# Patient Record
Sex: FEMALE | Race: WHITE | NOT HISPANIC OR LATINO | Employment: FULL TIME | URBAN - METROPOLITAN AREA
[De-identification: names, ages, dates, MRNs, and addresses within clinical notes are randomized per-mention and may not be internally consistent; named-entity substitution may affect disease eponyms.]

---

## 2018-12-08 ENCOUNTER — APPOINTMENT (OUTPATIENT)
Dept: CT IMAGING | Facility: HOSPITAL | Age: 43
End: 2018-12-08

## 2018-12-08 ENCOUNTER — HOSPITAL ENCOUNTER (EMERGENCY)
Facility: HOSPITAL | Age: 43
Discharge: HOME | End: 2018-12-08
Attending: EMERGENCY MEDICINE | Admitting: EMERGENCY MEDICINE

## 2018-12-08 ENCOUNTER — APPOINTMENT (OUTPATIENT)
Dept: GENERAL RADIOLOGY | Facility: HOSPITAL | Age: 43
End: 2018-12-08

## 2018-12-08 VITALS
SYSTOLIC BLOOD PRESSURE: 180 MMHG | WEIGHT: 168 LBS | HEIGHT: 66 IN | DIASTOLIC BLOOD PRESSURE: 125 MMHG | OXYGEN SATURATION: 98 % | RESPIRATION RATE: 16 BRPM | BODY MASS INDEX: 27 KG/M2 | TEMPERATURE: 99.5 F | HEART RATE: 120 BPM

## 2018-12-08 DIAGNOSIS — R10.9 LEFT FLANK PAIN: ICD-10-CM

## 2018-12-08 DIAGNOSIS — F19.10 POLYSUBSTANCE ABUSE (HCC): Primary | ICD-10-CM

## 2018-12-08 DIAGNOSIS — L98.9 SKIN LESIONS, GENERALIZED: ICD-10-CM

## 2018-12-08 LAB
ALBUMIN SERPL-MCNC: 3.5 G/DL (ref 3.5–5.2)
ALBUMIN/GLOB SERPL: 0.7 G/DL
ALP SERPL-CCNC: 82 U/L (ref 39–117)
ALT SERPL W P-5'-P-CCNC: 68 U/L (ref 1–33)
AMPHET+METHAMPHET UR QL: POSITIVE
ANION GAP SERPL CALCULATED.3IONS-SCNC: 13.7 MMOL/L
AST SERPL-CCNC: 77 U/L (ref 1–32)
B-HCG UR QL: NEGATIVE
BACTERIA UR QL AUTO: ABNORMAL /HPF
BARBITURATES UR QL SCN: NEGATIVE
BASOPHILS # BLD AUTO: 0.01 10*3/MM3 (ref 0–0.2)
BASOPHILS NFR BLD AUTO: 0.1 % (ref 0–1.5)
BENZODIAZ UR QL SCN: NEGATIVE
BILIRUB SERPL-MCNC: 0.9 MG/DL (ref 0.1–1.2)
BILIRUB UR QL STRIP: NEGATIVE
BUN BLD-MCNC: 9 MG/DL (ref 6–20)
BUN/CREAT SERPL: 13.2 (ref 7–25)
CALCIUM SPEC-SCNC: 8.8 MG/DL (ref 8.6–10.5)
CANNABINOIDS SERPL QL: POSITIVE
CHLORIDE SERPL-SCNC: 92 MMOL/L (ref 98–107)
CK SERPL-CCNC: 84 U/L (ref 20–180)
CLARITY UR: ABNORMAL
CO2 SERPL-SCNC: 25.3 MMOL/L (ref 22–29)
COARSE GRAN CASTS URNS QL MICRO: ABNORMAL /LPF
COCAINE UR QL: NEGATIVE
COLOR UR: ABNORMAL
CREAT BLD-MCNC: 0.68 MG/DL (ref 0.57–1)
D-LACTATE SERPL-SCNC: 1.5 MMOL/L (ref 0.5–2)
DEPRECATED RDW RBC AUTO: 52.2 FL (ref 37–54)
EOSINOPHIL # BLD AUTO: 0.01 10*3/MM3 (ref 0–0.7)
EOSINOPHIL NFR BLD AUTO: 0.1 % (ref 0.3–6.2)
ERYTHROCYTE [DISTWIDTH] IN BLOOD BY AUTOMATED COUNT: 16 % (ref 11.7–13)
FINE GRAN CASTS URNS QL MICRO: ABNORMAL /LPF
GFR SERPL CREATININE-BSD FRML MDRD: 95 ML/MIN/1.73
GLOBULIN UR ELPH-MCNC: 4.7 GM/DL
GLUCOSE BLD-MCNC: 118 MG/DL (ref 65–99)
GLUCOSE UR STRIP-MCNC: NEGATIVE MG/DL
HCT VFR BLD AUTO: 33.1 % (ref 35.6–45.5)
HGB BLD-MCNC: 10.4 G/DL (ref 11.9–15.5)
HGB UR QL STRIP.AUTO: NEGATIVE
HYALINE CASTS UR QL AUTO: ABNORMAL /LPF
IMM GRANULOCYTES # BLD: 0.03 10*3/MM3 (ref 0–0.03)
IMM GRANULOCYTES NFR BLD: 0.3 % (ref 0–0.5)
KETONES UR QL STRIP: ABNORMAL
LEUKOCYTE ESTERASE UR QL STRIP.AUTO: ABNORMAL
LIPASE SERPL-CCNC: 8 U/L (ref 13–60)
LYMPHOCYTES # BLD AUTO: 1.13 10*3/MM3 (ref 0.9–4.8)
LYMPHOCYTES NFR BLD AUTO: 9.7 % (ref 19.6–45.3)
MCH RBC QN AUTO: 28.1 PG (ref 26.9–32)
MCHC RBC AUTO-ENTMCNC: 31.4 G/DL (ref 32.4–36.3)
MCV RBC AUTO: 89.5 FL (ref 80.5–98.2)
METHADONE UR QL SCN: NEGATIVE
MONOCYTES # BLD AUTO: 1.06 10*3/MM3 (ref 0.2–1.2)
MONOCYTES NFR BLD AUTO: 9.1 % (ref 5–12)
MUCOUS THREADS URNS QL MICRO: ABNORMAL /HPF
NEUTROPHILS # BLD AUTO: 9.35 10*3/MM3 (ref 1.9–8.1)
NEUTROPHILS NFR BLD AUTO: 80.7 % (ref 42.7–76)
NITRITE UR QL STRIP: NEGATIVE
OPIATES UR QL: POSITIVE
OXYCODONE UR QL SCN: NEGATIVE
PH UR STRIP.AUTO: 5.5 [PH] (ref 5–8)
PLATELET # BLD AUTO: 197 10*3/MM3 (ref 140–500)
PMV BLD AUTO: 11.2 FL (ref 6–12)
POTASSIUM BLD-SCNC: 3.6 MMOL/L (ref 3.5–5.2)
PROCALCITONIN SERPL-MCNC: 0.83 NG/ML (ref 0.1–0.25)
PROT SERPL-MCNC: 8.2 G/DL (ref 6–8.5)
PROT UR QL STRIP: ABNORMAL
RBC # BLD AUTO: 3.7 10*6/MM3 (ref 3.9–5.2)
RBC # UR: ABNORMAL /HPF
REF LAB TEST METHOD: ABNORMAL
SODIUM BLD-SCNC: 131 MMOL/L (ref 136–145)
SP GR UR STRIP: 1.03 (ref 1–1.03)
SQUAMOUS #/AREA URNS HPF: ABNORMAL /HPF
UROBILINOGEN UR QL STRIP: ABNORMAL
WBC NRBC COR # BLD: 11.59 10*3/MM3 (ref 4.5–10.7)
WBC UR QL AUTO: ABNORMAL /HPF

## 2018-12-08 PROCEDURE — 80053 COMPREHEN METABOLIC PANEL: CPT | Performed by: PHYSICIAN ASSISTANT

## 2018-12-08 PROCEDURE — 87150 DNA/RNA AMPLIFIED PROBE: CPT | Performed by: PHYSICIAN ASSISTANT

## 2018-12-08 PROCEDURE — 81001 URINALYSIS AUTO W/SCOPE: CPT | Performed by: PHYSICIAN ASSISTANT

## 2018-12-08 PROCEDURE — 25010000002 KETOROLAC TROMETHAMINE PER 15 MG: Performed by: PHYSICIAN ASSISTANT

## 2018-12-08 PROCEDURE — 80307 DRUG TEST PRSMV CHEM ANLYZR: CPT | Performed by: PHYSICIAN ASSISTANT

## 2018-12-08 PROCEDURE — 71260 CT THORAX DX C+: CPT

## 2018-12-08 PROCEDURE — 87186 SC STD MICRODIL/AGAR DIL: CPT | Performed by: PHYSICIAN ASSISTANT

## 2018-12-08 PROCEDURE — 96375 TX/PRO/DX INJ NEW DRUG ADDON: CPT

## 2018-12-08 PROCEDURE — 96361 HYDRATE IV INFUSION ADD-ON: CPT

## 2018-12-08 PROCEDURE — 25010000002 ONDANSETRON PER 1 MG: Performed by: PHYSICIAN ASSISTANT

## 2018-12-08 PROCEDURE — 87040 BLOOD CULTURE FOR BACTERIA: CPT | Performed by: PHYSICIAN ASSISTANT

## 2018-12-08 PROCEDURE — 81025 URINE PREGNANCY TEST: CPT | Performed by: PHYSICIAN ASSISTANT

## 2018-12-08 PROCEDURE — 71045 X-RAY EXAM CHEST 1 VIEW: CPT

## 2018-12-08 PROCEDURE — 82550 ASSAY OF CK (CPK): CPT | Performed by: EMERGENCY MEDICINE

## 2018-12-08 PROCEDURE — 25010000002 IOPAMIDOL 61 % SOLUTION: Performed by: EMERGENCY MEDICINE

## 2018-12-08 PROCEDURE — 83605 ASSAY OF LACTIC ACID: CPT | Performed by: PHYSICIAN ASSISTANT

## 2018-12-08 PROCEDURE — 84145 PROCALCITONIN (PCT): CPT | Performed by: PHYSICIAN ASSISTANT

## 2018-12-08 PROCEDURE — 99284 EMERGENCY DEPT VISIT MOD MDM: CPT

## 2018-12-08 PROCEDURE — 87147 CULTURE TYPE IMMUNOLOGIC: CPT | Performed by: PHYSICIAN ASSISTANT

## 2018-12-08 PROCEDURE — 85025 COMPLETE CBC W/AUTO DIFF WBC: CPT | Performed by: PHYSICIAN ASSISTANT

## 2018-12-08 PROCEDURE — 83690 ASSAY OF LIPASE: CPT | Performed by: PHYSICIAN ASSISTANT

## 2018-12-08 PROCEDURE — 74177 CT ABD & PELVIS W/CONTRAST: CPT

## 2018-12-08 PROCEDURE — 96374 THER/PROPH/DIAG INJ IV PUSH: CPT

## 2018-12-08 PROCEDURE — 36415 COLL VENOUS BLD VENIPUNCTURE: CPT

## 2018-12-08 RX ORDER — ONDANSETRON 2 MG/ML
4 INJECTION INTRAMUSCULAR; INTRAVENOUS ONCE
Status: COMPLETED | OUTPATIENT
Start: 2018-12-08 | End: 2018-12-08

## 2018-12-08 RX ORDER — DOXYCYCLINE 100 MG/1
100 CAPSULE ORAL 2 TIMES DAILY
Qty: 20 CAPSULE | Refills: 0 | Status: SHIPPED | OUTPATIENT
Start: 2018-12-08 | End: 2018-12-21 | Stop reason: HOSPADM

## 2018-12-08 RX ORDER — KETOROLAC TROMETHAMINE 15 MG/ML
15 INJECTION, SOLUTION INTRAMUSCULAR; INTRAVENOUS ONCE
Status: COMPLETED | OUTPATIENT
Start: 2018-12-08 | End: 2018-12-08

## 2018-12-08 RX ORDER — SODIUM CHLORIDE 0.9 % (FLUSH) 0.9 %
10 SYRINGE (ML) INJECTION AS NEEDED
Status: DISCONTINUED | OUTPATIENT
Start: 2018-12-08 | End: 2018-12-08 | Stop reason: HOSPADM

## 2018-12-08 RX ORDER — NAPROXEN SODIUM 220 MG
220 TABLET ORAL 2 TIMES DAILY PRN
COMMUNITY
End: 2018-12-08

## 2018-12-08 RX ORDER — IBUPROFEN 600 MG/1
600 TABLET ORAL 3 TIMES DAILY
Qty: 24 TABLET | Refills: 0 | Status: SHIPPED | OUTPATIENT
Start: 2018-12-08 | End: 2018-12-21 | Stop reason: HOSPADM

## 2018-12-08 RX ORDER — METRONIDAZOLE 500 MG/1
2000 TABLET ORAL ONCE
Status: COMPLETED | OUTPATIENT
Start: 2018-12-08 | End: 2018-12-08

## 2018-12-08 RX ADMIN — IOPAMIDOL 85 ML: 612 INJECTION, SOLUTION INTRAVENOUS at 03:57

## 2018-12-08 RX ADMIN — SODIUM CHLORIDE 1000 ML: 9 INJECTION, SOLUTION INTRAVENOUS at 04:51

## 2018-12-08 RX ADMIN — ONDANSETRON 4 MG: 2 INJECTION INTRAMUSCULAR; INTRAVENOUS at 02:30

## 2018-12-08 RX ADMIN — SODIUM CHLORIDE, POTASSIUM CHLORIDE, SODIUM LACTATE AND CALCIUM CHLORIDE 1000 ML: 600; 310; 30; 20 INJECTION, SOLUTION INTRAVENOUS at 02:30

## 2018-12-08 RX ADMIN — METRONIDAZOLE 2000 MG: 500 TABLET, FILM COATED ORAL at 05:36

## 2018-12-08 RX ADMIN — KETOROLAC TROMETHAMINE 15 MG: 15 INJECTION, SOLUTION INTRAMUSCULAR; INTRAVENOUS at 05:13

## 2018-12-08 NOTE — DISCHARGE INSTRUCTIONS
Home, rest, home medicine as prescribed, keep well hydrated, follow up with PCP for recheck. Return to care with further concerns.

## 2018-12-08 NOTE — ED PROVIDER NOTES
Pt presents to the ED c/o left sided abd pain and flank pain that began several days ago. She also complains of fever, nausea, and vomiting for the same time period. On exam, mild left sided abd tenderness without rebound or guarding  Multiple excoriated areas to the trunk and extremities. Plan for labs and CT abd/pelvis.    Attestation:  The JACKY and I have discussed this patient's history, physical exam, and treatment plan.  I have reviewed the documentation and personally had a face to face interaction with the patient. I affirm the documentation and agree with the treatment and plan.  The attached note describes my personal findings.      Documentation assistance provided by arsh Saunders for Dr Barriga Information recorded by the arsh was done at my direction and has been verified and validated by me.     Tanna Saunders  12/08/18 0317       Aldo Barriga MD  12/08/18 0543

## 2018-12-08 NOTE — ED NOTES
Lesions are noted on bilaterally on forearms, feet, lower legs, abdomen. Lesions range in size but appear to be one cm in length, red appearance with dark discoloration around areas. Pt reports them to burn and itch.      Naina Gonsalez RN  12/08/18 0205

## 2018-12-08 NOTE — ED PROVIDER NOTES
" EMERGENCY DEPARTMENT ENCOUNTER    CHIEF COMPLAINT  Chief Complaint: Back Pain  History given by: Patient  History limited by:   Room Number: 26/26  PMD: Provider, No Known      HPI:  Pt is a 43 y.o. female who presents complaining of lower back pain that began several weeks ago. Since that time, pt reports that the pain has began to radiate to the LLQ and L hip. Pt was seen at [facility] and started on an abx (she does not know what for). Pt also reports some nausea and vomiting that began 4 day ago. Pt has hx of IVDA. She reports developing some skin lesions over the past several months. Pt has hx of nephrolithiasis.    Duration: several weeks  Onset: gradual  Timing: constant  Location: lower back  Radiation: LLQ, L hip  Quality: \"pain\"  Intensity/Severity: moderate  Progression: worsening  Associated Symptoms: nausea, vomiting, fever, wounds  Aggravating Factors: none  Alleviating Factors: none  Previous Episodes: hx of kidney stones. Pain is different  Treatment before arrival: seen at [facility] and started on abx.     PAST MEDICAL HISTORY  Active Ambulatory Problems     Diagnosis Date Noted   • No Active Ambulatory Problems     Resolved Ambulatory Problems     Diagnosis Date Noted   • No Resolved Ambulatory Problems     Past Medical History:   Diagnosis Date   • Kidney stone    • Lupus (systemic lupus erythematosus) (CMS/HCC)    • Mitral valve anterior leaflet prolapse    • Seizures (CMS/HCC)        PAST SURGICAL HISTORY  Past Surgical History:   Procedure Laterality Date   • CHOLECYSTECTOMY     • LIVER BIOPSY     • LYMPH NODE BIOPSY         FAMILY HISTORY  History reviewed. No pertinent family history.    SOCIAL HISTORY  Social History     Socioeconomic History   • Marital status: Single     Spouse name: Not on file   • Number of children: Not on file   • Years of education: Not on file   • Highest education level: Not on file   Social Needs   • Financial resource strain: Not on file   • Food insecurity - " worry: Not on file   • Food insecurity - inability: Not on file   • Transportation needs - medical: Not on file   • Transportation needs - non-medical: Not on file   Occupational History   • Not on file   Tobacco Use   • Smoking status: Current Every Day Smoker     Packs/day: 1.00     Types: Cigarettes   Substance and Sexual Activity   • Alcohol use: No     Frequency: Never   • Drug use: Yes     Types: IV   • Sexual activity: Defer   Other Topics Concern   • Not on file   Social History Narrative   • Not on file       ALLERGIES  Sulfa antibiotics    REVIEW OF SYSTEMS  Review of Systems   Constitutional: Positive for fever.   HENT: Negative for sore throat.    Eyes: Negative.    Respiratory: Negative for cough and shortness of breath.    Cardiovascular: Negative for chest pain.   Gastrointestinal: Positive for nausea and vomiting. Negative for abdominal pain and diarrhea.   Genitourinary: Negative for dysuria.   Musculoskeletal: Positive for back pain. Negative for neck pain.   Skin: Positive for wound. Negative for rash.   Allergic/Immunologic: Negative.    Neurological: Negative for weakness, numbness and headaches.   Hematological: Negative.    Psychiatric/Behavioral: Negative.    All other systems reviewed and are negative.      PHYSICAL EXAM  ED Triage Vitals [12/08/18 0100]   Temp Heart Rate Resp BP SpO2   99.9 °F (37.7 °C) (!) 124 18 -- 98 %      Temp src Heart Rate Source Patient Position BP Location FiO2 (%)   -- -- -- -- --       Physical Exam   Constitutional: She is oriented to person, place, and time. No distress.   HENT:   Head: Normocephalic and atraumatic.   Mouth/Throat: Mucous membranes are dry.   Eyes: EOM are normal. Pupils are equal, round, and reactive to light.   Neck: Normal range of motion. Neck supple.   Cardiovascular: Regular rhythm and normal heart sounds. Tachycardia present.   Pulmonary/Chest: Effort normal and breath sounds normal. No respiratory distress.   Abdominal: Soft. There is  no tenderness. There is no rebound and no guarding.   L flank tenderness   Musculoskeletal: Normal range of motion. She exhibits no edema.   Neurological: She is alert and oriented to person, place, and time. She has normal sensation and normal strength.   Skin: Skin is warm and dry. Lesion (multiple to face, BUE,and BLE. Consistant with picking.) noted.   Evidence of IV drug use.    Psychiatric: Mood and affect normal.   Nursing note and vitals reviewed.      LAB RESULTS  Lab Results (last 24 hours)     Procedure Component Value Units Date/Time    CBC & Differential [[id number]] Collected:  12/08/18 0208    Specimen:  Blood Updated:  12/08/18 0223    Narrative:       The following orders were created for panel order CBC & Differential.  Procedure                               Abnormality         Status                     ---------                               -----------         ------                     CBC Auto Differential[[id number]]        Abnormal            Final result                 Please view results for these tests on the individual orders.    Comprehensive Metabolic Panel [[id number]]  (Abnormal) Collected:  12/08/18 0208    Specimen:  Blood Updated:  12/08/18 0252     Glucose 118 mg/dL      BUN 9 mg/dL      Creatinine 0.68 mg/dL      Sodium 131 mmol/L      Potassium 3.6 mmol/L      Chloride 92 mmol/L      CO2 25.3 mmol/L      Calcium 8.8 mg/dL      Total Protein 8.2 g/dL      Albumin 3.50 g/dL      ALT (SGPT) 68 U/L      AST (SGOT) 77 U/L      Alkaline Phosphatase 82 U/L      Total Bilirubin 0.9 mg/dL      eGFR Non African Amer 95 mL/min/1.73      Globulin 4.7 gm/dL      A/G Ratio 0.7 g/dL      BUN/Creatinine Ratio 13.2     Anion Gap 13.7 mmol/L     Lipase [[id number]]  (Abnormal) Collected:  12/08/18 0208    Specimen:  Blood Updated:  12/08/18 0252     Lipase 8 U/L     Blood Culture - Blood, Arm, Left [[id number]] Collected:  12/08/18 0208    Specimen:  Blood from Arm, Left Updated:  12/08/18 0215     "Lactic Acid, Plasma [[id number]]  (Normal) Collected:  12/08/18 0208    Specimen:  Blood Updated:  12/08/18 0236     Lactate 1.5 mmol/L     Procalcitonin [[id number]]  (Abnormal) Collected:  12/08/18 0208    Specimen:  Blood Updated:  12/08/18 0301     Procalcitonin 0.83 ng/mL     Narrative:       As a Marker for Sepsis (Non-Neonates):   1. <0.5 ng/mL represents a low risk of severe sepsis and/or septic shock.  1. >2 ng/mL represents a high risk of severe sepsis and/or septic shock.    As a Marker for Lower Respiratory Tract Infections that require antibiotic therapy:  PCT on Admission     Antibiotic Therapy             6-12 Hrs later  > 0.5                Strongly Recommended            >0.25 - <0.5         Recommended  0.1 - 0.25           Discouraged                   Remeasure/reassess PCT  <0.1                 Strongly Discouraged          Remeasure/reassess PCT      As 28 day mortality risk marker: \"Change in Procalcitonin Result\" (> 80 % or <=80 %) if Day 0 (or Day 1) and Day 4 values are available. Refer to http://www.[employer]-pct-calculator.com/   Change in PCT <=80 %   A decrease of PCT levels below or equal to 80 % defines a positive change in PCT test result representing a higher risk for 28-day all-cause mortality of patients diagnosed with severe sepsis or septic shock.  Change in PCT > 80 %   A decrease of PCT levels of more than 80 % defines a negative change in PCT result representing a lower risk for 28-day all-cause mortality of patients diagnosed with severe sepsis or septic shock.                CBC Auto Differential [[id number]]  (Abnormal) Collected:  12/08/18 0208    Specimen:  Blood Updated:  12/08/18 0223     WBC 11.59 10*3/mm3      RBC 3.70 10*6/mm3      Hemoglobin 10.4 g/dL      Hematocrit 33.1 %      MCV 89.5 fL      MCH 28.1 pg      MCHC 31.4 g/dL      RDW 16.0 %      RDW-SD 52.2 fl      MPV 11.2 fL      Platelets 197 10*3/mm3      Neutrophil % 80.7 %      Lymphocyte % 9.7 %      Monocyte % " 9.1 %      Eosinophil % 0.1 %      Basophil % 0.1 %      Immature Grans % 0.3 %      Neutrophils, Absolute 9.35 10*3/mm3      Lymphocytes, Absolute 1.13 10*3/mm3      Monocytes, Absolute 1.06 10*3/mm3      Eosinophils, Absolute 0.01 10*3/mm3      Basophils, Absolute 0.01 10*3/mm3      Immature Grans, Absolute 0.03 10*3/mm3     CK [[id number]]  (Normal) Collected:  12/08/18 0208    Specimen:  Blood Updated:  12/08/18 0252     Creatine Kinase 84 U/L     Pregnancy, Urine - Urine, Clean Catch [[id number]]  (Normal) Collected:  12/08/18 0229    Specimen:  Urine, Clean Catch Updated:  12/08/18 0256     HCG, Urine QL Negative    Urinalysis With Microscopic If Indicated (No Culture) - Urine, Clean Catch [[id number]]  (Abnormal) Collected:  12/08/18 0229    Specimen:  Urine, Clean Catch Updated:  12/08/18 0330     Color, UA Dark Yellow     Appearance, UA Cloudy     pH, UA 5.5     Specific Gravity, UA 1.027     Glucose, UA Negative     Ketones, UA Trace     Bilirubin, UA Negative     Blood, UA Negative     Protein,  mg/dL (2+)     Leuk Esterase, UA Small (1+)     Nitrite, UA Negative     Urobilinogen, UA 2.0 E.U./dL    Urine Drug Screen - Urine, Clean Catch [[id number]]  (Abnormal) Collected:  12/08/18 0229    Specimen:  Urine, Clean Catch Updated:  12/08/18 0327     Amphet/Methamphet, Screen Positive     Barbiturates Screen, Urine Negative     Benzodiazepine Screen, Urine Negative     Cocaine Screen, Urine Negative     Opiate Screen Positive     THC, Screen, Urine Positive     Methadone Screen, Urine Negative     Oxycodone Screen, Urine Negative    Narrative:       Negative Thresholds For Drugs Screened:     Amphetamines               500 ng/ml   Barbiturates               200 ng/ml   Benzodiazepines            100 ng/ml   Cocaine                    300 ng/ml   Methadone                  300 ng/ml   Opiates                    300 ng/ml   Oxycodone                  100 ng/ml   THC                        50 ng/ml    The  Normal Value for all drugs tested is negative. This report includes final unconfirmed screening results to be used for medical treatment purposes only. Unconfirmed results must not be used for non-medical purposes such as employment or legal testing. Clinical consideration should be applied to any drug of abuse test, particulary when unconfirmed results are used.    Urinalysis, Microscopic Only - Urine, Clean Catch [[id number]]  (Abnormal) Collected:  12/08/18 0229    Specimen:  Urine, Clean Catch Updated:  12/08/18 0330     RBC, UA 0-2 /HPF      WBC, UA 3-5 /HPF      Bacteria, UA Trace /HPF      Squamous Epithelial Cells, UA 3-6 /HPF      Hyaline Casts, UA 13-20 /LPF      Coarse Granular Casts, UA 0-2 /LPF      Fine Granular Casts, UA 3-6 /LPF      Mucus, UA Small/1+ /HPF      Methodology Manual Light Microscopy    Blood Culture - Blood, Arm, Left [[id number]] Collected:  12/08/18 0243    Specimen:  Blood from Arm, Left Updated:  12/08/18 0247          I ordered the above labs and reviewed the results    RADIOLOGY  CT Chest With Contrast   Final Result      CT Abdomen Pelvis With Contrast   Final Result   IMPRESSION CHEST CT:    1. Unremarkable CT thorax     IMPRESSION ABDOMEN & PELVIS CT:    1. Homogeneous splenic enlargement.  2. Tiny right renal lesion, likely cyst      XR Chest 1 View   Final Result       No active disease by portable imaging.       This report was finalized on 12/8/2018 2:14 AM by Luis Pelaez M.D.               I ordered the above noted radiological studies. Interpreted by radiologist. Reviewed by me in PACS.       PROCEDURES  Procedures      PROGRESS AND CONSULTS     1:45 AM  Ordered blood work, CXR, CT abd/pel.  3:00 AM  Ordered CTA chest.  3:16 AM  Reviewed pt's history and workup with Dr. Barriga.  After a bedside evaluation; Dr Barriga agrees with the plan of care  5:02 AM  Rechecked with pt. Informed pt of her elevated procalcitonin, but otherwise unremarkable labs and imaging. Informed  of plan to discharge with abx. Pt understands and agrees with plan. All concerns addressed.       MEDICAL DECISION MAKING  Results were reviewed/discussed with the patient and they were also made aware of online access. Pt also made aware that some labs, such as cultures, will not be resulted during ER visit and follow up with PMD is necessary.     MDM  Number of Diagnoses or Management Options  Polysubstance abuse (CMS/HCC):      Amount and/or Complexity of Data Reviewed  Clinical lab tests: reviewed and ordered (Procalcitonin 0.83)  Tests in the radiology section of CPT®: reviewed and ordered (unremarkable)           DIAGNOSIS  Final diagnoses:   Polysubstance abuse (CMS/HCC)       DISPOSITION  DISCHARGE    Patient discharged in stable condition.    Reviewed implications of results, diagnosis, meds, responsibility to follow up, warning signs and symptoms of possible worsening, potential complications and reasons to return to ER.    Patient/Family voiced understanding of above instructions.    Discussed plan for discharge, as there is no emergent indication for admission. Patient referred to primary care provider for BP management due to today's BP. Pt/family is agreeable and understands need for follow up and repeat testing.  Pt is aware that discharge does not mean that nothing is wrong but it indicates no emergency is present that requires admission and they must continue care with follow-up as given below or physician of their choice.     FOLLOW-UP  No follow-up provider specified.       Medication List      No changes were made to your prescriptions during this visit.           Latest Documented Vital Signs:  As of 5:04 AM  BP- (!) 162/113 HR- (!) 122 Temp- 99.9 °F (37.7 °C) O2 sat- 98%    --  Documentation assistance provided by arsh Luu for Dago Valverde PA-C.  Information recorded by the arsh was done at my direction and has been verified and validated by me.       Yovany Luu  12/08/18  0504       Dago Valverde, PA  12/08/18 0546

## 2018-12-09 ENCOUNTER — APPOINTMENT (OUTPATIENT)
Dept: GENERAL RADIOLOGY | Facility: HOSPITAL | Age: 43
End: 2018-12-09

## 2018-12-09 ENCOUNTER — ANESTHESIA EVENT (OUTPATIENT)
Dept: PERIOP | Facility: HOSPITAL | Age: 43
End: 2018-12-09

## 2018-12-09 ENCOUNTER — APPOINTMENT (OUTPATIENT)
Dept: MRI IMAGING | Facility: HOSPITAL | Age: 43
End: 2018-12-09

## 2018-12-09 ENCOUNTER — ANESTHESIA (OUTPATIENT)
Dept: PERIOP | Facility: HOSPITAL | Age: 43
End: 2018-12-09

## 2018-12-09 ENCOUNTER — HOSPITAL ENCOUNTER (INPATIENT)
Facility: HOSPITAL | Age: 43
LOS: 12 days | Discharge: PSYCHIATRIC OR SUBSTANCE USE FACILITY | End: 2018-12-21
Attending: EMERGENCY MEDICINE | Admitting: INTERNAL MEDICINE

## 2018-12-09 DIAGNOSIS — B95.62 BACTEREMIA DUE TO METHICILLIN RESISTANT STAPHYLOCOCCUS AUREUS: ICD-10-CM

## 2018-12-09 DIAGNOSIS — R53.1 GENERALIZED WEAKNESS: ICD-10-CM

## 2018-12-09 DIAGNOSIS — G06.1 SPINAL EPIDURAL ABSCESS: Primary | ICD-10-CM

## 2018-12-09 DIAGNOSIS — M60.08 ABSCESS OF PARASPINAL MUSCLES: ICD-10-CM

## 2018-12-09 DIAGNOSIS — R78.81 BACTEREMIA DUE TO METHICILLIN RESISTANT STAPHYLOCOCCUS AUREUS: ICD-10-CM

## 2018-12-09 LAB
ALBUMIN SERPL-MCNC: 3.1 G/DL (ref 3.5–5.2)
ALBUMIN/GLOB SERPL: 0.7 G/DL
ALP SERPL-CCNC: 82 U/L (ref 39–117)
ALT SERPL W P-5'-P-CCNC: 52 U/L (ref 1–33)
ANION GAP SERPL CALCULATED.3IONS-SCNC: 11.7 MMOL/L
AST SERPL-CCNC: 59 U/L (ref 1–32)
BACTERIA BLD CULT: ABNORMAL
BASOPHILS # BLD AUTO: 0.01 10*3/MM3 (ref 0–0.2)
BASOPHILS NFR BLD AUTO: 0.1 % (ref 0–1.5)
BILIRUB SERPL-MCNC: 0.8 MG/DL (ref 0.1–1.2)
BUN BLD-MCNC: 8 MG/DL (ref 6–20)
BUN/CREAT SERPL: 11.9 (ref 7–25)
CALCIUM SPEC-SCNC: 9 MG/DL (ref 8.6–10.5)
CHLORIDE SERPL-SCNC: 94 MMOL/L (ref 98–107)
CO2 SERPL-SCNC: 29.3 MMOL/L (ref 22–29)
CREAT BLD-MCNC: 0.67 MG/DL (ref 0.57–1)
CRP SERPL-MCNC: 18.57 MG/DL (ref 0–0.5)
D-LACTATE SERPL-SCNC: 2.9 MMOL/L (ref 0.5–2)
DEPRECATED RDW RBC AUTO: 53.9 FL (ref 37–54)
EOSINOPHIL # BLD AUTO: 0.07 10*3/MM3 (ref 0–0.7)
EOSINOPHIL NFR BLD AUTO: 0.6 % (ref 0.3–6.2)
ERYTHROCYTE [DISTWIDTH] IN BLOOD BY AUTOMATED COUNT: 16.5 % (ref 11.7–13)
ERYTHROCYTE [SEDIMENTATION RATE] IN BLOOD: 66 MM/HR (ref 0–20)
GFR SERPL CREATININE-BSD FRML MDRD: 97 ML/MIN/1.73
GLOBULIN UR ELPH-MCNC: 4.7 GM/DL
GLUCOSE BLD-MCNC: 98 MG/DL (ref 65–99)
HCT VFR BLD AUTO: 34.5 % (ref 35.6–45.5)
HGB BLD-MCNC: 10.7 G/DL (ref 11.9–15.5)
HOLD SPECIMEN: NORMAL
IMM GRANULOCYTES # BLD: 0.06 10*3/MM3 (ref 0–0.03)
IMM GRANULOCYTES NFR BLD: 0.5 % (ref 0–0.5)
LYMPHOCYTES # BLD AUTO: 0.87 10*3/MM3 (ref 0.9–4.8)
LYMPHOCYTES NFR BLD AUTO: 7.7 % (ref 19.6–45.3)
MCH RBC QN AUTO: 27.7 PG (ref 26.9–32)
MCHC RBC AUTO-ENTMCNC: 31 G/DL (ref 32.4–36.3)
MCV RBC AUTO: 89.4 FL (ref 80.5–98.2)
MONOCYTES # BLD AUTO: 1.02 10*3/MM3 (ref 0.2–1.2)
MONOCYTES NFR BLD AUTO: 9 % (ref 5–12)
NEUTROPHILS # BLD AUTO: 9.26 10*3/MM3 (ref 1.9–8.1)
NEUTROPHILS NFR BLD AUTO: 82.1 % (ref 42.7–76)
PLATELET # BLD AUTO: 194 10*3/MM3 (ref 140–500)
PMV BLD AUTO: 10.8 FL (ref 6–12)
POTASSIUM BLD-SCNC: 3.2 MMOL/L (ref 3.5–5.2)
PROT SERPL-MCNC: 7.8 G/DL (ref 6–8.5)
RBC # BLD AUTO: 3.86 10*6/MM3 (ref 3.9–5.2)
SODIUM BLD-SCNC: 135 MMOL/L (ref 136–145)
WBC NRBC COR # BLD: 11.29 10*3/MM3 (ref 4.5–10.7)
WHOLE BLOOD HOLD SPECIMEN: NORMAL
WHOLE BLOOD HOLD SPECIMEN: NORMAL

## 2018-12-09 PROCEDURE — 25010000002 FENTANYL CITRATE (PF) 100 MCG/2ML SOLUTION: Performed by: ANESTHESIOLOGY

## 2018-12-09 PROCEDURE — 85025 COMPLETE CBC W/AUTO DIFF WBC: CPT | Performed by: EMERGENCY MEDICINE

## 2018-12-09 PROCEDURE — 83605 ASSAY OF LACTIC ACID: CPT | Performed by: EMERGENCY MEDICINE

## 2018-12-09 PROCEDURE — A9577 INJ MULTIHANCE: HCPCS | Performed by: EMERGENCY MEDICINE

## 2018-12-09 PROCEDURE — 80053 COMPREHEN METABOLIC PANEL: CPT | Performed by: EMERGENCY MEDICINE

## 2018-12-09 PROCEDURE — 25010000002 CEFEPIME PER 500 MG: Performed by: NEUROLOGICAL SURGERY

## 2018-12-09 PROCEDURE — 87205 SMEAR GRAM STAIN: CPT | Performed by: NEUROLOGICAL SURGERY

## 2018-12-09 PROCEDURE — 87070 CULTURE OTHR SPECIMN AEROBIC: CPT | Performed by: NEUROLOGICAL SURGERY

## 2018-12-09 PROCEDURE — 25010000002 VANCOMYCIN 1 G RECONSTITUTED SOLUTION 1 EACH VIAL: Performed by: NEUROLOGICAL SURGERY

## 2018-12-09 PROCEDURE — 25010000002 ONDANSETRON PER 1 MG: Performed by: ANESTHESIOLOGY

## 2018-12-09 PROCEDURE — 72158 MRI LUMBAR SPINE W/O & W/DYE: CPT

## 2018-12-09 PROCEDURE — 87176 TISSUE HOMOGENIZATION CULTR: CPT | Performed by: NEUROLOGICAL SURGERY

## 2018-12-09 PROCEDURE — 87186 SC STD MICRODIL/AGAR DIL: CPT | Performed by: NEUROLOGICAL SURGERY

## 2018-12-09 PROCEDURE — 25010000002 HYDROMORPHONE PER 4 MG: Performed by: ANESTHESIOLOGY

## 2018-12-09 PROCEDURE — 76000 FLUOROSCOPY <1 HR PHYS/QHP: CPT

## 2018-12-09 PROCEDURE — 63267 EXCISE INTRSPINL LESION LMBR: CPT | Performed by: NEUROLOGICAL SURGERY

## 2018-12-09 PROCEDURE — 25010000002 VANCOMYCIN 10 G RECONSTITUTED SOLUTION: Performed by: EMERGENCY MEDICINE

## 2018-12-09 PROCEDURE — 25010000002 PROPOFOL 10 MG/ML EMULSION: Performed by: ANESTHESIOLOGY

## 2018-12-09 PROCEDURE — 87116 MYCOBACTERIA CULTURE: CPT | Performed by: NEUROLOGICAL SURGERY

## 2018-12-09 PROCEDURE — 25010000002 DEXAMETHASONE PER 1 MG: Performed by: ANESTHESIOLOGY

## 2018-12-09 PROCEDURE — 87147 CULTURE TYPE IMMUNOLOGIC: CPT | Performed by: NEUROLOGICAL SURGERY

## 2018-12-09 PROCEDURE — 85652 RBC SED RATE AUTOMATED: CPT | Performed by: EMERGENCY MEDICINE

## 2018-12-09 PROCEDURE — 0 GADOBENATE DIMEGLUMINE 529 MG/ML SOLUTION: Performed by: EMERGENCY MEDICINE

## 2018-12-09 PROCEDURE — 25010000002 KETOROLAC TROMETHAMINE PER 15 MG: Performed by: ANESTHESIOLOGY

## 2018-12-09 PROCEDURE — 87102 FUNGUS ISOLATION CULTURE: CPT | Performed by: NEUROLOGICAL SURGERY

## 2018-12-09 PROCEDURE — 25010000002 LORAZEPAM PER 2 MG: Performed by: EMERGENCY MEDICINE

## 2018-12-09 PROCEDURE — 01NB0ZZ RELEASE LUMBAR NERVE, OPEN APPROACH: ICD-10-PCS | Performed by: NEUROLOGICAL SURGERY

## 2018-12-09 PROCEDURE — 99254 IP/OBS CNSLTJ NEW/EST MOD 60: CPT | Performed by: NEUROLOGICAL SURGERY

## 2018-12-09 PROCEDURE — 69990 MICROSURGERY ADD-ON: CPT | Performed by: NEUROLOGICAL SURGERY

## 2018-12-09 PROCEDURE — 87075 CULTR BACTERIA EXCEPT BLOOD: CPT | Performed by: NEUROLOGICAL SURGERY

## 2018-12-09 PROCEDURE — 25010000002 FENTANYL CITRATE (PF) 100 MCG/2ML SOLUTION: Performed by: EMERGENCY MEDICINE

## 2018-12-09 PROCEDURE — 87206 SMEAR FLUORESCENT/ACID STAI: CPT | Performed by: NEUROLOGICAL SURGERY

## 2018-12-09 PROCEDURE — 86140 C-REACTIVE PROTEIN: CPT | Performed by: EMERGENCY MEDICINE

## 2018-12-09 PROCEDURE — 25010000002 MIDAZOLAM PER 1 MG: Performed by: ANESTHESIOLOGY

## 2018-12-09 PROCEDURE — 72020 X-RAY EXAM OF SPINE 1 VIEW: CPT

## 2018-12-09 PROCEDURE — 99285 EMERGENCY DEPT VISIT HI MDM: CPT

## 2018-12-09 DEVICE — WAX BONE HEMO NAT 2.5G: Type: IMPLANTABLE DEVICE | Site: SPINE LUMBAR | Status: FUNCTIONAL

## 2018-12-09 RX ORDER — SODIUM CHLORIDE AND POTASSIUM CHLORIDE 150; 900 MG/100ML; MG/100ML
60 INJECTION, SOLUTION INTRAVENOUS CONTINUOUS
Status: DISCONTINUED | OUTPATIENT
Start: 2018-12-09 | End: 2018-12-10

## 2018-12-09 RX ORDER — HYDROCODONE BITARTRATE AND ACETAMINOPHEN 7.5; 325 MG/1; MG/1
1 TABLET ORAL ONCE AS NEEDED
Status: DISCONTINUED | OUTPATIENT
Start: 2018-12-09 | End: 2018-12-10 | Stop reason: HOSPADM

## 2018-12-09 RX ORDER — FENTANYL CITRATE 50 UG/ML
50 INJECTION, SOLUTION INTRAMUSCULAR; INTRAVENOUS
Status: DISCONTINUED | OUTPATIENT
Start: 2018-12-09 | End: 2018-12-10 | Stop reason: HOSPADM

## 2018-12-09 RX ORDER — ONDANSETRON 2 MG/ML
4 INJECTION INTRAMUSCULAR; INTRAVENOUS EVERY 6 HOURS PRN
Status: DISCONTINUED | OUTPATIENT
Start: 2018-12-09 | End: 2018-12-21 | Stop reason: HOSPADM

## 2018-12-09 RX ORDER — PROMETHAZINE HYDROCHLORIDE 25 MG/1
25 SUPPOSITORY RECTAL ONCE AS NEEDED
Status: DISCONTINUED | OUTPATIENT
Start: 2018-12-09 | End: 2018-12-10 | Stop reason: HOSPADM

## 2018-12-09 RX ORDER — ONDANSETRON 4 MG/1
4 TABLET, FILM COATED ORAL EVERY 6 HOURS PRN
Status: DISCONTINUED | OUTPATIENT
Start: 2018-12-09 | End: 2018-12-21 | Stop reason: HOSPADM

## 2018-12-09 RX ORDER — ONDANSETRON 2 MG/ML
INJECTION INTRAMUSCULAR; INTRAVENOUS AS NEEDED
Status: DISCONTINUED | OUTPATIENT
Start: 2018-12-09 | End: 2018-12-09 | Stop reason: SURG

## 2018-12-09 RX ORDER — LORAZEPAM 2 MG/ML
1 INJECTION INTRAMUSCULAR ONCE
Status: COMPLETED | OUTPATIENT
Start: 2018-12-09 | End: 2018-12-09

## 2018-12-09 RX ORDER — MIDAZOLAM HYDROCHLORIDE 1 MG/ML
1 INJECTION INTRAMUSCULAR; INTRAVENOUS
Status: DISCONTINUED | OUTPATIENT
Start: 2018-12-09 | End: 2018-12-09 | Stop reason: HOSPADM

## 2018-12-09 RX ORDER — HYDROMORPHONE HYDROCHLORIDE 1 MG/ML
0.5 INJECTION, SOLUTION INTRAMUSCULAR; INTRAVENOUS; SUBCUTANEOUS
Status: DISCONTINUED | OUTPATIENT
Start: 2018-12-09 | End: 2018-12-10 | Stop reason: HOSPADM

## 2018-12-09 RX ORDER — MIDAZOLAM HYDROCHLORIDE 1 MG/ML
2 INJECTION INTRAMUSCULAR; INTRAVENOUS
Status: DISCONTINUED | OUTPATIENT
Start: 2018-12-09 | End: 2018-12-09 | Stop reason: HOSPADM

## 2018-12-09 RX ORDER — PROMETHAZINE HYDROCHLORIDE 25 MG/ML
12.5 INJECTION, SOLUTION INTRAMUSCULAR; INTRAVENOUS ONCE AS NEEDED
Status: DISCONTINUED | OUTPATIENT
Start: 2018-12-09 | End: 2018-12-10 | Stop reason: HOSPADM

## 2018-12-09 RX ORDER — DOCUSATE SODIUM 100 MG/1
100 CAPSULE, LIQUID FILLED ORAL 2 TIMES DAILY PRN
Status: DISCONTINUED | OUTPATIENT
Start: 2018-12-09 | End: 2018-12-14

## 2018-12-09 RX ORDER — OXYCODONE AND ACETAMINOPHEN 7.5; 325 MG/1; MG/1
1 TABLET ORAL ONCE AS NEEDED
Status: DISCONTINUED | OUTPATIENT
Start: 2018-12-09 | End: 2018-12-10 | Stop reason: HOSPADM

## 2018-12-09 RX ORDER — FENTANYL CITRATE 50 UG/ML
50 INJECTION, SOLUTION INTRAMUSCULAR; INTRAVENOUS ONCE
Status: COMPLETED | OUTPATIENT
Start: 2018-12-09 | End: 2018-12-09

## 2018-12-09 RX ORDER — NALOXONE HCL 0.4 MG/ML
0.2 VIAL (ML) INJECTION AS NEEDED
Status: DISCONTINUED | OUTPATIENT
Start: 2018-12-09 | End: 2018-12-10 | Stop reason: HOSPADM

## 2018-12-09 RX ORDER — HYDROMORPHONE HCL IN 0.9% NACL 50 MG/50ML
PATIENT CONTROLLED ANALGESIA SYRINGE INTRAVENOUS CONTINUOUS
Status: DISCONTINUED | OUTPATIENT
Start: 2018-12-09 | End: 2018-12-12

## 2018-12-09 RX ORDER — SODIUM CHLORIDE, SODIUM LACTATE, POTASSIUM CHLORIDE, CALCIUM CHLORIDE 600; 310; 30; 20 MG/100ML; MG/100ML; MG/100ML; MG/100ML
9 INJECTION, SOLUTION INTRAVENOUS CONTINUOUS
Status: DISCONTINUED | OUTPATIENT
Start: 2018-12-09 | End: 2018-12-10

## 2018-12-09 RX ORDER — DEXAMETHASONE SODIUM PHOSPHATE 10 MG/ML
INJECTION INTRAMUSCULAR; INTRAVENOUS AS NEEDED
Status: DISCONTINUED | OUTPATIENT
Start: 2018-12-09 | End: 2018-12-09 | Stop reason: SURG

## 2018-12-09 RX ORDER — OXYCODONE AND ACETAMINOPHEN 10; 325 MG/1; MG/1
1 TABLET ORAL EVERY 4 HOURS PRN
Status: DISCONTINUED | OUTPATIENT
Start: 2018-12-09 | End: 2018-12-18

## 2018-12-09 RX ORDER — KETOROLAC TROMETHAMINE 30 MG/ML
INJECTION, SOLUTION INTRAMUSCULAR; INTRAVENOUS AS NEEDED
Status: DISCONTINUED | OUTPATIENT
Start: 2018-12-09 | End: 2018-12-09 | Stop reason: SURG

## 2018-12-09 RX ORDER — PROPOFOL 10 MG/ML
VIAL (ML) INTRAVENOUS AS NEEDED
Status: DISCONTINUED | OUTPATIENT
Start: 2018-12-09 | End: 2018-12-09 | Stop reason: SURG

## 2018-12-09 RX ORDER — EPHEDRINE SULFATE 50 MG/ML
5 INJECTION, SOLUTION INTRAVENOUS ONCE AS NEEDED
Status: DISCONTINUED | OUTPATIENT
Start: 2018-12-09 | End: 2018-12-10 | Stop reason: HOSPADM

## 2018-12-09 RX ORDER — ONDANSETRON 2 MG/ML
4 INJECTION INTRAMUSCULAR; INTRAVENOUS ONCE AS NEEDED
Status: DISCONTINUED | OUTPATIENT
Start: 2018-12-09 | End: 2018-12-10 | Stop reason: HOSPADM

## 2018-12-09 RX ORDER — ROCURONIUM BROMIDE 10 MG/ML
INJECTION, SOLUTION INTRAVENOUS AS NEEDED
Status: DISCONTINUED | OUTPATIENT
Start: 2018-12-09 | End: 2018-12-09 | Stop reason: SURG

## 2018-12-09 RX ORDER — LIDOCAINE HYDROCHLORIDE 20 MG/ML
INJECTION, SOLUTION INFILTRATION; PERINEURAL AS NEEDED
Status: DISCONTINUED | OUTPATIENT
Start: 2018-12-09 | End: 2018-12-09 | Stop reason: SURG

## 2018-12-09 RX ORDER — ONDANSETRON 4 MG/1
4 TABLET, ORALLY DISINTEGRATING ORAL EVERY 6 HOURS PRN
Status: DISCONTINUED | OUTPATIENT
Start: 2018-12-09 | End: 2018-12-21 | Stop reason: HOSPADM

## 2018-12-09 RX ORDER — SODIUM CHLORIDE 0.9 % (FLUSH) 0.9 %
10 SYRINGE (ML) INJECTION AS NEEDED
Status: DISCONTINUED | OUTPATIENT
Start: 2018-12-09 | End: 2018-12-21 | Stop reason: HOSPADM

## 2018-12-09 RX ORDER — DIPHENHYDRAMINE HCL 25 MG
25 CAPSULE ORAL
Status: DISCONTINUED | OUTPATIENT
Start: 2018-12-09 | End: 2018-12-10 | Stop reason: HOSPADM

## 2018-12-09 RX ORDER — FENTANYL CITRATE 50 UG/ML
INJECTION, SOLUTION INTRAMUSCULAR; INTRAVENOUS
Status: COMPLETED
Start: 2018-12-09 | End: 2018-12-09

## 2018-12-09 RX ORDER — FENTANYL CITRATE 50 UG/ML
50 INJECTION, SOLUTION INTRAMUSCULAR; INTRAVENOUS
Status: DISCONTINUED | OUTPATIENT
Start: 2018-12-09 | End: 2018-12-09 | Stop reason: HOSPADM

## 2018-12-09 RX ORDER — NALOXONE HCL 0.4 MG/ML
0.1 VIAL (ML) INJECTION
Status: DISCONTINUED | OUTPATIENT
Start: 2018-12-09 | End: 2018-12-21 | Stop reason: HOSPADM

## 2018-12-09 RX ORDER — PROMETHAZINE HYDROCHLORIDE 25 MG/1
12.5 TABLET ORAL ONCE AS NEEDED
Status: DISCONTINUED | OUTPATIENT
Start: 2018-12-09 | End: 2018-12-10 | Stop reason: HOSPADM

## 2018-12-09 RX ORDER — FLUMAZENIL 0.1 MG/ML
0.2 INJECTION INTRAVENOUS AS NEEDED
Status: DISCONTINUED | OUTPATIENT
Start: 2018-12-09 | End: 2018-12-10 | Stop reason: HOSPADM

## 2018-12-09 RX ORDER — LIDOCAINE HYDROCHLORIDE 10 MG/ML
0.5 INJECTION, SOLUTION EPIDURAL; INFILTRATION; INTRACAUDAL; PERINEURAL ONCE AS NEEDED
Status: DISCONTINUED | OUTPATIENT
Start: 2018-12-09 | End: 2018-12-09 | Stop reason: HOSPADM

## 2018-12-09 RX ORDER — HYDROMORPHONE HCL 110MG/55ML
PATIENT CONTROLLED ANALGESIA SYRINGE INTRAVENOUS AS NEEDED
Status: DISCONTINUED | OUTPATIENT
Start: 2018-12-09 | End: 2018-12-09 | Stop reason: SURG

## 2018-12-09 RX ORDER — SODIUM CHLORIDE 9 MG/ML
INJECTION, SOLUTION INTRAVENOUS AS NEEDED
Status: DISCONTINUED | OUTPATIENT
Start: 2018-12-09 | End: 2018-12-09 | Stop reason: HOSPADM

## 2018-12-09 RX ORDER — PROMETHAZINE HYDROCHLORIDE 25 MG/1
25 TABLET ORAL ONCE AS NEEDED
Status: DISCONTINUED | OUTPATIENT
Start: 2018-12-09 | End: 2018-12-10 | Stop reason: HOSPADM

## 2018-12-09 RX ORDER — FAMOTIDINE 10 MG/ML
20 INJECTION, SOLUTION INTRAVENOUS ONCE
Status: COMPLETED | OUTPATIENT
Start: 2018-12-09 | End: 2018-12-09

## 2018-12-09 RX ORDER — FENTANYL CITRATE 50 UG/ML
INJECTION, SOLUTION INTRAMUSCULAR; INTRAVENOUS AS NEEDED
Status: DISCONTINUED | OUTPATIENT
Start: 2018-12-09 | End: 2018-12-09 | Stop reason: SURG

## 2018-12-09 RX ORDER — SODIUM CHLORIDE 0.9 % (FLUSH) 0.9 %
1-10 SYRINGE (ML) INJECTION AS NEEDED
Status: DISCONTINUED | OUTPATIENT
Start: 2018-12-09 | End: 2018-12-09 | Stop reason: HOSPADM

## 2018-12-09 RX ADMIN — HYDROMORPHONE HYDROCHLORIDE: 10 INJECTION, SOLUTION INTRAMUSCULAR; INTRAVENOUS; SUBCUTANEOUS at 23:33

## 2018-12-09 RX ADMIN — SODIUM CHLORIDE, POTASSIUM CHLORIDE, SODIUM LACTATE AND CALCIUM CHLORIDE: 600; 310; 30; 20 INJECTION, SOLUTION INTRAVENOUS at 20:27

## 2018-12-09 RX ADMIN — ONDANSETRON 4 MG: 2 INJECTION INTRAMUSCULAR; INTRAVENOUS at 20:27

## 2018-12-09 RX ADMIN — HYDROMORPHONE HYDROCHLORIDE 0.5 MG: 2 INJECTION INTRAMUSCULAR; INTRAVENOUS; SUBCUTANEOUS at 20:45

## 2018-12-09 RX ADMIN — MIDAZOLAM HYDROCHLORIDE 2 MG: 2 INJECTION, SOLUTION INTRAMUSCULAR; INTRAVENOUS at 19:50

## 2018-12-09 RX ADMIN — SODIUM CHLORIDE 1000 ML: 9 INJECTION, SOLUTION INTRAVENOUS at 15:18

## 2018-12-09 RX ADMIN — FENTANYL CITRATE 50 MCG: 50 INJECTION, SOLUTION INTRAMUSCULAR; INTRAVENOUS at 22:03

## 2018-12-09 RX ADMIN — VANCOMYCIN HYDROCHLORIDE 1500 MG: 10 INJECTION, POWDER, LYOPHILIZED, FOR SOLUTION INTRAVENOUS at 15:36

## 2018-12-09 RX ADMIN — FENTANYL CITRATE 50 MCG: 50 INJECTION, SOLUTION INTRAMUSCULAR; INTRAVENOUS at 18:37

## 2018-12-09 RX ADMIN — ROCURONIUM BROMIDE 50 MG: 10 INJECTION INTRAVENOUS at 20:27

## 2018-12-09 RX ADMIN — PROPOFOL 200 MG: 10 INJECTION, EMULSION INTRAVENOUS at 20:27

## 2018-12-09 RX ADMIN — FENTANYL CITRATE 50 MCG: 50 INJECTION, SOLUTION INTRAMUSCULAR; INTRAVENOUS at 22:09

## 2018-12-09 RX ADMIN — SUGAMMADEX 152 MG: 100 INJECTION, SOLUTION INTRAVENOUS at 21:55

## 2018-12-09 RX ADMIN — LORAZEPAM 1 MG: 2 INJECTION INTRAMUSCULAR; INTRAVENOUS at 15:33

## 2018-12-09 RX ADMIN — GADOBENATE DIMEGLUMINE 15 ML: 529 INJECTION, SOLUTION INTRAVENOUS at 16:59

## 2018-12-09 RX ADMIN — FAMOTIDINE 20 MG: 10 INJECTION, SOLUTION INTRAVENOUS at 19:43

## 2018-12-09 RX ADMIN — FENTANYL CITRATE 50 MCG: 50 INJECTION, SOLUTION INTRAMUSCULAR; INTRAVENOUS at 23:15

## 2018-12-09 RX ADMIN — FENTANYL CITRATE 50 MCG: 50 INJECTION, SOLUTION INTRAMUSCULAR; INTRAVENOUS at 21:58

## 2018-12-09 RX ADMIN — SODIUM CHLORIDE, POTASSIUM CHLORIDE, SODIUM LACTATE AND CALCIUM CHLORIDE 9 ML/HR: 600; 310; 30; 20 INJECTION, SOLUTION INTRAVENOUS at 19:40

## 2018-12-09 RX ADMIN — KETOROLAC TROMETHAMINE 30 MG: 30 INJECTION, SOLUTION INTRAMUSCULAR; INTRAVENOUS at 22:09

## 2018-12-09 RX ADMIN — FENTANYL CITRATE 50 MCG: 50 INJECTION, SOLUTION INTRAMUSCULAR; INTRAVENOUS at 21:48

## 2018-12-09 RX ADMIN — LIDOCAINE HYDROCHLORIDE 100 MG: 20 INJECTION, SOLUTION INFILTRATION; PERINEURAL at 20:27

## 2018-12-09 RX ADMIN — FENTANYL CITRATE 50 MCG: 50 INJECTION, SOLUTION INTRAMUSCULAR; INTRAVENOUS at 20:27

## 2018-12-09 RX ADMIN — CEFEPIME 2 G: 2 INJECTION, POWDER, FOR SOLUTION INTRAVENOUS at 20:17

## 2018-12-09 RX ADMIN — HYDROMORPHONE HYDROCHLORIDE 0.5 MG: 2 INJECTION INTRAMUSCULAR; INTRAVENOUS; SUBCUTANEOUS at 20:35

## 2018-12-09 RX ADMIN — DEXAMETHASONE SODIUM PHOSPHATE 8 MG: 10 INJECTION INTRAMUSCULAR; INTRAVENOUS at 20:27

## 2018-12-09 RX ADMIN — FENTANYL CITRATE 50 MCG: 50 INJECTION, SOLUTION INTRAMUSCULAR; INTRAVENOUS at 19:45

## 2018-12-09 NOTE — ED PROVIDER NOTES
EMERGENCY DEPARTMENT ENCOUNTER    CHIEF COMPLAINT  Chief Complaint: abnormal lab  History given by: patient  History limited by: nothing  Room Number: 21/21  PMD: Provider, No Known      HPI:  Pt is a 43 y.o. female who presents to the ED after she received a call from the ED stating that her blood cultures were positive and that she needs to return to the ED. Pt complains of lower back pain and lower abd pain. Pt also complains of BLE weakness, difficulty urinating (yesterday) and BLE numbness (yesterday).Pt states that she went to [facility] yesterday after leaving [facility] yesterday because of her continued severe back pain and received an Ativan injection. Pt states that she has a history of IVDU and that she last used yesterday.    Duration:  2 days  Onset: gradual  Timing: constant  Quality: positive blood cultures  Intensity/Severity: moderate  Progression: unchanged  Associated Symptoms: lower back pain, lower abd pain, BLE weakness, difficulty urinating and BLE numbness  Aggravating Factors: none  Alleviating Factors: none  Previous Episodes: none mentioned  Treatment before arrival: none    PAST MEDICAL HISTORY  Active Ambulatory Problems     Diagnosis Date Noted   • No Active Ambulatory Problems     Resolved Ambulatory Problems     Diagnosis Date Noted   • No Resolved Ambulatory Problems     Past Medical History:   Diagnosis Date   • Kidney stone    • Lupus (systemic lupus erythematosus) (CMS/HCC)    • Mitral valve anterior leaflet prolapse    • Seizures (CMS/HCC)        PAST SURGICAL HISTORY  Past Surgical History:   Procedure Laterality Date   • CHOLECYSTECTOMY     • LIVER BIOPSY     • LYMPH NODE BIOPSY         FAMILY HISTORY  History reviewed. No pertinent family history.    SOCIAL HISTORY  Social History     Socioeconomic History   • Marital status: Single     Spouse name: Not on file   • Number of children: Not on file   • Years of education: Not on file   • Highest education level: Not on file    Social Needs   • Financial resource strain: Not on file   • Food insecurity - worry: Not on file   • Food insecurity - inability: Not on file   • Transportation needs - medical: Not on file   • Transportation needs - non-medical: Not on file   Occupational History   • Not on file   Tobacco Use   • Smoking status: Current Every Day Smoker     Packs/day: 1.00     Types: Cigarettes   Substance and Sexual Activity   • Alcohol use: No     Frequency: Never   • Drug use: Yes     Types: IV   • Sexual activity: Defer   Other Topics Concern   • Not on file   Social History Narrative   • Not on file       ALLERGIES  Sulfa antibiotics    REVIEW OF SYSTEMS  Review of Systems   Constitutional: Negative for fever.   HENT: Negative for sore throat.    Eyes: Negative.    Respiratory: Negative for cough and shortness of breath.    Cardiovascular: Negative for chest pain.   Gastrointestinal: Positive for abdominal pain (lower). Negative for diarrhea and vomiting.   Genitourinary: Positive for difficulty urinating. Negative for dysuria.   Musculoskeletal: Positive for back pain (lower). Negative for neck pain.   Skin: Negative for rash.   Allergic/Immunologic: Negative.    Neurological: Positive for weakness (BLE) and numbness (BLE). Negative for headaches.   Hematological: Negative.    Psychiatric/Behavioral: Negative.    All other systems reviewed and are negative.      PHYSICAL EXAM  ED Triage Vitals   Temp Heart Rate Resp BP SpO2   12/09/18 1341 12/09/18 1341 12/09/18 1341 12/09/18 1438 12/09/18 1341   97.8 °F (36.6 °C) 105 15 133/90 100 %      Temp src Heart Rate Source Patient Position BP Location FiO2 (%)   12/09/18 1341 -- -- -- --   Tympanic           Physical Exam   Constitutional: She is oriented to person, place, and time. No distress.   Pt appears uncomfortable   HENT:   Head: Normocephalic and atraumatic.   Eyes: EOM are normal. Pupils are equal, round, and reactive to light.   Neck: Normal range of motion. Neck supple.    Cardiovascular: Normal rate, regular rhythm and normal heart sounds.   No murmur heard.  Pulses:       Dorsalis pedis pulses are 2+ on the right side, and 2+ on the left side.   Pulmonary/Chest: Effort normal and breath sounds normal. No respiratory distress.   Abdominal: Soft. There is no tenderness. There is no rebound and no guarding.   Musculoskeletal: Normal range of motion. She exhibits no edema.        Lumbar back: She exhibits tenderness (left paraspinous) and bony tenderness (mid to lower L-Spine).   Neurological: She is alert and oriented to person, place, and time.   Skin: Skin is warm and dry. No rash noted.   Scattered excoriations to all extremities   Psychiatric: Mood and affect normal.   Nursing note and vitals reviewed.      LAB RESULTS  Lab Results (last 24 hours)     Procedure Component Value Units Date/Time    CBC & Differential [[id number]] Collected:  12/09/18 1436    Specimen:  Blood Updated:  12/09/18 1523    Narrative:       The following orders were created for panel order CBC & Differential.  Procedure                               Abnormality         Status                     ---------                               -----------         ------                     CBC Auto Differential[[id number]]        Abnormal            Final result                 Please view results for these tests on the individual orders.    Comprehensive Metabolic Panel [[id number]]  (Abnormal) Collected:  12/09/18 1436    Specimen:  Blood Updated:  12/09/18 1520     Glucose 98 mg/dL      BUN 8 mg/dL      Creatinine 0.67 mg/dL      Sodium 135 mmol/L      Potassium 3.2 mmol/L      Chloride 94 mmol/L      CO2 29.3 mmol/L      Calcium 9.0 mg/dL      Total Protein 7.8 g/dL      Albumin 3.10 g/dL      ALT (SGPT) 52 U/L      AST (SGOT) 59 U/L      Alkaline Phosphatase 82 U/L      Total Bilirubin 0.8 mg/dL      eGFR Non African Amer 97 mL/min/1.73      Globulin 4.7 gm/dL      A/G Ratio 0.7 g/dL      BUN/Creatinine Ratio  11.9     Anion Gap 11.7 mmol/L     C-reactive Protein [[id number]]  (Abnormal) Collected:  12/09/18 1436    Specimen:  Blood Updated:  12/09/18 1520     C-Reactive Protein 18.57 mg/dL     Sedimentation Rate [[id number]]  (Abnormal) Collected:  12/09/18 1436    Specimen:  Blood Updated:  12/09/18 1544     Sed Rate 66 mm/hr     CBC Auto Differential [[id number]]  (Abnormal) Collected:  12/09/18 1436    Specimen:  Blood Updated:  12/09/18 1523     WBC 11.29 10*3/mm3      RBC 3.86 10*6/mm3      Hemoglobin 10.7 g/dL      Hematocrit 34.5 %      MCV 89.4 fL      MCH 27.7 pg      MCHC 31.0 g/dL      RDW 16.5 %      RDW-SD 53.9 fl      MPV 10.8 fL      Platelets 194 10*3/mm3      Neutrophil % 82.1 %      Lymphocyte % 7.7 %      Monocyte % 9.0 %      Eosinophil % 0.6 %      Basophil % 0.1 %      Immature Grans % 0.5 %      Neutrophils, Absolute 9.26 10*3/mm3      Lymphocytes, Absolute 0.87 10*3/mm3      Monocytes, Absolute 1.02 10*3/mm3      Eosinophils, Absolute 0.07 10*3/mm3      Basophils, Absolute 0.01 10*3/mm3      Immature Grans, Absolute 0.06 10*3/mm3     Lactic Acid, Plasma [[id number]]  (Abnormal) Collected:  12/09/18 1519    Specimen:  Blood Updated:  12/09/18 1603     Lactate 2.9 mmol/L     Lactic Acid, Reflex Timer (This will reflex a repeat order 3-3:15 hours after ordered.) [[id number]] Collected:  12/09/18 1519    Specimen:  Blood Updated:  12/09/18 1603          I ordered the above labs and reviewed the results    RADIOLOGY  MRI Lumbar Spine With & Without Contrast   Preliminary Result   1.  Multilocular peripherally enhancing fluid collection in the   posterior spinal canal extending from L3 to S1 with dimensions given   above. This is consistent with an epidural abscess.   2.  Left paraspinal musculature abscess is also seen with dimensions   given above.   3.  Lower thoracic and degenerative disc disease as described.   4.  Findings were discussed with Dr. Cárdenas.             I ordered the above noted  radiological studies. Interpreted by radiologist. Discussed with radiologist (Dr. Macias). Reviewed by me in PACS.       PROCEDURES  Critical Care  Performed by: Perry Cárdenas MD  Authorized by: Perry Cárdenas MD     Critical care provider statement:     Critical care time (minutes):  30    Critical care time was exclusive of:  Separately billable procedures and treating other patients    Critical care was necessary to treat or prevent imminent or life-threatening deterioration of the following conditions:  Sepsis and CNS failure or compromise    Critical care was time spent personally by me on the following activities:  Development of treatment plan with patient or surrogate, discussions with consultants, evaluation of patient's response to treatment, examination of patient, obtaining history from patient or surrogate, ordering and performing treatments and interventions, ordering and review of laboratory studies, ordering and review of radiographic studies, pulse oximetry, re-evaluation of patient's condition and review of old charts            PROGRESS AND CONSULTS     1501- Notified pt that she will need to be admitted for IV abx and additional testing. Discussed the plan to order lab work and imaging studies for further evaluation prior to admission. Pt understands and agrees with the plan, all questions answered.    1505- Ordered blood work, sed rate, CRP, lactic acid and MRI L-Spine for further evaluation. Ordered vancomycin for bacteremia, ativan for agitation and IVF for hydration.     1742- Placed call to neurosurgery for consult.    1800- Rechecked pt. Pt is sleeping comfortably but is easily aroused. Notified pt of her lab and imaging results, including the spinal epidural abscess seen on MRI L-Spin. Discussed the plan to call neurosurgery and to admit the pt for IV abx and further evaluation. Pt agrees with the plan and all questions were addressed.    1810- Discussed the pt's case with Dr. Whitley  (neurosurgery), who agrees to consult.    1813- Placed call to pulmonology for admission.    1815- Rechecked pt. Notified pt of my discussion with Dr. Whitley (neurosurgery) and that he is going to come evaluate the pt in the ED. Pt understands and agrees with the plan, all questions answered.    1826- Discussed the pt's case with Dr. Gregory (pulmonology), who agrees to admit the pt to the ICU.    1831- Per RN, the pt is requesting pain medication. Ordered fentanyl for pain.    1849- Dr. Whitley (neurosurgery) is in the ED for consult.    1856- Discussed the pt's case with Dr. Whitley (neurosurgery), who states that he is going to take the pt to the OR.    MEDICAL DECISION MAKING  Results were reviewed/discussed with the patient and they were also made aware of online access. Pt also made aware that some labs, such as cultures, will not be resulted during ER visit and follow up with PMD is necessary.     MDM  Number of Diagnoses or Management Options  Abscess of paraspinal muscles, lumbar:   Bacteremia due to methicillin resistant Staphylococcus aureus:   Spinal epidural abscess:      Amount and/or Complexity of Data Reviewed  Clinical lab tests: ordered and reviewed (CRP=18.57, Sed Rate=66)  Tests in the radiology section of CPT®: ordered and reviewed (MRI L-Spine shows a L3-S1 epidural abscess that measures 1.4cm in diameter and 8cm in length. There is an abscess in the left paraspinous musculature that measures 5x3cm.)  Discussion of test results with the performing providers: yes (Dr. Macias)  Decide to obtain previous medical records or to obtain history from someone other than the patient: yes  Review and summarize past medical records: yes (Pt was seen in the ED yesterday for generalized abd pain and polysubstance abuse. Pt was called to come back to the ED because both of her blood cultures were positive for Staph. aureus.)  Discuss the patient with other providers: yes (Dr. Whitley (neurosurgery), Dr. Gregory  (pulmonology))  Independent visualization of images, tracings, or specimens: yes    Critical Care  Total time providing critical care: 30-74 minutes         DIAGNOSIS  Final diagnoses:   Spinal epidural abscess   Bacteremia due to methicillin resistant Staphylococcus aureus   Abscess of paraspinal muscles, lumbar       DISPOSITION  ADMISSION    Discussed treatment plan and reason for admission with pt/family and admitting physician.  Pt/family voiced understanding of the plan for admission for further testing/treatment as needed.         Latest Documented Vital Signs:  As of 6:57 PM  BP- 152/98 HR- 85 Temp- 97.8 °F (36.6 °C) (Tympanic) O2 sat- 98%    --  Documentation assistance provided by arsh Calle for Dr. Cárdenas.  Information recorded by the scribe was done at my direction and has been verified and validated by me.     Anneliese Calle  12/09/18 9209       Perry Cárdenas MD  12/10/18 1956

## 2018-12-09 NOTE — CONSULTS
Patient name: Silvia Velazquez  Referring Provider: Perry Cárdenas M.D.  Reason for Consultation: Epidural abscess    Patient Care Team:  Provider, No Known as PCP - General    Chief complaint: Back pain    Subjective .     History of present illness:    Patient is a 43 y.o. right handed female who presents with complaints of severe low back pain, bowel/bladder dysfunction, fever, nausea, vomiting, flank pain, bilateral lower extremity weakness and abdominal pain.      Review of Systems  Review of Systems   Constitutional: Positive for appetite change, chills and fever. Negative for unexpected weight change.   HENT: Negative for congestion.    Respiratory: Negative for chest tightness.    Gastrointestinal: Positive for abdominal pain, nausea and vomiting.   Endocrine: Positive for cold intolerance.   Genitourinary: Positive for difficulty urinating and dysuria.   Musculoskeletal: Positive for back pain.   Skin: Positive for rash.   Allergic/Immunologic: Negative for immunocompromised state.   Neurological: Negative for light-headedness and numbness.   Hematological: Negative for adenopathy. Does not bruise/bleed easily.   Psychiatric/Behavioral: Positive for agitation.       History  PAST MEDICAL HISTORY  Past Medical History:   Diagnosis Date   • Kidney stone    • Lupus (systemic lupus erythematosus) (CMS/HCC)    • Mitral valve anterior leaflet prolapse    • Seizures (CMS/HCC)        PAST SURGICAL HISTORY  Past Surgical History:   Procedure Laterality Date   • CHOLECYSTECTOMY     • LIVER BIOPSY     • LYMPH NODE BIOPSY         FAMILY HISTORY  History reviewed. No pertinent family history.    SOCIAL HISTORY  Social History     Tobacco Use   • Smoking status: Current Every Day Smoker     Packs/day: 1.00     Types: Cigarettes   Substance Use Topics   • Alcohol use: No     Frequency: Never   • Drug use: Yes     Types: IV       unemployed      Allergies:  Sulfa antibiotics    MEDICATIONS:    (Not in a hospital  admission)    Objective     Results Review:  LABS:    Admission on 12/09/2018   Component Date Value Ref Range Status   • Extra Tube 12/09/2018 hold for add-on   Final    Auto resulted   • Extra Tube 12/09/2018 Hold for add-ons.   Final    Auto resulted.   • Extra Tube 12/09/2018 hold for add-on   Final    Auto resulted   • Extra Tube 12/09/2018 Hold for add-ons.   Final    Auto resulted.   • Glucose 12/09/2018 98  65 - 99 mg/dL Final   • BUN 12/09/2018 8  6 - 20 mg/dL Final   • Creatinine 12/09/2018 0.67  0.57 - 1.00 mg/dL Final   • Sodium 12/09/2018 135* 136 - 145 mmol/L Final   • Potassium 12/09/2018 3.2* 3.5 - 5.2 mmol/L Final   • Chloride 12/09/2018 94* 98 - 107 mmol/L Final   • CO2 12/09/2018 29.3* 22.0 - 29.0 mmol/L Final   • Calcium 12/09/2018 9.0  8.6 - 10.5 mg/dL Final   • Total Protein 12/09/2018 7.8  6.0 - 8.5 g/dL Final   • Albumin 12/09/2018 3.10* 3.50 - 5.20 g/dL Final   • ALT (SGPT) 12/09/2018 52* 1 - 33 U/L Final   • AST (SGOT) 12/09/2018 59* 1 - 32 U/L Final   • Alkaline Phosphatase 12/09/2018 82  39 - 117 U/L Final   • Total Bilirubin 12/09/2018 0.8  0.1 - 1.2 mg/dL Final   • eGFR Non African Amer 12/09/2018 97  >60 mL/min/1.73 Final   • Globulin 12/09/2018 4.7  gm/dL Final   • A/G Ratio 12/09/2018 0.7  g/dL Final   • BUN/Creatinine Ratio 12/09/2018 11.9  7.0 - 25.0 Final   • Anion Gap 12/09/2018 11.7  mmol/L Final   • C-Reactive Protein 12/09/2018 18.57* 0.00 - 0.50 mg/dL Final   • Sed Rate 12/09/2018 66* 0 - 20 mm/hr Final   • Lactate 12/09/2018 2.9* 0.5 - 2.0 mmol/L Final   • WBC 12/09/2018 11.29* 4.50 - 10.70 10*3/mm3 Final   • RBC 12/09/2018 3.86* 3.90 - 5.20 10*6/mm3 Final   • Hemoglobin 12/09/2018 10.7* 11.9 - 15.5 g/dL Final   • Hematocrit 12/09/2018 34.5* 35.6 - 45.5 % Final   • MCV 12/09/2018 89.4  80.5 - 98.2 fL Final   • MCH 12/09/2018 27.7  26.9 - 32.0 pg Final   • MCHC 12/09/2018 31.0* 32.4 - 36.3 g/dL Final   • RDW 12/09/2018 16.5* 11.7 - 13.0 % Final   • RDW-SD 12/09/2018 53.9   37.0 - 54.0 fl Final   • MPV 12/09/2018 10.8  6.0 - 12.0 fL Final   • Platelets 12/09/2018 194  140 - 500 10*3/mm3 Final   • Neutrophil % 12/09/2018 82.1* 42.7 - 76.0 % Final   • Lymphocyte % 12/09/2018 7.7* 19.6 - 45.3 % Final   • Monocyte % 12/09/2018 9.0  5.0 - 12.0 % Final   • Eosinophil % 12/09/2018 0.6  0.3 - 6.2 % Final   • Basophil % 12/09/2018 0.1  0.0 - 1.5 % Final   • Immature Grans % 12/09/2018 0.5  0.0 - 0.5 % Final   • Neutrophils, Absolute 12/09/2018 9.26* 1.90 - 8.10 10*3/mm3 Final   • Lymphocytes, Absolute 12/09/2018 0.87* 0.90 - 4.80 10*3/mm3 Final   • Monocytes, Absolute 12/09/2018 1.02  0.20 - 1.20 10*3/mm3 Final   • Eosinophils, Absolute 12/09/2018 0.07  0.00 - 0.70 10*3/mm3 Final   • Basophils, Absolute 12/09/2018 0.01  0.00 - 0.20 10*3/mm3 Final   • Immature Grans, Absolute 12/09/2018 0.06* 0.00 - 0.03 10*3/mm3 Final       DIAGNOSTICS:  Blood cultures are positive for MRSA.    I personally reviewed the MRI of the lumbar spine done today: This demonstrates an enormous epidural abscess with severe lumbar stenosis on the basis of the epidural abscess at L3 4 L4 5 and L5-S1.    Results Review:   I reviewed the patient's new clinical results.  I personally viewed and interpreted the patient's imaging    Vital Signs   Temp:  [97.8 °F (36.6 °C)] 97.8 °F (36.6 °C)  Heart Rate:  [] 101  Resp:  [15] 15  BP: (133-134)/(90-94) 134/94    Physical Exam:  Physical Exam   HENT:   Extremely poor dentition   Neurological: She has an abnormal Heel to Barnes Test, an abnormal Romberg Test and an abnormal Tandem Gait Test. She has a normal Finger-Nose-Finger Test.   Reflex Scores:       Tricep reflexes are 3+ on the right side and 3+ on the left side.       Bicep reflexes are 3+ on the right side and 3+ on the left side.       Brachioradialis reflexes are 3+ on the right side and 3+ on the left side.       Patellar reflexes are 0 on the right side and 0 on the left side.       Achilles reflexes are 0 on the  right side and 0 on the left side.  Skin:   Multiple excoriated regions on the arms and legs that are scabbed over.    They are also on the breast torso back.     Neurologic Exam     Motor Exam     Strength   Strength 5/5 except as noted.   Right iliopsoas: 4/5  Left iliopsoas: 4/5  Right quadriceps: 4/5  Left quadriceps: 4/5  Right hamstrin/5  Left hamstrin/5  Right glutei: 3/5  Left glutei: 3/5  Right anterior tibial: 4/5  Left anterior tibial: 4/5  Right posterior tibial: 4/5  Left posterior tibial: 4/5  Right peroneal: 3/5  Left peroneal: 3/5  Right gastroc: 3/5  Left gastroc: 3/5    Sensory Exam   Decreased sensation throughout both lower extremities to both light touch and pinprick subjectively.  Predental examination demonstrates decreased rectal tone and an absent bulbocavernosus reflex.  Decreased pinprick in the pudendal region sensed as dull and light touch essentially absent.     Gait, Coordination, and Reflexes     Gait  Gait: wide-based    Coordination   Romberg: positive  Finger to nose coordination: normal  Heel to shin coordination: abnormal  Tandem walking coordination: abnormal    Reflexes   Right brachioradialis: 3+  Left brachioradialis: 3+  Right biceps: 3+  Left biceps: 3+  Right triceps: 3+  Left triceps: 3+  Right patellar: 0  Left patellar: 0  Right achilles: 0  Left achilles: 0  Right Chin: absent  Left Chin: absent  Right ankle clonus: present  Left ankle clonus: presentUnable to ambulate more than 3 steps with complaints of excruciating pain in the legs and weakness in the legs.    Absent bulbocavernosus reflex.       Assessment/Plan       * No active hospital problems. *  Epidural abscess  IV drug abuse  Sepsis  Cauda Equina syndrome    PLAN: Considering the size of the epidural abscess in the patient's current complaints I recommended that she undergo a lumbar decompression with removal of the epidural abscess and decompression of the nerve roots as best possible.  I did  explain to the patient that the likelihood of return of her neurologic deficit such as urinary problems was low.    The risks, benefits, and alternatives of lumbar decompression  were explained in detail to the patient. The alternative is not to perform an operative procedure. The benefit should be, though is not guaranteed, primarily a potential reduction in the neurosensory and/or motor dysfunction; secondarily, a possible reduction in back pain. The risks include, but are not limited to, the possibility of death, infection, stroke, bleeding, blindness, paralysis, blindness, lack of improvement in the patient's pain syndrome, worsening of the pain syndrome, meningitis, osteomyelitis, recurrent disc herniation or stenosis, need for further operative intervention, recurrence of the pain syndrome, sexual dysfunction, incontinence, worsening of inability to urinate without catheterization, inability to have a normal bowel movement, epidural scarring resulting in chronic back pain, leakage of cerebral spinal fluid at the time of surgery or after recovery from surgery requiring further operative intervention,spinal instability. I also explained the realistic expectations as they pertain to the procedure. The patient voiced understanding of these risks, benefits, alternatives, and realistic expectations and requests that we proceed with the operative intervention.    I discussed the patients findings and my recommendations with patient, nursing staff and consulting provider    Tevin Whitley MD  12/09/18  6:16 PM

## 2018-12-10 LAB
ANION GAP SERPL CALCULATED.3IONS-SCNC: 8.9 MMOL/L
BACTERIA SPEC AEROBE CULT: ABNORMAL
BACTERIA SPEC AEROBE CULT: ABNORMAL
BUN BLD-MCNC: 10 MG/DL (ref 6–20)
BUN/CREAT SERPL: 20.4 (ref 7–25)
CALCIUM SPEC-SCNC: 8.3 MG/DL (ref 8.6–10.5)
CHLORIDE SERPL-SCNC: 100 MMOL/L (ref 98–107)
CO2 SERPL-SCNC: 26.1 MMOL/L (ref 22–29)
CREAT BLD-MCNC: 0.49 MG/DL (ref 0.57–1)
D-LACTATE SERPL-SCNC: 0.7 MMOL/L (ref 0.5–2)
DEPRECATED RDW RBC AUTO: 52.4 FL (ref 37–54)
ERYTHROCYTE [DISTWIDTH] IN BLOOD BY AUTOMATED COUNT: 16.3 % (ref 11.7–13)
GFR SERPL CREATININE-BSD FRML MDRD: 139 ML/MIN/1.73
GLUCOSE BLD-MCNC: 147 MG/DL (ref 65–99)
GLUCOSE BLDC GLUCOMTR-MCNC: 115 MG/DL (ref 70–130)
GRAM STN SPEC: ABNORMAL
HCT VFR BLD AUTO: 31.3 % (ref 35.6–45.5)
HGB BLD-MCNC: 9.9 G/DL (ref 11.9–15.5)
ISOLATED FROM: ABNORMAL
ISOLATED FROM: ABNORMAL
MAGNESIUM SERPL-MCNC: 1.9 MG/DL (ref 1.6–2.6)
MCH RBC QN AUTO: 27.6 PG (ref 26.9–32)
MCHC RBC AUTO-ENTMCNC: 31.6 G/DL (ref 32.4–36.3)
MCV RBC AUTO: 87.2 FL (ref 80.5–98.2)
PHOSPHATE SERPL-MCNC: 3.7 MG/DL (ref 2.5–4.5)
PLATELET # BLD AUTO: 205 10*3/MM3 (ref 140–500)
PMV BLD AUTO: 10.5 FL (ref 6–12)
POTASSIUM BLD-SCNC: 3.8 MMOL/L (ref 3.5–5.2)
RBC # BLD AUTO: 3.59 10*6/MM3 (ref 3.9–5.2)
SODIUM BLD-SCNC: 135 MMOL/L (ref 136–145)
WBC NRBC COR # BLD: 13.63 10*3/MM3 (ref 4.5–10.7)

## 2018-12-10 PROCEDURE — 25810000003 SODIUM CHLORIDE 0.9 % WITH KCL 20 MEQ 20-0.9 MEQ/L-% SOLUTION: Performed by: NEUROLOGICAL SURGERY

## 2018-12-10 PROCEDURE — 97110 THERAPEUTIC EXERCISES: CPT

## 2018-12-10 PROCEDURE — 82962 GLUCOSE BLOOD TEST: CPT

## 2018-12-10 PROCEDURE — 83605 ASSAY OF LACTIC ACID: CPT | Performed by: EMERGENCY MEDICINE

## 2018-12-10 PROCEDURE — 25010000002 FENTANYL CITRATE (PF) 100 MCG/2ML SOLUTION: Performed by: ANESTHESIOLOGY

## 2018-12-10 PROCEDURE — 97162 PT EVAL MOD COMPLEX 30 MIN: CPT

## 2018-12-10 PROCEDURE — 85027 COMPLETE CBC AUTOMATED: CPT | Performed by: INTERNAL MEDICINE

## 2018-12-10 PROCEDURE — 25010000002 VANCOMYCIN 10 G RECONSTITUTED SOLUTION: Performed by: NEUROLOGICAL SURGERY

## 2018-12-10 PROCEDURE — 36415 COLL VENOUS BLD VENIPUNCTURE: CPT | Performed by: EMERGENCY MEDICINE

## 2018-12-10 PROCEDURE — 99024 POSTOP FOLLOW-UP VISIT: CPT | Performed by: NEUROLOGICAL SURGERY

## 2018-12-10 PROCEDURE — 84100 ASSAY OF PHOSPHORUS: CPT | Performed by: INTERNAL MEDICINE

## 2018-12-10 PROCEDURE — 83735 ASSAY OF MAGNESIUM: CPT | Performed by: INTERNAL MEDICINE

## 2018-12-10 PROCEDURE — 94799 UNLISTED PULMONARY SVC/PX: CPT

## 2018-12-10 PROCEDURE — 80048 BASIC METABOLIC PNL TOTAL CA: CPT | Performed by: INTERNAL MEDICINE

## 2018-12-10 RX ADMIN — OXYCODONE HYDROCHLORIDE AND ACETAMINOPHEN 1 TABLET: 10; 325 TABLET ORAL at 14:57

## 2018-12-10 RX ADMIN — VANCOMYCIN HYDROCHLORIDE 1250 MG: 10 INJECTION, POWDER, LYOPHILIZED, FOR SOLUTION INTRAVENOUS at 15:34

## 2018-12-10 RX ADMIN — VANCOMYCIN HYDROCHLORIDE 1250 MG: 10 INJECTION, POWDER, LYOPHILIZED, FOR SOLUTION INTRAVENOUS at 04:10

## 2018-12-10 RX ADMIN — OXYCODONE HYDROCHLORIDE AND ACETAMINOPHEN 1 TABLET: 10; 325 TABLET ORAL at 10:44

## 2018-12-10 RX ADMIN — FENTANYL CITRATE 50 MCG: 50 INJECTION, SOLUTION INTRAMUSCULAR; INTRAVENOUS at 00:15

## 2018-12-10 RX ADMIN — OXYCODONE HYDROCHLORIDE AND ACETAMINOPHEN 1 TABLET: 10; 325 TABLET ORAL at 20:10

## 2018-12-10 RX ADMIN — OXYCODONE HYDROCHLORIDE AND ACETAMINOPHEN 1 TABLET: 10; 325 TABLET ORAL at 23:40

## 2018-12-10 RX ADMIN — POTASSIUM CHLORIDE AND SODIUM CHLORIDE 60 ML/HR: 900; 150 INJECTION, SOLUTION INTRAVENOUS at 02:22

## 2018-12-10 NOTE — H&P
Group: [facility]         CRITICAL CARE ADMISSION    Patient Identification:  Silvia Velazquez  43 y.o.  female  1975  [id number]              CC: Epidural abscess    History of Present Illness:  43-year-old  female with a history of IV drug abuse.  She presents with severe low back pain.  This has been present for couple of days.  Described as severe.  It is associated with bowel/bladder dysfunction, fever, nausea, vomiting and flank pain.  She has also developed weakness in her lower extremities as well as abdominal pain.  Symptoms seem to be slightly better now after surgery.  She just underwent epidural abscess drainage and lumbar decompression by Dr. Tevin Whitley.  Alleviated by IV narcotics.    I reviewed operative note dictated with Dr. Whitley.    Review of Systems   Constitutional: Positive for diaphoresis, fatigue and fever.   HENT: Negative for ear discharge and sore throat.    Eyes: Negative for pain and visual disturbance.   Respiratory: Negative for cough and shortness of breath.    Cardiovascular: Negative for chest pain and leg swelling.   Gastrointestinal: Positive for abdominal pain, nausea and vomiting. Negative for diarrhea.   Endocrine: Negative for cold intolerance and polyuria.   Genitourinary: Negative for dysuria and hematuria.   Musculoskeletal: Positive for arthralgias, back pain and myalgias. Negative for joint swelling.   Skin: Negative for rash and wound.   Neurological: Negative for speech difficulty and numbness.   Hematological: Negative for adenopathy. Does not bruise/bleed easily.   Psychiatric/Behavioral: Negative for agitation and confusion.       Past Medical History:  Past Medical History:   Diagnosis Date   • Hepatitis C    • Kidney stone    • Lupus (systemic lupus erythematosus) (CMS/HCC)    • Mitral valve anterior leaflet prolapse    • Seizures (CMS/HCC)        Past Surgical History:  Past Surgical History:   Procedure Laterality Date   •  "CHOLECYSTECTOMY     • LIVER BIOPSY     • LYMPH NODE BIOPSY          Home Meds:  Medications Prior to Admission   Medication Sig Dispense Refill Last Dose   • doxycycline (MONODOX) 100 MG capsule Take 1 capsule by mouth 2 (Two) Times a Day. 20 capsule 0    • ibuprofen (ADVIL,MOTRIN) 600 MG tablet Take 1 tablet by mouth 3 (Three) Times a Day. 24 tablet 0        Allergies:  Allergies   Allergen Reactions   • Sulfa Antibiotics Nausea And Vomiting and Swelling       Social History:   Social History     Socioeconomic History   • Marital status: Single     Spouse name: Not on file   • Number of children: Not on file   • Years of education: Not on file   • Highest education level: Not on file   Social Needs   • Financial resource strain: Not on file   • Food insecurity - worry: Not on file   • Food insecurity - inability: Not on file   • Transportation needs - medical: Not on file   • Transportation needs - non-medical: Not on file   Occupational History   • Not on file   Tobacco Use   • Smoking status: Current Every Day Smoker     Packs/day: 1.00     Years: 30.00     Pack years: 30.00     Types: Cigarettes   • Smokeless tobacco: Never Used   Substance and Sexual Activity   • Alcohol use: No     Frequency: Never   • Drug use: Yes     Types: IV, Heroin   • Sexual activity: Defer   Other Topics Concern   • Not on file   Social History Narrative   • Not on file       Family History:  History reviewed. No pertinent family history.    Physical Exam:  /89   Pulse 81   Temp 99 °F (37.2 °C) (Oral)   Resp 16   Ht 167.6 cm (66\")   Wt 76.2 kg (168 lb)   LMP  (LMP Unknown)   SpO2 100%   BMI 27.12 kg/m²  Body mass index is 27.12 kg/m². 100% 76.2 kg (168 lb)  Physical Exam   Constitutional:   She is quite groggy after anesthesia.  She is arousable and very tearful   HENT:   Head: Normocephalic.   Mouth/Throat: Oropharynx is clear and moist.   Eyes: Conjunctivae and EOM are normal. No scleral icterus.   Neck: No tracheal " deviation present. No thyromegaly present.   Cardiovascular: Normal rate, regular rhythm and normal heart sounds.   Pulmonary/Chest: No respiratory distress. She has no wheezes. She exhibits no tenderness.   Abdominal: She exhibits no distension and no mass. There is no tenderness.   Musculoskeletal: She exhibits no deformity.   Range of motion in lower extremities is extremely limited.  She just underwent lumbar surgery   Neurological: She is alert. No cranial nerve deficit.   Some decreased muscular tone in her lower extremities.  She does distinguish to light touch on both lower extremities.  Normal strength 5 over 5 in both upper extremities.   Skin:   She has multiple excoriations and maculopapular as well as pustular lesions and ulcerations throughout her entire body, covering her face, trunk, abdomen and extremities.   Psychiatric:   She is very tearful, very poor insight.       LABS:  Lab Results   Component Value Date    CALCIUM 9.0 12/09/2018     Results from last 7 days   Lab Units  12/09/18   1436  12/08/18   0208   SODIUM mmol/L  135*  131*   POTASSIUM mmol/L  3.2*  3.6   CHLORIDE mmol/L  94*  92*   CO2 mmol/L  29.3*  25.3   BUN mg/dL  8  9   CREATININE mg/dL  0.67  0.68   GLUCOSE mg/dL  98  118*   CALCIUM mg/dL  9.0  8.8   WBC 10*3/mm3  11.29*  11.59*   HEMOGLOBIN g/dL  10.7*  10.4*   PLATELETS 10*3/mm3  194  197   ALT (SGPT) U/L  52*  68*   AST (SGOT) U/L  59*  77*   PROCALCITONIN ng/mL   --   0.83*     Lab Results   Component Value Date    CKTOTAL 84 12/08/2018     Results from last 7 days   Lab Units  12/08/18   0208   CK TOTAL U/L  84     Results from last 7 days   Lab Units  12/08/18   0243  12/08/18   0208   BLOODCX   Staphylococcus aureus*  Staphylococcus aureus*   BCIDPCR   Staphylococcus aureus. mecA (methicillin resistance gene) detected. Identification by BCID PCR.*   --      Results from last 7 days   Lab Units  12/09/18   1519  12/08/18   0208   PROCALCITONIN ng/mL   --   0.83*   LACTATE  mmol/L  2.9*  1.5                 Results from last 7 days   Lab Units  12/08/18   0243  12/08/18   0208   BLOODCX   Staphylococcus aureus*  Staphylococcus aureus*   BCIDPCR   Staphylococcus aureus. mecA (methicillin resistance gene) detected. Identification by BCID PCR.*   --      No results found for: TSH  Estimated Creatinine Clearance: 115.3 mL/min (by C-G formula based on SCr of 0.67 mg/dL).  Results from last 7 days   Lab Units  12/08/18   0229   NITRITE UA   Negative   WBC UA /HPF  3-5*   BACTERIA UA /HPF  Trace*   SQUAM EPITHEL UA /HPF  3-6*        Imaging: I personally visualized the images of scans/x-rays performed within last 3 days.      Assessment:  Staphylococcus aureus bacteremia  Epidural abscess  Status post lumbar decompression surgery  Back pain  Urinary incontinence  Hyponatremia  Hypokalemia  IV drug abuse  Chronic multifactorial anemia  Elevated liver enzymes      Recommendations:  The patient will be admitted to the intensive care unit.  She just underwent lumbar decompression surgery for epidural abscess causing bowel and bladder incontinence.  She will receive intravenous vancomycin  Consult infectious diseases.  Monitor lower extremities for any neurological changes.  Treat pain with oral narcotics, avoid IV narcotics if possible.  Lovenox for DVT prophylaxis when okay with neurosurgery.  SCD compression devices to legs for now.    Total critical care time 35 minutes        Med Rao MD  12/9/2018  11:17 PM      Much of this encounter note is an electronic transcription/translation of spoken language to printed text using Dragon Software.

## 2018-12-10 NOTE — PROGRESS NOTES
"Pharmacy Dosing Service  Pharmacokinetics  Vancomycin Initial Evaluation    Assessment/Action/Plan:  Initiated Vancomycin 1500 mg IVPB once, followed by 1250 mg every 12 hours. Vancomycin trough ordered prior to 4th dose on 12/11/2018 before 4th dose. Pharmacy will monitor renal function and adjust dose accordingly.     Subjective:  Silvia Velazquez is a 43 y.o. female with a Vancomycin \"Pharmacy to Dose\" consult for the treatment of MRSA bacteremia / spinal epidural abscess .    Objective:  Ht: 167.6 cm (66\"); Wt: 76.2 kg (168 lb)  Estimated Creatinine Clearance: 115.3 mL/min (by C-G formula based on SCr of 0.67 mg/dL).   Lab Results   Component Value Date    CREATININE 0.67 12/09/2018    CREATININE 0.68 12/08/2018      Lab Results   Component Value Date    WBC 11.29 (H) 12/09/2018    WBC 11.59 (H) 12/08/2018      Baseline culture results:  Microbiology Results (last 10 days)       Procedure Component Value - Date/Time    Blood Culture - Blood, Arm, Left [[id number]]  (Abnormal) Collected:  12/08/18 0243    Lab Status:  Preliminary result Specimen:  Blood from Arm, Left Updated:  12/09/18 0824     Blood Culture Staphylococcus aureus     Comment:   Consider infectious disease consult to rule out distant focus of infection.        Isolated from Aerobic and Anaerobic Bottles     Gram Stain Anaerobic Bottle Gram positive cocci in clusters      Aerobic Bottle Gram positive cocci in clusters    Blood Culture ID, PCR - Blood, Arm, Left [[id number]]  (Abnormal) Collected:  12/08/18 0243    Lab Status:  Final result Specimen:  Blood from Arm, Left Updated:  12/09/18 1707     BCID, PCR Staphylococcus aureus. mecA (methicillin resistance gene) detected. Identification by BCID PCR.    Blood Culture - Blood, Arm, Left [[id number]]  (Abnormal) Collected:  12/08/18 0208    Lab Status:  Preliminary result Specimen:  Blood from Arm, Left Updated:  12/09/18 0823     Blood Culture Staphylococcus aureus     Comment:   Consider " infectious disease consult to rule out distant focus of infection.        Isolated from Aerobic and Anaerobic Bottles     Gram Stain Anaerobic Bottle Gram positive cocci in clusters      Aerobic Bottle Gram positive cocci in clusters            Ruy Cortez III, [facility]  12/09/18 11:22 PM

## 2018-12-10 NOTE — H&P
Patient name: Silvia Velazquez  Referring Provider: Perry Cárdenas MD  Reason for Consultation: Epidural abcess    Patient Care Team:  Provider, No Known as PCP - General    Chief complaint: Pain and leg pain and inability to urinate    Subjective .     History of present illness:    Patient is a 43 y.o. right handed female who presents with a varying history of low back pain and bilateral leg pain.  She is crying out in pain.  The history is obtained from the chart and Dr. Cárdenas and the patient.    She is a trouble urinating, she has numbness in her peridium, she is not lost stool.  She has trouble initiating stream of urine.    She had low back pain.  This is been present for a long time.  It is Much worse.  She's been seen in the emergency room recently.  She's been seen a couple of different emergency rooms.    Patient has a history IV drug abuse.    With ongoing back pain and fever chills etc. she underwent blood cultures yesterday in the emergency room here.  She was called back because the blood cultures were positive for MRSA.  With the complaints as described above she underwent an MRI of the lumbar spine to demonstrates an enhancing lesions in the paraspinous musculature in the epidural space severely compressing the thecal sac consistent with epidural abscess.    Review of Systems  Review of Systems   Constitutional: Positive for fever and unexpected weight change.   HENT: Negative for dental problem.    Eyes: Negative for visual disturbance.   Respiratory: Negative for chest tightness.    Cardiovascular: Negative for chest pain, palpitations and leg swelling.   Endocrine: Negative for polyuria.   Genitourinary: Positive for difficulty urinating.   Musculoskeletal: Negative for joint swelling.   Skin: Positive for rash and wound.   Neurological: Negative for dizziness and light-headedness.   Hematological: Bruises/bleeds easily.   Psychiatric/Behavioral: Negative for confusion, dysphoric mood and  hallucinations.       History  PAST MEDICAL HISTORY  Past Medical History:   Diagnosis Date   • Hepatitis C    • Kidney stone    • Lupus (systemic lupus erythematosus) (CMS/HCC)    • Mitral valve anterior leaflet prolapse    • Seizures (CMS/HCC)        PAST SURGICAL HISTORY  Past Surgical History:   Procedure Laterality Date   • CHOLECYSTECTOMY     • LIVER BIOPSY     • LYMPH NODE BIOPSY         FAMILY HISTORY  History reviewed. No pertinent family history.    SOCIAL HISTORY  Social History     Tobacco Use   • Smoking status: Current Every Day Smoker     Packs/day: 1.00     Years: 30.00     Pack years: 30.00     Types: Cigarettes   • Smokeless tobacco: Never Used   Substance Use Topics   • Alcohol use: No     Frequency: Never   • Drug use: Yes     Types: IV, Heroin     not   unemployed      Allergies:  Sulfa antibiotics    MEDICATIONS:  Medications Prior to Admission   Medication Sig Dispense Refill Last Dose   • doxycycline (MONODOX) 100 MG capsule Take 1 capsule by mouth 2 (Two) Times a Day. 20 capsule 0    • ibuprofen (ADVIL,MOTRIN) 600 MG tablet Take 1 tablet by mouth 3 (Three) Times a Day. 24 tablet 0        Objective     Results Review:  LABS:    Admission on 12/09/2018   Component Date Value Ref Range Status   • Extra Tube 12/09/2018 hold for add-on   Final    Auto resulted   • Extra Tube 12/09/2018 Hold for add-ons.   Final    Auto resulted.   • Extra Tube 12/09/2018 hold for add-on   Final    Auto resulted   • Extra Tube 12/09/2018 Hold for add-ons.   Final    Auto resulted.   • Glucose 12/09/2018 98  65 - 99 mg/dL Final   • BUN 12/09/2018 8  6 - 20 mg/dL Final   • Creatinine 12/09/2018 0.67  0.57 - 1.00 mg/dL Final   • Sodium 12/09/2018 135* 136 - 145 mmol/L Final   • Potassium 12/09/2018 3.2* 3.5 - 5.2 mmol/L Final   • Chloride 12/09/2018 94* 98 - 107 mmol/L Final   • CO2 12/09/2018 29.3* 22.0 - 29.0 mmol/L Final   • Calcium 12/09/2018 9.0  8.6 - 10.5 mg/dL Final   • Total Protein 12/09/2018 7.8   6.0 - 8.5 g/dL Final   • Albumin 12/09/2018 3.10* 3.50 - 5.20 g/dL Final   • ALT (SGPT) 12/09/2018 52* 1 - 33 U/L Final   • AST (SGOT) 12/09/2018 59* 1 - 32 U/L Final   • Alkaline Phosphatase 12/09/2018 82  39 - 117 U/L Final   • Total Bilirubin 12/09/2018 0.8  0.1 - 1.2 mg/dL Final   • eGFR Non African Amer 12/09/2018 97  >60 mL/min/1.73 Final   • Globulin 12/09/2018 4.7  gm/dL Final   • A/G Ratio 12/09/2018 0.7  g/dL Final   • BUN/Creatinine Ratio 12/09/2018 11.9  7.0 - 25.0 Final   • Anion Gap 12/09/2018 11.7  mmol/L Final   • C-Reactive Protein 12/09/2018 18.57* 0.00 - 0.50 mg/dL Final   • Sed Rate 12/09/2018 66* 0 - 20 mm/hr Final   • Lactate 12/09/2018 2.9* 0.5 - 2.0 mmol/L Final   • WBC 12/09/2018 11.29* 4.50 - 10.70 10*3/mm3 Final   • RBC 12/09/2018 3.86* 3.90 - 5.20 10*6/mm3 Final   • Hemoglobin 12/09/2018 10.7* 11.9 - 15.5 g/dL Final   • Hematocrit 12/09/2018 34.5* 35.6 - 45.5 % Final   • MCV 12/09/2018 89.4  80.5 - 98.2 fL Final   • MCH 12/09/2018 27.7  26.9 - 32.0 pg Final   • MCHC 12/09/2018 31.0* 32.4 - 36.3 g/dL Final   • RDW 12/09/2018 16.5* 11.7 - 13.0 % Final   • RDW-SD 12/09/2018 53.9  37.0 - 54.0 fl Final   • MPV 12/09/2018 10.8  6.0 - 12.0 fL Final   • Platelets 12/09/2018 194  140 - 500 10*3/mm3 Final   • Neutrophil % 12/09/2018 82.1* 42.7 - 76.0 % Final   • Lymphocyte % 12/09/2018 7.7* 19.6 - 45.3 % Final   • Monocyte % 12/09/2018 9.0  5.0 - 12.0 % Final   • Eosinophil % 12/09/2018 0.6  0.3 - 6.2 % Final   • Basophil % 12/09/2018 0.1  0.0 - 1.5 % Final   • Immature Grans % 12/09/2018 0.5  0.0 - 0.5 % Final   • Neutrophils, Absolute 12/09/2018 9.26* 1.90 - 8.10 10*3/mm3 Final   • Lymphocytes, Absolute 12/09/2018 0.87* 0.90 - 4.80 10*3/mm3 Final   • Monocytes, Absolute 12/09/2018 1.02  0.20 - 1.20 10*3/mm3 Final   • Eosinophils, Absolute 12/09/2018 0.07  0.00 - 0.70 10*3/mm3 Final   • Basophils, Absolute 12/09/2018 0.01  0.00 - 0.20 10*3/mm3 Final   • Immature Grans, Absolute 12/09/2018 0.06*  0.00 - 0.03 10*3/mm3 Final   • Extra Tube 12/09/2018 Hold for add-ons.   Final    Auto resulted.       DIAGNOSTICS:  I personally reviewed the MRI scan of lumbar spine this demonstrates an enormous epidural abscess from L3 to sacrum.  There is also paraspinous muscular abscess.    Results Review:   I reviewed the patient's new clinical results.  I personally viewed and interpreted the patient's imaging    Vital Signs   Temp:  [97.8 °F (36.6 °C)-98.6 °F (37 °C)] 98.6 °F (37 °C)  Heart Rate:  [] 93  Resp:  [15-18] 18  BP: (133-153)/(77-98) 137/91    Physical Exam:  Physical Exam   Constitutional: She is oriented to person, place, and time. She appears well-developed and well-nourished. She is cooperative.   HENT:   Head: Normocephalic and atraumatic.   Eyes: EOM are normal. Pupils are equal, round, and reactive to light. No scleral icterus.   Neck: Normal range of motion. Neck supple.   Cardiovascular: Normal rate, regular rhythm and intact distal pulses.   No murmur heard.  Pulmonary/Chest: Effort normal and breath sounds normal.   Abdominal: Soft. Bowel sounds are normal. There is no tenderness.   Genitourinary:   Genitourinary Comments: Absent bulbocavernosus reflex.  Decreased anal tone.  Decreased sensation to both light touch and pinprick in the pudendal area and perianal region.   Musculoskeletal: Normal range of motion.   Neurological: She is alert and oriented to person, place, and time. She displays no atrophy. No cranial nerve deficit or sensory deficit. She exhibits normal muscle tone. She has an abnormal Romberg Test. She has a normal Finger-Nose-Finger Test, a normal Heel to Barnes Test and a normal Tandem Gait Test. She displays a negative Romberg sign. Coordination and gait normal. GCS eye subscore is 4. GCS verbal subscore is 5. GCS motor subscore is 6.   Reflex Scores:       Tricep reflexes are 2+ on the right side and 2+ on the left side.       Bicep reflexes are 2+ on the right side and 2+ on  the left side.       Brachioradialis reflexes are 2+ on the right side and 2+ on the left side.       Patellar reflexes are 0 on the right side and 0 on the left side.       Achilles reflexes are 0 on the right side and 0 on the left side.  I got her up to walk and she was able to take a few steps but bent over severely complaining of pain in her knees buckled.   Skin: Skin is warm, dry and intact.   Scabs on the arms and torso and breasts and legs   Psychiatric: She has a normal mood and affect. Her speech is normal and behavior is normal. Judgment and thought content normal. Cognition and memory are normal.   Vitals reviewed.    Neurologic Exam     Mental Status   Oriented to person, place, and time.   Registration: recalls 3 of 3 objects. Recall at 5 minutes: recalls 3 of 3 objects. Follows 3 step commands.   Attention: normal. Concentration: normal.   Speech: speech is normal   Level of consciousness: alert  Knowledge: good.   Able to name object. Able to read. Able to repeat. Able to write. Normal comprehension.     Cranial Nerves     CN II   Visual fields full to confrontation.     CN III, IV, VI   Pupils are equal, round, and reactive to light.  Extraocular motions are normal.   Right pupil: Consensual response: intact.   Left pupil: Consensual response: intact.   CN III: no CN III palsy  CN VI: no CN VI palsy  Nystagmus: none   Diplopia: none  Ophthalmoparesis: none  Upgaze: normal  Downgaze: normal  Conjugate gaze: present  Vestibulo-ocular reflex: present    CN V   Facial sensation intact.     CN VII   Facial expression full, symmetric.     CN VIII   CN VIII normal.     CN IX, X   CN IX normal.     CN XI   CN XI normal.     CN XII   CN XII normal.     Motor Exam   Muscle bulk: normal  Overall muscle tone: normal  Right arm tone: normal  Left arm tone: normal  Right arm pronator drift: absent  Left arm pronator drift: absent  Right leg tone: normal  Left leg tone: normal    Strength   Strength 5/5 except as  noted.   Right neck flexion: 5/5  Left neck flexion: 5/5  Right neck extension: 5/5  Left neck extension: 5/5  Right deltoid: 5/5  Left deltoid: 5/5  Right biceps: 5/5  Left biceps: 5/5  Right triceps: 5/5  Left triceps: 5/5  Right wrist flexion: 5/5  Left wrist flexion: 5/5  Right wrist extension: 5/5  Left wrist extension: 5/5  Right interossei: 5/5  Left interossei: 5/5  Right abdominals: 5/5  Left abdominals: 5/5  Right iliopsoas: 3/5  Left iliopsoas: 3/5  Right quadriceps: 4/5  Left quadriceps: 4/5  Right hamstring: 3/5  Left hamstring: 3/5  Right glutei: 4/5  Left glutei: 4/5  Right anterior tibial: 4/5  Left anterior tibial: 4/5  Right posterior tibial: 4/5  Left posterior tibial: 4/5  Right peroneal: 4/5  Left peroneal: 4/5  Right gastroc: 4/5  Left gastroc: 4/5    Sensory Exam   Light touch normal.   Vibration normal.   Proprioception normal.   Pinprick normal.     Gait, Coordination, and Reflexes     Coordination   Romberg: positive  Finger to nose coordination: normal  Heel to shin coordination: normal  Tandem walking coordination: normal    Tremor   Resting tremor: absent  Intention tremor: absent  Action tremor: absent    Reflexes   Right brachioradialis: 2+  Left brachioradialis: 2+  Right biceps: 2+  Left biceps: 2+  Right triceps: 2+  Left triceps: 2+  Right patellar: 0  Left patellar: 0  Right achilles: 0  Left achilles: 0  Right : 2+  Left : 2+  Right plantar: normal  Left plantar: normal  Right Chin: absent  Left Chin: absent  Right ankle clonus: absent  Left ankle clonus: absent      Assessment/Plan       Spinal epidural abscess      PLAN: The risks, benefits, and alternatives of lumbar decompression  were explained in detail to the patient. The alternative is not to perform an operative procedure. The benefit should be, though is not guaranteed, primarily a potential reduction in the neurosensory and/or motor dysfunction; secondarily, a possible reduction in back pain. The risks  include, but are not limited to, the possibility of death, infection, stroke, bleeding, blindness, paralysis, blindness, lack of improvement in the patient's pain syndrome, worsening of the pain syndrome, meningitis, osteomyelitis, recurrent disc herniation or stenosis, need for further operative intervention, recurrence of the pain syndrome, sexual dysfunction, incontinence, worsening of inability to urinate without catheterization, inability to have a normal bowel movement, epidural scarring resulting in chronic back pain, leakage of cerebral spinal fluid at the time of surgery or after recovery from surgery requiring further operative intervention,spinal instability. I also explained the realistic expectations as they pertain to the procedure. The patient voiced understanding of these risks, benefits, alternatives, and realistic expectations and requests that we proceed with the operative intervention.    I explained to the patient that I'm hopeful that she'll regain some of her the use of her neurologic function however it is not clear that we'll be the case.  I explained that the alternative is nonoperative management that this was suboptimal considering the amount of compression of the neural elements.    I discussed the patients findings and my recommendations with patient and consulting provider    Tevin Whitley MD  12/09/18  8:41 PM

## 2018-12-10 NOTE — ANESTHESIA POSTPROCEDURE EVALUATION
Patient: Silvia Velazquez    Procedure Summary     Date:  12/09/18 Room / Location:  [facility] OR [address] [place] [address] MAIN OR    Anesthesia Start:  2022 Anesthesia Stop:  2243    Procedure:  Posterior lumbar three through sacrum decompression (Left Spine Lumbar) Diagnosis:       Spinal epidural abscess      (Spinal epidural abscess [G06.1])    Surgeon:  Tevin Whitley MD Provider:  Alvin Alvarez MD    Anesthesia Type:  general ASA Status:  3 - Emergent          Anesthesia Type: general  Last vitals  BP   137/91 (12/09/18 1909)   Temp   37 °C (98.6 °F) (12/09/18 1909)   Pulse   93 (12/09/18 1909)   Resp   18 (12/09/18 1909)     SpO2   100 % (12/09/18 1909)     Post Anesthesia Care and Evaluation    Patient location during evaluation: PACU  Anesthetic complications: No anesthetic complications

## 2018-12-10 NOTE — PLAN OF CARE
Problem: Patient Care Overview  Goal: Plan of Care Review  Outcome: Ongoing (interventions implemented as appropriate)   12/10/18 0637   Coping/Psychosocial   Plan of Care Reviewed With patient   Plan of Care Review   Progress improving   OTHER   Outcome Summary VSS. PCA in place. No neuro changes. Pt drowsy throughout night. Drinking fluids with no issues. Continue to monitor per orders.       Problem: Skin Injury Risk (Adult)  Goal: Identify Related Risk Factors and Signs and Symptoms  Outcome: Ongoing (interventions implemented as appropriate)    Goal: Skin Health and Integrity  Outcome: Ongoing (interventions implemented as appropriate)      Problem: Fall Risk (Adult)  Goal: Identify Related Risk Factors and Signs and Symptoms  Outcome: Ongoing (interventions implemented as appropriate)    Goal: Absence of Fall  Outcome: Ongoing (interventions implemented as appropriate)

## 2018-12-10 NOTE — BRIEF OP NOTE
L3 - S1 laminectomy and removal of epidural abcess   Progress Note    Silvia Caroline  12/9/2018    Pre-op Diagnosis:   Spinal epidural abscess [G06.1]       Post-Op Diagnosis Codes:     * Spinal epidural abscess [G06.1]    Procedure/CPT® Codes:      Procedure(s):  Posterior lumbar three through sacrum decompression    Surgeon(s):  Tevin Whitley MD    Anesthesia: General    Staff:   Circulator: Lizabeth Felder RN; Bárbara Huerta RN  Radiology Technologist: Rianna Phillip  Scrub Person: Aisha Ying  Assistant: Lisa Tovar CSA    Estimated Blood Loss: 200ml    Urine Voided: * No values recorded between 12/9/2018  8:21 PM and 12/9/2018  9:55 PM *    Specimens:                ID Type Source Tests Collected by Time   1 : muscle Wound Spine, Lumbar WOUND CULTURE Tevin Whitley MD 12/9/2018 2125   2 : epidural Wound Spine, Lumbar WOUND CULTURE Tevin Whitley MD 12/9/2018 2126   3 : epidual tissue Tissue Spine, Lumbar ANAEROBIC CULTURE, FUNGAL CULTURE, TISSUE / BONE CULTURE, AFB CULTURE Tevin Whitley MD 12/9/2018 2127         Drains:   Urethral Catheter Non-latex (Active)       Findings: Free pus and thecal sac compression    Complications: none      Tevin Whitley MD     Date: 12/9/2018  Time: 9:55 PM

## 2018-12-10 NOTE — CONSULTS
CONSULT NOTE    Infectious Diseases - Fabian Woodruff MD  [facility]       Patient Identification:  Name: Silvia Velazquez  Age: 43 y.o.  Sex: female  :  1975  MRN: [id number]             Date of Consultation:  12/10/2018        Primary Care Physician: Provider, No Known                               Requesting Physician: Dr. Daniele Rao  Reason for Consultation: MRSA bacteremia with epidural abscess and paraspinal abscess status post I&D in the context of IV drug use.    Impression: 43-year-old female with history of IV drug use for the last 7 months presents to our facility with progressive abdominal pain and back pain for a few weeks, in the setting of active sepsis injections on a daily basis and her preferred drug of choice is heroin and currently planning to quit substance abuse and joint methadone clinic after she recovers from this hospitalization, on 2018.  Blood cultures were done and she was given pain medications and  On 2018 blood culture did come back positive and she was called back to be admitted.  The belly progressive worsening back pain and evidence of urinary incontinence MRI of her back was done showed epidural abscess with spinal canal stenosis and better spinal musculature abscess.  Patient was emergently taken to the OR and laminectomy and debridement was performed.  Her intraoperative culture and blood cultures are now positive for MRSA.  Patient had a CT scan of the abdomen and pelvis performed which did not show any acute intra-abdominal pathology.  Patient has generalized skin excoriations and areas of phlebitis upper extremities.      This presentation is consistent with:  1-disseminated MRSA sepsis with bacteremia originating from the skin lesions - multiple superficial abscesses and cellulitis and phlebitis, and secondary infection due to self mutilating IV drug abuse activity with obvious seeding to  2-epidural abscess involving the thoracic and  lumbar area with paraspinal muscular abscess status post emergent decompression surgery and debridement  3-at risk of probable right-sided or left-sided endocarditis becomes a persistent bacteremia as well as seeding to the other joints in the body parts.  4-active IV drug abuse with initial interaction suggest that she may be reaching the point to make some changes in her life and try to get rid of her habit      Recommendations/Discussions: At this juncture I agree with the care Plan consisting of IV vancomycin.  Check 2-D echo with Doppler repeated blood culture until blood cultures are sterile hours.  She would need periodic reassessment and reevaluation to uncover unmask other areas of secondary seeding but may require concurrent surgical intervention besides antibiotic therapy.  Access evaluation is needed so that she can be helped up into the program to get her for IV drug activity habits.  Long-term IV antibiotic therapy for 6-8 weeks as needed and trying to achieve this type of care structure would be difficult given her propensity for self abuse and mutilation and IV drug activity.  Thank you Dr. Rao for letting me be the part of the patient care please see above impression and recommendation        History of Present Illness:   43-year-old female with history of IV drug use for the last 7 months presents to our facility with progressive abdominal pain and back pain for a few weeks, in the setting of active sepsis injections on a daily basis and her preferred drug of choice is heroin and currently planning to quit substance abuse and joint methadone clinic after she recovers from this hospitalization, on 12/8/2018.  Blood cultures were done and she was given pain medications and  On 12/9/2018 blood culture did come back positive and she was called back to be admitted.  The belly progressive worsening back pain and evidence of urinary incontinence MRI of her back was done showed epidural abscess with  spinal canal stenosis and better spinal musculature abscess.  Patient was emergently taken to the OR and laminectomy and debridement was performed.  Her intraoperative culture and blood cultures are now positive for MRSA.  Patient had a CT scan of the abdomen and pelvis performed which did not show any acute intra-abdominal pathology.  Patient has generalized skin excoriations and areas of phlebitis upper extremities.        Past Medical History:  Past Medical History:   Diagnosis Date   • Hepatitis C    • Kidney stone    • Lupus (systemic lupus erythematosus) (CMS/HCC)    • Mitral valve anterior leaflet prolapse    • Seizures (CMS/HCC)      Past Surgical History:  Past Surgical History:   Procedure Laterality Date   • CHOLECYSTECTOMY     • LIVER BIOPSY     • LYMPH NODE BIOPSY        Home Meds:  Medications Prior to Admission   Medication Sig Dispense Refill Last Dose   • doxycycline (MONODOX) 100 MG capsule Take 1 capsule by mouth 2 (Two) Times a Day. 20 capsule 0    • ibuprofen (ADVIL,MOTRIN) 600 MG tablet Take 1 tablet by mouth 3 (Three) Times a Day. 24 tablet 0      Current Meds:     Current Facility-Administered Medications:   •  docusate sodium (COLACE) capsule 100 mg, 100 mg, Oral, BID PRN, Tevin Whitley MD  •  HYDROmorphone (DILAUDID) PCA 50 mg/50 mL Syringe, , Intravenous, Continuous, Tevin Whitley MD  •  lactated ringers infusion, 9 mL/hr, Intravenous, Continuous, lAvin Alvarez MD, Last Rate: 9 mL/hr at 12/09/18 1940  •  naloxone (NARCAN) injection 0.1 mg, 0.1 mg, Intravenous, Q5 Min PRN, Tevin Whitley MD  •  niCARdipine (CARDENE) 25 mg/250 mL (0.1 mg/mL) NS infusion kit, 5-15 mg/hr, Intravenous, Titrated, Tevin Whitley MD  •  ondansetron (ZOFRAN) tablet 4 mg, 4 mg, Oral, Q6H PRN **OR** ondansetron ODT (ZOFRAN-ODT) disintegrating tablet 4 mg, 4 mg, Oral, Q6H PRN **OR** ondansetron (ZOFRAN) injection 4 mg, 4 mg, Intravenous, Q6H PRN, Tevin Whitley MD  •   oxyCODONE-acetaminophen (PERCOCET)  MG per tablet 1 tablet, 1 tablet, Oral, Q4H PRN, Tevin Whitley MD  •  Pharmacy to dose vancomycin, , Does not apply, Continuous PRN, Tevin Whitley MD  •  [COMPLETED] Insert peripheral IV, , , Once **AND** sodium chloride 0.9 % flush 10 mL, 10 mL, Intravenous, PRN, Perry Cárdenas MD  •  sodium chloride 0.9 % with KCl 20 mEq/L infusion, 60 mL/hr, Intravenous, Continuous, Tevin Whitley MD, Last Rate: 60 mL/hr at 12/10/18 0222, 60 mL/hr at 12/10/18 0222  •  vancomycin 1250 mg/250 mL 0.9% NS IVPB ([facility]), 1,250 mg, Intravenous, Q12H, Tevin Whitley MD, 1,250 mg at 12/10/18 0410  Allergies:  Allergies   Allergen Reactions   • Sulfa Antibiotics Nausea And Vomiting and Swelling     Patient states when she took Sulfa medication, her throat started to swell.     Social History:   Social History     Tobacco Use   • Smoking status: Current Every Day Smoker     Packs/day: 1.00     Years: 30.00     Pack years: 30.00     Types: Cigarettes   • Smokeless tobacco: Never Used   Substance Use Topics   • Alcohol use: No     Frequency: Never      Family History:  History reviewed. No pertinent family history.       Review of Systems  See history of present illness and past medical history.   Constitutional: Positive for diaphoresis, fatigue and fever.   HENT: Negative for ear discharge and sore throat.    Eyes: Negative for pain and visual disturbance.   Respiratory: Negative for cough and shortness of breath.    Cardiovascular: Negative for chest pain and leg swelling.   Gastrointestinal: Positive for abdominal pain, nausea and vomiting. Negative for diarrhea.   Endocrine: Negative for cold intolerance and polyuria.   Genitourinary: Negative for dysuria and hematuria.   Musculoskeletal: Positive for arthralgias, back pain and myalgias. Negative for joint swelling.   Skin: Negative for rash and wound.   Neurological: Negative for speech difficulty and numbness.   Hematological:  "Negative for adenopathy. Does not bruise/bleed easily.   Psychiatric/Behavioral: Negative for agitation and confusion.         Vitals:   /96   Pulse 73   Temp 98.1 °F (36.7 °C) (Oral)   Resp 16   Ht 167.6 cm (66\")   Wt 76.2 kg (168 lb)   LMP  (LMP Unknown)   SpO2 98%   BMI 27.12 kg/m²   I/O:     Intake/Output Summary (Last 24 hours) at 12/10/2018 0824  Last data filed at 12/10/2018 0552  Gross per 24 hour   Intake 198.7 ml   Output 730 ml   Net -531.3 ml     Exam:  General Appearance:    Alert, cooperative, no distress, appears stated age   Head:    Normocephalic, without obvious abnormality, atraumatic   Eyes:    PERRL, conjunctiva/corneas clear, EOM's intact, both eyes   Ears:    Normal external ear canals, both ears   Nose:   Nares normal, septum midline, mucosa normal, no drainage    or sinus tenderness   Throat:   Lips, tongue, gums normal; oral mucosa pink and moist   Neck:   Supple, symmetrical, trachea midline, no adenopathy;     thyroid:  no enlargement/tenderness/nodules; no carotid    bruit or JVD   Back:     Surgical site distress    Lungs:     Clear to auscultation bilaterally, respirations unlabored   Chest Wall:    No tenderness or deformity    Heart:    Regular rate and rhythm, S1 and S2 normal, no murmur, rub   or gallop   Abdomen:     Soft, mild generalized tenderness, bowel sounds active all four quadrants,     no masses, no hepatomegaly, no splenomegaly   Extremities:   Extremities normal, atraumatic, no cyanosis or edema   Pulses:   Pulses palpable in all extremities; symmetric all extremities   Skin:   Multiple areas of excoriations due to IV drug activity.  Phlebitis noted needle track marks noted areas of neuropathy dermatitis with secondary infection involving upper and lower extremities and trunk noted is a small area draining abscess involving the left breast with no surrounding cellulitis.     Neurologic:   Grossly intact she is able to use upper and lower extremities and " has good strength.       Data Review:    I reviewed the patient's new clinical results.  Results from last 7 days   Lab Units  12/09/18   1436  12/08/18   0208   WBC 10*3/mm3  11.29*  11.59*   HEMOGLOBIN g/dL  10.7*  10.4*   PLATELETS 10*3/mm3  194  197     Results from last 7 days   Lab Units  12/09/18   1436  12/08/18   0208   SODIUM mmol/L  135*  131*   POTASSIUM mmol/L  3.2*  3.6   CHLORIDE mmol/L  94*  92*   CO2 mmol/L  29.3*  25.3   BUN mg/dL  8  9   CREATININE mg/dL  0.67  0.68   CALCIUM mg/dL  9.0  8.8   GLUCOSE mg/dL  98  118*     Microbiology Results (last 10 days)     Procedure Component Value - Date/Time    Wound Culture - Wound, Spine, Lumbar [[id number]]  (Abnormal) Collected:  12/09/18 2126    Lab Status:  Preliminary result Specimen:  Wound from Spine, Lumbar Updated:  12/10/18 0816     Wound Culture Heavy growth (4+) Staphylococcus aureus, MRSA     Comment:   Methicillin resistant Staphylococcus aureus, Patient may be an isolation risk.        Gram Stain Moderate (3+) WBCs seen      Few (2+) Gram positive cocci    Narrative:       Refer to previous wound culture collected on  12/9/18 for [facility]    Wound Culture - Wound, Spine, Lumbar [[id number]]  (Abnormal) Collected:  12/09/18 2125    Lab Status:  Preliminary result Specimen:  Wound from Spine, Lumbar Updated:  12/10/18 0815     Wound Culture Moderate growth (3+) Staphylococcus aureus, MRSA     Comment:   Methicillin resistant Staphylococcus aureus, Patient may be an isolation risk.        Gram Stain Moderate (3+) WBCs seen      Few (2+) Gram positive cocci    Blood Culture - Blood, Arm, Left [[id number]]  (Abnormal) Collected:  12/08/18 0243    Lab Status:  Edited Result - FINAL Specimen:  Blood from Arm, Left Updated:  12/10/18 0707     Blood Culture Staphylococcus aureus, MRSA     Comment:   Consider infectious disease consult to rule out distant focus of infection.  Methicillin resistant Staphylococcus aureus, Patient may be an isolation risk.     "    Isolated from Aerobic and Anaerobic Bottles     Gram Stain Anaerobic Bottle Gram positive cocci in clusters      Aerobic Bottle Gram positive cocci in clusters    Narrative:       Refer to previous blood culture collected on 12/8/2018 0208 for ZAINAB's.    Blood Culture ID, PCR - Blood, Arm, Left [[id number]]  (Abnormal) Collected:  12/08/18 0243    Lab Status:  Final result Specimen:  Blood from Arm, Left Updated:  12/09/18 1707     BCID, PCR Staphylococcus aureus. mecA (methicillin resistance gene) detected. Identification by BCID PCR.    Blood Culture - Blood, Arm, Left [[id number]]  (Abnormal)  (Susceptibility) Collected:  12/08/18 0208    Lab Status:  Edited Result - FINAL Specimen:  Blood from Arm, Left Updated:  12/10/18 0706     Blood Culture Staphylococcus aureus, MRSA     Comment:   Consider infectious disease consult to rule out distant focus of infection.  D test is positive. Isolate exhibits \"inducible\" resistance to Clindamycin.    Methicillin resistant Staphylococcus aureus, Patient may be an isolation risk.  Corrected result. Previously Reported Organism Changed. Previous result was Staphylococcus aureus on 12/10/2018 at 0650 EST.        Isolated from Aerobic and Anaerobic Bottles     Gram Stain Anaerobic Bottle Gram positive cocci in clusters      Aerobic Bottle Gram positive cocci in clusters    Narrative:       Although Staphylococcus aureus is sensitive to Oxacillin, it possesses the mecA gene, therefore it is an MRSA and should be considered resistant to all beta-lactam antibiotics.    Susceptibility      Staphylococcus aureus, MRSA     ZAINAB     Clindamycin Resistant     Erythromycin Resistant     Oxacillin Susceptible     Penicillin G Resistant     Rifampin Susceptible     Tetracycline Susceptible     Trimethoprim + Sulfamethoxazole Susceptible     Vancomycin Susceptible                        Xr Chest 1 View    Result Date: 12/8/2018   No active disease by portable imaging.  This report was " finalized on 12/8/2018 2:14 AM by Luis Pelaez M.D.      Xr Spine Lumbar 1 View    Result Date: 12/9/2018  Intraoperative fluoroscopy as above.       Mri Lumbar Spine With & Without Contrast    Result Date: 12/10/2018  1.  Multilocular peripherally enhancing fluid collection in the posterior spinal canal extending from L3 to S1 with dimensions given above. This is consistent with an epidural abscess. Additional thin collection is seen posterior to the inferior aspect of L1 and the L2 vertebra in the anterior spinal canal which may represent additional abscess. 2.  Left paraspinal musculature abscess is also seen with dimensions given above. 3.  Lower thoracic and degenerative disc disease as described. 4.  Findings were discussed with Dr. Cárdenas.  This report was finalized on 12/10/2018 9:16 AM by Dr. Pravin Macias M.D.        Assessment:  Active Hospital Problems    Diagnosis Date Noted   • Spinal epidural abscess [G06.1] 12/09/2018      Resolved Hospital Problems   No resolved problems to display.         Plan:  See above  Fabian Key MD   12/10/2018  8:24 AM    Much of this encounter note is an electronic transcription/translation of spoken language to printed text. The electronic translation of spoken language may permit erroneous, or at times, nonsensical words or phrases to be inadvertently transcribed; Although I have reviewed the note for such errors, some may still exist

## 2018-12-10 NOTE — PLAN OF CARE
Problem: Patient Care Overview  Goal: Plan of Care Review  Outcome: Ongoing (interventions implemented as appropriate)   12/10/18 1636   Coping/Psychosocial   Plan of Care Reviewed With patient   OTHER   Outcome Summary Pt presents with impaired funtional mobility and gait secondary to generalized weakness, pain, impaired balance, and decreased activity tolerance s/p back surgery for epidural abscess. Pt may benefit from skilled PT to address strength, mobility, and gait.

## 2018-12-10 NOTE — PROGRESS NOTES
Yasmani Encarnacion MD                          [phone]      Patient ID:    Name:  Silvia Velazquez    MRN:  [id number]    1975   43 y.o.  female            Patient Care Team:  Provider, No Known as PCP - General    CC/ Reason for visit: Per Assessment mentioned below    Subjective: Pt seen and examined this AM. No acute overnight events noted. Doing better post-op. C/o some pain but has a PCA. No diarrhoea noted.      ROS: Denies any subjective fevers, chest pain, nausea/ vomiting    Objective     Vital Signs past 24hrs    BP range: BP: (118-158)/() 132/82  Pulse range: Heart Rate:  [] 86  Resp rate range: Resp:  [15-18] 16  Temp range: Temp (24hrs), Av.4 °F (36.9 °C), Min:97.8 °F (36.6 °C), Max:99 °F (37.2 °C)      Ventilator/Non-Invasive Ventilation Settings (From admission, onward)    None          Device (Oxygen Therapy): room air       76.2 kg (168 lb); Body mass index is 27.12 kg/m².      Intake/Output Summary (Last 24 hours) at 12/10/2018 09  Last data filed at 12/10/2018 0552  Gross per 24 hour   Intake 198.7 ml   Output 730 ml   Net -531.3 ml       Exam:    GEN:  No respiratory distress, appears stated age; Ill appearing, No accessory muscle use  EYES:   EOMI, anicteric sclera bilat  ENT:    External ears/nose normal, OP clear  NECK:  Supple, midline trachea, No cervical lymphadenopathy  LUNGS: Bilateral breath sounds heard, No wheezes/ crackles heard  CV:  Regular rate and rhythm, No murmur  ABD:  Nontender, nondistended, no palpable liver or masses  EXT:  Extremities warm and well perfused, no peripheral/sacral edema.    Scheduled meds:    vancomycin 1,250 mg Intravenous Q12H       IV meds:                        HYDROmorphone HCl-NaCl     lactated ringers 9 mL/hr Last Rate: 9 mL/hr (18)   niCARdipine 5-15 mg/hr    Pharmacy to dose vancomycin     sodium chloride 0.9 % with KCl 20 mEq 60 mL/hr Last Rate: 60 mL/hr (12/10/18 0222)        Data Review:      Results from last 7 days   Lab Units  12/10/18   0820  12/09/18   1436  12/08/18   0208   SODIUM mmol/L  135*  135*  131*   POTASSIUM mmol/L  3.8  3.2*  3.6   CHLORIDE mmol/L  100  94*  92*   CO2 mmol/L  26.1  29.3*  25.3   BUN mg/dL  10  8  9   CREATININE mg/dL  0.49*  0.67  0.68   CALCIUM mg/dL  8.3*  9.0  8.8   BILIRUBIN mg/dL   --   0.8  0.9   ALK PHOS U/L   --   82  82   ALT (SGPT) U/L   --   52*  68*   AST (SGOT) U/L   --   59*  77*   GLUCOSE mg/dL  147*  98  118*   WBC 10*3/mm3  13.63*  11.29*  11.59*   HEMOGLOBIN g/dL  9.9*  10.7*  10.4*   PLATELETS 10*3/mm3  205  194  197   PROCALCITONIN ng/mL   --    --   0.83*       Lab Results   Component Value Date    CALCIUM 8.3 (L) 12/10/2018    PHOS 3.7 12/10/2018       Results from last 7 days   Lab Units  12/09/18   2126  12/09/18   2125  12/08/18   0243  12/08/18   0208   BLOODCX    --    --   Staphylococcus aureus, MRSA*  Staphylococcus aureus, MRSA*   WOUNDCX   Heavy growth (4+) Staphylococcus aureus, MRSA*  Moderate growth (3+) Staphylococcus aureus, MRSA*   --    --    BCIDPCR    --    --   Staphylococcus aureus. mecA (methicillin resistance gene) detected. Identification by BCID PCR.*   --               Results Review:    I have reviewed the available laboratory results and reviewed the chest imaging from the last 3 days personally and summarized it below    Assessment      ASSESSMENT:  MRSA Bacteremia   MRSA epidural/ paraspinal abscess   Spinal cord compression s/p lumbar decompression 12/9   Heroin/ Meth IVDA   Urinary incontinence  ABLA on Anemia of chronic disease  Hyponatremia  Hypokalemia  Mild hepatitis    PLAN:  Patient doing well postoperatively.  She is on a PCA pump maxed at 0.7 mg/hr with no basal rate.  I think it is a better idea than giving intermittent pushes.  I explained to her categorically that the goal is to wean her down and not replace her heroin.   No signs for withdrawal.  Will dc padron catheter now and eval  for retention again   Abx per ID - On Vanc   Will check HIV/ Hep panel   OOB/PT as tolerated per HUNTER  Full Code   DVT ppx - Mech     I have discussed my findings and recommendations with patient, nursing staff and consulting provider.     Yasmani Encarnacion MD  12/10/2018

## 2018-12-10 NOTE — ANESTHESIA PROCEDURE NOTES
ANESTHESIA INTUBATION  Urgency: elective    Airway not difficult    General Information and Staff    Patient location during procedure: OR  Anesthesiologist: Alvin Alvarez MD    Indications and Patient Condition  Indications for airway management: airway protection    Preoxygenated: yes  Mask difficulty assessment: 1 - vent by mask    Final Airway Details  Final airway type: endotracheal airway      Successful airway: ETT  Cuffed: yes   Successful intubation technique: video laryngoscopy  Blade: Heriberto  Blade size: D  ETT size (mm): 7.5  Cormack-Lehane Classification: grade I - full view of glottis  Placement verified by: chest auscultation and capnometry   Measured from: lips  ETT to lips (cm): 22  Number of attempts at approach: 1

## 2018-12-10 NOTE — ANESTHESIA PREPROCEDURE EVALUATION
Anesthesia Evaluation     Patient summary reviewed and Nursing notes reviewed   no history of anesthetic complications:    NPO Liquid Status: Waived due to emergency           Airway   Mallampati: III  TM distance: >3 FB  Neck ROM: limited  Possible difficult intubation  Dental      Pulmonary - normal exam   (+) a smoker Current,   Cardiovascular - normal exam    (+) valvular problems/murmurs MVP,   (-) angina      Neuro/Psych    ROS Comment: Epidural abcess  GI/Hepatic/Renal/Endo    (+)   hepatitis C,     Musculoskeletal     Abdominal    Substance History   (+) drug use (heroin)     OB/GYN          Other                        Anesthesia Plan    ASA 3 - emergent     general     Anesthetic plan, all risks, benefits, and alternatives have been provided, discussed and informed consent has been obtained with: patient.

## 2018-12-10 NOTE — THERAPY EVALUATION
Acute Care - Physical Therapy Initial Evaluation  [facility]     Patient Name: Silvia Velazquez  : 1975  MRN: [id number]  Today's Date: 12/10/2018   Onset of Illness/Injury or Date of Surgery: 18            Admit Date: 2018    Visit Dx:     ICD-10-CM ICD-9-CM   1. Spinal epidural abscess G06.1 324.1   2. Bacteremia due to methicillin resistant Staphylococcus aureus R78.81 790.7     041.12   3. Abscess of paraspinal muscles, lumbar M62.89 728.89   4. Generalized weakness R53.1 780.79     Patient Active Problem List   Diagnosis   • Spinal epidural abscess     Past Medical History:   Diagnosis Date   • Hepatitis C    • Kidney stone    • Lupus (systemic lupus erythematosus) (CMS/HCC)    • Mitral valve anterior leaflet prolapse    • Seizures (CMS/HCC)      Past Surgical History:   Procedure Laterality Date   • CHOLECYSTECTOMY     • LIVER BIOPSY     • LYMPH NODE BIOPSY          PT ASSESSMENT (last 12 hours)      Physical Therapy Evaluation     Row Name 12/10/18 1455          PT Evaluation Time/Intention    Subjective Information  complains of;pain  -     Document Type  evaluation  -     Mode of Treatment  physical therapy  -     Patient Effort  good  -     Symptoms Noted During/After Treatment  increased pain  -     Row Name 12/10/18 1455          General Information    Onset of Illness/Injury or Date of Surgery  18  -     Patient Observations  alert;cooperative;agree to therapy  -     Patient/Family Observations  pt L sidelying in bed, no acute distress noted at rest, eyes closed but answers questions  -     Prior Level of Function  independent:;gait;transfer;bed mobility;ADL's  -     Equipment Currently Used at Home  none  -     Pertinent History of Current Functional Problem  pt admitted with back pain and is post-op epidural abscess drainage and lumbar decompression  -     Existing Precautions/Restrictions  fall  -     Barriers to Rehab  medically complex  -      Row Name 12/10/18 1455          Resource/Environmental Concerns    Current Living Arrangements  hotel/motel  -     Row Name 12/10/18 1455          Cognitive Assessment/Interventions    Additional Documentation  Cognitive Assessment/Intervention (Group)  -     Row Name 12/10/18 1455          Cognitive Assessment/Intervention- PT/OT    Orientation Status (Cognition)  oriented x 3  -CH     Follows Commands (Cognition)  WFL  -     Personal Safety Interventions  fall prevention program maintained;gait belt;nonskid shoes/slippers when out of bed  -     Row Name 12/10/18 145          Bed Mobility Assessment/Treatment    Bed Mobility Assessment/Treatment  supine-sit;sit-supine  -     Supine-Sit [place] (Bed Mobility)  verbal cues;nonverbal cues (demo/gesture);moderate assist (50% patient effort);2 person assist log rolling  -     Sit-Supine [place] (Bed Mobility)  verbal cues;nonverbal cues (demo/gesture);moderate assist (50% patient effort);2 person assist log rolling  -     Row Name 12/10/18 1455          Transfer Assessment/Treatment    Transfer Assessment/Treatment  sit-stand transfer;stand-sit transfer  -     Sit-Stand [place] (Transfers)  verbal cues;nonverbal cues (demo/gesture);moderate assist (50% patient effort);2 person assist  -     Stand-Sit [place] (Transfers)  verbal cues;nonverbal cues (demo/gesture);moderate assist (50% patient effort);2 person assist  -     Row Name 12/10/18 1455          Sit-Stand Transfer    Assistive Device (Sit-Stand Transfers)  -- A  -     Row Name 12/10/18 1455          Stand-Sit Transfer    Assistive Device (Stand-Sit Transfers)  -- [facility]  -     Row Name 12/10/18 1455          Gait/Stairs Assessment/Training    [place] Level (Gait)  verbal cues;nonverbal cues (demo/gesture);moderate assist (50% patient effort);2 person assist  -     Assistive Device (Gait)  -- [facility]  -     Distance in Feet (Gait)  3 side steps to [facility]      Deviations/Abnormal Patterns (Gait)  antalgic;yamilet decreased;gait speed decreased;stride length decreased  -     Bilateral Gait Deviations  forward flexed posture  -[facility] Name 12/10/18 1455          General ROM    GENERAL ROM COMMENTS  UE and LE ROM WFL for age, trunch ROM limited secondary to pain  -[facility] Name 12/10/18 1455          MMT (Manual Muscle Testing)    General MMT Comments  generalized weakness noted with functional mobility  -[facility] Name 12/10/18 1455          Motor Assessment/Intervention    Additional Documentation  Balance (Group)  -CH     Row Name 12/10/18 1455          Balance    Balance  static standing balance;dynamic standing balance  -CH     Row Name 12/10/18 1455          Static Standing Balance    Level of [place] (Supported Standing, Static Balance)  moderate assist, 50 to 74% patient effort  -[facility] Name 12/10/18 1455          Dynamic Standing Balance    Level of [place], Reaches Outside Midline (Standing, Dynamic Balance)  moderate assist, 50 to 74% patient effort  -CH     Row Name 12/10/18 1455          Pain Assessment    Additional Documentation  Pain Scale: Numbers Pre/Post-Treatment (Group)  -CH     Row Name 12/10/18 1455          Pain Scale: Numbers Pre/Post-Treatment    Pain Scale: Numbers, Pretreatment  8/10  -     Pain Location  back  -     Pain Intervention(s)  Repositioned  -[facility] Name             Wound 12/09/18 2154 Bilateral back incision    Wound - Properties Group Date first assessed: 12/09/18  -KK Time first assessed: 2154  -KK Side: Bilateral  -KK Location: back  -KK Type: incision  -KK    [facility] Name 12/10/18 1455          Plan of Care Review    Plan of Care Reviewed With  patient  -CH     Row Name 12/10/18 1455          Physical Therapy Clinical Impression    Patient/Family Goals Statement (PT Clinical Impression)  to return to [facility]  -     Criteria for Skilled Interventions Met (PT Clinical Impression)  treatment indicated  -      Impairments Found (describe specific impairments)  gait, locomotion, and balance;muscle performance  -     Rehab Potential (PT Clinical Summary)  good, to achieve stated therapy goals  -     Row Name 12/10/18 1457          Physical Therapy Goals    Bed Mobility Goal Selection (PT)  bed mobility, PT goal 1  -CH     Transfer Goal Selection (PT)  transfer, PT goal 1  -CH     Gait Training Goal Selection (PT)  gait training, PT goal 1  -     Row Name 12/10/18 1459          Bed Mobility Goal 1 (PT)    Activity/Assistive Device (Bed Mobility Goal 1, PT)  bed mobility activities, all  -CH     [place] Level/Cues Needed (Bed Mobility Goal 1, PT)  contact guard assist  -CH     Time Frame (Bed Mobility Goal 1, PT)  1 week  -     Row Name 12/10/18 1451          Transfer Goal 1 (PT)    Activity/Assistive Device (Transfer Goal 1, PT)  transfers, all;walker, rolling  -CH     [place] Level/Cues Needed (Transfer Goal 1, PT)  contact guard assist  -CH     Time Frame (Transfer Goal 1, PT)  1 week  -     Row Name 12/10/18 1456          Gait Training Goal 1 (PT)    Activity/Assistive Device (Gait Training Goal 1, PT)  gait (walking locomotion);walker, rolling  -CH     [place] Level (Gait Training Goal 1, PT)  contact guard assist  -CH     Distance (Gait Goal 1, PT)  100  -CH     Time Frame (Gait Training Goal 1, PT)  1 week  -     Row Name 12/10/18 4288          Positioning and Restraints    Pre-Treatment Position  in bed  -CH     Post Treatment Position  bed  -CH     In Bed  side lying left;call light within reach;encouraged to call for assist;with [facility]  -       User Key  (r) = Recorded By, (t) = Taken By, (c) = Cosigned By    Initials Name Provider Type     Camille Emanuel, PT Physical Therapist    Bárbara Chakraborty, RN Registered Nurse        Physical Therapy Education     Title: PT OT SLP Therapies (Done)     Topic: Physical Therapy (Done)     Point: Mobility training (Done)     Learning Progress  Summary           Patient Acceptance, E,TB,D, VU,NR by  at 12/10/2018  4:29 PM                   Point: Home exercise program (Done)     Learning Progress Summary           Patient Acceptance, E,TB,D, VU,NR by  at 12/10/2018  4:29 PM                   Point: Body mechanics (Done)     Learning Progress Summary           Patient Acceptance, E,TB,D, VU,NR by  at 12/10/2018  4:29 PM                   Point: Precautions (Done)     Learning Progress Summary           Patient Acceptance, E,TB,D, VU,NR by  at 12/10/2018  4:29 PM                               User Key     Initials Effective Dates Name Provider Type Discipline     04/03/18 -  Camille Emanuel, PT Physical Therapist PT              PT Recommendation and Plan  Anticipated Discharge Disposition (PT): skilled nursing facility  Planned Therapy Interventions (PT Eval): balance training, bed mobility training, gait training, home exercise program, patient/family education, transfer training  Therapy Frequency (PT Clinical Impression): daily  Outcome Summary/Treatment Plan (PT)  Anticipated Discharge Disposition (PT): skilled nursing facility  Plan of Care Reviewed With: patient  Outcome Summary: Pt presents with impaired funtional mobility and gait secondary to generalized weakness, pain, impaired balance, and decreased activity tolerance s/p back surgery for epidural abscess. Pt may benefit from skilled PT to address strength, mobility, and gait.  Outcome Measures     Row Name 12/10/18 1600             How much help from another person do you currently need...    Turning from your back to your side while in flat bed without using bedrails?  2  -CH      Moving from lying on back to sitting on the side of a flat bed without bedrails?  2  -CH      Moving to and from a bed to a chair (including a wheelchair)?  2  -CH      Standing up from a chair using your arms (e.g., wheelchair, bedside chair)?  2  -CH      Climbing 3-5 steps with a railing?  1  -CH       To walk in hospital room?  2  -CH      AM-PAC 6 Clicks Score  11  -         Functional Assessment    Outcome Measure Options  AM-PAC 6 Clicks Basic Mobility (PT)  -        User Key  (r) = Recorded By, (t) = Taken By, (c) = Cosigned By    Initials Name Provider Type     Camille Emanuel, PT Physical Therapist         Time Calculation:   PT Charges     Row Name 12/10/18 1524             Time Calculation    Start Time  1441  -      Stop Time  1455  -      Time Calculation (min)  14 min  -      PT Received On  12/10/18  -      PT - Next Appointment  12/11/18  -      PT Goal Re-Cert Due Date  12/17/18  -         Time Calculation- PT    Total Timed Code Minutes- PT  8 minute(s)  -        User Key  (r) = Recorded By, (t) = Taken By, (c) = Cosigned By    Initials Name Provider Type     Camille Emanuel, PT Physical Therapist        Therapy Suggested Charges     Code   Minutes Charges    None           Therapy Charges for Today     Code Description Service Date Service Provider Modifiers Qty    58203771804  PT EVAL MOD COMPLEXITY 2 12/10/2018 Camille Emanuel, PT GP 1    15023731888 HC PT THER PROC EA 15 MIN 12/10/2018 Camille Emanuel, PT GP 1    14748297043 HC PT THER SUPP EA 15 MIN 12/10/2018 Camille Emanuel, PT GP 1          PT G-Codes  Outcome Measure Options: AM-PAC 6 Clicks Basic Mobility (PT)  AM-PAC 6 Clicks Score: 11      Camille Emanuel, ALIYAH  12/10/2018

## 2018-12-11 LAB
ANION GAP SERPL CALCULATED.3IONS-SCNC: 9.9 MMOL/L
BACTERIA SPEC AEROBE CULT: ABNORMAL
BUN BLD-MCNC: 8 MG/DL (ref 6–20)
BUN/CREAT SERPL: 17.4 (ref 7–25)
CALCIUM SPEC-SCNC: 7.9 MG/DL (ref 8.6–10.5)
CHLORIDE SERPL-SCNC: 99 MMOL/L (ref 98–107)
CO2 SERPL-SCNC: 25.1 MMOL/L (ref 22–29)
CREAT BLD-MCNC: 0.46 MG/DL (ref 0.57–1)
DEPRECATED RDW RBC AUTO: 52.9 FL (ref 37–54)
ERYTHROCYTE [DISTWIDTH] IN BLOOD BY AUTOMATED COUNT: 16.3 % (ref 11.7–13)
GFR SERPL CREATININE-BSD FRML MDRD: 150 ML/MIN/1.73
GLUCOSE BLD-MCNC: 99 MG/DL (ref 65–99)
GRAM STN SPEC: ABNORMAL
HCT VFR BLD AUTO: 29.6 % (ref 35.6–45.5)
HGB BLD-MCNC: 9.3 G/DL (ref 11.9–15.5)
MAGNESIUM SERPL-MCNC: 1.5 MG/DL (ref 1.6–2.6)
MCH RBC QN AUTO: 27.6 PG (ref 26.9–32)
MCHC RBC AUTO-ENTMCNC: 31.4 G/DL (ref 32.4–36.3)
MCV RBC AUTO: 87.8 FL (ref 80.5–98.2)
PHOSPHATE SERPL-MCNC: 3 MG/DL (ref 2.5–4.5)
PLATELET # BLD AUTO: 243 10*3/MM3 (ref 140–500)
PMV BLD AUTO: 11.3 FL (ref 6–12)
POTASSIUM BLD-SCNC: 2.9 MMOL/L (ref 3.5–5.2)
RBC # BLD AUTO: 3.37 10*6/MM3 (ref 3.9–5.2)
SODIUM BLD-SCNC: 134 MMOL/L (ref 136–145)
VANCOMYCIN TROUGH SERPL-MCNC: 10.9 MCG/ML (ref 5–20)
WBC NRBC COR # BLD: 11.24 10*3/MM3 (ref 4.5–10.7)

## 2018-12-11 PROCEDURE — 80202 ASSAY OF VANCOMYCIN: CPT | Performed by: INTERNAL MEDICINE

## 2018-12-11 PROCEDURE — 25010000002 MAGNESIUM SULFATE 2 GM/50ML SOLUTION: Performed by: INTERNAL MEDICINE

## 2018-12-11 PROCEDURE — 97110 THERAPEUTIC EXERCISES: CPT

## 2018-12-11 PROCEDURE — 25010000002 VANCOMYCIN 10 G RECONSTITUTED SOLUTION: Performed by: INTERNAL MEDICINE

## 2018-12-11 PROCEDURE — 25010000002 VANCOMYCIN 10 G RECONSTITUTED SOLUTION: Performed by: NEUROLOGICAL SURGERY

## 2018-12-11 PROCEDURE — 90791 PSYCH DIAGNOSTIC EVALUATION: CPT | Performed by: SOCIAL WORKER

## 2018-12-11 PROCEDURE — 85027 COMPLETE CBC AUTOMATED: CPT | Performed by: INTERNAL MEDICINE

## 2018-12-11 PROCEDURE — 80048 BASIC METABOLIC PNL TOTAL CA: CPT | Performed by: INTERNAL MEDICINE

## 2018-12-11 PROCEDURE — 84100 ASSAY OF PHOSPHORUS: CPT | Performed by: INTERNAL MEDICINE

## 2018-12-11 PROCEDURE — 99024 POSTOP FOLLOW-UP VISIT: CPT | Performed by: NEUROLOGICAL SURGERY

## 2018-12-11 PROCEDURE — 83735 ASSAY OF MAGNESIUM: CPT | Performed by: INTERNAL MEDICINE

## 2018-12-11 PROCEDURE — 87040 BLOOD CULTURE FOR BACTERIA: CPT | Performed by: INTERNAL MEDICINE

## 2018-12-11 PROCEDURE — 93010 ELECTROCARDIOGRAM REPORT: CPT | Performed by: INTERNAL MEDICINE

## 2018-12-11 PROCEDURE — 93005 ELECTROCARDIOGRAM TRACING: CPT | Performed by: INTERNAL MEDICINE

## 2018-12-11 RX ORDER — MAGNESIUM SULFATE HEPTAHYDRATE 40 MG/ML
4 INJECTION, SOLUTION INTRAVENOUS AS NEEDED
Status: DISCONTINUED | OUTPATIENT
Start: 2018-12-11 | End: 2018-12-21 | Stop reason: HOSPADM

## 2018-12-11 RX ORDER — MAGNESIUM SULFATE HEPTAHYDRATE 40 MG/ML
2 INJECTION, SOLUTION INTRAVENOUS AS NEEDED
Status: DISCONTINUED | OUTPATIENT
Start: 2018-12-11 | End: 2018-12-21 | Stop reason: HOSPADM

## 2018-12-11 RX ORDER — METHOCARBAMOL 750 MG/1
750 TABLET, FILM COATED ORAL 4 TIMES DAILY
Status: DISCONTINUED | OUTPATIENT
Start: 2018-12-11 | End: 2018-12-12

## 2018-12-11 RX ORDER — POTASSIUM CHLORIDE 750 MG/1
40 CAPSULE, EXTENDED RELEASE ORAL DAILY
Status: DISCONTINUED | OUTPATIENT
Start: 2018-12-11 | End: 2018-12-21 | Stop reason: HOSPADM

## 2018-12-11 RX ORDER — OXYCODONE HYDROCHLORIDE 5 MG/1
10 TABLET ORAL EVERY 6 HOURS SCHEDULED
Status: DISCONTINUED | OUTPATIENT
Start: 2018-12-11 | End: 2018-12-17

## 2018-12-11 RX ORDER — POTASSIUM CHLORIDE 20MEQ/15ML
40 LIQUID (ML) ORAL DAILY
Status: DISCONTINUED | OUTPATIENT
Start: 2018-12-11 | End: 2018-12-11 | Stop reason: CLARIF

## 2018-12-11 RX ORDER — POTASSIUM CHLORIDE 750 MG/1
40 CAPSULE, EXTENDED RELEASE ORAL AS NEEDED
Status: DISCONTINUED | OUTPATIENT
Start: 2018-12-11 | End: 2018-12-21 | Stop reason: HOSPADM

## 2018-12-11 RX ORDER — POTASSIUM CHLORIDE 1.5 G/1.77G
40 POWDER, FOR SOLUTION ORAL AS NEEDED
Status: DISCONTINUED | OUTPATIENT
Start: 2018-12-11 | End: 2018-12-21 | Stop reason: HOSPADM

## 2018-12-11 RX ADMIN — VANCOMYCIN HYDROCHLORIDE 1250 MG: 10 INJECTION, POWDER, LYOPHILIZED, FOR SOLUTION INTRAVENOUS at 15:09

## 2018-12-11 RX ADMIN — OXYCODONE HYDROCHLORIDE AND ACETAMINOPHEN 1 TABLET: 10; 325 TABLET ORAL at 04:05

## 2018-12-11 RX ADMIN — METHOCARBAMOL 750 MG: 750 TABLET ORAL at 21:30

## 2018-12-11 RX ADMIN — VANCOMYCIN HYDROCHLORIDE 1500 MG: 10 INJECTION, POWDER, LYOPHILIZED, FOR SOLUTION INTRAVENOUS at 17:57

## 2018-12-11 RX ADMIN — MAGNESIUM SULFATE HEPTAHYDRATE 2 G: 40 INJECTION, SOLUTION INTRAVENOUS at 20:17

## 2018-12-11 RX ADMIN — OXYCODONE HYDROCHLORIDE 10 MG: 5 TABLET ORAL at 17:52

## 2018-12-11 RX ADMIN — DOCUSATE SODIUM 100 MG: 100 CAPSULE, LIQUID FILLED ORAL at 07:58

## 2018-12-11 RX ADMIN — VANCOMYCIN HYDROCHLORIDE 1250 MG: 10 INJECTION, POWDER, LYOPHILIZED, FOR SOLUTION INTRAVENOUS at 04:07

## 2018-12-11 RX ADMIN — OXYCODONE HYDROCHLORIDE 10 MG: 5 TABLET ORAL at 11:36

## 2018-12-11 RX ADMIN — OXYCODONE HYDROCHLORIDE AND ACETAMINOPHEN 1 TABLET: 10; 325 TABLET ORAL at 15:59

## 2018-12-11 RX ADMIN — METHOCARBAMOL 750 MG: 750 TABLET ORAL at 11:22

## 2018-12-11 RX ADMIN — OXYCODONE HYDROCHLORIDE AND ACETAMINOPHEN 1 TABLET: 10; 325 TABLET ORAL at 07:58

## 2018-12-11 RX ADMIN — METHOCARBAMOL 750 MG: 750 TABLET ORAL at 17:52

## 2018-12-11 RX ADMIN — Medication 400 MG: at 20:15

## 2018-12-11 RX ADMIN — POTASSIUM CHLORIDE 40 MEQ: 750 CAPSULE, EXTENDED RELEASE ORAL at 14:55

## 2018-12-11 RX ADMIN — POTASSIUM CHLORIDE 40 MEQ: 750 CAPSULE, EXTENDED RELEASE ORAL at 17:52

## 2018-12-11 RX ADMIN — OXYCODONE HYDROCHLORIDE 10 MG: 5 TABLET ORAL at 23:55

## 2018-12-11 RX ADMIN — POTASSIUM CHLORIDE 40 MEQ: 750 CAPSULE, EXTENDED RELEASE ORAL at 21:30

## 2018-12-11 RX ADMIN — OXYCODONE HYDROCHLORIDE AND ACETAMINOPHEN 1 TABLET: 10; 325 TABLET ORAL at 11:59

## 2018-12-11 RX ADMIN — OXYCODONE HYDROCHLORIDE AND ACETAMINOPHEN 1 TABLET: 10; 325 TABLET ORAL at 20:15

## 2018-12-11 NOTE — PROGRESS NOTES
"Pharmacokinetic Evaluation - Vancomycin    Silvia Velazquez is a 43 y.o. female on vancomycin pharmacy to dose.  MRN: [id number]  : 1975    Day of vancomycin therapy: Vanc day 3/14  Indication: MRSA of blood and spinal epidural abcess  Consulted by: Dr. Fabian Key  Goal trough: 15-20 mcg/ml  Current dose: Vanc 1250mg Q12H  Other antimicrobials: none    Blood pressure (!) 154/113, pulse 92, temperature 98 °F (36.7 °C), temperature source Oral, resp. rate 16, height 167.6 cm (66\"), weight 82.6 kg (182 lb 1.6 oz), SpO2 98 %.  Results from last 7 days   Lab Units  18   1011  12/10/18   0820  18   1436   CREATININE mg/dL  0.46*  0.49*  0.67     Estimated Creatinine Clearance: 174.3 mL/min (A) (by C-G formula based on SCr of 0.46 mg/dL (L)).  Results from last 7 days   Lab Units  18   1011  12/10/18   0820  18   1436   WBC 10*3/mm3  11.24*  13.63*  11.29*   HEMOGLOBIN g/dL  9.3*  9.9*  10.7*   HEMATOCRIT %  29.6*  31.3*  34.5*   PLATELETS 10*3/mm3  243  205  194       Procal: 0.83()  Other relevant labs/chart info:     Radiology:     Cultures: MRSA positive in blood and spinal fluid     Microbiology Results (last 10 days)     Procedure Component Value - Date/Time    Tissue / Bone Culture - Tissue, Spine, Lumbar [[id number]]  (Abnormal) Collected:  18    Lab Status:  Edited Result - FINAL Specimen:  Tissue from Spine, Lumbar Updated:  18     Tissue Culture Light growth (2+) Staphylococcus aureus, MRSA     Comment:   Methicillin resistant Staphylococcus aureus, Patient may be an isolation risk.        Gram Stain Moderate (3+) WBCs seen      Rare (1+) Gram positive cocci    Narrative:       Refer to previous wound culture collected on 2018 for MICS    AFB Culture - Tissue, Spine, Lumbar [[id number]] Collected:  18    Lab Status:  Preliminary result Specimen:  Tissue from Spine, Lumbar Updated:  12/10/18 1216     AFB Stain No acid fast bacilli " "seen on direct smear    Wound Culture - Wound, Spine, Lumbar [[id number]]  (Abnormal) Collected:  12/09/18 2126    Lab Status:  Final result Specimen:  Wound from Spine, Lumbar Updated:  12/11/18 0704     Wound Culture Heavy growth (4+) Staphylococcus aureus, MRSA     Comment:   Methicillin resistant Staphylococcus aureus, Patient may be an isolation risk.        Gram Stain Moderate (3+) WBCs seen      Few (2+) Gram positive cocci    Narrative:       Refer to previous wound culture collected on 12/9/2018 2125 for MICS    Wound Culture - Wound, Spine, Lumbar [[id number]]  (Abnormal)  (Susceptibility) Collected:  12/09/18 2125    Lab Status:  Final result Specimen:  Wound from Spine, Lumbar Updated:  12/11/18 0703     Wound Culture Moderate growth (3+) Staphylococcus aureus, MRSA     Comment: D test is positive. Isolate exhibits \"inducible\" resistance to Clindamycin.    Methicillin resistant Staphylococcus aureus, Patient may be an isolation risk.        Gram Stain Moderate (3+) WBCs seen      Few (2+) Gram positive cocci    Susceptibility      Staphylococcus aureus, MRSA     ZAINAB     Clindamycin Resistant     Erythromycin Resistant     Oxacillin Resistant     Penicillin G Resistant     Rifampin Susceptible     Tetracycline Susceptible     Trimethoprim + Sulfamethoxazole Susceptible     Vancomycin Susceptible                    Blood Culture - Blood, Arm, Left [[id number]]  (Abnormal) Collected:  12/08/18 0243    Lab Status:  Edited Result - FINAL Specimen:  Blood from Arm, Left Updated:  12/10/18 0707     Blood Culture Staphylococcus aureus, MRSA     Comment:   Consider infectious disease consult to rule out distant focus of infection.  Methicillin resistant Staphylococcus aureus, Patient may be an isolation risk.        Isolated from Aerobic and Anaerobic Bottles     Gram Stain Anaerobic Bottle Gram positive cocci in clusters      Aerobic Bottle Gram positive cocci in clusters    Narrative:       Refer to previous blood " "culture collected on 12/8/2018 0208 for ZAINAB's.    Blood Culture ID, PCR - Blood, Arm, Left [[id number]]  (Abnormal) Collected:  12/08/18 0243    Lab Status:  Final result Specimen:  Blood from Arm, Left Updated:  12/09/18 1707     BCID, PCR Staphylococcus aureus. mecA (methicillin resistance gene) detected. Identification by BCID PCR.    Blood Culture - Blood, Arm, Left [[id number]]  (Abnormal)  (Susceptibility) Collected:  12/08/18 0208    Lab Status:  Edited Result - FINAL Specimen:  Blood from Arm, Left Updated:  12/10/18 0706     Blood Culture Staphylococcus aureus, MRSA     Comment:   Consider infectious disease consult to rule out distant focus of infection.  D test is positive. Isolate exhibits \"inducible\" resistance to Clindamycin.    Methicillin resistant Staphylococcus aureus, Patient may be an isolation risk.  Corrected result. Previously Reported Organism Changed. Previous result was Staphylococcus aureus on 12/10/2018 at 0650 EST.        Isolated from Aerobic and Anaerobic Bottles     Gram Stain Anaerobic Bottle Gram positive cocci in clusters      Aerobic Bottle Gram positive cocci in clusters    Narrative:       Although Staphylococcus aureus is sensitive to Oxacillin, it possesses the mecA gene, therefore it is an MRSA and should be considered resistant to all beta-lactam antibiotics.    Susceptibility      Staphylococcus aureus, MRSA     ZAINAB     Clindamycin Resistant     Erythromycin Resistant     Oxacillin Susceptible     Penicillin G Resistant     Rifampin Susceptible     Tetracycline Susceptible     Trimethoprim + Sulfamethoxazole Susceptible     Vancomycin Susceptible                            Dosing hx (include troughs if drawn):  12/9 1536  12/10  1250 q12:  0410, 1534  12/11  1250 q12:  0407    Assessment:  Trough is to be drawn at 1530 today. Scr stable. Expecting level to be low and may need q8 dosing, will leave out with 2nd shift rph.   Renal function is stable. CrCl is " 174mL/min.    Plan:  1) Dose vancomycin 1250 mg every 8 hours.  2) Encourage adequate hydration if appropriate. Monitor for decreased UOP, rash or other signs of vancomycin intolerance. Will continue to monitor renal function.    Thanks for this consult, will follow until Leslie agudelo, Pharm D. Candidate

## 2018-12-11 NOTE — PROGRESS NOTES
Discharge Planning Assessment  [facility]     Patient Name: Silvia Velazquez  MRN: [id number]  Today's Date: 12/11/2018    Admit Date: 12/9/2018    Discharge Needs Assessment     Row Name 12/11/18 1600       Living Environment    Lives With  spouse    Name(s) of Who Lives With Patient  Aldo Nieves  [phone]    Current Living Arrangements  hotel/motel    Primary Care Provided by  self    Provides Primary Care For  no one    Family Caregiver if Needed  spouse    Family Caregiver Names  Aldo Nieves  [phone]    Quality of Family Relationships  supportive       Resource/Environmental Concerns    Resource/Environmental Concerns  financial       Transition Planning    Patient/Family Anticipated Services at Transition  ;mental health services;community agency       Discharge Needs Assessment    Concerns to be Addressed  discharge planning;compliance issue;financial/insurance;medication;substance/tobacco abuse/use;other (see comments) Where will she get her long term IV antibiotics.    Equipment Currently Used at Home  none    Outpatient/Agency/Support Group Needs  infusion therapy, outpatient;outpatient substance abuse treatment    Discharge Facility/Level of Care Needs  LTACH (long term acute care hospital)    Offered/Gave Vendor List  no    Current Discharge Risk  chronically ill;psychiatric illness;substance use/abuse        Discharge Plan     Row Name 12/11/18 6562       Plan    Plan  TBD - Needs long term IV antibiotics.     Patient/Family in Agreement with Plan  yes    Plan Comments  Met with patient at bedside.  Face sheet reviewed, updated marital status.  Prior to admission patient states that she has been living with her  Aldo Nieevs  [phone].  Patient states that she has been able to perform her own ADL's.  Patient states that she goes to [facility] for [facility] appointments and gets her medications.   Patient does not use any special adaptive equipment at home.   Per MD notes, patient will need long-term IV antibiotics.  Spoke with Anabel Henson at [facility], [facility] not able to accept her insurance.  Patient's peripheral access is very poor.  Will need to identify resources that may be able to provide long-term IV antibiotics.  Will continue to monitor for new or changing needs.        Destination      Service Provider Request Status Selected Services Address Phone Number Fax Number    [facility] Pending - No Request Sent N/A [address] [facility] [address] [phone]       Lucila Obrien RN 12/11/2018 1313    12/11/2018 Left message for Anabel Henson, patient agreeable to [facility].  Th                 Durable Medical Equipment      No service coordination in this encounter.      Dialysis/Infusion      No service coordination in this encounter.      Home Medical Care      No service coordination in this encounter.      Community Resources      No service coordination in this encounter.          Demographic Summary     Row Name 12/11/18 1605       General Information    Admission Type  inpatient    Arrived From  emergency department    Referral Source  admission list    Reason for Consult  discharge planning    Preferred Language  English     Used During This Interaction  no       Contact Information    Permission Granted to Share Info With  family/designee Aldo Nieves  [phone]        Functional Status     Row Name 12/11/18 1606       Functional Status    Usual Activity Tolerance  moderate    Current Activity Tolerance  fair       Functional Status, IADL    Medications  independent    Meal Preparation  independent       Mental Status    General Appearance WDL  WDL;appearance    General Appearance  unkempt       Mental Status Summary    Recent Changes in Mental Status/Cognitive Functioning  no changes       Employment/    Employment Status  unemployed        Psychosocial    No documentation.        Abuse/Neglect    No documentation.       Legal    No documentation.       Substance Abuse    No documentation.       Patient Forms    No documentation.           Lucila Obrien RN

## 2018-12-11 NOTE — DISCHARGE PLACEMENT REQUEST
"Silvia Miller (43 y.o. Female)     Date of Birth Social Security Number Address Home Phone MRN    1975  [address] [place] [address] [place] [facility] [phone] [id number]    Orthodoxy Marital Status          Holiness Single       Admission Date Admission Type Admitting Provider Attending Provider Department, Room/Bed    12/9/18 Emergency Gomez Quick MD Nidadavolu, Vinay Gopal, MD [facility] INTENSIVE CARE, I388/1    Discharge Date Discharge Disposition Discharge Destination                       Attending Provider:  Yasmani Encarnacion MD    Allergies:  Sulfa Antibiotics    Isolation:  Contact   Infection:  MRSA (12/09/18)   Code Status:  CPR    Ht:  167.6 cm (66\")   Wt:  82.6 kg (182 lb 1.6 oz)    Admission Cmt:  None   Principal Problem:  None                Active Insurance as of 12/9/2018     Primary Coverage     Payor Plan Insurance Group Employer/Plan Group    [facility]      Payor Plan Address Payor Plan Phone Number Payor Plan Fax Number Effective Dates    [address] [address]   9/1/2017 - None Entered    [place] [address]       Subscriber Name Subscriber Birth Date Member ID       SILVIA MILLER 1975 [id number]                 Emergency Contacts      (Rel.) Home Phone Work Phone Mobile Phone    NievesAldo thomas (Spouse) [phone] -- --            "

## 2018-12-11 NOTE — PROGRESS NOTES
"Yasmani Encarnacion MD                          [phone]      Patient ID:    Name:  Silvia Velazquez    MRN:  [id number]    1975   43 y.o.  female            Patient Care Team:  Provider, No Known as PCP - General    CC/ Reason for visit: Per Assessment mentioned below    Subjective: Pt seen and examined this AM. No acute overnight events noted. C/o \"severe\" pain but has a PCA and getting prn po meds. No diarrhoea noted.      ROS: Denies any subjective fevers, chest pain, nausea/ vomiting    Objective     Vital Signs past 24hrs    BP range: BP: (136-173)/() 154/113  Pulse range: Heart Rate:  [74-92] 92  Resp rate range:    Temp range: Temp (24hrs), Av °F (36.7 °C), Min:97.9 °F (36.6 °C), Max:98 °F (36.7 °C)      Ventilator/Non-Invasive Ventilation Settings (From admission, onward)    None          Device (Oxygen Therapy): room air       82.6 kg (182 lb 1.6 oz); Body mass index is 29.39 kg/m².      Intake/Output Summary (Last 24 hours) at 2018 1638  Last data filed at 2018 1500  Gross per 24 hour   Intake 2494.8 ml   Output 2150 ml   Net 344.8 ml       Exam:    GEN:  No respiratory distress, appears stated age; Ill appearing, No accessory muscle use  EYES:   EOMI, anicteric sclera bilat  ENT:    External ears/nose normal, OP clear  NECK:  Supple, midline trachea, No cervical lymphadenopathy  LUNGS: Bilateral breath sounds heard, No wheezes/ crackles heard  CV:  Regular rate and rhythm, No murmur  ABD:  Nontender, nondistended, no palpable liver or masses  EXT:  Extremities warm and well perfused, no peripheral/sacral edema.    Scheduled meds:      methocarbamol 750 mg Oral 4x Daily   oxyCODONE 10 mg Oral Q6H   vancomycin 1,500 mg Intravenous Once   Followed by      [START ON 2018] vancomycin 1,250 mg Intravenous Q8H       IV meds:                          HYDROmorphone HCl-NaCl    Pharmacy to dose vancomycin        Data Review:      Results from " last 7 days   Lab Units  12/11/18   1011  12/10/18   0820  12/09/18   1436  12/08/18   0208   SODIUM mmol/L  134*  135*  135*  131*   POTASSIUM mmol/L  2.9*  3.8  3.2*  3.6   CHLORIDE mmol/L  99  100  94*  92*   CO2 mmol/L  25.1  26.1  29.3*  25.3   BUN mg/dL  8  10  8  9   CREATININE mg/dL  0.46*  0.49*  0.67  0.68   CALCIUM mg/dL  7.9*  8.3*  9.0  8.8   BILIRUBIN mg/dL   --    --   0.8  0.9   ALK PHOS U/L   --    --   82  82   ALT (SGPT) U/L   --    --   52*  68*   AST (SGOT) U/L   --    --   59*  77*   GLUCOSE mg/dL  99  147*  98  118*   WBC 10*3/mm3  11.24*  13.63*  11.29*  11.59*   HEMOGLOBIN g/dL  9.3*  9.9*  10.7*  10.4*   PLATELETS 10*3/mm3  243  205  194  197   PROCALCITONIN ng/mL   --    --    --   0.83*       Lab Results   Component Value Date    CALCIUM 7.9 (L) 12/11/2018    PHOS 3.0 12/11/2018       Results from last 7 days   Lab Units  12/09/18   2126  12/09/18   2125  12/08/18   0243  12/08/18   0208   BLOODCX    --    --   Staphylococcus aureus, MRSA*  Staphylococcus aureus, MRSA*   WOUNDCX   Heavy growth (4+) Staphylococcus aureus, MRSA*  Moderate growth (3+) Staphylococcus aureus, MRSA*   --    --    BCIDPCR    --    --   Staphylococcus aureus. mecA (methicillin resistance gene) detected. Identification by BCID PCR.*   --               Results Review:    I have reviewed the available laboratory results and reviewed the chest imaging from the last 3 days personally and summarized it below    Assessment      ASSESSMENT:  MRSA Bacteremia   MRSA epidural/ paraspinal abscess   Spinal cord compression s/p lumbar decompression 12/9   Heroin/ Meth IVDA   Urinary incontinence  ABLA on Anemia of chronic disease  Hyponatremia/ Hypomagnesemia  Hypokalemia  Mild hepatitis    PLAN:  Patient doing well postoperatively.  Will add some basal dilaudid to PCA and use PO long acting narcs. C/w prn narcs.  I explained to her categorically that the goal is to wean her down and not replace her heroin.   No signs for  withdrawal.  Abx per ID - On Vanc   Aggressive electrolyte repletion  Will check HIV/ Hep panel   OOB/PT as tolerated per HUNTER  Full Code   DVT ppx - Mech     I have discussed my findings and recommendations with patient, nursing staff and consulting provider.     Yasmani Encarnacion MD  12/11/2018

## 2018-12-11 NOTE — PROGRESS NOTES
Adult Nutrition  Assessment/PES    Patient Name:  Silvia Velazquez  YOB: 1975  MRN: [id number]  Admit Date:  12/9/2018    Assessment Date:  12/11/2018    Comments:  Nutrition assessment complete due to MST score of 4. Po intake 25-50% of meals. Pain seems to be her main issue. Skin noted. Encouraged po intake. Will follow as needed.    Reason for Assessment     Row Name 12/11/18 0956          Reason for Assessment    Reason For Assessment  identified at risk by screening criteria     Diagnosis  neurologic conditions;infection/sepsis S/P Posterior lumbar three through sacrum decompression, abscess in spinal cord     Identified At Risk by Screening Criteria  MST SCORE 2+           Anthropometrics     Row Name 12/11/18 0956 12/11/18 0425       Anthropometrics    Weight  --  82.6 kg (182 lb 1.6 oz)       Admit Weight    Admit Weight  76.2 kg (168 lb)  --       Body Mass Index (BMI)    BMI Assessment  BMI 25-29.9: overweight  --        Labs/Tests/Procedures/Meds     Row Name 12/11/18 0956          Labs/Procedures/Meds    Lab Results Reviewed  reviewed, pertinent     Lab Results Comments  Na, K, crp, wbc, hgb        Diagnostic Tests/Procedures    Diagnostic Test/Procedure Reviewed  reviewed, pertinent     Diagnostic Test/Procedures Comments  mri        Medications    Pertinent Medications Reviewed  reviewed, pertinent     Pertinent Medications Comments  Abx, pain meds         Physical Findings     Row Name 12/11/18 0959          Physical Findings    Overall Physical Appearance  overweight     Tubes  -- drains     Skin  other (see comments) back incision; multiple lesions amd scabs to legs and chest           Nutrition Prescription Ordered     Row Name 12/11/18 1000          Nutrition Prescription PO    Current PO Diet  Regular         Evaluation of Received Nutrient/Fluid Intake     Row Name 12/11/18 1000          PO Evaluation    Number of Meals  2     % PO Intake  25-50%          Problem/Interventions:  Problem 1     Row Name 12/11/18 1001          Nutrition Diagnoses Problem 1    Problem 1  Inadequate Intake/Infusion     Inadequate Intake Type  Oral     Macronutrient  Kcal;Protein         Intervention Goal     Row Name 12/11/18 1001          Intervention Goal    General  Maintain nutrition;Meet nutritional needs for age/condition;Reduce/improve symptoms;Disease management/therapy     PO  Tolerate PO;PO intake (%)     PO Intake %  75 %     Weight  No significant weight loss         Nutrition Intervention     Row Name 12/11/18 1001          Nutrition Intervention    RD/Tech Action  Care plan reviewd;Follow Tx progress;Encourage intake;Interview for preference           Education/Evaluation     Row Name 12/11/18 1001          Education    Education  Will Instruct as appropriate        Monitor/Evaluation    Monitor  Per protocol;Skin status;I&O;PO intake;Pertinent labs;Weight;Symptoms           Electronically signed by:  Rebecca Ochoa RD  12/11/18 10:41 AM

## 2018-12-11 NOTE — THERAPY TREATMENT NOTE
Acute Care - Physical Therapy Treatment Note  [facility]     Patient Name: Silvia Velazquez  : 1975  MRN: [id number]  Today's Date: 2018  Onset of Illness/Injury or Date of Surgery: 18          Admit Date: 2018    Visit Dx:    ICD-10-CM ICD-9-CM   1. Spinal epidural abscess G06.1 324.1   2. Bacteremia due to methicillin resistant Staphylococcus aureus R78.81 790.7     041.12   3. Abscess of paraspinal muscles, lumbar M62.89 728.89   4. Generalized weakness R53.1 780.79     Patient Active Problem List   Diagnosis   • Spinal epidural abscess       Therapy Treatment    Rehabilitation Treatment Summary     Row Name 18 1558             Treatment Time/Intention    Discipline  physical therapist  -      Document Type  therapy note (daily note)  -      Subjective Information  complains of;pain  -      Mode of Treatment  physical therapy  -      Patient/Family Observations  pt sitting in chair, pt appears uncomfortable secondary to back pain, RN present  -      Patient Effort  good  -      Comment  RN states pt has been up in chair since 12:30  -      Existing Precautions/Restrictions  fall  -CH      Recorded by [] Camille Emanuel, PT 18 1602      Row Name 18 1558             Cognitive Assessment/Intervention    Additional Documentation  Cognitive Assessment/Intervention (Group)  -CH      Recorded by [] Camille Emanuel, PT 18 1602      Row Name 18 1558             Cognitive Assessment/Intervention- PT/OT    Orientation Status (Cognition)  oriented x 3  -CH      Follows Commands (Cognition)  WFL  -      Personal Safety Interventions  fall prevention program maintained;nonskid shoes/slippers when out of bed  -      Recorded by [] Camille Emanuel, PT 18 1602      Row Name 18 1558             Bed Mobility Assessment/Treatment    Supine-Sit [place] (Bed Mobility)  not tested sitting in chair  -      Sit-Supine  [place] (Bed Mobility)  verbal cues;nonverbal cues (demo/gesture);minimum assist (75% patient effort);2 person assist  -CH      Recorded by [CH] Camille Emanuel, PT 12/11/18 1602      Row Name 12/11/18 1558             Sit-Stand Transfer    Sit-Stand [place] (Transfers)  verbal cues;nonverbal cues (demo/gesture);minimum assist (75% patient effort);2 person assist  -CH      Assistive Device (Sit-Stand Transfers)  walker, front-wheeled  -CH      Recorded by [CH] Camille Emanuel, PT 12/11/18 1602      Row Name 12/11/18 1558             Stand-Sit Transfer    Stand-Sit [place] (Transfers)  verbal cues;nonverbal cues (demo/gesture);contact guard;2 person assist  -CH      Assistive Device (Stand-Sit Transfers)  walker, front-wheeled  -CH      Recorded by [CH] Camille Emanuel, PT 12/11/18 1602      Row Name 12/11/18 1558             Gait/Stairs Assessment/Training    [place]ak Level (Gait)  verbal cues;nonverbal cues (demo/gesture);minimum assist (75% patient effort);2 person assist RN pushed IV pole  -CH      Assistive Device (Gait)  walker, front-wheeled  -CH      Distance in Feet (Gait)  35  -CH      Deviations/Abnormal Patterns (Gait)  yamilet decreased;stride length decreased;antalgic  -CH2      Bilateral Gait Deviations  forward flexed posture  -CH2      Comment (Gait/Stairs)  pt with some knee buckling x1, Dodie to correct  -CH2      Recorded by [CH] Camille Emanuel, PT 12/11/18 1602  [CH2] Camille Emanuel, PT 12/11/18 1618      Row Name 12/11/18 1558             Positioning and Restraints    Pre-Treatment Position  in bed  -CH      Post Treatment Position  bed  -CH      In Bed  side lying left;call light within reach;encouraged to call for assist;with nsg  -CH2      Recorded by [CH] Camille Emanuel, PT 12/11/18 1618  [CH2] Camille Emanuel, PT 12/11/18 1622      Row Name 12/11/18 1558             Pain Assessment    Additional Documentation  Pain Scale: Word Pre/Post-Treatment  (Group)  -CH      Recorded by [] Camille Emanuel, PT 12/11/18 1622      Row Name 12/11/18 1558             Pain Scale: Numbers Pre/Post-Treatment    Pain Location  back  -CH      Pain Intervention(s)  Repositioned pt used PCA pump  -CH      Recorded by [] Camille Emanuel, PT 12/11/18 1622      Row Name 12/11/18 1558             Pain Scale: Word Pre/Post-Treatment    Pain: Word Scale, Pretreatment  6 - moderate-severe pain  -CH      Recorded by [] Camille Emanuel, PT 12/11/18 1622      Row Name                Wound 12/09/18 2154 Bilateral back incision    Wound - Properties Group Date first assessed: 12/09/18 [KK] Time first assessed: 2154 [KK] Side: Bilateral [KK] Location: back [KK] Type: incision [KK] Recorded by:  [KK] Bárbara Huerta RN 12/09/18 2154    Row Name 12/11/18 1558             Plan of Care Review    Plan of Care Reviewed With  patient  -CH      Recorded by [] Camille Emanuel, PT 12/11/18 1622      Row Name 12/11/18 1558             Outcome Summary/Treatment Plan (PT)    Anticipated Discharge Disposition (PT)  skilled nursing facility  -CH      Recorded by [] Camille Emanuel, PT 12/11/18 1622        User Key  (r) = Recorded By, (t) = Taken By, (c) = Cosigned By    Initials Name Effective Dates Discipline     Camille Emanuel, PT 04/03/18 -  PT    Bárbara Chakraborty RN 09/30/16 -  Nurse          Wound 12/09/18 2154 Bilateral back incision (Active)   Dressing Appearance open to air;no drainage 12/11/2018 12:15 PM   Closure Sutures;Approximated;Open to air 12/11/2018 12:15 PM   Base clean;pink 12/11/2018 12:15 PM   Drainage Amount none 12/11/2018 12:15 PM   Dressing Care, Wound open to air 12/10/2018 10:00 PM           Physical Therapy Education     Title: PT OT SLP Therapies (Done)     Topic: Physical Therapy (Done)     Point: Mobility training (Done)     Learning Progress Summary           Patient Acceptance, E,TB,D, VU,NR by  at 12/11/2018  4:22 PM    Acceptance, E,TB,D,  VU,NR by  at 12/10/2018  4:29 PM                   Point: Home exercise program (Done)     Learning Progress Summary           Patient Acceptance, E,TB,D, VU,NR by  at 12/10/2018  4:29 PM                   Point: Body mechanics (Done)     Learning Progress Summary           Patient Acceptance, E,TB,D, VU,NR by  at 12/11/2018  4:22 PM    Acceptance, E,TB,D, VU,NR by  at 12/10/2018  4:29 PM                   Point: Precautions (Done)     Learning Progress Summary           Patient Acceptance, E,TB,D, VU,NR by  at 12/11/2018  4:22 PM    Acceptance, E,TB,D, VU,NR by  at 12/10/2018  4:29 PM                               User Key     Initials Effective Dates Name Provider Type Discipline     04/03/18 -  Camille Emanuel, PT Physical Therapist PT                PT Recommendation and Plan  Anticipated Discharge Disposition (PT): skilled nursing facility  Planned Therapy Interventions (PT Eval): balance training, bed mobility training, gait training, home exercise program, patient/family education, transfer training  Therapy Frequency (PT Clinical Impression): daily  Outcome Summary/Treatment Plan (PT)  Anticipated Discharge Disposition (PT): skilled nursing facility  Plan of Care Reviewed With: patient  Outcome Summary: Pt demonstrates some increased activity tolerance and functional strength as she was able to increase her gait distance and required slightly less assistance with tranfers and gait. PT willl. continue to follow to address strength, mobility, and gait.  Outcome Measures     Row Name 12/11/18 1600 12/10/18 1600          How much help from another person do you currently need...    Turning from your back to your side while in flat bed without using bedrails?  3  -CH  2  -CH     Moving from lying on back to sitting on the side of a flat bed without bedrails?  3  -CH  2  -CH     Moving to and from a bed to a chair (including a wheelchair)?  3  -CH  2  -CH     Standing up from a chair using your  arms (e.g., wheelchair, bedside chair)?  3  -CH  2  -CH     Climbing 3-5 steps with a railing?  1  -  1  -     To walk in hospital room?  3  -  2  -CH     AM-PAC 6 Clicks Score  16  -  11  -CH        Functional Assessment    Outcome Measure Options  AM-PAC 6 Clicks Basic Mobility (PT)  -CH  AM-PAC 6 Clicks Basic Mobility (PT)  -       User Key  (r) = Recorded By, (t) = Taken By, (c) = Cosigned By    Initials Name Provider Type     Camille Emanuel, PT Physical Therapist         Time Calculation:   PT Charges     Row Name 12/11/18 1624             Time Calculation    Start Time  1540  -      Stop Time  1557  -      Time Calculation (min)  17 min  -      PT Received On  12/11/18  -      PT - Next Appointment  12/12/18  -         Time Calculation- PT    Total Timed Code Minutes- PT  17 minute(s)  -        User Key  (r) = Recorded By, (t) = Taken By, (c) = Cosigned By    Initials Name Provider Type     Camille Emanuel PT Physical Therapist        Therapy Suggested Charges     Code   Minutes Charges    None           Therapy Charges for Today     Code Description Service Date Service Provider Modifiers Qty    59370385520 HC PT EVAL MOD COMPLEXITY 2 12/10/2018 Camille Emanuel, PT GP 1    54957065829 HC PT THER PROC EA 15 MIN 12/10/2018 Camille Emanuel, PT GP 1    85698611170 HC PT THER SUPP EA 15 MIN 12/10/2018 Camille Emanuel, PT GP 1    17467699797 HC PT THER PROC EA 15 MIN 12/11/2018 Camille Emanuel, PT GP 1    85999919939 HC PT THER SUPP EA 15 MIN 12/11/2018 Camille Emanuel, PT GP 1          PT G-Codes  Outcome Measure Options: AM-PAC 6 Clicks Basic Mobility (PT)  AM-PAC 6 Clicks Score: 16    Camille Emanuel, ALIYAH  12/11/2018

## 2018-12-11 NOTE — NURSING NOTE
WOCN referral multiple wounds legs and chest. Unit RN applied antibiotic ointment, vaseline gauze and gauze roll.  Continue current treatment.

## 2018-12-11 NOTE — PLAN OF CARE
Problem: Patient Care Overview  Goal: Plan of Care Review  Outcome: Ongoing (interventions implemented as appropriate)   12/11/18 4378   Coping/Psychosocial   Plan of Care Reviewed With patient   OTHER   Outcome Summary Pt demonstrates some increased activity tolerance and functional strength as she was able to increase her gait distance and required slightly less assistance with tranfers and gait. PT willl. continue to follow to address strength, mobility, and gait.

## 2018-12-11 NOTE — CONSULTS
"44 y/o  cauc admitted due to positive blood cultures for MRSA, and a epidural abscess and spinal musculature abscess. Patient was seen in I388. She was awake. Patient requested that her sig other of 7 off and on years, Aldo Nieves remain in the room.  Patient states they had just gotten back together. She had been on the street for a while using drugs.  He and she have most recently been living in a hotel or \"the cheapest place they can find.\"  She admitted to using substances as far back as her teens. In her teens she used ETOH, weed and LSD.  She states that most recently she used IV heroin. Used methamphetamine orally and by snorting.  Patient declined to give a more detailed hx of her drug use and mental health treatment saying she prefers to talk to treatment providers that she chooses and she did not come to [facility] for REINA tx.  She states that she has been going to [facility] and they know all about her and that she wasn't to see their psychiatric team and REINA team.  She is denying any SI or HI. She is denying any w/drawal symptoms.  Her UDS was positive for Amphet/methamphetmine., opiates and THC.  She and her sig other Aldo deny any regular use of ETOH.     Patient reports that at age 14 she took and OD. She denies any wish to die or any recent suicidal thoughts.  She reports past hx of IP psych admission. She declined to provide detailed hx of her psych and REINA treatment.  She states that she utilizes [facility] for her outpt treatment and she prefers to only talk to them about her usage have them help her w/ providers.  She rated depression as a 0 and anxiety as a 8.  She does state that she wants to get on methadone.     Patient had 4 children. She had a child that was murdered earlier this year in [place]. She states she is  from her . She is in a 7 year off and on relationship w/ Aldo Nieves who was in the room during the evaluation.  She states she has an associates degree " "and a \"law degree.\"  She has gone to 12 step meetings.  She has been homeless but is currently staying w/ Aldo A. In a hotel.  She is currently unemployed.    Patient is alert and O x 4. She is sitting up in a chair. No SI or HI.  No a/v hallucinations. Speech was appropriate. She provided some hx but chose not to provide a detailed hx of her Substance Use.  She denied any craving or interest in having someone bring her any substances into the hospital.  [facility] will follow. Discussed case at length w/ RN.   "

## 2018-12-11 NOTE — PROGRESS NOTES
"Pharmacy to Dose Vancomycin    Patient: Silvia Velazquez (I388/1, [id number]  LOS: 2 days )  Relevant clinical data and objective history reviewed:  43 y.o. female 167.6 cm (66\") 82.6 kg (182 lb 1.6 oz)  Body mass index is 29.39 kg/m².  she has a past medical history of Hepatitis C, Kidney stone, Lupus (systemic lupus erythematosus) (CMS/HCC), Mitral valve anterior leaflet prolapse, and Seizures (CMS/HCC).    Day #3  Duration: 14 days  Consult for Dr Fabian Key MD  Indication: MRSA blood and spinal epidural abscess  Current dose: 1250 mg IV q12h    Cultures:   tissue lumbar spine x2  MRSA   AFB - lumbar spine  Negative   wound cx lumbar spine  MRSA   Blood x2 and BCID  MRSA    Other Abx: none    Vancomycin dosing history/levels:   1536 1500 mg IV x1  12/10 041 1534 vanco 1250 mg IV q12h   0407     1532 Trough = 10.9 mcg/mL (~11.5 hr level)    Results from last 7 days   Lab Units  18   1011  12/10/18   0820  18   1436   WBC 10*3/mm3  11.24*  13.63*  11.29*   HEMOGLOBIN g/dL  9.3*  9.9*  10.7*   HEMATOCRIT %  29.6*  31.3*  34.5*   PLATELETS 10*3/mm3  243  205  194     Lab Results   Lab Value Date/Time    LACTATE 0.7 12/10/2018 0009    LACTATE 2.9 (C) 2018 1519    LACTATE 1.5 2018 0208    PROCALCITO 0.83 (C) 2018 0208    CKTOTAL 84 2018 0208     Temp (24hrs), Av °F (36.7 °C), Min:97.9 °F (36.6 °C), Max:98 °F (36.7 °C)    Serum creatinine: 0.46 mg/dL (L) 18 1011  Estimated creatinine clearance: 174.3 mL/min (A)    Assessment/Plan:   - Cpk(est) = (dose/Vd) + Tr = 1250 mg / (0.7 L/kg x 82.6 kg) + 10.9 mcg/ml ;  Cpk(est) = 21.6 mcg/ml  - 10.9 mcg/ml = 21.6 mcg/ml x e^(ke x 9.5 hr) ; ke = -0.038 hr-1  - new dose = (total daily dose x target concentration) / (acquired concentration)  = (2500 mg/day x 17.5 mcg/ml) / (10.9 mcg/ml) = ~4000 mg/day    - Vancomycin 1500 mg IV x1 (estPk = 36.8 mcg/mL) then 1250 mg IV q8h.  - Trough prior to the 4th dose " (12/12 1700). Predicted 15-20.    - CBC/CMP in AM. HIV antibodies and Hepatis profile ordered as well    Ilya Ash PharmD  12/11/18 4:22 PM

## 2018-12-11 NOTE — PROGRESS NOTES
POD # 1    Improved strength in LE.    Exam: Sensation improved in pudendal area. Normal rectal tone.    Wound is clean and dry.    Drain output: 80 cc.

## 2018-12-11 NOTE — PLAN OF CARE
Problem: Patient Care Overview  Goal: Plan of Care Review  Outcome: Ongoing (interventions implemented as appropriate)   12/11/18 4116   Coping/Psychosocial   Plan of Care Reviewed With patient;significant other   Plan of Care Review   Progress improving   OTHER   Outcome Summary Vitals stable on RA. Elevated BP usually associated with pain and inability to keep arm still. Scheduled PO narcotics and muscle relaxers were added to regimen today, augmenting PRN percocet and Dilaudid PCA. Patient had improved pain control, althouigh still has periods of intense pain. Patient's mobility improced significantly, got up to bedside commode, sat in chair for 4 hours, and walked short distance with physical therapy. Significant other Aldo at bedside most of day. He expressed concern that her sister or friends may bring her drugs. He says he refuses to do that. He asked to be called if we see anyone with suspicious behavior come to visit her. [facility] is working on LTAC placement as means to provide long term antibiotic therapy in a safe monitored environment.  Access center interviewed patient; see their note.        Problem: Skin Injury Risk (Adult)  Goal: Identify Related Risk Factors and Signs and Symptoms  Outcome: Outcome(s) achieved Date Met: 12/11/18    Goal: Skin Health and Integrity  Outcome: Ongoing (interventions implemented as appropriate)      Problem: Fall Risk (Adult)  Goal: Identify Related Risk Factors and Signs and Symptoms  Outcome: Outcome(s) achieved Date Met: 12/11/18    Goal: Absence of Fall  Outcome: Ongoing (interventions implemented as appropriate)      Problem: Pain, Acute (Adult)  Goal: Identify Related Risk Factors and Signs and Symptoms  Outcome: Outcome(s) achieved Date Met: 12/11/18    Goal: Acceptable Pain Control/Comfort Level  Outcome: Ongoing (interventions implemented as appropriate)      Problem: Skin and Soft Tissue Infection (Adult)  Goal: Signs and Symptoms of Listed Potential Problems Will  be Absent, Minimized or Managed (Skin and Soft Tissue Infection)  Outcome: Ongoing (interventions implemented as appropriate)

## 2018-12-11 NOTE — NURSING NOTE
Spoke with Dr. Rao about patient's request for additional pain medication.  No new orders received.

## 2018-12-11 NOTE — PROGRESS NOTES
Postoperative day #2    Strength essentially normal.    Voiding spontaneously.    Wound is clean clear and dry.    140 cc out of the drains.    Okay to transfer to floor from neurosurgery standpoint.

## 2018-12-11 NOTE — PROGRESS NOTES
"  Infectious Diseases Progress Note    Fabian Key MD     [facility]  Los: 2 days  Patient Identification:  Name: Silvia Velazquez  Age: 43 y.o.  Sex: female  :  1975  MRN: [id number]         Primary Care Physician: Provider, No Known            Subjective: Complaining a lot of pain in her back and states that the pain medication that she's getting is not adequate.  Denies any fever and chills.  Does admit to have increasing strength in lower extremities.  Interval History: See consultation note    Objective:    Scheduled Meds:  methocarbamol 750 mg Oral 4x Daily   vancomycin 1,250 mg Intravenous Q12H     Continuous Infusions:  HYDROmorphone HCl-NaCl    Pharmacy to dose vancomycin        Vital signs in last 24 hours:  Temp:  [97.9 °F (36.6 °C)-98.5 °F (36.9 °C)] 97.9 °F (36.6 °C)  Heart Rate:  [74-89] 84  BP: (127-169)/() 169/114    Intake/Output:    Intake/Output Summary (Last 24 hours) at 2018 0920  Last data filed at 2018 0425  Gross per 24 hour   Intake 2954.8 ml   Output 1565 ml   Net 1389.8 ml       Exam:  BP (!) 169/114 Comment: pt angry, upset, in pain  Pulse 84   Temp 97.9 °F (36.6 °C) (Oral)   Resp 16   Ht 167.6 cm (66\")   Wt 82.6 kg (182 lb 1.6 oz)   LMP  (LMP Unknown)   SpO2 100%   BMI 29.39 kg/m²     General Appearance:    Alert, cooperative, no distress, AAOx3                          Head:    Normocephalic, without obvious abnormality, atraumatic                           Eyes:    PERRL, conjunctivae/corneas clear, EOM's intact, both eyes                         Throat:   Lips, tongue, gums normal; oral mucosa pink and moist                           Neck:   Supple, symmetrical, trachea midline, no JVD                         Lungs:    Clear to auscultation bilaterally, respirations unlabored                 Chest Wall:    No tenderness or deformity                          Heart:    Regular rate and rhythm, S1 and S2 normal, no murmur, no rub         "                                 or gallop                  Abdomen:     Soft, non-tender, bowel sounds active, no masses, no                                                        organomegaly                  Extremities:   Extremities normal, atraumatic, no cyanosis or edema                        Pulses:   Pulses palpable in all extremities                            Skin:   Skin is warm and dry,  no rashes or palpable lesions                  Neurologic:   CNII-XII intact, motor strength grossly intact, sensation grossly                                         intact to light touch, no focal deficits noted       Data Review:    I reviewed the patient's new clinical results.  Results from last 7 days   Lab Units  12/10/18   0820  12/09/18   1436  12/08/18   0208   WBC 10*3/mm3  13.63*  11.29*  11.59*   HEMOGLOBIN g/dL  9.9*  10.7*  10.4*   PLATELETS 10*3/mm3  205  194  197     Results from last 7 days   Lab Units  12/10/18   0820  12/09/18   1436  12/08/18   0208   SODIUM mmol/L  135*  135*  131*   POTASSIUM mmol/L  3.8  3.2*  3.6   CHLORIDE mmol/L  100  94*  92*   CO2 mmol/L  26.1  29.3*  25.3   BUN mg/dL  10  8  9   CREATININE mg/dL  0.49*  0.67  0.68   CALCIUM mg/dL  8.3*  9.0  8.8   GLUCOSE mg/dL  147*  98  118*     Microbiology Results (last 10 days)     Procedure Component Value - Date/Time    Tissue / Bone Culture - Tissue, Spine, Lumbar [[id number]]  (Abnormal) Collected:  12/09/18 2127    Lab Status:  Edited Result - FINAL Specimen:  Tissue from Spine, Lumbar Updated:  12/11/18 0705     Tissue Culture Light growth (2+) Staphylococcus aureus, MRSA     Comment:   Methicillin resistant Staphylococcus aureus, Patient may be an isolation risk.        Gram Stain Moderate (3+) WBCs seen      Rare (1+) Gram positive cocci    Narrative:       Refer to previous wound culture collected on 12/9/2018 2125 for MICS    AFB Culture - Tissue, Spine, Lumbar [[id number]] Collected:  12/09/18 2127    Lab Status:  Preliminary  "result Specimen:  Tissue from Spine, Lumbar Updated:  12/10/18 1216     AFB Stain No acid fast bacilli seen on direct smear    Wound Culture - Wound, Spine, Lumbar [[id number]]  (Abnormal) Collected:  12/09/18 2126    Lab Status:  Final result Specimen:  Wound from Spine, Lumbar Updated:  12/11/18 0704     Wound Culture Heavy growth (4+) Staphylococcus aureus, MRSA     Comment:   Methicillin resistant Staphylococcus aureus, Patient may be an isolation risk.        Gram Stain Moderate (3+) WBCs seen      Few (2+) Gram positive cocci    Narrative:       Refer to previous wound culture collected on 12/9/2018 2125 for MICS    Wound Culture - Wound, Spine, Lumbar [[id number]]  (Abnormal)  (Susceptibility) Collected:  12/09/18 2125    Lab Status:  Final result Specimen:  Wound from Spine, Lumbar Updated:  12/11/18 0703     Wound Culture Moderate growth (3+) Staphylococcus aureus, MRSA     Comment: D test is positive. Isolate exhibits \"inducible\" resistance to Clindamycin.    Methicillin resistant Staphylococcus aureus, Patient may be an isolation risk.        Gram Stain Moderate (3+) WBCs seen      Few (2+) Gram positive cocci    Susceptibility      Staphylococcus aureus, MRSA     ZAINAB     Clindamycin Resistant     Erythromycin Resistant     Oxacillin Resistant     Penicillin G Resistant     Rifampin Susceptible     Tetracycline Susceptible     Trimethoprim + Sulfamethoxazole Susceptible     Vancomycin Susceptible                    Blood Culture - Blood, Arm, Left [[id number]]  (Abnormal) Collected:  12/08/18 0243    Lab Status:  Edited Result - FINAL Specimen:  Blood from Arm, Left Updated:  12/10/18 0707     Blood Culture Staphylococcus aureus, MRSA     Comment:   Consider infectious disease consult to rule out distant focus of infection.  Methicillin resistant Staphylococcus aureus, Patient may be an isolation risk.        Isolated from Aerobic and Anaerobic Bottles     Gram Stain Anaerobic Bottle Gram positive cocci in " "clusters      Aerobic Bottle Gram positive cocci in clusters    Narrative:       Refer to previous blood culture collected on 12/8/2018 0208 for ZAINAB's.    Blood Culture ID, PCR - Blood, Arm, Left [[id number]]  (Abnormal) Collected:  12/08/18 0243    Lab Status:  Final result Specimen:  Blood from Arm, Left Updated:  12/09/18 1707     BCID, PCR Staphylococcus aureus. mecA (methicillin resistance gene) detected. Identification by BCID PCR.    Blood Culture - Blood, Arm, Left [[id number]]  (Abnormal)  (Susceptibility) Collected:  12/08/18 0208    Lab Status:  Edited Result - FINAL Specimen:  Blood from Arm, Left Updated:  12/10/18 0706     Blood Culture Staphylococcus aureus, MRSA     Comment:   Consider infectious disease consult to rule out distant focus of infection.  D test is positive. Isolate exhibits \"inducible\" resistance to Clindamycin.    Methicillin resistant Staphylococcus aureus, Patient may be an isolation risk.  Corrected result. Previously Reported Organism Changed. Previous result was Staphylococcus aureus on 12/10/2018 at 0650 EST.        Isolated from Aerobic and Anaerobic Bottles     Gram Stain Anaerobic Bottle Gram positive cocci in clusters      Aerobic Bottle Gram positive cocci in clusters    Narrative:       Although Staphylococcus aureus is sensitive to Oxacillin, it possesses the mecA gene, therefore it is an MRSA and should be considered resistant to all beta-lactam antibiotics.    Susceptibility      Staphylococcus aureus, MRSA     ZAINAB     Clindamycin Resistant     Erythromycin Resistant     Oxacillin Susceptible     Penicillin G Resistant     Rifampin Susceptible     Tetracycline Susceptible     Trimethoprim + Sulfamethoxazole Susceptible     Vancomycin Susceptible                            Assessment:    Spinal epidural abscess  MSSA sepsis with bacteremia - staph aureus has mecA gene  Subjective IV drug abuse with significant skin lesions in the needle tracks and associated " phlebitis    Recommendation:  Continue vancomycin adjusted to achieve a trough level of 20.Follow up on repeat blood cultures.  Would need 6 weeks of IV antibiotics in the last negative blood culture surgical intervention with her result latest.    Fabian Key MD  12/11/2018  9:20 AM    Much of this encounter note is an electronic transcription/translation of spoken language to printed text. The electronic translation of spoken language may permit erroneous, or at times, nonsensical words or phrases to be inadvertently transcribed; Although I have reviewed the note for such errors, some may still exist

## 2018-12-11 NOTE — PLAN OF CARE
Problem: Patient Care Overview  Goal: Plan of Care Review   12/11/18 0748   Coping/Psychosocial   Plan of Care Reviewed With patient;spouse   Plan of Care Review   Progress no change   OTHER   Outcome Summary Pt c/o pain throughout the night, able to urinate on her own with a purewick.  at bedside all night .No neuro changes overnight.       Problem: Skin Injury Risk (Adult)  Goal: Identify Related Risk Factors and Signs and Symptoms  Outcome: Ongoing (interventions implemented as appropriate)      Problem: Fall Risk (Adult)  Goal: Identify Related Risk Factors and Signs and Symptoms  Outcome: Ongoing (interventions implemented as appropriate)      Problem: Pain, Acute (Adult)  Goal: Identify Related Risk Factors and Signs and Symptoms  Outcome: Ongoing (interventions implemented as appropriate)    Goal: Acceptable Pain Control/Comfort Level  Outcome: Ongoing (interventions implemented as appropriate)      Problem: Skin and Soft Tissue Infection (Adult)  Goal: Signs and Symptoms of Listed Potential Problems Will be Absent, Minimized or Managed (Skin and Soft Tissue Infection)  Outcome: Ongoing (interventions implemented as appropriate)

## 2018-12-12 ENCOUNTER — APPOINTMENT (OUTPATIENT)
Dept: CARDIOLOGY | Facility: HOSPITAL | Age: 43
End: 2018-12-12
Attending: INTERNAL MEDICINE

## 2018-12-12 LAB
ALBUMIN SERPL-MCNC: 2.6 G/DL (ref 3.5–5.2)
ALBUMIN/GLOB SERPL: 0.5 G/DL
ALP SERPL-CCNC: 78 U/L (ref 39–117)
ALT SERPL W P-5'-P-CCNC: 29 U/L (ref 1–33)
ANION GAP SERPL CALCULATED.3IONS-SCNC: 11 MMOL/L
AST SERPL-CCNC: 31 U/L (ref 1–32)
BH CV ECHO MEAS - ACS: 2 CM
BH CV ECHO MEAS - AO MEAN PG (FULL): 1 MMHG
BH CV ECHO MEAS - AO MEAN PG: 5 MMHG
BH CV ECHO MEAS - AO V2 MAX: 155 CM/SEC
BH CV ECHO MEAS - AO V2 MEAN: 97.9 CM/SEC
BH CV ECHO MEAS - AO V2 VTI: 29.3 CM
BH CV ECHO MEAS - AVA(I,A): 2.6 CM^2
BH CV ECHO MEAS - AVA(I,D): 2.6 CM^2
BH CV ECHO MEAS - BSA(HAYCOCK): 2 M^2
BH CV ECHO MEAS - BSA: 1.9 M^2
BH CV ECHO MEAS - BZI_BMI: 29.4 KILOGRAMS/M^2
BH CV ECHO MEAS - BZI_METRIC_HEIGHT: 167.6 CM
BH CV ECHO MEAS - BZI_METRIC_WEIGHT: 82.6 KG
BH CV ECHO MEAS - EDV(CUBED): 50.7 ML
BH CV ECHO MEAS - EDV(MOD-SP2): 85 ML
BH CV ECHO MEAS - EDV(MOD-SP4): 95 ML
BH CV ECHO MEAS - EDV(TEICH): 58.1 ML
BH CV ECHO MEAS - EF(CUBED): 72.7 %
BH CV ECHO MEAS - EF(MOD-SP2): 58.8 %
BH CV ECHO MEAS - EF(MOD-SP4): 58.9 %
BH CV ECHO MEAS - EF(TEICH): 65.3 %
BH CV ECHO MEAS - ESV(CUBED): 13.8 ML
BH CV ECHO MEAS - ESV(MOD-SP2): 35 ML
BH CV ECHO MEAS - ESV(MOD-SP4): 39 ML
BH CV ECHO MEAS - ESV(TEICH): 20.2 ML
BH CV ECHO MEAS - FS: 35.1 %
BH CV ECHO MEAS - IVS/LVPW: 1.2
BH CV ECHO MEAS - IVSD: 1.1 CM
BH CV ECHO MEAS - LAT PEAK E' VEL: 9 CM/SEC
BH CV ECHO MEAS - LV DIASTOLIC VOL/BSA (35-75): 49.4 ML/M^2
BH CV ECHO MEAS - LV MASS(C)D: 112.5 GRAMS
BH CV ECHO MEAS - LV MASS(C)DI: 58.6 GRAMS/M^2
BH CV ECHO MEAS - LV MEAN PG: 4 MMHG
BH CV ECHO MEAS - LV SYSTOLIC VOL/BSA (12-30): 20.3 ML/M^2
BH CV ECHO MEAS - LV V1 MAX: 149 CM/SEC
BH CV ECHO MEAS - LV V1 MEAN: 96.1 CM/SEC
BH CV ECHO MEAS - LV V1 VTI: 29.7 CM
BH CV ECHO MEAS - LVIDD: 3.7 CM
BH CV ECHO MEAS - LVIDS: 2.4 CM
BH CV ECHO MEAS - LVLD AP2: 9.5 CM
BH CV ECHO MEAS - LVLD AP4: 9.5 CM
BH CV ECHO MEAS - LVLS AP2: 7.8 CM
BH CV ECHO MEAS - LVLS AP4: 8.2 CM
BH CV ECHO MEAS - LVOT AREA (M): 2.5 CM^2
BH CV ECHO MEAS - LVOT AREA: 2.5 CM^2
BH CV ECHO MEAS - LVOT DIAM: 1.8 CM
BH CV ECHO MEAS - LVPWD: 0.9 CM
BH CV ECHO MEAS - MED PEAK E' VEL: 7 CM/SEC
BH CV ECHO MEAS - MV A DUR: 0.09 SEC
BH CV ECHO MEAS - MV A MAX VEL: 132 CM/SEC
BH CV ECHO MEAS - MV DEC SLOPE: 355 CM/SEC^2
BH CV ECHO MEAS - MV DEC TIME: 0.22 SEC
BH CV ECHO MEAS - MV E MAX VEL: 113 CM/SEC
BH CV ECHO MEAS - MV E/A: 0.86
BH CV ECHO MEAS - MV MEAN PG: 3 MMHG
BH CV ECHO MEAS - MV P1/2T MAX VEL: 107 CM/SEC
BH CV ECHO MEAS - MV P1/2T: 88.3 MSEC
BH CV ECHO MEAS - MV V2 MEAN: 81.6 CM/SEC
BH CV ECHO MEAS - MV V2 VTI: 28.5 CM
BH CV ECHO MEAS - MVA P1/2T LCG: 2.1 CM^2
BH CV ECHO MEAS - MVA(P1/2T): 2.5 CM^2
BH CV ECHO MEAS - MVA(VTI): 2.7 CM^2
BH CV ECHO MEAS - PA ACC SLOPE: 0 CM/SEC^2
BH CV ECHO MEAS - PA ACC TIME: 0.09 SEC
BH CV ECHO MEAS - PA MAX PG (FULL): 4.2 MMHG
BH CV ECHO MEAS - PA MAX PG: 8 MMHG
BH CV ECHO MEAS - PA PR(ACCEL): 39.4 MMHG
BH CV ECHO MEAS - PA V2 MAX: 141 CM/SEC
BH CV ECHO MEAS - PULM A REVS DUR: 0.11 SEC
BH CV ECHO MEAS - PULM A REVS VEL: 31.6 CM/SEC
BH CV ECHO MEAS - PULM DIAS VEL: 55.3 CM/SEC
BH CV ECHO MEAS - PULM S/D: 1.5
BH CV ECHO MEAS - PULM SYS VEL: 83.9 CM/SEC
BH CV ECHO MEAS - PVA(V,A): 1.9 CM^2
BH CV ECHO MEAS - PVA(V,D): 1.9 CM^2
BH CV ECHO MEAS - QP/QS: 0.78
BH CV ECHO MEAS - RAP SYSTOLE: 8 MMHG
BH CV ECHO MEAS - RV MAX PG: 3.7 MMHG
BH CV ECHO MEAS - RV MEAN PG: 2 MMHG
BH CV ECHO MEAS - RV V1 MAX: 96.3 CM/SEC
BH CV ECHO MEAS - RV V1 MEAN: 64.1 CM/SEC
BH CV ECHO MEAS - RV V1 VTI: 20.7 CM
BH CV ECHO MEAS - RVOT AREA: 2.8 CM^2
BH CV ECHO MEAS - RVOT DIAM: 1.9 CM
BH CV ECHO MEAS - RVSP: 33 MMHG
BH CV ECHO MEAS - SI(CUBED): 19.2 ML/M^2
BH CV ECHO MEAS - SI(LVOT): 39.3 ML/M^2
BH CV ECHO MEAS - SI(MOD-SP2): 26 ML/M^2
BH CV ECHO MEAS - SI(MOD-SP4): 29.1 ML/M^2
BH CV ECHO MEAS - SI(TEICH): 19.8 ML/M^2
BH CV ECHO MEAS - SV(CUBED): 36.8 ML
BH CV ECHO MEAS - SV(LVOT): 75.6 ML
BH CV ECHO MEAS - SV(MOD-SP2): 50 ML
BH CV ECHO MEAS - SV(MOD-SP4): 56 ML
BH CV ECHO MEAS - SV(RVOT): 58.7 ML
BH CV ECHO MEAS - SV(TEICH): 38 ML
BH CV ECHO MEAS - TAPSE (>1.6): 2.6 CM2
BH CV ECHO MEAS - TR MAX VEL: 250 CM/SEC
BH CV ECHO MEASUREMENTS AVERAGE E/E' RATIO: 14.13
BH CV XLRA - RV BASE: 3.6 CM
BH CV XLRA - TDI S': 16 CM/SEC
BILIRUB SERPL-MCNC: 0.4 MG/DL (ref 0.1–1.2)
BUN BLD-MCNC: 3 MG/DL (ref 6–20)
BUN/CREAT SERPL: 6.4 (ref 7–25)
CALCIUM SPEC-SCNC: 8.6 MG/DL (ref 8.6–10.5)
CHLORIDE SERPL-SCNC: 98 MMOL/L (ref 98–107)
CO2 SERPL-SCNC: 26 MMOL/L (ref 22–29)
CREAT BLD-MCNC: 0.47 MG/DL (ref 0.57–1)
DEPRECATED RDW RBC AUTO: 50.3 FL (ref 37–54)
ERYTHROCYTE [DISTWIDTH] IN BLOOD BY AUTOMATED COUNT: 16.4 % (ref 11.7–13)
GFR SERPL CREATININE-BSD FRML MDRD: 146 ML/MIN/1.73
GLOBULIN UR ELPH-MCNC: 5 GM/DL
GLUCOSE BLD-MCNC: 105 MG/DL (ref 65–99)
HCT VFR BLD AUTO: 30.9 % (ref 35.6–45.5)
HGB BLD-MCNC: 10.2 G/DL (ref 11.9–15.5)
HIV1 P24 AG SER QL: NORMAL
HIV1+2 AB SER QL: NORMAL
LEFT ATRIUM VOLUME INDEX: 15 ML/M2
LV EF 2D ECHO EST: 59 %
MAGNESIUM SERPL-MCNC: 1.9 MG/DL (ref 1.6–2.6)
MAXIMAL PREDICTED HEART RATE: 179 BPM
MCH RBC QN AUTO: 27.6 PG (ref 26.9–32)
MCHC RBC AUTO-ENTMCNC: 33 G/DL (ref 32.4–36.3)
MCV RBC AUTO: 83.7 FL (ref 80.5–98.2)
PHOSPHATE SERPL-MCNC: 3.2 MG/DL (ref 2.5–4.5)
PLATELET # BLD AUTO: 253 10*3/MM3 (ref 140–500)
PMV BLD AUTO: 11.1 FL (ref 6–12)
POTASSIUM BLD-SCNC: 4.1 MMOL/L (ref 3.5–5.2)
PROT SERPL-MCNC: 7.6 G/DL (ref 6–8.5)
RBC # BLD AUTO: 3.69 10*6/MM3 (ref 3.9–5.2)
SODIUM BLD-SCNC: 135 MMOL/L (ref 136–145)
STRESS TARGET HR: 152 BPM
VANCOMYCIN TROUGH SERPL-MCNC: 14.6 MCG/ML (ref 5–20)
WBC NRBC COR # BLD: 10.25 10*3/MM3 (ref 4.5–10.7)

## 2018-12-12 PROCEDURE — 93306 TTE W/DOPPLER COMPLETE: CPT

## 2018-12-12 PROCEDURE — 99024 POSTOP FOLLOW-UP VISIT: CPT | Performed by: NURSE PRACTITIONER

## 2018-12-12 PROCEDURE — 86705 HEP B CORE ANTIBODY IGM: CPT | Performed by: INTERNAL MEDICINE

## 2018-12-12 PROCEDURE — 85027 COMPLETE CBC AUTOMATED: CPT | Performed by: INTERNAL MEDICINE

## 2018-12-12 PROCEDURE — 86709 HEPATITIS A IGM ANTIBODY: CPT | Performed by: INTERNAL MEDICINE

## 2018-12-12 PROCEDURE — 87899 AGENT NOS ASSAY W/OPTIC: CPT | Performed by: INTERNAL MEDICINE

## 2018-12-12 PROCEDURE — G0432 EIA HIV-1/HIV-2 SCREEN: HCPCS | Performed by: INTERNAL MEDICINE

## 2018-12-12 PROCEDURE — 25010000002 VANCOMYCIN 10 G RECONSTITUTED SOLUTION: Performed by: INTERNAL MEDICINE

## 2018-12-12 PROCEDURE — 25010000002 VANCOMYCIN 10 G RECONSTITUTED SOLUTION: Performed by: NEUROLOGICAL SURGERY

## 2018-12-12 PROCEDURE — 80053 COMPREHEN METABOLIC PANEL: CPT | Performed by: INTERNAL MEDICINE

## 2018-12-12 PROCEDURE — 80202 ASSAY OF VANCOMYCIN: CPT | Performed by: INTERNAL MEDICINE

## 2018-12-12 PROCEDURE — 25010000002 ONDANSETRON PER 1 MG: Performed by: NEUROLOGICAL SURGERY

## 2018-12-12 PROCEDURE — 25010000002 HYDROMORPHONE HCL-NACL 50-0.9 MG/50ML-% SOLUTION PREFILLED SYRINGE: Performed by: NURSE PRACTITIONER

## 2018-12-12 PROCEDURE — 93306 TTE W/DOPPLER COMPLETE: CPT | Performed by: INTERNAL MEDICINE

## 2018-12-12 PROCEDURE — 84100 ASSAY OF PHOSPHORUS: CPT | Performed by: INTERNAL MEDICINE

## 2018-12-12 PROCEDURE — 83735 ASSAY OF MAGNESIUM: CPT | Performed by: INTERNAL MEDICINE

## 2018-12-12 PROCEDURE — 87340 HEPATITIS B SURFACE AG IA: CPT | Performed by: INTERNAL MEDICINE

## 2018-12-12 RX ORDER — CLONIDINE HYDROCHLORIDE 0.1 MG/1
0.1 TABLET ORAL 4 TIMES DAILY PRN
Status: DISPENSED | OUTPATIENT
Start: 2018-12-12 | End: 2018-12-13

## 2018-12-12 RX ORDER — NICOTINE 21 MG/24HR
1 PATCH, TRANSDERMAL 24 HOURS TRANSDERMAL
Status: DISCONTINUED | OUTPATIENT
Start: 2018-12-12 | End: 2018-12-21 | Stop reason: HOSPADM

## 2018-12-12 RX ORDER — CLONIDINE HYDROCHLORIDE 0.1 MG/1
0.1 TABLET ORAL ONCE AS NEEDED
Status: ACTIVE | OUTPATIENT
Start: 2018-12-16 | End: 2018-12-17

## 2018-12-12 RX ORDER — HYDROMORPHONE HCL IN 0.9% NACL 50 MG/50ML
PATIENT CONTROLLED ANALGESIA SYRINGE INTRAVENOUS CONTINUOUS
Status: DISCONTINUED | OUTPATIENT
Start: 2018-12-12 | End: 2018-12-13

## 2018-12-12 RX ORDER — CLONIDINE HYDROCHLORIDE 0.1 MG/1
0.1 TABLET ORAL 3 TIMES DAILY PRN
Status: ACTIVE | OUTPATIENT
Start: 2018-12-14 | End: 2018-12-15

## 2018-12-12 RX ORDER — METHOCARBAMOL 750 MG/1
750 TABLET, FILM COATED ORAL EVERY 6 HOURS SCHEDULED
Status: DISCONTINUED | OUTPATIENT
Start: 2018-12-12 | End: 2018-12-21 | Stop reason: HOSPADM

## 2018-12-12 RX ORDER — CLONIDINE HYDROCHLORIDE 0.1 MG/1
0.1 TABLET ORAL 2 TIMES DAILY PRN
Status: ACTIVE | OUTPATIENT
Start: 2018-12-15 | End: 2018-12-16

## 2018-12-12 RX ORDER — CLONIDINE HYDROCHLORIDE 0.1 MG/1
0.1 TABLET ORAL 4 TIMES DAILY PRN
Status: ACTIVE | OUTPATIENT
Start: 2018-12-13 | End: 2018-12-14

## 2018-12-12 RX ORDER — HYDROMORPHONE HYDROCHLORIDE 1 MG/ML
0.5 INJECTION, SOLUTION INTRAMUSCULAR; INTRAVENOUS; SUBCUTANEOUS
Status: DISCONTINUED | OUTPATIENT
Start: 2018-12-12 | End: 2018-12-17

## 2018-12-12 RX ADMIN — CLONIDINE HYDROCHLORIDE 0.1 MG: 0.1 TABLET ORAL at 15:56

## 2018-12-12 RX ADMIN — OXYCODONE HYDROCHLORIDE AND ACETAMINOPHEN 1 TABLET: 10; 325 TABLET ORAL at 07:53

## 2018-12-12 RX ADMIN — Medication 400 MG: at 22:00

## 2018-12-12 RX ADMIN — OXYCODONE HYDROCHLORIDE 10 MG: 5 TABLET ORAL at 17:43

## 2018-12-12 RX ADMIN — METHOCARBAMOL 750 MG: 750 TABLET ORAL at 07:52

## 2018-12-12 RX ADMIN — OXYCODONE HYDROCHLORIDE AND ACETAMINOPHEN 1 TABLET: 10; 325 TABLET ORAL at 00:24

## 2018-12-12 RX ADMIN — VANCOMYCIN HYDROCHLORIDE 1250 MG: 10 INJECTION, POWDER, LYOPHILIZED, FOR SOLUTION INTRAVENOUS at 00:31

## 2018-12-12 RX ADMIN — POTASSIUM CHLORIDE 40 MEQ: 750 CAPSULE, EXTENDED RELEASE ORAL at 00:24

## 2018-12-12 RX ADMIN — HYDROMORPHONE HYDROCHLORIDE: 10 INJECTION, SOLUTION INTRAMUSCULAR; INTRAVENOUS; SUBCUTANEOUS at 21:55

## 2018-12-12 RX ADMIN — ONDANSETRON 4 MG: 2 INJECTION INTRAMUSCULAR; INTRAVENOUS at 03:12

## 2018-12-12 RX ADMIN — OXYCODONE HYDROCHLORIDE AND ACETAMINOPHEN 1 TABLET: 10; 325 TABLET ORAL at 15:56

## 2018-12-12 RX ADMIN — OXYCODONE HYDROCHLORIDE AND ACETAMINOPHEN 1 TABLET: 10; 325 TABLET ORAL at 04:17

## 2018-12-12 RX ADMIN — VANCOMYCIN HYDROCHLORIDE 1500 MG: 10 INJECTION, POWDER, LYOPHILIZED, FOR SOLUTION INTRAVENOUS at 18:58

## 2018-12-12 RX ADMIN — NICOTINE 1 PATCH: 21 PATCH, EXTENDED RELEASE TRANSDERMAL at 17:43

## 2018-12-12 RX ADMIN — POTASSIUM CHLORIDE 40 MEQ: 750 CAPSULE, EXTENDED RELEASE ORAL at 08:03

## 2018-12-12 RX ADMIN — OXYCODONE HYDROCHLORIDE 10 MG: 5 TABLET ORAL at 06:01

## 2018-12-12 RX ADMIN — METHOCARBAMOL TABLETS 750 MG: 750 TABLET, COATED ORAL at 17:43

## 2018-12-12 RX ADMIN — OXYCODONE HYDROCHLORIDE 10 MG: 5 TABLET ORAL at 11:12

## 2018-12-12 RX ADMIN — OXYCODONE HYDROCHLORIDE AND ACETAMINOPHEN 1 TABLET: 10; 325 TABLET ORAL at 12:16

## 2018-12-12 RX ADMIN — METHOCARBAMOL 750 MG: 750 TABLET ORAL at 11:12

## 2018-12-12 RX ADMIN — OXYCODONE HYDROCHLORIDE AND ACETAMINOPHEN 1 TABLET: 10; 325 TABLET ORAL at 20:23

## 2018-12-12 RX ADMIN — DOCUSATE SODIUM 100 MG: 100 CAPSULE, LIQUID FILLED ORAL at 16:22

## 2018-12-12 RX ADMIN — VANCOMYCIN HYDROCHLORIDE 1250 MG: 10 INJECTION, POWDER, LYOPHILIZED, FOR SOLUTION INTRAVENOUS at 10:17

## 2018-12-12 NOTE — PROGRESS NOTES
"Pharmacokinetic Evaluation - Vancomycin    Silvia Velazquez is a 43 y.o. female on vancomycin pharmacy to dose.  MRN: [id number]  : 1975    Day of vancomycin therapy: day   Indication: MRSA in spinal fluid/bone  Consulted by: Dr. Fabian Key MD  Goal trough: 15-20 mcg/ml  Current dose: 1250mg [id number],101 today  Other antimicrobials: none    Blood pressure (!) 166/109, pulse 86, temperature 98.2 °F (36.8 °C), temperature source Oral, resp. rate 16, height 167.6 cm (66\"), weight 82.6 kg (182 lb 1.6 oz), SpO2 91 %.  Results from last 7 days   Lab Units  18   0621  18   1011  12/10/18   0820   CREATININE mg/dL  0.47*  0.46*  0.49*     Estimated Creatinine Clearance: 170.6 mL/min (A) (by C-G formula based on SCr of 0.47 mg/dL (L)).  Results from last 7 days   Lab Units  18   0621  18   1011  12/10/18   0820   WBC 10*3/mm3  10.25  11.24*  13.63*   HEMOGLOBIN g/dL  10.2*  9.3*  9.9*   HEMATOCRIT %  30.9*  29.6*  31.3*   PLATELETS 10*3/mm3  253  243  205       Procal: n/a  Other relevant labs/chart info: lactate-0.7 (12/10)    Radiology: n/a  Cultures: MRSA positive in Spinal fluid and bone     Microbiology Results (last 10 days)     Procedure Component Value - Date/Time    Blood Culture - Blood, Hand, Right [[id number]] Collected:  18 1029    Lab Status:  Preliminary result Specimen:  Blood from Hand, Right Updated:  18 1046     Blood Culture No growth at 24 hours    Blood Culture - Blood, Arm, Left [[id number]] Collected:  18 1011    Lab Status:  Preliminary result Specimen:  Blood from Arm, Left Updated:  18 1046     Blood Culture No growth at 24 hours    Anaerobic Culture - Tissue, Spine, Lumbar [[id number]] Collected:  18    Lab Status:  Preliminary result Specimen:  Tissue from Spine, Lumbar Updated:  18 1025     Culture No anaerobes isolated at 3 days    Tissue / Bone Culture - Tissue, Spine, Lumbar [[id number]]  (Abnormal) Collected:  " "12/09/18 2127    Lab Status:  Edited Result - FINAL Specimen:  Tissue from Spine, Lumbar Updated:  12/11/18 0705     Tissue Culture Light growth (2+) Staphylococcus aureus, MRSA     Comment:   Methicillin resistant Staphylococcus aureus, Patient may be an isolation risk.        Gram Stain Moderate (3+) WBCs seen      Rare (1+) Gram positive cocci    Narrative:       Refer to previous wound culture collected on 12/9/2018 2125 for MICS    AFB Culture - Tissue, Spine, Lumbar [[id number]] Collected:  12/09/18 2127    Lab Status:  Preliminary result Specimen:  Tissue from Spine, Lumbar Updated:  12/10/18 1216     AFB Stain No acid fast bacilli seen on direct smear    Wound Culture - Wound, Spine, Lumbar [[id number]]  (Abnormal) Collected:  12/09/18 2126    Lab Status:  Final result Specimen:  Wound from Spine, Lumbar Updated:  12/11/18 0704     Wound Culture Heavy growth (4+) Staphylococcus aureus, MRSA     Comment:   Methicillin resistant Staphylococcus aureus, Patient may be an isolation risk.        Gram Stain Moderate (3+) WBCs seen      Few (2+) Gram positive cocci    Narrative:       Refer to previous wound culture collected on 12/9/2018 2125 for MICS    Wound Culture - Wound, Spine, Lumbar [[id number]]  (Abnormal)  (Susceptibility) Collected:  12/09/18 2125    Lab Status:  Final result Specimen:  Wound from Spine, Lumbar Updated:  12/11/18 0703     Wound Culture Moderate growth (3+) Staphylococcus aureus, MRSA     Comment: D test is positive. Isolate exhibits \"inducible\" resistance to Clindamycin.    Methicillin resistant Staphylococcus aureus, Patient may be an isolation risk.        Gram Stain Moderate (3+) WBCs seen      Few (2+) Gram positive cocci    Susceptibility      Staphylococcus aureus, MRSA     ZAINAB     Clindamycin Resistant     Erythromycin Resistant     Oxacillin Resistant     Penicillin G Resistant     Rifampin Susceptible     Tetracycline Susceptible     Trimethoprim + Sulfamethoxazole Susceptible     " "Vancomycin Susceptible                    Blood Culture - Blood, Arm, Left [[id number]]  (Abnormal) Collected:  12/08/18 0243    Lab Status:  Edited Result - FINAL Specimen:  Blood from Arm, Left Updated:  12/10/18 0707     Blood Culture Staphylococcus aureus, MRSA     Comment:   Consider infectious disease consult to rule out distant focus of infection.  Methicillin resistant Staphylococcus aureus, Patient may be an isolation risk.        Isolated from Aerobic and Anaerobic Bottles     Gram Stain Anaerobic Bottle Gram positive cocci in clusters      Aerobic Bottle Gram positive cocci in clusters    Narrative:       Refer to previous blood culture collected on 12/8/2018 0208 for ZAINAB's.    Blood Culture ID, PCR - Blood, Arm, Left [[id number]]  (Abnormal) Collected:  12/08/18 0243    Lab Status:  Final result Specimen:  Blood from Arm, Left Updated:  12/09/18 1707     BCID, PCR Staphylococcus aureus. mecA (methicillin resistance gene) detected. Identification by BCID PCR.    Blood Culture - Blood, Arm, Left [[id number]]  (Abnormal)  (Susceptibility) Collected:  12/08/18 0208    Lab Status:  Edited Result - FINAL Specimen:  Blood from Arm, Left Updated:  12/10/18 0706     Blood Culture Staphylococcus aureus, MRSA     Comment:   Consider infectious disease consult to rule out distant focus of infection.  D test is positive. Isolate exhibits \"inducible\" resistance to Clindamycin.    Methicillin resistant Staphylococcus aureus, Patient may be an isolation risk.  Corrected result. Previously Reported Organism Changed. Previous result was Staphylococcus aureus on 12/10/2018 at 0650 EST.        Isolated from Aerobic and Anaerobic Bottles     Gram Stain Anaerobic Bottle Gram positive cocci in clusters      Aerobic Bottle Gram positive cocci in clusters    Narrative:       Although Staphylococcus aureus is sensitive to Oxacillin, it possesses the mecA gene, therefore it is an MRSA and should be considered resistant to all " beta-lactam antibiotics.    Susceptibility      Staphylococcus aureus, MRSA     ZAINAB     Clindamycin Resistant     Erythromycin Resistant     Oxacillin Susceptible     Penicillin G Resistant     Rifampin Susceptible     Tetracycline Susceptible     Trimethoprim + Sulfamethoxazole Susceptible     Vancomycin Susceptible                            Dosing hx (include troughs if drawn):  Vanc 1500mg loading dose given 12/.  Vanc 1250 Q12H on (12/10)-0410, 1534 (12/11)-0407,1509 (rn started early, then stopped after 10 min to allow for trough, then finished infusion after level drawn)  Vanc trough 12/11 at 1530-10.9  Vanc 1500mg given 12/ (one dose for subtherapeutic vanc trough)--this was in addition to the 1250 mg given at 1509  Vanc 1250 Q8H-(12/12)-0031, 1017    Assessment:  Renal function is steady. CrCl-170ml/min  Last trough was 10.9; drawn 1530 at steady state.  One time dose of Vanc 1500mg was given for subtherapeutic levels  Dose was then adjusted to 1250mg Q8H instead of Q12H    Plan:  1) Give vancomycin 1250mg every 8 hours.  2) Next trough on 12/12 at 1700 after 3 doses of this regimen and 8 total doses.  3) Encourage adequate hydration if appropriate. Monitor for decreased UOP, rash or other signs of vancomycin intolerance.   4) CrCl/renal function stable, continue to monitor.  5) If trough from 12/12 comes back at 15-20, keep regimen as is. If not, reassess.     Thanks for this consult, will follow until Leslie agudelo, Pharm. D. Candidate

## 2018-12-12 NOTE — PROGRESS NOTES
"Reviewed chart and interviewed pt. Educated pt brain disease of REINA. Pt would like to have REINA treatment. Pt said she would not go to \"[facility]\"   "

## 2018-12-12 NOTE — PROGRESS NOTES
Continued Stay Note  [facility]     Patient Name: Silvia Velazquez  MRN: [id number]  Today's Date: 12/12/2018    Admit Date: 12/9/2018    Discharge Plan     Row Name 12/12/18 1616       Plan    Plan  TBD    Patient/Family in Agreement with Plan  yes    Plan Comments  HR CCP spoke again with pt at bedside.  She states that she will not be able to care for herself at home so therefore is open to referrals to other [facility] at this time.  Referral called to [facility] as well as [facility].  Await response.    Row Name 12/12/18 1531       Plan    Plan Comments  Received a call from Mara for [facility].  Her facilities have reviewed and decline.  HR CCP contacted Graciela with [facility] and they do not accept pt's insurance.    Row Name 12/12/18 1327       Plan    Plan  TBD    Patient/Family in Agreement with Plan  yes    Plan Comments  High [facility] CCP met with pt and, with her permission, her significant other Aldo Nieves at bedside.  Explained discharge planning role and explained that at this time, pt appears that she will need LTACH placement.  Gave pt options of LTACHs which will accept pt's insurance.  She refuses referrals due to their location out of the [facility].  She states that [place] or [place] are the only locations she will accept.  She would like to go to a SNF in either of those [facility] and gives permission for CCP to send referrals to all who may be able to accept her.  HR CCP placed a call to [facility]/[facility].  They reviewed and both decline.  Call then placed to Mara Cruz for the [facility] and she will review and let CCP know.        Discharge Codes    No documentation.             Yolanda Altman RN

## 2018-12-12 NOTE — CONSULTS
The patient states that the only narcotic that really helps is Percocet 10mg for about an hour. Perhaps she would do well with a higher dose Oxycontin and she states she also does well  With Methadone..

## 2018-12-12 NOTE — PLAN OF CARE
Problem: Skin Injury Risk (Adult)  Goal: Skin Health and Integrity  Outcome: Ongoing (interventions implemented as appropriate)      Problem: Fall Risk (Adult)  Goal: Absence of Fall  Outcome: Ongoing (interventions implemented as appropriate)      Problem: Pain, Acute (Adult)  Goal: Acceptable Pain Control/Comfort Level  Outcome: Ongoing (interventions implemented as appropriate)      Problem: Skin and Soft Tissue Infection (Adult)  Goal: Signs and Symptoms of Listed Potential Problems Will be Absent, Minimized or Managed (Skin and Soft Tissue Infection)  Outcome: Ongoing (interventions implemented as appropriate)

## 2018-12-12 NOTE — PROGRESS NOTES
Continued Stay Note  [facility]     Patient Name: Silvia Velazquez  MRN: [id number]  Today's Date: 12/12/2018    Admit Date: 12/9/2018    Discharge Plan     Row Name 12/12/18 1327       Plan    Plan  TBD    Patient/Family in Agreement with Plan  yes    Plan Comments  High Risk CCP met with pt and, with her permission, her significant other Aldo Nieves at bedside.  Explained discharge planning role and explained that at this time, pt appears that she will need LTACH placement.  Gave pt options of LTACHs which will accept pt's insurance.  She refuses referrals due to their location out of the city.  She states that [place] or [place] are the only locations she will accept.  She would like to go to a SNF in either of those [facility] and gives permission for CCP to send referrals to all who may be able to accept her.  HR CCP placed a call to [place]/[facility].  They reviewed and both decline.  Call then placed to Mara Cruz for the [facility] and she will review and let CCP know.        Discharge Codes    No documentation.             Yolanda Altman RN

## 2018-12-12 NOTE — PROGRESS NOTES
Continued Stay Note  [facility]     Patient Name: Silvia Velazquez  MRN: [id number]  Today's Date: 12/12/2018    Admit Date: 12/9/2018    Discharge Plan     Row Name 12/12/18 3025       Plan    Plan Comments  Received a call from Mara for [facility].  Her facilities have reviewed and decline.  HR CCP contacted Graciela with [facility] and they do not accept pt's insurance.    Row Name 12/12/18 1328       Plan    Plan  TBD    Patient/Family in Agreement with Plan  yes    Plan Comments  High Risk CCP met with pt and, with her permission, her significant other Aldo Nieves at bedside.  Explained discharge planning role and explained that at this time, pt appears that she will need LTACH placement.  Gave pt options of LTACHs which will accept pt's insurance.  She refuses referrals due to their location out of the [facility].  She states that [place] or [place] are the only locations she will accept.  She would like to go to a SNF in either of those [facility] and gives permission for CCP to send referrals to all who may be able to accept her.  HR CCP placed a call to [place]/[facility].  They reviewed and both decline.  Call then placed to Mara Cruz for the [facility] group and she will review and let CCP know.        Discharge Codes    No documentation.             Yolanda Altman RN

## 2018-12-12 NOTE — PROGRESS NOTES
Yasmani Encarnacion MD                          [phone]      Patient ID:    Name:  Silvia Velazquez    MRN:  [id number]    1975   43 y.o.  female            Patient Care Team:  Provider, No Known as PCP - General    CC/ Reason for visit: Per Assessment mentioned below    Subjective: Pt seen and examined this AM. No acute overnight events noted. C/o Rt butt pain. Pain not controlled per her. Echo done      ROS: Denies any subjective fevers, chest pain, nausea/ vomiting    Objective     Vital Signs past 24hrs    BP range: BP: (152-189)/() 189/110  Pulse range: Heart Rate:  [72-92] 89  Resp rate range:    Temp range: Temp (24hrs), Av °F (36.7 °C), Min:97.9 °F (36.6 °C), Max:98.2 °F (36.8 °C)      Ventilator/Non-Invasive Ventilation Settings (From admission, onward)    None          Device (Oxygen Therapy): room air       82.6 kg (182 lb); Body mass index is 29.38 kg/m².      Intake/Output Summary (Last 24 hours) at 2018 1709  Last data filed at 2018 1556  Gross per 24 hour   Intake 2513.5 ml   Output 6420 ml   Net -3906.5 ml       Exam:    GEN:  No respiratory distress, appears stated age; Ill appearing,  No accessory muscle use  EYES:   EOMI, anicteric sclera bilat  ENT:    External ears/nose normal, OP clear  NECK:  Supple, midline trachea, No cervical lymphadenopathy  LUNGS: Bilateral breath sounds heard, No wheezes/ crackles heard  CV:  Regular rate and rhythm, No murmur  ABD:  Nontender, nondistended, no palpable liver or masses  EXT:  Extremities warm and well perfused, no peripheral/sacral edema. Rt ischial tuberosity pain     Scheduled meds:      magnesium oxide 400 mg Oral BID   methocarbamol 750 mg Oral Q6H   nicotine 1 patch Transdermal Q24H   oxyCODONE 10 mg Oral Q6H   potassium chloride 40 mEq Oral Daily   vancomycin 1,250 mg Intravenous Q8H       IV meds:                          HYDROmorphone HCl-NaCl    Pharmacy to dose vancomycin         Data Review:      Results from last 7 days   Lab Units  12/12/18   0621  12/11/18   1011  12/10/18   0820  12/09/18   1436  12/08/18   0208   SODIUM mmol/L  135*  134*  135*  135*  131*   POTASSIUM mmol/L  4.1  2.9*  3.8  3.2*  3.6   CHLORIDE mmol/L  98  99  100  94*  92*   CO2 mmol/L  26.0  25.1  26.1  29.3*  25.3   BUN mg/dL  3*  8  10  8  9   CREATININE mg/dL  0.47*  0.46*  0.49*  0.67  0.68   CALCIUM mg/dL  8.6  7.9*  8.3*  9.0  8.8   BILIRUBIN mg/dL  0.4   --    --   0.8  0.9   ALK PHOS U/L  78   --    --   82  82   ALT (SGPT) U/L  29   --    --   52*  68*   AST (SGOT) U/L  31   --    --   59*  77*   GLUCOSE mg/dL  105*  99  147*  98  118*   WBC 10*3/mm3  10.25  11.24*  13.63*  11.29*  11.59*   HEMOGLOBIN g/dL  10.2*  9.3*  9.9*  10.7*  10.4*   PLATELETS 10*3/mm3  253  243  205  194  197   PROCALCITONIN ng/mL   --    --    --    --   0.83*       Lab Results   Component Value Date    CALCIUM 8.6 12/12/2018    PHOS 3.2 12/12/2018       Results from last 7 days   Lab Units  12/11/18   1029  12/11/18   1011  12/09/18   2126  12/09/18   2125  12/08/18   0243  12/08/18   0208   BLOODCX   No growth at 24 hours  No growth at 24 hours   --    --   Staphylococcus aureus, MRSA*  Staphylococcus aureus, MRSA*   WOUNDCX    --    --   Heavy growth (4+) Staphylococcus aureus, MRSA*  Moderate growth (3+) Staphylococcus aureus, MRSA*   --    --    BCIDPCR    --    --    --    --   Staphylococcus aureus. mecA (methicillin resistance gene) detected. Identification by BCID PCR.*   --               Results Review:    I have reviewed the available laboratory results and reviewed the chest imaging from the last 3 days personally and summarized it below    Assessment      ASSESSMENT:  MRSA Bacteremia   MRSA epidural/ paraspinal abscess   Spinal cord compression s/p lumbar decompression 12/9   Heroin/ Meth IVDA   Urinary incontinence  ABLA on Anemia of chronic disease  Hyponatremia/ Hypomagnesemia  Hypokalemia  Mild  hepatitis  Grade 2 DD - no veg on ECHO     PLAN:  Now c/o Rt ischial tuberosity pain.   Pain management consulted now. Will follow their recs  No signs for narc withdrawal.  Abx per ID - On Vanc. Rpt blood cx neg.    Aggressive electrolyte repletion  Neg HIV panel   OOB/PT as tolerated per HUNTER  Full Code   DVT ppx - Mech     Tx to floor    I have discussed my findings and recommendations with patient, nursing staff and consulting provider.     Yasmani Encarnacion MD  12/12/2018

## 2018-12-12 NOTE — PROGRESS NOTES
"[facility] PROGRESS NOTE      CC:Follow-up epidural abscess/MRSA sepsis status post lumbar decompression for abscessI    Subjective     Interval History: Since last encounter, patient has not had new complaints. Still with c/o severe pain. \"why does it hurt in my tailbone when I drink something?\" refused PT. Anesthesia consult regarding pain management: \"The patient states that the only narcotic that really helps is Percocet 10mg for about an hour. Perhaps she would do well with a higher dose Oxycontin and she states she also does well  With Methadone.\"    ROS:  Constitutional: No fever, chills  GI: No nausea, vomiting  MS: back pain  Neuro: No numbness, tingling  : no difficulty voiding, no incontinence    Objective     Vital signs in last 24 hours:  Temp:  [97.9 °F (36.6 °C)-98.2 °F (36.8 °C)] 98.2 °F (36.8 °C)  Heart Rate:  [72-92] 89  BP: (152-189)/() 189/110    Intake/Output this shift:  I/O this shift:  In: 480 [P.O.:480]  Out: 2200 [Urine:2200]  LACHELLE drain- left 15 cc, right 110 cc/24 hours; 45 cc from right drain since 0500    LABS:  Results from last 7 days   Lab Units  12/12/18   0621  12/11/18   1011  12/10/18   0820   WBC 10*3/mm3  10.25  11.24*  13.63*   HEMOGLOBIN g/dL  10.2*  9.3*  9.9*   HEMATOCRIT %  30.9*  29.6*  31.3*   PLATELETS 10*3/mm3  253  243  205     Results from last 7 days   Lab Units  12/12/18   0621  12/11/18   1011  12/10/18   0820  12/09/18   1436  12/08/18   0208   SODIUM mmol/L  135*  134*  135*  135*  131*   POTASSIUM mmol/L  4.1  2.9*  3.8  3.2*  3.6   CHLORIDE mmol/L  98  99  100  94*  92*   CO2 mmol/L  26.0  25.1  26.1  29.3*  25.3   BUN mg/dL  3*  8  10  8  9   CREATININE mg/dL  0.47*  0.46*  0.49*  0.67  0.68   CALCIUM mg/dL  8.6  7.9*  8.3*  9.0  8.8   BILIRUBIN mg/dL  0.4   --    --   0.8  0.9   ALK PHOS U/L  78   --    --   82  82   ALT (SGPT) U/L  29   --    --   52*  68*   AST (SGOT) U/L  31   --    --   59*  77*   GLUCOSE mg/dL  105*  99  147* "  98  118*       Blood cultures 12/8/18 and intraoperative lumbar wound culture 12/9/2018, intraoperative tissue/bone culture dated 12/9/2018 all reveal MRSA.      Blood culture 12/11/18 ×2  NGTD    IMAGING STUDIES:  No new imaging    I personally viewed and interpreted the patient's chart.    Meds reviewed/changed: Yes    Current Facility-Administered Medications:   •  CloNIDine (CATAPRES) tablet 0.1 mg, 0.1 mg, Oral, 4x Daily PRN, 0.1 mg at 12/12/18 1556 **FOLLOWED BY** [START ON 12/13/2018] CloNIDine (CATAPRES) tablet 0.1 mg, 0.1 mg, Oral, 4x Daily PRN **FOLLOWED BY** [START ON 12/14/2018] CloNIDine (CATAPRES) tablet 0.1 mg, 0.1 mg, Oral, TID PRN **FOLLOWED BY** [START ON 12/15/2018] CloNIDine (CATAPRES) tablet 0.1 mg, 0.1 mg, Oral, BID PRN **FOLLOWED BY** [START ON 12/16/2018] CloNIDine (CATAPRES) tablet 0.1 mg, 0.1 mg, Oral, Once PRN, Yasmani Encarnacion MD  •  docusate sodium (COLACE) capsule 100 mg, 100 mg, Oral, BID PRN, Tevin Whitley MD, 100 mg at 12/11/18 0758  •  HYDROmorphone (DILAUDID) PCA 50 mg/50 mL Syringe, , Intravenous, Continuous, Tevin Whitley MD  •  magnesium oxide (MAG-OX) tablet 400 mg, 400 mg, Oral, BID, Yasmani Encarnacion MD, 400 mg at 12/11/18 2015  •  Magnesium Sulfate 2 gram Bolus, followed by 8 gram infusion (total Mg dose 10 grams)- Mg less than or equal to 1mg/dL, 2 g, Intravenous, PRN **OR** Magnesium Sulfate 2 gram / 50mL Infusion (GIVE X 3 BAGS TO EQUAL 6GM TOTAL DOSE) - Mg 1.1 - 1.5 mg/dl, 2 g, Intravenous, PRN, Last Rate: 25 mL/hr at 12/11/18 2017, 2 g at 12/11/18 2017 **OR** Magnesium Sulfate 4 gram infusion- Mg 1.6-1.9 mg/dL, 4 g, Intravenous, PRN, Yasmani Encarnacion MD  •  methocarbamol (ROBAXIN) tablet 750 mg, 750 mg, Oral, Q6H, Yasmani Encarnacion MD  •  naloxone (NARCAN) injection 0.1 mg, 0.1 mg, Intravenous, Q5 Min PRN, Tevin Whitley MD  •  ondansetron (ZOFRAN) tablet 4 mg, 4 mg, Oral, Q6H PRN **OR** ondansetron ODT (ZOFRAN-ODT)  disintegrating tablet 4 mg, 4 mg, Oral, Q6H PRN **OR** ondansetron (ZOFRAN) injection 4 mg, 4 mg, Intravenous, Q6H PRN, Tevin Whitley MD, 4 mg at 12/12/18 0312  •  oxyCODONE (ROXICODONE) immediate release tablet 10 mg, 10 mg, Oral, Q6H, Yasmani Encarnacion MD, 10 mg at 12/12/18 1112  •  oxyCODONE-acetaminophen (PERCOCET)  MG per tablet 1 tablet, 1 tablet, Oral, Q4H PRN, Tevin Whitley MD, 1 tablet at 12/12/18 1556  •  Pharmacy to dose vancomycin, , Does not apply, Continuous PRN, Tevin Whitley MD  •  potassium chloride (KLOR-CON) packet 40 mEq, 40 mEq, Oral, PRN, Yasmani Encarnacion MD  •  potassium chloride (MICRO-K) CR capsule 40 mEq, 40 mEq, Oral, PRN, Yasmani Encarnacion MD, 40 mEq at 12/12/18 0024  •  potassium chloride (MICRO-K) CR capsule 40 mEq, 40 mEq, Oral, Daily, Yasmani Encarnacion MD, 40 mEq at 12/12/18 0803  •  [COMPLETED] Insert peripheral IV, , , Once **AND** sodium chloride 0.9 % flush 10 mL, 10 mL, Intravenous, PRN, Perry Cárdenas MD  •  [COMPLETED] vancomycin 1500 mg/500 mL 0.9% NS IVPB ([facility]), 1,500 mg, Intravenous, Once, 1,500 mg at 12/11/18 1757 **FOLLOWED BY** vancomycin 1250 mg/250 mL 0.9% NS IVPB ([facility]), 1,250 mg, Intravenous, Q8H, Fabian Key MD, 1,250 mg at 12/12/18 1017      Physical Exam:    General:   Awake, alert, oriented x3. Speech clear with no aphasia  Back:    LACHELLE x2 with serosanginous drainage; minimal left; Incision midline lumbar well approximated with no R/E/D; back pain with SLR     Motor: 5/5 jessee LE  Sensation: Normal to light touch and pain jessee LE  Extremities:   Wearing SCD; diffuse scattered open lesions/abrasions; distal pulses 2+      Assessment/Plan     ASSESSMENT:      Spinal epidural abscess      PLAN: Neuro intact; wound looks fine; JPs decreasing- will DC left LACHELLE today;  ID notes indicate plan is for continuation of IV and a biotics for 6 weeks from last negative blood culture or surgery CCP working on placements, but so far  multiple fills facilities have declined.  Encouraged activity, compliance with therapies.  Told her that we need to start weaning off her pain medications.  We will decrease PCA timing.  DC at 6 AM tomorrow. Will see if Dr. Gu will evaluate.      I discussed the patients findings and my recommendations with patient and nursing staff       LOS: 3 days       SYLWIA Johnson  12/12/2018  4:02 PM

## 2018-12-12 NOTE — PLAN OF CARE
Problem: Patient Care Overview  Goal: Plan of Care Review   12/12/18 1833   Coping/Psychosocial   Plan of Care Reviewed With patient   Plan of Care Review   Progress improving   OTHER   Outcome Summary Pt stable neurologically; no deficits. C/o pain frequently; scheduled and PRN medications given frequently; frequent use of PCA noted. Clonidine protocol added for possible withdrawal symptoms and elevated blood pressure. Awaiting transfer to floor.        Problem: Skin Injury Risk (Adult)  Goal: Skin Health and Integrity   12/12/18 1833   Skin Injury Risk (Adult)   Skin Health and Integrity making progress toward outcome       Problem: Fall Risk (Adult)  Goal: Absence of Fall   12/12/18 1833   Fall Risk (Adult)   Absence of Fall making progress toward outcome       Problem: Pain, Acute (Adult)  Goal: Acceptable Pain Control/Comfort Level   12/12/18 1833   Pain, Acute (Adult)   Acceptable Pain Control/Comfort Level making progress toward outcome       Problem: Skin and Soft Tissue Infection (Adult)  Goal: Signs and Symptoms of Listed Potential Problems Will be Absent, Minimized or Managed (Skin and Soft Tissue Infection)   12/12/18 1833   Goal/Outcome Evaluation   Problems Assessed (Skin and Soft Tissue Infection) all   Problems Present (Skin/Soft Tissue Inf) none

## 2018-12-12 NOTE — DISCHARGE PLACEMENT REQUEST
"Silvia Miller (43 y.o. Female)     Date of Birth Social Security Number Address Home Phone MRN    1975  [address] [place] [address] [place] [facility] [phone] [id number]    Protestant Marital Status          Episcopalian Single       Admission Date Admission Type Admitting Provider Attending Provider Department, Room/Bed    12/9/18 Emergency Gomez Quick MD Nidadavolu, Vinay Gopal, MD [facility] INTENSIVE CARE, I388/1    Discharge Date Discharge Disposition Discharge Destination                       Attending Provider:  Yasmani Encarnacion MD    Allergies:  Sulfa Antibiotics    Isolation:  Contact   Infection:  MRSA (12/09/18)   Code Status:  CPR    Ht:  167.6 cm (66\")   Wt:  82.6 kg (182 lb)    Admission Cmt:  None   Principal Problem:  None                Active Insurance as of 12/9/2018     Primary Coverage     Payor Plan Insurance Group Employer/Plan Group    [facility] [employer] [facility]      Payor Plan Address Payor Plan Phone Number Payor Plan Fax Number Effective Dates    [address] [address]   9/1/2017 - None Entered    [place] [address]       Subscriber Name Subscriber Birth Date Member ID       SILVIA MILLER 1975 [id number]                 Emergency Contacts      (Rel.) Home Phone Work Phone Mobile Phone    Aldo Nieves (Significant Other) [phone] -- --              "

## 2018-12-12 NOTE — PROGRESS NOTES
"  Infectious Diseases Progress Note    Fabian Key MD     [facility]  Los: 3 days  Patient Identification:  Name: Silvia Velazquez  Age: 43 y.o.  Sex: female  :  1975  MRN: [id number]         Primary Care Physician: Provider, No Known            Subjective: Having episodic spasm in the back and right gluteal and thigh area in between she thinks that she is improving.  Interval History: See consultation note    Objective:    Scheduled Meds:    magnesium oxide 400 mg Oral BID   methocarbamol 750 mg Oral 4x Daily   oxyCODONE 10 mg Oral Q6H   potassium chloride 40 mEq Oral Daily   vancomycin 1,250 mg Intravenous Q8H     Continuous Infusions:    HYDROmorphone HCl-NaCl    Pharmacy to dose vancomycin        Vital signs in last 24 hours:  Temp:  [97.9 °F (36.6 °C)-98.2 °F (36.8 °C)] 98.2 °F (36.8 °C)  Heart Rate:  [72-92] 72  BP: (152-186)/() 170/102    Intake/Output:    Intake/Output Summary (Last 24 hours) at 2018 1406  Last data filed at 2018 1005  Gross per 24 hour   Intake 2513.5 ml   Output 4475 ml   Net -1961.5 ml       Exam:  BP (!) 170/102   Pulse 72   Temp 98.2 °F (36.8 °C) (Oral)   Resp 16   Ht 167.6 cm (66\")   Wt 82.6 kg (182 lb)   LMP  (LMP Unknown)   SpO2 96%   BMI 29.38 kg/m²     General Appearance:    Alert, cooperative, no distress, AAOx3                          Head:    Normocephalic, without obvious abnormality, atraumatic                           Eyes:    PERRL, conjunctivae/corneas clear, EOM's intact, both eyes                         Throat:   Lips, tongue, gums normal; oral mucosa pink and moist                           Neck:   Supple, symmetrical, trachea midline, no JVD                         Lungs:    Clear to auscultation bilaterally, respirations unlabored                 Chest Wall:    No tenderness or deformity                          Heart:    Regular rate and rhythm, S1 and S2 normal, no murmur, no rub                                    "      or gallop                  Abdomen:     Soft, non-tender, bowel sounds active, no masses, no                                                        organomegaly                  Extremities:   Extremities normal, atraumatic, no cyanosis or edema                        Pulses:   Pulses palpable in all extremities                            Skin:   Multiple skin lesions noted          Data Review:    I reviewed the patient's new clinical results.  Results from last 7 days   Lab Units  12/12/18   0621  12/11/18   1011  12/10/18   0820  12/09/18   1436  12/08/18   0208   WBC 10*3/mm3  10.25  11.24*  13.63*  11.29*  11.59*   HEMOGLOBIN g/dL  10.2*  9.3*  9.9*  10.7*  10.4*   PLATELETS 10*3/mm3  253  243  205  194  197     Results from last 7 days   Lab Units  12/12/18   0621  12/11/18   1011  12/10/18   0820  12/09/18   1436  12/08/18   0208   SODIUM mmol/L  135*  134*  135*  135*  131*   POTASSIUM mmol/L  4.1  2.9*  3.8  3.2*  3.6   CHLORIDE mmol/L  98  99  100  94*  92*   CO2 mmol/L  26.0  25.1  26.1  29.3*  25.3   BUN mg/dL  3*  8  10  8  9   CREATININE mg/dL  0.47*  0.46*  0.49*  0.67  0.68   CALCIUM mg/dL  8.6  7.9*  8.3*  9.0  8.8   GLUCOSE mg/dL  105*  99  147*  98  118*     Microbiology Results (last 10 days)     Procedure Component Value - Date/Time    Blood Culture - Blood, Hand, Right [[id number]] Collected:  12/11/18 1029    Lab Status:  Preliminary result Specimen:  Blood from Hand, Right Updated:  12/12/18 1046     Blood Culture No growth at 24 hours    Blood Culture - Blood, Arm, Left [[id number]] Collected:  12/11/18 1011    Lab Status:  Preliminary result Specimen:  Blood from Arm, Left Updated:  12/12/18 1046     Blood Culture No growth at 24 hours    Anaerobic Culture - Tissue, Spine, Lumbar [[id number]] Collected:  12/09/18 2127    Lab Status:  Preliminary result Specimen:  Tissue from Spine, Lumbar Updated:  12/12/18 1025     Culture No anaerobes isolated at 3 days    Tissue / Bone Culture -  "Tissue, Spine, Lumbar [[id number]]  (Abnormal) Collected:  12/09/18 2127    Lab Status:  Edited Result - FINAL Specimen:  Tissue from Spine, Lumbar Updated:  12/11/18 0705     Tissue Culture Light growth (2+) Staphylococcus aureus, MRSA     Comment:   Methicillin resistant Staphylococcus aureus, Patient may be an isolation risk.        Gram Stain Moderate (3+) WBCs seen      Rare (1+) Gram positive cocci    Narrative:       Refer to previous wound culture collected on 12/9/2018 2125 for MICS    AFB Culture - Tissue, Spine, Lumbar [[id number]] Collected:  12/09/18 2127    Lab Status:  Preliminary result Specimen:  Tissue from Spine, Lumbar Updated:  12/10/18 1216     AFB Stain No acid fast bacilli seen on direct smear    Wound Culture - Wound, Spine, Lumbar [[id number]]  (Abnormal) Collected:  12/09/18 2126    Lab Status:  Final result Specimen:  Wound from Spine, Lumbar Updated:  12/11/18 0704     Wound Culture Heavy growth (4+) Staphylococcus aureus, MRSA     Comment:   Methicillin resistant Staphylococcus aureus, Patient may be an isolation risk.        Gram Stain Moderate (3+) WBCs seen      Few (2+) Gram positive cocci    Narrative:       Refer to previous wound culture collected on 12/9/2018 2125 for MICS    Wound Culture - Wound, Spine, Lumbar [[id number]]  (Abnormal)  (Susceptibility) Collected:  12/09/18 2125    Lab Status:  Final result Specimen:  Wound from Spine, Lumbar Updated:  12/11/18 0703     Wound Culture Moderate growth (3+) Staphylococcus aureus, MRSA     Comment: D test is positive. Isolate exhibits \"inducible\" resistance to Clindamycin.    Methicillin resistant Staphylococcus aureus, Patient may be an isolation risk.        Gram Stain Moderate (3+) WBCs seen      Few (2+) Gram positive cocci    Susceptibility      Staphylococcus aureus, MRSA     ZAINAB     Clindamycin Resistant     Erythromycin Resistant     Oxacillin Resistant     Penicillin G Resistant     Rifampin Susceptible     Tetracycline " "Susceptible     Trimethoprim + Sulfamethoxazole Susceptible     Vancomycin Susceptible                    Blood Culture - Blood, Arm, Left [[id number]]  (Abnormal) Collected:  12/08/18 0243    Lab Status:  Edited Result - FINAL Specimen:  Blood from Arm, Left Updated:  12/10/18 0707     Blood Culture Staphylococcus aureus, MRSA     Comment:   Consider infectious disease consult to rule out distant focus of infection.  Methicillin resistant Staphylococcus aureus, Patient may be an isolation risk.        Isolated from Aerobic and Anaerobic Bottles     Gram Stain Anaerobic Bottle Gram positive cocci in clusters      Aerobic Bottle Gram positive cocci in clusters    Narrative:       Refer to previous blood culture collected on 12/8/2018 0208 for ZAINAB's.    Blood Culture ID, PCR - Blood, Arm, Left [[id number]]  (Abnormal) Collected:  12/08/18 0243    Lab Status:  Final result Specimen:  Blood from Arm, Left Updated:  12/09/18 1707     BCID, PCR Staphylococcus aureus. mecA (methicillin resistance gene) detected. Identification by BCID PCR.    Blood Culture - Blood, Arm, Left [[id number]]  (Abnormal)  (Susceptibility) Collected:  12/08/18 0208    Lab Status:  Edited Result - FINAL Specimen:  Blood from Arm, Left Updated:  12/10/18 0706     Blood Culture Staphylococcus aureus, MRSA     Comment:   Consider infectious disease consult to rule out distant focus of infection.  D test is positive. Isolate exhibits \"inducible\" resistance to Clindamycin.    Methicillin resistant Staphylococcus aureus, Patient may be an isolation risk.  Corrected result. Previously Reported Organism Changed. Previous result was Staphylococcus aureus on 12/10/2018 at 0650 EST.        Isolated from Aerobic and Anaerobic Bottles     Gram Stain Anaerobic Bottle Gram positive cocci in clusters      Aerobic Bottle Gram positive cocci in clusters    Narrative:       Although Staphylococcus aureus is sensitive to Oxacillin, it possesses the mecA gene, therefore " it is an MRSA and should be considered resistant to all beta-lactam antibiotics.    Susceptibility      Staphylococcus aureus, MRSA     ZAINAB     Clindamycin Resistant     Erythromycin Resistant     Oxacillin Susceptible     Penicillin G Resistant     Rifampin Susceptible     Tetracycline Susceptible     Trimethoprim + Sulfamethoxazole Susceptible     Vancomycin Susceptible                            Assessment:    Spinal epidural abscess  MSSA sepsis with bacteremia - staph aureus has mecA gene  Subjective IV drug abuse with significant skin lesions in the needle tracks and associated phlebitis    Recommendation:  Continue vancomycin adjusted to achieve a trough level of 20.  Follow up on repeat blood cultures.  Would need 6 weeks of IV antibiotics in the last negative blood culture or surgical intervention which ever is the latest.    Fabian Key MD  12/12/2018  2:06 PM    Much of this encounter note is an electronic transcription/translation of spoken language to printed text. The electronic translation of spoken language may permit erroneous, or at times, nonsensical words or phrases to be inadvertently transcribed; Although I have reviewed the note for such errors, some may still exist

## 2018-12-12 NOTE — PROGRESS NOTES
"Pharmacokinetic Consult - Vancomycin Dosing (Follow-up Note)  Silvia Velazquez is a 43 y.o. female on vancomycin pharmacy to dose.  MRN: [id number]  : 1975     Day of vancomycin therapy: day   Indication: MRSA in spinal fluid/bone  Consulted by: Dr. Fabian Key MD  Goal trough: 15-20 mcg/mL -> targeting 20 mcg/mL per Dr. Key  Current dose: 1250mg [id number] today  Other antimicrobials: none    Relevant clinical data and objective history reviewed:  167.6 cm (66\")  82.6 kg (182 lb)  Body mass index is 29.38 kg/m².     She has a past medical history of Hepatitis C, Kidney stone, Lupus (systemic lupus erythematosus) (CMS/HCC), Mitral valve anterior leaflet prolapse, and Seizures (CMS/HCC).    Allergies as of 2018 - Reviewed 2018   Allergen Reaction Noted   • Sulfa antibiotics Nausea And Vomiting and Swelling 2018     Vital Signs (last 24 hours)        0700  -   0659  0700  -   1807   Most Recent    Temp (°F) 97.9 -  98.2       98.2 (36.8)    Heart Rate 73 -  92    72 -  89     89    /95 -  (!) 186/113    (!) 158/113 -  (!) 189/110     (!) 189/110  Comment: clonidine requested    SpO2 (%) 94 -  100    91 -  99     95        Estimated Creatinine Clearance: 170.6 mL/min (A) (by C-G formula based on SCr of 0.47 mg/dL (L)).  Results from last 7 days   Lab Units  18   0621  18   1011  12/10/18   0820   CREATININE mg/dL  0.47*  0.46*  0.49*     Results from last 7 days   Lab Units  18   0621  18   1011  12/10/18   0820   WBC 10*3/mm3  10.25  11.24*  13.63*     Procal: n/a  Other relevant labs/chart info: lactate-0.7 (12/10)     Cultures:   BC 2 of 2 MRSA (Vancomycin ZAINAB = 1)   MRSA positive in Spinal fluid and bone (Vancomycin ZAINAB < 0.5)   BC prelim NG24h    Dosing hx (include troughs if drawn):  Vanc 1500mg loading dose given -.  Vanc 1250 Q12H on (12/10)-410, 1534 ()-0407,1509 (rn started early, then stopped " "after 10 min to allow for trough, then finished infusion after level drawn)  Vanc trough 12/11 at 1530-10.9  Vanc 1500mg given 12/ (one dose for subtherapeutic vanc trough)--this was in addition to the 1250 mg given at 1509  Vanc 1250 Q8H-(12/12)-0031, 1017  12/12 1707 Vancomycin trough level 14.6 mcg/mL (~7hr level)     Assessment:  Renal function is steady.   Last trough was 10.9; drawn 1530 at steady state.  One time dose of Vanc 1500mg was previously given for subtherapeutic levels and dose adjusted from 1250mg IV q12h to 1250 q8h     Lab Results   Component Value Date    VANCOTROUGH 14.60 12/12/2018     Assessment/Plan  Dr. Key plans for \"6 weeks of IV antibiotics in the last negative blood culture or surgical intervention which ever is the latest.\"    1) Current Vancomycin trough level drawn ~30min prior to target and before 4th dose of q8h regimen = 14.6 mcg/mL.  Anticipate Vancomycin trough will continue to increase/accumulate given total daily dose of 45mg/kg/day and age of 43.    -To help increase trough level closer to 20 mcg/mL more quickly, will give a \"mini-load\" of Vancomycin 1500mg IV x1 dose followed 8hrs later by 1250mg IV q8h.  Will defer follow-up troughs to RPh in AM  2) Daily CMP on order  3) Encourage hydration as allowed by MD to help prevent toxic accumulation; monitor for s/sxn of toxicity including increase in SCr and decrease in UOP.    Pharmacy will continue to follow daily while on vancomycin and adjust as needed.     Thanks, Jose Hicks, PharmD, BCPS    "

## 2018-12-13 LAB
ALBUMIN SERPL-MCNC: 2.5 G/DL (ref 3.5–5.2)
ALBUMIN/GLOB SERPL: 0.5 G/DL
ALP SERPL-CCNC: 86 U/L (ref 39–117)
ALT SERPL W P-5'-P-CCNC: 24 U/L (ref 1–33)
ANION GAP SERPL CALCULATED.3IONS-SCNC: 11.1 MMOL/L
AST SERPL-CCNC: 26 U/L (ref 1–32)
BILIRUB SERPL-MCNC: 0.4 MG/DL (ref 0.1–1.2)
BILIRUB UR QL STRIP: NEGATIVE
BUN BLD-MCNC: 5 MG/DL (ref 6–20)
BUN/CREAT SERPL: 10.4 (ref 7–25)
CALCIUM SPEC-SCNC: 8.6 MG/DL (ref 8.6–10.5)
CHLORIDE SERPL-SCNC: 96 MMOL/L (ref 98–107)
CLARITY UR: CLEAR
CO2 SERPL-SCNC: 25.9 MMOL/L (ref 22–29)
COLOR UR: YELLOW
CREAT BLD-MCNC: 0.48 MG/DL (ref 0.57–1)
DEPRECATED RDW RBC AUTO: 50.8 FL (ref 37–54)
ERYTHROCYTE [DISTWIDTH] IN BLOOD BY AUTOMATED COUNT: 16.4 % (ref 11.7–13)
GFR SERPL CREATININE-BSD FRML MDRD: 143 ML/MIN/1.73
GLOBULIN UR ELPH-MCNC: 5.1 GM/DL
GLUCOSE BLD-MCNC: 128 MG/DL (ref 65–99)
GLUCOSE UR STRIP-MCNC: NEGATIVE MG/DL
HAV IGM SERPL QL IA: POSITIVE
HBV CORE IGM SERPL QL IA: NEGATIVE
HBV SURFACE AG SERPL QL IA: NEGATIVE
HCT VFR BLD AUTO: 31.4 % (ref 35.6–45.5)
HGB BLD-MCNC: 10.3 G/DL (ref 11.9–15.5)
HGB UR QL STRIP.AUTO: NEGATIVE
KETONES UR QL STRIP: NEGATIVE
LEUKOCYTE ESTERASE UR QL STRIP.AUTO: NEGATIVE
MAGNESIUM SERPL-MCNC: 1.7 MG/DL (ref 1.6–2.6)
MCH RBC QN AUTO: 27.8 PG (ref 26.9–32)
MCHC RBC AUTO-ENTMCNC: 32.8 G/DL (ref 32.4–36.3)
MCV RBC AUTO: 84.6 FL (ref 80.5–98.2)
NITRITE UR QL STRIP: NEGATIVE
PH UR STRIP.AUTO: 8.5 [PH] (ref 5–8)
PHOSPHATE SERPL-MCNC: 3.6 MG/DL (ref 2.5–4.5)
PLATELET # BLD AUTO: 285 10*3/MM3 (ref 140–500)
PMV BLD AUTO: 11 FL (ref 6–12)
POTASSIUM BLD-SCNC: 4 MMOL/L (ref 3.5–5.2)
PROT SERPL-MCNC: 7.6 G/DL (ref 6–8.5)
PROT UR QL STRIP: NEGATIVE
RBC # BLD AUTO: 3.71 10*6/MM3 (ref 3.9–5.2)
SODIUM BLD-SCNC: 133 MMOL/L (ref 136–145)
SP GR UR STRIP: 1.01 (ref 1–1.03)
UROBILINOGEN UR QL STRIP: ABNORMAL
WBC NRBC COR # BLD: 12.64 10*3/MM3 (ref 4.5–10.7)

## 2018-12-13 PROCEDURE — 25010000002 VANCOMYCIN 10 G RECONSTITUTED SOLUTION: Performed by: NEUROLOGICAL SURGERY

## 2018-12-13 PROCEDURE — 84100 ASSAY OF PHOSPHORUS: CPT | Performed by: INTERNAL MEDICINE

## 2018-12-13 PROCEDURE — 80053 COMPREHEN METABOLIC PANEL: CPT | Performed by: INTERNAL MEDICINE

## 2018-12-13 PROCEDURE — 99024 POSTOP FOLLOW-UP VISIT: CPT | Performed by: NURSE PRACTITIONER

## 2018-12-13 PROCEDURE — 85027 COMPLETE CBC AUTOMATED: CPT | Performed by: INTERNAL MEDICINE

## 2018-12-13 PROCEDURE — 25010000002 HYDROMORPHONE PER 4 MG: Performed by: NURSE PRACTITIONER

## 2018-12-13 PROCEDURE — 83735 ASSAY OF MAGNESIUM: CPT | Performed by: INTERNAL MEDICINE

## 2018-12-13 PROCEDURE — 81003 URINALYSIS AUTO W/O SCOPE: CPT | Performed by: NURSE PRACTITIONER

## 2018-12-13 PROCEDURE — 25010000002 HYDRALAZINE PER 20 MG: Performed by: INTERNAL MEDICINE

## 2018-12-13 PROCEDURE — 25010000002 ONDANSETRON PER 1 MG: Performed by: NEUROLOGICAL SURGERY

## 2018-12-13 RX ORDER — AMLODIPINE BESYLATE 10 MG/1
10 TABLET ORAL
Status: DISCONTINUED | OUTPATIENT
Start: 2018-12-13 | End: 2018-12-21 | Stop reason: HOSPADM

## 2018-12-13 RX ORDER — HYDRALAZINE HYDROCHLORIDE 20 MG/ML
20 INJECTION INTRAMUSCULAR; INTRAVENOUS EVERY 4 HOURS PRN
Status: DISCONTINUED | OUTPATIENT
Start: 2018-12-13 | End: 2018-12-21 | Stop reason: HOSPADM

## 2018-12-13 RX ORDER — METHADONE HYDROCHLORIDE 5 MG/1
5 TABLET ORAL EVERY 8 HOURS SCHEDULED
Status: DISCONTINUED | OUTPATIENT
Start: 2018-12-13 | End: 2018-12-14

## 2018-12-13 RX ADMIN — METHOCARBAMOL TABLETS 750 MG: 750 TABLET, COATED ORAL at 06:57

## 2018-12-13 RX ADMIN — OXYCODONE HYDROCHLORIDE AND ACETAMINOPHEN 1 TABLET: 10; 325 TABLET ORAL at 01:12

## 2018-12-13 RX ADMIN — VANCOMYCIN HYDROCHLORIDE 1250 MG: 10 INJECTION, POWDER, LYOPHILIZED, FOR SOLUTION INTRAVENOUS at 01:13

## 2018-12-13 RX ADMIN — ONDANSETRON 4 MG: 4 TABLET, FILM COATED ORAL at 08:10

## 2018-12-13 RX ADMIN — NICOTINE 1 PATCH: 21 PATCH, EXTENDED RELEASE TRANSDERMAL at 08:03

## 2018-12-13 RX ADMIN — CLONIDINE HYDROCHLORIDE 0.1 MG: 0.1 TABLET ORAL at 00:27

## 2018-12-13 RX ADMIN — OXYCODONE HYDROCHLORIDE 10 MG: 5 TABLET ORAL at 06:57

## 2018-12-13 RX ADMIN — HYDROMORPHONE HYDROCHLORIDE 0.5 MG: 1 INJECTION, SOLUTION INTRAMUSCULAR; INTRAVENOUS; SUBCUTANEOUS at 03:45

## 2018-12-13 RX ADMIN — DOCUSATE SODIUM 100 MG: 100 CAPSULE, LIQUID FILLED ORAL at 12:01

## 2018-12-13 RX ADMIN — ONDANSETRON 4 MG: 4 TABLET, FILM COATED ORAL at 17:04

## 2018-12-13 RX ADMIN — METHOCARBAMOL TABLETS 750 MG: 750 TABLET, COATED ORAL at 12:01

## 2018-12-13 RX ADMIN — OXYCODONE HYDROCHLORIDE 10 MG: 5 TABLET ORAL at 12:01

## 2018-12-13 RX ADMIN — OXYCODONE HYDROCHLORIDE AND ACETAMINOPHEN 1 TABLET: 10; 325 TABLET ORAL at 13:22

## 2018-12-13 RX ADMIN — METHADONE HYDROCHLORIDE 5 MG: 5 TABLET ORAL at 14:58

## 2018-12-13 RX ADMIN — HYDROMORPHONE HYDROCHLORIDE 0.5 MG: 1 INJECTION, SOLUTION INTRAMUSCULAR; INTRAVENOUS; SUBCUTANEOUS at 13:56

## 2018-12-13 RX ADMIN — Medication 400 MG: at 20:00

## 2018-12-13 RX ADMIN — ONDANSETRON 4 MG: 2 INJECTION INTRAMUSCULAR; INTRAVENOUS at 03:45

## 2018-12-13 RX ADMIN — OXYCODONE HYDROCHLORIDE AND ACETAMINOPHEN 1 TABLET: 10; 325 TABLET ORAL at 17:01

## 2018-12-13 RX ADMIN — OXYCODONE HYDROCHLORIDE AND ACETAMINOPHEN 1 TABLET: 10; 325 TABLET ORAL at 09:24

## 2018-12-13 RX ADMIN — METHADONE HYDROCHLORIDE 5 MG: 5 TABLET ORAL at 22:03

## 2018-12-13 RX ADMIN — AMLODIPINE BESYLATE 10 MG: 10 TABLET ORAL at 15:34

## 2018-12-13 RX ADMIN — HYDROMORPHONE HYDROCHLORIDE 0.5 MG: 1 INJECTION, SOLUTION INTRAMUSCULAR; INTRAVENOUS; SUBCUTANEOUS at 17:01

## 2018-12-13 RX ADMIN — HYDROMORPHONE HYDROCHLORIDE 0.5 MG: 1 INJECTION, SOLUTION INTRAMUSCULAR; INTRAVENOUS; SUBCUTANEOUS at 20:01

## 2018-12-13 RX ADMIN — VANCOMYCIN HYDROCHLORIDE 1250 MG: 10 INJECTION, POWDER, LYOPHILIZED, FOR SOLUTION INTRAVENOUS at 11:18

## 2018-12-13 RX ADMIN — HYDROMORPHONE HYDROCHLORIDE 0.5 MG: 1 INJECTION, SOLUTION INTRAMUSCULAR; INTRAVENOUS; SUBCUTANEOUS at 23:10

## 2018-12-13 RX ADMIN — OXYCODONE HYDROCHLORIDE 10 MG: 5 TABLET ORAL at 18:00

## 2018-12-13 RX ADMIN — HYDROMORPHONE HYDROCHLORIDE 0.5 MG: 1 INJECTION, SOLUTION INTRAMUSCULAR; INTRAVENOUS; SUBCUTANEOUS at 11:18

## 2018-12-13 RX ADMIN — POTASSIUM CHLORIDE 40 MEQ: 750 CAPSULE, EXTENDED RELEASE ORAL at 08:03

## 2018-12-13 RX ADMIN — OXYCODONE HYDROCHLORIDE AND ACETAMINOPHEN 1 TABLET: 10; 325 TABLET ORAL at 05:19

## 2018-12-13 RX ADMIN — METHOCARBAMOL TABLETS 750 MG: 750 TABLET, COATED ORAL at 00:08

## 2018-12-13 RX ADMIN — CLONIDINE HYDROCHLORIDE 0.1 MG: 0.1 TABLET ORAL at 08:03

## 2018-12-13 RX ADMIN — OXYCODONE HYDROCHLORIDE AND ACETAMINOPHEN 1 TABLET: 10; 325 TABLET ORAL at 21:21

## 2018-12-13 RX ADMIN — HYDROMORPHONE HYDROCHLORIDE 0.5 MG: 1 INJECTION, SOLUTION INTRAMUSCULAR; INTRAVENOUS; SUBCUTANEOUS at 08:03

## 2018-12-13 RX ADMIN — VANCOMYCIN HYDROCHLORIDE 1250 MG: 10 INJECTION, POWDER, LYOPHILIZED, FOR SOLUTION INTRAVENOUS at 18:00

## 2018-12-13 RX ADMIN — Medication 400 MG: at 08:03

## 2018-12-13 RX ADMIN — OXYCODONE HYDROCHLORIDE 10 MG: 5 TABLET ORAL at 00:08

## 2018-12-13 RX ADMIN — METHOCARBAMOL TABLETS 750 MG: 750 TABLET, COATED ORAL at 18:00

## 2018-12-13 RX ADMIN — HYDRALAZINE HYDROCHLORIDE 20 MG: 20 INJECTION INTRAMUSCULAR; INTRAVENOUS at 11:18

## 2018-12-13 NOTE — PROGRESS NOTES
[facility] INPATIENT PROGRESS NOTE         [address]    2018      PATIENT IDENTIFICATION:  Name: Silvia Velazquez ADMIT: 2018   : 1975  PCP: Provider, No Known    MRN: [id number] LOS: 4 days   AGE/SEX: 43 y.o. female  ROOM: [facility]                     LOS 4    Reason for visit: Follow-up sepsis with MRSA bacteremia from epidural/spinal abscess      SUBJECTIVE:    Complains of severe pain despite PCA.  Pain management helping with management of pain medications.  Pain is described as severe.    Objective   OBJECTIVE:    Vital Sign Min/Max for last 24 hours  Temp  Min: 97.4 °F (36.3 °C)  Max: 98.5 °F (36.9 °C)   BP  Min: 152/94  Max: 180/106   Pulse  Min: 66  Max: 93   Resp  Min: 16  Max: 18   SpO2  Min: 95 %  Max: 98 %   No Data Recorded   No Data Recorded                         Body mass index is 29.38 kg/m².    Intake/Output Summary (Last 24 hours) at 2018 1410  Last data filed at 2018 0900  Gross per 24 hour   Intake 1348.4 ml   Output 4335 ml   Net -2986.6 ml         Exam:     GEN:               No respiratory distress, appears stated age; Ill appearing,  No accessory muscle use  EYES:              EOMI, anicteric sclera bilat  ENT:                External ears/nose normal, OP clear  NECK:             Supple, midline trachea, No cervical lymphadenopathy  LUNGS:           Bilateral breath sounds heard, No wheezes/ crackles heard  CV:                  Regular rate and rhythm, No murmur  ABD:                Nontender, nondistended, no palpable liver or masses  EXT:                Extremities warm and well perfused, no peripheral/sacral edema. Rt ischial tuberosity pain           Scheduled meds:    amLODIPine 10 mg Oral Q24H   magnesium oxide 400 mg Oral BID   methocarbamol 750 mg Oral Q6H   nicotine 1 patch Transdermal Q24H   oxyCODONE 10 mg Oral Q6H   potassium chloride 40 mEq Oral Daily   vancomycin 1,250 mg Intravenous Q8H     IV meds:                         Pharmacy to dose vancomycin      Data Review:  Results from last 7 days   Lab Units  12/13/18   0357  12/12/18   0621  12/11/18   1011  12/10/18   0820  12/09/18   1436   SODIUM mmol/L  133*  135*  134*  135*  135*   POTASSIUM mmol/L  4.0  4.1  2.9*  3.8  3.2*   CHLORIDE mmol/L  96*  98  99  100  94*   CO2 mmol/L  25.9  26.0  25.1  26.1  29.3*   BUN mg/dL  5*  3*  8  10  8   CREATININE mg/dL  0.48*  0.47*  0.46*  0.49*  0.67   GLUCOSE mg/dL  128*  105*  99  147*  98   CALCIUM mg/dL  8.6  8.6  7.9*  8.3*  9.0         Estimated Creatinine Clearance: 167 mL/min (A) (by C-G formula based on SCr of 0.48 mg/dL (L)).  Results from last 7 days   Lab Units  12/13/18   0357  12/12/18   0621  12/11/18   1011  12/10/18   0820  12/09/18   1436   WBC 10*3/mm3  12.64*  10.25  11.24*  13.63*  11.29*   HEMOGLOBIN g/dL  10.3*  10.2*  9.3*  9.9*  10.7*   PLATELETS 10*3/mm3  285  253  243  205  194         Results from last 7 days   Lab Units  12/13/18   0357  12/12/18   0621  12/09/18   1436  12/08/18   0208   ALT (SGPT) U/L  24  29  52*  68*   AST (SGOT) U/L  26  31  59*  77*         Results from last 7 days   Lab Units  12/10/18   0009  12/09/18   1519  12/08/18   0208   PROCALCITONIN ng/mL   --    --   0.83*   LACTATE mmol/L  0.7  2.9*  1.5         Microbiology reviewed: Positive MRSA and blood and wound cultures.  Repeat blood cultures no growth to date from 12/11/18      2-D echo reviewed from 12/12/18: EF 59%.  Grade 2 diastolic dysfunction.    )Assessment   ASSESSMENT:      Active Hospital Problems    Diagnosis Date Noted   • Spinal epidural abscess [G06.1] 12/09/2018      Resolved Hospital Problems   No resolved problems to display.     MRSA Bacteremia with sepsis  MRSA epidural/ paraspinal abscess   Spinal cord compression s/p lumbar decompression 12/9   Heroin/ Meth IVDA   Urinary incontinence  ABLA on Anemia of chronic disease  Hyponatremia/ Hypomagnesemia  Hypokalemia  Mild hepatitis  Grade 2 DD - no veg on ECHO              PLAN:    Pain management consultation for recommendations and management of severe pain.  Current pain management refuses to see her and recommended consultng [facility] spine.  We'll place consultation for their group and add methadone in the interim.  Antibiotics per infectious disease recommendations.  Electrolyte repletion area  Increase activity encourage working with physical therapy.  Mechanical DVT prophylaxis.    Aldo Lindsey MD  Pulmonary and Critical Care Medicine  [facility], [facility]  12/13/2018    2:10 PM

## 2018-12-13 NOTE — NURSING NOTE
Call received from [facility]. Per office, their physicians do not accept inpatient consults.

## 2018-12-13 NOTE — PROGRESS NOTES
Telephone call from Yolanda marcus RN from [facility]. Consulted concerning d/c plan for pt. Yolanda said that the pt note said she was interested in [facility] treatment. Yolanda inquired if any [facility] residential treatment Center could take pt who will require 6 weeks of IV antibotics. We split up REINA resource list to call on behalf of pt. Called local REINA treatment Centers who accept pts insurance and needed medical care. None would accept pt in RE[facility] treatment. Spoke to [facility] treatment  who said that they used to do IV antibiotics for pts but is on hold. She called nurse manager and nurse manager said that they could not provide that care.   TC to Yolanda who said she could not fund a REINA residential placement and she was going to try to get pt in [facility].     Spoke to pts RN [facility] who stated that 10 minutes ago she gave pt small does of Methadone .5 mg and pt became angry that it was not enough. Gali states that pain management would not take pt.   Attempted to speak to pt. I could not wake her up despite several attempts.   Consulted with Jose Carlos Bean in [facility] who had no knowledge of any [facility] resources for treatment who would accept pt on IV antibiotics.

## 2018-12-13 NOTE — PROGRESS NOTES
"[facility] PROGRESS NOTE      CC:Follow-up epidural abscess/MRSA sepsis status post lumbar decompression for abscessI    Subjective     Interval History: Since last encounter, patient has had new complaints. Complains of low pelvic pain, worse with voiding attempt and sensation of needing to push to void. Still with c/o severe pain. Dr. Mitchell declined consult for pain management. States she is up moving around \" a lot\"- to bathroom, walking; nursing staff states that she refuses to get up and using Pure Wick for voids    ROS:  Constitutional: No fever, chills  MS: back pain  Neuro: No numbness, tingling  : pelvic pain and diff voiding    Objective     Vital signs in last 24 hours:  Temp:  [97.5 °F (36.4 °C)-98.5 °F (36.9 °C)] 98.5 °F (36.9 °C)  Heart Rate:  [66-89] 66  Resp:  [16-18] 16  BP: (155-189)/() 180/106    Intake/Output this shift:  I/O this shift:  In: -   Out: 840 [Urine:800; Drains:40]  LACHELLE drain right 40 cc/ 16 hours    LABS:  Results from last 7 days   Lab Units  12/13/18   0357  12/12/18   0621  12/11/18   1011   WBC 10*3/mm3  12.64*  10.25  11.24*   HEMOGLOBIN g/dL  10.3*  10.2*  9.3*   HEMATOCRIT %  31.4*  30.9*  29.6*   PLATELETS 10*3/mm3  285  253  243     Results from last 7 days   Lab Units  12/13/18   0357  12/12/18   0621  12/11/18   1011   12/09/18   1436   SODIUM mmol/L  133*  135*  134*   < >  135*   POTASSIUM mmol/L  4.0  4.1  2.9*   < >  3.2*   CHLORIDE mmol/L  96*  98  99   < >  94*   CO2 mmol/L  25.9  26.0  25.1   < >  29.3*   BUN mg/dL  5*  3*  8   < >  8   CREATININE mg/dL  0.48*  0.47*  0.46*   < >  0.67   CALCIUM mg/dL  8.6  8.6  7.9*   < >  9.0   BILIRUBIN mg/dL  0.4  0.4   --    --   0.8   ALK PHOS U/L  86  78   --    --   82   ALT (SGPT) U/L  24  29   --    --   52*   AST (SGOT) U/L  26  31   --    --   59*   GLUCOSE mg/dL  128*  105*  99   < >  98    < > = values in this interval not displayed.       Blood cultures 12/8/18 and intraoperative lumbar " wound culture 2018, intraoperative tissue/bone culture dated 2018 all reveal MRSA.      Blood culture 18 ×2  NGTD    IMAGING STUDIES:  No new imaging    I personally viewed and interpreted the patient's chart.    Meds reviewed/changed: Yes    Current Facility-Administered Medications:   •  amLODIPine (NORVASC) tablet 10 mg, 10 mg, Oral, Q24H, Aldo Lindsey MD  •  [] CloNIDine (CATAPRES) tablet 0.1 mg, 0.1 mg, Oral, 4x Daily PRN, 0.1 mg at 18 1556 **FOLLOWED BY** CloNIDine (CATAPRES) tablet 0.1 mg, 0.1 mg, Oral, 4x Daily PRN, 0.1 mg at 18 0803 **FOLLOWED BY** [START ON 2018] CloNIDine (CATAPRES) tablet 0.1 mg, 0.1 mg, Oral, TID PRN **FOLLOWED BY** [START ON 12/15/2018] CloNIDine (CATAPRES) tablet 0.1 mg, 0.1 mg, Oral, BID PRN **FOLLOWED BY** [START ON 2018] CloNIDine (CATAPRES) tablet 0.1 mg, 0.1 mg, Oral, Once PRN, Yasmani Encarnacion MD  •  docusate sodium (COLACE) capsule 100 mg, 100 mg, Oral, BID PRN, Tevin Whitley MD, 100 mg at 18 1622  •  hydrALAZINE (APRESOLINE) injection 20 mg, 20 mg, Intravenous, Q4H PRN, Aldo Lindsey MD  •  HYDROmorphone (DILAUDID) injection 0.5 mg, 0.5 mg, Intravenous, Q3H PRN, Caroline Randle, APRN, 0.5 mg at 18 0803  •  magnesium oxide (MAG-OX) tablet 400 mg, 400 mg, Oral, BID, Yasmani Encarnacion MD, 400 mg at 18 0803  •  Magnesium Sulfate 2 gram Bolus, followed by 8 gram infusion (total Mg dose 10 grams)- Mg less than or equal to 1mg/dL, 2 g, Intravenous, PRN **OR** Magnesium Sulfate 2 gram / 50mL Infusion (GIVE X 3 BAGS TO EQUAL 6GM TOTAL DOSE) - Mg 1.1 - 1.5 mg/dl, 2 g, Intravenous, PRN, Last Rate: 25 mL/hr at 18, 2 g at 18 **OR** Magnesium Sulfate 4 gram infusion- Mg 1.6-1.9 mg/dL, 4 g, Intravenous, PRN, Yasmani Encarnacion MD  •  methocarbamol (ROBAXIN) tablet 750 mg, 750 mg, Oral, Q6H, Yasmani Encarnacion MD, 750 mg at 18 0657  •  naloxone (NARCAN)  injection 0.1 mg, 0.1 mg, Intravenous, Q5 Min PRN, Tevin Whitley MD  •  nicotine (NICODERM CQ) 21 MG/24HR patch 1 patch, 1 patch, Transdermal, Q24H, Yasmani Encarnacion MD, 1 patch at 12/13/18 0803  •  ondansetron (ZOFRAN) tablet 4 mg, 4 mg, Oral, Q6H PRN, 4 mg at 12/13/18 0810 **OR** ondansetron ODT (ZOFRAN-ODT) disintegrating tablet 4 mg, 4 mg, Oral, Q6H PRN **OR** ondansetron (ZOFRAN) injection 4 mg, 4 mg, Intravenous, Q6H PRN, Tevin Whitley MD, 4 mg at 12/13/18 0345  •  oxyCODONE (ROXICODONE) immediate release tablet 10 mg, 10 mg, Oral, Q6H, Yasmani Encarnacion MD, 10 mg at 12/13/18 0657  •  oxyCODONE-acetaminophen (PERCOCET)  MG per tablet 1 tablet, 1 tablet, Oral, Q4H PRN, Tevin Whitley MD, 1 tablet at 12/13/18 0924  •  Pharmacy to dose vancomycin, , Does not apply, Continuous PRN, Tevin Whitley MD  •  potassium chloride (KLOR-CON) packet 40 mEq, 40 mEq, Oral, PRN, Yasmani Encarnacion MD  •  potassium chloride (MICRO-K) CR capsule 40 mEq, 40 mEq, Oral, PRN, Yasmani Encarnacion MD, 40 mEq at 12/12/18 0024  •  potassium chloride (MICRO-K) CR capsule 40 mEq, 40 mEq, Oral, Daily, Yasmani Encarnacion MD, 40 mEq at 12/13/18 0803  •  [COMPLETED] Insert peripheral IV, , , Once **AND** sodium chloride 0.9 % flush 10 mL, 10 mL, Intravenous, PRN, Perry Cárdenas MD  •  vancomycin 1250 mg/250 mL 0.9% NS IVPB ([facility]), 1,250 mg, Intravenous, Q8H, Tevin Whitley MD, 1,250 mg at 12/13/18 0113      Physical Exam:    General:   Awake, alert, oriented x3. Speech clear with no aphasia; facial grimacing     even with lying still but rolled in bed with minimal help  Back:    LACHELLE x 1 with serosanginous drainage;Incision midline lumbar      well approximated with no R/E/D; NO back pain with distracted SLR today;     diffuse tenderness   Motor:   5/5 jessee LE  Sensation: Normal to light touch and pain jessee LE  Extremities:   Wearing SCD; diffuse scattered open lesions/abrasions; distal  pulses 2+      Assessment/Plan     ASSESSMENT:      Spinal epidural abscess      PLAN: Neuro intact; wound looks fine; will DC right LACHELLE today; DC PCA; discussed importance of activity and participation with therapy. Will check U/A and PVR due to complaints of voiding issue.  ID notes indicate plan is for continuation of IV and antibiotics for 6 weeks from last negative blood culture or surgery CCP working on placements, but so far multiple fills facilities have declined. BP issues- nursing has discussed with primary service. [facility] following.    I discussed the patients findings and my recommendations with patient and nursing staff       LOS: 4 days       Caroline Randle, APRN  12/13/2018  10:26 AM

## 2018-12-13 NOTE — PROGRESS NOTES
Continued Stay Note  [facility]     Patient Name: Silvia Velazquez  MRN: [id number]  Today's Date: 12/13/2018    Admit Date: 12/9/2018    Discharge Plan     Row Name 12/13/18 1134       Plan    Plan  TBD    Patient/Family in Agreement with Plan  yes    Plan Comments  HR CCP spoke with pt at bedside.  She is requesting an inpatient [facility]INA treatment program.  Discussed with Carmella Clark with [facility] and received information about programs which may be able to help pt.  All locations called and no inpatient treatment program will accept pt while she still requires IV antibiotics.    Row Name 12/13/18 1056       Plan    Plan  TBD    Plan Comments  HR CCP placed a call to  Devon MCCOLLUM, Flor Yancey, and left another VM.  Await call back.  Placed a call to Lashawn Gomez for [facility] and left VM.  Call received from Abi for [facility].  Efaxed her the additional information requested, [phone].  Await response.        Discharge Codes    No documentation.             Yolanda Altman RN

## 2018-12-13 NOTE — NURSING NOTE
Call returned by Dr. Lindsey in regards to pt pain. Call placed to Dr. Mitchell per Dr. Lindsey request for assistance with pain control. Per Dr. Mitchell he will not be seeing the patient due to her drug abuse history, recommended contacting another pain management clinic, [facility]. Call placed to Dr. Lindsey to notify.

## 2018-12-13 NOTE — DISCHARGE PLACEMENT REQUEST
"Silvia Miller (43 y.o. Female)     Date of Birth Social Security Number Address Home Phone MRN    1975  [address] [place] [address] [place] [facility] [phone] [id number]    Rastafarian Marital Status          Buddhism Single       Admission Date Admission Type Admitting Provider Attending Provider Department, Room/Bed    12/9/18 Emergency Gomez Quick MD Nidadavolu, Vinay Gopal, MD [address], [address]1    Discharge Date Discharge Disposition Discharge Destination                       Attending Provider:  Yasmani Encarnacion MD    Allergies:  Sulfa Antibiotics    Isolation:  Contact   Infection:  MRSA (12/09/18)   Code Status:  CPR    Ht:  167.6 cm (66\")   Wt:  82.6 kg (182 lb)    Admission Cmt:  None   Principal Problem:  None                Active Insurance as of 12/9/2018     Primary Coverage     Payor Plan Insurance Group Employer/Plan Group    [facility] [employer] [place] [facility] [facility]      Payor Plan Address Payor Plan Phone Number Payor Plan Fax Number Effective Dates    [address] [address]   9/1/2017 - None Entered    [place] [address]       Subscriber Name Subscriber Birth Date Member ID       SILVIA MILLER 1975 [id number]                 Emergency Contacts      (Rel.) Home Phone Work Phone Mobile Phone    Aldo Nieves (Significant Other) [phone] -- --            Hospital Medications (active)       Dose Frequency Start End    amLODIPine (NORVASC) tablet 10 mg 10 mg Every 24 Hours Scheduled 12/13/2018     Sig - Route: Take 1 tablet by mouth Daily. - Oral    CloNIDine (CATAPRES) tablet 0.1 mg 0.1 mg 4 Times Daily PRN 12/12/2018 12/13/2018    Sig - Route: Take 1 tablet by mouth 4 (Four) Times a Day As Needed for Other (See Administration Instructions). - Oral    Linked Group 1:  \"Followed by\" Linked Group Details        CloNIDine (CATAPRES) tablet 0.1 mg 0.1 mg 4 Times Daily PRN 12/13/2018 12/14/2018    Sig - Route: Take 1 tablet by mouth " "4 (Four) Times a Day As Needed for Other (See Administration Instructions). - Oral    Linked Group 1:  \"Followed by\" Linked Group Details        CloNIDine (CATAPRES) tablet 0.1 mg 0.1 mg 3 Times Daily PRN 12/14/2018 12/15/2018    Sig - Route: Take 1 tablet by mouth 3 (Three) Times a Day As Needed for Other (See Administration Instructions). - Oral    Linked Group 1:  \"Followed by\" Linked Group Details        CloNIDine (CATAPRES) tablet 0.1 mg 0.1 mg 2 Times Daily PRN 12/15/2018 12/16/2018    Sig - Route: Take 1 tablet by mouth 2 (Two) Times a Day As Needed for Other (See Administration Instructions). - Oral    Linked Group 1:  \"Followed by\" Linked Group Details        CloNIDine (CATAPRES) tablet 0.1 mg 0.1 mg Once As Needed 12/16/2018 12/17/2018    Sig - Route: Take 1 tablet by mouth 1 (One) Time As Needed for Other (See Administration Instructions). - Oral    Linked Group 1:  \"Followed by\" Linked Group Details        docusate sodium (COLACE) capsule 100 mg 100 mg 2 Times Daily PRN 12/9/2018     Sig - Route: Take 1 capsule by mouth 2 (Two) Times a Day As Needed for Constipation. - Oral    hydrALAZINE (APRESOLINE) injection 20 mg 20 mg Every 4 Hours PRN 12/13/2018     Sig - Route: Infuse 1 mL into a venous catheter Every 4 (Four) Hours As Needed for High Blood Pressure. - Intravenous    HYDROmorphone (DILAUDID) injection 0.5 mg 0.5 mg Every 3 Hours PRN 12/12/2018 12/17/2018    Sig - Route: Infuse 0.5 mL into a venous catheter Every 3 (Three) Hours As Needed for Severe Pain  (breakthrough pain). - Intravenous    magnesium oxide (MAG-OX) tablet 400 mg 400 mg 2 Times Daily 12/11/2018     Sig - Route: Take 1 tablet by mouth 2 (Two) Times a Day. - Oral    Magnesium Sulfate 2 gram / 50mL Infusion (GIVE X 3 BAGS TO EQUAL 6GM TOTAL DOSE) - Mg 1.1 - 1.5 mg/dl 2 g As Needed 12/11/2018     Sig - Route: Infuse 50 mL into a venous catheter As Needed (See Administration Instructions). - Intravenous    Linked Group 2:  \"Or\" Linked " "Group Details        Magnesium Sulfate 2 gram Bolus, followed by 8 gram infusion (total Mg dose 10 grams)- Mg less than or equal to 1mg/dL 2 g As Needed 12/11/2018     Sig - Route: Infuse 50 mL into a venous catheter As Needed (See Administration Instructions). - Intravenous    Linked Group 2:  \"Or\" Linked Group Details        Magnesium Sulfate 4 gram infusion- Mg 1.6-1.9 mg/dL 4 g As Needed 12/11/2018     Sig - Route: Infuse 100 mL into a venous catheter As Needed (See Administration Instructions). - Intravenous    Linked Group 2:  \"Or\" Linked Group Details        methocarbamol (ROBAXIN) tablet 750 mg 750 mg Every 6 Hours Scheduled 12/12/2018     Sig - Route: Take 1 tablet by mouth Every 6 (Six) Hours. - Oral    naloxone (NARCAN) injection 0.1 mg 0.1 mg Every 5 Minutes PRN 12/9/2018     Sig - Route: Infuse 0.25 mL into a venous catheter Every 5 (Five) Minutes As Needed for Opioid Reversal or Respiratory Depression (see administration instructions). - Intravenous    nicotine (NICODERM CQ) 21 MG/24HR patch 1 patch 1 patch Every 24 Hours Scheduled 12/12/2018     Sig - Route: Place 1 patch on the skin as directed by provider Daily. - Transdermal    ondansetron (ZOFRAN) injection 4 mg 4 mg Every 6 Hours PRN 12/9/2018     Sig - Route: Infuse 2 mL into a venous catheter Every 6 (Six) Hours As Needed for Nausea or Vomiting. - Intravenous    Linked Group 3:  \"Or\" Linked Group Details        ondansetron (ZOFRAN) tablet 4 mg 4 mg Every 6 Hours PRN 12/9/2018     Sig - Route: Take 1 tablet by mouth Every 6 (Six) Hours As Needed for Nausea or Vomiting. - Oral    Linked Group 3:  \"Or\" Linked Group Details        ondansetron ODT (ZOFRAN-ODT) disintegrating tablet 4 mg 4 mg Every 6 Hours PRN 12/9/2018     Sig - Route: Take 1 tablet by mouth Every 6 (Six) Hours As Needed for Nausea or Vomiting. - Oral    Linked Group 3:  \"Or\" Linked Group Details        oxyCODONE (ROXICODONE) immediate release tablet 10 mg 10 mg Every 6 Hours " "Scheduled 12/11/2018 12/21/2018    Sig - Route: Take 2 tablets by mouth Every 6 (Six) Hours. - Oral    oxyCODONE-acetaminophen (PERCOCET)  MG per tablet 1 tablet 1 tablet Every 4 Hours PRN 12/9/2018 12/19/2018    Sig - Route: Take 1 tablet by mouth Every 4 (Four) Hours As Needed for Severe Pain . - Oral    Pharmacy to dose vancomycin  Continuous PRN 12/9/2018 12/23/2018    Sig - Route: Continuous As Needed for Consult. - Does not apply    potassium chloride (KLOR-CON) packet 40 mEq 40 mEq As Needed 12/11/2018     Sig - Route: Take 40 mEq by mouth As Needed (potassium replacement, see admin instructions). - Oral    potassium chloride (MICRO-K) CR capsule 40 mEq 40 mEq As Needed 12/11/2018     Sig - Route: Take 4 capsules by mouth As Needed (potassium replacement.  see admin instructions). - Oral    potassium chloride (MICRO-K) CR capsule 40 mEq 40 mEq Daily 12/11/2018     Sig - Route: Take 4 capsules by mouth Daily. - Oral    sodium chloride 0.9 % flush 10 mL 10 mL As Needed 12/9/2018     Sig - Route: Infuse 10 mL into a venous catheter As Needed for Line Care. - Intravenous    Linked Group 4:  \"And\" Linked Group Details        vancomycin 1250 mg/250 mL 0.9% NS IVPB ([facility]) 1,250 mg Every 8 Hours 12/13/2018 12/24/2018    Sig - Route: Infuse 250 mL into a venous catheter Every 8 (Eight) Hours. - Intravenous    vancomycin 1500 mg/500 mL 0.9% NS IVPB ([facility]) 1,500 mg Once 12/12/2018 12/12/2018    Sig - Route: Infuse 500 mL into a venous catheter 1 (One) Time. - Intravenous    Linked Group 5:  \"Followed by\" Linked Group Details        HYDROmorphone (DILAUDID) PCA 50 mg/50 mL Syringe (Discontinued)  Continuous 12/9/2018 12/12/2018    Sig - Route: Infuse  into a venous catheter Continuous. - Intravenous    HYDROmorphone (DILAUDID) PCA 50 mg/50 mL Syringe (Discontinued)  Continuous 12/12/2018 12/13/2018    Sig - Route: Infuse  into a venous catheter Continuous. - Intravenous    methocarbamol (ROBAXIN) tablet 750 mg " "(Discontinued) 750 mg 4 Times Daily 12/11/2018 12/12/2018    Sig - Route: Take 1 tablet by mouth 4 (Four) Times a Day. - Oral    vancomycin 1250 mg/250 mL 0.9% NS IVPB ([facility]) (Discontinued) 1,250 mg Every 8 Hours 12/12/2018 12/12/2018    Sig - Route: Infuse 250 mL into a venous catheter Every 8 (Eight) Hours. - Intravenous    Linked Group 5:  \"Followed by\" Linked Group Details              "

## 2018-12-13 NOTE — PROGRESS NOTES
"Pharmacy to Dose Vancomycin    Patient: Silvia Velazquez (E464/1, [id number]  LOS: 4 days )  Relevant clinical data and objective history reviewed:  43 y.o. female 167.6 cm (66\") 82.6 kg (182 lb)  Body mass index is 29.38 kg/m².  she has a past medical history of Hepatitis C, Kidney stone, Lupus (systemic lupus erythematosus) (CMS/HCC), Mitral valve anterior leaflet prolapse, and Seizures (CMS/HCC).    Day #5  Duration: 14 days  Consult for Dr Fabian Key MD   Indication: MRSA in spinal fluid/bone  Current dose: 1250 mg IV q8h    Cultures:   BC 2 of 2 MRSA (Vancomycin ZAINAB = 1)   MRSA positive in Spinal fluid and bone (Vancomycin ZAINAB < 0.5)   BC prelim NG24h    Other Abx: none    Dosing hx (include troughs if drawn):  Vanc 1500mg loading dose given -153.  Vanc 1250 Q12H on (12/10)-0410, 1534 ()-0407,1509 (rn started early, then stopped after 10 min to allow for trough, then finished infusion after level drawn)  Vanc trough  at 1530-10.9  Vanc 1500mg given -1757 (one dose for subtherapeutic vanc trough)--this was in addition to the 1250 mg given at 1509  Vanc 1250 Q8H-()-0031, 1017   1707 Vancomycin trough level 14.6 mcg/mL (~7hr level)   1858 Vanc 1500 mg IV x1 followed by 1250 mg IV q8h    Results from last 7 days   Lab Units  18   0357  18   0621  18   1011   WBC 10*3/mm3  12.64*  10.25  11.24*   HEMOGLOBIN g/dL  10.3*  10.2*  9.3*   HEMATOCRIT %  31.4*  30.9*  29.6*   PLATELETS 10*3/mm3  285  253  243     Lab Results   Lab Value Date/Time    LACTATE 0.7 12/10/2018 0009    LACTATE 2.9 (C) 2018 1519    LACTATE 1.5 2018 0208    PROCALCITO 0.83 (C) 2018 020    CKTOTAL 84 2018 020     Temp (24hrs), Av.9 °F (36.6 °C), Min:97.5 °F (36.4 °C), Max:98.5 °F (36.9 °C)    Serum creatinine: 0.48 mg/dL (L) 18 0357  Estimated creatinine clearance: 167 mL/min (A)    Assessment/Plan:   - Continue Vancomycin 1250 mg IV q8h  - " Trough prior to the 6th dose (12/14 0830). Predicted 15-20.    - expect 6 wks IV abx  (1/20/2019), current stop date is 12/23    - CMP/CBC/Mg/Phos daily    Ilya Ash PharmD  12/13/18 8:34 AM

## 2018-12-13 NOTE — PLAN OF CARE
Problem: Patient Care Overview  Goal: Plan of Care Review  Outcome: Ongoing (interventions implemented as appropriate)   12/13/18 7064   Coping/Psychosocial   Plan of Care Reviewed With patient;significant other   Plan of Care Review   Progress no change   OTHER   Outcome Summary pt c/o constant pain throughout shift. Dilaudid PCA d/c'd. MDs aware of pain complaints. consulted pain managment but neither group will see the patient. Dr. Lindsey added methadone to regimen. pain plan on pt white board so pt can visualize times medications are due. pt c/o back and muscle tightness. HUNTER aware-encouraged activity. pt refused PT but did ambulate out in hallway with NA. still need post void-pt using purewick for urination. BP meds started and BP better. VS otherwise stable. continue to monitor       Problem: Skin Injury Risk (Adult)  Goal: Skin Health and Integrity  Outcome: Ongoing (interventions implemented as appropriate)      Problem: Fall Risk (Adult)  Goal: Absence of Fall  Outcome: Ongoing (interventions implemented as appropriate)      Problem: Pain, Acute (Adult)  Goal: Acceptable Pain Control/Comfort Level  Outcome: Ongoing (interventions implemented as appropriate)      Problem: Skin and Soft Tissue Infection (Adult)  Goal: Signs and Symptoms of Listed Potential Problems Will be Absent, Minimized or Managed (Skin and Soft Tissue Infection)  Outcome: Ongoing (interventions implemented as appropriate)

## 2018-12-13 NOTE — PROGRESS NOTES
Continued Stay Note  [facility]     Patient Name: Silvia Velazquez  MRN: [id number]  Today's Date: 12/13/2018    Admit Date: 12/9/2018    Discharge Plan     Row Name 12/13/18 1056       Plan    Plan  TBD    Plan Comments  HR CCP placed a call to  Devon MCCOLLUM, Flor Yancey, and left another VM.  Await call back.  Placed a call to Lashawn Gomez for [facility] and left VM.  Call received from Abi for [facility].  Efaxed her the additional information requested, [phone].  Await response.        Discharge Codes    No documentation.             Yolanda Altman RN

## 2018-12-13 NOTE — PROGRESS NOTES
"  Infectious Diseases Progress Note    Fabian Key MD     [facility]  [place]: 4 days  Patient Identification:  Name: Silvia Velazquez  Age: 43 y.o.  Sex: female  :  1975  MRN: [id number]         Primary Care Physician: Provider, No Known            Subjective: less spasms and pain in the back of right leg. Denies any fever and chills. Has improved strength in the leg  Interval History: See consultation note    Objective:    Scheduled Meds:    amLODIPine 10 mg Oral Q24H   magnesium oxide 400 mg Oral BID   methocarbamol 750 mg Oral Q6H   nicotine 1 patch Transdermal Q24H   oxyCODONE 10 mg Oral Q6H   potassium chloride 40 mEq Oral Daily   vancomycin 1,250 mg Intravenous Q8H     Continuous Infusions:    Pharmacy to dose vancomycin        Vital signs in last 24 hours:  Temp:  [97.5 °F (36.4 °C)-98.5 °F (36.9 °C)] 98.5 °F (36.9 °C)  Heart Rate:  [66-89] 66  Resp:  [16-18] 16  BP: (155-189)/() 180/106    Intake/Output:    Intake/Output Summary (Last 24 hours) at 2018 1136  Last data filed at 2018 0900  Gross per 24 hour   Intake 1348.4 ml   Output 4335 ml   Net -2986.6 ml       Exam:  BP (!) 180/106 (BP Location: Right arm, Patient Position: Lying)   Pulse 66   Temp 98.5 °F (36.9 °C) (Oral)   Resp 16   Ht 167.6 cm (66\")   Wt 82.6 kg (182 lb)   LMP  (LMP Unknown)   SpO2 95%   BMI 29.38 kg/m²     General Appearance:    Alert, cooperative, no distress, AAOx3                          Head:    Normocephalic, without obvious abnormality, atraumatic                           Eyes:    PERRL, conjunctivae/corneas clear, EOM's intact, both eyes                         Throat:   Lips, tongue, gums normal; oral mucosa pink and moist                           Neck:   Supple, symmetrical, trachea midline, no JVD                         Lungs:    Clear to auscultation bilaterally, respirations unlabored                 Chest Wall:    No tenderness or deformity                          " Heart:    Regular rate and rhythm, S1 and S2 normal, no murmur, no rub                                         or gallop                  Abdomen:     Soft, non-tender, bowel sounds active, no masses, no                                                        organomegaly                  Extremities:   Extremities normal, atraumatic, no cyanosis or edema                        Pulses:   Pulses palpable in all extremities                            Skin:   Multiple skin lesions noted          Data Review:    I reviewed the patient's new clinical results.  Results from last 7 days   Lab Units  12/13/18   0357  12/12/18   0621  12/11/18   1011  12/10/18   0820  12/09/18   1436  12/08/18   0208   WBC 10*3/mm3  12.64*  10.25  11.24*  13.63*  11.29*  11.59*   HEMOGLOBIN g/dL  10.3*  10.2*  9.3*  9.9*  10.7*  10.4*   PLATELETS 10*3/mm3  285  253  243  205  194  197     Results from last 7 days   Lab Units  12/13/18   0357  12/12/18   0621  12/11/18   1011  12/10/18   0820  12/09/18   1436  12/08/18   0208   SODIUM mmol/L  133*  135*  134*  135*  135*  131*   POTASSIUM mmol/L  4.0  4.1  2.9*  3.8  3.2*  3.6   CHLORIDE mmol/L  96*  98  99  100  94*  92*   CO2 mmol/L  25.9  26.0  25.1  26.1  29.3*  25.3   BUN mg/dL  5*  3*  8  10  8  9   CREATININE mg/dL  0.48*  0.47*  0.46*  0.49*  0.67  0.68   CALCIUM mg/dL  8.6  8.6  7.9*  8.3*  9.0  8.8   GLUCOSE mg/dL  128*  105*  99  147*  98  118*     Microbiology Results (last 10 days)     Procedure Component Value - Date/Time    Blood Culture - Blood, Hand, Right [[id number]] Collected:  12/11/18 1029    Lab Status:  Preliminary result Specimen:  Blood from Hand, Right Updated:  12/13/18 1046     Blood Culture No growth at 2 days    Blood Culture - Blood, Arm, Left [[id number]] Collected:  12/11/18 1011    Lab Status:  Preliminary result Specimen:  Blood from Arm, Left Updated:  12/13/18 1046     Blood Culture No growth at 2 days    Anaerobic Culture - Tissue, Spine, Lumbar [[id number]]  "Collected:  12/09/18 2127    Lab Status:  Preliminary result Specimen:  Tissue from Spine, Lumbar Updated:  12/12/18 1025     Culture No anaerobes isolated at 3 days    Tissue / Bone Culture - Tissue, Spine, Lumbar [[id number]]  (Abnormal) Collected:  12/09/18 2127    Lab Status:  Edited Result - FINAL Specimen:  Tissue from Spine, Lumbar Updated:  12/11/18 0705     Tissue Culture Light growth (2+) Staphylococcus aureus, MRSA     Comment:   Methicillin resistant Staphylococcus aureus, Patient may be an isolation risk.        Gram Stain Moderate (3+) WBCs seen      Rare (1+) Gram positive cocci    Narrative:       Refer to previous wound culture collected on 12/9/2018 2125 for MICS    AFB Culture - Tissue, Spine, Lumbar [[id number]] Collected:  12/09/18 2127    Lab Status:  Preliminary result Specimen:  Tissue from Spine, Lumbar Updated:  12/10/18 1216     AFB Stain No acid fast bacilli seen on direct smear    Wound Culture - Wound, Spine, Lumbar [[id number]]  (Abnormal) Collected:  12/09/18 2126    Lab Status:  Final result Specimen:  Wound from Spine, Lumbar Updated:  12/11/18 0704     Wound Culture Heavy growth (4+) Staphylococcus aureus, MRSA     Comment:   Methicillin resistant Staphylococcus aureus, Patient may be an isolation risk.        Gram Stain Moderate (3+) WBCs seen      Few (2+) Gram positive cocci    Narrative:       Refer to previous wound culture collected on 12/9/2018 2125 for MICS    Wound Culture - Wound, Spine, Lumbar [[id number]]  (Abnormal)  (Susceptibility) Collected:  12/09/18 2125    Lab Status:  Final result Specimen:  Wound from Spine, Lumbar Updated:  12/11/18 0703     Wound Culture Moderate growth (3+) Staphylococcus aureus, MRSA     Comment: D test is positive. Isolate exhibits \"inducible\" resistance to Clindamycin.    Methicillin resistant Staphylococcus aureus, Patient may be an isolation risk.        Gram Stain Moderate (3+) WBCs seen      Few (2+) Gram positive cocci    Susceptibility  " "    Staphylococcus aureus, MRSA     ZAINAB     Clindamycin Resistant     Erythromycin Resistant     Oxacillin Resistant     Penicillin G Resistant     Rifampin Susceptible     Tetracycline Susceptible     Trimethoprim + Sulfamethoxazole Susceptible     Vancomycin Susceptible                    Blood Culture - Blood, Arm, Left [[id number]]  (Abnormal) Collected:  12/08/18 0243    Lab Status:  Edited Result - FINAL Specimen:  Blood from Arm, Left Updated:  12/10/18 0707     Blood Culture Staphylococcus aureus, MRSA     Comment:   Consider infectious disease consult to rule out distant focus of infection.  Methicillin resistant Staphylococcus aureus, Patient may be an isolation risk.        Isolated from Aerobic and Anaerobic Bottles     Gram Stain Anaerobic Bottle Gram positive cocci in clusters      Aerobic Bottle Gram positive cocci in clusters    Narrative:       Refer to previous blood culture collected on 12/8/2018 0208 for ZAINAB's.    Blood Culture ID, PCR - Blood, Arm, Left [[id number]]  (Abnormal) Collected:  12/08/18 0243    Lab Status:  Final result Specimen:  Blood from Arm, Left Updated:  12/09/18 1707     BCID, PCR Staphylococcus aureus. mecA (methicillin resistance gene) detected. Identification by BCID PCR.    Blood Culture - Blood, Arm, Left [[id number]]  (Abnormal)  (Susceptibility) Collected:  12/08/18 0208    Lab Status:  Edited Result - FINAL Specimen:  Blood from Arm, Left Updated:  12/10/18 0706     Blood Culture Staphylococcus aureus, MRSA     Comment:   Consider infectious disease consult to rule out distant focus of infection.  D test is positive. Isolate exhibits \"inducible\" resistance to Clindamycin.    Methicillin resistant Staphylococcus aureus, Patient may be an isolation risk.  Corrected result. Previously Reported Organism Changed. Previous result was Staphylococcus aureus on 12/10/2018 at 0650 EST.        Isolated from Aerobic and Anaerobic Bottles     Gram Stain Anaerobic Bottle Gram positive " cocci in clusters      Aerobic Bottle Gram positive cocci in clusters    Narrative:       Although Staphylococcus aureus is sensitive to Oxacillin, it possesses the mecA gene, therefore it is an MRSA and should be considered resistant to all beta-lactam antibiotics.    Susceptibility      Staphylococcus aureus, MRSA     ZAINAB     Clindamycin Resistant     Erythromycin Resistant     Oxacillin Susceptible     Penicillin G Resistant     Rifampin Susceptible     Tetracycline Susceptible     Trimethoprim + Sulfamethoxazole Susceptible     Vancomycin Susceptible                            Assessment:    Spinal epidural abscess  MSSA sepsis with bacteremia - staph aureus has mecA gene  Subjective IV drug abuse with significant skin lesions in the needle tracks and associated phlebitis    Recommendation:  · Continue vancomycin adjusted to achieve a trough level of 20.  · Follow up on repeat blood cultures.    · Would need 6 weeks of IV antibiotics in the last negative blood culture or final surgical intervention - to be decided by neurosurgery service -  which ever is the latest.    Fabian Key MD  12/13/2018  11:36 AM    Much of this encounter note is an electronic transcription/translation of spoken language to printed text. The electronic translation of spoken language may permit erroneous, or at times, nonsensical words or phrases to be inadvertently transcribed; Although I have reviewed the note for such errors, some may still exist

## 2018-12-13 NOTE — PLAN OF CARE
Problem: Patient Care Overview  Goal: Plan of Care Review  Outcome: Ongoing (interventions implemented as appropriate)   12/13/18 0630   Coping/Psychosocial   Plan of Care Reviewed With patient   Plan of Care Review   Progress no change   OTHER   Outcome Summary Pt c/o pain all night long. No relief. Pain meds requested every hour. Dilaudid PCA still infusing. BP elevated, prn clonidine given. Will continue to monitor.

## 2018-12-14 LAB
ALBUMIN SERPL-MCNC: 2.7 G/DL (ref 3.5–5.2)
ALBUMIN/GLOB SERPL: 0.5 G/DL
ALP SERPL-CCNC: 88 U/L (ref 39–117)
ALT SERPL W P-5'-P-CCNC: 22 U/L (ref 1–33)
ANION GAP SERPL CALCULATED.3IONS-SCNC: 11.1 MMOL/L
AST SERPL-CCNC: 24 U/L (ref 1–32)
BACTERIA SPEC ANAEROBE CULT: NORMAL
BILIRUB SERPL-MCNC: 0.4 MG/DL (ref 0.1–1.2)
BUN BLD-MCNC: 6 MG/DL (ref 6–20)
BUN/CREAT SERPL: 10.9 (ref 7–25)
CALCIUM SPEC-SCNC: 8.8 MG/DL (ref 8.6–10.5)
CHLORIDE SERPL-SCNC: 95 MMOL/L (ref 98–107)
CO2 SERPL-SCNC: 26.9 MMOL/L (ref 22–29)
CREAT BLD-MCNC: 0.55 MG/DL (ref 0.57–1)
CRP SERPL-MCNC: 3.81 MG/DL (ref 0–0.5)
DEPRECATED RDW RBC AUTO: 52.9 FL (ref 37–54)
ERYTHROCYTE [DISTWIDTH] IN BLOOD BY AUTOMATED COUNT: 16.4 % (ref 11.7–13)
ERYTHROCYTE [SEDIMENTATION RATE] IN BLOOD: 72 MM/HR (ref 0–20)
GFR SERPL CREATININE-BSD FRML MDRD: 122 ML/MIN/1.73
GLOBULIN UR ELPH-MCNC: 5.5 GM/DL
GLUCOSE BLD-MCNC: 115 MG/DL (ref 65–99)
HCT VFR BLD AUTO: 33.1 % (ref 35.6–45.5)
HGB BLD-MCNC: 10.5 G/DL (ref 11.9–15.5)
MAGNESIUM SERPL-MCNC: 1.8 MG/DL (ref 1.6–2.6)
MCH RBC QN AUTO: 27.9 PG (ref 26.9–32)
MCHC RBC AUTO-ENTMCNC: 31.7 G/DL (ref 32.4–36.3)
MCV RBC AUTO: 88 FL (ref 80.5–98.2)
PHOSPHATE SERPL-MCNC: 4 MG/DL (ref 2.5–4.5)
PLATELET # BLD AUTO: 366 10*3/MM3 (ref 140–500)
PMV BLD AUTO: 10.5 FL (ref 6–12)
POTASSIUM BLD-SCNC: 3.9 MMOL/L (ref 3.5–5.2)
PROT SERPL-MCNC: 8.2 G/DL (ref 6–8.5)
RBC # BLD AUTO: 3.76 10*6/MM3 (ref 3.9–5.2)
SODIUM BLD-SCNC: 133 MMOL/L (ref 136–145)
VANCOMYCIN TROUGH SERPL-MCNC: 6.8 MCG/ML (ref 5–20)
WBC NRBC COR # BLD: 13.76 10*3/MM3 (ref 4.5–10.7)

## 2018-12-14 PROCEDURE — 25010000002 VANCOMYCIN 10 G RECONSTITUTED SOLUTION: Performed by: NEUROLOGICAL SURGERY

## 2018-12-14 PROCEDURE — 83735 ASSAY OF MAGNESIUM: CPT | Performed by: INTERNAL MEDICINE

## 2018-12-14 PROCEDURE — 85027 COMPLETE CBC AUTOMATED: CPT | Performed by: INTERNAL MEDICINE

## 2018-12-14 PROCEDURE — 85652 RBC SED RATE AUTOMATED: CPT | Performed by: NURSE PRACTITIONER

## 2018-12-14 PROCEDURE — 97110 THERAPEUTIC EXERCISES: CPT

## 2018-12-14 PROCEDURE — 84100 ASSAY OF PHOSPHORUS: CPT | Performed by: INTERNAL MEDICINE

## 2018-12-14 PROCEDURE — 80053 COMPREHEN METABOLIC PANEL: CPT | Performed by: INTERNAL MEDICINE

## 2018-12-14 PROCEDURE — 25010000002 VANCOMYCIN 10 G RECONSTITUTED SOLUTION: Performed by: INTERNAL MEDICINE

## 2018-12-14 PROCEDURE — 25010000002 HYDROMORPHONE PER 4 MG: Performed by: NURSE PRACTITIONER

## 2018-12-14 PROCEDURE — 86140 C-REACTIVE PROTEIN: CPT | Performed by: NURSE PRACTITIONER

## 2018-12-14 PROCEDURE — 80202 ASSAY OF VANCOMYCIN: CPT | Performed by: INTERNAL MEDICINE

## 2018-12-14 PROCEDURE — 99024 POSTOP FOLLOW-UP VISIT: CPT | Performed by: NURSE PRACTITIONER

## 2018-12-14 RX ORDER — METHADONE HYDROCHLORIDE 5 MG/1
25 TABLET ORAL ONCE
Status: COMPLETED | OUTPATIENT
Start: 2018-12-14 | End: 2018-12-14

## 2018-12-14 RX ORDER — SENNA AND DOCUSATE SODIUM 50; 8.6 MG/1; MG/1
2 TABLET, FILM COATED ORAL NIGHTLY
Status: DISCONTINUED | OUTPATIENT
Start: 2018-12-14 | End: 2018-12-21 | Stop reason: HOSPADM

## 2018-12-14 RX ORDER — METHADONE HYDROCHLORIDE 10 MG/1
30 TABLET ORAL EVERY 8 HOURS SCHEDULED
Status: DISCONTINUED | OUTPATIENT
Start: 2018-12-14 | End: 2018-12-21 | Stop reason: HOSPADM

## 2018-12-14 RX ORDER — DOCUSATE SODIUM 100 MG/1
100 CAPSULE, LIQUID FILLED ORAL DAILY
Status: DISCONTINUED | OUTPATIENT
Start: 2018-12-15 | End: 2018-12-21 | Stop reason: HOSPADM

## 2018-12-14 RX ADMIN — DOCUSATE SODIUM 100 MG: 100 CAPSULE, LIQUID FILLED ORAL at 01:33

## 2018-12-14 RX ADMIN — OXYCODONE HYDROCHLORIDE AND ACETAMINOPHEN 1 TABLET: 10; 325 TABLET ORAL at 01:34

## 2018-12-14 RX ADMIN — OXYCODONE HYDROCHLORIDE AND ACETAMINOPHEN 1 TABLET: 10; 325 TABLET ORAL at 11:25

## 2018-12-14 RX ADMIN — NICOTINE 1 PATCH: 21 PATCH, EXTENDED RELEASE TRANSDERMAL at 08:53

## 2018-12-14 RX ADMIN — OXYCODONE HYDROCHLORIDE AND ACETAMINOPHEN 1 TABLET: 10; 325 TABLET ORAL at 23:47

## 2018-12-14 RX ADMIN — METHADONE HYDROCHLORIDE 25 MG: 5 TABLET ORAL at 10:18

## 2018-12-14 RX ADMIN — OXYCODONE HYDROCHLORIDE 10 MG: 5 TABLET ORAL at 05:00

## 2018-12-14 RX ADMIN — OXYCODONE HYDROCHLORIDE 10 MG: 5 TABLET ORAL at 18:10

## 2018-12-14 RX ADMIN — AMLODIPINE BESYLATE 10 MG: 10 TABLET ORAL at 08:53

## 2018-12-14 RX ADMIN — HYDROMORPHONE HYDROCHLORIDE 0.5 MG: 1 INJECTION, SOLUTION INTRAMUSCULAR; INTRAVENOUS; SUBCUTANEOUS at 22:43

## 2018-12-14 RX ADMIN — SENNOSIDES AND DOCUSATE SODIUM 2 TABLET: 8.6; 5 TABLET ORAL at 21:08

## 2018-12-14 RX ADMIN — ONDANSETRON 4 MG: 4 TABLET, FILM COATED ORAL at 07:58

## 2018-12-14 RX ADMIN — OXYCODONE HYDROCHLORIDE AND ACETAMINOPHEN 1 TABLET: 10; 325 TABLET ORAL at 19:39

## 2018-12-14 RX ADMIN — HYDROMORPHONE HYDROCHLORIDE 0.5 MG: 1 INJECTION, SOLUTION INTRAMUSCULAR; INTRAVENOUS; SUBCUTANEOUS at 19:39

## 2018-12-14 RX ADMIN — METHADONE HYDROCHLORIDE 5 MG: 5 TABLET ORAL at 07:45

## 2018-12-14 RX ADMIN — OXYCODONE HYDROCHLORIDE AND ACETAMINOPHEN 1 TABLET: 10; 325 TABLET ORAL at 06:12

## 2018-12-14 RX ADMIN — ONDANSETRON 4 MG: 4 TABLET, FILM COATED ORAL at 13:33

## 2018-12-14 RX ADMIN — HYDROMORPHONE HYDROCHLORIDE 0.5 MG: 1 INJECTION, SOLUTION INTRAMUSCULAR; INTRAVENOUS; SUBCUTANEOUS at 16:28

## 2018-12-14 RX ADMIN — HYDROMORPHONE HYDROCHLORIDE 0.5 MG: 1 INJECTION, SOLUTION INTRAMUSCULAR; INTRAVENOUS; SUBCUTANEOUS at 13:27

## 2018-12-14 RX ADMIN — OXYCODONE HYDROCHLORIDE 10 MG: 5 TABLET ORAL at 00:03

## 2018-12-14 RX ADMIN — METHOCARBAMOL TABLETS 750 MG: 750 TABLET, COATED ORAL at 18:10

## 2018-12-14 RX ADMIN — Medication 400 MG: at 21:08

## 2018-12-14 RX ADMIN — POTASSIUM CHLORIDE 40 MEQ: 750 CAPSULE, EXTENDED RELEASE ORAL at 08:53

## 2018-12-14 RX ADMIN — HYDROMORPHONE HYDROCHLORIDE 0.5 MG: 1 INJECTION, SOLUTION INTRAMUSCULAR; INTRAVENOUS; SUBCUTANEOUS at 08:53

## 2018-12-14 RX ADMIN — METHADONE HYDROCHLORIDE 30 MG: 10 TABLET ORAL at 17:03

## 2018-12-14 RX ADMIN — VANCOMYCIN HYDROCHLORIDE 1500 MG: 10 INJECTION, POWDER, LYOPHILIZED, FOR SOLUTION INTRAVENOUS at 17:05

## 2018-12-14 RX ADMIN — OXYCODONE HYDROCHLORIDE 10 MG: 5 TABLET ORAL at 12:37

## 2018-12-14 RX ADMIN — Medication 400 MG: at 08:53

## 2018-12-14 RX ADMIN — VANCOMYCIN HYDROCHLORIDE 1250 MG: 10 INJECTION, POWDER, LYOPHILIZED, FOR SOLUTION INTRAVENOUS at 08:53

## 2018-12-14 RX ADMIN — METHOCARBAMOL TABLETS 750 MG: 750 TABLET, COATED ORAL at 00:03

## 2018-12-14 RX ADMIN — METHOCARBAMOL TABLETS 750 MG: 750 TABLET, COATED ORAL at 12:37

## 2018-12-14 RX ADMIN — OXYCODONE HYDROCHLORIDE AND ACETAMINOPHEN 1 TABLET: 10; 325 TABLET ORAL at 15:57

## 2018-12-14 RX ADMIN — METHOCARBAMOL TABLETS 750 MG: 750 TABLET, COATED ORAL at 05:00

## 2018-12-14 NOTE — PROGRESS NOTES
[facility] INPATIENT PROGRESS NOTE         [address]    2018      PATIENT IDENTIFICATION:  Name: Silvia Velazquez ADMIT: 2018   : 1975  PCP: Provider, No Known    MRN: [id number] LOS: 5 days   AGE/SEX: 43 y.o. female  ROOM: [facility]                     LOS 5    Reason for visit: Follow-up sepsis with MRSA bacteremia from epidural/spinal abscess      SUBJECTIVE:      Increase methadone to 30 mg. Discontinue monitored bed. Pain management refuses to see her. Still with moderate discomfort.    Objective   OBJECTIVE:    Vital Sign Min/Max for last 24 hours  Temp  Min: 97.4 °F (36.3 °C)  Max: 98.6 °F (37 °C)   BP  Min: 149/95  Max: 152/99   Pulse  Min: 76  Max: 98   Resp  Min: 16  Max: 20   SpO2  Min: 95 %  Max: 95 %   No Data Recorded   No Data Recorded                         Body mass index is 29.38 kg/m².    Intake/Output Summary (Last 24 hours) at 2018 1113  Last data filed at 2018 0614  Gross per 24 hour   Intake 630 ml   Output 2950 ml   Net -2320 ml         Exam:     GEN:               No respiratory distress, appears stated age; Ill appearing,  No accessory muscle use  EYES:              EOMI, anicteric sclera bilat  ENT:                External ears/nose normal, OP clear  NECK:             Supple, midline trachea, No cervical lymphadenopathy  LUNGS:           Bilateral breath sounds heard, No wheezes/ crackles heard  CV:                  Regular rate and rhythm, No murmur  ABD:                Nontender, nondistended, no palpable liver or masses  EXT:                Extremities warm and well perfused, no peripheral/sacral edema. Rt ischial tuberosity pain           Scheduled meds:      amLODIPine 10 mg Oral Q24H   [START ON 12/15/2018] docusate sodium 100 mg Oral Daily   magnesium oxide 400 mg Oral BID   methadone 30 mg Oral Q8H   methocarbamol 750 mg Oral Q6H   nicotine 1 patch Transdermal Q24H   oxyCODONE 10 mg Oral Q6H   potassium chloride 40 mEq Oral Daily    sennosides-docusate sodium 2 tablet Oral Nightly   vancomycin 1,500 mg Intravenous Q8H     IV meds:                          Pharmacy to dose vancomycin      Data Review:  Results from last 7 days   Lab Units  12/14/18   0820  12/13/18   0357  12/12/18   0621  12/11/18   1011  12/10/18   0820   SODIUM mmol/L  133*  133*  135*  134*  135*   POTASSIUM mmol/L  3.9  4.0  4.1  2.9*  3.8   CHLORIDE mmol/L  95*  96*  98  99  100   CO2 mmol/L  26.9  25.9  26.0  25.1  26.1   BUN mg/dL  6  5*  3*  8  10   CREATININE mg/dL  0.55*  0.48*  0.47*  0.46*  0.49*   GLUCOSE mg/dL  115*  128*  105*  99  147*   CALCIUM mg/dL  8.8  8.6  8.6  7.9*  8.3*         Estimated Creatinine Clearance: 145.8 mL/min (A) (by C-G formula based on SCr of 0.55 mg/dL (L)).  Results from last 7 days   Lab Units  12/14/18   0820  12/13/18   0357  12/12/18   0621  12/11/18   1011  12/10/18   0820   WBC 10*3/mm3  13.76*  12.64*  10.25  11.24*  13.63*   HEMOGLOBIN g/dL  10.5*  10.3*  10.2*  9.3*  9.9*   PLATELETS 10*3/mm3  366  285  253  243  205         Results from last 7 days   Lab Units  12/14/18   0820  12/13/18   0357  12/12/18   0621  12/09/18   1436  12/08/18   0208   ALT (SGPT) U/L  22  24  29  52*  68*   AST (SGOT) U/L  24  26  31  59*  77*         Results from last 7 days   Lab Units  12/10/18   0009  12/09/18   1519  12/08/18   0208   PROCALCITONIN ng/mL   --    --   0.83*   LACTATE mmol/L  0.7  2.9*  1.5         Microbiology reviewed: Positive MRSA and blood and wound cultures.  Repeat blood cultures no growth to date from 12/11/18      2-D echo reviewed from 12/12/18: EF 59%.  Grade 2 diastolic dysfunction.    )Assessment   ASSESSMENT:      Active Hospital Problems    Diagnosis Date Noted   • Spinal epidural abscess [G06.1] 12/09/2018      Resolved Hospital Problems   No resolved problems to display.     MRSA Bacteremia with sepsis  MRSA epidural/ paraspinal abscess   Spinal cord compression s/p lumbar decompression 12/9   Heroin/ Meth IVDA    Urinary incontinence  ABLA on Anemia of chronic disease  Hyponatremia/ Hypomagnesemia  Hypokalemia  Mild hepatitis  Grade 2 DD - no veg on ECHO             PLAN:    Pain management refuses to see her due to previous drug abuse.  Increase methadone for better pain control.  Antibiotics per infectious disease recommendations.  Electrolyte repletion as needed.  Increase activity encourage working with physical therapy.  Mechanical DVT prophylaxis.  Can transfer to [facility].    Aldo Lindsey MD  Pulmonary and Critical Care Medicine  [facility], [facility]  12/14/2018    11:13 AM

## 2018-12-14 NOTE — PROGRESS NOTES
"[facility] PROGRESS NOTE      CC: Follow-up epidural abscess/MRSA sepsis status post lumbar decompression for abscess POD 5    Subjective     Interval History: Since last encounter, patient has not had new complaints. Lost IV access overnight per nursing and patient went most of the night without Dilaudid.  Nursing feels that she is doing better and more relaxed with the methadone. BP better. She is more agreeable to getting out of bed.  She worked with physical therapy this morning and walked 90 feet.  PVR per nursing- 21 cc. States she is Hep A positive and attributes belly pain to this; she states \"back feels great, belly hurts\".     ROS:  Constitutional: No fever, chills  MS: back pain-improved  Neuro: No numbness, tingling  : no voiding issue    Objective     Vital signs in last 24 hours:  Temp:  [97.4 °F (36.3 °C)-98.6 °F (37 °C)] 97.4 °F (36.3 °C)  Heart Rate:  [76-98] 76  Resp:  [16-20] 18  BP: (149-152)/(94-99) 149/95    Intake/Output this shift:  No intake/output data recorded.      LABS:  Results from last 7 days   Lab Units  12/14/18   0820  12/13/18   0357  12/12/18   0621   WBC 10*3/mm3  13.76*  12.64*  10.25   HEMOGLOBIN g/dL  10.5*  10.3*  10.2*   HEMATOCRIT %  33.1*  31.4*  30.9*   PLATELETS 10*3/mm3  366  285  253     Results from last 7 days   Lab Units  12/14/18   0820  12/13/18   0357  12/12/18   0621   SODIUM mmol/L  133*  133*  135*   POTASSIUM mmol/L  3.9  4.0  4.1   CHLORIDE mmol/L  95*  96*  98   CO2 mmol/L  26.9  25.9  26.0   BUN mg/dL  6  5*  3*   CREATININE mg/dL  0.55*  0.48*  0.47*   CALCIUM mg/dL  8.8  8.6  8.6   BILIRUBIN mg/dL  0.4  0.4  0.4   ALK PHOS U/L  88  86  78   ALT (SGPT) U/L  22  24  29   AST (SGOT) U/L  24  26  31   GLUCOSE mg/dL  115*  128*  105*     12/9: ESR-66, CRP-18.57    Results from last 7 days   Lab Units  12/14/18   0820   CRP mg/dL  3.81*     Results from last 7 days   Lab Units  12/14/18   1025   SED RATE mm/hr  72*       Blood cultures " 18 and intraoperative lumbar wound culture 2018, intraoperative tissue/bone culture dated 2018 all reveal MRSA.      Blood culture 18 ×2  NGTD    IMAGING STUDIES:  No new imaging    I personally viewed and interpreted the patient's chart.    Meds reviewed/changed: Yes    Current Facility-Administered Medications:   •  amLODIPine (NORVASC) tablet 10 mg, 10 mg, Oral, Q24H, Aldo Lindsey MD, 10 mg at 18 0853  •  [] CloNIDine (CATAPRES) tablet 0.1 mg, 0.1 mg, Oral, 4x Daily PRN, 0.1 mg at 18 1556 **FOLLOWED BY** [] CloNIDine (CATAPRES) tablet 0.1 mg, 0.1 mg, Oral, 4x Daily PRN, 0.1 mg at 18 0803 **FOLLOWED BY** CloNIDine (CATAPRES) tablet 0.1 mg, 0.1 mg, Oral, TID PRN **FOLLOWED BY** [START ON 12/15/2018] CloNIDine (CATAPRES) tablet 0.1 mg, 0.1 mg, Oral, BID PRN **FOLLOWED BY** [START ON 2018] CloNIDine (CATAPRES) tablet 0.1 mg, 0.1 mg, Oral, Once PRN, Yasmani Encarnacion MD  •  docusate sodium (COLACE) capsule 100 mg, 100 mg, Oral, BID PRN, Tevin Whitley MD, 100 mg at 18 0133  •  hydrALAZINE (APRESOLINE) injection 20 mg, 20 mg, Intravenous, Q4H PRN, Aldo Lindsey MD, 20 mg at 18 1118  •  HYDROmorphone (DILAUDID) injection 0.5 mg, 0.5 mg, Intravenous, Q3H PRN, Caroline Randle, APRN, 0.5 mg at 18 0853  •  magnesium oxide (MAG-OX) tablet 400 mg, 400 mg, Oral, BID, Yasmani Encarnacion MD, 400 mg at 18 0853  •  Magnesium Sulfate 2 gram Bolus, followed by 8 gram infusion (total Mg dose 10 grams)- Mg less than or equal to 1mg/dL, 2 g, Intravenous, PRN **OR** Magnesium Sulfate 2 gram / 50mL Infusion (GIVE X 3 BAGS TO EQUAL 6GM TOTAL DOSE) - Mg 1.1 - 1.5 mg/dl, 2 g, Intravenous, PRN, Last Rate: 25 mL/hr at 18, 2 g at 18 **OR** Magnesium Sulfate 4 gram infusion- Mg 1.6-1.9 mg/dL, 4 g, Intravenous, PRN, Yasmani Encarnacion MD  •  methadone (DOLOPHINE) tablet 30 mg, 30 mg, Oral, Q8H, César  Aldo Laboy MD  •  methocarbamol (ROBAXIN) tablet 750 mg, 750 mg, Oral, Q6H, Yasmani Encarnacion MD, 750 mg at 12/14/18 0500  •  naloxone (NARCAN) injection 0.1 mg, 0.1 mg, Intravenous, Q5 Min PRN, Tevin Whitley MD  •  nicotine (NICODERM CQ) 21 MG/24HR patch 1 patch, 1 patch, Transdermal, Q24H, Yasmani Encarnacion MD, 1 patch at 12/14/18 0853  •  ondansetron (ZOFRAN) tablet 4 mg, 4 mg, Oral, Q6H PRN, 4 mg at 12/14/18 0758 **OR** ondansetron ODT (ZOFRAN-ODT) disintegrating tablet 4 mg, 4 mg, Oral, Q6H PRN **OR** ondansetron (ZOFRAN) injection 4 mg, 4 mg, Intravenous, Q6H PRN, Tevin Whitley MD, 4 mg at 12/13/18 0345  •  oxyCODONE (ROXICODONE) immediate release tablet 10 mg, 10 mg, Oral, Q6H, Yasmani Encarnacion MD, 10 mg at 12/14/18 0500  •  oxyCODONE-acetaminophen (PERCOCET)  MG per tablet 1 tablet, 1 tablet, Oral, Q4H PRN, Tevin Whitley MD, 1 tablet at 12/14/18 0612  •  Pharmacy to dose vancomycin, , Does not apply, Continuous PRN, Tevin Whitley MD  •  potassium chloride (KLOR-CON) packet 40 mEq, 40 mEq, Oral, PRN, Yasmani Encarnacion MD  •  potassium chloride (MICRO-K) CR capsule 40 mEq, 40 mEq, Oral, PRN, Yasmani Encarnacion MD, 40 mEq at 12/12/18 0024  •  potassium chloride (MICRO-K) CR capsule 40 mEq, 40 mEq, Oral, Daily, Yasmani Encarnacion MD, 40 mEq at 12/14/18 0853  •  [COMPLETED] Insert peripheral IV, , , Once **AND** sodium chloride 0.9 % flush 10 mL, 10 mL, Intravenous, PRN, Perry Cárdenas MD  •  vancomycin 1500 mg/500 mL 0.9% NS IVPB ([facility]), 1,500 mg, Intravenous, Q8H, Fabian Key MD      Physical Exam:    General:   Resting but awakens to voice. Much more animated. Smiling     and relaxed. Speech clear with no aphasia;  Back:    LACHELLE site x 2 with CDI dressings;Incision midline lumbar     well approximated with no R/E/D;    Motor:   5/5 jessee LE  Sensation: Normal to light touch and pain jessee LE  Extremities:    diffuse scattered open  lesions/abrasions; distal pulses 2+      Assessment/Plan     ASSESSMENT:      Spinal epidural abscess      PLAN: WBC up slightly but no fever or chills.  Nursing states she missed one dose of vancomycin due to IV access loss.  ESR slightly up, but CRP significantly better. She looks much better today.  She has more interactive and participatory with care.  Her post void residual was minimal.  She denies any difficulty voiding today.  Strength is intact.  Incision looks good.  She is awaiting placement, but this may take some time.  Please call over weekend for covering neurosurgeon to see her if there are any concerns.  We will plan to see her back on Monday.  Repeat ESR, CRP, CBC at that time.  No plans to reimage patient unless she develops fever or new neurologic issues.  No plans for further surgery at this time.  She discharges over the weekend, we will see her back with new MRI in 6 weeks, following cessation of IV antibiotics.  No lifting, pushing, pulling more than 10 pounds.  No repetitive bending or twisting.  Okay to shower.  No tub baths or swimming.    I discussed the patients findings and my recommendations with patient and nursing staff       LOS: 5 days       Caroline Randle, APRN  12/14/2018  9:57 AM

## 2018-12-14 NOTE — PROGRESS NOTES
Access did follow up with pt. Pt stated there was no news about her d/c at this time. Access will continue to follow.

## 2018-12-14 NOTE — PROGRESS NOTES
Continued Stay Note  [facility]     Patient Name: Silvia Velazquez  MRN: [id number]  Today's Date: 12/14/2018    Admit Date: 12/9/2018    Discharge Plan     Row Name 12/14/18 1200       Plan    Plan  TBD    Plan Comments  Call received from [facility] with [facility].  [facility] is evaluating pt's referral.  Their two [place] hospitals have declined.  She will call back today with a response.    Row Name 12/14/18 0139       Plan    Plan  TBD    Plan Comments  Call placed to [facility] with [facility].  Left  for call back to check on eval progress.        Discharge Codes    No documentation.             Yolanda Altman RN

## 2018-12-14 NOTE — DISCHARGE INSTRUCTIONS
No lifting, pushing, pulling more than 10 pounds.  No repetitive bending or twisting.  Okay to shower.  No tub baths or swimming.

## 2018-12-14 NOTE — PLAN OF CARE
Problem: Patient Care Overview  Goal: Plan of Care Review  Outcome: Ongoing (interventions implemented as appropriate)   12/14/18 1723   Coping/Psychosocial   Plan of Care Reviewed With patient   Plan of Care Review   Progress improving   OTHER   Outcome Summary pain much better controlled with the addition on methadone. pt ambulated with PT and is using BSC. c/o abd pain-laxative ordered at HS. pt utilizing stretched provided by PT. BP better controlled with addition of norvasc-no PRNs needed. transfer to . continue to monitor       Problem: Skin Injury Risk (Adult)  Goal: Skin Health and Integrity  Outcome: Ongoing (interventions implemented as appropriate)      Problem: Fall Risk (Adult)  Goal: Absence of Fall  Outcome: Ongoing (interventions implemented as appropriate)      Problem: Pain, Acute (Adult)  Goal: Acceptable Pain Control/Comfort Level  Outcome: Ongoing (interventions implemented as appropriate)      Problem: Skin and Soft Tissue Infection (Adult)  Goal: Signs and Symptoms of Listed Potential Problems Will be Absent, Minimized or Managed (Skin and Soft Tissue Infection)  Outcome: Ongoing (interventions implemented as appropriate)

## 2018-12-14 NOTE — PROGRESS NOTES
"Pharmacokinetic Evaluation - Vancomycin    Silvia Velazquez is a 43 y.o. female on vancomycin pharmacy to dose.  MRN: [id number]  : 1975    Day of vancomycin therapy: 6  Indication: MRSA in spinal fluid/bone  Consulted by: Dr. Key  Goal trough: 15-20 (closer to 20 mg/dL per Dr. Key)    Current dose: 1250 q8  Other antimicrobials: None    Blood pressure 149/95, pulse 76, temperature 97.4 °F (36.3 °C), temperature source Oral, resp. rate 18, height 167.6 cm (66\"), weight 82.6 kg (182 lb), SpO2 95 %.  Results from last 7 days   Lab Units  18   081821   CREATININE mg/dL  0.55*  0.48*  0.47*     Estimated Creatinine Clearance: 145.8 mL/min (A) (by C-G formula based on SCr of 0.55 mg/dL (L)).  Results from last 7 days   Lab Units  18   0820  18   0357  18   0621   WBC 10*3/mm3  13.76*  12.64*  10.25   HEMOGLOBIN g/dL  10.5*  10.3*  10.2*   HEMATOCRIT %  33.1*  31.4*  30.9*   PLATELETS 10*3/mm3  366  285  253     Other relevant labs/chart info: Pt with spinal epidural abscess with subjective IV drug abuse. Per Dr. Key, pt to receive 6 week of IV abx from the last negative blood cx or final surgical intervention.     Radiology:   MRI Lumbar spine : \" Multilocular peripherally enhancing fluid collection in the  posterior spinal canal extending from L3 to S1 with dimensions given above. This is consistent with an epidural abscess. Additional thin collection is seen posterior to the inferior aspect of L1 and the L2 vertebra in the anterior spinal canal which may represent additional Abscess. 2.  Left paraspinal musculature abscess is also seen with dimensions given above.\"    Cultures:    Blood: MRSA   wound: MRSA   Tissue: MRSA  : Blood NGTD    Dosing hx (include troughs if drawn):  Vanc 1500mg loading dose given -.  Vanc 1250 Q12H on (12/10)-410, 1534 ()-8507,1505 (rn started early, then stopped after 10 min to allow " for trough, then finished infusion after level drawn)  Vanc trough 12/11 at 1530-10.9  Vanc 1500mg given 12/ (one dose for subtherapeutic vanc trough)--this was in addition to the 1250 mg given at 1509  Vanc 1250 Q8H-(12/12)-0031, 1017  12/12 1707 Vancomycin trough level 14.6 mcg/mL (~7hr level)  12/12 1858 Vanc 1500 mg IV x1 followed by 1250 mg IV q8h  12/14: Vancomycin trough at 0820 = 6.8; pt missed dose at 0236 due to loss of IV access, so this is a level drawn 14 hrs after last dose    Assessment:  Patient with subtherapeutic level, athough not reflective of trough. Although patient likely to accumulate still as per note on 12/12, the fact that she fell so far within only hours of missed dose make me concerned that she will not adequately reach levels around 20 on current dose. Will increase to 1500 q8 and recheck level on Sunday 12/16.    Plan:  1) Increase vancomycin to 1500 mg every 8 hours.  2) Next trough on Sunday 12/16 at 0830 after 5 total doses.  3) Monitor for s/sxns of vancomycin toxicity including changes in UOP/SCr; encourage hydration as tolerated to decrease risk of toxic accumulation.    Salome Mora, Pharm.D., [facility]  Oncology Pharmacy Specialist  [phone]

## 2018-12-14 NOTE — PROGRESS NOTES
Continued Stay Note  [facility]     Patient Name: Silvia Velazquez  MRN: [id number]  Today's Date: 12/14/2018    Admit Date: 12/9/2018    Discharge Plan     Row Name 12/14/18 1639       Plan    Plan  TBD    Patient/Family in Agreement with Plan  yes    Plan Comments  Another call placed to [facility] in [place].  They have not yet completed the evaluation so cannot accept pt at this time.  HR CCP discussed pt's discharge needs with pt at bedside.  Pt states that she is not currently able to get to the restroom on her own.   Her boyfriend works 12 hours and will not be with her during the day.  PT notes reviewed and CCP will follow.  Pt is in agreement with [facility] for outpt antibiotics if that discharge plan becomes viable.         Discharge Codes    No documentation.             Yolanda Altman RN

## 2018-12-14 NOTE — THERAPY TREATMENT NOTE
Acute Care - Physical Therapy Treatment Note  [facility]     Patient Name: Silvia Velazquez  : 1975  MRN: [id number]  Today's Date: 2018  Onset of Illness/Injury or Date of Surgery: 18          Admit Date: 2018    Visit Dx:    ICD-10-CM ICD-9-CM   1. Spinal epidural abscess G06.1 324.1   2. Bacteremia due to methicillin resistant Staphylococcus aureus R78.81 790.7     041.12   3. Abscess of paraspinal muscles, lumbar M62.89 728.89   4. Generalized weakness R53.1 780.79     Patient Active Problem List   Diagnosis   • Spinal epidural abscess       Therapy Treatment    Rehabilitation Treatment Summary     Row Name 1844             Treatment Time/Intention    Discipline  physical therapist  -      Document Type  therapy note (daily note)  -      Subjective Information  complains of;pain  -      Mode of Treatment  individual therapy;physical therapy  -      Patient/Family Observations  pt supine in bed no acute distress  -      Therapy Frequency (PT Clinical Impression)  daily  -      Patient Effort  good  -      Existing Precautions/Restrictions  fall  -LH      Recorded by [] Daisy Fowler, PT 1847      Row Name 1844             Cognitive Assessment/Intervention- PT/OT    Orientation Status (Cognition)  oriented x 3  -      Follows Commands (Cognition)  WFL  -      Personal Safety Interventions  fall prevention program maintained;gait belt;nonskid shoes/slippers when out of bed;supervised activity  -      Recorded by [] Daisy Fowler, PT 1847      Row Name 1844             Bed Mobility Assessment/Treatment    Supine-Sit [place] (Bed Mobility)  supervision  -      Sit-Supine [place] (Bed Mobility)  supervision  -      Comment (Bed Mobility)  log rolling  -LH      Recorded by [] Daisy Fowler, PT 1847      Row Name 18             Transfer Assessment/Treatment    Transfer  Assessment/Treatment  toilet transfer  -LH      Recorded by [] Daisy Fowler, PT 12/14/18 0947      Row Name 12/14/18 0944             Sit-Stand Transfer    Sit-Stand [place] (Transfers)  contact guard;verbal cues  -      Assistive Device (Sit-Stand Transfers)  walker, front-wheeled  -LH      Recorded by [] Daisy Fowler, PT 12/14/18 0947      Row Name 12/14/18 0944             Stand-Sit Transfer    Stand-Sit [place] (Transfers)  contact guard;verbal cues  -      Assistive Device (Stand-Sit Transfers)  walker, front-wheeled  -LH      Recorded by [] Daisy Fowler, PT 12/14/18 0947      Row Name 12/14/18 0944             Toilet Transfer    Type (Toilet Transfer)  sit-stand;stand-sit  -      [place] Level (Toilet Transfer)  contact guard;verbal cues  -      Assistive Device (Toilet Transfer)  commode, bedside without drop arms  -LH      Recorded by [] Daisy Fowler, PT 12/14/18 0947      Row Name 12/14/18 0944             Gait/Stairs Assessment/Training    [place] Level (Gait)  contact guard  -      Assistive Device (Gait)  walker, front-wheeled  -LH      Distance in Feet (Gait)  90  -      Pattern (Gait)  step-through  -      Deviations/Abnormal Patterns (Gait)  yamilet decreased;antalgic;right sided deviations  -      Bilateral Gait Deviations  forward flexed posture  -LH      Recorded by [] Daisy Fowler, PT 12/14/18 0947      Row Name 12/14/18 0944             Positioning and Restraints    Pre-Treatment Position  in bed  -      Post Treatment Position  bed  -      In Bed  supine;notified nsg;call light within reach;encouraged to call for assist;exit alarm on;with family/caregiver  -LH      Recorded by [] Daisy Fowler, PT 12/14/18 0947      Row Name 12/14/18 0944             Pain Scale: Numbers Pre/Post-Treatment    Pain Scale: Numbers, Pretreatment  8/10  -LH      Pain Scale: Numbers, Post-Treatment  8/10  -LH      Pain Location - Side  Right  -LH      Pain  "Location - Orientation  lower  -LH      Pain Location  hip  -LH      Pre/Post Treatment Pain Comment  \"sciatic\" pain  -LH      Pain Intervention(s)  Repositioned educated on figure 4 piriformis stretching  -LH      Recorded by [] Daisy Fowler, PT 12/14/18 0947      Row Name                Wound 12/09/18 2154 Bilateral back incision    Wound - Properties Group Date first assessed: 12/09/18 [KK] Time first assessed: 2154 [KK] Side: Bilateral [KK] Location: back [KK] Type: incision [KK] Recorded by:  [KK] Bárbara Huerta RN 12/09/18 2154      User Key  (r) = Recorded By, (t) = Taken By, (c) = Cosigned By    Initials Name Effective Dates Discipline     Daisy Fowler, PT 04/03/18 -  PT    Bárbara Chakraborty RN 09/30/16 -  Nurse          Wound 12/09/18 2154 Bilateral back incision (Active)   Dressing Appearance open to air 12/14/2018 12:03 AM   Closure Liquid skin adhesive 12/14/2018 12:03 AM   Base clean;pink 12/14/2018 12:03 AM   Periwound intact;dry 12/14/2018 12:03 AM   Periwound Temperature warm 12/14/2018 12:03 AM   Periwound Skin Turgor soft 12/14/2018 12:03 AM   Drainage Amount none 12/14/2018 12:03 AM   Dressing Care, Wound open to air 12/14/2018 12:03 AM           Physical Therapy Education     Title: PT OT SLP Therapies (Done)     Topic: Physical Therapy (Done)     Point: Mobility training (Done)     Learning Progress Summary           Patient Acceptance, E, VU,NR by  at 12/14/2018  9:47 AM    Acceptance, E,TB,D, VU,NR by  at 12/11/2018  4:22 PM    Acceptance, E,TB,D, VU,NR by  at 12/10/2018  4:29 PM                   Point: Home exercise program (Done)     Learning Progress Summary           Patient Acceptance, E, VU,NR by  at 12/14/2018  9:47 AM    Acceptance, E,TB,D, VU,NR by  at 12/10/2018  4:29 PM                   Point: Body mechanics (Done)     Learning Progress Summary           Patient Acceptance, E, VU,NR by  at 12/14/2018  9:47 AM    Acceptance, E,DAVON COLON, EUGENIO,NR by  at 12/11/2018  " 4:22 PM    Acceptance, E,TB,D, VU,NR by  at 12/10/2018  4:29 PM                   Point: Precautions (Done)     Learning Progress Summary           Patient Acceptance, E, VU,NR by  at 12/14/2018  9:47 AM    Acceptance, E,TB,D, VU,NR by  at 12/11/2018  4:22 PM    Acceptance, E,TB,D, VU,NR by  at 12/10/2018  4:29 PM                               User Key     Initials Effective Dates Name Provider Type Discipline     04/03/18 -  Camille Emanuel, PT Physical Therapist PT     04/03/18 -  Daisy Fowler, PT Physical Therapist PT                PT Recommendation and Plan  Therapy Frequency (PT Clinical Impression): daily  Plan of Care Reviewed With: patient  Outcome Summary: pt agreeable PT this date with nsg encouragement, ambulated 90ft CGA rwx, antalgic gait. pt also assisted to BSC, urinated. pt educated on figure 4 stretching for R sciatic pain. will cont to progress as ritu. pt anxious at times.   Outcome Measures     Row Name 12/14/18 0900 12/11/18 1600          How much help from another person do you currently need...    Turning from your back to your side while in flat bed without using bedrails?  3  -  3  -CH     Moving from lying on back to sitting on the side of a flat bed without bedrails?  3  -  3  -CH     Moving to and from a bed to a chair (including a wheelchair)?  3  -  3  -CH     Standing up from a chair using your arms (e.g., wheelchair, bedside chair)?  3  -  3  -CH     Climbing 3-5 steps with a railing?  3  -  1  -CH     To walk in hospital room?  3  -  3  -CH     AM-PAC 6 Clicks Score  18  -  16  -CH        Functional Assessment    Outcome Measure Options  AM-PAC 6 Clicks Basic Mobility (PT)  -  AM-PAC 6 Clicks Basic Mobility (PT)  -       User Key  (r) = Recorded By, (t) = Taken By, (c) = Cosigned By    Initials Name Provider Type     Camille Emanuel, PT Physical Therapist     Daisy Fowler, PT Physical Therapist         Time Calculation:   PT Charges     Row  Name 12/14/18 0948             Time Calculation    Start Time  0900  -      Stop Time  0928  -      Time Calculation (min)  28 min  -      PT Received On  12/14/18  -      PT - Next Appointment  12/15/18  -        User Key  (r) = Recorded By, (t) = Taken By, (c) = Cosigned By    Initials Name Provider Type     Daisy Fowler, PT Physical Therapist        Therapy Suggested Charges     Code   Minutes Charges    None           Therapy Charges for Today     Code Description Service Date Service Provider Modifiers Qty    68724511315 HC PT THER SUPP EA 15 MIN 12/14/2018 Daisy Fowler, PT GP 1    74078425773 HC PT THER PROC EA 15 MIN 12/14/2018 Daisy Fowler, PT GP 2          PT G-Codes  Outcome Measure Options: AM-PAC 6 Clicks Basic Mobility (PT)  AM-PAC 6 Clicks Score: 18    Daisy Fowler PT  12/14/2018

## 2018-12-14 NOTE — PROGRESS NOTES
"  Infectious Diseases Progress Note    Fabian Key MD     [facility]  [place]: 5 days  Patient Identification:  Name: Silvia Velazquez  Age: 43 y.o.  Sex: female  :  1975  MRN: [id number]         Primary Care Physician: Provider, No Known            Subjective: Somewhat better denies any fever and chills.    Interval History: See consultation note    Objective:    Scheduled Meds:    amLODIPine 10 mg Oral Q24H   [START ON 12/15/2018] docusate sodium 100 mg Oral Daily   magnesium oxide 400 mg Oral BID   methadone 30 mg Oral Q8H   methocarbamol 750 mg Oral Q6H   nicotine 1 patch Transdermal Q24H   oxyCODONE 10 mg Oral Q6H   potassium chloride 40 mEq Oral Daily   sennosides-docusate sodium 2 tablet Oral Nightly   vancomycin 1,500 mg Intravenous Q8H     Continuous Infusions:    Pharmacy to dose vancomycin        Vital signs in last 24 hours:  Temp:  [97.4 °F (36.3 °C)-98.6 °F (37 °C)] 97.4 °F (36.3 °C)  Heart Rate:  [76-98] 76  Resp:  [16-20] 18  BP: (149-152)/(94-99) 149/95    Intake/Output:    Intake/Output Summary (Last 24 hours) at 2018 1040  Last data filed at 2018 0614  Gross per 24 hour   Intake 630 ml   Output 2950 ml   Net -2320 ml       Exam:  /95 (BP Location: Right arm, Patient Position: Lying)   Pulse 76   Temp 97.4 °F (36.3 °C) (Oral)   Resp 18   Ht 167.6 cm (66\")   Wt 82.6 kg (182 lb)   LMP  (LMP Unknown)   SpO2 95%   BMI 29.38 kg/m²     General Appearance:    Alert, cooperative, no distress, AAOx3                          Head:    Normocephalic, without obvious abnormality, atraumatic                           Eyes:    PERRL, conjunctivae/corneas clear, EOM's intact, both eyes                         Throat:   Lips, tongue, gums normal; oral mucosa pink and moist                           Neck:   Supple, symmetrical, trachea midline, no JVD                         Lungs:    Clear to auscultation bilaterally, respirations unlabored                 Chest Wall: "    No tenderness or deformity                          Heart:    Regular rate and rhythm, S1 and S2 normal, no murmur, no rub                                         or gallop                  Abdomen:     Soft, non-tender, bowel sounds active, no masses, no                                                        organomegaly                  Extremities:   Extremities normal, atraumatic, no cyanosis or edema                        Pulses:   Pulses palpable in all extremities                            Skin:   Multiple skin lesions noted          Data Review:    I reviewed the patient's new clinical results.  Results from last 7 days   Lab Units  12/14/18   0820  12/13/18   0357  12/12/18   0621  12/11/18   1011  12/10/18   0820  12/09/18   1436  12/08/18   0208   WBC 10*3/mm3  13.76*  12.64*  10.25  11.24*  13.63*  11.29*  11.59*   HEMOGLOBIN g/dL  10.5*  10.3*  10.2*  9.3*  9.9*  10.7*  10.4*   PLATELETS 10*3/mm3  366  285  253  243  205  194  197     Results from last 7 days   Lab Units  12/14/18   0820  12/13/18   0357  12/12/18   0621  12/11/18   1011  12/10/18   0820  12/09/18   1436  12/08/18   0208   SODIUM mmol/L  133*  133*  135*  134*  135*  135*  131*   POTASSIUM mmol/L  3.9  4.0  4.1  2.9*  3.8  3.2*  3.6   CHLORIDE mmol/L  95*  96*  98  99  100  94*  92*   CO2 mmol/L  26.9  25.9  26.0  25.1  26.1  29.3*  25.3   BUN mg/dL  6  5*  3*  8  10  8  9   CREATININE mg/dL  0.55*  0.48*  0.47*  0.46*  0.49*  0.67  0.68   CALCIUM mg/dL  8.8  8.6  8.6  7.9*  8.3*  9.0  8.8   GLUCOSE mg/dL  115*  128*  105*  99  147*  98  118*     Microbiology Results (last 10 days)     Procedure Component Value - Date/Time    Blood Culture - Blood, Hand, Right [[id number]] Collected:  12/11/18 1029    Lab Status:  Preliminary result Specimen:  Blood from Hand, Right Updated:  12/13/18 1046     Blood Culture No growth at 2 days    Blood Culture - Blood, Arm, Left [[id number]] Collected:  12/11/18 1011    Lab Status:  Preliminary  "result Specimen:  Blood from Arm, Left Updated:  12/13/18 1046     Blood Culture No growth at 2 days    Anaerobic Culture - Tissue, Spine, Lumbar [[id number]] Collected:  12/09/18 2127    Lab Status:  Preliminary result Specimen:  Tissue from Spine, Lumbar Updated:  12/12/18 1025     Culture No anaerobes isolated at 3 days    Tissue / Bone Culture - Tissue, Spine, Lumbar [[id number]]  (Abnormal) Collected:  12/09/18 2127    Lab Status:  Edited Result - FINAL Specimen:  Tissue from Spine, Lumbar Updated:  12/11/18 0705     Tissue Culture Light growth (2+) Staphylococcus aureus, MRSA     Comment:   Methicillin resistant Staphylococcus aureus, Patient may be an isolation risk.        Gram Stain Moderate (3+) WBCs seen      Rare (1+) Gram positive cocci    Narrative:       Refer to previous wound culture collected on 12/9/2018 2125 for MICS    AFB Culture - Tissue, Spine, Lumbar [[id number]] Collected:  12/09/18 2127    Lab Status:  Preliminary result Specimen:  Tissue from Spine, Lumbar Updated:  12/10/18 1216     AFB Stain No acid fast bacilli seen on direct smear    Wound Culture - Wound, Spine, Lumbar [[id number]]  (Abnormal) Collected:  12/09/18 2126    Lab Status:  Final result Specimen:  Wound from Spine, Lumbar Updated:  12/11/18 0704     Wound Culture Heavy growth (4+) Staphylococcus aureus, MRSA     Comment:   Methicillin resistant Staphylococcus aureus, Patient may be an isolation risk.        Gram Stain Moderate (3+) WBCs seen      Few (2+) Gram positive cocci    Narrative:       Refer to previous wound culture collected on 12/9/2018 2125 for MICS    Wound Culture - Wound, Spine, Lumbar [[id number]]  (Abnormal)  (Susceptibility) Collected:  12/09/18 2125    Lab Status:  Final result Specimen:  Wound from Spine, Lumbar Updated:  12/11/18 0703     Wound Culture Moderate growth (3+) Staphylococcus aureus, MRSA     Comment: D test is positive. Isolate exhibits \"inducible\" resistance to Clindamycin.    Methicillin " "resistant Staphylococcus aureus, Patient may be an isolation risk.        Gram Stain Moderate (3+) WBCs seen      Few (2+) Gram positive cocci    Susceptibility      Staphylococcus aureus, MRSA     ZAINAB     Clindamycin Resistant     Erythromycin Resistant     Oxacillin Resistant     Penicillin G Resistant     Rifampin Susceptible     Tetracycline Susceptible     Trimethoprim + Sulfamethoxazole Susceptible     Vancomycin Susceptible                    Blood Culture - Blood, Arm, Left [[id number]]  (Abnormal) Collected:  12/08/18 0243    Lab Status:  Edited Result - FINAL Specimen:  Blood from Arm, Left Updated:  12/10/18 0707     Blood Culture Staphylococcus aureus, MRSA     Comment:   Consider infectious disease consult to rule out distant focus of infection.  Methicillin resistant Staphylococcus aureus, Patient may be an isolation risk.        Isolated from Aerobic and Anaerobic Bottles     Gram Stain Anaerobic Bottle Gram positive cocci in clusters      Aerobic Bottle Gram positive cocci in clusters    Narrative:       Refer to previous blood culture collected on 12/8/2018 0208 for ZAINAB's.    Blood Culture ID, PCR - Blood, Arm, Left [[id number]]  (Abnormal) Collected:  12/08/18 0243    Lab Status:  Final result Specimen:  Blood from Arm, Left Updated:  12/09/18 1707     BCID, PCR Staphylococcus aureus. mecA (methicillin resistance gene) detected. Identification by BCID PCR.    Blood Culture - Blood, Arm, Left [[id number]]  (Abnormal)  (Susceptibility) Collected:  12/08/18 0208    Lab Status:  Edited Result - FINAL Specimen:  Blood from Arm, Left Updated:  12/10/18 0706     Blood Culture Staphylococcus aureus, MRSA     Comment:   Consider infectious disease consult to rule out distant focus of infection.  D test is positive. Isolate exhibits \"inducible\" resistance to Clindamycin.    Methicillin resistant Staphylococcus aureus, Patient may be an isolation risk.  Corrected result. Previously Reported Organism Changed. " Previous result was Staphylococcus aureus on 12/10/2018 at 0650 EST.        Isolated from Aerobic and Anaerobic Bottles     Gram Stain Anaerobic Bottle Gram positive cocci in clusters      Aerobic Bottle Gram positive cocci in clusters    Narrative:       Although Staphylococcus aureus is sensitive to Oxacillin, it possesses the mecA gene, therefore it is an MRSA and should be considered resistant to all beta-lactam antibiotics.    Susceptibility      Staphylococcus aureus, MRSA     ZAINAB     Clindamycin Resistant     Erythromycin Resistant     Oxacillin Susceptible     Penicillin G Resistant     Rifampin Susceptible     Tetracycline Susceptible     Trimethoprim + Sulfamethoxazole Susceptible     Vancomycin Susceptible                            Assessment:    Spinal epidural abscess  MSSA sepsis with bacteremia - staph aureus has mecA gene  Subjective IV drug abuse with significant skin lesions in the needle tracks and associated phlebitis    Recommendation:  · Continue vancomycin adjusted to achieve a trough level of 20.  · Follow up on repeat blood cultures.    · Would need 6 weeks of IV antibiotics in the last negative blood culture or final surgical intervention - to be decided by neurosurgery service -  which ever is the latest.    Fabian Key MD  12/14/2018  10:40 AM    Much of this encounter note is an electronic transcription/translation of spoken language to printed text. The electronic translation of spoken language may permit erroneous, or at times, nonsensical words or phrases to be inadvertently transcribed; Although I have reviewed the note for such errors, some may still exist

## 2018-12-14 NOTE — PROGRESS NOTES
Continued Stay Note  [facility]     Patient Name: Silvia Velazquez  MRN: [id number]  Today's Date: 12/14/2018    Admit Date: 12/9/2018    Discharge Plan     Row Name 12/14/18 1133       Plan    Plan  TBD    Plan Comments  Call placed to [facility] with [facility].  Left  for call back to check on eval progress.        Discharge Codes    No documentation.             Yolanda Altman RN

## 2018-12-14 NOTE — CONSULTS
Adult Nutrition  Assessment/PES    Patient Name:  Silvia Velazquez  YOB: 1975  MRN: [id number]  Admit Date:  12/9/2018    Assessment Date:  12/14/2018    Comments:  Nutrition assessment ordered per APRN.  Previous RD saw and assessed patient on Tuesday 12/11.  25% x 3 meals per chart PO data.    Patient reports fair-poor appetite, but says this is probably d/t new dx hep A causing patient to have severe abdominal pain constantly.    Discussed oral nutrition supplements.  Requests [employer] Instant Breakfast and Boost Plus on every tray.  Will add orders.    Will continue to follow.    Reason for Assessment     Row Name 12/14/18 1309          Reason for Assessment    Reason For Assessment  nurse/nurse practitioner consult per APRN consult - sepsis, low albumin         Nutrition/Diet History     Row Name 12/14/18 1310          Nutrition/Diet History    Typical Food/Fluid Intake  patient reports fair appetite, says not doing so well d/t abdominal pain constantly (says from hep A)     Supplemental Drinks/Foods/Additives  wants Boost Plus & CIB on every tray         Anthropometrics     Row Name 12/14/18 1310          Admit Weight    Admit Weight  -- 182# 12/12        Body Mass Index (BMI)    BMI Assessment  BMI 25-29.9: overweight         Labs/Tests/Procedures/Meds     Row Name 12/14/18 1311          Labs/Procedures/Meds    Lab Results Reviewed  reviewed, pertinent     Lab Results Comments  Gluc, Na+, Creat, Alb, WBC, Hgb, Hct        Diagnostic Tests/Procedures    Diagnostic Test/Procedure Reviewed  reviewed, pertinent        Medications    Pertinent Medications Reviewed  reviewed, pertinent     Pertinent Medications Comments  colace, MagOx, methadone, KCl, senokot, vanc         Physical Findings     Row Name 12/14/18 1312          Physical Findings    Overall Physical Appearance  overweight     Skin  other (see comments) back incision, B=18           Nutrition Prescription Ordered     Row Name 12/14/18  1312          Nutrition Prescription PO    Current PO Diet  Regular         Evaluation of Received Nutrient/Fluid Intake     Row Name 12/14/18 1312          PO Evaluation    Number of Meals  3     % PO Intake  25               Problem/Interventions:            Intervention Goal     Row Name 12/14/18 1313          Intervention Goal    General  Maintain nutrition;Reduce/improve symptoms;Disease management/therapy;Meet nutritional needs for age/condition     PO  Tolerate PO;Increase intake;PO intake (%)     PO Intake %  75 %     Weight  No significant weight loss         Nutrition Intervention     Row Name 12/14/18 1313          Nutrition Intervention    RD/Tech Action  Follow Tx progress;Care plan reviewd;Encourage intake;Recommend/ordered     Recommended/Ordered  Supplement         Nutrition Prescription     Row Name 12/14/18 1313          Nutrition Prescription PO    PO Prescription  Begin/change supplement     Supplement  Boost Plus;[place] Breakfast Essentials     Supplement Frequency  3 times a day     New PO Prescription Ordered?  Yes         Education/Evaluation     Row Name 12/14/18 1313          Education    Education  Will Instruct as appropriate        Monitor/Evaluation    Monitor  Per protocol;PO intake;Supplement intake;Pertinent labs;Weight;Skin status;Symptoms           Electronically signed by:  Bria Antunez RD  12/14/18 1:14 PM

## 2018-12-14 NOTE — PLAN OF CARE
Problem: Patient Care Overview  Goal: Plan of Care Review  Outcome: Ongoing (interventions implemented as appropriate)   12/14/18 0322   Coping/Psychosocial   Plan of Care Reviewed With patient   Plan of Care Review   Progress no change   OTHER   Outcome Summary Pt c/o constant hip, back and abd pain, PRN pain meds, scheduled pain meds given and non pharmacological pain measures provided. Pain remains uncontrolled. VSS. Colace given. Refused to walk in the donato.  IV abx given. Will cont to monitor.     Goal: Individualization and Mutuality  Outcome: Ongoing (interventions implemented as appropriate)    Goal: Discharge Needs Assessment  Outcome: Ongoing (interventions implemented as appropriate)    Goal: Interprofessional Rounds/Family Conf  Outcome: Ongoing (interventions implemented as appropriate)      Problem: Skin Injury Risk (Adult)  Goal: Identify Related Risk Factors and Signs and Symptoms  Outcome: Outcome(s) achieved Date Met: 12/14/18    Goal: Skin Health and Integrity  Outcome: Ongoing (interventions implemented as appropriate)      Problem: Fall Risk (Adult)  Goal: Identify Related Risk Factors and Signs and Symptoms  Outcome: Outcome(s) achieved Date Met: 12/14/18    Goal: Absence of Fall  Outcome: Ongoing (interventions implemented as appropriate)      Problem: Pain, Acute (Adult)  Goal: Identify Related Risk Factors and Signs and Symptoms  Outcome: Outcome(s) achieved Date Met: 12/14/18    Goal: Acceptable Pain Control/Comfort Level  Outcome: Ongoing (interventions implemented as appropriate)      Problem: Skin and Soft Tissue Infection (Adult)  Goal: Signs and Symptoms of Listed Potential Problems Will be Absent, Minimized or Managed (Skin and Soft Tissue Infection)  Outcome: Ongoing (interventions implemented as appropriate)

## 2018-12-15 LAB
ALBUMIN SERPL-MCNC: 2.7 G/DL (ref 3.5–5.2)
ALBUMIN/GLOB SERPL: 0.6 G/DL
ALP SERPL-CCNC: 86 U/L (ref 39–117)
ALT SERPL W P-5'-P-CCNC: 18 U/L (ref 1–33)
ANION GAP SERPL CALCULATED.3IONS-SCNC: 7.8 MMOL/L
AST SERPL-CCNC: 25 U/L (ref 1–32)
BILIRUB SERPL-MCNC: 0.4 MG/DL (ref 0.1–1.2)
BUN BLD-MCNC: 9 MG/DL (ref 6–20)
BUN/CREAT SERPL: 17 (ref 7–25)
CALCIUM SPEC-SCNC: 8.5 MG/DL (ref 8.6–10.5)
CHLORIDE SERPL-SCNC: 93 MMOL/L (ref 98–107)
CO2 SERPL-SCNC: 29.2 MMOL/L (ref 22–29)
CREAT BLD-MCNC: 0.53 MG/DL (ref 0.57–1)
DEPRECATED RDW RBC AUTO: 51.8 FL (ref 37–54)
ERYTHROCYTE [DISTWIDTH] IN BLOOD BY AUTOMATED COUNT: 16.6 % (ref 11.7–13)
GFR SERPL CREATININE-BSD FRML MDRD: 127 ML/MIN/1.73
GLOBULIN UR ELPH-MCNC: 4.8 GM/DL
GLUCOSE BLD-MCNC: 129 MG/DL (ref 65–99)
HCT VFR BLD AUTO: 29.9 % (ref 35.6–45.5)
HGB BLD-MCNC: 9.8 G/DL (ref 11.9–15.5)
MAGNESIUM SERPL-MCNC: 1.7 MG/DL (ref 1.6–2.6)
MCH RBC QN AUTO: 27.9 PG (ref 26.9–32)
MCHC RBC AUTO-ENTMCNC: 32.8 G/DL (ref 32.4–36.3)
MCV RBC AUTO: 85.2 FL (ref 80.5–98.2)
PHOSPHATE SERPL-MCNC: 3.3 MG/DL (ref 2.5–4.5)
PLATELET # BLD AUTO: 381 10*3/MM3 (ref 140–500)
PMV BLD AUTO: 10.8 FL (ref 6–12)
POTASSIUM BLD-SCNC: 3.9 MMOL/L (ref 3.5–5.2)
PROT SERPL-MCNC: 7.5 G/DL (ref 6–8.5)
RBC # BLD AUTO: 3.51 10*6/MM3 (ref 3.9–5.2)
SODIUM BLD-SCNC: 130 MMOL/L (ref 136–145)
WBC NRBC COR # BLD: 14.1 10*3/MM3 (ref 4.5–10.7)

## 2018-12-15 PROCEDURE — 25010000002 VANCOMYCIN 10 G RECONSTITUTED SOLUTION: Performed by: INTERNAL MEDICINE

## 2018-12-15 PROCEDURE — 84100 ASSAY OF PHOSPHORUS: CPT | Performed by: INTERNAL MEDICINE

## 2018-12-15 PROCEDURE — 25010000002 HYDROMORPHONE PER 4 MG: Performed by: NURSE PRACTITIONER

## 2018-12-15 PROCEDURE — 25010000002 ONDANSETRON PER 1 MG: Performed by: NEUROLOGICAL SURGERY

## 2018-12-15 PROCEDURE — 83735 ASSAY OF MAGNESIUM: CPT | Performed by: INTERNAL MEDICINE

## 2018-12-15 PROCEDURE — 85027 COMPLETE CBC AUTOMATED: CPT | Performed by: INTERNAL MEDICINE

## 2018-12-15 PROCEDURE — 97110 THERAPEUTIC EXERCISES: CPT

## 2018-12-15 PROCEDURE — 80053 COMPREHEN METABOLIC PANEL: CPT | Performed by: INTERNAL MEDICINE

## 2018-12-15 RX ORDER — FAMOTIDINE 20 MG/1
20 TABLET, FILM COATED ORAL 2 TIMES DAILY
Status: DISCONTINUED | OUTPATIENT
Start: 2018-12-15 | End: 2018-12-21 | Stop reason: HOSPADM

## 2018-12-15 RX ORDER — ALUMINA, MAGNESIA, AND SIMETHICONE 2400; 2400; 240 MG/30ML; MG/30ML; MG/30ML
30 SUSPENSION ORAL EVERY 4 HOURS PRN
Status: DISCONTINUED | OUTPATIENT
Start: 2018-12-15 | End: 2018-12-21 | Stop reason: HOSPADM

## 2018-12-15 RX ADMIN — VANCOMYCIN HYDROCHLORIDE 1500 MG: 10 INJECTION, POWDER, LYOPHILIZED, FOR SOLUTION INTRAVENOUS at 08:09

## 2018-12-15 RX ADMIN — NICOTINE 1 PATCH: 21 PATCH, EXTENDED RELEASE TRANSDERMAL at 08:09

## 2018-12-15 RX ADMIN — ONDANSETRON 4 MG: 2 INJECTION INTRAMUSCULAR; INTRAVENOUS at 11:07

## 2018-12-15 RX ADMIN — OXYCODONE HYDROCHLORIDE AND ACETAMINOPHEN 1 TABLET: 10; 325 TABLET ORAL at 09:06

## 2018-12-15 RX ADMIN — FAMOTIDINE 20 MG: 20 TABLET, FILM COATED ORAL at 08:08

## 2018-12-15 RX ADMIN — HYDROMORPHONE HYDROCHLORIDE 0.5 MG: 1 INJECTION, SOLUTION INTRAMUSCULAR; INTRAVENOUS; SUBCUTANEOUS at 22:23

## 2018-12-15 RX ADMIN — OXYCODONE HYDROCHLORIDE AND ACETAMINOPHEN 1 TABLET: 10; 325 TABLET ORAL at 20:05

## 2018-12-15 RX ADMIN — DOCUSATE SODIUM 100 MG: 100 CAPSULE, LIQUID FILLED ORAL at 08:08

## 2018-12-15 RX ADMIN — OXYCODONE HYDROCHLORIDE 10 MG: 5 TABLET ORAL at 18:10

## 2018-12-15 RX ADMIN — OXYCODONE HYDROCHLORIDE AND ACETAMINOPHEN 1 TABLET: 10; 325 TABLET ORAL at 04:21

## 2018-12-15 RX ADMIN — ALUMINUM HYDROXIDE, MAGNESIUM HYDROXIDE, AND DIMETHICONE 30 ML: 400; 400; 40 SUSPENSION ORAL at 04:21

## 2018-12-15 RX ADMIN — HYDROMORPHONE HYDROCHLORIDE 0.5 MG: 1 INJECTION, SOLUTION INTRAMUSCULAR; INTRAVENOUS; SUBCUTANEOUS at 07:29

## 2018-12-15 RX ADMIN — POTASSIUM CHLORIDE 40 MEQ: 750 CAPSULE, EXTENDED RELEASE ORAL at 08:09

## 2018-12-15 RX ADMIN — OXYCODONE HYDROCHLORIDE AND ACETAMINOPHEN 1 TABLET: 10; 325 TABLET ORAL at 15:43

## 2018-12-15 RX ADMIN — ALUMINUM HYDROXIDE, MAGNESIUM HYDROXIDE, AND DIMETHICONE 30 ML: 400; 400; 40 SUSPENSION ORAL at 18:10

## 2018-12-15 RX ADMIN — HYDROMORPHONE HYDROCHLORIDE 0.5 MG: 1 INJECTION, SOLUTION INTRAMUSCULAR; INTRAVENOUS; SUBCUTANEOUS at 10:43

## 2018-12-15 RX ADMIN — AMLODIPINE BESYLATE 10 MG: 10 TABLET ORAL at 08:09

## 2018-12-15 RX ADMIN — VANCOMYCIN HYDROCHLORIDE 1500 MG: 10 INJECTION, POWDER, LYOPHILIZED, FOR SOLUTION INTRAVENOUS at 18:16

## 2018-12-15 RX ADMIN — METHADONE HYDROCHLORIDE 30 MG: 10 TABLET ORAL at 18:10

## 2018-12-15 RX ADMIN — HYDROMORPHONE HYDROCHLORIDE 0.5 MG: 1 INJECTION, SOLUTION INTRAMUSCULAR; INTRAVENOUS; SUBCUTANEOUS at 01:40

## 2018-12-15 RX ADMIN — OXYCODONE HYDROCHLORIDE 10 MG: 5 TABLET ORAL at 06:45

## 2018-12-15 RX ADMIN — METHADONE HYDROCHLORIDE 30 MG: 10 TABLET ORAL at 01:06

## 2018-12-15 RX ADMIN — Medication 400 MG: at 21:07

## 2018-12-15 RX ADMIN — HYDROMORPHONE HYDROCHLORIDE 0.5 MG: 1 INJECTION, SOLUTION INTRAMUSCULAR; INTRAVENOUS; SUBCUTANEOUS at 15:43

## 2018-12-15 RX ADMIN — METHOCARBAMOL TABLETS 750 MG: 750 TABLET, COATED ORAL at 06:45

## 2018-12-15 RX ADMIN — Medication 400 MG: at 08:09

## 2018-12-15 RX ADMIN — HYDROMORPHONE HYDROCHLORIDE 0.5 MG: 1 INJECTION, SOLUTION INTRAMUSCULAR; INTRAVENOUS; SUBCUTANEOUS at 19:21

## 2018-12-15 RX ADMIN — METHOCARBAMOL TABLETS 750 MG: 750 TABLET, COATED ORAL at 01:00

## 2018-12-15 RX ADMIN — SENNOSIDES AND DOCUSATE SODIUM 2 TABLET: 8.6; 5 TABLET ORAL at 21:07

## 2018-12-15 RX ADMIN — METHOCARBAMOL TABLETS 750 MG: 750 TABLET, COATED ORAL at 18:10

## 2018-12-15 RX ADMIN — VANCOMYCIN HYDROCHLORIDE 1500 MG: 10 INJECTION, POWDER, LYOPHILIZED, FOR SOLUTION INTRAVENOUS at 01:06

## 2018-12-15 RX ADMIN — OXYCODONE HYDROCHLORIDE 10 MG: 5 TABLET ORAL at 13:59

## 2018-12-15 RX ADMIN — HYDROMORPHONE HYDROCHLORIDE 0.5 MG: 1 INJECTION, SOLUTION INTRAMUSCULAR; INTRAVENOUS; SUBCUTANEOUS at 04:21

## 2018-12-15 RX ADMIN — METHADONE HYDROCHLORIDE 30 MG: 10 TABLET ORAL at 10:43

## 2018-12-15 RX ADMIN — FAMOTIDINE 20 MG: 20 TABLET, FILM COATED ORAL at 21:07

## 2018-12-15 RX ADMIN — OXYCODONE HYDROCHLORIDE 10 MG: 5 TABLET ORAL at 01:05

## 2018-12-15 RX ADMIN — METHOCARBAMOL TABLETS 750 MG: 750 TABLET, COATED ORAL at 13:59

## 2018-12-15 NOTE — NURSING NOTE
"Access Center follow-up.  Upon entering room patient is awake, RIB, significant other sleeping on recliner.      Patient appears bright and hopeful. 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  She stated her last use was the day of admission and that this experience has opened her eyes to the importance of sobriety.  She reports a history of being sober for 18 months, but one of her sons was \"brutally murdered\" and this caused her to relapse.      She reports a sober support system in her family, children, and boyfriend.  She is interested in REINA treatment options.  She apparently will require long-term IV antibiotics after discharged.  Discussed with her that there are no inpatient REINA treatment options with IV antibiotics.  She is understanding of this and would like to receive outpatient REINA treatment options for when the time is appropriate. ]    She also expressed wanting information on passport insurance.  Informed her to check with [facility] and her RN regarding this information.  I informed RN of this request.      List of REINA treatment options given to patient.    Access will sign off, please call if any questions.      "

## 2018-12-15 NOTE — PLAN OF CARE
Problem: Patient Care Overview  Goal: Plan of Care Review  Outcome: Ongoing (interventions implemented as appropriate)   12/15/18 5536   Coping/Psychosocial   Plan of Care Reviewed With patient   Plan of Care Review   Progress improving   OTHER   Outcome Summary Pt increased with amb safety and activity tolerance but required Min A with legs to get back into bed

## 2018-12-15 NOTE — THERAPY TREATMENT NOTE
Acute Care - Physical Therapy Treatment Note  [facility]     Patient Name: Silvia Velazquez  : 1975  MRN: [id number]  Today's Date: 12/15/2018  Onset of Illness/Injury or Date of Surgery: 18          Admit Date: 2018    Visit Dx:    ICD-10-CM ICD-9-CM   1. Spinal epidural abscess G06.1 324.1   2. Bacteremia due to methicillin resistant Staphylococcus aureus R78.81 790.7     041.12   3. Abscess of paraspinal muscles, lumbar M62.89 728.89   4. Generalized weakness R53.1 780.79     Patient Active Problem List   Diagnosis   • Spinal epidural abscess       Therapy Treatment    Rehabilitation Treatment Summary     Row Name 12/15/18 1400             Treatment Time/Intention    Discipline  physical therapy assistant  -CW      Document Type  therapy note (daily note)  -CW      Subjective Information  complains of;weakness;fatigue;pain  -CW      Mode of Treatment  physical therapy  -CW      Therapy Frequency (PT Clinical Impression)  daily  -CW      Patient Effort  good  -CW      Existing Precautions/Restrictions  fall  -CW      Recorded by [CW] Brent Pratt P, PTA 12/15/18 1425      Row Name 12/15/18 1400             Vital Signs    O2 Delivery Pre Treatment  room air  -CW      Recorded by [CW] Brent Pratt P, PTA 12/15/18 1425      Row Name 12/15/18 1400             Cognitive Assessment/Intervention- PT/OT    Orientation Status (Cognition)  oriented x 3  -CW      Follows Commands (Cognition)  WFL  -CW      Personal Safety Interventions  fall prevention program maintained;gait belt;muscle strengthening facilitated;nonskid shoes/slippers when out of bed  -CW      Recorded by [CW] Brent Pratt P, PTA 12/15/18 1425      Row Name 12/15/18 1400             Bed Mobility Assessment/Treatment    Bed Mobility Assessment/Treatment  supine-sit;sit-supine  -CW      Supine-Sit [place] (Bed Mobility)  supervision  -CW      Sit-Supine [place] (Bed Mobility)  minimum assist (75% patient effort)   -CW      Recorded by [CW] Brent Pratt, PTA 12/15/18 1425      Row Name 12/15/18 1400             Transfer Assessment/Treatment    Transfer Assessment/Treatment  toilet transfer;sit-stand transfer;stand-sit transfer  -CW      Recorded by [CW] Brent Pratt, PTA 12/15/18 1425      Row Name 12/15/18 1400             Sit-Stand Transfer    Sit-Stand [place] (Transfers)  contact guard;verbal cues  -CW      Assistive Device (Sit-Stand Transfers)  walker, front-wheeled  -CW      Recorded by [CW] Brent Pratt, PTA 12/15/18 1425      Row Name 12/15/18 1400             Stand-Sit Transfer    Stand-Sit [place] (Transfers)  contact guard;verbal cues  -CW      Assistive Device (Stand-Sit Transfers)  walker, front-wheeled  -CW      Recorded by [CW] Brent Pratt, PTA 12/15/18 1425      Row Name 12/15/18 1400             Toilet Transfer    Type (Toilet Transfer)  sit-stand;stand-sit  -CW      [place] Level (Toilet Transfer)  contact guard;verbal cues  -CW      Assistive Device (Toilet Transfer)  commode, bedside without drop arms  -CW      Recorded by [CW] Brent Pratt, PTA 12/15/18 1425      Row Name 12/15/18 1400             Gait/Stairs Assessment/Training    [place] Level (Gait)  contact guard  -CW      Assistive Device (Gait)  walker, front-wheeled  -CW      Distance in Feet (Gait)  90  -CW      Pattern (Gait)  step-through  -CW      Deviations/Abnormal Patterns (Gait)  yamilet decreased;antalgic;right sided deviations  -CW      Bilateral Gait Deviations  forward flexed posture  -CW      Recorded by [CW] Brent Pratt, PTA 12/15/18 1425      Row Name 12/15/18 1400             Positioning and Restraints    Pre-Treatment Position  in bed  -CW      Post Treatment Position  bed  -CW      In Bed  notified nsg;supine;call light within reach;encouraged to call for assist  -CW      Recorded by [CW] Brent Pratt, PTA 12/15/18 1425      Row Name 12/15/18 1400              Pain Assessment    Additional Documentation  Pain Scale: Numbers Pre/Post-Treatment (Group)  -CW      Recorded by [CW] Brent Pratt, PTA 12/15/18 1425      Row Name 12/15/18 1400             Pain Scale: Numbers Pre/Post-Treatment    Pain Scale: Numbers, Pretreatment  8/10  -CW      Pain Scale: Numbers, Post-Treatment  8/10  -CW      Pain Location - Side  Right  -CW      Pain Location - Orientation  lower  -CW      Pain Location  hip  -CW      Pain Intervention(s)  Repositioned;Ambulation/increased activity  -CW      Recorded by [CW] Brent Pratt, PTA 12/15/18 1425      Row Name                Wound 12/09/18 2154 Bilateral back incision    Wound - Properties Group Date first assessed: 12/09/18 [KK] Time first assessed: 2154 [KK] Side: Bilateral [KK] Location: back [KK] Type: incision [KK] Recorded by:  [KK] Bárbara Huerta RN 12/09/18 2154    Row Name 12/15/18 1400             Outcome Summary/Treatment Plan (PT)    Anticipated Discharge Disposition (PT)  skilled nursing facility  -CW      Recorded by [CW] Brent Pratt, PTA 12/15/18 1425        User Key  (r) = Recorded By, (t) = Taken By, (c) = Cosigned By    Initials Name Effective Dates Discipline    KK Bárbara Huerta RN 09/30/16 -  Nurse    CW Brent Pratt, PTA 03/07/18 -  PT          Wound 12/09/18 2154 Bilateral back incision (Active)   Dressing Appearance other (see comments) 12/15/2018  8:00 AM   Closure Liquid skin adhesive 12/15/2018  8:00 AM   Base clean;pink 12/15/2018  8:00 AM   Drainage Amount none 12/15/2018  8:00 AM   Dressing Care, Wound other (see comments) 12/15/2018  8:00 AM           Physical Therapy Education     Title: PT OT SLP Therapies (Done)     Topic: Physical Therapy (Done)     Point: Mobility training (Done)     Learning Progress Summary           Patient Acceptance, E,TB, VU,DU by CW at 12/15/2018  2:25 PM    Acceptance, E, VU,NR by  at 12/14/2018  9:47 AM    Acceptance, E,TB,D, VU,NR by  at 12/11/2018   4:22 PM    Acceptance, E,TB,D, VU,NR by  at 12/10/2018  4:29 PM                   Point: Home exercise program (Done)     Learning Progress Summary           Patient Acceptance, E,TB, VU,DU by  at 12/15/2018  2:25 PM    Acceptance, E, VU,NR by  at 12/14/2018  9:47 AM    Acceptance, E,TB,D, VU,NR by  at 12/10/2018  4:29 PM                   Point: Body mechanics (Done)     Learning Progress Summary           Patient Acceptance, E,TB, VU,DU by  at 12/15/2018  2:25 PM    Acceptance, E, VU,NR by  at 12/14/2018  9:47 AM    Acceptance, E,TB,D, VU,NR by  at 12/11/2018  4:22 PM    Acceptance, E,TB,D, VU,NR by  at 12/10/2018  4:29 PM                   Point: Precautions (Done)     Learning Progress Summary           Patient Acceptance, E,TB, VU,DU by  at 12/15/2018  2:25 PM    Acceptance, E, VU,NR by  at 12/14/2018  9:47 AM    Acceptance, E,TB,D, VU,NR by  at 12/11/2018  4:22 PM    Acceptance, E,TB,D, VU,NR by  at 12/10/2018  4:29 PM                               User Key     Initials Effective Dates Name Provider Type Discipline     04/03/18 -  Camille Emanuel, PT Physical Therapist PT     04/03/18 -  Daisy Fowler, PT Physical Therapist PT     03/07/18 -  Brent Pratt, PTA Physical Therapy Assistant PT                PT Recommendation and Plan  Anticipated Discharge Disposition (PT): skilled nursing facility  Therapy Frequency (PT Clinical Impression): daily  Outcome Summary/Treatment Plan (PT)  Anticipated Discharge Disposition (PT): skilled nursing facility  Plan of Care Reviewed With: patient  Progress: improving  Outcome Summary: Pt increased with amb safety and activity otlerance but required Min A with legs to get back into bed  Outcome Measures     Row Name 12/15/18 1400 12/14/18 0900          How much help from another person do you currently need...    Turning from your back to your side while in flat bed without using bedrails?  3  -  3  -     Moving from lying on back  to sitting on the side of a flat bed without bedrails?  3  -CW  3  -LH     Moving to and from a bed to a chair (including a wheelchair)?  3  -CW  3  -LH     Standing up from a chair using your arms (e.g., wheelchair, bedside chair)?  3  -CW  3  -LH     Climbing 3-5 steps with a railing?  2  -CW  3  -LH     To walk in hospital room?  3  -CW  3  -LH     AM-PAC 6 Clicks Score  17  -CW  18  -LH        Functional Assessment    Outcome Measure Options  AM-PAC 6 Clicks Basic Mobility (PT)  -CW  AM-PAC 6 Clicks Basic Mobility (PT)  -LH       User Key  (r) = Recorded By, (t) = Taken By, (c) = Cosigned By    Initials Name Provider Type     Daisy Fowler, PT Physical Therapist    Brent Nicole PTA Physical Therapy Assistant         Time Calculation:   PT Charges     Row Name 12/15/18 1427             Time Calculation    Start Time  1410  -CW      Stop Time  1427  -CW      Time Calculation (min)  17 min  -CW      PT Received On  12/15/18  -CW      PT - Next Appointment  12/17/18  -CW        User Key  (r) = Recorded By, (t) = Taken By, (c) = Cosigned By    Initials Name Provider Type    Brent Nicole PTA Physical Therapy Assistant        Therapy Suggested Charges     Code   Minutes Charges    None           Therapy Charges for Today     Code Description Service Date Service Provider Modifiers Qty    40293461781 HC PT THER PROC EA 15 MIN 12/15/2018 Brent Pratt PTA GP 1    36585777260 HC PT THER SUPP EA 15 MIN 12/15/2018 Brent Pratt PTA GP 1          PT G-Codes  Outcome Measure Options: AM-PAC 6 Clicks Basic Mobility (PT)  AM-PAC 6 Clicks Score: 17    Brent Pratt PTA  12/15/2018

## 2018-12-15 NOTE — PLAN OF CARE
"Problem: Patient Care Overview  Goal: Plan of Care Review  Outcome: Ongoing (interventions implemented as appropriate)   12/15/18 1625   Coping/Psychosocial   Plan of Care Reviewed With patient   Plan of Care Review   Progress improving   OTHER   Outcome Summary Patient alert and verbal with needs. Frequently seeks out staff. Frequently requesting pain medications and requests her dilaudid be given in a port close to IV insertion site because she can \"feel it\" better. Patient was able to ambulate with PT down hallway with a walker. Patient requested a \"wick\". Upon further investigation she wanted a purewick cath. Explained there was no idication for one and we do not use them on patients who are cont and able to get up. Patient was angry but did get up to bedside commode with one assist. Awaiting rehab placement.        Problem: Skin Injury Risk (Adult)  Goal: Skin Health and Integrity  Outcome: Ongoing (interventions implemented as appropriate)   12/15/18 1625   Skin Injury Risk (Adult)   Skin Health and Integrity making progress toward outcome       Problem: Fall Risk (Adult)  Goal: Absence of Fall  Outcome: Ongoing (interventions implemented as appropriate)   12/15/18 1625   Fall Risk (Adult)   Absence of Fall making progress toward outcome       Problem: Pain, Acute (Adult)  Goal: Acceptable Pain Control/Comfort Level  Outcome: Ongoing (interventions implemented as appropriate)   12/15/18 1625   Pain, Acute (Adult)   Acceptable Pain Control/Comfort Level making progress toward outcome       Problem: Skin and Soft Tissue Infection (Adult)  Goal: Signs and Symptoms of Listed Potential Problems Will be Absent, Minimized or Managed (Skin and Soft Tissue Infection)  Outcome: Ongoing (interventions implemented as appropriate)   12/15/18 1625   Goal/Outcome Evaluation   Problems Assessed (Skin and Soft Tissue Infection) all   Problems Present (Skin/Soft Tissue Inf) pain         "

## 2018-12-16 LAB
ALBUMIN SERPL-MCNC: 2.5 G/DL (ref 3.5–5.2)
ALBUMIN/GLOB SERPL: 0.5 G/DL
ALP SERPL-CCNC: 84 U/L (ref 39–117)
ALT SERPL W P-5'-P-CCNC: 20 U/L (ref 1–33)
ANION GAP SERPL CALCULATED.3IONS-SCNC: 6.9 MMOL/L
AST SERPL-CCNC: 24 U/L (ref 1–32)
BACTERIA SPEC AEROBE CULT: NORMAL
BACTERIA SPEC AEROBE CULT: NORMAL
BILIRUB SERPL-MCNC: 0.4 MG/DL (ref 0.1–1.2)
BUN BLD-MCNC: 9 MG/DL (ref 6–20)
BUN/CREAT SERPL: 18.8 (ref 7–25)
CALCIUM SPEC-SCNC: 8 MG/DL (ref 8.6–10.5)
CHLORIDE SERPL-SCNC: 95 MMOL/L (ref 98–107)
CO2 SERPL-SCNC: 27.1 MMOL/L (ref 22–29)
CREAT BLD-MCNC: 0.48 MG/DL (ref 0.57–1)
DEPRECATED RDW RBC AUTO: 51.7 FL (ref 37–54)
ERYTHROCYTE [DISTWIDTH] IN BLOOD BY AUTOMATED COUNT: 16.6 % (ref 11.7–13)
GFR SERPL CREATININE-BSD FRML MDRD: 143 ML/MIN/1.73
GLOBULIN UR ELPH-MCNC: 4.9 GM/DL
GLUCOSE BLD-MCNC: 82 MG/DL (ref 65–99)
HCT VFR BLD AUTO: 29.4 % (ref 35.6–45.5)
HGB BLD-MCNC: 9.6 G/DL (ref 11.9–15.5)
MAGNESIUM SERPL-MCNC: 2 MG/DL (ref 1.6–2.6)
MCH RBC QN AUTO: 27.8 PG (ref 26.9–32)
MCHC RBC AUTO-ENTMCNC: 32.7 G/DL (ref 32.4–36.3)
MCV RBC AUTO: 85.2 FL (ref 80.5–98.2)
PHOSPHATE SERPL-MCNC: 3.4 MG/DL (ref 2.5–4.5)
PLATELET # BLD AUTO: 351 10*3/MM3 (ref 140–500)
PMV BLD AUTO: 10.5 FL (ref 6–12)
POTASSIUM BLD-SCNC: 4.1 MMOL/L (ref 3.5–5.2)
PROT SERPL-MCNC: 7.4 G/DL (ref 6–8.5)
RBC # BLD AUTO: 3.45 10*6/MM3 (ref 3.9–5.2)
SODIUM BLD-SCNC: 129 MMOL/L (ref 136–145)
VANCOMYCIN TROUGH SERPL-MCNC: 15.3 MCG/ML (ref 5–20)
WBC NRBC COR # BLD: 14.45 10*3/MM3 (ref 4.5–10.7)

## 2018-12-16 PROCEDURE — 25010000002 VANCOMYCIN 10 G RECONSTITUTED SOLUTION: Performed by: INTERNAL MEDICINE

## 2018-12-16 PROCEDURE — 25010000002 VANCOMYCIN: Performed by: INTERNAL MEDICINE

## 2018-12-16 PROCEDURE — 80053 COMPREHEN METABOLIC PANEL: CPT | Performed by: INTERNAL MEDICINE

## 2018-12-16 PROCEDURE — 25010000002 HYDROMORPHONE PER 4 MG: Performed by: NURSE PRACTITIONER

## 2018-12-16 PROCEDURE — 80202 ASSAY OF VANCOMYCIN: CPT | Performed by: INTERNAL MEDICINE

## 2018-12-16 PROCEDURE — 25010000002 ONDANSETRON PER 1 MG: Performed by: NEUROLOGICAL SURGERY

## 2018-12-16 PROCEDURE — 85027 COMPLETE CBC AUTOMATED: CPT | Performed by: INTERNAL MEDICINE

## 2018-12-16 PROCEDURE — 84100 ASSAY OF PHOSPHORUS: CPT | Performed by: INTERNAL MEDICINE

## 2018-12-16 PROCEDURE — 25010000002 VANCOMYCIN PER 500 MG: Performed by: INTERNAL MEDICINE

## 2018-12-16 PROCEDURE — 83735 ASSAY OF MAGNESIUM: CPT | Performed by: INTERNAL MEDICINE

## 2018-12-16 RX ORDER — VANCOMYCIN HYDROCHLORIDE 1 G/200ML
1000 INJECTION, SOLUTION INTRAVENOUS EVERY 6 HOURS
Status: DISCONTINUED | OUTPATIENT
Start: 2018-12-16 | End: 2018-12-19

## 2018-12-16 RX ADMIN — HYDROMORPHONE HYDROCHLORIDE 0.5 MG: 1 INJECTION, SOLUTION INTRAMUSCULAR; INTRAVENOUS; SUBCUTANEOUS at 01:54

## 2018-12-16 RX ADMIN — METHOCARBAMOL TABLETS 750 MG: 750 TABLET, COATED ORAL at 12:09

## 2018-12-16 RX ADMIN — HYDROMORPHONE HYDROCHLORIDE 0.5 MG: 1 INJECTION, SOLUTION INTRAMUSCULAR; INTRAVENOUS; SUBCUTANEOUS at 22:51

## 2018-12-16 RX ADMIN — HYDROMORPHONE HYDROCHLORIDE 0.5 MG: 1 INJECTION, SOLUTION INTRAMUSCULAR; INTRAVENOUS; SUBCUTANEOUS at 13:57

## 2018-12-16 RX ADMIN — Medication 400 MG: at 20:14

## 2018-12-16 RX ADMIN — METHADONE HYDROCHLORIDE 30 MG: 10 TABLET ORAL at 01:54

## 2018-12-16 RX ADMIN — OXYCODONE HYDROCHLORIDE AND ACETAMINOPHEN 1 TABLET: 10; 325 TABLET ORAL at 20:14

## 2018-12-16 RX ADMIN — SENNOSIDES AND DOCUSATE SODIUM 2 TABLET: 8.6; 5 TABLET ORAL at 20:14

## 2018-12-16 RX ADMIN — METHADONE HYDROCHLORIDE 30 MG: 10 TABLET ORAL at 17:34

## 2018-12-16 RX ADMIN — OXYCODONE HYDROCHLORIDE 10 MG: 5 TABLET ORAL at 00:12

## 2018-12-16 RX ADMIN — METHOCARBAMOL TABLETS 750 MG: 750 TABLET, COATED ORAL at 17:34

## 2018-12-16 RX ADMIN — HYDROMORPHONE HYDROCHLORIDE 0.5 MG: 1 INJECTION, SOLUTION INTRAMUSCULAR; INTRAVENOUS; SUBCUTANEOUS at 18:31

## 2018-12-16 RX ADMIN — Medication 400 MG: at 08:42

## 2018-12-16 RX ADMIN — OXYCODONE HYDROCHLORIDE 10 MG: 5 TABLET ORAL at 06:44

## 2018-12-16 RX ADMIN — DOCUSATE SODIUM 100 MG: 100 CAPSULE, LIQUID FILLED ORAL at 08:42

## 2018-12-16 RX ADMIN — OXYCODONE HYDROCHLORIDE AND ACETAMINOPHEN 1 TABLET: 10; 325 TABLET ORAL at 09:21

## 2018-12-16 RX ADMIN — OXYCODONE HYDROCHLORIDE 10 MG: 5 TABLET ORAL at 17:34

## 2018-12-16 RX ADMIN — VANCOMYCIN HYDROCHLORIDE 1500 MG: 10 INJECTION, POWDER, LYOPHILIZED, FOR SOLUTION INTRAVENOUS at 08:42

## 2018-12-16 RX ADMIN — ONDANSETRON 4 MG: 2 INJECTION INTRAMUSCULAR; INTRAVENOUS at 20:26

## 2018-12-16 RX ADMIN — OXYCODONE HYDROCHLORIDE AND ACETAMINOPHEN 1 TABLET: 10; 325 TABLET ORAL at 00:12

## 2018-12-16 RX ADMIN — OXYCODONE HYDROCHLORIDE AND ACETAMINOPHEN 1 TABLET: 10; 325 TABLET ORAL at 04:40

## 2018-12-16 RX ADMIN — VANCOMYCIN HYDROCHLORIDE 1000 MG: 1 INJECTION, SOLUTION INTRAVENOUS at 20:14

## 2018-12-16 RX ADMIN — VANCOMYCIN HYDROCHLORIDE 1500 MG: 10 INJECTION, POWDER, LYOPHILIZED, FOR SOLUTION INTRAVENOUS at 00:36

## 2018-12-16 RX ADMIN — HYDROMORPHONE HYDROCHLORIDE 0.5 MG: 1 INJECTION, SOLUTION INTRAMUSCULAR; INTRAVENOUS; SUBCUTANEOUS at 09:21

## 2018-12-16 RX ADMIN — NICOTINE 1 PATCH: 21 PATCH, EXTENDED RELEASE TRANSDERMAL at 08:57

## 2018-12-16 RX ADMIN — OXYCODONE HYDROCHLORIDE 10 MG: 5 TABLET ORAL at 12:09

## 2018-12-16 RX ADMIN — POTASSIUM CHLORIDE 40 MEQ: 750 CAPSULE, EXTENDED RELEASE ORAL at 08:43

## 2018-12-16 RX ADMIN — FAMOTIDINE 20 MG: 20 TABLET, FILM COATED ORAL at 20:15

## 2018-12-16 RX ADMIN — ONDANSETRON 4 MG: 2 INJECTION INTRAMUSCULAR; INTRAVENOUS at 08:43

## 2018-12-16 RX ADMIN — OXYCODONE HYDROCHLORIDE AND ACETAMINOPHEN 1 TABLET: 10; 325 TABLET ORAL at 14:19

## 2018-12-16 RX ADMIN — METHOCARBAMOL TABLETS 750 MG: 750 TABLET, COATED ORAL at 00:12

## 2018-12-16 RX ADMIN — AMLODIPINE BESYLATE 10 MG: 10 TABLET ORAL at 08:43

## 2018-12-16 RX ADMIN — HYDROMORPHONE HYDROCHLORIDE 0.5 MG: 1 INJECTION, SOLUTION INTRAMUSCULAR; INTRAVENOUS; SUBCUTANEOUS at 05:23

## 2018-12-16 RX ADMIN — METHADONE HYDROCHLORIDE 30 MG: 10 TABLET ORAL at 10:37

## 2018-12-16 RX ADMIN — VANCOMYCIN HYDROCHLORIDE 1000 MG: 1 INJECTION, SOLUTION INTRAVENOUS at 13:57

## 2018-12-16 RX ADMIN — FAMOTIDINE 20 MG: 20 TABLET, FILM COATED ORAL at 08:43

## 2018-12-16 RX ADMIN — METHOCARBAMOL TABLETS 750 MG: 750 TABLET, COATED ORAL at 06:44

## 2018-12-16 NOTE — PROGRESS NOTES
"Pharmacokinetic Evaluation: Vancomycin IV  Patient: Silvia Velazquez  Admission Date: 1209  MRN: [id number]  : 1975    Day of vancomycin therapy:   Consult for Dr. Key  Treating: Wound & Tissue cx (spine) MRSA+ (Spinal epidural abscess)  Goal trough: 20 mcg/mL    Current regimen: Vancomycin 1500 mg IV q8hrs    Relevant clinical data and objective history reviewed:  43 y.o. female 167.6 cm (66\") 82.6 kg (182 lb)  Body mass index is 29.38 kg/m².  Results from last 7 days   Lab Units  18   0437  12/15/18   0514  18   0820   CREATININE mg/dL  0.48*  0.53*  0.55*     Estimated Creatinine Clearance: 167 mL/min (A) (by C-G formula based on SCr of 0.48 mg/dL (L)).    Lab Results   Component Value Date    WBC 14.45 (H) 2018    WBC 14.10 (H) 12/15/2018    WBC 13.76 (H) 2018     Temp:  [98.4 °F (36.9 °C)-100.3 °F (37.9 °C)] 99 °F (37.2 °C)  Heart Rate:  [93-95] 93  Resp:  [16-20] 16  BP: (128-140)/(83-93) 128/93    Intake/Output Summary (Last 24 hours) at 2018 1212  Last data filed at 2018 0500  Gross per 24 hour   Intake 620 ml   Output --   Net 620 ml     Baseline cultures/labs/radiology:  Cultures:    Blood cx: MRSA+   Blood cx: NG x 5 days    Culture results from last 30 days   Lab  18   AFBSTAIN  No acid fast bacilli seen on direct smear   --    --    WOUNDCX   --   Heavy growth (4+) Staphylococcus aureus, MRSA*  Moderate growth (3+) Staphylococcus aureus, MRSA*   TISSUE CULTURE  Light growth (2+) Staphylococcus aureus, MRSA*   --    --    AFBCX  No AFB isolated at less than 1 week   --    --    ANACX  No anaerobes isolated at 5 days   --    --    FUNGUSCX  No fungus isolated at less than 1 week   --    --      Assessment/Plan:   PMH: Hepatitis C, Kidney stone, Lupus, Mitral valve anterior leaflet prolapse, and Seizures.     1. Vancomycin trough level this AM below target goal per Dr Key  2. Change Vancomycin to " 1000 mg IV q6hrs  3. Will follow with levels    Lab Results   Component Value Date    VANCO TROUGH 15.30 mcg/mL (prior 6th dose) 12/16/2018 @ 08:18 am     Vancomycin IV Dosing & Levels History:  Vanc 1500 mg loading dose given 12/.  Vanc 1250 mg Q12H on (12/10)-0410, 1534 (12/11)-0407,1509 (rn started early, then stopped after 10 min to allow for trough, then finished infusion after level drawn)  Vanc trough 12/11 at 1530-10.9  Vanc 1500 mg given 12/ (one dose for subtherapeutic vanc trough)--this was in addition to the 1250 mg given at 1509  Vanc 1250 mg Q8H-(12/12)-0031, 1017  12/12 1707 Vancomycin trough level 14.6 mcg/mL (~7hr level)  12/12 1858 Vanc 1500 mg IV x1 followed by 1250 mg IV q8h  12/14: Vancomycin trough at 0820 = 6.8; pt missed dose at 0236 due to loss of IV access, so this is a level drawn 14 hrs after last dose  12/14 Vanc 1500 mg q8hr  12/16 08:18 am Vanc trough= 15.30 mcg/mL    Thank you for your consult,    Laurel Sow [facility]

## 2018-12-16 NOTE — PROGRESS NOTES
"  Infectious Diseases Progress Note    Fabian Key MD     [facility]  Los: 6 days  Patient Identification:  Name: Silvia Velazquez  Age: 43 y.o.  Sex: female  :  1975  MRN: [id number]         Primary Care Physician: Provider, No Known            Subjective: Doing well and denies any specific complaints.  She does have significant discomfort in her hips and lower back after the period of inactivity but with movement and exercise she is able to carry out most of the task..    Interval History: See consultation note    Objective:    Scheduled Meds:    amLODIPine 10 mg Oral Q24H   docusate sodium 100 mg Oral Daily   famotidine 20 mg Oral BID   magnesium oxide 400 mg Oral BID   methadone 30 mg Oral Q8H   methocarbamol 750 mg Oral Q6H   nicotine 1 patch Transdermal Q24H   oxyCODONE 10 mg Oral Q6H   potassium chloride 40 mEq Oral Daily   sennosides-docusate sodium 2 tablet Oral Nightly   vancomycin 1,500 mg Intravenous Q8H     Continuous Infusions:    Pharmacy to dose vancomycin        Vital signs in last 24 hours:  Temp:  [97.7 °F (36.5 °C)-98.7 °F (37.1 °C)] 98.4 °F (36.9 °C)  Heart Rate:  [86-94] 93  Resp:  [17-20] 20  BP: (140-158)/() 140/88    Intake/Output:  No intake or output data in the 24 hours ending 12/15/18 1944    Exam:  /88 (BP Location: Right arm, Patient Position: Lying)   Pulse 93   Temp 98.4 °F (36.9 °C) (Oral)   Resp 20   Ht 167.6 cm (66\")   Wt 82.6 kg (182 lb)   LMP  (LMP Unknown)   SpO2 96%   BMI 29.38 kg/m²     General Appearance:    Alert, cooperative, no distress, AAOx3                          Head:    Normocephalic, without obvious abnormality, atraumatic                           Eyes:    PERRL, conjunctivae/corneas clear, EOM's intact, both eyes                         Throat:   Lips, tongue, gums normal; oral mucosa pink and moist                           Neck:   Supple, symmetrical, trachea midline, no JVD                         Lungs:    Clear " to auscultation bilaterally, respirations unlabored                 Chest Wall:    No tenderness or deformity                          Heart:    Regular rate and rhythm, S1 and S2 normal, no murmur, no rub                                         or gallop                  Abdomen:     Soft, non-tender, bowel sounds active, no masses, no                                                        organomegaly                  Extremities:   Extremities normal, atraumatic, no cyanosis or edema                        Pulses:   Pulses palpable in all extremities                            Skin:   Multiple skin lesions noted          Data Review:    I reviewed the patient's new clinical results.  Results from last 7 days   Lab Units  12/15/18   0514  12/14/18   0820  12/13/18   0357  12/12/18   0621  12/11/18   1011  12/10/18   0820  12/09/18   1436   WBC 10*3/mm3  14.10*  13.76*  12.64*  10.25  11.24*  13.63*  11.29*   HEMOGLOBIN g/dL  9.8*  10.5*  10.3*  10.2*  9.3*  9.9*  10.7*   PLATELETS 10*3/mm3  381  366  285  253  243  205  194     Results from last 7 days   Lab Units  12/15/18   0514  12/14/18   0820  12/13/18   0357  12/12/18   0621  12/11/18   1011  12/10/18   0820  12/09/18   1436   SODIUM mmol/L  130*  133*  133*  135*  134*  135*  135*   POTASSIUM mmol/L  3.9  3.9  4.0  4.1  2.9*  3.8  3.2*   CHLORIDE mmol/L  93*  95*  96*  98  99  100  94*   CO2 mmol/L  29.2*  26.9  25.9  26.0  25.1  26.1  29.3*   BUN mg/dL  9  6  5*  3*  8  10  8   CREATININE mg/dL  0.53*  0.55*  0.48*  0.47*  0.46*  0.49*  0.67   CALCIUM mg/dL  8.5*  8.8  8.6  8.6  7.9*  8.3*  9.0   GLUCOSE mg/dL  129*  115*  128*  105*  99  147*  98     Microbiology Results (last 10 days)     Procedure Component Value - Date/Time    Blood Culture - Blood, Hand, Right [[id number]] Collected:  12/11/18 1029    Lab Status:  Preliminary result Specimen:  Blood from Hand, Right Updated:  12/15/18 1046     Blood Culture No growth at 4 days    Blood Culture - Blood,  Arm, Left [[id number]] Collected:  12/11/18 1011    Lab Status:  Preliminary result Specimen:  Blood from Arm, Left Updated:  12/15/18 1046     Blood Culture No growth at 4 days    Anaerobic Culture - Tissue, Spine, Lumbar [[id number]] Collected:  12/09/18 2127    Lab Status:  Final result Specimen:  Tissue from Spine, Lumbar Updated:  12/14/18 1128     Culture No anaerobes isolated at 5 days    Fungus Culture - Tissue, Spine, Lumbar [[id number]] Collected:  12/09/18 2127    Lab Status:  Preliminary result Specimen:  Tissue from Spine, Lumbar Updated:  12/14/18 2146     Fungus Culture No fungus isolated at less than 1 week    Tissue / Bone Culture - Tissue, Spine, Lumbar [[id number]]  (Abnormal) Collected:  12/09/18 2127    Lab Status:  Edited Result - FINAL Specimen:  Tissue from Spine, Lumbar Updated:  12/11/18 0705     Tissue Culture Light growth (2+) Staphylococcus aureus, MRSA     Comment:   Methicillin resistant Staphylococcus aureus, Patient may be an isolation risk.        Gram Stain Moderate (3+) WBCs seen      Rare (1+) Gram positive cocci    Narrative:       Refer to previous wound culture collected on 12/9/2018 2125 for MICS    AFB Culture - Tissue, Spine, Lumbar [[id number]] Collected:  12/09/18 2127    Lab Status:  Preliminary result Specimen:  Tissue from Spine, Lumbar Updated:  12/14/18 2146     AFB Culture No AFB isolated at less than 1 week     AFB Stain No acid fast bacilli seen on direct smear    Wound Culture - Wound, Spine, Lumbar [[id number]]  (Abnormal) Collected:  12/09/18 2126    Lab Status:  Final result Specimen:  Wound from Spine, Lumbar Updated:  12/11/18 0704     Wound Culture Heavy growth (4+) Staphylococcus aureus, MRSA     Comment:   Methicillin resistant Staphylococcus aureus, Patient may be an isolation risk.        Gram Stain Moderate (3+) WBCs seen      Few (2+) Gram positive cocci    Narrative:       Refer to previous wound culture collected on 12/9/2018 2125 for MICS    Wound  "Culture - Wound, Spine, Lumbar [[id number]]  (Abnormal)  (Susceptibility) Collected:  12/09/18 2125    Lab Status:  Final result Specimen:  Wound from Spine, Lumbar Updated:  12/11/18 0703     Wound Culture Moderate growth (3+) Staphylococcus aureus, MRSA     Comment: D test is positive. Isolate exhibits \"inducible\" resistance to Clindamycin.    Methicillin resistant Staphylococcus aureus, Patient may be an isolation risk.        Gram Stain Moderate (3+) WBCs seen      Few (2+) Gram positive cocci    Susceptibility      Staphylococcus aureus, MRSA     ZAINAB     Clindamycin Resistant     Erythromycin Resistant     Oxacillin Resistant     Penicillin G Resistant     Rifampin Susceptible     Tetracycline Susceptible     Trimethoprim + Sulfamethoxazole Susceptible     Vancomycin Susceptible                    Blood Culture - Blood, Arm, Left [[id number]]  (Abnormal) Collected:  12/08/18 0243    Lab Status:  Edited Result - FINAL Specimen:  Blood from Arm, Left Updated:  12/10/18 0707     Blood Culture Staphylococcus aureus, MRSA     Comment:   Consider infectious disease consult to rule out distant focus of infection.  Methicillin resistant Staphylococcus aureus, Patient may be an isolation risk.        Isolated from Aerobic and Anaerobic Bottles     Gram Stain Anaerobic Bottle Gram positive cocci in clusters      Aerobic Bottle Gram positive cocci in clusters    Narrative:       Refer to previous blood culture collected on 12/8/2018 0208 for ZAINAB's.    Blood Culture ID, PCR - Blood, Arm, Left [[id number]]  (Abnormal) Collected:  12/08/18 0243    Lab Status:  Final result Specimen:  Blood from Arm, Left Updated:  12/09/18 1707     BCID, PCR Staphylococcus aureus. mecA (methicillin resistance gene) detected. Identification by BCID PCR.    Blood Culture - Blood, Arm, Left [[id number]]  (Abnormal)  (Susceptibility) Collected:  12/08/18 0208    Lab Status:  Edited Result - FINAL Specimen:  Blood from Arm, Left Updated:  12/10/18 " "0706     Blood Culture Staphylococcus aureus, MRSA     Comment:   Consider infectious disease consult to rule out distant focus of infection.  D test is positive. Isolate exhibits \"inducible\" resistance to Clindamycin.    Methicillin resistant Staphylococcus aureus, Patient may be an isolation risk.  Corrected result. Previously Reported Organism Changed. Previous result was Staphylococcus aureus on 12/10/2018 at 0650 EST.        Isolated from Aerobic and Anaerobic Bottles     Gram Stain Anaerobic Bottle Gram positive cocci in clusters      Aerobic Bottle Gram positive cocci in clusters    Narrative:       Although Staphylococcus aureus is sensitive to Oxacillin, it possesses the mecA gene, therefore it is an MRSA and should be considered resistant to all beta-lactam antibiotics.    Susceptibility      Staphylococcus aureus, MRSA     ZAINAB     Clindamycin Resistant     Erythromycin Resistant     Oxacillin Susceptible     Penicillin G Resistant     Rifampin Susceptible     Tetracycline Susceptible     Trimethoprim + Sulfamethoxazole Susceptible     Vancomycin Susceptible                            Assessment:    Spinal epidural abscess  MSSA sepsis with bacteremia - staph aureus has mecA gene  Subjective IV drug abuse with significant skin lesions in the needle tracks and associated phlebitis    Recommendation:  · Continue vancomycin adjusted to achieve a trough level of 20.  · Follow up on repeat blood cultures.    · Would need 6 weeks of IV antibiotics in the last negative blood culture or final surgical intervention - to be decided by neurosurgery service -  which ever is the latest.  · Long-term IV antibiotic administration  is  difficult unless she is accepted at LTAC type institution where her antibiotic administration    Fabian Key MD  12/15/2018  7:44 PM    Much of this encounter note is an electronic transcription/translation of spoken language to printed text. The electronic translation of spoken language " may permit erroneous, or at times, nonsensical words or phrases to be inadvertently transcribed; Although I have reviewed the note for such errors, some may still exist

## 2018-12-16 NOTE — PLAN OF CARE
Problem: Patient Care Overview  Goal: Plan of Care Review  Outcome: Ongoing (interventions implemented as appropriate)   12/16/18 0407   Coping/Psychosocial   Plan of Care Reviewed With patient   Plan of Care Review   Progress no change   OTHER   Outcome Summary no changes noted       Problem: Skin Injury Risk (Adult)  Goal: Skin Health and Integrity  Outcome: Ongoing (interventions implemented as appropriate)   12/16/18 0407   Skin Injury Risk (Adult)   Skin Health and Integrity making progress toward outcome       Problem: Fall Risk (Adult)  Goal: Absence of Fall  Outcome: Ongoing (interventions implemented as appropriate)   12/16/18 0407   Fall Risk (Adult)   Absence of Fall making progress toward outcome       Problem: Pain, Acute (Adult)  Goal: Acceptable Pain Control/Comfort Level  Outcome: Ongoing (interventions implemented as appropriate)   12/16/18 0407   Pain, Acute (Adult)   Acceptable Pain Control/Comfort Level making progress toward outcome       Problem: Skin and Soft Tissue Infection (Adult)  Goal: Signs and Symptoms of Listed Potential Problems Will be Absent, Minimized or Managed (Skin and Soft Tissue Infection)  Outcome: Ongoing (interventions implemented as appropriate)   12/16/18 0407   Goal/Outcome Evaluation   Problems Assessed (Skin and Soft Tissue Infection) all   Problems Present (Skin/Soft Tissue Inf) pain

## 2018-12-17 ENCOUNTER — TELEPHONE (OUTPATIENT)
Dept: NEUROSURGERY | Facility: CLINIC | Age: 43
End: 2018-12-17

## 2018-12-17 DIAGNOSIS — G06.1 ABSCESS IN EPIDURAL SPACE OF LUMBAR SPINE: Primary | ICD-10-CM

## 2018-12-17 DIAGNOSIS — Z98.890 POST-OPERATIVE STATE: ICD-10-CM

## 2018-12-17 LAB
ALBUMIN SERPL-MCNC: 2.6 G/DL (ref 3.5–5.2)
ALBUMIN/GLOB SERPL: 0.5 G/DL
ALP SERPL-CCNC: 96 U/L (ref 39–117)
ALT SERPL W P-5'-P-CCNC: 21 U/L (ref 1–33)
ANION GAP SERPL CALCULATED.3IONS-SCNC: 10.4 MMOL/L
AST SERPL-CCNC: 30 U/L (ref 1–32)
BASOPHILS # BLD AUTO: 0.02 10*3/MM3 (ref 0–0.2)
BASOPHILS NFR BLD AUTO: 0.1 % (ref 0–1.5)
BILIRUB SERPL-MCNC: 0.4 MG/DL (ref 0.1–1.2)
BUN BLD-MCNC: 12 MG/DL (ref 6–20)
BUN/CREAT SERPL: 20.7 (ref 7–25)
CALCIUM SPEC-SCNC: 8.4 MG/DL (ref 8.6–10.5)
CHLORIDE SERPL-SCNC: 89 MMOL/L (ref 98–107)
CO2 SERPL-SCNC: 28.6 MMOL/L (ref 22–29)
CREAT BLD-MCNC: 0.58 MG/DL (ref 0.57–1)
CRP SERPL-MCNC: 8.9 MG/DL (ref 0–0.5)
DEPRECATED RDW RBC AUTO: 51.8 FL (ref 37–54)
EOSINOPHIL # BLD AUTO: 0.17 10*3/MM3 (ref 0–0.7)
EOSINOPHIL NFR BLD AUTO: 1.2 % (ref 0.3–6.2)
ERYTHROCYTE [DISTWIDTH] IN BLOOD BY AUTOMATED COUNT: 16.5 % (ref 11.7–13)
ERYTHROCYTE [SEDIMENTATION RATE] IN BLOOD: 133 MM/HR (ref 0–20)
GFR SERPL CREATININE-BSD FRML MDRD: 115 ML/MIN/1.73
GLOBULIN UR ELPH-MCNC: 5.2 GM/DL
GLUCOSE BLD-MCNC: 145 MG/DL (ref 65–99)
HCT VFR BLD AUTO: 28.8 % (ref 35.6–45.5)
HGB BLD-MCNC: 9.2 G/DL (ref 11.9–15.5)
IMM GRANULOCYTES # BLD: 0.11 10*3/MM3 (ref 0–0.03)
IMM GRANULOCYTES NFR BLD: 0.8 % (ref 0–0.5)
LYMPHOCYTES # BLD AUTO: 1.74 10*3/MM3 (ref 0.9–4.8)
LYMPHOCYTES NFR BLD AUTO: 12.5 % (ref 19.6–45.3)
MAGNESIUM SERPL-MCNC: 1.9 MG/DL (ref 1.6–2.6)
MCH RBC QN AUTO: 27.4 PG (ref 26.9–32)
MCHC RBC AUTO-ENTMCNC: 31.9 G/DL (ref 32.4–36.3)
MCV RBC AUTO: 85.7 FL (ref 80.5–98.2)
MONOCYTES # BLD AUTO: 1.32 10*3/MM3 (ref 0.2–1.2)
MONOCYTES NFR BLD AUTO: 9.5 % (ref 5–12)
NEUTROPHILS # BLD AUTO: 10.71 10*3/MM3 (ref 1.9–8.1)
NEUTROPHILS NFR BLD AUTO: 76.7 % (ref 42.7–76)
NRBC BLD MANUAL-RTO: 0 /100 WBC (ref 0–0)
PHOSPHATE SERPL-MCNC: 3.7 MG/DL (ref 2.5–4.5)
PLATELET # BLD AUTO: 350 10*3/MM3 (ref 140–500)
PMV BLD AUTO: 10.6 FL (ref 6–12)
POTASSIUM BLD-SCNC: 4 MMOL/L (ref 3.5–5.2)
PROT SERPL-MCNC: 7.8 G/DL (ref 6–8.5)
RBC # BLD AUTO: 3.36 10*6/MM3 (ref 3.9–5.2)
SODIUM BLD-SCNC: 128 MMOL/L (ref 136–145)
WBC NRBC COR # BLD: 13.96 10*3/MM3 (ref 4.5–10.7)

## 2018-12-17 PROCEDURE — 97110 THERAPEUTIC EXERCISES: CPT

## 2018-12-17 PROCEDURE — 25010000002 VANCOMYCIN PER 500 MG: Performed by: INTERNAL MEDICINE

## 2018-12-17 PROCEDURE — 85652 RBC SED RATE AUTOMATED: CPT | Performed by: NURSE PRACTITIONER

## 2018-12-17 PROCEDURE — 83735 ASSAY OF MAGNESIUM: CPT | Performed by: INTERNAL MEDICINE

## 2018-12-17 PROCEDURE — 84100 ASSAY OF PHOSPHORUS: CPT | Performed by: INTERNAL MEDICINE

## 2018-12-17 PROCEDURE — 86140 C-REACTIVE PROTEIN: CPT | Performed by: NURSE PRACTITIONER

## 2018-12-17 PROCEDURE — 80053 COMPREHEN METABOLIC PANEL: CPT | Performed by: INTERNAL MEDICINE

## 2018-12-17 PROCEDURE — 85025 COMPLETE CBC W/AUTO DIFF WBC: CPT | Performed by: NURSE PRACTITIONER

## 2018-12-17 PROCEDURE — 63710000001 DIPHENHYDRAMINE PER 50 MG: Performed by: INTERNAL MEDICINE

## 2018-12-17 PROCEDURE — 25010000002 HYDROMORPHONE PER 4 MG: Performed by: NURSE PRACTITIONER

## 2018-12-17 PROCEDURE — 99024 POSTOP FOLLOW-UP VISIT: CPT | Performed by: NURSE PRACTITIONER

## 2018-12-17 RX ORDER — OXYCODONE HYDROCHLORIDE 5 MG/1
10 TABLET ORAL EVERY 8 HOURS
Status: DISCONTINUED | OUTPATIENT
Start: 2018-12-18 | End: 2018-12-18

## 2018-12-17 RX ORDER — DIPHENHYDRAMINE HCL 25 MG
25 CAPSULE ORAL EVERY 6 HOURS PRN
Status: DISCONTINUED | OUTPATIENT
Start: 2018-12-17 | End: 2018-12-21 | Stop reason: HOSPADM

## 2018-12-17 RX ORDER — POLYETHYLENE GLYCOL 3350 17 G/17G
17 POWDER, FOR SOLUTION ORAL DAILY
Status: DISCONTINUED | OUTPATIENT
Start: 2018-12-17 | End: 2018-12-21 | Stop reason: HOSPADM

## 2018-12-17 RX ORDER — BISACODYL 10 MG
10 SUPPOSITORY, RECTAL RECTAL DAILY PRN
Status: DISCONTINUED | OUTPATIENT
Start: 2018-12-17 | End: 2018-12-21 | Stop reason: HOSPADM

## 2018-12-17 RX ADMIN — AMLODIPINE BESYLATE 10 MG: 10 TABLET ORAL at 08:20

## 2018-12-17 RX ADMIN — FAMOTIDINE 20 MG: 20 TABLET, FILM COATED ORAL at 08:19

## 2018-12-17 RX ADMIN — NICOTINE 1 PATCH: 21 PATCH, EXTENDED RELEASE TRANSDERMAL at 08:20

## 2018-12-17 RX ADMIN — METHADONE HYDROCHLORIDE 30 MG: 10 TABLET ORAL at 18:11

## 2018-12-17 RX ADMIN — METHOCARBAMOL TABLETS 750 MG: 750 TABLET, COATED ORAL at 00:25

## 2018-12-17 RX ADMIN — METHOCARBAMOL TABLETS 750 MG: 750 TABLET, COATED ORAL at 06:06

## 2018-12-17 RX ADMIN — Medication 400 MG: at 08:20

## 2018-12-17 RX ADMIN — OXYCODONE HYDROCHLORIDE AND ACETAMINOPHEN 1 TABLET: 10; 325 TABLET ORAL at 01:23

## 2018-12-17 RX ADMIN — VANCOMYCIN HYDROCHLORIDE 1000 MG: 1 INJECTION, SOLUTION INTRAVENOUS at 14:17

## 2018-12-17 RX ADMIN — METHADONE HYDROCHLORIDE 30 MG: 10 TABLET ORAL at 10:40

## 2018-12-17 RX ADMIN — METHOCARBAMOL TABLETS 750 MG: 750 TABLET, COATED ORAL at 23:40

## 2018-12-17 RX ADMIN — DIPHENHYDRAMINE HYDROCHLORIDE 25 MG: 25 CAPSULE ORAL at 21:27

## 2018-12-17 RX ADMIN — HYDROMORPHONE HYDROCHLORIDE 0.5 MG: 1 INJECTION, SOLUTION INTRAMUSCULAR; INTRAVENOUS; SUBCUTANEOUS at 03:29

## 2018-12-17 RX ADMIN — OXYCODONE HYDROCHLORIDE 10 MG: 5 TABLET ORAL at 11:56

## 2018-12-17 RX ADMIN — METHOCARBAMOL TABLETS 750 MG: 750 TABLET, COATED ORAL at 18:11

## 2018-12-17 RX ADMIN — Medication 400 MG: at 21:23

## 2018-12-17 RX ADMIN — METHADONE HYDROCHLORIDE 30 MG: 10 TABLET ORAL at 02:12

## 2018-12-17 RX ADMIN — SENNOSIDES AND DOCUSATE SODIUM 2 TABLET: 8.6; 5 TABLET ORAL at 21:23

## 2018-12-17 RX ADMIN — FAMOTIDINE 20 MG: 20 TABLET, FILM COATED ORAL at 21:23

## 2018-12-17 RX ADMIN — POLYETHYLENE GLYCOL 3350 17 G: 17 POWDER, FOR SOLUTION ORAL at 21:23

## 2018-12-17 RX ADMIN — METHOCARBAMOL TABLETS 750 MG: 750 TABLET, COATED ORAL at 11:56

## 2018-12-17 RX ADMIN — ONDANSETRON 4 MG: 4 TABLET, ORALLY DISINTEGRATING ORAL at 12:04

## 2018-12-17 RX ADMIN — OXYCODONE HYDROCHLORIDE 10 MG: 5 TABLET ORAL at 18:11

## 2018-12-17 RX ADMIN — OXYCODONE HYDROCHLORIDE 10 MG: 5 TABLET ORAL at 06:06

## 2018-12-17 RX ADMIN — VANCOMYCIN HYDROCHLORIDE 1000 MG: 1 INJECTION, SOLUTION INTRAVENOUS at 21:22

## 2018-12-17 RX ADMIN — DOCUSATE SODIUM 100 MG: 100 CAPSULE, LIQUID FILLED ORAL at 08:20

## 2018-12-17 RX ADMIN — HYDROMORPHONE HYDROCHLORIDE 0.5 MG: 1 INJECTION, SOLUTION INTRAMUSCULAR; INTRAVENOUS; SUBCUTANEOUS at 09:44

## 2018-12-17 RX ADMIN — OXYCODONE HYDROCHLORIDE AND ACETAMINOPHEN 1 TABLET: 10; 325 TABLET ORAL at 13:28

## 2018-12-17 RX ADMIN — OXYCODONE HYDROCHLORIDE AND ACETAMINOPHEN 1 TABLET: 10; 325 TABLET ORAL at 08:20

## 2018-12-17 RX ADMIN — OXYCODONE HYDROCHLORIDE 10 MG: 5 TABLET ORAL at 00:25

## 2018-12-17 RX ADMIN — VANCOMYCIN HYDROCHLORIDE 1000 MG: 1 INJECTION, SOLUTION INTRAVENOUS at 02:12

## 2018-12-17 RX ADMIN — OXYCODONE HYDROCHLORIDE AND ACETAMINOPHEN 1 TABLET: 10; 325 TABLET ORAL at 17:38

## 2018-12-17 RX ADMIN — OXYCODONE HYDROCHLORIDE AND ACETAMINOPHEN 1 TABLET: 10; 325 TABLET ORAL at 21:23

## 2018-12-17 RX ADMIN — POTASSIUM CHLORIDE 40 MEQ: 750 CAPSULE, EXTENDED RELEASE ORAL at 08:19

## 2018-12-17 NOTE — PLAN OF CARE
Problem: Patient Care Overview  Goal: Plan of Care Review   12/16/18 1858   Coping/Psychosocial   Plan of Care Reviewed With patient   Plan of Care Review   Progress improving   OTHER   Outcome Summary VSS. Scheduled and PRN pain meds given. IVPB ABX. Pt has been aggitated at times but can be talked down with time and patience. Pt stated she had a son in college and a son that was murdered while young.  Ice pack for back. No SS of infection.  Gauze and tegaderm to site.         Problem: Skin Injury Risk (Adult)  Goal: Skin Health and Integrity   12/16/18 1858   Skin Injury Risk (Adult)   Skin Health and Integrity making progress toward outcome       Problem: Fall Risk (Adult)  Goal: Absence of Fall   12/16/18 1858   Fall Risk (Adult)   Absence of Fall making progress toward outcome       Problem: Pain, Acute (Adult)  Goal: Acceptable Pain Control/Comfort Level   12/16/18 1858   Pain, Acute (Adult)   Acceptable Pain Control/Comfort Level making progress toward outcome       Problem: Skin and Soft Tissue Infection (Adult)  Goal: Signs and Symptoms of Listed Potential Problems Will be Absent, Minimized or Managed (Skin and Soft Tissue Infection)   12/16/18 0407 12/16/18 1858   Goal/Outcome Evaluation   Problems Assessed (Skin and Soft Tissue Infection) all --    Problems Present (Skin/Soft Tissue Inf) --  pain

## 2018-12-17 NOTE — PROGRESS NOTES
"  Infectious Diseases Progress Note    Fabian Key MD     [facility]  Los: 7 days  Patient Identification:  Name: Silvia Velazquez  Age: 43 y.o.  Sex: female  :  1975  MRN: [id number]         Primary Care Physician: Provider, No Known            Subjective: nothing new, feels about the same.still gave questions about the \"hookworm infestation in the skin\"    Interval History: See consultation note    Objective:    Scheduled Meds:    amLODIPine 10 mg Oral Q24H   docusate sodium 100 mg Oral Daily   famotidine 20 mg Oral BID   magnesium oxide 400 mg Oral BID   methadone 30 mg Oral Q8H   methocarbamol 750 mg Oral Q6H   nicotine 1 patch Transdermal Q24H   oxyCODONE 10 mg Oral Q6H   potassium chloride 40 mEq Oral Daily   sennosides-docusate sodium 2 tablet Oral Nightly   vancomycin 1,000 mg Intravenous Q6H     Continuous Infusions:    Pharmacy to dose vancomycin        Vital signs in last 24 hours:  Temp:  [99 °F (37.2 °C)-99.6 °F (37.6 °C)] 99.6 °F (37.6 °C)  Heart Rate:  [] 91  Resp:  [16-18] 18  BP: (128-141)/(91-95) 128/94    Intake/Output:    Intake/Output Summary (Last 24 hours) at 20188  Last data filed at 2018 0500  Gross per 24 hour   Intake 620 ml   Output --   Net 620 ml       Exam:  /94 (BP Location: Left arm, Patient Position: Lying)   Pulse 91   Temp 99.6 °F (37.6 °C) (Oral)   Resp 18   Ht 167.6 cm (66\")   Wt 82.6 kg (182 lb)   LMP  (LMP Unknown)   SpO2 100%   BMI 29.38 kg/m²     General Appearance:    Alert, cooperative, no distress, AAOx3                          Head:    Normocephalic, without obvious abnormality, atraumatic                           Eyes:    PERRL, conjunctivae/corneas clear, EOM's intact, both eyes                         Throat:   Lips, tongue, gums normal; oral mucosa pink and moist                           Neck:   Supple, symmetrical, trachea midline, no JVD                         Lungs:    Clear to auscultation " bilaterally, respirations unlabored                 Chest Wall:    No tenderness or deformity                          Heart:    Regular rate and rhythm, S1 and S2 normal, no murmur, no rub                                         or gallop                  Abdomen:     Soft, non-tender, bowel sounds active, no masses, no                                                        organomegaly                  Extremities:   Extremities normal, atraumatic, no cyanosis or edema                        Pulses:   Pulses palpable in all extremities                            Skin:   Multiple skin lesions noted          Data Review:    I reviewed the patient's new clinical results.  Results from last 7 days   Lab Units  12/16/18   0437  12/15/18   0514  12/14/18   0820  12/13/18   0357  12/12/18   0621  12/11/18   1011  12/10/18   0820   WBC 10*3/mm3  14.45*  14.10*  13.76*  12.64*  10.25  11.24*  13.63*   HEMOGLOBIN g/dL  9.6*  9.8*  10.5*  10.3*  10.2*  9.3*  9.9*   PLATELETS 10*3/mm3  351  381  366  285  253  243  205     Results from last 7 days   Lab Units  12/16/18   0437  12/15/18   0514  12/14/18   0820  12/13/18   0357  12/12/18   0621  12/11/18   1011  12/10/18   0820   SODIUM mmol/L  129*  130*  133*  133*  135*  134*  135*   POTASSIUM mmol/L  4.1  3.9  3.9  4.0  4.1  2.9*  3.8   CHLORIDE mmol/L  95*  93*  95*  96*  98  99  100   CO2 mmol/L  27.1  29.2*  26.9  25.9  26.0  25.1  26.1   BUN mg/dL  9  9  6  5*  3*  8  10   CREATININE mg/dL  0.48*  0.53*  0.55*  0.48*  0.47*  0.46*  0.49*   CALCIUM mg/dL  8.0*  8.5*  8.8  8.6  8.6  7.9*  8.3*   GLUCOSE mg/dL  82  129*  115*  128*  105*  99  147*     Microbiology Results (last 10 days)     Procedure Component Value - Date/Time    Blood Culture - Blood, Hand, Right [[id number]] Collected:  12/11/18 1029    Lab Status:  Final result Specimen:  Blood from Hand, Right Updated:  12/16/18 1046     Blood Culture No growth at 5 days    Blood Culture - Blood, Arm, Left [[id number]]  Collected:  12/11/18 1011    Lab Status:  Final result Specimen:  Blood from Arm, Left Updated:  12/16/18 1046     Blood Culture No growth at 5 days    Anaerobic Culture - Tissue, Spine, Lumbar [[id number]] Collected:  12/09/18 2127    Lab Status:  Final result Specimen:  Tissue from Spine, Lumbar Updated:  12/14/18 1128     Culture No anaerobes isolated at 5 days    Fungus Culture - Tissue, Spine, Lumbar [[id number]] Collected:  12/09/18 2127    Lab Status:  Preliminary result Specimen:  Tissue from Spine, Lumbar Updated:  12/16/18 2146     Fungus Culture No fungus isolated at 1 week    Tissue / Bone Culture - Tissue, Spine, Lumbar [[id number]]  (Abnormal) Collected:  12/09/18 2127    Lab Status:  Edited Result - FINAL Specimen:  Tissue from Spine, Lumbar Updated:  12/11/18 0705     Tissue Culture Light growth (2+) Staphylococcus aureus, MRSA     Comment:   Methicillin resistant Staphylococcus aureus, Patient may be an isolation risk.        Gram Stain Moderate (3+) WBCs seen      Rare (1+) Gram positive cocci    Narrative:       Refer to previous wound culture collected on 12/9/2018 2125 for MICS    AFB Culture - Tissue, Spine, Lumbar [[id number]] Collected:  12/09/18 2127    Lab Status:  Preliminary result Specimen:  Tissue from Spine, Lumbar Updated:  12/16/18 2146     AFB Culture No AFB isolated at 1 week     AFB Stain No acid fast bacilli seen on direct smear    Wound Culture - Wound, Spine, Lumbar [[id number]]  (Abnormal) Collected:  12/09/18 2126    Lab Status:  Final result Specimen:  Wound from Spine, Lumbar Updated:  12/11/18 0704     Wound Culture Heavy growth (4+) Staphylococcus aureus, MRSA     Comment:   Methicillin resistant Staphylococcus aureus, Patient may be an isolation risk.        Gram Stain Moderate (3+) WBCs seen      Few (2+) Gram positive cocci    Narrative:       Refer to previous wound culture collected on 12/9/2018 2125 for MICS    Wound Culture - Wound, Spine, Lumbar [[id number]]  (Abnormal)  " (Susceptibility) Collected:  12/09/18 2125    Lab Status:  Final result Specimen:  Wound from Spine, Lumbar Updated:  12/11/18 0703     Wound Culture Moderate growth (3+) Staphylococcus aureus, MRSA     Comment: D test is positive. Isolate exhibits \"inducible\" resistance to Clindamycin.    Methicillin resistant Staphylococcus aureus, Patient may be an isolation risk.        Gram Stain Moderate (3+) WBCs seen      Few (2+) Gram positive cocci    Susceptibility      Staphylococcus aureus, MRSA     ZAINAB     Clindamycin Resistant     Erythromycin Resistant     Oxacillin Resistant     Penicillin G Resistant     Rifampin Susceptible     Tetracycline Susceptible     Trimethoprim + Sulfamethoxazole Susceptible     Vancomycin Susceptible                    Blood Culture - Blood, Arm, Left [[id number]]  (Abnormal) Collected:  12/08/18 0243    Lab Status:  Edited Result - FINAL Specimen:  Blood from Arm, Left Updated:  12/10/18 0707     Blood Culture Staphylococcus aureus, MRSA     Comment:   Consider infectious disease consult to rule out distant focus of infection.  Methicillin resistant Staphylococcus aureus, Patient may be an isolation risk.        Isolated from Aerobic and Anaerobic Bottles     Gram Stain Anaerobic Bottle Gram positive cocci in clusters      Aerobic Bottle Gram positive cocci in clusters    Narrative:       Refer to previous blood culture collected on 12/8/2018 0208 for ZAINAB's.    Blood Culture ID, PCR - Blood, Arm, Left [[id number]]  (Abnormal) Collected:  12/08/18 0243    Lab Status:  Final result Specimen:  Blood from Arm, Left Updated:  12/09/18 1707     BCID, PCR Staphylococcus aureus. mecA (methicillin resistance gene) detected. Identification by BCID PCR.    Blood Culture - Blood, Arm, Left [[id number]]  (Abnormal)  (Susceptibility) Collected:  12/08/18 0208    Lab Status:  Edited Result - FINAL Specimen:  Blood from Arm, Left Updated:  12/10/18 0706     Blood Culture Staphylococcus aureus, MRSA     " "Comment:   Consider infectious disease consult to rule out distant focus of infection.  D test is positive. Isolate exhibits \"inducible\" resistance to Clindamycin.    Methicillin resistant Staphylococcus aureus, Patient may be an isolation risk.  Corrected result. Previously Reported Organism Changed. Previous result was Staphylococcus aureus on 12/10/2018 at 0650 EST.        Isolated from Aerobic and Anaerobic Bottles     Gram Stain Anaerobic Bottle Gram positive cocci in clusters      Aerobic Bottle Gram positive cocci in clusters    Narrative:       Although Staphylococcus aureus is sensitive to Oxacillin, it possesses the mecA gene, therefore it is an MRSA and should be considered resistant to all beta-lactam antibiotics.    Susceptibility      Staphylococcus aureus, MRSA     ZAINAB     Clindamycin Resistant     Erythromycin Resistant     Oxacillin Susceptible     Penicillin G Resistant     Rifampin Susceptible     Tetracycline Susceptible     Trimethoprim + Sulfamethoxazole Susceptible     Vancomycin Susceptible                            Assessment:    Spinal epidural abscess  MSSA sepsis with bacteremia - staph aureus has mecA gene  Subjective IV drug abuse with significant skin lesions in the needle tracks and associated phlebitis    Recommendation:  · Continue vancomycin adjusted to achieve a trough level of 20.  · Follow up on repeat blood cultures.    · Would need 6 weeks of IV antibiotics in the last negative blood culture or final surgical intervention - to be decided by neurosurgery service -  which ever is the latest.  · Long-term IV antibiotic administration  is  difficult unless she is accepted at LTAC type institution where her antibiotic administration    Fabian Key MD  12/16/2018  11:28 PM    Much of this encounter note is an electronic transcription/translation of spoken language to printed text. The electronic translation of spoken language may permit erroneous, or at times, nonsensical words or " phrases to be inadvertently transcribed; Although I have reviewed the note for such errors, some may still exist

## 2018-12-17 NOTE — PROGRESS NOTES
"Continued Stay Note  [facility]     Patient Name: Silvia Velazquez  MRN: [id number]  Today's Date: 12/17/2018    Admit Date: 12/9/2018    Discharge Plan     Row Name 12/17/18 1054       Plan    Plan  Home with her boyfriend and daily appointments with [facility] for her IV Vancomycin    Patient/Family in Agreement with Plan  yes    Plan Comments  Notes reviewed.  Discussed with Dr. Salinas.  HR CCP spoke with pt and her boyfriend at bedside.  She states that she is doing better and getting to the [facility] \"alone until the pain got bad this morning\".  Pt is in agreement with plan for home with her boyfriend and return daily to [facility] for IV Abx.  Discussed with pt's RN.  Will need final antibiotic and lab work orders for [facility] planning.          Discharge Codes    No documentation.             Yolanda Altman RN    "

## 2018-12-17 NOTE — OP NOTE
LUMBAR LAMINECTOMY DISCECTOMY DECOMPRESSION POSTERIOR 1-2 LEVELS  Procedure Note    Silvia Velazquez  12/9/2018 - 12/10/2018  [id number]    SURGEON  Tevin Whitley MD    ASSISTANT:  Lisa Tovar CSA  INDICATION:  Epidural abscess with cauda equina syndrome    Pre-op Diagnosis:   Spinal epidural abscess [G06.1]    Post-Op Diagnosis Codes:     * Spinal epidural abscess [G06.1]    PROCEDURE PERFORMED:  Procedure(s):  Posterior lumbar three through sacrum decompression    Anesthesia: General    Staff:   Circulator: Lizabeth Felder RN; Bárbara Huerta RN  Radiology Technologist: Rianna Phillip  Scrub Person: Aisha Ying  Assistant: Lisa Tovar CSA    Estimated Blood Loss: 100ml    Specimens:   Order Name Source Comment Collection Info Order Time   WOUND CULTURE Spine, Lumbar  Collected By: Tevin Whitley MD 12/9/2018  9:25 PM   WOUND CULTURE Spine, Lumbar  Collected By: Tevin Whitley MD 12/9/2018  9:26 PM   ANAEROBIC CULTURE Spine, Lumbar  Collected By: Tevin Whitley MD 12/9/2018  9:28 PM   FUNGUS CULTURE Spine, Lumbar  Collected By: Tevin Whitley MD 12/9/2018  9:28 PM   TISSUE / BONE CULTURE Spine, Lumbar  Collected By: Tevin Whitley MD 12/9/2018  9:28 PM   AFB CULTURE Spine, Lumbar  Collected By: Tevin Whitley MD 12/9/2018  9:28 PM            Drains:   [REMOVED] Closed/Suction Drain 1 Left Back Bulb 15 Fr. (Removed)   Site Description Unable to view 12/12/2018  4:07 AM   Dressing Status Clean;Dry;Intact 12/12/2018  3:56 PM   Drainage Appearance Serosanguineous 12/12/2018  3:56 PM   Status To bulb suction 12/12/2018  3:56 PM   Output (mL) 5 mL 12/12/2018  3:56 PM       [REMOVED] Closed/Suction Drain 2 Right Back Bulb 15 Fr. (Removed)   Site Description Unable to view 12/13/2018  1:56 PM   Dressing Status Clean;Dry;Intact 12/13/2018  1:56 PM   Drainage Appearance Serosanguineous 12/13/2018  1:56 PM   Status To bulb suction 12/13/2018  1:56 PM   Number of Sutures  Removed 1 12/13/2018 12:00 PM   Output (mL) 40 mL 12/13/2018  9:00 AM       [REMOVED] Urethral Catheter Non-latex (Removed)   Daily Indications < 24 hr post op 12/10/2018 12:30 PM   Site Assessment Clean;Skin intact 12/10/2018 12:30 PM   Collection Container Standard drainage bag 12/10/2018 12:30 PM   Securement Method Securing device 12/10/2018 12:30 PM   Catheter care complete Yes 12/10/2018  2:30 PM   Output (mL) 325 mL 12/10/2018  3:34 PM       Findings: austin free flowing yellow pus in the epidural space with severe thecal sac compression    Complications: none    Details: The patient was brought to the operating room where general endotracheal anesthesia was induced.      Positioning: The patient was then turned prone onto the [place] table and care was taken to paddle susceptible areas.  The lumbar area was prepped and draped in the usual sterile manner.    Approach: Incision was made from the L2 spinous process to the S2 spinous processside and then the paraspinous musculature was then elevated from the spinous process and the lamina.  X-ray confirmation of the appropriate levels was then accomplished.  Using the high-speed drill I removed the laminal of L3, L4 and L5 and the superior lamina of S1.     Microscope: The operating microscope was then draped sterilely and brought into the field and the rest of the operation performed under operative magnification with microdissection technique and micro-illumination.    Decompression: I encountered austin pus. I widened the laminectomies. I cultured the pus. I removed the pus and adherent phlegman taking care not to violate the dura. I used copious Vancomycin irrigation.      Closure: Copious irrigation, hemostasis was obtained, the wound was then closed over two drains in appropriate skin reapproximated with Monocryl and Dermabond.  The patient was then awakened from general endotracheal anesthesia extubated and taken the recovery room in stable and good  condition.      Tevin Whitley MD     Date: 12/17/2018  Time: 9:18 AM

## 2018-12-17 NOTE — THERAPY TREATMENT NOTE
Acute Care - Physical Therapy Treatment Note  [facility]     Patient Name: Silvia Velazquez  : 1975  MRN: [id number]  Today's Date: 2018  Onset of Illness/Injury or Date of Surgery: 18          Admit Date: 2018    Visit Dx:    ICD-10-CM ICD-9-CM   1. Spinal epidural abscess G06.1 324.1   2. Bacteremia due to methicillin resistant Staphylococcus aureus R78.81 790.7     041.12   3. Abscess of paraspinal muscles, lumbar M62.89 728.89   4. Generalized weakness R53.1 780.79     Patient Active Problem List   Diagnosis   • Spinal epidural abscess       Therapy Treatment    Rehabilitation Treatment Summary     Row Name 18 1245             Treatment Time/Intention    Discipline  physical therapy assistant  -      Document Type  therapy note (daily note)  -      Subjective Information  complains of;weakness;fatigue;pain  -      Care Plan Review  patient/other agree to care plan  -      Comment  boyfriend in rm but sleeping  -      Existing Precautions/Restrictions  fall;spinal  -      Recorded by [JM] Lisa Acuña, PTA 18 1431      Row Name 18 1245             Bed Mobility Assessment/Treatment    Supine-Sit [place] (Bed Mobility)  minimum assist (75% patient effort);contact guard  -      Assistive Device (Bed Mobility)  bed rails;head of bed elevated attempted to educ on rail and HOB but too quick to sit  -      Comment (Bed Mobility)  reminded of log roll technique  -      Recorded by [JM] Lisa Acuña, PTA 18 1431      Row Name 18 1245             Sit-Stand Transfer    Sit-Stand [place] (Transfers)  contact guard;verbal cues cues for hand placement  -      Assistive Device (Sit-Stand Transfers)  walker, front-wheeled elevated bed-educ offered due to plans home  -      Recorded by [] Lisa Acuña, PTA 18 1431      Row Name 18 1245             Stand-Sit Transfer    Stand-Sit [place] (Transfers)   supervision;verbal cues  -JM      Assistive Device (Stand-Sit Transfers)  walker, front-wheeled  -JM      Recorded by [] Lisa Acuña, [facility] 12/17/18 1431      Row Name 12/17/18 1245             Toilet Transfer    [place] Level (Toilet Transfer)  contact guard;verbal cues  -JM      Assistive Device (Toilet Transfer)  commode, bedside without drop arms;grab bars/safety frame  -JM      Recorded by [] Lisa Acuña, [facility] 12/17/18 1431      Row Name 12/17/18 1245             Gait/Stairs Assessment/Training    [place] Level (Gait)  contact guard;supervision  -      Assistive Device (Gait)  walker, front-wheeled  -JM      Distance in Feet (Gait)  150  -JM      Deviations/Abnormal Patterns (Gait)  yamilet decreased;antalgic  -JM      Recorded by [] Lisa Acuña, [facility] 12/17/18 1431      Row Name 12/17/18 1245             Positioning and Restraints    Pre-Treatment Position  in bed  -JM      In Bed  sitting EOB;with nsg  -JM      Recorded by [] Lisa Acuña, NICOLE 12/17/18 1431      Row Name 12/17/18 1245             Pain Scale: Numbers Pre/Post-Treatment    Pain Scale: Numbers, Pretreatment  6/10  -JM      Pain Scale: Numbers, Post-Treatment  4/10  -JM      Pain Location  back  -JM      Pain Intervention(s)  Medication (See MAR);Repositioned;Ambulation/increased activity ice in room , after lunch eaten will place back on   -JM      Recorded by [] Lisa Acuña, NICOLE 12/17/18 1431      Row Name                Wound 12/09/18 2154 Bilateral back incision    Wound - Properties Group Date first assessed: 12/09/18 [KK] Time first assessed: 2154 [KK] Side: Bilateral [KK] Location: back [KK] Type: incision [KK] Recorded by:  [KK] Bárbara Huerta RN 12/09/18 2154      User Key  (r) = Recorded By, (t) = Taken By, (c) = Cosigned By    Initials Name Effective Dates Discipline    Lisa Chisholm, NICOLE 03/07/18 -  PT    KK Bárbara Huerta RN 09/30/16 -  Nurse          Wound 12/09/18 2154 Bilateral back  incision (Active)   Dressing Appearance open to air 12/17/2018  8:19 AM   Closure Liquid skin adhesive;Approximated 12/17/2018  8:19 AM   Base closed/resurfaced 12/17/2018  8:19 AM   Periwound intact;dry 12/17/2018  8:19 AM   Drainage Amount none 12/17/2018  8:19 AM           Physical Therapy Education     Title: PT OT SLP Therapies (Done)     Topic: Physical Therapy (Done)     Point: Mobility training (Done)     Learning Progress Summary           Patient Acceptance, E,TB,D, VU,NR by KENTON at 12/17/2018  2:32 PM    Comment:  cues for back safety and log rolling importance    Acceptance, E,TB, VU,DU by LEONARDO at 12/15/2018  2:25 PM    Acceptance, E, VU,NR by  at 12/14/2018  9:47 AM    Acceptance, E,TB,D, VU,NR by  at 12/11/2018  4:22 PM    Acceptance, E,TB,D, VU,NR by  at 12/10/2018  4:29 PM                   Point: Home exercise program (Done)     Learning Progress Summary           Patient Acceptance, E,TB,D, VU,NR by KENTON at 12/17/2018  2:32 PM    Comment:  cues for back safety and log rolling importance    Acceptance, E,TB, VU,DU by LEONARDO at 12/15/2018  2:25 PM    Acceptance, E, VU,NR by  at 12/14/2018  9:47 AM    Acceptance, E,TB,D, VU,NR by  at 12/10/2018  4:29 PM                   Point: Body mechanics (Done)     Learning Progress Summary           Patient Acceptance, E,TB,D, VU,NR by KENTON at 12/17/2018  2:32 PM    Comment:  cues for back safety and log rolling importance    Acceptance, E,TB, VU,DU by LEONARDO at 12/15/2018  2:25 PM    Acceptance, E, VU,NR by  at 12/14/2018  9:47 AM    Acceptance, E,TB,D, VU,NR by  at 12/11/2018  4:22 PM    Acceptance, E,TB,D, VU,NR by  at 12/10/2018  4:29 PM                   Point: Precautions (Done)     Learning Progress Summary           Patient Acceptance, E,TB,D, VU,NR by KENTON at 12/17/2018  2:32 PM    Comment:  cues for back safety and log rolling importance    Acceptance, E,TB, EUGENIO,DU by CW at 12/15/2018  2:25 PM    AcceptanceTHERESE VU, NR by  at 12/14/2018  9:47 AM     Acceptance, E,TB,D, VU,NR by  at 12/11/2018  4:22 PM    Acceptance, E,TB,D, VU,NR by  at 12/10/2018  4:29 PM                               User Key     Initials Effective Dates Name Provider Type [facility] 04/03/18 -  Camille Emanuel, PT Physical Therapist PT     04/03/18 -  Daisy Fowler, PT Physical Therapist PT     03/07/18 -  Lisa Acuña PTA Physical Therapy Assistant PT     03/07/18 -  Brent Pratt, NICOLE Physical Therapy Assistant PT                PT Recommendation and Plan     Plan of Care Reviewed With: patient  Progress: improving  Outcome Summary: incr amb dist , feels less pain after moving ; [facility] [place] [facility]  Outcome Measures     Row Name 12/17/18 1200 12/15/18 1400          How much help from another person do you currently need...    Turning from your back to your side while in flat bed without using bedrails?  3  -JM  3  -CW     Moving from lying on back to sitting on the side of a flat bed without bedrails?  3  -JM  3  -CW     Moving to and from a bed to a chair (including a wheelchair)?  3  -JM  3  -CW     Standing up from a chair using your arms (e.g., wheelchair, bedside chair)?  3  -  3  -CW     Climbing 3-5 steps with a railing?  2  -JM  2  -CW     To walk in hospital room?  3  -  3  -CW     AM-PAC 6 Clicks Score  17  -JM  17  -CW        Functional Assessment    Outcome Measure Options  --  AM-PAC 6 Clicks Basic Mobility (PT)  -CW       User Key  (r) = Recorded By, (t) = Taken By, (c) = Cosigned By    Initials Name Provider Type     Lisa Acuña PTA Physical Therapy Assistant     Brent Pratt, NICOLE Physical Therapy Assistant         Time Calculation:   PT Charges     Row Name 12/17/18 1219             Time Calculation    Start Time  1128  -      Stop Time  1153  -      Time Calculation (min)  25 min  -      PT Received On  12/17/18  -      PT - Next Appointment  12/18/18  -         Time Calculation- PT    Total  Timed Code Minutes- PT  25 minute(s)  -KENTON        User Key  (r) = Recorded By, (t) = Taken By, (c) = Cosigned By    Initials Name Provider Type    Lisa Chisholm PTA Physical Therapy Assistant        Therapy Suggested Charges     Code   Minutes Charges    None           Therapy Charges for Today     Code Description Service Date Service Provider Modifiers Qty    79418830066 HC PT THER PROC EA 15 MIN 12/17/2018 Lisa Acuña PTA GP 2          PT G-Codes  Outcome Measure Options: AM-PAC 6 Clicks Basic Mobility (PT)  AM-PAC 6 Clicks Score: 17    Lisa Acuña PTA  12/17/2018
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
## 2018-12-17 NOTE — PROGRESS NOTES
LOS: 8 days   Patient Care Team:  Provider, Luann Known as PCP - General    Subjective     Patient inadvertently the fell off computer list.  She reports she has been up walking pretty good today despite a good day her pain is less.  She is fixated on parasites in her skin she says she sure she has felt warm she says she seen in coming out and gave him to the nurses today the nurses here and says she absolutely did not.  The patient has some lesions on her scan interestingly they look like pick lesions and there are only regions that she can reach.  She was upset with the nurse when we discontinued IV narcotics.  I have spoken with the caseworkers there is the possibility that she can go to a treatment center in [facility] that could also administer her IV antibiotics otherwise she is can have to come in for a month daily and get a new IV because she can't be discharged to the community with her drug addiction with a IV in place.  She seems to understand that.  She seems to be willing to go to the [facility] treatment facility    Review of Systems:          Objective     Vital Signs  Vital Sign Min/Max for last 24 hours  Temp  Min: 98.6 °F (37 °C)  Max: 99.6 °F (37.6 °C)   BP  Min: 120/81  Max: 141/76   Pulse  Min: 82  Max: 99   Resp  Min: 17  Max: 18   SpO2  Min: 95 %  Max: 100 %   No Data Recorded   No Data Recorded        Ventilator/Non-Invasive Ventilation Settings (From admission, onward)    None                       Body mass index is 29.38 kg/m².  I/O last 3 completed shifts:  In: 620 [P.O.:120; IV Piggyback:500]  Out: -   No intake/output data recorded.        Physical Exam:  General Appearance: Well-developed white female she is resting in bed she doesn't appear in any acute distress  Eyes: Conjunctiva are clear and anicteric pupils are about 2-1/2 mm they are equal.  There is no conjunctival hemorrhages  ENT: Mucous membranes are moist no erythema or exudates  Neck: No lymphadenopathy or thyromegaly no  jugular venous distention trachea midline  Lungs: Clear nonlabored symmetric expansion no wheezes rales or rhonchi  Cardiac: Regular rate and rhythm there is a 2/6 systolic murmur heard best at the lower left sternal border  Abdomen: Soft nontender no palpable organomegaly or masses  : Not examined  Musculoskeletal: Grossly normal her lower spine incision appears to be healing well there is no erythema no drainage  Skin: He has a bunch of lesions on her legs and arms they look like pick lesions like she's been picking her skin I guess it could be prior injection sites.  They do not look like hookworm or other parasites to me.  Neuro: She is alert and oriented she is cooperative she is following commands moving extremities  Extremities/P Vascular: No clubbing no cyanosis no edema palpable radial dorsalis pedis pulses.  I don't see any splinter hemorrhages  MSE: She seems to be relatively pleasant right now       Labs:  Results from last 7 days   Lab Units  12/17/18   0403  12/16/18   0437  12/15/18   0514  12/14/18   0820  12/13/18   0357  12/12/18   0621  12/11/18   1011   GLUCOSE mg/dL  145*  82  129*  115*  128*  105*  99   SODIUM mmol/L  128*  129*  130*  133*  133*  135*  134*   POTASSIUM mmol/L  4.0  4.1  3.9  3.9  4.0  4.1  2.9*   MAGNESIUM mg/dL  1.9  2.0  1.7  1.8  1.7  1.9  1.5*   CO2 mmol/L  28.6  27.1  29.2*  26.9  25.9  26.0  25.1   CHLORIDE mmol/L  89*  95*  93*  95*  96*  98  99   ANION GAP mmol/L  10.4  6.9  7.8  11.1  11.1  11.0  9.9   CREATININE mg/dL  0.58  0.48*  0.53*  0.55*  0.48*  0.47*  0.46*   BUN mg/dL  12  9  9  6  5*  3*  8   BUN / CREAT RATIO   20.7  18.8  17.0  10.9  10.4  6.4*  17.4   CALCIUM mg/dL  8.4*  8.0*  8.5*  8.8  8.6  8.6  7.9*   EGFR IF NONAFRICN AM mL/min/1.73  115  143  127  122  143  146  150   ALK PHOS U/L  96  84  86  88  86  78   --    TOTAL PROTEIN g/dL  7.8  7.4  7.5  8.2  7.6  7.6   --    ALT (SGPT) U/L  21  20  18  22  24  29   --    AST (SGOT) U/L  30  24  25  24   26  31   --    BILIRUBIN mg/dL  0.4  0.4  0.4  0.4  0.4  0.4   --    ALBUMIN g/dL  2.60*  2.50*  2.70*  2.70*  2.50*  2.60*   --    GLOBULIN gm/dL  5.2  4.9  4.8  5.5  5.1  5.0   --      Estimated Creatinine Clearance: 138.2 mL/min (by C-G formula based on SCr of 0.58 mg/dL).      Results from last 7 days   Lab Units  12/17/18   0403  12/16/18   0437  12/15/18   0514  12/14/18   0820  12/13/18   0357  12/12/18   0621  12/11/18   1011   WBC 10*3/mm3  13.96*  14.45*  14.10*  13.76*  12.64*  10.25  11.24*   RBC 10*6/mm3  3.36*  3.45*  3.51*  3.76*  3.71*  3.69*  3.37*   HEMOGLOBIN g/dL  9.2*  9.6*  9.8*  10.5*  10.3*  10.2*  9.3*   HEMATOCRIT %  28.8*  29.4*  29.9*  33.1*  31.4*  30.9*  29.6*   MCV fL  85.7  85.2  85.2  88.0  84.6  83.7  87.8   MCH pg  27.4  27.8  27.9  27.9  27.8  27.6  27.6   MCHC g/dL  31.9*  32.7  32.8  31.7*  32.8  33.0  31.4*   RDW %  16.5*  16.6*  16.6*  16.4*  16.4*  16.4*  16.3*   RDW-SD fl  51.8  51.7  51.8  52.9  50.8  50.3  52.9   MPV fL  10.6  10.5  10.8  10.5  11.0  11.1  11.3   PLATELETS 10*3/mm3  350  351  381  366  285  253  243   NEUTROPHIL % %  76.7*   --    --    --    --    --    --    LYMPHOCYTE % %  12.5*   --    --    --    --    --    --    MONOCYTES % %  9.5   --    --    --    --    --    --    EOSINOPHIL % %  1.2   --    --    --    --    --    --    BASOPHIL % %  0.1   --    --    --    --    --    --    IMM GRAN % %  0.8*   --    --    --    --    --    --    NEUTROS ABS 10*3/mm3  10.71*   --    --    --    --    --    --    LYMPHS ABS 10*3/mm3  1.74   --    --    --    --    --    --    MONOS ABS 10*3/mm3  1.32*   --    --    --    --    --    --    EOS ABS 10*3/mm3  0.17   --    --    --    --    --    --    BASOS ABS 10*3/mm3  0.02   --    --    --    --    --    --    IMMATURE GRANS (ABS) 10*3/mm3  0.11*   --    --    --    --    --    --    NRBC /100 WBC  0.0   --    --    --    --    --    --                              Microbiology Results (last 10 days)      Procedure Component Value - Date/Time    Blood Culture - Blood, Hand, Right [[id number]] Collected:  12/11/18 1029    Lab Status:  Final result Specimen:  Blood from Hand, Right Updated:  12/16/18 1046     Blood Culture No growth at 5 days    Blood Culture - Blood, Arm, Left [[id number]] Collected:  12/11/18 1011    Lab Status:  Final result Specimen:  Blood from Arm, Left Updated:  12/16/18 1046     Blood Culture No growth at 5 days    Anaerobic Culture - Tissue, Spine, Lumbar [[id number]] Collected:  12/09/18 2127    Lab Status:  Final result Specimen:  Tissue from Spine, Lumbar Updated:  12/14/18 1128     Culture No anaerobes isolated at 5 days    Fungus Culture - Tissue, Spine, Lumbar [[id number]] Collected:  12/09/18 2127    Lab Status:  Preliminary result Specimen:  Tissue from Spine, Lumbar Updated:  12/16/18 2146     Fungus Culture No fungus isolated at 1 week    Tissue / Bone Culture - Tissue, Spine, Lumbar [[id number]]  (Abnormal) Collected:  12/09/18 2127    Lab Status:  Edited Result - FINAL Specimen:  Tissue from Spine, Lumbar Updated:  12/11/18 0705     Tissue Culture Light growth (2+) Staphylococcus aureus, MRSA     Comment:   Methicillin resistant Staphylococcus aureus, Patient may be an isolation risk.        Gram Stain Moderate (3+) WBCs seen      Rare (1+) Gram positive cocci    Narrative:       Refer to previous wound culture collected on 12/9/2018 2125 for MICS    AFB Culture - Tissue, Spine, Lumbar [[id number]] Collected:  12/09/18 2127    Lab Status:  Preliminary result Specimen:  Tissue from Spine, Lumbar Updated:  12/16/18 2146     AFB Culture No AFB isolated at 1 week     AFB Stain No acid fast bacilli seen on direct smear    Wound Culture - Wound, Spine, Lumbar [[id number]]  (Abnormal) Collected:  12/09/18 2126    Lab Status:  Final result Specimen:  Wound from Spine, Lumbar Updated:  12/11/18 0704     Wound Culture Heavy growth (4+) Staphylococcus aureus, MRSA     Comment:   Methicillin  "resistant Staphylococcus aureus, Patient may be an isolation risk.        Gram Stain Moderate (3+) WBCs seen      Few (2+) Gram positive cocci    Narrative:       Refer to previous wound culture collected on 12/9/2018 2125 for MICS    Wound Culture - Wound, Spine, Lumbar [[id number]]  (Abnormal)  (Susceptibility) Collected:  12/09/18 2125    Lab Status:  Final result Specimen:  Wound from Spine, Lumbar Updated:  12/11/18 0703     Wound Culture Moderate growth (3+) Staphylococcus aureus, MRSA     Comment: D test is positive. Isolate exhibits \"inducible\" resistance to Clindamycin.    Methicillin resistant Staphylococcus aureus, Patient may be an isolation risk.        Gram Stain Moderate (3+) WBCs seen      Few (2+) Gram positive cocci    Susceptibility      Staphylococcus aureus, MRSA     ZAINAB     Clindamycin Resistant     Erythromycin Resistant     Oxacillin Resistant     Penicillin G Resistant     Rifampin Susceptible     Tetracycline Susceptible     Trimethoprim + Sulfamethoxazole Susceptible     Vancomycin Susceptible                    Blood Culture - Blood, Arm, Left [[id number]]  (Abnormal) Collected:  12/08/18 0243    Lab Status:  Edited Result - FINAL Specimen:  Blood from Arm, Left Updated:  12/10/18 0707     Blood Culture Staphylococcus aureus, MRSA     Comment:   Consider infectious disease consult to rule out distant focus of infection.  Methicillin resistant Staphylococcus aureus, Patient may be an isolation risk.        Isolated from Aerobic and Anaerobic Bottles     Gram Stain Anaerobic Bottle Gram positive cocci in clusters      Aerobic Bottle Gram positive cocci in clusters    Narrative:       Refer to previous blood culture collected on 12/8/2018 0208 for ZAINAB's.    Blood Culture ID, PCR - Blood, Arm, Left [[id number]]  (Abnormal) Collected:  12/08/18 0243    Lab Status:  Final result Specimen:  Blood from Arm, Left Updated:  12/09/18 1707     BCID, PCR Staphylococcus aureus. mecA (methicillin " "resistance gene) detected. Identification by BCID PCR.    Blood Culture - Blood, Arm, Left [[id number]]  (Abnormal)  (Susceptibility) Collected:  12/08/18 0208    Lab Status:  Edited Result - FINAL Specimen:  Blood from Arm, Left Updated:  12/10/18 0706     Blood Culture Staphylococcus aureus, MRSA     Comment:   Consider infectious disease consult to rule out distant focus of infection.  D test is positive. Isolate exhibits \"inducible\" resistance to Clindamycin.    Methicillin resistant Staphylococcus aureus, Patient may be an isolation risk.  Corrected result. Previously Reported Organism Changed. Previous result was Staphylococcus aureus on 12/10/2018 at 0650 EST.        Isolated from Aerobic and Anaerobic Bottles     Gram Stain Anaerobic Bottle Gram positive cocci in clusters      Aerobic Bottle Gram positive cocci in clusters    Narrative:       Although Staphylococcus aureus is sensitive to Oxacillin, it possesses the mecA gene, therefore it is an MRSA and should be considered resistant to all beta-lactam antibiotics.    Susceptibility      Staphylococcus aureus, MRSA     ZAINAB     Clindamycin Resistant     Erythromycin Resistant     Oxacillin Susceptible     Penicillin G Resistant     Rifampin Susceptible     Tetracycline Susceptible     Trimethoprim + Sulfamethoxazole Susceptible     Vancomycin Susceptible                                amLODIPine 10 mg Oral Q24H   docusate sodium 100 mg Oral Daily   famotidine 20 mg Oral BID   magnesium oxide 400 mg Oral BID   methadone 30 mg Oral Q8H   methocarbamol 750 mg Oral Q6H   nicotine 1 patch Transdermal Q24H   oxyCODONE 10 mg Oral Q6H   potassium chloride 40 mEq Oral Daily   sennosides-docusate sodium 2 tablet Oral Nightly   vancomycin 1,000 mg Intravenous Q6H       Pharmacy to dose vancomycin        Diagnostics:  Ct Chest With Contrast    Result Date: 12/8/2018  CT SCANS CHEST, ABDOMEN, PELVIS  HISTORY:  fever, short of breath; F19.10-Other psychoactive substance " abuse, uncomplicated  COMPARISON: Abdomen and pelvis CT December 12, 2012.  TECHNIQUE: Radiation dose reduction techniques were utilized, including automated exposure control and exposure modulation based on body size. Axial images were obtained from the thoracic inlet through the symphysis pubis with IV contrast only, per request. Oral contrast was not administered per the referring physician.  This limits evaluation of the GI tract.  FINDINGS CHEST CT:  There are calcified residua of granulomatous disease present. No active disease or pleural fluid. Aorta nonaneurysmal.  FINDINGS ABDOMEN/PELVIS CT:  There has been development of homogeneous splenic enlargement at 18 cm multiple length. A subcentimeter upper pole right renal lesion has developed, likely tiny cyst. It is too small for detailed assessment. Prior cholecystectomy. Remaining solid organs have an unremarkable appearance. Normal aorta and appendix. The GI tract not opacified for assessment but non obstructive in appearance.     IMPRESSION CHEST CT:  1. Unremarkable CT thorax  IMPRESSION ABDOMEN & PELVIS CT:  1. Homogeneous splenic enlargement. 2. Tiny right renal lesion, likely cyst     This report was finalized on 12/8/2018 4:36 AM by Luis Pelaez M.D.      Ct Abdomen Pelvis With Contrast    Result Date: 12/8/2018  CT SCANS CHEST, ABDOMEN, PELVIS  HISTORY:  fever, short of breath; F19.10-Other psychoactive substance abuse, uncomplicated  COMPARISON: Abdomen and pelvis CT December 12, 2012.  TECHNIQUE: Radiation dose reduction techniques were utilized, including automated exposure control and exposure modulation based on body size. Axial images were obtained from the thoracic inlet through the symphysis pubis with IV contrast only, per request. Oral contrast was not administered per the referring physician.  This limits evaluation of the GI tract.  FINDINGS CHEST CT:  There are calcified residua of granulomatous disease present. No active disease or  pleural fluid. Aorta nonaneurysmal.  FINDINGS ABDOMEN/PELVIS CT:  There has been development of homogeneous splenic enlargement at 18 cm multiple length. A subcentimeter upper pole right renal lesion has developed, likely tiny cyst. It is too small for detailed assessment. Prior cholecystectomy. Remaining solid organs have an unremarkable appearance. Normal aorta and appendix. The GI tract not opacified for assessment but non obstructive in appearance.     IMPRESSION CHEST CT:  1. Unremarkable CT thorax  IMPRESSION ABDOMEN & PELVIS CT:  1. Homogeneous splenic enlargement. 2. Tiny right renal lesion, likely cyst     This report was finalized on 12/8/2018 4:36 AM by Luis Pelaez M.D.      Xr Chest 1 View    Result Date: 12/8/2018  PORTABLE CHEST  CLINICAL HISTORY:  fever  COMPARISON:  May 10, 2012.  FINDINGS:  Single portable view of the chest obtained.  The lungs are well expanded and clear.  Cardiac size is within normal limits. Vascularity is normal considering technique.  No pleural fluid is demonstrated by portable imaging.             No active disease by portable imaging.  This report was finalized on 12/8/2018 2:14 AM by Luis Pelaez M.D.      Fl C Arm During Surgery    Result Date: 12/9/2018  This procedure was auto-finalized with no dictation required.    Xr Spine Lumbar 1 View    Result Date: 12/9/2018  PROCEDURAL/INTRAOPERATIVE FLUOROSCOPY  ONE VIEW LUMBAR SPINE  HISTORY:  Intraoperative evaluation/fluoroscopic guidance  FINDINGS:  C-arm fluoroscopy was provided intraoperatively for the referring service.  The amount of fluoroscopy time was not provided to the radiologist.  The two submitted image(s) demonstrate intraoperative C-arm images of the lumbosacral spine with localizer probes posteriorly.   Please refer to the operative report for detailed findings and any follow-up suggestions per the referring service.      Intraoperative fluoroscopy as above.       Mri Lumbar Spine With & Without  Contrast    Result Date: 12/10/2018  MRI OF THE LUMBAR SPINE WITH AND WITHOUT CONTRAST 12/09/2018  HISTORY: Fever, back pain. IV drug use.  Multiple precontrast and postcontrast sagittal and axial images were obtained through the lumbar spine.  In the posterior spinal canal extending from the inferior aspect of L3 to the superior aspect of L1 there is a multiloculated collection which demonstrates T1 low signal, T2 bright signal and peripheral enhancement. This is consistent with an epidural abscess and measures approximately 8.0 cm in craniocaudal dimension by approximately 1.4 cm in diameter. This produces moderately severe narrowing of the spinal canal.  Posterior to the inferior aspect of the L1 vertebra and posterior to the L2 vertebra in the anterior spinal canal is an additional thin area of T1 low signal and T2 bright signal which may represent additional epidural abscess. It measures 3 mm to 4 mm in thickness at the approximate level of the L2 vertebra.  Within the left paraspinal musculature, there is T2 edema and multilocular fluid collection measuring up to 4.6 cm in craniocaudal dimension by approximately 2.7 cm in greatest oblique transverse dimension.  There is no evidence of discitis or osteomyelitis. There is a disc bulge at T11-T12 with minimal disc bulges at L4-5 and L5-S1. The lower cord and conus appear normal.  There is no evidence of osteomyelitis.      1.  Multilocular peripherally enhancing fluid collection in the posterior spinal canal extending from L3 to S1 with dimensions given above. This is consistent with an epidural abscess. Additional thin collection is seen posterior to the inferior aspect of L1 and the L2 vertebra in the anterior spinal canal which may represent additional abscess. 2.  Left paraspinal musculature abscess is also seen with dimensions given above. 3.  Lower thoracic and degenerative disc disease as described. 4.  Findings were discussed with Dr. Cárdenas.  This report  was finalized on 12/10/2018 9:16 AM by Dr. Pravin Macias M.D.      Results for orders placed during the hospital encounter of 12/09/18   Adult Transthoracic Echo Complete W/ Cont if Necessary Per Protocol    Narrative · Left ventricular systolic function is normal. Estimated EF = 59%. Normal   left ventricular cavity size and wall thickness noted. All left   ventricular wall segments contract normally. Left ventricular diastolic   dysfunction is noted (grade II w/high LAP) consistent with   pseudonormalization.  · Trace tricuspid valve regurgitation is present. Estimated right   ventricular systolic pressure from tricuspid regurgitation is normal (<35   mmHg).              Active Hospital Problems    Diagnosis Date Noted   • Spinal epidural abscess [G06.1] 12/09/2018      Resolved Hospital Problems   No resolved problems to display.         Assessment/Plan     1. MRSA bacteremic sepsis infectious diseases recommended 6 weeks of antibiotics from her last surgical intervention which is 12/9/18  2. MRSA epidural paraspinal abscess with cord compression  3. Status post lumbar decompression 12/9/18.  4. IV drug abuse heroin and methamphetamines patient is on a lot of narcotic pain medication somewhere the methadone dealing with her withdraw some of the pain in her back.  I have discontinued IV  narcotics.  Then discussed this with her and we are going slowly weaned back her other narcotics.  5. Acute blood loss anemia on anemia of chronic disease  6. Hyponatremia and hypomagnesemia and hypokalemia  7. Mild hepatitis probably secondary to sepsis  8. Grade 2 diastolic dysfunction    Plan for disposition: The best option for this patient would be if she can go to a treatment center that can help her with her drugs and still allow her to get her intravenous antibiotics.  There are not many options I'm just afraid she would not do well coming back and forth for 30 days to get IV antibiotics daily much less get adequate  treatment for her addiction    Aniceto Salinas MD  12/17/18  6:17 PM    Time:

## 2018-12-17 NOTE — PROGRESS NOTES
"Continued Stay Note  [facility]     Patient Name: Silvia Velazquez  MRN: [id number]  Today's Date: 12/17/2018    Admit Date: 12/9/2018    Discharge Plan     Row Name 12/17/18 1238       Plan    Plan  Home vs [facility] in [place]    Patient/Family in Agreement with Plan  yes    Plan Comments  HR CCP received a call back from Carol, nurse for [facility] in [place].  She states that they COULD accept pt once her pain is under better control, her pain meds are weaned down and a PICC is placed.  They WILL be able to give her IV Abx daily as ordered.   They are a residential program and have an MD and nursing on site.  They will not give pt Methadone.  They use Suboxone.  Carol will speak with their MD to see what pain meds will need to be weaned to in order to be accepted into their program.  She will have a PICC for her ABX and will be transported via [facility] to [facility] at discharge.  Spoke with pt at bedside.  She would like to speak with staff at [facility] prior to making a final decision about admitting into their program.  Notified Carol and she will give pt a call to discuss the program.  HR CCP will follow up.    Row Name 12/17/18 1050       Plan    Plan  Home with her boyfriend and daily appointments with [facility] for her IV Vancomycin    Patient/Family in Agreement with Plan  yes    Plan Comments  Notes reviewed.  Discussed with Dr. Salinas.  HR CCP spoke with pt and her boyfriend at bedside.  She states that she is doing better and getting to the BSC \"alone until the pain got bad this morning\".  Pt is in agreement with plan for home with her boyfriend and return daily to [facility] for IV Abx.  Discussed with pt's RN.  Will need final antibiotic and lab work orders for [facility] planning.          Discharge Codes    No documentation.             Yolanda Altman RN    "

## 2018-12-17 NOTE — PROGRESS NOTES
Continued Stay Note  [facility]     Patient Name: Silvia Velazquez  MRN: [id number]  Today's Date: 12/17/2018    Admit Date: 12/9/2018    Discharge Plan     Row Name 12/17/18 8420       Plan    Plan  [facility] treatment center in [place] pending eval    Patient/Family in Agreement with Plan  yes    Plan Comments  HR CCP spoke again with pt at bedside.  She has spoken with staff at [facility] and is in agreement with the plan to go there for residential [facility] treatment (with the ability to get IV Abx) at discharge.  She is requesting a rolling walker, BSC and shower chair at discharge.  She chooses [facility] to be her DME provider.  Left VM with Gosia for [facility].  HR CCP will contact [facility] for further evaluation.  Pt will need to be independent with ADLs and her pain meds will need to have been weaned down more prior to discharge.  She will also need PICC line placement.    Row Name 12/17/18 7047       Plan    Plan Comments  CCP spoke with RN/Aaliyah; per Aaliyah, patient made claims of human trafficking to her. CCP met with patient alone at bedside. Patient denied said claims of human trafficking. CCP updated RN and advised RN to make APS report regarding patient's claims. CCP will follow to see if APS case is accepted. Sandra Carr [facility]         Discharge Codes    No documentation.             Yolanda Altman RN

## 2018-12-17 NOTE — PROGRESS NOTES
Continued Stay Note  [facility]     Patient Name: Silvia Velazquez  MRN: [id number]  Today's Date: 12/17/2018    Admit Date: 12/9/2018    Discharge Plan     Row Name 12/17/18 1553       Plan    Plan Comments  CCP spoke with RN/Aaliyah; per Aaliyah, patient made claims of human trafficking to her. CCP met with patient alone at bedside. Patient denied said claims of human trafficking. CCP updated RN and advised RN to make APS report regarding patient's claims. CCP will follow to see if APS case is accepted. Sandra Carr [facility]         Discharge Codes    No documentation.             HANS Balbuena

## 2018-12-17 NOTE — TELEPHONE ENCOUNTER
----- Message from SYLWIA Brunson sent at 12/17/2018 10:17 AM EST -----  Please arrange outpatient follow-up with MRI lumbar spine (with and without contrast) status post surgical decompression for epidural abscess.  Okay to see NP.  Think

## 2018-12-17 NOTE — PLAN OF CARE
Problem: Patient Care Overview  Goal: Plan of Care Review  Outcome: Ongoing (interventions implemented as appropriate)   12/17/18 1431   Coping/Psychosocial   Plan of Care Reviewed With patient   Plan of Care Review   Progress improving   OTHER   Outcome Summary incr amb dist , feels less pain after moving ; plans home at [place] , wants rwx,bsc for home

## 2018-12-17 NOTE — PLAN OF CARE
Problem: Patient Care Overview  Goal: Plan of Care Review  Outcome: Ongoing (interventions implemented as appropriate)   12/17/18 0418   Coping/Psychosocial   Plan of Care Reviewed With patient   Plan of Care Review   Progress no change   OTHER   Outcome Summary Patient requesting pain meds every 30mins-1hr. Rates pain 9/10. Patient appears relaxed, eating throughout shift, and up to BSC with ease. VSS, no other changes. Significant other at bedside.

## 2018-12-17 NOTE — PROGRESS NOTES
"  [facility] PROGRESS NOTE    PATIENT IDENTIFICATION:   Name:  Silvia Velazquez      MRN:  [id number]     43 y.o.  female               CC:post op epidural abscess/ MRSA sepsis      Subjective     Interval History: has complaints of pain all over, concerned she \"parasites\" \"hookworms are in my legs\", requesting tx for this issue.    Objective     Vital signs in last 24 hours:  Temp:  [98.6 °F (37 °C)-99.6 °F (37.6 °C)] 98.6 °F (37 °C)  Heart Rate:  [] 99  Resp:  [18] 18  BP: (128-141)/(76-94) 141/76  ICP ranges-    Intake/Output last 3 shifts:  I/O last 3 completed shifts:  In: 620 [P.O.:120; IV Piggyback:500]  Out: -     Intake/Output this shift:  No intake/output data recorded.      Physical Exam:  General:   Awake, alert, and oriented x 3. NAD  Sitting on side of bed  Chronically ill-appearing  Midline lumbar incision site healing well, flat, normal surrounding skin  Moving all extremities well  Strength equal bilateral lower extremities  Gait is stable with assistance  Scratches and abrasions on all extremities          LABS:    Lab Results (last 24 hours)     Procedure Component Value Units Date/Time    Phosphorus [[id number]]  (Normal) Collected:  12/17/18 0403    Specimen:  Blood Updated:  12/17/18 0546     Phosphorus 3.7 mg/dL     Magnesium [[id number]]  (Normal) Collected:  12/17/18 0403    Specimen:  Blood Updated:  12/17/18 0546     Magnesium 1.9 mg/dL     Comprehensive Metabolic Panel [[id number]]  (Abnormal) Collected:  12/17/18 0403    Specimen:  Blood Updated:  12/17/18 0546     Glucose 145 mg/dL      BUN 12 mg/dL      Creatinine 0.58 mg/dL      Sodium 128 mmol/L      Potassium 4.0 mmol/L      Chloride 89 mmol/L      CO2 28.6 mmol/L      Calcium 8.4 mg/dL      Total Protein 7.8 g/dL      Albumin 2.60 g/dL      ALT (SGPT) 21 U/L      AST (SGOT) 30 U/L      Alkaline Phosphatase 96 U/L      Total Bilirubin 0.4 mg/dL      eGFR Non African Amer 115 mL/min/1.73      Globulin 5.2 " gm/dL      A/G Ratio 0.5 g/dL      BUN/Creatinine Ratio 20.7     Anion Gap 10.4 mmol/L     Sedimentation Rate [[id number]]  (Abnormal) Collected:  12/17/18 0403    Specimen:  Blood Updated:  12/17/18 0627     Sed Rate 133 mm/hr     C-reactive Protein [[id number]]  (Abnormal) Collected:  12/17/18 0403    Specimen:  Blood Updated:  12/17/18 0546     C-Reactive Protein 8.90 mg/dL     CBC & Differential [[id number]] Collected:  12/17/18 0403    Specimen:  Blood Updated:  12/17/18 0527    Narrative:       The following orders were created for panel order CBC & Differential.  Procedure                               Abnormality         Status                     ---------                               -----------         ------                     CBC Auto Differential[[id number]]        Abnormal            Final result                 Please view results for these tests on the individual orders.    CBC Auto Differential [[id number]]  (Abnormal) Collected:  12/17/18 0403    Specimen:  Blood Updated:  12/17/18 0527     WBC 13.96 10*3/mm3      RBC 3.36 10*6/mm3      Hemoglobin 9.2 g/dL      Hematocrit 28.8 %      MCV 85.7 fL      MCH 27.4 pg      MCHC 31.9 g/dL      RDW 16.5 %      RDW-SD 51.8 fl      MPV 10.6 fL      Platelets 350 10*3/mm3      Neutrophil % 76.7 %      Lymphocyte % 12.5 %      Monocyte % 9.5 %      Eosinophil % 1.2 %      Basophil % 0.1 %      Immature Grans % 0.8 %      Neutrophils, Absolute 10.71 10*3/mm3      Lymphocytes, Absolute 1.74 10*3/mm3      Monocytes, Absolute 1.32 10*3/mm3      Eosinophils, Absolute 0.17 10*3/mm3      Basophils, Absolute 0.02 10*3/mm3      Immature Grans, Absolute 0.11 10*3/mm3      nRBC 0.0 /100 WBC           IMAGING STUDIES:  No new imaging      Meds reviewed/changed: Yes    Current Facility-Administered Medications   Medication Dose Route Frequency Provider Last Rate Last Dose   • aluminum-magnesium hydroxide-simethicone (MAALOX MAX) 400-400-40 MG/5ML suspension 30 mL  30 mL Oral  Q4H PRN Mary Sanchez MD   30 mL at 12/15/18 1810   • amLODIPine (NORVASC) tablet 10 mg  10 mg Oral Q24H Aldo Lindsey MD   10 mg at 12/17/18 0820   • docusate sodium (COLACE) capsule 100 mg  100 mg Oral Daily Caroline Randle APRCM   100 mg at 12/17/18 0820   • famotidine (PEPCID) tablet 20 mg  20 mg Oral BID Mary Sanchez MD   20 mg at 12/17/18 0819   • hydrALAZINE (APRESOLINE) injection 20 mg  20 mg Intravenous Q4H PRN Aldo Lindsey MD   20 mg at 12/13/18 1118   • magnesium oxide (MAG-OX) tablet 400 mg  400 mg Oral BID Yasmani Encarnacion MD   400 mg at 12/17/18 0820   • Magnesium Sulfate 2 gram Bolus, followed by 8 gram infusion (total Mg dose 10 grams)- Mg less than or equal to 1mg/dL  2 g Intravenous PRN Yasmani Encarnacion MD        Or   • Magnesium Sulfate 2 gram / 50mL Infusion (GIVE X 3 BAGS TO EQUAL 6GM TOTAL DOSE) - Mg 1.1 - 1.5 mg/dl  2 g Intravenous PRN Yasmani Encarnacion MD 25 mL/hr at 12/11/18 2017 2 g at 12/11/18 2017    Or   • Magnesium Sulfate 4 gram infusion- Mg 1.6-1.9 mg/dL  4 g Intravenous PRN Yasmani Encarnacion MD       • methadone (DOLOPHINE) tablet 30 mg  30 mg Oral Q8H Aldo Lindsey MD   30 mg at 12/17/18 1040   • methocarbamol (ROBAXIN) tablet 750 mg  750 mg Oral Q6H Yasmani Encarnacion MD   750 mg at 12/17/18 1156   • naloxone (NARCAN) injection 0.1 mg  0.1 mg Intravenous Q5 Min PRN Tevin Whitley MD       • nicotine (NICODERM CQ) 21 MG/24HR patch 1 patch  1 patch Transdermal Q24H Yasmani Encarnacion MD   1 patch at 12/17/18 0820   • ondansetron (ZOFRAN) tablet 4 mg  4 mg Oral Q6H PRN Tevin Whitley MD   4 mg at 12/14/18 1333    Or   • ondansetron ODT (ZOFRAN-ODT) disintegrating tablet 4 mg  4 mg Oral Q6H PRN Tevin Whitley MD        Or   • ondansetron (ZOFRAN) injection 4 mg  4 mg Intravenous Q6H PRN Tevin Whitley MD   4 mg at 12/16/18 2026   • oxyCODONE (ROXICODONE) immediate release tablet 10 mg  10 mg Oral Q6H  "Yasmani Encarnacion MD   10 mg at 12/17/18 1156   • oxyCODONE-acetaminophen (PERCOCET)  MG per tablet 1 tablet  1 tablet Oral Q4H PRN Tevin Whitley MD   1 tablet at 12/17/18 0820   • Pharmacy to dose vancomycin   Does not apply Continuous PRN Fabian Key MD       • potassium chloride (KLOR-CON) packet 40 mEq  40 mEq Oral PRN Yasmani Encarnacion MD       • potassium chloride (MICRO-K) CR capsule 40 mEq  40 mEq Oral PRN Yasmani Encarnacion MD   40 mEq at 12/12/18 0024   • potassium chloride (MICRO-K) CR capsule 40 mEq  40 mEq Oral Daily Yasmani Encarnacion MD   40 mEq at 12/17/18 0819   • sennosides-docusate sodium (SENOKOT-S) 8.6-50 MG tablet 2 tablet  2 tablet Oral Nightly Caroline Randle, SYLWIA   2 tablet at 12/16/18 2014   • sodium chloride 0.9 % flush 10 mL  10 mL Intravenous PRN Perry Cárdenas MD       • vancomycin (VANCOCIN) in iso-osmotic dextrose IVPB 1 g (premix) 200 mL  1,000 mg Intravenous Q6H Fabian Key MD   Stopped at 12/17/18 0820       Assessment/Plan     ASSESSMENT:      Spinal epidural abscess      PLAN: White count slightly trended down to 13.96 today from 14 the past two days.  CRP is elevated at 8.9 and sed rate has increased to 133 from 72 on the 12/14. ID following, she remains on IV vanc.  Clinically she appears stable.  We will continue to monitor her labs.  As noted above, she is requesting treatment for \"parasites\".  The midline lumbar incision site is healing well.    I discussed the patients findings and my recommendations with patient, nursing staff and Dr Whitley       LOS: 8 days       Yanely Montilla, SYLWIA  12/17/2018  12:00 PM        "

## 2018-12-18 LAB
ALBUMIN SERPL-MCNC: 2.7 G/DL (ref 3.5–5.2)
ALBUMIN/GLOB SERPL: 0.5 G/DL
ALP SERPL-CCNC: 98 U/L (ref 39–117)
ALT SERPL W P-5'-P-CCNC: 33 U/L (ref 1–33)
ANION GAP SERPL CALCULATED.3IONS-SCNC: 10 MMOL/L
AST SERPL-CCNC: 46 U/L (ref 1–32)
BILIRUB SERPL-MCNC: 0.3 MG/DL (ref 0.1–1.2)
BUN BLD-MCNC: 13 MG/DL (ref 6–20)
BUN/CREAT SERPL: 26 (ref 7–25)
CALCIUM SPEC-SCNC: 8.8 MG/DL (ref 8.6–10.5)
CHLORIDE SERPL-SCNC: 92 MMOL/L (ref 98–107)
CO2 SERPL-SCNC: 31 MMOL/L (ref 22–29)
CREAT BLD-MCNC: 0.5 MG/DL (ref 0.57–1)
DEPRECATED RDW RBC AUTO: 52.3 FL (ref 37–54)
ERYTHROCYTE [DISTWIDTH] IN BLOOD BY AUTOMATED COUNT: 16.4 % (ref 11.7–13)
GFR SERPL CREATININE-BSD FRML MDRD: 136 ML/MIN/1.73
GLOBULIN UR ELPH-MCNC: 5.1 GM/DL
GLUCOSE BLD-MCNC: 98 MG/DL (ref 65–99)
HCT VFR BLD AUTO: 27.9 % (ref 35.6–45.5)
HGB BLD-MCNC: 8.8 G/DL (ref 11.9–15.5)
MAGNESIUM SERPL-MCNC: 2 MG/DL (ref 1.6–2.6)
MCH RBC QN AUTO: 27.2 PG (ref 26.9–32)
MCHC RBC AUTO-ENTMCNC: 31.5 G/DL (ref 32.4–36.3)
MCV RBC AUTO: 86.4 FL (ref 80.5–98.2)
PHOSPHATE SERPL-MCNC: 4.5 MG/DL (ref 2.5–4.5)
PLATELET # BLD AUTO: 347 10*3/MM3 (ref 140–500)
PMV BLD AUTO: 10.4 FL (ref 6–12)
POTASSIUM BLD-SCNC: 4.2 MMOL/L (ref 3.5–5.2)
PROT SERPL-MCNC: 7.8 G/DL (ref 6–8.5)
RBC # BLD AUTO: 3.23 10*6/MM3 (ref 3.9–5.2)
SODIUM BLD-SCNC: 133 MMOL/L (ref 136–145)
VANCOMYCIN TROUGH SERPL-MCNC: 11.6 MCG/ML (ref 5–20)
VANCOMYCIN TROUGH SERPL-MCNC: 22.7 MCG/ML (ref 5–20)
WBC NRBC COR # BLD: 10 10*3/MM3 (ref 4.5–10.7)

## 2018-12-18 PROCEDURE — 85027 COMPLETE CBC AUTOMATED: CPT | Performed by: INTERNAL MEDICINE

## 2018-12-18 PROCEDURE — 25010000002 VANCOMYCIN PER 500 MG: Performed by: INTERNAL MEDICINE

## 2018-12-18 PROCEDURE — 63710000001 DIPHENHYDRAMINE PER 50 MG: Performed by: INTERNAL MEDICINE

## 2018-12-18 PROCEDURE — 97110 THERAPEUTIC EXERCISES: CPT

## 2018-12-18 PROCEDURE — 84100 ASSAY OF PHOSPHORUS: CPT | Performed by: INTERNAL MEDICINE

## 2018-12-18 PROCEDURE — 83735 ASSAY OF MAGNESIUM: CPT | Performed by: INTERNAL MEDICINE

## 2018-12-18 PROCEDURE — 80053 COMPREHEN METABOLIC PANEL: CPT | Performed by: INTERNAL MEDICINE

## 2018-12-18 PROCEDURE — 80202 ASSAY OF VANCOMYCIN: CPT | Performed by: INTERNAL MEDICINE

## 2018-12-18 RX ORDER — OXYCODONE AND ACETAMINOPHEN 10; 325 MG/1; MG/1
1 TABLET ORAL EVERY 8 HOURS PRN
Status: DISCONTINUED | OUTPATIENT
Start: 2018-12-18 | End: 2018-12-19

## 2018-12-18 RX ADMIN — OXYCODONE HYDROCHLORIDE 10 MG: 5 TABLET ORAL at 10:45

## 2018-12-18 RX ADMIN — VANCOMYCIN HYDROCHLORIDE 1000 MG: 1 INJECTION, SOLUTION INTRAVENOUS at 15:08

## 2018-12-18 RX ADMIN — POTASSIUM CHLORIDE 40 MEQ: 750 CAPSULE, EXTENDED RELEASE ORAL at 09:30

## 2018-12-18 RX ADMIN — AMLODIPINE BESYLATE 10 MG: 10 TABLET ORAL at 09:30

## 2018-12-18 RX ADMIN — FAMOTIDINE 20 MG: 20 TABLET, FILM COATED ORAL at 09:30

## 2018-12-18 RX ADMIN — BISACODYL 10 MG: 10 SUPPOSITORY RECTAL at 04:35

## 2018-12-18 RX ADMIN — METHADONE HYDROCHLORIDE 30 MG: 10 TABLET ORAL at 02:10

## 2018-12-18 RX ADMIN — NICOTINE 1 PATCH: 21 PATCH, EXTENDED RELEASE TRANSDERMAL at 09:29

## 2018-12-18 RX ADMIN — Medication 400 MG: at 22:00

## 2018-12-18 RX ADMIN — SODIUM CHLORIDE, PRESERVATIVE FREE 10 ML: 5 INJECTION INTRAVENOUS at 09:29

## 2018-12-18 RX ADMIN — METHADONE HYDROCHLORIDE 30 MG: 10 TABLET ORAL at 18:45

## 2018-12-18 RX ADMIN — OXYCODONE HYDROCHLORIDE AND ACETAMINOPHEN 1 TABLET: 10; 325 TABLET ORAL at 01:12

## 2018-12-18 RX ADMIN — OXYCODONE HYDROCHLORIDE AND ACETAMINOPHEN 1 TABLET: 10; 325 TABLET ORAL at 05:33

## 2018-12-18 RX ADMIN — FAMOTIDINE 20 MG: 20 TABLET, FILM COATED ORAL at 22:00

## 2018-12-18 RX ADMIN — OXYCODONE HYDROCHLORIDE AND ACETAMINOPHEN 1 TABLET: 10; 325 TABLET ORAL at 09:30

## 2018-12-18 RX ADMIN — METHOCARBAMOL TABLETS 750 MG: 750 TABLET, COATED ORAL at 18:45

## 2018-12-18 RX ADMIN — VANCOMYCIN HYDROCHLORIDE 1000 MG: 1 INJECTION, SOLUTION INTRAVENOUS at 22:00

## 2018-12-18 RX ADMIN — OXYCODONE HYDROCHLORIDE AND ACETAMINOPHEN 1 TABLET: 10; 325 TABLET ORAL at 13:44

## 2018-12-18 RX ADMIN — DIPHENHYDRAMINE HYDROCHLORIDE 25 MG: 25 CAPSULE ORAL at 04:13

## 2018-12-18 RX ADMIN — METHADONE HYDROCHLORIDE 30 MG: 10 TABLET ORAL at 10:45

## 2018-12-18 RX ADMIN — OXYCODONE HYDROCHLORIDE AND ACETAMINOPHEN 1 TABLET: 10; 325 TABLET ORAL at 17:36

## 2018-12-18 RX ADMIN — SENNOSIDES AND DOCUSATE SODIUM 2 TABLET: 8.6; 5 TABLET ORAL at 22:00

## 2018-12-18 RX ADMIN — METHOCARBAMOL TABLETS 750 MG: 750 TABLET, COATED ORAL at 05:33

## 2018-12-18 RX ADMIN — Medication 400 MG: at 09:30

## 2018-12-18 RX ADMIN — OXYCODONE HYDROCHLORIDE 10 MG: 5 TABLET ORAL at 02:10

## 2018-12-18 RX ADMIN — POLYETHYLENE GLYCOL 3350 17 G: 17 POWDER, FOR SOLUTION ORAL at 09:29

## 2018-12-18 RX ADMIN — DOCUSATE SODIUM 100 MG: 100 CAPSULE, LIQUID FILLED ORAL at 09:30

## 2018-12-18 RX ADMIN — METHOCARBAMOL TABLETS 750 MG: 750 TABLET, COATED ORAL at 12:36

## 2018-12-18 NOTE — PROGRESS NOTES
Continued Stay Note  [facility]     Patient Name: Silvia Velazquez  MRN: [id number]  Today's Date: 12/18/2018    Admit Date: 12/9/2018    Discharge Plan     Row Name 12/18/18 1606       Plan    Plan Comments  Contact information for [facility]:  Intake, Trudi [phone];  Nursing, Carol [phone].    Row Name 12/18/18 1523       Plan    Plan  [facility] treatment [facility] in [place] pending final eval    Plan Comments  HR CCP discussed all with Dr. Salinas and with pt at bedside.  Plan is to continue weaning pt's narcotics as tolerated.  Tentative discharge date of 12/20.  HR CCP will f/u in am.      Row Name 12/18/18 9557       Plan    Plan  [facility] treatment [facility] in [place] pending final eval    Plan Comments  HR CCP spoke again with the nurse Carol at [facility].  She states that their physician is in agreement with their ability to wean pt from her Methadone slowly while she is there, if she is currently taking 30mg TID at discharge.  They would like her to be pain controlled at that time.  CCP spoke as well to Trudi with intake.  They would like notice the day before discharge.  Once notified, they will call pt to complete her intake psychosocial screening.  Once that is completed, they receommend placing her PICC line.          Discharge Codes    No documentation.             Yolanda Altman RN

## 2018-12-18 NOTE — THERAPY TREATMENT NOTE
Acute Care - Physical Therapy Treatment Note  [facility]     Patient Name: Silvia Velazquez  : 1975  MRN: [id number]  Today's Date: 2018  Onset of Illness/Injury or Date of Surgery: 18          Admit Date: 2018    Visit Dx:    ICD-10-CM ICD-9-CM   1. Spinal epidural abscess G06.1 324.1   2. Bacteremia due to methicillin resistant Staphylococcus aureus R78.81 790.7     041.12   3. Abscess of paraspinal muscles, lumbar M62.89 728.89   4. Generalized weakness R53.1 780.79     Patient Active Problem List   Diagnosis   • Spinal epidural abscess       Therapy Treatment    Rehabilitation Treatment Summary     Row Name 18 1100             Treatment Time/Intention    Discipline  physical therapy assistant  -      Document Type  therapy note (daily note)  -      Subjective Information  complains of;weakness;fatigue;pain  -      Care Plan Review  patient/other agree to care plan  -      Comment  impulsive, but slow paced movement  -      Existing Precautions/Restrictions  fall;spinal  -      Recorded by [JM] Lisa Acuña, PTA 18 1124      Row Name 18 1100             Bed Mobility Assessment/Treatment    Comment (Bed Mobility)  in chair  -      Recorded by [JM] Lisa Acuña, PTA 18 1124      Row Name 18 1100             Sit-Stand Transfer    Sit-Stand [place] (Transfers)  contact guard;verbal cues cues for hand placement  -      Assistive Device (Sit-Stand Transfers)  walker, front-wheeled elevated bed-educ offered due to plans home  -JM      Recorded by [JM] Lisa Acuña, PTA 18 1124      Row Name 18 1100             Stand-Sit Transfer    Stand-Sit [place] (Transfers)  supervision;verbal cues  -      Assistive Device (Stand-Sit Transfers)  walker, front-wheeled  -      Recorded by [JM] Lisa Acuña, PTA 18 1124      Row Name 18 1100             Toilet Transfer    [place] Level (Toilet Transfer)   contact guard;verbal cues  -      Assistive Device (Toilet Transfer)  commode, bedside without drop arms;grab bars/safety frame  -      Recorded by [] Lisa Acuña PTA 12/18/18 1124      Row Name 12/18/18 1100             Gait/Stairs Assessment/Training    [place] Level (Gait)  contact guard;supervision  -      Assistive Device (Gait)  walker, front-wheeled  -      Distance in Feet (Gait)  200  -JM      Deviations/Abnormal Patterns (Gait)  yamilet decreased;antalgic  -      Comment (Gait/Stairs)  2 standing rests  -      Recorded by [] Lisa Acuña PTA 12/18/18 1124      Row Name 12/18/18 1100             Positioning and Restraints    Pre-Treatment Position  sitting in chair/recliner  -      Post Treatment Position  chair  -JM      In Chair  reclined;call light within reach;encouraged to call for assist;exit alarm on;with family/caregiver;notified nsg  -      Recorded by [] Lisa Acuña PTA 12/18/18 1124      Row Name 12/18/18 1100             Pain Scale: Numbers Pre/Post-Treatment    Pain Scale: Numbers, Pretreatment  8/10  -      Pain Scale: Numbers, Post-Treatment  8/10  -JM      Pain Location  back  -      Pain Intervention(s)  Medication (See MAR);Repositioned;Ambulation/increased activity  -      Recorded by [] Lisa Acuña PTA 12/18/18 1124      Row Name                Wound 12/09/18 2154 Bilateral back incision    Wound - Properties Group Date first assessed: 12/09/18 [KK] Time first assessed: 2154 [KK] Side: Bilateral [KK] Location: back [KK] Type: incision [KK] Recorded by:  [KK] Bárbara Huerta RN 12/09/18 2154      User Key  (r) = Recorded By, (t) = Taken By, (c) = Cosigned By    Initials Name Effective Dates Discipline     Lisa Acuña PTA 03/07/18 -  PT    Bárbara Chakraborty RN 09/30/16 -  Nurse          Wound 12/09/18 2154 Bilateral back incision (Active)   Dressing Appearance open to air 12/17/2018  8:10 PM   Drainage Amount none  12/17/2018  8:10 PM           Physical Therapy Education     Title: PT OT SLP Therapies (Done)     Topic: Physical Therapy (Done)     Point: Mobility training (Done)     Learning Progress Summary           Patient Acceptance, E,TB,D, VU,NR by KENTON at 12/18/2018 11:27 AM    Comment:  educ on back safety    Acceptance, E,TB,D, VU,NR by KENTON at 12/17/2018  2:32 PM    Comment:  cues for back safety and log rolling importance    Acceptance, E,TB, VU,DU by LEONARDO at 12/15/2018  2:25 PM    Acceptance, E, VU,NR by  at 12/14/2018  9:47 AM    Acceptance, E,TB,D, VU,NR by  at 12/11/2018  4:22 PM    Acceptance, E,TB,D, VU,NR by  at 12/10/2018  4:29 PM                   Point: Home exercise program (Done)     Learning Progress Summary           Patient Acceptance, E,TB,D, VU,NR by KENTON at 12/18/2018 11:27 AM    Comment:  educ on back safety    Acceptance, E,TB,D, VU,NR by KENTON at 12/17/2018  2:32 PM    Comment:  cues for back safety and log rolling importance    Acceptance, E,TB, VU,DU by LEONARDO at 12/15/2018  2:25 PM    Acceptance, E, VU,NR by  at 12/14/2018  9:47 AM    Acceptance, E,TB,D, VU,NR by  at 12/10/2018  4:29 PM                   Point: Body mechanics (Done)     Learning Progress Summary           Patient Acceptance, E,TB,D, VU,NR by KENTON at 12/18/2018 11:27 AM    Comment:  educ on back safety    Acceptance, E,TB,D, VU,NR by KENTON at 12/17/2018  2:32 PM    Comment:  cues for back safety and log rolling importance    Acceptance, E,TB, VU,DU by LEONARDO at 12/15/2018  2:25 PM    Acceptance, E, VU,NR by  at 12/14/2018  9:47 AM    Acceptance, E,TB,D, VU,NR by  at 12/11/2018  4:22 PM    Acceptance, E,TB,D, VU,NR by  at 12/10/2018  4:29 PM                   Point: Precautions (Done)     Learning Progress Summary           Patient Acceptance, E,TB,D, VU,NR by KENTON at 12/18/2018 11:27 AM    Comment:  educ on back safety    Acceptance, E,TB,D, EUGENIO,NR by KENTON at 12/17/2018  2:32 PM    Comment:  cues for back safety and log rolling importance     Acceptance, E,TB, VU,DU by  at 12/15/2018  2:25 PM    Acceptance, E, VU,NR by  at 12/14/2018  9:47 AM    Acceptance, E,TB,D, VU,NR by  at 12/11/2018  4:22 PM    Acceptance, E,TB,D, VU,NR by  at 12/10/2018  4:29 PM                               User Key     Initials Effective Dates Name Provider Type Discipline     04/03/18 -  Camille Emanuel, PT Physical Therapist PT     04/03/18 -  Daisy Fowler, PT Physical Therapist PT     03/07/18 -  Lisa Acuña PTA Physical Therapy Assistant PT     03/07/18 -  Brent Pratt, NICOLE Physical Therapy Assistant PT                PT Recommendation and Plan     Plan of Care Reviewed With: patient  Progress: improving  Outcome Summary: moves at her pace but very agreeable to PT, very upset this am, but observed staff answering call light quickly; pt incr amb dist w/2 standing rests  Outcome Measures     Row Name 12/18/18 1100 12/17/18 1200 12/15/18 1400       How much help from another person do you currently need...    Turning from your back to your side while in flat bed without using bedrails?  3  -JM  3  -JM  3  -CW    Moving from lying on back to sitting on the side of a flat bed without bedrails?  3  -JM  3  -JM  3  -CW    Moving to and from a bed to a chair (including a wheelchair)?  3  -  3  -JM  3  -CW    Standing up from a chair using your arms (e.g., wheelchair, bedside chair)?  3  -JM  3  -JM  3  -CW    Climbing 3-5 steps with a railing?  2  -JM  2  -JM  2  -CW    To walk in hospital room?  3  -JM  3  -JM  3  -CW    AM-PAC 6 Clicks Score  17  -  17  -JM  17  -CW       Functional Assessment    Outcome Measure Options  --  --  AM-PAC 6 Clicks Basic Mobility (PT)  -CW      User Key  (r) = Recorded By, (t) = Taken By, (c) = Cosigned By    Initials Name Provider Type     Lisa Acuña, NICOLE Physical Therapy Assistant     Brent Pratt, NICOLE Physical Therapy Assistant         Time Calculation:   PT Charges     Row Name 12/18/18 1126              Time Calculation    Start Time  1045  -      Stop Time  1119  -KENTON      Time Calculation (min)  34 min  -KENTON      PT Received On  12/18/18  -KENTON      PT - Next Appointment  12/19/18  -KENTON         Time Calculation- PT    Total Timed Code Minutes- PT  30 minute(s)  -KENTON        User Key  (r) = Recorded By, (t) = Taken By, (c) = Cosigned By    Initials Name Provider Type    Lisa Chisholm PTA Physical Therapy Assistant        Therapy Suggested Charges     Code   Minutes Charges    None           Therapy Charges for Today     Code Description Service Date Service Provider Modifiers Qty    76204408273 HC PT THER PROC EA 15 MIN 12/17/2018 iLsa Acuña PTA GP 2    27365694586 HC PT THER PROC EA 15 MIN 12/18/2018 Lisa Acuña PTA GP 2          PT G-Codes  Outcome Measure Options: AM-PAC 6 Clicks Basic Mobility (PT)  AM-PAC 6 Clicks Score: 17    Lisa Acuña PTA  12/18/2018

## 2018-12-18 NOTE — PROGRESS NOTES
LOS: 9 days   Patient Care Team:  Provider, No Known as PCP - General    Subjective     Patient says she has been up walking today doing pretty well the nurse yesterday told me that she had worms coming out of her body and I asked her to save 1 she gave 3 things to the nurse there all clearly scabs that she has picked off.  We discussed weaning her narcotics further and she seemed amenable to that are not excited but she didn't object either    Review of Systems:          Objective     Vital Signs  Vital Sign Min/Max for last 24 hours  Temp  Min: 97.3 °F (36.3 °C)  Max: 98.6 °F (37 °C)   BP  Min: 112/68  Max: 130/95   Pulse  Min: 88  Max: 95   Resp  Min: 18  Max: 20   SpO2  Min: 95 %  Max: 100 %   No Data Recorded   No Data Recorded        Ventilator/Non-Invasive Ventilation Settings (From admission, onward)    None                       Body mass index is 29.38 kg/m².  I/O last 3 completed shifts:  In: 1370 [P.O.:1370]  Out: 1500 [Urine:1500]  No intake/output data recorded.        Physical Exam:  General Appearance: Well-developed white female she is resting in chair she doesn't appear in any acute distress  Eyes: Conjunctiva are clear and anicteric pupils are about 3 mm they are equal.    ENT: Mucous membranes are moist no erythema or exudates  Neck: No lymphadenopathy or thyromegaly no jugular venous distention trachea midline  Lungs: Clear nonlabored symmetric expansion no wheezes rales or rhonchi  Cardiac: Regular rate and rhythm I do not hear a murmur today  Abdomen: Soft nontender no palpable organomegaly or masses  : Not examined  Musculoskeletal: Grossly normal her lower spine incision appears to be healing well there is no erythema no drainage  Skin: SHe has a bunch of lesions on her legs and arms they look like pick lesions like she's been picking her skin I guess it could be prior injection sites.  They do not look like hookworm or other parasites to me.  Neuro: She is alert and oriented she  is cooperative she is following commands moving extremities  Extremities/P Vascular: No clubbing no cyanosis no edema palpable radial dorsalis pedis pulses.  I don't see any splinter hemorrhages  MSE: She seems to be relatively pleasant        Labs:  Results from last 7 days   Lab Units  12/18/18   0734  12/17/18   0403  12/16/18   0437  12/15/18   0514  12/14/18   0820  12/13/18   0357  12/12/18   0621   GLUCOSE mg/dL  98  145*  82  129*  115*  128*  105*   SODIUM mmol/L  133*  128*  129*  130*  133*  133*  135*   POTASSIUM mmol/L  4.2  4.0  4.1  3.9  3.9  4.0  4.1   MAGNESIUM mg/dL  2.0  1.9  2.0  1.7  1.8  1.7  1.9   CO2 mmol/L  31.0*  28.6  27.1  29.2*  26.9  25.9  26.0   CHLORIDE mmol/L  92*  89*  95*  93*  95*  96*  98   ANION GAP mmol/L  10.0  10.4  6.9  7.8  11.1  11.1  11.0   CREATININE mg/dL  0.50*  0.58  0.48*  0.53*  0.55*  0.48*  0.47*   BUN mg/dL  13  12  9  9  6  5*  3*   BUN / CREAT RATIO   26.0*  20.7  18.8  17.0  10.9  10.4  6.4*   CALCIUM mg/dL  8.8  8.4*  8.0*  8.5*  8.8  8.6  8.6   EGFR IF NONAFRICN AM mL/min/1.73  136  115  143  127  122  143  146   ALK PHOS U/L  98  96  84  86  88  86  78   TOTAL PROTEIN g/dL  7.8  7.8  7.4  7.5  8.2  7.6  7.6   ALT (SGPT) U/L  33  21  20  18  22  24  29   AST (SGOT) U/L  46*  30  24  25  24  26  31   BILIRUBIN mg/dL  0.3  0.4  0.4  0.4  0.4  0.4  0.4   ALBUMIN g/dL  2.70*  2.60*  2.50*  2.70*  2.70*  2.50*  2.60*   GLOBULIN gm/dL  5.1  5.2  4.9  4.8  5.5  5.1  5.0     Estimated Creatinine Clearance: 160.4 mL/min (A) (by C-G formula based on SCr of 0.5 mg/dL (L)).      Results from last 7 days   Lab Units  12/18/18   0735  12/17/18   0403  12/16/18   0437  12/15/18   0514  12/14/18   0820  12/13/18   0357  12/12/18   0621   WBC 10*3/mm3  10.00  13.96*  14.45*  14.10*  13.76*  12.64*  10.25   RBC 10*6/mm3  3.23*  3.36*  3.45*  3.51*  3.76*  3.71*  3.69*   HEMOGLOBIN g/dL  8.8*  9.2*  9.6*  9.8*  10.5*  10.3*  10.2*   HEMATOCRIT %  27.9*  28.8*  29.4*  29.9*   33.1*  31.4*  30.9*   MCV fL  86.4  85.7  85.2  85.2  88.0  84.6  83.7   MCH pg  27.2  27.4  27.8  27.9  27.9  27.8  27.6   MCHC g/dL  31.5*  31.9*  32.7  32.8  31.7*  32.8  33.0   RDW %  16.4*  16.5*  16.6*  16.6*  16.4*  16.4*  16.4*   RDW-SD fl  52.3  51.8  51.7  51.8  52.9  50.8  50.3   MPV fL  10.4  10.6  10.5  10.8  10.5  11.0  11.1   PLATELETS 10*3/mm3  347  350  351  381  366  285  253   NEUTROPHIL % %   --   76.7*   --    --    --    --    --    LYMPHOCYTE % %   --   12.5*   --    --    --    --    --    MONOCYTES % %   --   9.5   --    --    --    --    --    EOSINOPHIL % %   --   1.2   --    --    --    --    --    BASOPHIL % %   --   0.1   --    --    --    --    --    IMM GRAN % %   --   0.8*   --    --    --    --    --    NEUTROS ABS 10*3/mm3   --   10.71*   --    --    --    --    --    LYMPHS ABS 10*3/mm3   --   1.74   --    --    --    --    --    MONOS ABS 10*3/mm3   --   1.32*   --    --    --    --    --    EOS ABS 10*3/mm3   --   0.17   --    --    --    --    --    BASOS ABS 10*3/mm3   --   0.02   --    --    --    --    --    IMMATURE GRANS (ABS) 10*3/mm3   --   0.11*   --    --    --    --    --    NRBC /100 WBC   --   0.0   --    --    --    --    --                              Microbiology Results (last 10 days)     Procedure Component Value - Date/Time    Blood Culture - Blood, Hand, Right [[id number]] Collected:  12/11/18 1029    Lab Status:  Final result Specimen:  Blood from Hand, Right Updated:  12/16/18 1046     Blood Culture No growth at 5 days    Blood Culture - Blood, Arm, Left [[id number]] Collected:  12/11/18 1011    Lab Status:  Final result Specimen:  Blood from Arm, Left Updated:  12/16/18 1046     Blood Culture No growth at 5 days    Anaerobic Culture - Tissue, Spine, Lumbar [[id number]] Collected:  12/09/18 2127    Lab Status:  Final result Specimen:  Tissue from Spine, Lumbar Updated:  12/14/18 1128     Culture No anaerobes isolated at 5 days    Fungus Culture - Tissue,  "Spine, Lumbar [[id number]] Collected:  12/09/18 2127    Lab Status:  Preliminary result Specimen:  Tissue from Spine, Lumbar Updated:  12/16/18 2146     Fungus Culture No fungus isolated at 1 week    Tissue / Bone Culture - Tissue, Spine, Lumbar [[id number]]  (Abnormal) Collected:  12/09/18 2127    Lab Status:  Edited Result - FINAL Specimen:  Tissue from Spine, Lumbar Updated:  12/11/18 0705     Tissue Culture Light growth (2+) Staphylococcus aureus, MRSA     Comment:   Methicillin resistant Staphylococcus aureus, Patient may be an isolation risk.        Gram Stain Moderate (3+) WBCs seen      Rare (1+) Gram positive cocci    Narrative:       Refer to previous wound culture collected on 12/9/2018 2125 for [facility]    AFB Culture - Tissue, Spine, Lumbar [[id number]] Collected:  12/09/18 2127    Lab Status:  Preliminary result Specimen:  Tissue from Spine, Lumbar Updated:  12/16/18 2146     AFB Culture No AFB isolated at 1 week     AFB Stain No acid fast bacilli seen on direct smear    Wound Culture - Wound, Spine, Lumbar [[id number]]  (Abnormal) Collected:  12/09/18 2126    Lab Status:  Final result Specimen:  Wound from Spine, Lumbar Updated:  12/11/18 0704     Wound Culture Heavy growth (4+) Staphylococcus aureus, MRSA     Comment:   Methicillin resistant Staphylococcus aureus, Patient may be an isolation risk.        Gram Stain Moderate (3+) WBCs seen      Few (2+) Gram positive cocci    Narrative:       Refer to previous wound culture collected on 12/9/2018 2125 for [facility]    Wound Culture - Wound, Spine, Lumbar [[id number]]  (Abnormal)  (Susceptibility) Collected:  12/09/18 2125    Lab Status:  Final result Specimen:  Wound from Spine, Lumbar Updated:  12/11/18 0703     Wound Culture Moderate growth (3+) Staphylococcus aureus, MRSA     Comment: D test is positive. Isolate exhibits \"inducible\" resistance to Clindamycin.    Methicillin resistant Staphylococcus aureus, Patient may be an isolation risk.        Gram Stain " Moderate (3+) WBCs seen      Few (2+) Gram positive cocci    Susceptibility      Staphylococcus aureus, MRSA     ZAINAB     Clindamycin Resistant     Erythromycin Resistant     Oxacillin Resistant     Penicillin G Resistant     Rifampin Susceptible     Tetracycline Susceptible     Trimethoprim + Sulfamethoxazole Susceptible     Vancomycin Susceptible                                amLODIPine 10 mg Oral Q24H   docusate sodium 100 mg Oral Daily   famotidine 20 mg Oral BID   magnesium oxide 400 mg Oral BID   methadone 30 mg Oral Q8H   methocarbamol 750 mg Oral Q6H   nicotine 1 patch Transdermal Q24H   oxyCODONE 10 mg Oral Q8H   polyethylene glycol 17 g Oral Daily   potassium chloride 40 mEq Oral Daily   sennosides-docusate sodium 2 tablet Oral Nightly   vancomycin 1,000 mg Intravenous Q6H       Pharmacy to dose vancomycin        Diagnostics:  Ct Chest With Contrast    Result Date: 12/8/2018  CT SCANS CHEST, ABDOMEN, PELVIS  HISTORY:  fever, short of breath; F19.10-Other psychoactive substance abuse, uncomplicated  COMPARISON: Abdomen and pelvis CT December 12, 2012.  TECHNIQUE: Radiation dose reduction techniques were utilized, including automated exposure control and exposure modulation based on body size. Axial images were obtained from the thoracic inlet through the symphysis pubis with IV contrast only, per request. Oral contrast was not administered per the referring physician.  This limits evaluation of the GI tract.  FINDINGS CHEST CT:  There are calcified residua of granulomatous disease present. No active disease or pleural fluid. Aorta nonaneurysmal.  FINDINGS ABDOMEN/PELVIS CT:  There has been development of homogeneous splenic enlargement at 18 cm multiple length. A subcentimeter upper pole right renal lesion has developed, likely tiny cyst. It is too small for detailed assessment. Prior cholecystectomy. Remaining solid organs have an unremarkable appearance. Normal aorta and appendix. The GI tract not  opacified for assessment but non obstructive in appearance.     IMPRESSION CHEST CT:  1. Unremarkable CT thorax  IMPRESSION ABDOMEN & PELVIS CT:  1. Homogeneous splenic enlargement. 2. Tiny right renal lesion, likely cyst     This report was finalized on 12/8/2018 4:36 AM by Luis Pelaez M.D.      Ct Abdomen Pelvis With Contrast    Result Date: 12/8/2018  CT SCANS CHEST, ABDOMEN, PELVIS  HISTORY:  fever, short of breath; F19.10-Other psychoactive substance abuse, uncomplicated  COMPARISON: Abdomen and pelvis CT December 12, 2012.  TECHNIQUE: Radiation dose reduction techniques were utilized, including automated exposure control and exposure modulation based on body size. Axial images were obtained from the thoracic inlet through the symphysis pubis with IV contrast only, per request. Oral contrast was not administered per the referring physician.  This limits evaluation of the GI tract.  FINDINGS CHEST CT:  There are calcified residua of granulomatous disease present. No active disease or pleural fluid. Aorta nonaneurysmal.  FINDINGS ABDOMEN/PELVIS CT:  There has been development of homogeneous splenic enlargement at 18 cm multiple length. A subcentimeter upper pole right renal lesion has developed, likely tiny cyst. It is too small for detailed assessment. Prior cholecystectomy. Remaining solid organs have an unremarkable appearance. Normal aorta and appendix. The GI tract not opacified for assessment but non obstructive in appearance.     IMPRESSION CHEST CT:  1. Unremarkable CT thorax  IMPRESSION ABDOMEN & PELVIS CT:  1. Homogeneous splenic enlargement. 2. Tiny right renal lesion, likely cyst     This report was finalized on 12/8/2018 4:36 AM by Luis Pelaez M.D.      Xr Chest 1 View    Result Date: 12/8/2018  PORTABLE CHEST  CLINICAL HISTORY:  fever  COMPARISON:  May 10, 2012.  FINDINGS:  Single portable view of the chest obtained.  The lungs are well expanded and clear.  Cardiac size is within normal  limits. Vascularity is normal considering technique.  No pleural fluid is demonstrated by portable imaging.             No active disease by portable imaging.  This report was finalized on 12/8/2018 2:14 AM by Luis Pelaez M.D.      Fl C Arm During Surgery    Result Date: 12/9/2018  This procedure was auto-finalized with no dictation required.    Xr Spine Lumbar 1 View    Result Date: 12/9/2018  PROCEDURAL/INTRAOPERATIVE FLUOROSCOPY  ONE VIEW LUMBAR SPINE  HISTORY:  Intraoperative evaluation/fluoroscopic guidance  FINDINGS:  C-arm fluoroscopy was provided intraoperatively for the referring service.  The amount of fluoroscopy time was not provided to the radiologist.  The two submitted image(s) demonstrate intraoperative C-arm images of the lumbosacral spine with localizer probes posteriorly.   Please refer to the operative report for detailed findings and any follow-up suggestions per the referring service.      Intraoperative fluoroscopy as above.       Mri Lumbar Spine With & Without Contrast    Result Date: 12/10/2018  MRI OF THE LUMBAR SPINE WITH AND WITHOUT CONTRAST 12/09/2018  HISTORY: Fever, back pain. IV drug use.  Multiple precontrast and postcontrast sagittal and axial images were obtained through the lumbar spine.  In the posterior spinal canal extending from the inferior aspect of L3 to the superior aspect of L1 there is a multiloculated collection which demonstrates T1 low signal, T2 bright signal and peripheral enhancement. This is consistent with an epidural abscess and measures approximately 8.0 cm in craniocaudal dimension by approximately 1.4 cm in diameter. This produces moderately severe narrowing of the spinal canal.  Posterior to the inferior aspect of the L1 vertebra and posterior to the L2 vertebra in the anterior spinal canal is an additional thin area of T1 low signal and T2 bright signal which may represent additional epidural abscess. It measures 3 mm to 4 mm in thickness at the  approximate level of the L2 vertebra.  Within the left paraspinal musculature, there is T2 edema and multilocular fluid collection measuring up to 4.6 cm in craniocaudal dimension by approximately 2.7 cm in greatest oblique transverse dimension.  There is no evidence of discitis or osteomyelitis. There is a disc bulge at T11-T12 with minimal disc bulges at L4-5 and L5-S1. The lower cord and conus appear normal.  There is no evidence of osteomyelitis.      1.  Multilocular peripherally enhancing fluid collection in the posterior spinal canal extending from L3 to S1 with dimensions given above. This is consistent with an epidural abscess. Additional thin collection is seen posterior to the inferior aspect of L1 and the L2 vertebra in the anterior spinal canal which may represent additional abscess. 2.  Left paraspinal musculature abscess is also seen with dimensions given above. 3.  Lower thoracic and degenerative disc disease as described. 4.  Findings were discussed with Dr. Cárdenas.  This report was finalized on 12/10/2018 9:16 AM by Dr. Pravin Macias M.D.      Results for orders placed during the hospital encounter of 12/09/18   Adult Transthoracic Echo Complete W/ Cont if Necessary Per Protocol    Narrative · Left ventricular systolic function is normal. Estimated EF = 59%. Normal   left ventricular cavity size and wall thickness noted. All left   ventricular wall segments contract normally. Left ventricular diastolic   dysfunction is noted (grade II w/high LAP) consistent with   pseudonormalization.  · Trace tricuspid valve regurgitation is present. Estimated right   ventricular systolic pressure from tricuspid regurgitation is normal (<35   mmHg).              Active Hospital Problems    Diagnosis Date Noted   • Spinal epidural abscess [G06.1] 12/09/2018      Resolved Hospital Problems   No resolved problems to display.         Assessment/Plan     1. MRSA bacteremic sepsis infectious diseases recommended 6  weeks of antibiotics from her last surgical intervention which is 12/9/18  2. MRSA epidural paraspinal abscess with cord compression  3. Status post lumbar decompression 12/9/18.  4. IV drug abuse heroin and methamphetamines patient is on a lot of narcotic pain medication  the methadone dealing with her withdraw some of the pain in her back.  She hasn't done bad at all today with the reduction and narcotics yesterday we are going to reduce further today as I discussed with her and hopefully get her back just to the fairly high dose methadone that she is on next couple of days.  5. Acute blood loss anemia on anemia of chronic disease  6. Hyponatremia and hypomagnesemia and hypokalemia  7. Mild hepatitis probably secondary to sepsis  8. Grade 2 diastolic dysfunction    Plan for disposition: The best option for this patient would be if she can go to a treatment center that can help her with her drugs and still allow her to get her intravenous antibiotics.  There are not many options I'm just afraid she would not do well coming back and forth for 30 days to get IV antibiotics daily much less get adequate treatment for her addiction    Aniceto Salinas MD  12/18/18  5:26 PM    Time:

## 2018-12-18 NOTE — PROGRESS NOTES
"Pharmacokinetic Evaluation - Vancomycin    Silvia Velazquez is a 43 y.o. female on vancomycin pharmacy to dose.  MRN: [id number]  : 1975    Day of vancomycin therapy: 10/42  Indication: spinal epidural abscess  Consulted by: Dr. Key  Goal trough: 20 mcg/mL    Current dose: 1000 mg IV q6h    Blood pressure 112/68, pulse 88, temperature 98.6 °F (37 °C), temperature source Oral, resp. rate 20, height 167.6 cm (66\"), weight 82.6 kg (182 lb), SpO2 97 %.  Results from last 7 days   Lab Units  18   0734  18   0403  18   0437   CREATININE mg/dL  0.50*  0.58  0.48*     Estimated Creatinine Clearance: 160.4 mL/min (A) (by C-G formula based on SCr of 0.5 mg/dL (L)).  Results from last 7 days   Lab Units  18   0735  18   0403  18   0437   WBC 10*3/mm3  10.00  13.96*  14.45*   HEMOGLOBIN g/dL  8.8*  9.2*  9.6*   HEMATOCRIT %  27.9*  28.8*  29.4*   PLATELETS 10*3/mm3  347  350  351       Baseline cultures/labs/radiology:  Cultures:    Blood cx: MRSA+   Blood cx: NG x 5 days     Vancomycin IV Dosing & Levels History:  Vanc 1500 mg loading dose given -.  Vanc 1250 mg Q12H on (12/10)-0410, 1534 ()-0407,1509 (rn started early, then stopped after 10 min to allow for trough, then finished infusion after level drawn)  Vanc trough  at 1530-10.9  Vanc 1500 mg given -175 (one dose for subtherapeutic vanc trough)--this was in addition to the 1250 mg given at 1509  Vanc 1250 mg Q8H-()-0031, 1017   1707 Vancomycin trough level 14.6 mcg/mL (~7hr level)   1858 Vanc 1500 mg IV x1 followed by 1250 mg IV q8h  : Vancomycin trough at 0820 = 6.8; pt missed dose at 0236 due to loss of IV access, so this is a level drawn 14 hrs after last dose   Vanc 1500 mg q8hr   08:18 am Vanc trough= 15.30 mcg/mL   1357 vancomycin 1g IV q6h started                  0212                         0800 not given d/t leaking " vancomycin bag after being spiked               12/17 1417               12/17 2122 dose documented as leaking at 2252               12/17 2339               12/18 1046                            12/18 1326 trough at 22.7 mcg/mL (~ 2.5 hours after beginning last infusion)     Assessment:  Level is 22.7 mcg/mL; drawn roughly 2.5 hours after starting last administered dose. This level is unlikely to be a representation of steady state levels. There have been a few issues with administering regimen, and patient has missed a few doses in the last 24 hours.   Renal function remains stable.    Plan:  1) Continue vancomycin 1000 mg every 6 hours.  2) Next trough on 12/19 at 1330.  3) Pharmacy will continue to monitor daily and adjust regimen as needed.    Salome Rocha RPH

## 2018-12-18 NOTE — NURSING NOTE
"Flushed saline lock to left hand. Patient immediately screamed, \"You just pushed air into my vein then water!\" Patient instructed that saline lock was flushed with normal saline. Continued to cry inconsolably, until saline lock d/c. Pressure held at site for approximately 2 minutes due to the site continuing to bleed. Pressure dressing applied once bleeding had stopped.  "

## 2018-12-18 NOTE — PLAN OF CARE
Problem: Patient Care Overview  Goal: Plan of Care Review  Outcome: Ongoing (interventions implemented as appropriate)   12/18/18 0458   Coping/Psychosocial   Plan of Care Reviewed With patient   Plan of Care Review   Progress improving   OTHER   Outcome Summary meds per order, pain meds per request, cont to frequently request pain meds, skin remains with scabs, no breakdown noted, no falls, see vs and labs     Goal: Individualization and Mutuality  Outcome: Ongoing (interventions implemented as appropriate)    Goal: Discharge Needs Assessment  Outcome: Ongoing (interventions implemented as appropriate)    Goal: Interprofessional Rounds/Family Conf  Outcome: Ongoing (interventions implemented as appropriate)      Problem: Skin Injury Risk (Adult)  Goal: Skin Health and Integrity  Outcome: Ongoing (interventions implemented as appropriate)      Problem: Fall Risk (Adult)  Goal: Absence of Fall  Outcome: Ongoing (interventions implemented as appropriate)      Problem: Pain, Acute (Adult)  Goal: Acceptable Pain Control/Comfort Level  Outcome: Ongoing (interventions implemented as appropriate)      Problem: Skin and Soft Tissue Infection (Adult)  Goal: Signs and Symptoms of Listed Potential Problems Will be Absent, Minimized or Managed (Skin and Soft Tissue Infection)  Outcome: Ongoing (interventions implemented as appropriate)

## 2018-12-18 NOTE — TELEPHONE ENCOUNTER
appt angela/Yanely 1-23 at 1PM. MRI lumbar [facility] 1-17 arrive 9:15 AM. Informed Nim on 5Park. Letter/forms sent to patient.

## 2018-12-18 NOTE — PLAN OF CARE
Problem: Patient Care Overview  Goal: Plan of Care Review  Outcome: Ongoing (interventions implemented as appropriate)   12/17/18 1954   OTHER   Outcome Summary Consistent complaints of pain. Medicated with oral meds per orders. Up to chair, ambulatory in donato with PT, and took own shower today.        Problem: Skin Injury Risk (Adult)  Goal: Skin Health and Integrity  Outcome: Ongoing (interventions implemented as appropriate)      Problem: Fall Risk (Adult)  Goal: Absence of Fall  Outcome: Ongoing (interventions implemented as appropriate)      Problem: Pain, Acute (Adult)  Goal: Acceptable Pain Control/Comfort Level  Outcome: Ongoing (interventions implemented as appropriate)

## 2018-12-18 NOTE — NURSING NOTE
"Pt picking at scabs on legs. States \"I am removing parasites\" appears to be pieces of skin. Dr. Sanchez aware, see order for Benedryl  "

## 2018-12-18 NOTE — PROGRESS NOTES
Continued Stay Note  [facility]     Patient Name: Silvia Velazquez  MRN: [id number]  Today's Date: 12/18/2018    Admit Date: 12/9/2018    Discharge Plan     Row Name 12/18/18 1523       Plan    Plan  [facility] [place] in [place] pending final eval    Plan Comments  HR CCP discussed all with Dr. Salinas and with pt at bedside.  Plan is to continue weaning pt's narcotics as tolerated.  Tentative discharge date of 12/20.  HR CCP will f/u in am.      Row Name 12/18/18 1442       Plan    Plan  [facility] in [place] pending final eval    Plan Comments  HR CCP spoke again with the nurse Carol at [facility].  She states that their physician is in agreement with their ability to wean pt from her Methadone slowly while she is there, if she is currently taking 30mg TID at discharge.  They would like her to be pain controlled at that time.  CCP spoke as well to Trudi with intake.  They would like notice the day before discharge.  Once notified, they will call pt to complete her intake psychosocial screening.  Once that is completed, they receommend placing her PICC line.          Discharge Codes    No documentation.             Yolanda Altman RN

## 2018-12-18 NOTE — PLAN OF CARE
Problem: Patient Care Overview  Goal: Plan of Care Review  Outcome: Ongoing (interventions implemented as appropriate)   12/18/18 1124   Coping/Psychosocial   Plan of Care Reviewed With patient   Plan of Care Review   Progress improving   OTHER   Outcome Summary moves at her pace but very agreeable to PT, very upset this am, but observed staff answering call light quickly; pt incr amb dist w/2 standing rests

## 2018-12-18 NOTE — PROGRESS NOTES
Continued Stay Note  [facility]     Patient Name: Silvia Velazquez  MRN: [id number]  Today's Date: 12/18/2018    Admit Date: 12/9/2018    Discharge Plan     Row Name 12/18/18 1442       Plan    Plan  [facility] treatment center in [place] pending final eval    Plan Comments  HR CCP spoke again with the nurse Carol at [facility].  She states that their physician is in agreement with their ability to wean pt from her Methadone slowly while she is there, if she is currently taking 30mg TID at discharge.  They would like her to be pain controlled at that time.  CCP spoke as well to Trudi with intake.  They would like notice the day before discharge.  Once notified, they will call pt to complete her intake psychosocial screening.  Once that is completed, they receommend placing her PICC line.          Discharge Codes    No documentation.             Yolanda Altman RN

## 2018-12-19 LAB
ALBUMIN SERPL-MCNC: 2.9 G/DL (ref 3.5–5.2)
ALBUMIN/GLOB SERPL: 0.5 G/DL
ALP SERPL-CCNC: 108 U/L (ref 39–117)
ALT SERPL W P-5'-P-CCNC: 40 U/L (ref 1–33)
ANION GAP SERPL CALCULATED.3IONS-SCNC: 10 MMOL/L
AST SERPL-CCNC: 46 U/L (ref 1–32)
BILIRUB SERPL-MCNC: 0.3 MG/DL (ref 0.1–1.2)
BUN BLD-MCNC: 13 MG/DL (ref 6–20)
BUN/CREAT SERPL: 22.4 (ref 7–25)
CALCIUM SPEC-SCNC: 9.1 MG/DL (ref 8.6–10.5)
CHLORIDE SERPL-SCNC: 92 MMOL/L (ref 98–107)
CO2 SERPL-SCNC: 31 MMOL/L (ref 22–29)
CREAT BLD-MCNC: 0.58 MG/DL (ref 0.57–1)
DEPRECATED RDW RBC AUTO: 54.7 FL (ref 37–54)
ERYTHROCYTE [DISTWIDTH] IN BLOOD BY AUTOMATED COUNT: 16.3 % (ref 11.7–13)
GFR SERPL CREATININE-BSD FRML MDRD: 115 ML/MIN/1.73
GLOBULIN UR ELPH-MCNC: 5.5 GM/DL
GLUCOSE BLD-MCNC: 136 MG/DL (ref 65–99)
HCT VFR BLD AUTO: 31.3 % (ref 35.6–45.5)
HGB BLD-MCNC: 9.3 G/DL (ref 11.9–15.5)
MAGNESIUM SERPL-MCNC: 1.9 MG/DL (ref 1.6–2.6)
MCH RBC QN AUTO: 27 PG (ref 26.9–32)
MCHC RBC AUTO-ENTMCNC: 29.7 G/DL (ref 32.4–36.3)
MCV RBC AUTO: 90.7 FL (ref 80.5–98.2)
PHOSPHATE SERPL-MCNC: 4.8 MG/DL (ref 2.5–4.5)
PLATELET # BLD AUTO: 422 10*3/MM3 (ref 140–500)
PMV BLD AUTO: 10 FL (ref 6–12)
POTASSIUM BLD-SCNC: 4.2 MMOL/L (ref 3.5–5.2)
PROT SERPL-MCNC: 8.4 G/DL (ref 6–8.5)
RBC # BLD AUTO: 3.45 10*6/MM3 (ref 3.9–5.2)
SODIUM BLD-SCNC: 133 MMOL/L (ref 136–145)
VANCOMYCIN SERPL-MCNC: 17.6 MCG/ML (ref 5–40)
VANCOMYCIN TROUGH SERPL-MCNC: 28.3 MCG/ML (ref 5–20)
WBC NRBC COR # BLD: 8.33 10*3/MM3 (ref 4.5–10.7)

## 2018-12-19 PROCEDURE — 80202 ASSAY OF VANCOMYCIN: CPT | Performed by: INTERNAL MEDICINE

## 2018-12-19 PROCEDURE — 84100 ASSAY OF PHOSPHORUS: CPT | Performed by: INTERNAL MEDICINE

## 2018-12-19 PROCEDURE — 83735 ASSAY OF MAGNESIUM: CPT | Performed by: INTERNAL MEDICINE

## 2018-12-19 PROCEDURE — 85027 COMPLETE CBC AUTOMATED: CPT | Performed by: INTERNAL MEDICINE

## 2018-12-19 PROCEDURE — 63710000001 DIPHENHYDRAMINE PER 50 MG: Performed by: INTERNAL MEDICINE

## 2018-12-19 PROCEDURE — 97110 THERAPEUTIC EXERCISES: CPT

## 2018-12-19 PROCEDURE — 80053 COMPREHEN METABOLIC PANEL: CPT | Performed by: INTERNAL MEDICINE

## 2018-12-19 PROCEDURE — 25010000002 VANCOMYCIN PER 500 MG: Performed by: INTERNAL MEDICINE

## 2018-12-19 RX ORDER — OXYCODONE HYDROCHLORIDE AND ACETAMINOPHEN 5; 325 MG/1; MG/1
1 TABLET ORAL EVERY 8 HOURS PRN
Status: DISCONTINUED | OUTPATIENT
Start: 2018-12-19 | End: 2018-12-21 | Stop reason: HOSPADM

## 2018-12-19 RX ADMIN — METHADONE HYDROCHLORIDE 30 MG: 10 TABLET ORAL at 12:16

## 2018-12-19 RX ADMIN — OXYCODONE HYDROCHLORIDE AND ACETAMINOPHEN 1 TABLET: 10; 325 TABLET ORAL at 01:20

## 2018-12-19 RX ADMIN — METHOCARBAMOL TABLETS 750 MG: 750 TABLET, COATED ORAL at 22:20

## 2018-12-19 RX ADMIN — METHOCARBAMOL TABLETS 750 MG: 750 TABLET, COATED ORAL at 15:50

## 2018-12-19 RX ADMIN — POLYETHYLENE GLYCOL 3350 17 G: 17 POWDER, FOR SOLUTION ORAL at 09:23

## 2018-12-19 RX ADMIN — VANCOMYCIN HYDROCHLORIDE 1000 MG: 1 INJECTION, SOLUTION INTRAVENOUS at 05:00

## 2018-12-19 RX ADMIN — BISACODYL 10 MG: 10 SUPPOSITORY RECTAL at 22:20

## 2018-12-19 RX ADMIN — POTASSIUM CHLORIDE 40 MEQ: 750 CAPSULE, EXTENDED RELEASE ORAL at 09:18

## 2018-12-19 RX ADMIN — VANCOMYCIN HYDROCHLORIDE 1000 MG: 1 INJECTION, SOLUTION INTRAVENOUS at 12:15

## 2018-12-19 RX ADMIN — FAMOTIDINE 20 MG: 20 TABLET, FILM COATED ORAL at 22:20

## 2018-12-19 RX ADMIN — METHOCARBAMOL TABLETS 750 MG: 750 TABLET, COATED ORAL at 01:20

## 2018-12-19 RX ADMIN — SENNOSIDES AND DOCUSATE SODIUM 2 TABLET: 8.6; 5 TABLET ORAL at 22:20

## 2018-12-19 RX ADMIN — DOCUSATE SODIUM 100 MG: 100 CAPSULE, LIQUID FILLED ORAL at 09:19

## 2018-12-19 RX ADMIN — OXYCODONE HYDROCHLORIDE AND ACETAMINOPHEN 1 TABLET: 10; 325 TABLET ORAL at 09:19

## 2018-12-19 RX ADMIN — Medication 400 MG: at 22:20

## 2018-12-19 RX ADMIN — METHADONE HYDROCHLORIDE 30 MG: 10 TABLET ORAL at 18:21

## 2018-12-19 RX ADMIN — METHOCARBAMOL TABLETS 750 MG: 750 TABLET, COATED ORAL at 09:19

## 2018-12-19 RX ADMIN — DIPHENHYDRAMINE HYDROCHLORIDE 25 MG: 25 CAPSULE ORAL at 12:22

## 2018-12-19 RX ADMIN — AMLODIPINE BESYLATE 10 MG: 10 TABLET ORAL at 09:19

## 2018-12-19 RX ADMIN — Medication 400 MG: at 09:19

## 2018-12-19 RX ADMIN — FAMOTIDINE 20 MG: 20 TABLET, FILM COATED ORAL at 09:19

## 2018-12-19 RX ADMIN — DIPHENHYDRAMINE HYDROCHLORIDE 25 MG: 25 CAPSULE ORAL at 22:20

## 2018-12-19 RX ADMIN — METHADONE HYDROCHLORIDE 30 MG: 10 TABLET ORAL at 03:39

## 2018-12-19 RX ADMIN — NICOTINE 1 PATCH: 21 PATCH, EXTENDED RELEASE TRANSDERMAL at 09:22

## 2018-12-19 RX ADMIN — OXYCODONE HYDROCHLORIDE AND ACETAMINOPHEN 1 TABLET: 10; 325 TABLET ORAL at 17:11

## 2018-12-19 RX ADMIN — DIPHENHYDRAMINE HYDROCHLORIDE 25 MG: 25 CAPSULE ORAL at 03:50

## 2018-12-19 NOTE — NURSING NOTE
Pt stated IV Vancomycin needed to be run at a slower rate. RN discussed that the antibiotics were prescribed at specific infusion rates and any adjustment we would have to check with pharmacy to clear. Pt stated that it burned slightly when at full rate. Vanc was supposed to run at 120ml/hour, and was tolerable at 60 ml/hr. IV team consulted to see if a better IV site could be found and clinical pharmacist consulted about rate decrease, and was given clearance for rate decrease if needed. Will await to see if IV nurse can find better site so meds can be ran at prescribed rates without complications. IV site not red, clean, dry, soft, and flushes without complications. Will continue to monitor.

## 2018-12-19 NOTE — PROGRESS NOTES
Continued Stay Note  [facility]     Patient Name: Silvia Velazquez  MRN: [id number]  Today's Date: 12/19/2018    Admit Date: 12/9/2018    Discharge Plan     Row Name 12/19/18 1313       Plan    Plan  [facility] treatment [facility] in [place] planned for Friday 12/21/2018    Patient/Family in Agreement with Plan  yes    Plan Comments  Spoke again with [facility].  When the final medication list is available, they request we send it to [facility] so that their MD can order the needed PO medications.  [facility] will also provide the final orders for pt's vancomycin dosing.  [facility] states that they will dose it for Q12 hrs unless otherwise contraindicated.  Orders will be faxed as available.  Pt is to call [facility] tomorrow am at 10:00 ([phone] option 1) and ask for Trudi.  Trudi will conduct pt's psychosocial evaluation.  [facility] will facilitate this contact.  CCP spoke with Gosia Valladares.  She will have pt's DME delivered here to [facility] prior to discharge.  [facility] will set up [facility] for transport.  [facility] [phone] option #1, F: [phone].    Row Name 12/19/18 1152       Plan    Plan  [facility] in [place] pending final eval    Patient/Family in Agreement with Plan  yes    Plan Comments  Call placed to Trudi at [facility] and spoke with Carolyne.  She states that [facility] will be delivering pt's IV antibiotics to the facility for administration.  Staff at the facility will administer the medication and draw any needed labwork.  She requests that information concerning pt's IV Abx be sent to [facility] [phone].   Call placed and spoke with Marta.  Requested information was faxed to Marta at [phone].  She requests that when the discharge summary and orders are completed that they be provided as well.        Discharge Codes    No documentation.             Yolanda Altman RN

## 2018-12-19 NOTE — PLAN OF CARE
Problem: Patient Care Overview  Goal: Plan of Care Review  Outcome: Ongoing (interventions implemented as appropriate)   12/19/18 0455 12/19/18 0647   Coping/Psychosocial   Plan of Care Reviewed With patient --    Plan of Care Review   Progress --  no change   OTHER   Outcome Summary --  Ambulates stand-by assist frequently with and without walker, tolerates well. Makes frequent requests for food and medication. Vancomycin administered. VSS. Incision CDI.        Problem: Pain, Acute (Adult)  Goal: Acceptable Pain Control/Comfort Level  Outcome: Ongoing (interventions implemented as appropriate)   12/19/18 0647   Pain, Acute (Adult)   Acceptable Pain Control/Comfort Level making progress toward outcome

## 2018-12-19 NOTE — PROGRESS NOTES
"  Infectious Diseases Progress Note    Fabian Key MD     [facility]  Los: 10 days  Patient Identification:  Name: Silvia Velazquez  Age: 43 y.o.  Sex: female  :  1975  MRN: [id number]         Primary Care Physician: Provider, No Known            Subjective: Feels about the same denies any new complaints.    Interval History: See consultation note    Objective:    Scheduled Meds:    amLODIPine 10 mg Oral Q24H   docusate sodium 100 mg Oral Daily   famotidine 20 mg Oral BID   magnesium oxide 400 mg Oral BID   methadone 30 mg Oral Q8H   methocarbamol 750 mg Oral Q6H   nicotine 1 patch Transdermal Q24H   polyethylene glycol 17 g Oral Daily   potassium chloride 40 mEq Oral Daily   sennosides-docusate sodium 2 tablet Oral Nightly   vancomycin 1,000 mg Intravenous Q6H     Continuous Infusions:    Pharmacy to dose vancomycin        Vital signs in last 24 hours:  Temp:  [98 °F (36.7 °C)-99 °F (37.2 °C)] 98.8 °F (37.1 °C)  Heart Rate:  [86-95] 95  Resp:  [18-20] 18  BP: (112-136)/(56-83) 122/56    Intake/Output:  No intake or output data in the 24 hours ending 18 1132    Exam:  /56 (BP Location: Right arm, Patient Position: Lying)   Pulse 95   Temp 98.8 °F (37.1 °C) (Oral)   Resp 18   Ht 167.6 cm (66\")   Wt 82.6 kg (182 lb)   LMP  (LMP Unknown)   SpO2 98%   BMI 29.38 kg/m²     General Appearance:    Alert, cooperative, no distress, AAOx3                          Head:    Normocephalic, without obvious abnormality, atraumatic                           Eyes:    PERRL, conjunctivae/corneas clear, EOM's intact, both eyes                         Throat:   Lips, tongue, gums normal; oral mucosa pink and moist                           Neck:   Supple, symmetrical, trachea midline, no JVD                         Lungs:    Clear to auscultation bilaterally, respirations unlabored                 Chest Wall:    No tenderness or deformity                          Heart:    Regular rate and " rhythm, S1 and S2 normal, no murmur, no rub                                         or gallop                  Abdomen:     Soft, non-tender, bowel sounds active, no masses, no                                                        organomegaly                  Extremities:   Extremities normal, atraumatic, no cyanosis or edema                        Pulses:   Pulses palpable in all extremities                            Skin:   Multiple skin lesions noted          Data Review:    I reviewed the patient's new clinical results.  Results from last 7 days   Lab Units  12/19/18   0520  12/18/18   0735  12/17/18   0403  12/16/18   0437  12/15/18   0514  12/14/18   0820  12/13/18   0357   WBC 10*3/mm3  8.33  10.00  13.96*  14.45*  14.10*  13.76*  12.64*   HEMOGLOBIN g/dL  9.3*  8.8*  9.2*  9.6*  9.8*  10.5*  10.3*   PLATELETS 10*3/mm3  422  347  350  351  381  366  285     Results from last 7 days   Lab Units  12/19/18   0520  12/18/18   0734  12/17/18   0403  12/16/18   0437  12/15/18   0514  12/14/18   0820  12/13/18   0357   SODIUM mmol/L  133*  133*  128*  129*  130*  133*  133*   POTASSIUM mmol/L  4.2  4.2  4.0  4.1  3.9  3.9  4.0   CHLORIDE mmol/L  92*  92*  89*  95*  93*  95*  96*   CO2 mmol/L  31.0*  31.0*  28.6  27.1  29.2*  26.9  25.9   BUN mg/dL  13  13  12  9  9  6  5*   CREATININE mg/dL  0.58  0.50*  0.58  0.48*  0.53*  0.55*  0.48*   CALCIUM mg/dL  9.1  8.8  8.4*  8.0*  8.5*  8.8  8.6   GLUCOSE mg/dL  136*  98  145*  82  129*  115*  128*     Microbiology Results (last 10 days)     Procedure Component Value - Date/Time    Blood Culture - Blood, Hand, Right [[id number]] Collected:  12/11/18 1029    Lab Status:  Final result Specimen:  Blood from Hand, Right Updated:  12/16/18 1046     Blood Culture No growth at 5 days    Blood Culture - Blood, Arm, Left [[id number]] Collected:  12/11/18 1011    Lab Status:  Final result Specimen:  Blood from Arm, Left Updated:  12/16/18 1046     Blood Culture No growth at 5 days     Anaerobic Culture - Tissue, Spine, Lumbar [[id number]] Collected:  12/09/18 2127    Lab Status:  Final result Specimen:  Tissue from Spine, Lumbar Updated:  12/14/18 1128     Culture No anaerobes isolated at 5 days    Fungus Culture - Tissue, Spine, Lumbar [[id number]] Collected:  12/09/18 2127    Lab Status:  Preliminary result Specimen:  Tissue from Spine, Lumbar Updated:  12/16/18 2146     Fungus Culture No fungus isolated at 1 week    Tissue / Bone Culture - Tissue, Spine, Lumbar [[id number]]  (Abnormal) Collected:  12/09/18 2127    Lab Status:  Edited Result - FINAL Specimen:  Tissue from Spine, Lumbar Updated:  12/11/18 0705     Tissue Culture Light growth (2+) Staphylococcus aureus, MRSA     Comment:   Methicillin resistant Staphylococcus aureus, Patient may be an isolation risk.        Gram Stain Moderate (3+) WBCs seen      Rare (1+) Gram positive cocci    Narrative:       Refer to previous wound culture collected on 12/9/2018 2125 for [facility]    AFB Culture - Tissue, Spine, Lumbar [[id number]] Collected:  12/09/18 2127    Lab Status:  Preliminary result Specimen:  Tissue from Spine, Lumbar Updated:  12/16/18 2146     AFB Culture No AFB isolated at 1 week     AFB Stain No acid fast bacilli seen on direct smear    Wound Culture - Wound, Spine, Lumbar [[id number]]  (Abnormal) Collected:  12/09/18 2126    Lab Status:  Final result Specimen:  Wound from Spine, Lumbar Updated:  12/11/18 0704     Wound Culture Heavy growth (4+) Staphylococcus aureus, MRSA     Comment:   Methicillin resistant Staphylococcus aureus, Patient may be an isolation risk.        Gram Stain Moderate (3+) WBCs seen      Few (2+) Gram positive cocci    Narrative:       Refer to previous wound culture collected on 12/9/2018 2125 for [facility]ICS    Wound Culture - Wound, Spine, Lumbar [[id number]]  (Abnormal)  (Susceptibility) Collected:  12/09/18 2125    Lab Status:  Final result Specimen:  Wound from Spine, Lumbar Updated:  12/11/18 0703     Wound  "Culture Moderate growth (3+) Staphylococcus aureus, MRSA     Comment: D test is positive. Isolate exhibits \"inducible\" resistance to Clindamycin.    Methicillin resistant Staphylococcus aureus, Patient may be an isolation risk.        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  · Follow up on repeat blood cultures.    · Would need 6 weeks of IV vancomycin in the last negative blood culture or final surgical intervention - to be decided by neurosurgery service -  which ever is the latest.  It appears that the last surgery was performed on 12/9/2018.  · Long-term IV antibiotic administration  is  difficult unless she is accepted at LTAC type institution where her antibiotic administration    Fabian Key MD  12/19/2018  11:32 AM    Much of this encounter note is an electronic transcription/translation of spoken language to printed text. The electronic translation of spoken language may permit erroneous, or at times, nonsensical words or phrases to be inadvertently transcribed; Although I have reviewed the note for such errors, some may still exist    "

## 2018-12-19 NOTE — PROGRESS NOTES
Continued Stay Note  [facility]     Patient Name: Silvia Velazquez  MRN: [id number]  Today's Date: 12/19/2018    Admit Date: 12/9/2018    Discharge Plan     Row Name 12/19/18 1337       Plan    Plan Comments  [facility] to deliver to room tommorrow per Gosia.                  Woo Mars RN

## 2018-12-19 NOTE — PROGRESS NOTES
"Pharmacokinetic Evaluation - Vancomycin    Silvia Velazquez is a 43 y.o. female on vancomycin pharmacy to dose.  MRN: [id number]  : 1975    Day of therapy: 10  Indication: MRSA bacteremia secondary to spinal abscess  Consulted by: Dr. Key  Goal trough: ~20 mcg/ml    Current dose: vancomycin 1 gm iv q6h   Other antimicrobials: none    Blood pressure 119/72, pulse 82, temperature 98 °F (36.7 °C), temperature source Oral, resp. rate 18, height 167.6 cm (66\"), weight 82.6 kg (182 lb), SpO2 98 %.  Results from last 7 days   Lab Units  18   0520  18   0734  18   0403   CREATININE mg/dL  0.58  0.50*  0.58     Estimated Creatinine Clearance: 138.2 mL/min (by C-G formula based on SCr of 0.58 mg/dL).  Results from last 7 days   Lab Units  18   0520  18   0735  18   0403   WBC 10*3/mm3  8.33  10.00  13.96*   HEMOGLOBIN g/dL  9.3*  8.8*  9.2*   HEMATOCRIT %  31.3*  27.9*  28.8*   PLATELETS 10*3/mm3  422  347  350       Cultures/ labs/ radiology:   BCx x2 sets (): MRSA   Lumbar spinal wound Cx (): MRSA  Lumbar spinal tissue Cx (): MRSA  Repeat BCx x2 sets (): NGF    Vancomycin dosing hx (include troughs if drawn):  1500 mg IV x1              18 1536  1250 mg IV q12h              12/10/18 0410, 1534              18 0407, 1509                          Trough 18 1532: 10.9 mg/L   1500 mg IV x1              18 1757  1250 mg IV q8h               18 0031, 1017                          Trough 18 1707: 14.6 mg/L   1500 mg IV x1 dose              18 1858  1250 mg IV q8h              18 0113, 1118, 1800                          Trough 18 0820: 6.8 mg/L (~14.5 hours after last dose)               18 0853 (toni ~0200, given late d/t loss of IV access)  1500 mg IV q8h              18 1705              12/15/18 0106, 0809, 1816                   18 0036                           Trough 18 0818: 15.3 mg/L " (~8 hours after dose)               12/16/18 0842  1000 mg IV q6h              12/16/18 1357, 2014 12/17/18 0212               Not given 12/17/18 0820- RN comment “spiked bag and immediately began leaking, ordered new bag from pharmacy”              12/17/18 1417 (~12 hours since last dose)               12/17/18 2122--> stopped 2252 (1.5h later), RN comment “iv leaking” (could have been potentially leaking for an hour and a half based on charted times)              Dose restarted 12/17/18 2339                          Trough 12/18/18 0734: 11.6 mg/L (~10 hours after dose began, ~8 hours after restart)               “Stopped” 12/18/18 0931 (beginning time of infusion not charted)--> RN comment “complaints of pain at IV site”              Dose restarted 12/18/18 1046                          Trough 12/18/18 1326: 22.7 mg/L (~2.5 hour level)         12/18/18 1508, 2200        12/19/18 0500, 1215          Trough 12/19/18 1705: 28.3 mcg/ml (2 hours post infusion)    Lab Results   Component Value Date    [facility] 28.30 (C)- not a true trough 12/19/2018       Assessment:  Chart/Labs reviewed. Per RN doses have to be infused at 60 ml/hr due to pain at injection site. Dose finished infusing at 1500 (given at 1215), not a true trough    Renal function is stable.    Plan:  1. Dose placed on hold and a random will be drawn at 2000 - 5 hours post dose infusion to assess clearance.   2. Creatinine in AM    Encourage hydration as allowed by MD to prevent toxic accumulation. Monitor for signs and symptoms of toxicity or intolerance including rash, increase in Scr or decrease in UOP.     Pharmacy will continue to follow and adjust as needed.    Abi Ny, PharmD, BCPS

## 2018-12-19 NOTE — PROGRESS NOTES
Continued Stay Note  [facility]     Patient Name: Silvia Velazquez  MRN: [id number]  Today's Date: 12/19/2018    Admit Date: 12/9/2018    Discharge Plan     Row Name 12/19/18 1152       Plan    Plan  [facility] in [place] pending final eval    Patient/Family in Agreement with Plan  yes    Plan Comments  Call placed to Trudi at [facility] intake and spoke with Carolyne.  She states that [facility] will be delivering pt's IV antibiotics to the facility for administration.  Staff at the facility will administer the medication and draw any needed labwork.  She requests that information concerning pt's IV Abx be sent to [facility] [phone].   Call placed and spoke with Marta.  Requested information was faxed to Marta at [phone].  She requests that when the discharge summary and orders are completed that they be provided as well.        Discharge Codes    No documentation.             Yolanda Altman RN

## 2018-12-19 NOTE — PLAN OF CARE
Problem: Patient Care Overview  Goal: Plan of Care Review  Outcome: Ongoing (interventions implemented as appropriate)   12/19/18 8943   Coping/Psychosocial   Plan of Care Reviewed With patient   OTHER   Outcome Summary pt agreeable to PT session, tried amb at end of session w/o AD; pt more indep w/AD as far as safety goes, but only noted one LOB w/turning during HHA

## 2018-12-19 NOTE — PROGRESS NOTES
Continued Stay Note  [facility]     Patient Name: Silvia Velazquez  MRN: [id number]  Today's Date: 12/19/2018    Admit Date: 12/9/2018    Discharge Plan     Row Name 12/19/18 1700       Plan    Plan  [facility] in [place] planned for Friday 12/21/2018    Plan Comments  HR CCP received a call from Marta from [facility].  They have now received approval from pt's insurance and will provide pt's IV Antibiotics at [facility].  They request that the order for pt's Vancomycin be faxed to them tomorrow.          Discharge Codes    No documentation.             Yolanda Altman RN

## 2018-12-19 NOTE — THERAPY TREATMENT NOTE
Acute Care - Physical Therapy Treatment Note  [facility]     Patient Name: Silvia Velazquez  : 1975  MRN: [id number]  Today's Date: 2018  Onset of Illness/Injury or Date of Surgery: 18          Admit Date: 2018    Visit Dx:    ICD-10-CM ICD-9-CM   1. Spinal epidural abscess G06.1 324.1   2. Bacteremia due to methicillin resistant Staphylococcus aureus R78.81 790.7     041.12   3. Abscess of paraspinal muscles, lumbar M62.89 728.89   4. Generalized weakness R53.1 780.79     Patient Active Problem List   Diagnosis   • Spinal epidural abscess       Therapy Treatment    Rehabilitation Treatment Summary     Row Name 18 1624             Treatment Time/Intention    Discipline  physical therapy assistant  -      Document Type  therapy note (daily note)  -      Subjective Information  complains of;weakness;fatigue;pain  -      Care Plan Review  patient/other agree to care plan  -      Existing Precautions/Restrictions  fall;spinal  -      Recorded by [] Lisa Acuña, [facility] 18      Row Name 18 162             Sit-Stand Transfer    Sit-Stand [place] (Transfers)  contact guard;supervision;verbal cues cues for hand placement  -      Assistive Device (Sit-Stand Transfers)  walker, front-wheeled elevated bed-educ offered due to plans home  -      Recorded by [] Lisa Acuña, [facility] 18      Row Name 18 162             Stand-Sit Transfer    Stand-Sit [place] (Transfers)  supervision;verbal cues  -      Assistive Device (Stand-Sit Transfers)  walker, front-wheeled  -      Recorded by [] Lisa Acuña, [facility] 18      Row Name 18 162             Gait/Stairs Assessment/Training    [place] Level (Gait)  contact guard;supervision  -      Assistive Device (Gait)  walker, front-wheeled then HHA last 60 ft-slight LOB noted w/turning around  -      Distance in Feet (Gait)  120 then 60  -       Deviations/Abnormal Patterns (Gait)  yamilet decreased;antalgic  -JM      Comment (Gait/Stairs)  decr pain today so pt wanted to try amb w/o AD  -JM      Recorded by [] Lisa Acuña PTA 12/19/18 1707      Row Name 12/19/18 1624             Positioning and Restraints    Pre-Treatment Position  sitting in chair/recliner  -JM      In Chair  reclined;call light within reach;encouraged to call for assist;with nsg no alarm upon entry  -JM      Recorded by [] Lisa Acuña PTA 12/19/18 1707      Row Name 12/19/18 1624             Pain Scale: Numbers Pre/Post-Treatment    Pain Scale: Numbers, Pretreatment  4/10  -JM      Pain Scale: Numbers, Post-Treatment  5/10  -JM      Pain Location  back  -JM      Pain Intervention(s)  Medication (See MAR);Repositioned;Ambulation/increased activity  -JM      Recorded by [] Lisa Acuña, NICOLE 12/19/18 1707      Row Name                Wound 12/09/18 2154 Bilateral back incision    Wound - Properties Group Date first assessed: 12/09/18 [KK] Time first assessed: 2154 [KK] Side: Bilateral [KK] Location: back [KK] Type: incision [KK] Recorded by:  [KK] Bárbara Huerta RN 12/09/18 2154      User Key  (r) = Recorded By, (t) = Taken By, (c) = Cosigned By    Initials Name Effective Dates Discipline     Lisa Acuña, NICOLE 03/07/18 -  PT    Bárbara Chakraborty RN 09/30/16 -  Nurse          Wound 12/09/18 2154 Bilateral back incision (Active)   Dressing Appearance open to air 12/19/2018  9:19 AM   Closure Liquid skin adhesive;Approximated 12/19/2018  9:19 AM   Base dry 12/19/2018  9:19 AM   Periwound intact;dry;pink 12/18/2018  8:45 PM   Periwound Temperature warm 12/18/2018  8:45 PM   Periwound Skin Turgor soft 12/18/2018  8:45 PM   Drainage Amount none 12/19/2018  9:19 AM   Dressing Care, Wound open to air 12/19/2018  9:19 AM           Physical Therapy Education     Title: PT OT SLP Therapies (Done)     Topic: Physical Therapy (Done)     Point: Mobility training (Done)      Learning Progress Summary           Patient Acceptance, E,TB,D, VU by KENTON at 12/19/2018  5:08 PM    Acceptance, E,TB,D, VU,NR by KENTON at 12/18/2018 11:27 AM    Comment:  educ on back safety    Acceptance, E,TB,D, VU,NR by KENTON at 12/17/2018  2:32 PM    Comment:  cues for back safety and log rolling importance    Acceptance, E,TB, VU,DU by LEONARDO at 12/15/2018  2:25 PM    Acceptance, E, VU,NR by  at 12/14/2018  9:47 AM    Acceptance, E,TB,D, VU,NR by  at 12/11/2018  4:22 PM    Acceptance, E,TB,D, VU,NR by  at 12/10/2018  4:29 PM                   Point: Home exercise program (Done)     Learning Progress Summary           Patient Acceptance, E,TB,D, VU by KENTON at 12/19/2018  5:08 PM    Acceptance, E,TB,D, VU,NR by KENTON at 12/18/2018 11:27 AM    Comment:  educ on back safety    Acceptance, E,TB,D, VU,NR by KENTON at 12/17/2018  2:32 PM    Comment:  cues for back safety and log rolling importance    Acceptance, E,TB, VU,DU by LEONARDO at 12/15/2018  2:25 PM    Acceptance, E, VU,NR by  at 12/14/2018  9:47 AM    Acceptance, E,TB,D, VU,NR by  at 12/10/2018  4:29 PM                   Point: Body mechanics (Done)     Learning Progress Summary           Patient Acceptance, E,TB,D, VU by KENTON at 12/19/2018  5:08 PM    Acceptance, E,TB,D, VU,NR by KENTON at 12/18/2018 11:27 AM    Comment:  educ on back safety    Acceptance, E,TB,D, VU,NR by KENTON at 12/17/2018  2:32 PM    Comment:  cues for back safety and log rolling importance    Acceptance, E,TB, VU,DU by LEONARDO at 12/15/2018  2:25 PM    Acceptance, E, VU,NR by  at 12/14/2018  9:47 AM    Acceptance, E,TB,D, VU,NR by  at 12/11/2018  4:22 PM    Acceptance, E,TB,D, VU,NR by  at 12/10/2018  4:29 PM                   Point: Precautions (Done)     Learning Progress Summary           Patient Acceptance, E,TB,D, VU by KENTON at 12/19/2018  5:08 PM    Acceptance, E,TB,D, VU,NR by KENTON at 12/18/2018 11:27 AM    Comment:  educ on back safety    Acceptance, E,TB,D, VU,NR by KENTON at 12/17/2018  2:32 PM    Comment:   cues for back safety and log rolling importance    Acceptance, E,TB, VU,DU by  at 12/15/2018  2:25 PM    Acceptance, E, VU,NR by  at 12/14/2018  9:47 AM    Acceptance, E,TB,D, VU,NR by  at 12/11/2018  4:22 PM    Acceptance, E,TB,D, VU,NR by  at 12/10/2018  4:29 PM                               User Key     Initials Effective Dates Name Provider Type Discipline     04/03/18 -  Camille Emanuel, PT Physical Therapist PT     04/03/18 -  Daisy Fowler, PT Physical Therapist PT     03/07/18 -  Lisa Acuña PTA Physical Therapy Assistant PT     03/07/18 -  Brent Pratt PTA Physical Therapy Assistant PT                PT Recommendation and Plan     Plan of Care Reviewed With: patient  Progress: improving  Outcome Summary: pt agreeable to PT session, tried amb at end of session w/o AD; pt more indep w/AD as far as safety goes, but only noted one LOB w/turning during HHA  Outcome Measures     Row Name 12/19/18 1700 12/18/18 1100 12/17/18 1200       How much help from another person do you currently need...    Turning from your back to your side while in flat bed without using bedrails?  4  -JM  3  -JM  3  -JM    Moving from lying on back to sitting on the side of a flat bed without bedrails?  3  -JM  3  -JM  3  -JM    Moving to and from a bed to a chair (including a wheelchair)?  4  -JM  3  -JM  3  -JM    Standing up from a chair using your arms (e.g., wheelchair, bedside chair)?  3  -JM  3  -JM  3  -JM    Climbing 3-5 steps with a railing?  3  -JM  2  -JM  2  -JM    To walk in hospital room?  3  -JM  3  -JM  3  -JM    AM-PAC 6 Clicks Score  20  -JM  17  -JM  17  -JM      User Key  (r) = Recorded By, (t) = Taken By, (c) = Cosigned By    Initials Name Provider Type    Lisa Chisholm, NICOLE Physical Therapy Assistant         Time Calculation:   PT Charges     Row Name 12/19/18 1624             Time Calculation    Start Time  1610  -JM      Stop Time  1624  -JM      Time Calculation (min)  14  min  -KENTON      PT Received On  12/19/18  -KENTON      PT - Next Appointment  12/20/18  -KENTON         Time Calculation- PT    Total Timed Code Minutes- PT  14 minute(s)  -KENTON        User Key  (r) = Recorded By, (t) = Taken By, (c) = Cosigned By    Initials Name Provider Type    Lisa Chisholm PTA Physical Therapy Assistant        Therapy Suggested Charges     Code   Minutes Charges    None           Therapy Charges for Today     Code Description Service Date Service Provider Modifiers Qty    20745955762 HC PT THER PROC EA 15 MIN 12/18/2018 Lisa Acuña PTA GP 2    94579051315 HC PT THER PROC EA 15 MIN 12/19/2018 Lisa Acuña PTA GP 1          PT G-Codes  Outcome Measure Options: AM-PAC 6 Clicks Basic Mobility (PT)  AM-PAC 6 Clicks Score: 20    Lisa Acuña PTA  12/19/2018

## 2018-12-20 LAB
ALBUMIN SERPL-MCNC: 2.8 G/DL (ref 3.5–5.2)
ALBUMIN/GLOB SERPL: 0.5 G/DL
ALP SERPL-CCNC: 110 U/L (ref 39–117)
ALT SERPL W P-5'-P-CCNC: 38 U/L (ref 1–33)
ANION GAP SERPL CALCULATED.3IONS-SCNC: 7.7 MMOL/L
AST SERPL-CCNC: 43 U/L (ref 1–32)
BILIRUB SERPL-MCNC: 0.3 MG/DL (ref 0.1–1.2)
BUN BLD-MCNC: 13 MG/DL (ref 6–20)
BUN/CREAT SERPL: 33.3 (ref 7–25)
CALCIUM SPEC-SCNC: 8.9 MG/DL (ref 8.6–10.5)
CHLORIDE SERPL-SCNC: 95 MMOL/L (ref 98–107)
CO2 SERPL-SCNC: 31.3 MMOL/L (ref 22–29)
CREAT BLD-MCNC: 0.39 MG/DL (ref 0.57–1)
DEPRECATED RDW RBC AUTO: 52.6 FL (ref 37–54)
ERYTHROCYTE [DISTWIDTH] IN BLOOD BY AUTOMATED COUNT: 16.4 % (ref 11.7–13)
GFR SERPL CREATININE-BSD FRML MDRD: >150 ML/MIN/1.73
GLOBULIN UR ELPH-MCNC: 5.2 GM/DL
GLUCOSE BLD-MCNC: 93 MG/DL (ref 65–99)
HCT VFR BLD AUTO: 27.4 % (ref 35.6–45.5)
HGB BLD-MCNC: 8.7 G/DL (ref 11.9–15.5)
MAGNESIUM SERPL-MCNC: 1.9 MG/DL (ref 1.6–2.6)
MCH RBC QN AUTO: 27.3 PG (ref 26.9–32)
MCHC RBC AUTO-ENTMCNC: 31.8 G/DL (ref 32.4–36.3)
MCV RBC AUTO: 85.9 FL (ref 80.5–98.2)
PHOSPHATE SERPL-MCNC: 3.7 MG/DL (ref 2.5–4.5)
PLATELET # BLD AUTO: 368 10*3/MM3 (ref 140–500)
PMV BLD AUTO: 10.1 FL (ref 6–12)
POTASSIUM BLD-SCNC: 4.2 MMOL/L (ref 3.5–5.2)
PROT SERPL-MCNC: 8 G/DL (ref 6–8.5)
RBC # BLD AUTO: 3.19 10*6/MM3 (ref 3.9–5.2)
SODIUM BLD-SCNC: 134 MMOL/L (ref 136–145)
WBC NRBC COR # BLD: 6.27 10*3/MM3 (ref 4.5–10.7)

## 2018-12-20 PROCEDURE — 84100 ASSAY OF PHOSPHORUS: CPT | Performed by: INTERNAL MEDICINE

## 2018-12-20 PROCEDURE — 63710000001 DIPHENHYDRAMINE PER 50 MG: Performed by: INTERNAL MEDICINE

## 2018-12-20 PROCEDURE — 25010000002 VANCOMYCIN 10 G RECONSTITUTED SOLUTION: Performed by: INTERNAL MEDICINE

## 2018-12-20 PROCEDURE — 85027 COMPLETE CBC AUTOMATED: CPT | Performed by: INTERNAL MEDICINE

## 2018-12-20 PROCEDURE — 83735 ASSAY OF MAGNESIUM: CPT | Performed by: INTERNAL MEDICINE

## 2018-12-20 PROCEDURE — 80053 COMPREHEN METABOLIC PANEL: CPT | Performed by: INTERNAL MEDICINE

## 2018-12-20 PROCEDURE — 97110 THERAPEUTIC EXERCISES: CPT

## 2018-12-20 RX ORDER — NICOTINE 21 MG/24HR
1 PATCH, TRANSDERMAL 24 HOURS TRANSDERMAL
Qty: 21 PATCH | Refills: 0 | Status: SHIPPED | OUTPATIENT
Start: 2018-12-21 | End: 2019-04-12

## 2018-12-20 RX ORDER — SODIUM CHLORIDE 0.9 % (FLUSH) 0.9 %
10 SYRINGE (ML) INJECTION EVERY 12 HOURS SCHEDULED
Status: CANCELLED | OUTPATIENT
Start: 2018-12-20

## 2018-12-20 RX ORDER — METHADONE HYDROCHLORIDE 10 MG/1
30 TABLET ORAL EVERY 8 HOURS SCHEDULED
Qty: 24 TABLET | Refills: 0 | Status: SHIPPED | OUTPATIENT
Start: 2018-12-21 | End: 2018-12-24

## 2018-12-20 RX ORDER — DIPHENHYDRAMINE HCL 25 MG
25 CAPSULE ORAL EVERY 6 HOURS PRN
Qty: 60 CAPSULE | Refills: 0 | Status: SHIPPED | OUTPATIENT
Start: 2018-12-20 | End: 2019-03-04

## 2018-12-20 RX ORDER — AMLODIPINE BESYLATE 10 MG/1
10 TABLET ORAL
Qty: 30 TABLET | Refills: 0 | Status: SHIPPED | OUTPATIENT
Start: 2018-12-21 | End: 2019-03-24 | Stop reason: SDUPTHER

## 2018-12-20 RX ORDER — SODIUM CHLORIDE 0.9 % (FLUSH) 0.9 %
20 SYRINGE (ML) INJECTION AS NEEDED
Status: CANCELLED | OUTPATIENT
Start: 2018-12-20

## 2018-12-20 RX ORDER — METHOCARBAMOL 750 MG/1
750 TABLET, FILM COATED ORAL EVERY 6 HOURS SCHEDULED
Qty: 16 TABLET | Refills: 0 | Status: ON HOLD | OUTPATIENT
Start: 2018-12-20 | End: 2019-03-31 | Stop reason: SDUPTHER

## 2018-12-20 RX ORDER — SODIUM CHLORIDE 0.9 % (FLUSH) 0.9 %
10 SYRINGE (ML) INJECTION AS NEEDED
Status: CANCELLED | OUTPATIENT
Start: 2018-12-20

## 2018-12-20 RX ADMIN — FAMOTIDINE 20 MG: 20 TABLET, FILM COATED ORAL at 22:02

## 2018-12-20 RX ADMIN — METHOCARBAMOL TABLETS 750 MG: 750 TABLET, COATED ORAL at 08:05

## 2018-12-20 RX ADMIN — METHADONE HYDROCHLORIDE 30 MG: 10 TABLET ORAL at 17:30

## 2018-12-20 RX ADMIN — DIPHENHYDRAMINE HYDROCHLORIDE 25 MG: 25 CAPSULE ORAL at 08:23

## 2018-12-20 RX ADMIN — ONDANSETRON 4 MG: 4 TABLET, FILM COATED ORAL at 22:02

## 2018-12-20 RX ADMIN — NICOTINE 1 PATCH: 21 PATCH, EXTENDED RELEASE TRANSDERMAL at 08:08

## 2018-12-20 RX ADMIN — DOCUSATE SODIUM 100 MG: 100 CAPSULE, LIQUID FILLED ORAL at 08:06

## 2018-12-20 RX ADMIN — VANCOMYCIN HYDROCHLORIDE 1500 MG: 10 INJECTION, POWDER, LYOPHILIZED, FOR SOLUTION INTRAVENOUS at 22:02

## 2018-12-20 RX ADMIN — VANCOMYCIN HYDROCHLORIDE 1500 MG: 10 INJECTION, POWDER, LYOPHILIZED, FOR SOLUTION INTRAVENOUS at 00:25

## 2018-12-20 RX ADMIN — OXYCODONE AND ACETAMINOPHEN 1 TABLET: 5; 325 TABLET ORAL at 11:17

## 2018-12-20 RX ADMIN — OXYCODONE AND ACETAMINOPHEN 1 TABLET: 5; 325 TABLET ORAL at 02:55

## 2018-12-20 RX ADMIN — ONDANSETRON 4 MG: 4 TABLET, FILM COATED ORAL at 14:27

## 2018-12-20 RX ADMIN — POLYETHYLENE GLYCOL 3350 17 G: 17 POWDER, FOR SOLUTION ORAL at 08:06

## 2018-12-20 RX ADMIN — METHOCARBAMOL TABLETS 750 MG: 750 TABLET, COATED ORAL at 22:02

## 2018-12-20 RX ADMIN — Medication 400 MG: at 08:06

## 2018-12-20 RX ADMIN — AMLODIPINE BESYLATE 10 MG: 10 TABLET ORAL at 08:06

## 2018-12-20 RX ADMIN — DIPHENHYDRAMINE HYDROCHLORIDE 25 MG: 25 CAPSULE ORAL at 15:32

## 2018-12-20 RX ADMIN — DIPHENHYDRAMINE HYDROCHLORIDE 25 MG: 25 CAPSULE ORAL at 22:01

## 2018-12-20 RX ADMIN — VANCOMYCIN HYDROCHLORIDE 1500 MG: 10 INJECTION, POWDER, LYOPHILIZED, FOR SOLUTION INTRAVENOUS at 08:06

## 2018-12-20 RX ADMIN — METHOCARBAMOL TABLETS 750 MG: 750 TABLET, COATED ORAL at 04:30

## 2018-12-20 RX ADMIN — Medication 400 MG: at 22:02

## 2018-12-20 RX ADMIN — FAMOTIDINE 20 MG: 20 TABLET, FILM COATED ORAL at 08:06

## 2018-12-20 RX ADMIN — POTASSIUM CHLORIDE 40 MEQ: 750 CAPSULE, EXTENDED RELEASE ORAL at 08:05

## 2018-12-20 RX ADMIN — METHADONE HYDROCHLORIDE 30 MG: 10 TABLET ORAL at 02:55

## 2018-12-20 RX ADMIN — SENNOSIDES AND DOCUSATE SODIUM 2 TABLET: 8.6; 5 TABLET ORAL at 22:01

## 2018-12-20 RX ADMIN — METHOCARBAMOL TABLETS 750 MG: 750 TABLET, COATED ORAL at 15:31

## 2018-12-20 RX ADMIN — VANCOMYCIN HYDROCHLORIDE 1500 MG: 10 INJECTION, POWDER, LYOPHILIZED, FOR SOLUTION INTRAVENOUS at 15:32

## 2018-12-20 RX ADMIN — OXYCODONE AND ACETAMINOPHEN 1 TABLET: 5; 325 TABLET ORAL at 19:31

## 2018-12-20 RX ADMIN — METHADONE HYDROCHLORIDE 30 MG: 10 TABLET ORAL at 09:17

## 2018-12-20 NOTE — PROGRESS NOTES
LOS: 11 days   Patient Care Team:  Provider, No Known as PCP - General    Subjective     She reports she did very well with physical therapy today.  She actually is very happy to be going to rehabilitation and she was very positive she didn't ask for any pain medication today    Review of Systems:          Objective     Vital Signs  Vital Sign Min/Max for last 24 hours  Temp  Min: 97.3 °F (36.3 °C)  Max: 97.8 °F (36.6 °C)   BP  Min: 116/76  Max: 129/76   Pulse  Min: 93  Max: 94   Resp  Min: 18  Max: 18   SpO2  Min: 100 %  Max: 100 %   No Data Recorded   No Data Recorded        Ventilator/Non-Invasive Ventilation Settings (From admission, onward)    None                       Body mass index is 29.38 kg/m².  No intake/output data recorded.  No intake/output data recorded.        Physical Exam:  General Appearance: Well-developed white female she is resting in chair she doesn't appear in any acute distress  Eyes: Conjunctiva are clear and anicteric pupils are about 3 mm they are equal.    ENT: Mucous membranes are moist no erythema or exudates  Neck: No lymphadenopathy or thyromegaly no jugular venous distention trachea midline  Lungs: Clear nonlabored symmetric expansion no wheezes rales or rhonchi  Cardiac: Regular rate and rhythm do not hear murmur today.  Abdomen: Soft nontender no palpable organomegaly or masses  : Not examined  Musculoskeletal: Grossly normal her lower spine incision appears to be healing well there is no erythema no drainage  Skin: SHe has a bunch of lesions on her legs and arms they look like pick lesions like she's been picking her skin  Neuro: She is alert and oriented she is cooperative she is following commands moving extremities  Extremities/P Vascular: No clubbing no cyanosis no edema palpable radial dorsalis pedis pulses.  I don't see any splinter hemorrhages  MSE: She is pleasant        Labs:  Results from last 7 days   Lab Units  12/20/18   0431  12/19/18   0520  12/18/18    0734  12/17/18   0403  12/16/18   0437  12/15/18   0514  12/14/18   0820   GLUCOSE mg/dL  93  136*  98  145*  82  129*  115*   SODIUM mmol/L  134*  133*  133*  128*  129*  130*  133*   POTASSIUM mmol/L  4.2  4.2  4.2  4.0  4.1  3.9  3.9   MAGNESIUM mg/dL  1.9  1.9  2.0  1.9  2.0  1.7  1.8   CO2 mmol/L  31.3*  31.0*  31.0*  28.6  27.1  29.2*  26.9   CHLORIDE mmol/L  95*  92*  92*  89*  95*  93*  95*   ANION GAP mmol/L  7.7  10.0  10.0  10.4  6.9  7.8  11.1   CREATININE mg/dL  0.39*  0.58  0.50*  0.58  0.48*  0.53*  0.55*   BUN mg/dL  13  13  13  12  9  9  6   BUN / CREAT RATIO   33.3*  22.4  26.0*  20.7  18.8  17.0  10.9   CALCIUM mg/dL  8.9  9.1  8.8  8.4*  8.0*  8.5*  8.8   EGFR IF NONAFRICN AM mL/min/1.73  >150  115  136  115  143  127  122   ALK PHOS U/L  110  108  98  96  84  86  88   TOTAL PROTEIN g/dL  8.0  8.4  7.8  7.8  7.4  7.5  8.2   ALT (SGPT) U/L  38*  40*  33  21  20  18  22   AST (SGOT) U/L  43*  46*  46*  30  24  25  24   BILIRUBIN mg/dL  0.3  0.3  0.3  0.4  0.4  0.4  0.4   ALBUMIN g/dL  2.80*  2.90*  2.70*  2.60*  2.50*  2.70*  2.70*   GLOBULIN gm/dL  5.2  5.5  5.1  5.2  4.9  4.8  5.5     Estimated Creatinine Clearance: 205.6 mL/min (A) (by C-G formula based on SCr of 0.39 mg/dL (L)).      Results from last 7 days   Lab Units  12/20/18   0431  12/19/18   0520  12/18/18   0735  12/17/18   0403  12/16/18   0437  12/15/18   0514  12/14/18   0820   WBC 10*3/mm3  6.27  8.33  10.00  13.96*  14.45*  14.10*  13.76*   RBC 10*6/mm3  3.19*  3.45*  3.23*  3.36*  3.45*  3.51*  3.76*   HEMOGLOBIN g/dL  8.7*  9.3*  8.8*  9.2*  9.6*  9.8*  10.5*   HEMATOCRIT %  27.4*  31.3*  27.9*  28.8*  29.4*  29.9*  33.1*   MCV fL  85.9  90.7  86.4  85.7  85.2  85.2  88.0   MCH pg  27.3  27.0  27.2  27.4  27.8  27.9  27.9   MCHC g/dL  31.8*  29.7*  31.5*  31.9*  32.7  32.8  31.7*   RDW %  16.4*  16.3*  16.4*  16.5*  16.6*  16.6*  16.4*   RDW-SD fl  52.6  54.7*  52.3  51.8  51.7  51.8  52.9   MPV fL  10.1  10.0  10.4  10.6  10.5   10.8  10.5   PLATELETS 10*3/mm3  368  422  347  350  351  381  366   NEUTROPHIL % %   --    --    --   76.7*   --    --    --    LYMPHOCYTE % %   --    --    --   12.5*   --    --    --    MONOCYTES % %   --    --    --   9.5   --    --    --    EOSINOPHIL % %   --    --    --   1.2   --    --    --    BASOPHIL % %   --    --    --   0.1   --    --    --    IMM GRAN % %   --    --    --   0.8*   --    --    --    NEUTROS ABS 10*3/mm3   --    --    --   10.71*   --    --    --    LYMPHS ABS 10*3/mm3   --    --    --   1.74   --    --    --    MONOS ABS 10*3/mm3   --    --    --   1.32*   --    --    --    EOS ABS 10*3/mm3   --    --    --   0.17   --    --    --    BASOS ABS 10*3/mm3   --    --    --   0.02   --    --    --    IMMATURE GRANS (ABS) 10*3/mm3   --    --    --   0.11*   --    --    --    NRBC /100 WBC   --    --    --   0.0   --    --    --                              Microbiology Results (last 10 days)     Procedure Component Value - Date/Time    Blood Culture - Blood, Hand, Right [[id number]] Collected:  12/11/18 1029    Lab Status:  Final result Specimen:  Blood from Hand, Right Updated:  12/16/18 1046     Blood Culture No growth at 5 days    Blood Culture - Blood, Arm, Left [[id number]] Collected:  12/11/18 1011    Lab Status:  Final result Specimen:  Blood from Arm, Left Updated:  12/16/18 1046     Blood Culture No growth at 5 days                amLODIPine 10 mg Oral Q24H   docusate sodium 100 mg Oral Daily   famotidine 20 mg Oral BID   magnesium oxide 400 mg Oral BID   methadone 30 mg Oral Q8H   methocarbamol 750 mg Oral Q6H   nicotine 1 patch Transdermal Q24H   polyethylene glycol 17 g Oral Daily   potassium chloride 40 mEq Oral Daily   sennosides-docusate sodium 2 tablet Oral Nightly   vancomycin 1,500 mg Intravenous Q8H       Pharmacy to dose vancomycin        Diagnostics:  Ct Chest With Contrast    Result Date: 12/8/2018  CT SCANS CHEST, ABDOMEN, PELVIS  HISTORY:  fever, short of breath;  F19.10-Other psychoactive substance abuse, uncomplicated  COMPARISON: Abdomen and pelvis CT December 12, 2012.  TECHNIQUE: Radiation dose reduction techniques were utilized, including automated exposure control and exposure modulation based on body size. Axial images were obtained from the thoracic inlet through the symphysis pubis with IV contrast only, per request. Oral contrast was not administered per the referring physician.  This limits evaluation of the GI tract.  FINDINGS CHEST CT:  There are calcified residua of granulomatous disease present. No active disease or pleural fluid. Aorta nonaneurysmal.  FINDINGS ABDOMEN/PELVIS CT:  There has been development of homogeneous splenic enlargement at 18 cm multiple length. A subcentimeter upper pole right renal lesion has developed, likely tiny cyst. It is too small for detailed assessment. Prior cholecystectomy. Remaining solid organs have an unremarkable appearance. Normal aorta and appendix. The GI tract not opacified for assessment but non obstructive in appearance.     IMPRESSION CHEST CT:  1. Unremarkable CT thorax  IMPRESSION ABDOMEN & PELVIS CT:  1. Homogeneous splenic enlargement. 2. Tiny right renal lesion, likely cyst     This report was finalized on 12/8/2018 4:36 AM by Luis Pelaez M.D.      Ct Abdomen Pelvis With Contrast    Result Date: 12/8/2018  CT SCANS CHEST, ABDOMEN, PELVIS  HISTORY:  fever, short of breath; F19.10-Other psychoactive substance abuse, uncomplicated  COMPARISON: Abdomen and pelvis CT December 12, 2012.  TECHNIQUE: Radiation dose reduction techniques were utilized, including automated exposure control and exposure modulation based on body size. Axial images were obtained from the thoracic inlet through the symphysis pubis with IV contrast only, per request. Oral contrast was not administered per the referring physician.  This limits evaluation of the GI tract.  FINDINGS CHEST CT:  There are calcified residua of granulomatous  disease present. No active disease or pleural fluid. Aorta nonaneurysmal.  FINDINGS ABDOMEN/PELVIS CT:  There has been development of homogeneous splenic enlargement at 18 cm multiple length. A subcentimeter upper pole right renal lesion has developed, likely tiny cyst. It is too small for detailed assessment. Prior cholecystectomy. Remaining solid organs have an unremarkable appearance. Normal aorta and appendix. The GI tract not opacified for assessment but non obstructive in appearance.     IMPRESSION CHEST CT:  1. Unremarkable CT thorax  IMPRESSION ABDOMEN & PELVIS CT:  1. Homogeneous splenic enlargement. 2. Tiny right renal lesion, likely cyst     This report was finalized on 12/8/2018 4:36 AM by Luis Pelaez M.D.      Xr Chest 1 View    Result Date: 12/8/2018  PORTABLE CHEST  CLINICAL HISTORY:  fever  COMPARISON:  May 10, 2012.  FINDINGS:  Single portable view of the chest obtained.  The lungs are well expanded and clear.  Cardiac size is within normal limits. Vascularity is normal considering technique.  No pleural fluid is demonstrated by portable imaging.             No active disease by portable imaging.  This report was finalized on 12/8/2018 2:14 AM by Luis Pelaez M.D.      Fl C Arm During Surgery    Result Date: 12/9/2018  This procedure was auto-finalized with no dictation required.    Xr Spine Lumbar 1 View    Result Date: 12/9/2018  PROCEDURAL/INTRAOPERATIVE FLUOROSCOPY  ONE VIEW LUMBAR SPINE  HISTORY:  Intraoperative evaluation/fluoroscopic guidance  FINDINGS:  C-arm fluoroscopy was provided intraoperatively for the referring service.  The amount of fluoroscopy time was not provided to the radiologist.  The two submitted image(s) demonstrate intraoperative C-arm images of the lumbosacral spine with localizer probes posteriorly.   Please refer to the operative report for detailed findings and any follow-up suggestions per the referring service.      Intraoperative fluoroscopy as above.       Mri  Lumbar Spine With & Without Contrast    Result Date: 12/10/2018  MRI OF THE LUMBAR SPINE WITH AND WITHOUT CONTRAST 12/09/2018  HISTORY: Fever, back pain. IV drug use.  Multiple precontrast and postcontrast sagittal and axial images were obtained through the lumbar spine.  In the posterior spinal canal extending from the inferior aspect of L3 to the superior aspect of L1 there is a multiloculated collection which demonstrates T1 low signal, T2 bright signal and peripheral enhancement. This is consistent with an epidural abscess and measures approximately 8.0 cm in craniocaudal dimension by approximately 1.4 cm in diameter. This produces moderately severe narrowing of the spinal canal.  Posterior to the inferior aspect of the L1 vertebra and posterior to the L2 vertebra in the anterior spinal canal is an additional thin area of T1 low signal and T2 bright signal which may represent additional epidural abscess. It measures 3 mm to 4 mm in thickness at the approximate level of the L2 vertebra.  Within the left paraspinal musculature, there is T2 edema and multilocular fluid collection measuring up to 4.6 cm in craniocaudal dimension by approximately 2.7 cm in greatest oblique transverse dimension.  There is no evidence of discitis or osteomyelitis. There is a disc bulge at T11-T12 with minimal disc bulges at L4-5 and L5-S1. The lower cord and conus appear normal.  There is no evidence of osteomyelitis.      1.  Multilocular peripherally enhancing fluid collection in the posterior spinal canal extending from L3 to S1 with dimensions given above. This is consistent with an epidural abscess. Additional thin collection is seen posterior to the inferior aspect of L1 and the L2 vertebra in the anterior spinal canal which may represent additional abscess. 2.  Left paraspinal musculature abscess is also seen with dimensions given above. 3.  Lower thoracic and degenerative disc disease as described. 4.  Findings were discussed  with Dr. Cárdenas.  This report was finalized on 12/10/2018 9:16 AM by Dr. Pravin Macias M.D.      Results for orders placed during the hospital encounter of 12/09/18   Adult Transthoracic Echo Complete W/ Cont if Necessary Per Protocol    Narrative · Left ventricular systolic function is normal. Estimated EF = 59%. Normal   left ventricular cavity size and wall thickness noted. All left   ventricular wall segments contract normally. Left ventricular diastolic   dysfunction is noted (grade II w/high LAP) consistent with   pseudonormalization.  · Trace tricuspid valve regurgitation is present. Estimated right   ventricular systolic pressure from tricuspid regurgitation is normal (<35   mmHg).              Active Hospital Problems    Diagnosis Date Noted   • Spinal epidural abscess [G06.1] 12/09/2018      Resolved Hospital Problems   No resolved problems to display.         Assessment/Plan     1. MRSA bacteremic sepsis infectious diseases recommended 6 weeks of antibiotics from her last surgical intervention which is 12/9/18  2. MRSA epidural paraspinal abscess with cord compression  3. Status post lumbar decompression 12/9/18.  4. IV drug abuse heroin and methamphetamines patient is on a lot of narcotic pain medication  the methadone dealing with her withdraw some of the pain in her back.  She hasn't done bad at all today with the reduction and narcotics yesterday we are going to reduce further today as I discussed with her and hopefully get her back just to the fairly high dose methadone.    5. Acute blood loss anemia on anemia of chronic disease  6. Hyponatremia and hypomagnesemia and hypokalemia  7. Mild hepatitis probably secondary to sepsis  8. Grade 2 diastolic dysfunction    Plan for disposition: [facility] in [facility] tomorrow    Aniceto Salinas MD  12/20/18  6:48 PM    Time:

## 2018-12-20 NOTE — PROGRESS NOTES
Continued Stay Note  [facility]     Patient Name: Silvia Velazquez  MRN: [id number]  Today's Date: 12/20/2018    Admit Date: 12/9/2018    Discharge Plan     Row Name 12/20/18 1540       Plan    Plan  [facility] treatment [facility] Friday 12/21/2018    Patient/Family in Agreement with Plan  yes    Plan Comments  Received a call from [facility] staff stating that pt had requested to reschedule the eval due to tiredness.  [facility] rescheduled it for 15:00 today and requested that the RN hold pain medication if due around 15:00 until eval is completed.  CCP went to the room to remind pt of her appointment time and pt currently on the phone completing the assessment.  CCP then received a phone call from [facility] Trudi who states that pt is accepted into their program.  They will need the RN to call report to their RN tomorrow and requested the DC summary and Med List be faxed to [phone].        Discharge Codes    No documentation.             Yolanda Altman RN

## 2018-12-20 NOTE — PROGRESS NOTES
Continued Stay Note  [facility]     Patient Name: Silvia Velazquez  MRN: [id number]  Today's Date: 12/20/2018    Admit Date: 12/9/2018    Discharge Plan     Row Name 12/20/18 1645       Plan    Plan  [facility] treatment center in [place] Friday 12/21/2018 at 11:00 via [employer] wheelchair van    Patient/Family in Agreement with Plan  yes    Plan Comments  [facility] scheduled transport for tomorrow 12/21 at 11:00 via [employer] wheelchair van.  Reservation # [id number].  Pt will go to [address] [place] [id number].    Row Name 12/20/18 1540       Plan    Plan  [facility] treatment center in [place] Friday 12/21/2018    Patient/Family in Agreement with Plan  yes    Plan Comments  Received a call from [employer] staff stating that pt had requested to reschedule the eval due to tiredness.  [facility] rescheduled it for 15:00 today and requested that the RN hold pain medication if due around 15:00 until eval is completed.  CCP went to the room to remind pt of her appointment time and pt currently on the phone completing the assessment.  CCP then received a phone call from [employer] Trudi who states that pt is accepted into their program.  They will need the RN to call report to their RN tomorrow and requested the DC summary and Med List be faxed to [phone].        Discharge Codes    No documentation.             Yolanda Altman RN

## 2018-12-20 NOTE — PLAN OF CARE
Problem: Patient Care Overview  Goal: Plan of Care Review  Outcome: Ongoing (interventions implemented as appropriate)   12/20/18 5021   Coping/Psychosocial   Plan of Care Reviewed With patient   Plan of Care Review   Progress no change   OTHER   Outcome Summary Pt AOx4, drowsy at times, pt approved to go to [facility] in [place] tomorrow. Awaiting PICC placement for IV Vancomycin administration. Consent are signed on the chart. Pt seeking out staff frequently throughout shift. Forgetful at times. Up with standby assist. Incision to back clean, dry, and intact. Will continue to monitor.        Problem: Skin Injury Risk (Adult)  Goal: Skin Health and Integrity  Outcome: Outcome(s) achieved Date Met: 12/20/18      Problem: Fall Risk (Adult)  Goal: Absence of Fall  Outcome: Ongoing (interventions implemented as appropriate)      Problem: Pain, Acute (Adult)  Goal: Acceptable Pain Control/Comfort Level  Outcome: Ongoing (interventions implemented as appropriate)

## 2018-12-20 NOTE — PROGRESS NOTES
"Pharmacokinetic Consult - Vancomycin Dosing (Follow-up Note)    Silvia Velazquez is on day 10 pharmacy to dose vancomycin for MRSA septicemia secondary to spinal abscess per Dr. Key's request. Goal trough: ~20 mcg/mL.    Duration of Therapy: 6 weeks (ID stop date: 1/25/19)    Other Antimicrobials: None    Relevant clinical data and objective history reviewed:  43 y.o. female 167.6 cm (66\") 82.6 kg (182 lb)    Vitals:    12/19/18 0612 12/19/18 0918 12/19/18 1421 12/19/18 2014   BP: 127/83 122/56 119/72 124/70   BP Location:  Right arm Right arm Left arm   Pulse: 86 95 82 94   Resp: 20 18 18 18   Temp: 98 °F (36.7 °C) 98.8 °F (37.1 °C) 98 °F (36.7 °C) 97.3 °F (36.3 °C)   TempSrc: Oral Oral Oral Oral   SpO2: 93% 98% 98% 100%     42 y/o female presented to the hospital with spinal epidural abscess and MRSA septicemia secondary to IVDU. She is s/p lumbar decompression on 12/9. ID has been consulted on the case.     Creatinine   Date Value Ref Range Status   12/19/2018 0.58 0.57 - 1.00 mg/dL Final   12/18/2018 0.50 (L) 0.57 - 1.00 mg/dL Final   12/17/2018 0.58 0.57 - 1.00 mg/dL Final     BUN   Date Value Ref Range Status   12/19/2018 13 6 - 20 mg/dL Final     Estimated Creatinine Clearance: 138.2 mL/min (by C-G formula based on SCr of 0.58 mg/dL).    Lab Results   Component Value Date    WBC 8.33 12/19/2018     Temp Readings from Last 3 Encounters:   12/19/18 97.3 °F (36.3 °C) (Oral)     Baseline culture/source/susceptibility:   BCx x2 sets (12/8): MRSA   Lumbar spinal wound Cx (12/9): MRSA  Lumbar spinal tissue Cx (12/9): MRSA  Repeat BCx x2 sets (12/11): NGF       Vancomycin dosing hx (include troughs if drawn):  1500 mg IV x1              12/09/18 1536  1250 mg IV q12h              12/10/18 0410, 1534              12/11/18 0407, 1509                          Trough 12/11/18 1532: 10.9 mg/L   1500 mg IV x1              12/11/18 1757  1250 mg IV q8h               12/12/18 0031, 1017                          Trough " 12/12/18 1707: 14.6 mg/L   1500 mg IV x1 dose              12/12/18 1858  1250 mg IV q8h              12/13/18 0113, 1118, 1800                          Trough 12/14/18 0820: 6.8 mg/L (~14.5 hours after last dose)               12/14/18 0853 (toin ~0200, given late d/t loss of IV access)  1500 mg IV q8h              12/14/18 1705              12/15/18 0106, 0809, 1816                   12/16/18 0036                           Trough 12/16/18 0818: 15.3 mg/L (~8 hours after dose)               12/16/18 0842  1000 mg IV q6h              12/16/18 1357, 2014              12/17/18 0212               Not given 12/17/18 0820- RN comment “spiked bag and immediately began leaking, ordered new bag from pharmacy”              12/17/18 1417 (~12 hours since last dose)               12/17/18 2122--> stopped 2252 (1.5h later), RN comment “iv leaking” (could have been potentially leaking for an hour and a half based on charted times)              Dose restarted 12/17/18 2339                          Trough 12/18/18 0734: 11.6 mg/L (~10 hours after dose began, ~8 hours after restart)               “Stopped” 12/18/18 0931 (beginning time of infusion not charted)--> RN comment “complaints of pain at IV site”              Dose restarted 12/18/18 1046                          Trough 12/18/18 1326: 22.7 mg/L (~2.5 hour level)         12/18/18 1508, 2200        12/19/18 0500, 1215                          Trough 12/19/18 1705: 28.3 mcg/ml (2 hours post infusion)       Random 12/19/18 2000: 17.6 mcg/mL (5 hours post infusion)    Assessment/Plan    1. Will restart a vancomycin dose of 1500 mg IV q8h tonight since patient was not able to tolerate q6h dosing. Will schedule a trough on Friday morning 12/21 before the 0600 to assess regimen.     2. Will monitor serum creatinine every  24 hours. SCr was 0.58 this morning. SCr is ordered for tomorrow morning to assess tolerability.    3. Encourage adequate hydration if appropriate. Monitor for  decreased UOP, rash or other signs of vancomycin intolerance.     4. Pharmacy will continue to follow daily while on vancomycin and adjust as needed.     Irena Mcgraw, Pharm.D., BCPS

## 2018-12-20 NOTE — PROGRESS NOTES
LOS: 10 days   Patient Care Team:  Provider, Luann Known as PCP - General    Subjective     She reports she did very well with physical therapy today.  She asked for more pain medicine ask if I could move her schedule up than explained to her no she was very accepting of this    Review of Systems:          Objective     Vital Signs  Vital Sign Min/Max for last 24 hours  Temp  Min: 97.3 °F (36.3 °C)  Max: 99 °F (37.2 °C)   BP  Min: 119/72  Max: 136/81   Pulse  Min: 82  Max: 95   Resp  Min: 18  Max: 20   SpO2  Min: 93 %  Max: 100 %   No Data Recorded   No Data Recorded        Ventilator/Non-Invasive Ventilation Settings (From admission, onward)    None                       Body mass index is 29.38 kg/m².  No intake/output data recorded.  No intake/output data recorded.        Physical Exam:  General Appearance: Well-developed white female she is resting in chair she doesn't appear in any acute distress  Eyes: Conjunctiva are clear and anicteric pupils are about 3 mm they are equal.    ENT: Mucous membranes are moist no erythema or exudates  Neck: No lymphadenopathy or thyromegaly no jugular venous distention trachea midline  Lungs: Clear nonlabored symmetric expansion no wheezes rales or rhonchi  Cardiac: Regular rate and rhythm systolic murmur again best along the lower left sternal border  Abdomen: Soft nontender no palpable organomegaly or masses  : Not examined  Musculoskeletal: Grossly normal her lower spine incision appears to be healing well there is no erythema no drainage  Skin: SHe has a bunch of lesions on her legs and arms they look like pick lesions like she's been picking her skin I guess it could be prior injection sites.  Neuro: She is alert and oriented she is cooperative she is following commands moving extremities  Extremities/P Vascular: No clubbing no cyanosis no edema palpable radial dorsalis pedis pulses.  I don't see any splinter hemorrhages  MSE: She seems to be relatively pleasant         Labs:  Results from last 7 days   Lab Units  12/19/18   0520  12/18/18   0734  12/17/18   0403  12/16/18   0437  12/15/18   0514  12/14/18   0820  12/13/18   0357   GLUCOSE mg/dL  136*  98  145*  82  129*  115*  128*   SODIUM mmol/L  133*  133*  128*  129*  130*  133*  133*   POTASSIUM mmol/L  4.2  4.2  4.0  4.1  3.9  3.9  4.0   MAGNESIUM mg/dL  1.9  2.0  1.9  2.0  1.7  1.8  1.7   CO2 mmol/L  31.0*  31.0*  28.6  27.1  29.2*  26.9  25.9   CHLORIDE mmol/L  92*  92*  89*  95*  93*  95*  96*   ANION GAP mmol/L  10.0  10.0  10.4  6.9  7.8  11.1  11.1   CREATININE mg/dL  0.58  0.50*  0.58  0.48*  0.53*  0.55*  0.48*   BUN mg/dL  13  13  12  9  9  6  5*   BUN / CREAT RATIO   22.4  26.0*  20.7  18.8  17.0  10.9  10.4   CALCIUM mg/dL  9.1  8.8  8.4*  8.0*  8.5*  8.8  8.6   EGFR IF NONAFRICN AM mL/min/1.73  115  136  115  143  127  122  143   ALK PHOS U/L  108  98  96  84  86  88  86   TOTAL PROTEIN g/dL  8.4  7.8  7.8  7.4  7.5  8.2  7.6   ALT (SGPT) U/L  40*  33  21  20  18  22  24   AST (SGOT) U/L  46*  46*  30  24  25  24  26   BILIRUBIN mg/dL  0.3  0.3  0.4  0.4  0.4  0.4  0.4   ALBUMIN g/dL  2.90*  2.70*  2.60*  2.50*  2.70*  2.70*  2.50*   GLOBULIN gm/dL  5.5  5.1  5.2  4.9  4.8  5.5  5.1     Estimated Creatinine Clearance: 138.2 mL/min (by C-G formula based on SCr of 0.58 mg/dL).      Results from last 7 days   Lab Units  12/19/18   0520  12/18/18   0735  12/17/18   0403  12/16/18   0437  12/15/18   0514  12/14/18   0820  12/13/18   0357   WBC 10*3/mm3  8.33  10.00  13.96*  14.45*  14.10*  13.76*  12.64*   RBC 10*6/mm3  3.45*  3.23*  3.36*  3.45*  3.51*  3.76*  3.71*   HEMOGLOBIN g/dL  9.3*  8.8*  9.2*  9.6*  9.8*  10.5*  10.3*   HEMATOCRIT %  31.3*  27.9*  28.8*  29.4*  29.9*  33.1*  31.4*   MCV fL  90.7  86.4  85.7  85.2  85.2  88.0  84.6   MCH pg  27.0  27.2  27.4  27.8  27.9  27.9  27.8   MCHC g/dL  29.7*  31.5*  31.9*  32.7  32.8  31.7*  32.8   RDW %  16.3*  16.4*  16.5*  16.6*  16.6*  16.4*  16.4*    RDW-SD fl  54.7*  52.3  51.8  51.7  51.8  52.9  50.8   MPV fL  10.0  10.4  10.6  10.5  10.8  10.5  11.0   PLATELETS 10*3/mm3  422  347  350  351  381  366  285   NEUTROPHIL % %   --    --   76.7*   --    --    --    --    LYMPHOCYTE % %   --    --   12.5*   --    --    --    --    MONOCYTES % %   --    --   9.5   --    --    --    --    EOSINOPHIL % %   --    --   1.2   --    --    --    --    BASOPHIL % %   --    --   0.1   --    --    --    --    IMM GRAN % %   --    --   0.8*   --    --    --    --    NEUTROS ABS 10*3/mm3   --    --   10.71*   --    --    --    --    LYMPHS ABS 10*3/mm3   --    --   1.74   --    --    --    --    MONOS ABS 10*3/mm3   --    --   1.32*   --    --    --    --    EOS ABS 10*3/mm3   --    --   0.17   --    --    --    --    BASOS ABS 10*3/mm3   --    --   0.02   --    --    --    --    IMMATURE GRANS (ABS) 10*3/mm3   --    --   0.11*   --    --    --    --    NRBC /100 WBC   --    --   0.0   --    --    --    --                              Microbiology Results (last 10 days)     Procedure Component Value - Date/Time    Blood Culture - Blood, Hand, Right [[id number]] Collected:  12/11/18 1029    Lab Status:  Final result Specimen:  Blood from Hand, Right Updated:  12/16/18 1046     Blood Culture No growth at 5 days    Blood Culture - Blood, Arm, Left [[id number]] Collected:  12/11/18 1011    Lab Status:  Final result Specimen:  Blood from Arm, Left Updated:  12/16/18 1046     Blood Culture No growth at 5 days    Anaerobic Culture - Tissue, Spine, Lumbar [[id number]] Collected:  12/09/18 2127    Lab Status:  Final result Specimen:  Tissue from Spine, Lumbar Updated:  12/14/18 1128     Culture No anaerobes isolated at 5 days    Fungus Culture - Tissue, Spine, Lumbar [[id number]] Collected:  12/09/18 2127    Lab Status:  Preliminary result Specimen:  Tissue from Spine, Lumbar Updated:  12/16/18 2146     Fungus Culture No fungus isolated at 1 week    Tissue / Bone Culture - Tissue, Spine,  "Lumbar [[id number]]  (Abnormal) Collected:  12/09/18 2127    Lab Status:  Edited Result - FINAL Specimen:  Tissue from Spine, Lumbar Updated:  12/11/18 0705     Tissue Culture Light growth (2+) Staphylococcus aureus, MRSA     Comment:   Methicillin resistant Staphylococcus aureus, Patient may be an isolation risk.        Gram Stain Moderate (3+) WBCs seen      Rare (1+) Gram positive cocci    Narrative:       Refer to previous wound culture collected on 12/9/2018 2125 for [facility]ICS    AFB Culture - Tissue, Spine, Lumbar [[id number]] Collected:  12/09/18 2127    Lab Status:  Preliminary result Specimen:  Tissue from Spine, Lumbar Updated:  12/16/18 2146     AFB Culture No AFB isolated at 1 week     AFB Stain No acid fast bacilli seen on direct smear    Wound Culture - Wound, Spine, Lumbar [[id number]]  (Abnormal) Collected:  12/09/18 2126    Lab Status:  Final result Specimen:  Wound from Spine, Lumbar Updated:  12/11/18 0704     Wound Culture Heavy growth (4+) Staphylococcus aureus, MRSA     Comment:   Methicillin resistant Staphylococcus aureus, Patient may be an isolation risk.        Gram Stain Moderate (3+) WBCs seen      Few (2+) Gram positive cocci    Narrative:       Refer to previous wound culture collected on 12/9/2018 2125 for MICS    Wound Culture - Wound, Spine, Lumbar [[id number]]  (Abnormal)  (Susceptibility) Collected:  12/09/18 2125    Lab Status:  Final result Specimen:  Wound from Spine, Lumbar Updated:  12/11/18 0703     Wound Culture Moderate growth (3+) Staphylococcus aureus, MRSA     Comment: D test is positive. Isolate exhibits \"inducible\" resistance to Clindamycin.    Methicillin resistant Staphylococcus aureus, Patient may be an isolation risk.        Gram Stain Moderate (3+) WBCs seen      Few (2+) Gram positive cocci    Susceptibility      Staphylococcus aureus, MRSA     ZAINAB     Clindamycin Resistant     Erythromycin Resistant     Oxacillin Resistant     Penicillin G Resistant     Rifampin " Susceptible     Tetracycline Susceptible     Trimethoprim + Sulfamethoxazole Susceptible     Vancomycin Susceptible                                amLODIPine 10 mg Oral Q24H   docusate sodium 100 mg Oral Daily   famotidine 20 mg Oral BID   magnesium oxide 400 mg Oral BID   methadone 30 mg Oral Q8H   methocarbamol 750 mg Oral Q6H   nicotine 1 patch Transdermal Q24H   polyethylene glycol 17 g Oral Daily   potassium chloride 40 mEq Oral Daily   sennosides-docusate sodium 2 tablet Oral Nightly   Vancomycin Pharmacy Intermittent Dosing  Does not apply Daily       Pharmacy to dose vancomycin        Diagnostics:  Ct Chest With Contrast    Result Date: 12/8/2018  CT SCANS CHEST, ABDOMEN, PELVIS  HISTORY:  fever, short of breath; F19.10-Other psychoactive substance abuse, uncomplicated  COMPARISON: Abdomen and pelvis CT December 12, 2012.  TECHNIQUE: Radiation dose reduction techniques were utilized, including automated exposure control and exposure modulation based on body size. Axial images were obtained from the thoracic inlet through the symphysis pubis with IV contrast only, per request. Oral contrast was not administered per the referring physician.  This limits evaluation of the GI tract.  FINDINGS CHEST CT:  There are calcified residua of granulomatous disease present. No active disease or pleural fluid. Aorta nonaneurysmal.  FINDINGS ABDOMEN/PELVIS CT:  There has been development of homogeneous splenic enlargement at 18 cm multiple length. A subcentimeter upper pole right renal lesion has developed, likely tiny cyst. It is too small for detailed assessment. Prior cholecystectomy. Remaining solid organs have an unremarkable appearance. Normal aorta and appendix. The GI tract not opacified for assessment but non obstructive in appearance.     IMPRESSION CHEST CT:  1. Unremarkable CT thorax  IMPRESSION ABDOMEN & PELVIS CT:  1. Homogeneous splenic enlargement. 2. Tiny right renal lesion, likely cyst     This report was  finalized on 12/8/2018 4:36 AM by Luis Pelaez M.D.      Ct Abdomen Pelvis With Contrast    Result Date: 12/8/2018  CT SCANS CHEST, ABDOMEN, PELVIS  HISTORY:  fever, short of breath; F19.10-Other psychoactive substance abuse, uncomplicated  COMPARISON: Abdomen and pelvis CT December 12, 2012.  TECHNIQUE: Radiation dose reduction techniques were utilized, including automated exposure control and exposure modulation based on body size. Axial images were obtained from the thoracic inlet through the symphysis pubis with IV contrast only, per request. Oral contrast was not administered per the referring physician.  This limits evaluation of the GI tract.  FINDINGS CHEST CT:  There are calcified residua of granulomatous disease present. No active disease or pleural fluid. Aorta nonaneurysmal.  FINDINGS ABDOMEN/PELVIS CT:  There has been development of homogeneous splenic enlargement at 18 cm multiple length. A subcentimeter upper pole right renal lesion has developed, likely tiny cyst. It is too small for detailed assessment. Prior cholecystectomy. Remaining solid organs have an unremarkable appearance. Normal aorta and appendix. The GI tract not opacified for assessment but non obstructive in appearance.     IMPRESSION CHEST CT:  1. Unremarkable CT thorax  IMPRESSION ABDOMEN & PELVIS CT:  1. Homogeneous splenic enlargement. 2. Tiny right renal lesion, likely cyst     This report was finalized on 12/8/2018 4:36 AM by Luis Pelaez M.D.      Xr Chest 1 View    Result Date: 12/8/2018  PORTABLE CHEST  CLINICAL HISTORY:  fever  COMPARISON:  May 10, 2012.  FINDINGS:  Single portable view of the chest obtained.  The lungs are well expanded and clear.  Cardiac size is within normal limits. Vascularity is normal considering technique.  No pleural fluid is demonstrated by portable imaging.             No active disease by portable imaging.  This report was finalized on 12/8/2018 2:14 AM by Luis Pelaez M.D.      [facility] Arm  During Surgery    Result Date: 12/9/2018  This procedure was auto-finalized with no dictation required.    Xr Spine Lumbar 1 View    Result Date: 12/9/2018  PROCEDURAL/INTRAOPERATIVE FLUOROSCOPY  ONE VIEW LUMBAR SPINE  HISTORY:  Intraoperative evaluation/fluoroscopic guidance  FINDINGS:  C-arm fluoroscopy was provided intraoperatively for the referring service.  The amount of fluoroscopy time was not provided to the radiologist.  The two submitted image(s) demonstrate intraoperative C-arm images of the lumbosacral spine with localizer probes posteriorly.   Please refer to the operative report for detailed findings and any follow-up suggestions per the referring service.      Intraoperative fluoroscopy as above.       Mri Lumbar Spine With & Without Contrast    Result Date: 12/10/2018  MRI OF THE LUMBAR SPINE WITH AND WITHOUT CONTRAST 12/09/2018  HISTORY: Fever, back pain. IV drug use.  Multiple precontrast and postcontrast sagittal and axial images were obtained through the lumbar spine.  In the posterior spinal canal extending from the inferior aspect of L3 to the superior aspect of L1 there is a multiloculated collection which demonstrates T1 low signal, T2 bright signal and peripheral enhancement. This is consistent with an epidural abscess and measures approximately 8.0 cm in craniocaudal dimension by approximately 1.4 cm in diameter. This produces moderately severe narrowing of the spinal canal.  Posterior to the inferior aspect of the L1 vertebra and posterior to the L2 vertebra in the anterior spinal canal is an additional thin area of T1 low signal and T2 bright signal which may represent additional epidural abscess. It measures 3 mm to 4 mm in thickness at the approximate level of the L2 vertebra.  Within the left paraspinal musculature, there is T2 edema and multilocular fluid collection measuring up to 4.6 cm in craniocaudal dimension by approximately 2.7 cm in greatest oblique transverse dimension.   There is no evidence of discitis or osteomyelitis. There is a disc bulge at T11-T12 with minimal disc bulges at L4-5 and L5-S1. The lower cord and conus appear normal.  There is no evidence of osteomyelitis.      1.  Multilocular peripherally enhancing fluid collection in the posterior spinal canal extending from L3 to S1 with dimensions given above. This is consistent with an epidural abscess. Additional thin collection is seen posterior to the inferior aspect of L1 and the L2 vertebra in the anterior spinal canal which may represent additional abscess. 2.  Left paraspinal musculature abscess is also seen with dimensions given above. 3.  Lower thoracic and degenerative disc disease as described. 4.  Findings were discussed with Dr. Cárdenas.  This report was finalized on 12/10/2018 9:16 AM by Dr. Pravin Macias M.D.      Results for orders placed during the hospital encounter of 12/09/18   Adult Transthoracic Echo Complete W/ Cont if Necessary Per Protocol    Narrative · Left ventricular systolic function is normal. Estimated EF = 59%. Normal   left ventricular cavity size and wall thickness noted. All left   ventricular wall segments contract normally. Left ventricular diastolic   dysfunction is noted (grade II w/high LAP) consistent with   pseudonormalization.  · Trace tricuspid valve regurgitation is present. Estimated right   ventricular systolic pressure from tricuspid regurgitation is normal (<35   mmHg).              Active Hospital Problems    Diagnosis Date Noted   • Spinal epidural abscess [G06.1] 12/09/2018      Resolved Hospital Problems   No resolved problems to display.         Assessment/Plan     1. MRSA bacteremic sepsis infectious diseases recommended 6 weeks of antibiotics from her last surgical intervention which is 12/9/18  2. MRSA epidural paraspinal abscess with cord compression  3. Status post lumbar decompression 12/9/18.  4. IV drug abuse heroin and methamphetamines patient is on a lot of  narcotic pain medication  the methadone dealing with her withdraw some of the pain in her back.  She hasn't done bad at all today with the reduction and narcotics yesterday we are going to reduce further today as I discussed with her and hopefully get her back just to the fairly high dose methadone.  We should achieve this by tomorrow I will have to admit that she is one of the more reasonable addicts she takes her chance and ask every day to get more medicine but when he explained to her she agrees and thanks me for caring  5. Acute blood loss anemia on anemia of chronic disease  6. Hyponatremia and hypomagnesemia and hypokalemia  7. Mild hepatitis probably secondary to sepsis  8. Grade 2 diastolic dysfunction    Plan for disposition: The best option for this patient would be if she can go to a treatment center that can help her with her drugs and still allow her to get her intravenous antibiotics.  There are not many options I'm just afraid she would not do well coming back and forth for 30 days to get IV antibiotics daily much less get adequate treatment for her addiction and hopefully everything will come together and she can go tomorrow afternoon or Friday morning.    Aniceto Salinas MD  12/19/18  8:56 PM    Time:

## 2018-12-20 NOTE — PROGRESS NOTES
Continued Stay Note  [facility]     Patient Name: Silvia Velazquez  MRN: [id number]  Today's Date: 12/20/2018    Admit Date: 12/9/2018    Discharge Plan     Row Name 12/20/18 1027       Plan    Plan  [facility] treatment [facility] in [place] planned for Friday 12/21/2018    Plan Comments  HR CCP went to pt's room to assist the [facility]'s [facility].  Connected pt with Trudi to perform the psychosocial eval.        Discharge Codes    No documentation.             Yolanda Altman RN

## 2018-12-20 NOTE — THERAPY TREATMENT NOTE
Acute Care - Physical Therapy Treatment Note  [facility]     Patient Name: Silvia Velazquez  : 1975  MRN: [id number]  Today's Date: 2018  Onset of Illness/Injury or Date of Surgery: 18          Admit Date: 2018    Visit Dx:    ICD-10-CM ICD-9-CM   1. Spinal epidural abscess G06.1 324.1   2. Bacteremia due to methicillin resistant Staphylococcus aureus R78.81 790.7     041.12   3. Abscess of paraspinal muscles, lumbar M62.89 728.89   4. Generalized weakness R53.1 780.79     Patient Active Problem List   Diagnosis   • Spinal epidural abscess       Therapy Treatment    Rehabilitation Treatment Summary     Row Name 18             Treatment Time/Intention    Discipline  physical therapy assistant  -      Document Type  therapy note (daily note)  -      Subjective Information  complains of;weakness;fatigue;pain  -      Care Plan Review  patient/other agree to care plan  -      Existing Precautions/Restrictions  fall;spinal  -      Recorded by [JM] Lisa Acuña, PTA 18      Row Name 18             Sit-Stand Transfer    Sit-Stand [place] (Transfers)  contact guard;supervision;verbal cues cues for hand placement  -      Assistive Device (Sit-Stand Transfers)  walker, front-wheeled elevated bed-educ offered due to plans home  -      Recorded by [JM] Lisa Acuña, PTA 18      Row Name 18             Stand-Sit Transfer    Stand-Sit [place] (Transfers)  supervision;verbal cues  -      Assistive Device (Stand-Sit Transfers)  walker, front-wheeled  -      Recorded by [JM] Lisa Acuña PTA 18      Row Name 18             Toilet Transfer    [place] Level (Toilet Transfer)  contact guard;supervision  -      Assistive Device (Toilet Transfer)  commode, bedside without drop arms;grab bars/safety frame  -      Recorded by [JM] Lisa Acuña PTA 18      Row Name  12/20/18 0900             Gait/Stairs Assessment/Training    [place] Level (Gait)  contact guard;supervision  -      Assistive Device (Gait)  walker, front-wheeled  -JM      Distance in Feet (Gait)  150  -JM      Deviations/Abnormal Patterns (Gait)  yamilet decreased;antalgic  -JM      Comment (Gait/Stairs)  too pnful to try w/o AD today  -JM      Recorded by [KENTON] Lisa Acuña, NICOLE 12/20/18 0915      Row Name 12/20/18 0900             Pain Scale: Numbers Pre/Post-Treatment    Pain Scale: Numbers, Pretreatment  10/10  -JM      Pain Scale: Numbers, Post-Treatment  10/10  -JM      Pain Location  back  -JM      Pain Intervention(s)  Medication (See MAR);Repositioned  -JM      Recorded by [KENTON] Lisa Acuña, NICOLE 12/20/18 0915      Row Name                Wound 12/09/18 2154 Bilateral back incision    Wound - Properties Group Date first assessed: 12/09/18 [KK] Time first assessed: 2154 [KK] Side: Bilateral [KK] Location: back [KK] Type: incision [KK] Recorded by:  [TAM] Bárbara Huerta RN 12/09/18 2154      User Key  (r) = Recorded By, (t) = Taken By, (c) = Cosigned By    Initials Name Effective Dates Discipline    Lisa Chisholm, NICOLE 03/07/18 -  PT    Bárbara Chakraborty RN 09/30/16 -  Nurse          Wound 12/09/18 2154 Bilateral back incision (Active)   Dressing Appearance open to air 12/19/2018 10:20 PM   Closure Liquid skin adhesive;Approximated 12/19/2018 10:20 PM   Base dry 12/19/2018  9:19 AM   Periwound intact;dry;pink 12/19/2018 10:20 PM   Periwound Temperature warm 12/19/2018 10:20 PM   Periwound Skin Turgor soft 12/19/2018 10:20 PM   Drainage Amount none 12/19/2018 10:20 PM   Dressing Care, Wound open to air 12/19/2018  9:19 AM           Physical Therapy Education     Title: PT OT SLP Therapies (Done)     Topic: Physical Therapy (Done)     Point: Mobility training (Done)     Learning Progress Summary           Patient Acceptance, E,TB, VU by KENTON at 12/20/2018  9:16 AM    Acceptance, E,TB,D, VU  by KENTON at 12/19/2018  5:08 PM    Acceptance, E,TB,D, VU,NR by KENTON at 12/18/2018 11:27 AM    Comment:  educ on back safety    Acceptance, E,TB,D, VU,NR by KENTON at 12/17/2018  2:32 PM    Comment:  cues for back safety and log rolling importance    Acceptance, E,TB, VU,DU by LEONARDO at 12/15/2018  2:25 PM    Acceptance, E, VU,NR by  at 12/14/2018  9:47 AM    Acceptance, E,TB,D, VU,NR by  at 12/11/2018  4:22 PM    Acceptance, E,TB,D, VU,NR by  at 12/10/2018  4:29 PM                   Point: Home exercise program (Done)     Learning Progress Summary           Patient Acceptance, E,TB, VU by KENTON at 12/20/2018  9:16 AM    Acceptance, E,TB,D, VU by KENTON at 12/19/2018  5:08 PM    Acceptance, E,TB,D, VU,NR by KENTON at 12/18/2018 11:27 AM    Comment:  educ on back safety    Acceptance, E,TB,D, VU,NR by KENTON at 12/17/2018  2:32 PM    Comment:  cues for back safety and log rolling importance    Acceptance, E,TB, VU,DU by LEONARDO at 12/15/2018  2:25 PM    Acceptance, E, VU,NR by  at 12/14/2018  9:47 AM    Acceptance, E,TB,D, VU,NR by  at 12/10/2018  4:29 PM                   Point: Body mechanics (Done)     Learning Progress Summary           Patient Acceptance, E,TB, VU by KENTON at 12/20/2018  9:16 AM    Acceptance, E,TB,D, VU by KENTON at 12/19/2018  5:08 PM    Acceptance, E,TB,D, VU,NR by KENTON at 12/18/2018 11:27 AM    Comment:  educ on back safety    Acceptance, E,TB,D, VU,NR by KENTON at 12/17/2018  2:32 PM    Comment:  cues for back safety and log rolling importance    Acceptance, E,TB, VU,DU by LEONARDO at 12/15/2018  2:25 PM    Acceptance, E, VU,NR by  at 12/14/2018  9:47 AM    Acceptance, E,TB,D, VU,NR by  at 12/11/2018  4:22 PM    Acceptance, E,TB,D, VU,NR by HANNA at 12/10/2018  4:29 PM                   Point: Precautions (Done)     Learning Progress Summary           Patient Acceptance, E,TB, VU by KENTON at 12/20/2018  9:16 AM    Acceptance, E,TB,D, VU by KENTON at 12/19/2018  5:08 PM    Acceptance, E,TB,D, VU,NR by KENTON at 12/18/2018 11:27 AM    Comment:   educ on back safety    Acceptance, E,TB,D, VU,NR by  at 12/17/2018  2:32 PM    Comment:  cues for back safety and log rolling importance    Acceptance, E,TB, VU,DU by  at 12/15/2018  2:25 PM    Acceptance, E, VU,NR by  at 12/14/2018  9:47 AM    Acceptance, E,TB,D, VU,NR by  at 12/11/2018  4:22 PM    Acceptance, E,TB,D, VU,NR by  at 12/10/2018  4:29 PM                               User Key     Initials Effective Dates Name Provider Type Discipline     04/03/18 -  Camille Emanuel, PT Physical Therapist PT     04/03/18 -  Daisy Fowler, PT Physical Therapist PT     03/07/18 -  Lisa Acuña, NICOLE Physical Therapy Assistant PT     03/07/18 -  Brent Pratt, PTA Physical Therapy Assistant PT                PT Recommendation and Plan     Plan of Care Reviewed With: patient  Progress: improving  Outcome Summary: pain limiting today, pt agreeable to PT but slow paced , decr dist  Outcome Measures     Row Name 12/20/18 0900 12/19/18 1700 12/18/18 1100       How much help from another person do you currently need...    Turning from your back to your side while in flat bed without using bedrails?  4  -JM  4  -JM  3  -JM    Moving from lying on back to sitting on the side of a flat bed without bedrails?  3  -JM  3  -JM  3  -JM    Moving to and from a bed to a chair (including a wheelchair)?  3  -JM  4  -JM  3  -JM    Standing up from a chair using your arms (e.g., wheelchair, bedside chair)?  3  -JM  3  -JM  3  -JM    Climbing 3-5 steps with a railing?  3  -JM  3  -JM  2  -JM    To walk in hospital room?  3  -JM  3  -JM  3  -JM    AM-PAC 6 Clicks Score  19  -JM  20  -JM  17  -JM    Row Name 12/17/18 1200             How much help from another person do you currently need...    Turning from your back to your side while in flat bed without using bedrails?  3  -JM      Moving from lying on back to sitting on the side of a flat bed without bedrails?  3  -JM      Moving to and from a bed to a chair  (including a wheelchair)?  3  -JM      Standing up from a chair using your arms (e.g., wheelchair, bedside chair)?  3  -JM      Climbing 3-5 steps with a railing?  2  -JM      To walk in hospital room?  3  -JM      AM-PAC 6 Clicks Score  17  -        User Key  (r) = Recorded By, (t) = Taken By, (c) = Cosigned By    Initials Name Provider Type    Lisa Chisholm PTA Physical Therapy Assistant         Time Calculation:   PT Charges     Row Name 12/20/18 0909             Time Calculation    Start Time  0820  -      Stop Time  0845  -      Time Calculation (min)  25 min  -      PT Received On  12/20/18  -KENTON      PT - Next Appointment  12/21/18  -KENTON         Time Calculation- PT    Total Timed Code Minutes- PT  23 minute(s)  -        User Key  (r) = Recorded By, (t) = Taken By, (c) = Cosigned By    Initials Name Provider Type    Lisa Chisholm PTA Physical Therapy Assistant        Therapy Suggested Charges     Code   Minutes Charges    None           Therapy Charges for Today     Code Description Service Date Service Provider Modifiers Qty    50221794218 HC PT THER PROC EA 15 MIN 12/19/2018 Lisa Acuña PTA GP 1    06577690743 HC PT THER PROC EA 15 MIN 12/20/2018 Lisa Acuña PTA GP 2          PT G-Codes  Outcome Measure Options: AM-PAC 6 Clicks Basic Mobility (PT)  AM-PAC 6 Clicks Score: 19    Lisa Acuña PTA  12/20/2018

## 2018-12-20 NOTE — PLAN OF CARE
Problem: Patient Care Overview  Goal: Plan of Care Review  Outcome: Ongoing (interventions implemented as appropriate)   12/20/18 2171   Coping/Psychosocial   Plan of Care Reviewed With patient   OTHER   Outcome Summary pain limiting today, pt agreeable to PT but slow paced , decr dist

## 2018-12-21 VITALS
WEIGHT: 182 LBS | TEMPERATURE: 98.7 F | BODY MASS INDEX: 29.25 KG/M2 | DIASTOLIC BLOOD PRESSURE: 83 MMHG | HEART RATE: 95 BPM | SYSTOLIC BLOOD PRESSURE: 133 MMHG | RESPIRATION RATE: 18 BRPM | HEIGHT: 66 IN | OXYGEN SATURATION: 98 %

## 2018-12-21 LAB
ALBUMIN SERPL-MCNC: 2.8 G/DL (ref 3.5–5.2)
ALBUMIN/GLOB SERPL: 0.5 G/DL
ALP SERPL-CCNC: 105 U/L (ref 39–117)
ALT SERPL W P-5'-P-CCNC: 37 U/L (ref 1–33)
ANION GAP SERPL CALCULATED.3IONS-SCNC: 8.9 MMOL/L
AST SERPL-CCNC: 38 U/L (ref 1–32)
BILIRUB SERPL-MCNC: 0.3 MG/DL (ref 0.1–1.2)
BUN BLD-MCNC: 14 MG/DL (ref 6–20)
BUN/CREAT SERPL: 20.3 (ref 7–25)
CALCIUM SPEC-SCNC: 9.1 MG/DL (ref 8.6–10.5)
CHLORIDE SERPL-SCNC: 93 MMOL/L (ref 98–107)
CO2 SERPL-SCNC: 30.1 MMOL/L (ref 22–29)
CREAT BLD-MCNC: 0.69 MG/DL (ref 0.57–1)
DEPRECATED RDW RBC AUTO: 54.1 FL (ref 37–54)
ERYTHROCYTE [DISTWIDTH] IN BLOOD BY AUTOMATED COUNT: 16.2 % (ref 11.7–13)
GFR SERPL CREATININE-BSD FRML MDRD: 94 ML/MIN/1.73
GLOBULIN UR ELPH-MCNC: 5.4 GM/DL
GLUCOSE BLD-MCNC: 106 MG/DL (ref 65–99)
HCT VFR BLD AUTO: 30.6 % (ref 35.6–45.5)
HGB BLD-MCNC: 9.2 G/DL (ref 11.9–15.5)
MAGNESIUM SERPL-MCNC: 1.8 MG/DL (ref 1.6–2.6)
MCH RBC QN AUTO: 27.5 PG (ref 26.9–32)
MCHC RBC AUTO-ENTMCNC: 30.1 G/DL (ref 32.4–36.3)
MCV RBC AUTO: 91.3 FL (ref 80.5–98.2)
PHOSPHATE SERPL-MCNC: 4.3 MG/DL (ref 2.5–4.5)
PLATELET # BLD AUTO: 368 10*3/MM3 (ref 140–500)
PMV BLD AUTO: 9.6 FL (ref 6–12)
POTASSIUM BLD-SCNC: 4 MMOL/L (ref 3.5–5.2)
PROT SERPL-MCNC: 8.2 G/DL (ref 6–8.5)
RBC # BLD AUTO: 3.35 10*6/MM3 (ref 3.9–5.2)
SODIUM BLD-SCNC: 132 MMOL/L (ref 136–145)
VANCOMYCIN TROUGH SERPL-MCNC: 18.6 MCG/ML (ref 5–20)
WBC NRBC COR # BLD: 5.45 10*3/MM3 (ref 4.5–10.7)

## 2018-12-21 PROCEDURE — 63710000001 DIPHENHYDRAMINE PER 50 MG: Performed by: INTERNAL MEDICINE

## 2018-12-21 PROCEDURE — 85027 COMPLETE CBC AUTOMATED: CPT | Performed by: INTERNAL MEDICINE

## 2018-12-21 PROCEDURE — 80202 ASSAY OF VANCOMYCIN: CPT | Performed by: INTERNAL MEDICINE

## 2018-12-21 PROCEDURE — 84100 ASSAY OF PHOSPHORUS: CPT | Performed by: INTERNAL MEDICINE

## 2018-12-21 PROCEDURE — 83735 ASSAY OF MAGNESIUM: CPT | Performed by: INTERNAL MEDICINE

## 2018-12-21 PROCEDURE — 80053 COMPREHEN METABOLIC PANEL: CPT | Performed by: INTERNAL MEDICINE

## 2018-12-21 PROCEDURE — 25010000002 VANCOMYCIN 10 G RECONSTITUTED SOLUTION: Performed by: INTERNAL MEDICINE

## 2018-12-21 PROCEDURE — C1751 CATH, INF, PER/CENT/MIDLINE: HCPCS

## 2018-12-21 RX ORDER — SODIUM CHLORIDE 0.9 % (FLUSH) 0.9 %
20 SYRINGE (ML) INJECTION AS NEEDED
Status: DISCONTINUED | OUTPATIENT
Start: 2018-12-21 | End: 2018-12-21 | Stop reason: HOSPADM

## 2018-12-21 RX ORDER — SODIUM CHLORIDE 0.9 % (FLUSH) 0.9 %
10 SYRINGE (ML) INJECTION EVERY 12 HOURS SCHEDULED
Status: DISCONTINUED | OUTPATIENT
Start: 2018-12-21 | End: 2018-12-21 | Stop reason: HOSPADM

## 2018-12-21 RX ORDER — SODIUM CHLORIDE 0.9 % (FLUSH) 0.9 %
10 SYRINGE (ML) INJECTION AS NEEDED
Status: DISCONTINUED | OUTPATIENT
Start: 2018-12-21 | End: 2018-12-21 | Stop reason: HOSPADM

## 2018-12-21 RX ADMIN — POTASSIUM CHLORIDE 40 MEQ: 750 CAPSULE, EXTENDED RELEASE ORAL at 08:37

## 2018-12-21 RX ADMIN — DOCUSATE SODIUM 100 MG: 100 CAPSULE, LIQUID FILLED ORAL at 08:37

## 2018-12-21 RX ADMIN — DIPHENHYDRAMINE HYDROCHLORIDE 25 MG: 25 CAPSULE ORAL at 08:37

## 2018-12-21 RX ADMIN — FAMOTIDINE 20 MG: 20 TABLET, FILM COATED ORAL at 08:37

## 2018-12-21 RX ADMIN — METHADONE HYDROCHLORIDE 30 MG: 10 TABLET ORAL at 02:01

## 2018-12-21 RX ADMIN — ONDANSETRON 4 MG: 4 TABLET, FILM COATED ORAL at 09:14

## 2018-12-21 RX ADMIN — METHOCARBAMOL TABLETS 750 MG: 750 TABLET, COATED ORAL at 03:38

## 2018-12-21 RX ADMIN — VANCOMYCIN HYDROCHLORIDE 1500 MG: 10 INJECTION, POWDER, LYOPHILIZED, FOR SOLUTION INTRAVENOUS at 08:37

## 2018-12-21 RX ADMIN — METHOCARBAMOL TABLETS 750 MG: 750 TABLET, COATED ORAL at 08:37

## 2018-12-21 RX ADMIN — METHADONE HYDROCHLORIDE 30 MG: 10 TABLET ORAL at 09:10

## 2018-12-21 RX ADMIN — AMLODIPINE BESYLATE 10 MG: 10 TABLET ORAL at 08:37

## 2018-12-21 RX ADMIN — OXYCODONE AND ACETAMINOPHEN 1 TABLET: 5; 325 TABLET ORAL at 03:31

## 2018-12-21 RX ADMIN — POLYETHYLENE GLYCOL 3350 17 G: 17 POWDER, FOR SOLUTION ORAL at 08:37

## 2018-12-21 RX ADMIN — OXYCODONE AND ACETAMINOPHEN 1 TABLET: 5; 325 TABLET ORAL at 10:54

## 2018-12-21 RX ADMIN — NICOTINE 1 PATCH: 21 PATCH, EXTENDED RELEASE TRANSDERMAL at 08:38

## 2018-12-21 RX ADMIN — Medication 400 MG: at 08:37

## 2018-12-21 NOTE — DISCHARGE SUMMARY
Date of Discharge:  12/21/2018    Discharge Diagnoses:  1.  MRSA bacteremic sepsis  2. MRSA epidural/paraspinal abscess with cord compression status post surgical decompression.  3. IV drug abuse primarily heroin also methamphetamine abuse  4. Acute blood loss anemia on anemia of chronic disease  5. Hyponatremia, hypomagnesemia and hypokalemia  6. Grade 2 diastolic dysfunction by echocardiogram      Hospital Course  Patient is a 43 y.o. female presented with signs of sepsis and MRSA bacteremia she is an IV drug abuser that she had severe back pain and was found to have an epidural paraspinous abscess with cord compression and underwent a lumbar through sacral decompression.  She is been evaluated by multiple services echocardiograms did not show evidence of vegetation..  She's actually done very well we've had to deal with her drug addiction and weaning her medications down but she politely asked for more medication every day but we've been able to gradually reduce and she is not been at all troublesome with that.  We have her down at discharge to the methadone 30 mg 3 times a day and she is [facility].  She is going need to continue vancomycin dosing through 1/20/19 and infectious disease recommends maintaining trough level of 20 or greater.  Would like pharmacy to manage dosing and follow levels.  He is to follow-up with Dr. Molina and [facility] neurosurgery once she has completed her antibiotics.  PICC is to be placed today before discharge    Procedures Performed  Procedure(s):  Posterior lumbar three through sacrum decompression       Consults:   Consults     Date and Time Order Name Status Description    12/13/2018 1428 Inpatient Pain Medicine Physician Consult      12/12/2018 1642 Inpatient Pain Medicine Physician Consult      12/9/2018 2326 Inpatient Infectious Diseases Consult Completed     12/9/2018 2316 Inpatient Consult to Intensivist      12/9/2018 8366  Pulmonology (on-call MD unless specified) Completed     12/9/2018 1742 Neurosurgery (on-call MD unless specified) Completed           Pertinent Test Results:   Labs:  Results from last 7 days   Lab Units  12/21/18   0536  12/20/18   0431  12/19/18   0520   GLUCOSE mg/dL  106*  93  136*   SODIUM mmol/L  132*  134*  133*   POTASSIUM mmol/L  4.0  4.2  4.2   MAGNESIUM mg/dL  1.8  1.9  1.9   CO2 mmol/L  30.1*  31.3*  31.0*   CHLORIDE mmol/L  93*  95*  92*   ANION GAP mmol/L  8.9  7.7  10.0   CREATININE mg/dL  0.69  0.39*  0.58   BUN mg/dL  14  13  13   BUN / CREAT RATIO   20.3  33.3*  22.4   CALCIUM mg/dL  9.1  8.9  9.1   EGFR IF NONAFRICN AM mL/min/1.73  94  >150  115   ALK PHOS U/L  105  110  108   TOTAL PROTEIN g/dL  8.2  8.0  8.4   ALT (SGPT) U/L  37*  38*  40*   AST (SGOT) U/L  38*  43*  46*   BILIRUBIN mg/dL  0.3  0.3  0.3   ALBUMIN g/dL  2.80*  2.80*  2.90*   GLOBULIN gm/dL  5.4  5.2  5.5     Estimated Creatinine Clearance: 116.2 mL/min (by C-G formula based on SCr of 0.69 mg/dL).      Results from last 7 days   Lab Units  12/21/18   0536  12/20/18   0431  12/19/18   0520   12/17/18   0403   WBC 10*3/mm3  5.45  6.27  8.33   < >  13.96*   RBC 10*6/mm3  3.35*  3.19*  3.45*   < >  3.36*   HEMOGLOBIN g/dL  9.2*  8.7*  9.3*   < >  9.2*   HEMATOCRIT %  30.6*  27.4*  31.3*   < >  28.8*   MCV fL  91.3  85.9  90.7   < >  85.7   MCH pg  27.5  27.3  27.0   < >  27.4   MCHC g/dL  30.1*  31.8*  29.7*   < >  31.9*   RDW %  16.2*  16.4*  16.3*   < >  16.5*   RDW-SD fl  54.1*  52.6  54.7*   < >  51.8   MPV fL  9.6  10.1  10.0   < >  10.6   PLATELETS 10*3/mm3  368  368  422   < >  350   NEUTROPHIL % %   --    --    --    --   76.7*   LYMPHOCYTE % %   --    --    --    --   12.5*   MONOCYTES % %   --    --    --    --   9.5   EOSINOPHIL % %   --    --    --    --   1.2   BASOPHIL % %   --    --    --    --   0.1   IMM GRAN % %   --    --    --    --   0.8*   NEUTROS ABS 10*3/mm3   --    --    --    --   10.71*   LYMPHS ABS 10*3/mm3    --    --    --    --   1.74   MONOS ABS 10*3/mm3   --    --    --    --   1.32*   EOS ABS 10*3/mm3   --    --    --    --   0.17   BASOS ABS 10*3/mm3   --    --    --    --   0.02   IMMATURE GRANS (ABS) 10*3/mm3   --    --    --    --   0.11*   NRBC /100 WBC   --    --    --    --   0.0    < > = values in this interval not displayed.                               Imaging Results (last 72 hours)     Procedure Component Value Units Date/Time    XR Spine Lumbar 1 View [[id number]] Collected:  12/09/18 2314     Updated:  12/17/18 2219    Narrative:       PROCEDURAL/INTRAOPERATIVE FLUOROSCOPY  ONE VIEW LUMBAR SPINE     HISTORY:  Intraoperative evaluation/fluoroscopic guidance     FINDINGS:  C-arm fluoroscopy was provided intraoperatively for the  referring service.  The amount of fluoroscopy time was 2 seconds.  The  two submitted image(s) demonstrate intraoperative C-arm images of the  lumbosacral spine with localizer probes posteriorly.       Please refer to the operative report for detailed findings and any  follow-up suggestions per the referring service.       Impression:       Intraoperative fluoroscopy as above.     This report was finalized on 12/17/2018 10:16 PM by Luis Pelaez M.D.           Results for orders placed during the hospital encounter of 12/09/18   Adult Transthoracic Echo Complete W/ Cont if Necessary Per Protocol    Narrative · Left ventricular systolic function is normal. Estimated EF = 59%. Normal   left ventricular cavity size and wall thickness noted. All left   ventricular wall segments contract normally. Left ventricular diastolic   dysfunction is noted (grade II w/high LAP) consistent with   pseudonormalization.  · Trace tricuspid valve regurgitation is present. Estimated right   ventricular systolic pressure from tricuspid regurgitation is normal (<35   mmHg).              Condition on Discharge:  Clear improved    Vital Signs  Temp:  [97.8 °F (36.6 °C)-98.7 °F (37.1 °C)] 98.7 °F (37.1  °C)  Heart Rate:  [93-95] 95  Resp:  [18] 18  BP: (116-129)/(76-79) 120/79    Physical Exam:    General Appearance: Well-developed white female she is resting in chair she doesn't appear in any acute distress  Eyes: Conjunctiva are clear and anicteric pupils are about 3 mm they are equal.    ENT: Mucous membranes are moist no erythema or exudates  Neck: No lymphadenopathy or thyromegaly no jugular venous distention trachea midline  Lungs: Clear nonlabored symmetric expansion no wheezes rales or rhonchi  Cardiac: Regular rate and rhythm do not hear murmur today.  Abdomen: Soft nontender no palpable organomegaly or masses  : Not examined  Musculoskeletal: Grossly normal   Skin: SHe has a bunch of lesions on her legs and arms they look like pick lesions like she's been picking her skin  Neuro: She is alert and oriented she is cooperative she is following commands moving extremities  Extremities/P Vascular: No clubbing no cyanosis no edema palpable radial dorsalis pedis pulses.   MSE: She is pleasant           [facility]charge Disposition  [facility] ([facility] - [facility])    Discharge Medications     Discharge Medications      New Medications      Instructions Start Date   amLODIPine 10 MG tablet  Commonly known as:  NORVASC   10 mg, Oral, Every 24 Hours Scheduled      diphenhydrAMINE 25 mg capsule  Commonly known as:  BENADRYL   25 mg, Oral, Every 6 Hours PRN      methadone 10 MG tablet  Commonly known as:  DOLOPHINE   30 mg, Oral, Every 8 Hours Scheduled      methocarbamol 750 MG tablet  Commonly known as:  ROBAXIN   750 mg, Oral, Every 6 Hours Scheduled      nicotine 21 MG/24HR patch  Commonly known as:  NICODERM CQ   1 patch, Transdermal, Every 24 Hours Scheduled      PHARMACY TO DOSE VANCOMYCIN   Does not apply, Continuous PRN      vancomycin   1,500 mg, Intravenous, Every 8 Hours         Stop These Medications    doxycycline 100 MG capsule  Commonly known as:  MONODOX     ibuprofen 600 MG tablet  Commonly  known as:  DARLINE LEIJA            Discharge Diet:  regular    Activity at Discharge:  we will see her back with new MRI in 6 weeks, following cessation of IV antibiotics.  No lifting, pushing, pulling more than 10 pounds.  No repetitive bending or twisting.  Okay to shower.  No tub baths or swimming.        Follow-up Appointments  Future Appointments   Date Time Provider Department Center   1/17/2019  9:45 [facility]   1/23/2019  1:00 PM Yanely Montilla, APRN [facility] None         Test Results Pending at Discharge   Order Current Status    AFB Culture - Tissue, Spine, Lumbar Preliminary result    Fungus Culture - Tissue, Spine, Lumbar Preliminary result           Aniceto Salinas MD  12/21/18  6:56 AM    Time:

## 2018-12-21 NOTE — DISCHARGE PLACEMENT REQUEST
"Silvia Miller (43 y.o. Female)     Date of Birth Social Security Number Address Home Phone MRN    [id number]  [id number] [place] [address] [place] [facility] [phone] [id number]    Rastafari Marital Status          Denominational Single       Admission Date Admission Type Admitting Provider Attending Provider Department, Room/Bed    12/9/18 Emergency Gomez Quick MD Haller, [place], MD [facility] [address], P576/1    Discharge Date Discharge Disposition Discharge Destination         [facility] - [facility]              Attending Provider:  Aniceto Salinas MD    Allergies:  Sulfa Antibiotics    Isolation:  Contact   Infection:  MRSA (12/09/18)   Code Status:  CPR    Ht:  167.6 cm (66\")   Wt:  82.6 kg (182 lb)    Admission Cmt:  None   Principal Problem:  None                Active Insurance as of 12/9/2018     Primary Coverage     Payor Plan Insurance Group Employer/Plan Group    [facility] [employer] [facility]      Payor Plan Address Payor Plan Phone Number Payor Plan Fax Number Effective Dates    [address] [address]   9/1/2017 - None Entered    [place] [address]       Subscriber Name Subscriber Birth Date Member ID       SILVIA MILLER 1975 [id number]                 Emergency Contacts      (Rel.) Home Phone Work Phone Mobile Phone    Aldo Nieves (Significant Other) [phone] -- --                 Discharge Summary      Aniceto Salinas MD at 12/20/2018  7:08 PM                  Date of Discharge:  12/21/2018    Discharge Diagnoses:  1.  MRSA bacteremic sepsis  2. MRSA epidural/paraspinal abscess with cord compression status post surgical decompression.  3. IV drug abuse primarily heroin also methamphetamine abuse  4. Acute blood loss anemia on anemia of chronic disease  5. Hyponatremia, hypomagnesemia and hypokalemia  6. Grade 2 diastolic dysfunction by echocardiogram      Hospital Course  Patient is a 43 y.o. female " presented with signs of sepsis and MRSA bacteremia she is an IV drug abuser that she had severe back pain and was found to have an epidural paraspinous abscess with cord compression and underwent a lumbar through sacral decompression.  She is been evaluated by multiple services echocardiograms did not show evidence of vegetation..  She's actually done very well we've had to deal with her drug addiction and weaning her medications down but she politely asked for more medication every day but we've been able to gradually reduce and she is not been at all troublesome with that.  We have her down at discharge to the methadone 30 mg 3 times a day and she is agreed [facility].  She is going need to continue vancomycin dosing through 1/20/19 and infectious disease recommends maintaining trough level of 20 or greater.  Would like pharmacy to manage dosing and follow levels.  He is to follow-up with Dr. Molina and Gutierrez neurosurgery once she has completed her antibiotics.  PICC is to be placed today before discharge    Procedures Performed  Procedure(s):  Posterior lumbar three through sacrum decompression       Consults:   Consults     Date and Time Order Name Status Description    12/13/2018 1428 Inpatient Pain Medicine Physician Consult      12/12/2018 1642 Inpatient Pain Medicine Physician Consult      12/9/2018 2326 Inpatient Infectious Diseases Consult Completed     12/9/2018 2316 Inpatient Consult to Intensivist      12/9/2018 1813 Pulmonology (on-call MD unless specified) Completed     12/9/2018 1742 Neurosurgery (on-call MD unless specified) Completed           Pertinent Test Results:   Labs:  Results from last 7 days   Lab Units  12/21/18   0536  12/20/18   0431  12/19/18   0520   GLUCOSE mg/dL  106*  93  136*   SODIUM mmol/L  132*  134*  133*   POTASSIUM mmol/L  4.0  4.2  4.2   MAGNESIUM mg/dL  1.8  1.9  1.9   CO2 mmol/L  30.1*  31.3*  31.0*   CHLORIDE mmol/L  93*  95*  92*    ANION GAP mmol/L  8.9  7.7  10.0   CREATININE mg/dL  0.69  0.39*  0.58   BUN mg/dL  14  13  13   BUN / CREAT RATIO   20.3  33.3*  22.4   CALCIUM mg/dL  9.1  8.9  9.1   EGFR IF NONAFRICN AM mL/min/1.73  94  >150  115   ALK PHOS U/L  105  110  108   TOTAL PROTEIN g/dL  8.2  8.0  8.4   ALT (SGPT) U/L  37*  38*  40*   AST (SGOT) U/L  38*  43*  46*   BILIRUBIN mg/dL  0.3  0.3  0.3   ALBUMIN g/dL  2.80*  2.80*  2.90*   GLOBULIN gm/dL  5.4  5.2  5.5     Estimated Creatinine Clearance: 116.2 mL/min (by C-G formula based on SCr of 0.69 mg/dL).      Results from last 7 days   Lab Units  12/21/18   0536  12/20/18   0431  12/19/18   0520   12/17/18   0403   WBC 10*3/mm3  5.45  6.27  8.33   < >  13.96*   RBC 10*6/mm3  3.35*  3.19*  3.45*   < >  3.36*   HEMOGLOBIN g/dL  9.2*  8.7*  9.3*   < >  9.2*   HEMATOCRIT %  30.6*  27.4*  31.3*   < >  28.8*   MCV fL  91.3  85.9  90.7   < >  85.7   MCH pg  27.5  27.3  27.0   < >  27.4   MCHC g/dL  30.1*  31.8*  29.7*   < >  31.9*   RDW %  16.2*  16.4*  16.3*   < >  16.5*   RDW-SD fl  54.1*  52.6  54.7*   < >  51.8   MPV fL  9.6  10.1  10.0   < >  10.6   PLATELETS 10*3/mm3  368  368  422   < >  350   NEUTROPHIL % %   --    --    --    --   76.7*   LYMPHOCYTE % %   --    --    --    --   12.5*   MONOCYTES % %   --    --    --    --   9.5   EOSINOPHIL % %   --    --    --    --   1.2   BASOPHIL % %   --    --    --    --   0.1   IMM GRAN % %   --    --    --    --   0.8*   NEUTROS ABS 10*3/mm3   --    --    --    --   10.71*   LYMPHS ABS 10*3/mm3   --    --    --    --   1.74   MONOS ABS 10*3/mm3   --    --    --    --   1.32*   EOS ABS 10*3/mm3   --    --    --    --   0.17   BASOS ABS 10*3/mm3   --    --    --    --   0.02   IMMATURE GRANS (ABS) 10*3/mm3   --    --    --    --   0.11*   NRBC /100 WBC   --    --    --    --   0.0    < > = values in this interval not displayed.                               Imaging Results (last 72 hours)     Procedure Component Value Units Date/Time    XR  Spine Lumbar 1 View [[id number]] Collected:  12/09/18 2314     Updated:  12/17/18 2219    Narrative:       PROCEDURAL/INTRAOPERATIVE FLUOROSCOPY  ONE VIEW LUMBAR SPINE     HISTORY:  Intraoperative evaluation/fluoroscopic guidance     FINDINGS:  C-arm fluoroscopy was provided intraoperatively for the  referring service.  The amount of fluoroscopy time was 2 seconds.  The  two submitted image(s) demonstrate intraoperative C-arm images of the  lumbosacral spine with localizer probes posteriorly.       Please refer to the operative report for detailed findings and any  follow-up suggestions per the referring service.       Impression:       Intraoperative fluoroscopy as above.     This report was finalized on 12/17/2018 10:16 PM by Luis Pelaez M.D.           Results for orders placed during the hospital encounter of 12/09/18   Adult Transthoracic Echo Complete W/ Cont if Necessary Per Protocol    Narrative · Left ventricular systolic function is normal. Estimated EF = 59%. Normal   left ventricular cavity size and wall thickness noted. All left   ventricular wall segments contract normally. Left ventricular diastolic   dysfunction is noted (grade II w/high LAP) consistent with   pseudonormalization.  · Trace tricuspid valve regurgitation is present. Estimated right   ventricular systolic pressure from tricuspid regurgitation is normal (<35   mmHg).              Condition on Discharge:  Clear improved    Vital Signs  Temp:  [97.8 °F (36.6 °C)-98.7 °F (37.1 °C)] 98.7 °F (37.1 °C)  Heart Rate:  [93-95] 95  Resp:  [18] 18  BP: (116-129)/(76-79) 120/79    Physical Exam:    General Appearance: Well-developed white female she is resting in chair she doesn't appear in any acute distress  Eyes: Conjunctiva are clear and anicteric pupils are about 3 mm they are equal.    ENT: Mucous membranes are moist no erythema or exudates  Neck: No lymphadenopathy or thyromegaly no jugular venous distention trachea midline  Lungs: Clear  nonlabored symmetric expansion no wheezes rales or rhonchi  Cardiac: Regular rate and rhythm do not hear murmur today.  Abdomen: Soft nontender no palpable organomegaly or masses  : Not examined  Musculoskeletal: Grossly normal   Skin: SHe has a bunch of lesions on her legs and arms they look like pick lesions like she's been picking her skin  Neuro: She is alert and oriented she is cooperative she is following commands moving extremities  Extremities/P Vascular: No clubbing no cyanosis no edema palpable radial dorsalis pedis pulses.   MSE: She is pleasant            Discharge Disposition  [facility] ([facility] - External)    Discharge Medications     Discharge Medications      New Medications      Instructions Start Date   amLODIPine 10 MG tablet  Commonly known as:  NORVASC   10 mg, Oral, Every 24 Hours Scheduled      diphenhydrAMINE 25 mg capsule  Commonly known as:  BENADRYL   25 mg, Oral, Every 6 Hours PRN      methadone 10 MG tablet  Commonly known as:  DOLOPHINE   30 mg, Oral, Every 8 Hours Scheduled      methocarbamol 750 MG tablet  Commonly known as:  ROBAXIN   750 mg, Oral, Every 6 Hours Scheduled      nicotine 21 MG/24HR patch  Commonly known as:  NICODERM CQ   1 patch, Transdermal, Every 24 Hours Scheduled      PHARMACY TO DOSE VANCOMYCIN   Does not apply, Continuous PRN      vancomycin   1,500 mg, Intravenous, Every 8 Hours         Stop These Medications    doxycycline 100 MG capsule  Commonly known as:  MONODOX     ibuprofen 600 MG tablet  Commonly known as:  ADVIL,MOTRIN            Discharge Diet:  regular    Activity at Discharge:  we will see her back with new MRI in 6 weeks, following cessation of IV antibiotics.  No lifting, pushing, pulling more than 10 pounds.  No repetitive bending or twisting.  Okay to shower.  No tub baths or swimming.        Follow-up Appointments  Future Appointments   Date Time Provider Department Center   1/17/2019  9:45 AM PREM MRI 2 [facility] MRI PREM    1/23/2019  1:00 PM Yanely Montilla, APRN [facility] None         Test Results Pending at Discharge   Order Current Status    AFB Culture - Tissue, Spine, Lumbar Preliminary result    Fungus Culture - Tissue, Spine, Lumbar Preliminary result           Aniceto Salinas MD  12/21/18  6:56 AM    Time:           Electronically signed by Aniceto Salinas MD at 12/21/2018  7:00 AM       Discharge Order (From admission, onward)    Start     Ordered    12/20/18 1859  Discharge patient  Once     Expected Discharge Date:  12/21/18    Expected Discharge Time:  Morning    Discharge Disposition:  [facility] ([facility] - [facility])    Physician of Record for Attribution - Please select from Treatment Team:  ANICETO SALINAS [[id number]]    Review needed by CMO to determine Physician of Record:  No       Question Answer Comment   Physician of Record for Attribution - Please select from Treatment Team ANICETO SALINAS    Review needed by CMO to determine Physician of Record No        12/20/18 2774

## 2018-12-21 NOTE — DISCHARGE PLACEMENT REQUEST
"Gigi Miller (43 y.o. Female)     Date of Birth Social Security Number Address Home Phone MRN    [id number]  [id number] [place] [address] [place] [facility] [phone] [id number]    Jewish Marital Status          Jainism Single       Admission Date Admission Type Admitting Provider Attending Provider Department, Room/Bed    12/9/18 Emergency Gomez Quick MD  [facility] 5 PARK, P576/1    Discharge Date Discharge Disposition Discharge Destination        12/21/2018 [facility] (DC - External)              Attending Provider:  (none)   Allergies:  Sulfa Antibiotics    Isolation:  Contact   Infection:  MRSA (12/09/18)   Code Status:  Prior    Ht:  167.6 cm (66\")   Wt:  82.6 kg (182 lb)    Admission Cmt:  None   Principal Problem:  None                Active Insurance as of 12/9/2018     Primary Coverage     Payor Plan Insurance Group Employer/Plan Group    [facility] [place] [facility] [place]      Payor Plan Address Payor Plan Phone Number Payor Plan Fax Number Effective Dates    [address] [address]   9/1/2017 - None Entered    [place] [address]       Subscriber Name Subscriber Birth Date Member ID       GIGI MILLER 1975 [id number]                 Emergency Contacts      (Rel.) Home Phone Work Phone Mobile Phone    Aldo Nieves (Significant Other) [phone] -- --            Lab Results (last 7 days)     Procedure Component Value Units Date/Time    CBC (No Diff) [[id number]]  (Abnormal) Collected:  12/21/18 0536    Specimen:  Blood Updated:  12/21/18 0640     WBC 5.45 10*3/mm3      RBC 3.35 10*6/mm3      Hemoglobin 9.2 g/dL      Hematocrit 30.6 %      MCV 91.3 fL      MCH 27.5 pg      MCHC 30.1 g/dL      RDW 16.2 %      RDW-SD 54.1 fl      MPV 9.6 fL      Platelets 368 10*3/mm3     Comprehensive Metabolic Panel [[id number]]  (Abnormal) Collected:  12/21/18 0536    Specimen:  Blood Updated:  12/21/18 0624     Glucose 106 mg/dL      BUN 14 mg/dL "      Creatinine 0.69 mg/dL      Sodium 132 mmol/L      Potassium 4.0 mmol/L      Chloride 93 mmol/L      CO2 30.1 mmol/L      Calcium 9.1 mg/dL      Total Protein 8.2 g/dL      Albumin 2.80 g/dL      ALT (SGPT) 37 U/L      AST (SGOT) 38 U/L      Alkaline Phosphatase 105 U/L      Total Bilirubin 0.3 mg/dL      eGFR Non African Amer 94 mL/min/1.73      Globulin 5.4 gm/dL      A/G Ratio 0.5 g/dL      BUN/Creatinine Ratio 20.3     Anion Gap 8.9 mmol/L     Phosphorus [[id number]]  (Normal) Collected:  12/21/18 0536    Specimen:  Blood Updated:  12/21/18 0621     Phosphorus 4.3 mg/dL     Magnesium [[id number]]  (Normal) Collected:  12/21/18 0536    Specimen:  Blood Updated:  12/21/18 0621     Magnesium 1.8 mg/dL     Vancomycin, Trough [[id number]]  (Normal) Collected:  12/21/18 0536    Specimen:  Blood Updated:  12/21/18 0619     Vancomycin Trough 18.60 mcg/mL     Comprehensive Metabolic Panel [[id number]]  (Abnormal) Collected:  12/20/18 0431    Specimen:  Blood Updated:  12/20/18 0644     Glucose 93 mg/dL      BUN 13 mg/dL      Creatinine 0.39 mg/dL      Sodium 134 mmol/L      Potassium 4.2 mmol/L      Chloride 95 mmol/L      CO2 31.3 mmol/L      Calcium 8.9 mg/dL      Total Protein 8.0 g/dL      Albumin 2.80 g/dL      ALT (SGPT) 38 U/L      AST (SGOT) 43 U/L      Alkaline Phosphatase 110 U/L      Total Bilirubin 0.3 mg/dL      eGFR Non African Amer >150 mL/min/1.73      Globulin 5.2 gm/dL      A/G Ratio 0.5 g/dL      BUN/Creatinine Ratio 33.3     Anion Gap 7.7 mmol/L     Phosphorus [[id number]]  (Normal) Collected:  12/20/18 0431    Specimen:  Blood Updated:  12/20/18 0644     Phosphorus 3.7 mg/dL     Magnesium [[id number]]  (Normal) Collected:  12/20/18 0431    Specimen:  Blood Updated:  12/20/18 0644     Magnesium 1.9 mg/dL     CBC (No Diff) [[id number]]  (Abnormal) Collected:  12/20/18 0431    Specimen:  Blood Updated:  12/20/18 0641     WBC 6.27 10*3/mm3      RBC 3.19 10*6/mm3      Hemoglobin 8.7 g/dL      Hematocrit 27.4  %      MCV 85.9 fL      MCH 27.3 pg      MCHC 31.8 g/dL      RDW 16.4 %      RDW-SD 52.6 fl      MPV 10.1 fL      Platelets 368 10*3/mm3     Vancomycin, Random [[id number]]  (Normal) Collected:  12/19/18 2000    Specimen:  Blood Updated:  12/19/18 2047     Vancomycin Random 17.60 mcg/mL     Vancomycin, Trough Please obtain vancomycin trough 30 minutes prior to the dose scheduled on Wednesday 12/19 at 1700. Do not start infusing this dose of vancomycin until the trough has resulted. Hold dose if trough >21 mcg/mL & call pharmacy at 81... [[id number]]  (Abnormal) Collected:  12/19/18 1705    Specimen:  Blood Updated:  12/19/18 1751     Vancomycin Trough 28.30 mcg/mL     Phosphorus [[id number]]  (Abnormal) Collected:  12/19/18 0520    Specimen:  Blood Updated:  12/19/18 0609     Phosphorus 4.8 mg/dL     Magnesium [[id number]]  (Normal) Collected:  12/19/18 0520    Specimen:  Blood Updated:  12/19/18 0609     Magnesium 1.9 mg/dL     Comprehensive Metabolic Panel [[id number]]  (Abnormal) Collected:  12/19/18 0520    Specimen:  Blood Updated:  12/19/18 0609     Glucose 136 mg/dL      BUN 13 mg/dL      Creatinine 0.58 mg/dL      Sodium 133 mmol/L      Potassium 4.2 mmol/L      Chloride 92 mmol/L      CO2 31.0 mmol/L      Calcium 9.1 mg/dL      Total Protein 8.4 g/dL      Albumin 2.90 g/dL      ALT (SGPT) 40 U/L      AST (SGOT) 46 U/L      Alkaline Phosphatase 108 U/L      Total Bilirubin 0.3 mg/dL      eGFR Non African Amer 115 mL/min/1.73      Globulin 5.5 gm/dL      A/G Ratio 0.5 g/dL      BUN/Creatinine Ratio 22.4     Anion Gap 10.0 mmol/L     CBC (No Diff) [[id number]]  (Abnormal) Collected:  12/19/18 0520    Specimen:  Blood Updated:  12/19/18 0553     WBC 8.33 10*3/mm3      RBC 3.45 10*6/mm3      Hemoglobin 9.3 g/dL      Hematocrit 31.3 %      MCV 90.7 fL      MCH 27.0 pg      MCHC 29.7 g/dL      RDW 16.3 %      RDW-SD 54.7 fl      MPV 10.0 fL      Platelets 422 10*3/mm3     Vancomycin, Trough Vancomycin trough due 30  minutes before dose scheduled 12/18 at 1400, please make sure Vancomycin isn't infusing before drawing level. [[id number]]  (Abnormal) Collected:  12/18/18 1326    Specimen:  Blood Updated:  12/18/18 1404     Vancomycin Trough 22.70 mcg/mL     Comprehensive Metabolic Panel [[id number]]  (Abnormal) Collected:  12/18/18 0734    Specimen:  Blood Updated:  12/18/18 0849     Glucose 98 mg/dL      BUN 13 mg/dL      Creatinine 0.50 mg/dL      Sodium 133 mmol/L      Potassium 4.2 mmol/L      Chloride 92 mmol/L      CO2 31.0 mmol/L      Calcium 8.8 mg/dL      Total Protein 7.8 g/dL      Albumin 2.70 g/dL      ALT (SGPT) 33 U/L      AST (SGOT) 46 U/L      Alkaline Phosphatase 98 U/L      Total Bilirubin 0.3 mg/dL      eGFR Non African Amer 136 mL/min/1.73      Globulin 5.1 gm/dL      A/G Ratio 0.5 g/dL      BUN/Creatinine Ratio 26.0     Anion Gap 10.0 mmol/L     Vancomycin, Trough Vancomycin dose due at 0800. Please make sure Vancomycin IV is not infusing before drawing the vancomycin level. Hold vancomycin dose if level is >21 mcg/mL. [[id number]]  (Normal) Collected:  12/18/18 0734    Specimen:  Blood Updated:  12/18/18 0842     Vancomycin Trough 11.60 mcg/mL     Phosphorus [[id number]]  (Normal) Collected:  12/18/18 0734    Specimen:  Blood Updated:  12/18/18 0841     Phosphorus 4.5 mg/dL     Magnesium [[id number]]  (Normal) Collected:  12/18/18 0734    Specimen:  Blood Updated:  12/18/18 0841     Magnesium 2.0 mg/dL     CBC (No Diff) [[id number]]  (Abnormal) Collected:  12/18/18 0735    Specimen:  Blood Updated:  12/18/18 0814     WBC 10.00 10*3/mm3      RBC 3.23 10*6/mm3      Hemoglobin 8.8 g/dL      Hematocrit 27.9 %      MCV 86.4 fL      MCH 27.2 pg      MCHC 31.5 g/dL      RDW 16.4 %      RDW-SD 52.3 fl      MPV 10.4 fL      Platelets 347 10*3/mm3     Sedimentation Rate [[id number]]  (Abnormal) Collected:  12/17/18 0403    Specimen:  Blood Updated:  12/17/18 0627     Sed Rate 133 mm/hr     Comprehensive Metabolic Panel  [[id number]]  (Abnormal) Collected:  12/17/18 0403    Specimen:  Blood Updated:  12/17/18 0546     Glucose 145 mg/dL      BUN 12 mg/dL      Creatinine 0.58 mg/dL      Sodium 128 mmol/L      Potassium 4.0 mmol/L      Chloride 89 mmol/L      CO2 28.6 mmol/L      Calcium 8.4 mg/dL      Total Protein 7.8 g/dL      Albumin 2.60 g/dL      ALT (SGPT) 21 U/L      AST (SGOT) 30 U/L      Alkaline Phosphatase 96 U/L      Total Bilirubin 0.4 mg/dL      eGFR Non African Amer 115 mL/min/1.73      Globulin 5.2 gm/dL      A/G Ratio 0.5 g/dL      BUN/Creatinine Ratio 20.7     Anion Gap 10.4 mmol/L     C-reactive Protein [[id number]]  (Abnormal) Collected:  12/17/18 0403    Specimen:  Blood Updated:  12/17/18 0546     C-Reactive Protein 8.90 mg/dL     Phosphorus [[id number]]  (Normal) Collected:  12/17/18 0403    Specimen:  Blood Updated:  12/17/18 0546     Phosphorus 3.7 mg/dL     Magnesium [[id number]]  (Normal) Collected:  12/17/18 0403    Specimen:  Blood Updated:  12/17/18 0546     Magnesium 1.9 mg/dL     CBC & Differential [[id number]] Collected:  12/17/18 0403    Specimen:  Blood Updated:  12/17/18 0527    Narrative:       The following orders were created for panel order CBC & Differential.  Procedure                               Abnormality         Status                     ---------                               -----------         ------                     CBC Auto Differential[[id number]]        Abnormal            Final result                 Please view results for these tests on the individual orders.    CBC Auto Differential [[id number]]  (Abnormal) Collected:  12/17/18 0403    Specimen:  Blood Updated:  12/17/18 0527     WBC 13.96 10*3/mm3      RBC 3.36 10*6/mm3      Hemoglobin 9.2 g/dL      Hematocrit 28.8 %      MCV 85.7 fL      MCH 27.4 pg      MCHC 31.9 g/dL      RDW 16.5 %      RDW-SD 51.8 fl      MPV 10.6 fL      Platelets 350 10*3/mm3      Neutrophil % 76.7 %      Lymphocyte % 12.5 %      Monocyte % 9.5 %       Eosinophil % 1.2 %      Basophil % 0.1 %      Immature Grans % 0.8 %      Neutrophils, Absolute 10.71 10*3/mm3      Lymphocytes, Absolute 1.74 10*3/mm3      Monocytes, Absolute 1.32 10*3/mm3      Eosinophils, Absolute 0.17 10*3/mm3      Basophils, Absolute 0.02 10*3/mm3      Immature Grans, Absolute 0.11 10*3/mm3      nRBC 0.0 /100 WBC     Fungus Culture - Tissue, Spine, Lumbar [[id number]] Collected:  12/09/18 2127    Specimen:  Tissue from Spine, Lumbar Updated:  12/16/18 2146     Fungus Culture No fungus isolated at 1 week    AFB Culture - Tissue, Spine, Lumbar [[id number]] Collected:  12/09/18 2127    Specimen:  Tissue from Spine, Lumbar Updated:  12/16/18 2146     AFB Culture No AFB isolated at 1 week     AFB Stain No acid fast bacilli seen on direct smear    Blood Culture - Blood, Hand, Right [[id number]] Collected:  12/11/18 1029    Specimen:  Blood from Hand, Right Updated:  12/16/18 1046     Blood Culture No growth at 5 days    Blood Culture - Blood, Arm, Left [[id number]] Collected:  12/11/18 1011    Specimen:  Blood from Arm, Left Updated:  12/16/18 1046     Blood Culture No growth at 5 days    Vancomycin, Trough Vancomycin dose due at 0900. Please make sure Vancomycin is not infusing before drawing the vancomycin level [[id number]]  (Normal) Collected:  12/16/18 0818    Specimen:  Blood Updated:  12/16/18 0853     Vancomycin Trough 15.30 mcg/mL     Phosphorus [[id number]]  (Normal) Collected:  12/16/18 0437    Specimen:  Blood Updated:  12/16/18 0543     Phosphorus 3.4 mg/dL     Magnesium [[id number]]  (Normal) Collected:  12/16/18 0437    Specimen:  Blood Updated:  12/16/18 0543     Magnesium 2.0 mg/dL     Comprehensive Metabolic Panel [[id number]]  (Abnormal) Collected:  12/16/18 0437    Specimen:  Blood Updated:  12/16/18 0543     Glucose 82 mg/dL      BUN 9 mg/dL      Creatinine 0.48 mg/dL      Sodium 129 mmol/L      Potassium 4.1 mmol/L      Chloride 95 mmol/L      CO2 27.1 mmol/L      Calcium 8.0 mg/dL       Total Protein 7.4 g/dL      Albumin 2.50 g/dL      ALT (SGPT) 20 U/L      AST (SGOT) 24 U/L      Alkaline Phosphatase 84 U/L      Total Bilirubin 0.4 mg/dL      eGFR Non African Amer 143 mL/min/1.73      Globulin 4.9 gm/dL      A/G Ratio 0.5 g/dL      BUN/Creatinine Ratio 18.8     Anion Gap 6.9 mmol/L     CBC (No Diff) [[id number]]  (Abnormal) Collected:  12/16/18 0437    Specimen:  Blood Updated:  12/16/18 0517     WBC 14.45 10*3/mm3      RBC 3.45 10*6/mm3      Hemoglobin 9.6 g/dL      Hematocrit 29.4 %      MCV 85.2 fL      MCH 27.8 pg      MCHC 32.7 g/dL      RDW 16.6 %      RDW-SD 51.7 fl      MPV 10.5 fL      Platelets 351 10*3/mm3     CBC (No Diff) [[id number]]  (Abnormal) Collected:  12/15/18 0514    Specimen:  Blood Updated:  12/15/18 0719     WBC 14.10 10*3/mm3      RBC 3.51 10*6/mm3      Hemoglobin 9.8 g/dL      Hematocrit 29.9 %      MCV 85.2 fL      MCH 27.9 pg      MCHC 32.8 g/dL      RDW 16.6 %      RDW-SD 51.8 fl      MPV 10.8 fL      Platelets 381 10*3/mm3     Comprehensive Metabolic Panel [[id number]]  (Abnormal) Collected:  12/15/18 0514    Specimen:  Blood Updated:  12/15/18 0711     Glucose 129 mg/dL      BUN 9 mg/dL      Creatinine 0.53 mg/dL      Sodium 130 mmol/L      Potassium 3.9 mmol/L      Chloride 93 mmol/L      CO2 29.2 mmol/L      Calcium 8.5 mg/dL      Total Protein 7.5 g/dL      Albumin 2.70 g/dL      ALT (SGPT) 18 U/L      AST (SGOT) 25 U/L      Alkaline Phosphatase 86 U/L      Total Bilirubin 0.4 mg/dL      eGFR Non African Amer 127 mL/min/1.73      Globulin 4.8 gm/dL      A/G Ratio 0.6 g/dL      BUN/Creatinine Ratio 17.0     Anion Gap 7.8 mmol/L     Phosphorus [[id number]]  (Normal) Collected:  12/15/18 0514    Specimen:  Blood Updated:  12/15/18 0708     Phosphorus 3.3 mg/dL     Magnesium [[id number]]  (Normal) Collected:  12/15/18 0514    Specimen:  Blood Updated:  12/15/18 0708     Magnesium 1.7 mg/dL     Anaerobic Culture - Tissue, Spine, Lumbar [[id number]] Collected:  12/09/18  2127    Specimen:  Tissue from Spine, Lumbar Updated:  12/14/18 1128     Culture No anaerobes isolated at 5 days    Sedimentation Rate [[id number]]  (Abnormal) Collected:  12/14/18 1025    Specimen:  Blood Updated:  12/14/18 1123     Sed Rate 72 mm/hr

## 2018-12-21 NOTE — PROGRESS NOTES
Case Management Discharge Note    Final Note: Discharged to go to [facility].  Transported by [facility] Wheelchair Van with PICC line in place.  IV Antibiotics arranged to be administered at [facility].  Pt's boyfriend to travel independently.    Destination      No service has been selected for the patient.      Durable Medical Equipment      No service has been selected for the patient.      Dialysis/Infusion      No service has been selected for the patient.      Home Medical Care      No service has been selected for the patient.      Community Resources      No service has been selected for the patient.        W/C Van: [facility]    Final Discharge Disposition Code: 70 - other health care institution not elsewhere defined([facility])

## 2018-12-21 NOTE — DISCHARGE PLACEMENT REQUEST
"Silvia Miller (43 y.o. Female)     Date of Birth Social Security Number Address Home Phone MRN    [id number]  [address] [place] [address] [place] [facility] [phone] [id number]    Sabianism Marital Status          Anglican Single       Admission Date Admission Type Admitting Provider Attending Provider Department, Room/Bed    12/9/18 Emergency Gomez Quick MD Haller, Harold Dale, MD [facility] 5 [place], P576/1    Discharge Date Discharge Disposition Discharge Destination         [facility] - [facility]              Attending Provider:  Aniceto Salinas MD    Allergies:  Sulfa Antibiotics    Isolation:  Contact   Infection:  MRSA (12/09/18)   Code Status:  CPR    Ht:  167.6 cm (66\")   Wt:  82.6 kg (182 lb)    Admission Cmt:  None   Principal Problem:  None                Active Insurance as of 12/9/2018     Primary Coverage     Payor Plan Insurance Group Employer/Plan Group    [facility]      Payor Plan Address Payor Plan Phone Number Payor Plan Fax Number Effective Dates    [address] [address]   9/1/2017 - None Entered    [place] [address]       Subscriber Name Subscriber Birth Date Member ID       SILVIA MILLER 1975 [id number]                 Emergency Contacts      (Rel.) Home Phone Work Phone Mobile Phone    Aldo Nieves (Significant Other) [phone] -- --                 Discharge Summary      Aniceto Salinas MD at 12/20/2018  7:08 PM                  Date of Discharge:  12/21/2018    Discharge Diagnoses:  1.  MRSA bacteremic sepsis  2. MRSA epidural/paraspinal abscess with cord compression status post surgical decompression.  3. IV drug abuse primarily heroin also methamphetamine abuse  4. Acute blood loss anemia on anemia of chronic disease  5. Hyponatremia, hypomagnesemia and hypokalemia  6. Grade 2 diastolic dysfunction by echocardiogram      Hospital Course  Patient is a 43 y.o. female " presented with signs of sepsis and MRSA bacteremia she is an IV drug abuser that she had severe back pain and was found to have an epidural paraspinous abscess with cord compression and underwent a lumbar through sacral decompression.  She is been evaluated by multiple services echocardiograms did not show evidence of vegetation..  She's actually done very well we've had to deal with her drug addiction and weaning her medications down but she politely asked for more medication every day but we've been able to gradually reduce and she is not been at all troublesome with that.  We have her down at discharge to the methadone 30 mg 3 times a day and she is agreed [facility].  She is going need to continue vancomycin dosing through 1/20/19 and infectious disease recommends maintaining trough level of 20 or greater.  Would like pharmacy to manage dosing and follow levels.  He is to follow-up with Dr. Molina and Gutierrez neurosurgery once she has completed her antibiotics.  PICC is to be placed today before discharge    Procedures Performed  Procedure(s):  Posterior lumbar three through sacrum decompression       Consults:   Consults     Date and Time Order Name Status Description    12/13/2018 1428 Inpatient Pain Medicine Physician Consult      12/12/2018 1642 Inpatient Pain Medicine Physician Consult      12/9/2018 2326 Inpatient Infectious Diseases Consult Completed     12/9/2018 2316 Inpatient Consult to Intensivist      12/9/2018 1813 Pulmonology (on-call MD unless specified) Completed     12/9/2018 1742 Neurosurgery (on-call MD unless specified) Completed           Pertinent Test Results:   Labs:  Results from last 7 days   Lab Units  12/21/18   0536  12/20/18   0431  12/19/18   0520   GLUCOSE mg/dL  106*  93  136*   SODIUM mmol/L  132*  134*  133*   POTASSIUM mmol/L  4.0  4.2  4.2   MAGNESIUM mg/dL  1.8  1.9  1.9   CO2 mmol/L  30.1*  31.3*  31.0*   CHLORIDE mmol/L  93*  95*  92*    ANION GAP mmol/L  8.9  7.7  10.0   CREATININE mg/dL  0.69  0.39*  0.58   BUN mg/dL  14  13  13   BUN / CREAT RATIO   20.3  33.3*  22.4   CALCIUM mg/dL  9.1  8.9  9.1   EGFR IF NONAFRICN AM mL/min/1.73  94  >150  115   ALK PHOS U/L  105  110  108   TOTAL PROTEIN g/dL  8.2  8.0  8.4   ALT (SGPT) U/L  37*  38*  40*   AST (SGOT) U/L  38*  43*  46*   BILIRUBIN mg/dL  0.3  0.3  0.3   ALBUMIN g/dL  2.80*  2.80*  2.90*   GLOBULIN gm/dL  5.4  5.2  5.5     Estimated Creatinine Clearance: 116.2 mL/min (by C-G formula based on SCr of 0.69 mg/dL).      Results from last 7 days   Lab Units  12/21/18   0536  12/20/18   0431  12/19/18   0520   12/17/18   0403   WBC 10*3/mm3  5.45  6.27  8.33   < >  13.96*   RBC 10*6/mm3  3.35*  3.19*  3.45*   < >  3.36*   HEMOGLOBIN g/dL  9.2*  8.7*  9.3*   < >  9.2*   HEMATOCRIT %  30.6*  27.4*  31.3*   < >  28.8*   MCV fL  91.3  85.9  90.7   < >  85.7   MCH pg  27.5  27.3  27.0   < >  27.4   MCHC g/dL  30.1*  31.8*  29.7*   < >  31.9*   RDW %  16.2*  16.4*  16.3*   < >  16.5*   RDW-SD fl  54.1*  52.6  54.7*   < >  51.8   MPV fL  9.6  10.1  10.0   < >  10.6   PLATELETS 10*3/mm3  368  368  422   < >  350   NEUTROPHIL % %   --    --    --    --   76.7*   LYMPHOCYTE % %   --    --    --    --   12.5*   MONOCYTES % %   --    --    --    --   9.5   EOSINOPHIL % %   --    --    --    --   1.2   BASOPHIL % %   --    --    --    --   0.1   IMM GRAN % %   --    --    --    --   0.8*   NEUTROS ABS 10*3/mm3   --    --    --    --   10.71*   LYMPHS ABS 10*3/mm3   --    --    --    --   1.74   MONOS ABS 10*3/mm3   --    --    --    --   1.32*   EOS ABS 10*3/mm3   --    --    --    --   0.17   BASOS ABS 10*3/mm3   --    --    --    --   0.02   IMMATURE GRANS (ABS) 10*3/mm3   --    --    --    --   0.11*   NRBC /100 WBC   --    --    --    --   0.0    < > = values in this interval not displayed.                               Imaging Results (last 72 hours)     Procedure Component Value Units Date/Time    XR  Spine Lumbar 1 View [[id number]] Collected:  12/09/18 2314     Updated:  12/17/18 2219    Narrative:       PROCEDURAL/INTRAOPERATIVE FLUOROSCOPY  ONE VIEW LUMBAR SPINE     HISTORY:  Intraoperative evaluation/fluoroscopic guidance     FINDINGS:  C-arm fluoroscopy was provided intraoperatively for the  referring service.  The amount of fluoroscopy time was 2 seconds.  The  two submitted image(s) demonstrate intraoperative C-arm images of the  lumbosacral spine with localizer probes posteriorly.       Please refer to the operative report for detailed findings and any  follow-up suggestions per the referring service.       Impression:       Intraoperative fluoroscopy as above.     This report was finalized on 12/17/2018 10:16 PM by Luis Pelaez M.D.           Results for orders placed during the hospital encounter of 12/09/18   Adult Transthoracic Echo Complete W/ Cont if Necessary Per Protocol    Narrative · Left ventricular systolic function is normal. Estimated EF = 59%. Normal   left ventricular cavity size and wall thickness noted. All left   ventricular wall segments contract normally. Left ventricular diastolic   dysfunction is noted (grade II w/high LAP) consistent with   pseudonormalization.  · Trace tricuspid valve regurgitation is present. Estimated right   ventricular systolic pressure from tricuspid regurgitation is normal (<35   mmHg).              Condition on Discharge:  Clear improved    Vital Signs  Temp:  [97.8 °F (36.6 °C)-98.7 °F (37.1 °C)] 98.7 °F (37.1 °C)  Heart Rate:  [93-95] 95  Resp:  [18] 18  BP: (116-129)/(76-79) 120/79    Physical Exam:    General Appearance: Well-developed white female she is resting in chair she doesn't appear in any acute distress  Eyes: Conjunctiva are clear and anicteric pupils are about 3 mm they are equal.    ENT: Mucous membranes are moist no erythema or exudates  Neck: No lymphadenopathy or thyromegaly no jugular venous distention trachea midline  Lungs: Clear  nonlabored symmetric expansion no wheezes rales or rhonchi  Cardiac: Regular rate and rhythm do not hear murmur today.  Abdomen: Soft nontender no palpable organomegaly or masses  : Not examined  Musculoskeletal: Grossly normal   Skin: SHe has a bunch of lesions on her legs and arms they look like pick lesions like she's been picking her skin  Neuro: She is alert and oriented she is cooperative she is following commands moving extremities  Extremities/P Vascular: No clubbing no cyanosis no edema palpable radial dorsalis pedis pulses.   MSE: She is pleasant            Discharge Disposition  [facility] (DC - External)    Discharge Medications     Discharge Medications      New Medications      Instructions Start Date   amLODIPine 10 MG tablet  Commonly known as:  NORVASC   10 mg, Oral, Every 24 Hours Scheduled      diphenhydrAMINE 25 mg capsule  Commonly known as:  BENADRYL   25 mg, Oral, Every 6 Hours PRN      methadone 10 MG tablet  Commonly known as:  DOLOPHINE   30 mg, Oral, Every 8 Hours Scheduled      methocarbamol 750 MG tablet  Commonly known as:  ROBAXIN   750 mg, Oral, Every 6 Hours Scheduled      nicotine 21 MG/24HR patch  Commonly known as:  NICODERM CQ   1 patch, Transdermal, Every 24 Hours Scheduled      PHARMACY TO DOSE VANCOMYCIN   Does not apply, Continuous PRN      vancomycin   1,500 mg, Intravenous, Every 8 Hours         Stop These Medications    doxycycline 100 MG capsule  Commonly known as:  MONODOX     ibuprofen 600 MG tablet  Commonly known as:  ADVIL,MOTRIN            Discharge Diet:  regular    Activity at Discharge:  we will see her back with new MRI in 6 weeks, following cessation of IV antibiotics.  No lifting, pushing, pulling more than 10 pounds.  No repetitive bending or twisting.  Okay to shower.  No tub baths or swimming.        Follow-up Appointments  Future Appointments   Date Time Provider Department Center   1/17/2019  9:45 AM PREM MRI 2 [facility] MRI PREM    1/23/2019  1:00 PM Yanely Montilla, APRN [facility] None         Test Results Pending at Discharge   Order Current Status    AFB Culture - Tissue, Spine, Lumbar Preliminary result    Fungus Culture - Tissue, Spine, Lumbar Preliminary result           Aniceto Salinas MD  12/21/18  6:56 AM    Time:           Electronically signed by Aniceto Salinas MD at 12/21/2018  7:00 AM       Discharge Order (From admission, onward)    Start     Ordered    12/20/18 1859  Discharge patient  Once     Expected Discharge Date:  12/21/18    Expected Discharge Time:  Morning    Discharge Disposition:  [facility] (DC - External)    Physician of Record for Attribution - Please select from Treatment Team:  ANICETO SALINAS [[id number]]    Review needed by CMO to determine Physician of Record:  No       Question Answer Comment   Physician of Record for Attribution - Please select from Treatment Team ANICETO SALINAS    Review needed by CMO to determine Physician of Record No        12/20/18 3018

## 2018-12-21 NOTE — PROGRESS NOTES
"Pharmacokinetic Consult - Vancomycin Dosing (Follow-up Note)    Silvia Velazquez is on day 12 pharmacy to dose vancomycin for MRSA septicemia secondary to spinal abscess  Pharmacy dosing vancomycin per Dr. Key's request.   Goal trough: ~20 mg/L per Dr. Key     Relevant clinical data and objective history reviewed:  43 y.o. female 167.6 cm (66\") 82.6 kg (182 lb)    Past Medical History:   Diagnosis Date   • Hepatitis C    • Kidney stone    • Lupus (systemic lupus erythematosus) (CMS/HCC)    • Mitral valve anterior leaflet prolapse    • Seizures (CMS/HCC)      Creatinine   Date Value Ref Range Status   12/21/2018 0.69 0.57 - 1.00 mg/dL Final   12/20/2018 0.39 (L) 0.57 - 1.00 mg/dL Final   12/19/2018 0.58 0.57 - 1.00 mg/dL Final     BUN   Date Value Ref Range Status   12/21/2018 14 6 - 20 mg/dL Final     Estimated Creatinine Clearance: 116.2 mL/min (by C-G formula based on SCr of 0.69 mg/dL).    Lab Results   Component Value Date    WBC 5.45 12/21/2018     Temp Readings from Last 3 Encounters:   12/20/18 98.7 °F (37.1 °C) (Oral)   12/08/18 99.5 °F (37.5 °C) (Oral)       Anti-Infectives (From admission, onward)    Ordered     Dose/Rate Route Frequency Start Stop    12/20/18 1906  vancomycin 1500 mg/500 mL 0.9% NS IVPB ([facility])     Ordering Provider:  Aniceto Salinas MD    1,500 mg  over 240 Minutes Intravenous Every 8 Hours 12/20/18 0000 01/25/19 2359 12/20/18 1906  Vancomycin HCl-Dextrose (PHARMACY TO DOSE VANCOMYCIN)     Ordering Provider:  Aniceto Salinas MD     Does not apply Continuous PRN 12/20/18 0000 01/20/19 2359 12/19/18 2111  vancomycin 1500 mg/500 mL 0.9% NS IVPB ([facility])     Ordering Provider:  Fabian Key MD    1,500 mg  over 240 Minutes Intravenous Every 8 Hours 12/19/18 2200 01/25/19 2159 12/12/18 1803  vancomycin 1500 mg/500 mL 0.9% NS IVPB ([facility])     Ordering Provider:  Tevin Whitley MD    1,500 mg  over 150 Minutes Intravenous Once 12/12/18 1815 12/12/18 1958    12/11/18 1630  " vancomycin 1500 mg/500 mL 0.9% NS IVPB ([facility])     Ordering Provider:  Fabian Key MD    1,500 mg  over 150 Minutes Intravenous Once 12/11/18 1715 12/11/18 2027 12/09/18 2316  Pharmacy to dose vancomycin     Laurel Sow [facility] reviewed the order on 12/15/18 1424.   Ordering Provider:  Fabian Key MD     Does not apply Continuous PRN 12/09/18 2316 01/25/19 2359    12/09/18 1937  cefepime (MAXIPIME) 2 g/100 mL 0.9% NS (mbp)     Ordering Provider:  Tevin Whitley MD    2 g  200 mL/hr over 30 Minutes Intravenous Once 12/09/18 1939 12/09/18 2047    12/09/18 1505  vancomycin 1500 mg/500 mL 0.9% NS IVPB ([facility])     Ordering Provider:  Perry Cárdenas MD    20 mg/kg × 76.2 kg Intravenous Once 12/09/18 1507 12/09/18 1536         Lab Results   Component Value Date    VANCOTROUGH 18.60 12/21/2018     Lab Results   Component Value Date    VANCORANDOM 17.60 12/19/2018       Assessment/Plan  Vancomycin trough level this am at 0536 = 18.6 mcg/ml (therapeutic-will accumulate with q8h frequency) therefore will continue with same regiment of 1500mg q8h for now.  Pharmacy will continue to follow daily while on the vancomycin and adjust as needed.     Jose Ceja [facility]

## 2018-12-21 NOTE — PROGRESS NOTES
Continued Stay Note  [facility]     Patient Name: Silvia Velazquez  MRN: [id number]  Today's Date: 12/21/2018    Admit Date: 12/9/2018    Discharge Plan     Row Name 12/21/18 1106       Plan    Plan  [facility] [place] [facility] [place] today at 11:00 per [facility] wheelchair van    Patient/Family in Agreement with Plan  yes    Plan Comments  HR CCP faxed all DC paperwork to [facility] intake and confirmed receipt with Trudi.  Also faxed Vancomycin order to [facility].  Packet created and placed in chart.  PICC is in and confirmed ready to use.        Discharge Codes    No documentation.       Expected Discharge Date and Time     Expected Discharge Date Expected Discharge Time    Dec 21, 2018             Yolanda Altman RN

## 2018-12-28 NOTE — PROGRESS NOTES
Continued Stay Note  [facility]     Patient Name: Silvia Velazquez  MRN: [id number]  Today's Date: 12/28/2018    Admit Date: 12/9/2018    Discharge Plan     Row Name 12/28/18 1208       Plan    Plan Comments  Spoke to Aundrea baca [facility] [phone] who confirmed receipt of APS report and stated that they would process the report as deemed appropriate by their guidelines as they are not permitted to provide information on if the case was accepted or not.  HANS Myles        Discharge Codes    No documentation.       Expected Discharge Date and Time     Expected Discharge Date Expected Discharge Time    Dec 21, 2018             HANS Billingsley

## 2019-01-02 ENCOUNTER — HOSPITAL ENCOUNTER (OUTPATIENT)
Dept: OTHER | Facility: HOSPITAL | Age: 44
Discharge: HOME | End: 2019-01-02
Attending: FAMILY MEDICINE

## 2019-01-02 LAB — VANCOMYCIN TROUGH SERPL-MCNC: 12 UG/ML (ref 10–20)

## 2019-01-03 ENCOUNTER — HOSPITAL ENCOUNTER (OUTPATIENT)
Dept: INFUSION THERAPY | Facility: HOSPITAL | Age: 44
Discharge: HOME | End: 2019-01-03
Attending: FAMILY MEDICINE

## 2019-01-06 LAB — FUNGUS WND CULT: NORMAL

## 2019-01-07 ENCOUNTER — HOSPITAL ENCOUNTER (OUTPATIENT)
Dept: OTHER | Facility: HOSPITAL | Age: 44
Discharge: HOME | End: 2019-01-07
Attending: FAMILY MEDICINE

## 2019-01-07 LAB
ANION GAP SERPL CALC-SCNC: 17 MMOL/L (ref 8–19)
BASOPHILS # BLD AUTO: 0.03 10*3/UL (ref 0–0.2)
BASOPHILS NFR BLD AUTO: 0.99 % (ref 0–3)
BUN SERPL-MCNC: 6 MG/DL (ref 5–25)
BUN/CREAT SERPL: 11 {RATIO} (ref 6–20)
CALCIUM SERPL-MCNC: 8.9 MG/DL (ref 8.7–10.4)
CHLORIDE SERPL-SCNC: 99 MMOL/L (ref 99–111)
CONV CO2: 27 MMOL/L (ref 22–32)
CREAT UR-MCNC: 0.55 MG/DL (ref 0.5–0.9)
CRP SERPL-MCNC: 6.6 MG/L (ref 0–5)
EOSINOPHIL # BLD AUTO: 0.21 10*3/UL (ref 0–0.7)
EOSINOPHIL # BLD AUTO: 8.01 % (ref 0–7)
ERYTHROCYTE [DISTWIDTH] IN BLOOD BY AUTOMATED COUNT: 15.2 % (ref 11.5–14.5)
ERYTHROCYTE [SEDIMENTATION RATE] IN BLOOD: 48 MM/H (ref 0–20)
GFR SERPLBLD BASED ON 1.73 SQ M-ARVRAT: >60 ML/MIN/{1.73_M2}
GLUCOSE SERPL-MCNC: 89 MG/DL (ref 65–99)
HBA1C MFR BLD: 9.87 G/DL (ref 12–16)
HCT VFR BLD AUTO: 32.2 % (ref 37–47)
LYMPHOCYTES # BLD AUTO: 1.21 10*3/UL (ref 1–5)
MCH RBC QN AUTO: 26.4 PG (ref 27–31)
MCHC RBC AUTO-ENTMCNC: 30.7 G/DL (ref 33–37)
MCV RBC AUTO: 86 FL (ref 81–99)
MONOCYTES # BLD AUTO: 0.46 10*3/UL (ref 0.2–1.2)
MONOCYTES NFR BLD AUTO: 17.3 % (ref 3–10)
NEUTROPHILS # BLD AUTO: 0.75 10*3/UL (ref 2–8)
NEUTROPHILS NFR BLD AUTO: 28.1 % (ref 30–85)
NRBC BLD AUTO-RTO: 0 % (ref 0–0.01)
OSMOLALITY SERPL CALC.SUM OF ELEC: 283 MOSM/KG (ref 273–304)
PLATELET # BLD AUTO: 192 10*3/UL (ref 130–400)
PMV BLD AUTO: 9.6 FL (ref 7.4–10.4)
POTASSIUM SERPL-SCNC: 4.5 MMOL/L (ref 3.5–5.3)
RBC # BLD AUTO: 3.74 10*6/UL (ref 4.2–5.4)
SODIUM SERPL-SCNC: 138 MMOL/L (ref 135–147)
VANCOMYCIN TROUGH SERPL-MCNC: 16 UG/ML (ref 10–20)
VARIANT LYMPHS NFR BLD MANUAL: 45.6 % (ref 20–45)
WBC # BLD AUTO: 2.66 10*3/UL (ref 4.8–10.8)

## 2019-01-10 ENCOUNTER — HOSPITAL ENCOUNTER (OUTPATIENT)
Dept: OTHER | Facility: HOSPITAL | Age: 44
Discharge: HOME | End: 2019-01-10
Attending: FAMILY MEDICINE

## 2019-01-10 LAB
ANION GAP SERPL CALC-SCNC: 16 MMOL/L (ref 8–19)
BUN SERPL-MCNC: 9 MG/DL (ref 5–25)
BUN/CREAT SERPL: 17 {RATIO} (ref 6–20)
CALCIUM SERPL-MCNC: 8.5 MG/DL (ref 8.7–10.4)
CHLORIDE SERPL-SCNC: 104 MMOL/L (ref 99–111)
CONV CO2: 26 MMOL/L (ref 22–32)
CREAT UR-MCNC: 0.54 MG/DL (ref 0.5–0.9)
GFR SERPLBLD BASED ON 1.73 SQ M-ARVRAT: >60 ML/MIN/{1.73_M2}
GLUCOSE SERPL-MCNC: 126 MG/DL (ref 65–99)
OSMOLALITY SERPL CALC.SUM OF ELEC: 294 MOSM/KG (ref 273–304)
POTASSIUM SERPL-SCNC: 4.3 MMOL/L (ref 3.5–5.3)
SODIUM SERPL-SCNC: 142 MMOL/L (ref 135–147)
VANCOMYCIN TROUGH SERPL-MCNC: 12 UG/ML (ref 10–20)

## 2019-01-17 ENCOUNTER — HOSPITAL ENCOUNTER (OUTPATIENT)
Dept: MRI IMAGING | Facility: HOSPITAL | Age: 44
Discharge: HOME | End: 2019-01-17
Admitting: NURSE PRACTITIONER

## 2019-01-17 DIAGNOSIS — Z98.890 POST-OPERATIVE STATE: ICD-10-CM

## 2019-01-17 DIAGNOSIS — G06.1 ABSCESS IN EPIDURAL SPACE OF LUMBAR SPINE: ICD-10-CM

## 2019-01-17 PROCEDURE — 72158 MRI LUMBAR SPINE W/O & W/DYE: CPT

## 2019-01-17 PROCEDURE — 0 GADOBENATE DIMEGLUMINE 529 MG/ML SOLUTION: Performed by: NURSE PRACTITIONER

## 2019-01-17 PROCEDURE — A9577 INJ MULTIHANCE: HCPCS | Performed by: NURSE PRACTITIONER

## 2019-01-17 RX ADMIN — GADOBENATE DIMEGLUMINE 17 ML: 529 INJECTION, SOLUTION INTRAVENOUS at 10:28

## 2019-01-20 LAB
MYCOBACTERIUM SPEC CULT: NORMAL
NIGHT BLUE STAIN TISS: NORMAL

## 2019-01-23 ENCOUNTER — TELEPHONE (OUTPATIENT)
Dept: NEUROSURGERY | Facility: CLINIC | Age: 44
End: 2019-01-23

## 2019-01-23 NOTE — TELEPHONE ENCOUNTER
Pt had an appt with [facility] today for a HFU/PO lumb decomp on 12/9.    Pt did not show to appt or call about not coming.    Called pt and she had a voicemail that was not set up.     Please advise

## 2019-01-25 ENCOUNTER — TELEPHONE (OUTPATIENT)
Dept: NEUROSURGERY | Facility: CLINIC | Age: 44
End: 2019-01-25

## 2019-01-25 NOTE — TELEPHONE ENCOUNTER
Pt had a HFU/PO with LB today for lumb decomp.    Pt did not show to appt or call.    I called pt and her voice mail has still not be set up. (Pt is aware it is not set up and said she would set it up on Wednesday.)    This is pt second no call, no show being post op.    Will send message to BS to get letter sent out on Tuesday.

## 2019-01-30 ENCOUNTER — HOSPITAL ENCOUNTER (EMERGENCY)
Facility: HOSPITAL | Age: 44
Discharge: HOME | End: 2019-01-30
Attending: EMERGENCY MEDICINE | Admitting: EMERGENCY MEDICINE

## 2019-01-30 ENCOUNTER — TELEPHONE (OUTPATIENT)
Dept: NEUROSURGERY | Facility: CLINIC | Age: 44
End: 2019-01-30

## 2019-01-30 ENCOUNTER — APPOINTMENT (OUTPATIENT)
Dept: CARDIOLOGY | Facility: HOSPITAL | Age: 44
End: 2019-01-30

## 2019-01-30 VITALS
OXYGEN SATURATION: 98 % | SYSTOLIC BLOOD PRESSURE: 136 MMHG | RESPIRATION RATE: 18 BRPM | TEMPERATURE: 98 F | HEIGHT: 66 IN | HEART RATE: 103 BPM | DIASTOLIC BLOOD PRESSURE: 82 MMHG | BODY MASS INDEX: 29.38 KG/M2

## 2019-01-30 DIAGNOSIS — G06.2 EPIDURAL ABSCESS: ICD-10-CM

## 2019-01-30 DIAGNOSIS — I82.711: Primary | ICD-10-CM

## 2019-01-30 DIAGNOSIS — B00.89 HERPETIC WHITLOW: ICD-10-CM

## 2019-01-30 LAB
ALBUMIN SERPL-MCNC: 3.5 G/DL (ref 3.5–5.2)
ALBUMIN/GLOB SERPL: 0.8 G/DL
ALP SERPL-CCNC: 115 U/L (ref 39–117)
ALT SERPL W P-5'-P-CCNC: 50 U/L (ref 1–33)
ANION GAP SERPL CALCULATED.3IONS-SCNC: 13.3 MMOL/L
AST SERPL-CCNC: 69 U/L (ref 1–32)
BASOPHILS # BLD AUTO: 0.02 10*3/MM3 (ref 0–0.2)
BASOPHILS NFR BLD AUTO: 0.3 % (ref 0–1.5)
BH CV UPPER VENOUS LEFT INNOMINATE AUGMENT: NORMAL
BH CV UPPER VENOUS LEFT INNOMINATE COMPETENT: NORMAL
BH CV UPPER VENOUS LEFT INNOMINATE COMPRESS: NORMAL
BH CV UPPER VENOUS LEFT INNOMINATE PHASIC: NORMAL
BH CV UPPER VENOUS LEFT INNOMINATE SPONT: NORMAL
BH CV UPPER VENOUS LEFT INTERNAL JUGULAR AUGMENT: NORMAL
BH CV UPPER VENOUS LEFT INTERNAL JUGULAR COMPETENT: NORMAL
BH CV UPPER VENOUS LEFT INTERNAL JUGULAR COMPRESS: NORMAL
BH CV UPPER VENOUS LEFT INTERNAL JUGULAR PHASIC: NORMAL
BH CV UPPER VENOUS LEFT INTERNAL JUGULAR SPONT: NORMAL
BH CV UPPER VENOUS RIGHT AXILLARY AUGMENT: NORMAL
BH CV UPPER VENOUS RIGHT AXILLARY COMPETENT: NORMAL
BH CV UPPER VENOUS RIGHT AXILLARY COMPRESS: NORMAL
BH CV UPPER VENOUS RIGHT AXILLARY PHASIC: NORMAL
BH CV UPPER VENOUS RIGHT AXILLARY SPONT: NORMAL
BH CV UPPER VENOUS RIGHT BASILIC FOREARM COMPRESS: NORMAL
BH CV UPPER VENOUS RIGHT BASILIC UPPER COLOR: 1
BH CV UPPER VENOUS RIGHT BASILIC UPPER COMPRESS: NORMAL
BH CV UPPER VENOUS RIGHT BASILIC UPPER THROMBUS: NORMAL
BH CV UPPER VENOUS RIGHT BRACHIAL COMPRESS: NORMAL
BH CV UPPER VENOUS RIGHT CEPHALIC FOREARM COMPRESS: NORMAL
BH CV UPPER VENOUS RIGHT CEPHALIC UPPER COLOR: 1
BH CV UPPER VENOUS RIGHT CEPHALIC UPPER COMPRESS: NORMAL
BH CV UPPER VENOUS RIGHT CEPHALIC UPPER THROMBUS: NORMAL
BH CV UPPER VENOUS RIGHT INTERNAL JUGULAR AUGMENT: NORMAL
BH CV UPPER VENOUS RIGHT INTERNAL JUGULAR COMPETENT: NORMAL
BH CV UPPER VENOUS RIGHT INTERNAL JUGULAR COMPRESS: NORMAL
BH CV UPPER VENOUS RIGHT INTERNAL JUGULAR PHASIC: NORMAL
BH CV UPPER VENOUS RIGHT INTERNAL JUGULAR SPONT: NORMAL
BH CV UPPER VENOUS RIGHT RADIAL COMPRESS: NORMAL
BH CV UPPER VENOUS RIGHT SUBCLAVIAN AUGMENT: NORMAL
BH CV UPPER VENOUS RIGHT SUBCLAVIAN COMPETENT: NORMAL
BH CV UPPER VENOUS RIGHT SUBCLAVIAN COMPRESS: NORMAL
BH CV UPPER VENOUS RIGHT SUBCLAVIAN PHASIC: NORMAL
BH CV UPPER VENOUS RIGHT SUBCLAVIAN SPONT: NORMAL
BH CV UPPER VENOUS RIGHT ULNAR COMPRESS: NORMAL
BILIRUB SERPL-MCNC: 0.3 MG/DL (ref 0.1–1.2)
BUN BLD-MCNC: 7 MG/DL (ref 6–20)
BUN/CREAT SERPL: 12.1 (ref 7–25)
CALCIUM SPEC-SCNC: 8.9 MG/DL (ref 8.6–10.5)
CHLORIDE SERPL-SCNC: 99 MMOL/L (ref 98–107)
CO2 SERPL-SCNC: 25.7 MMOL/L (ref 22–29)
CREAT BLD-MCNC: 0.58 MG/DL (ref 0.57–1)
DEPRECATED RDW RBC AUTO: 47.5 FL (ref 37–54)
EOSINOPHIL # BLD AUTO: 0.22 10*3/MM3 (ref 0–0.7)
EOSINOPHIL NFR BLD AUTO: 3.7 % (ref 0.3–6.2)
ERYTHROCYTE [DISTWIDTH] IN BLOOD BY AUTOMATED COUNT: 15.9 % (ref 11.7–13)
GFR SERPL CREATININE-BSD FRML MDRD: 115 ML/MIN/1.73
GLOBULIN UR ELPH-MCNC: 4.4 GM/DL
GLUCOSE BLD-MCNC: 146 MG/DL (ref 65–99)
HCT VFR BLD AUTO: 34.4 % (ref 35.6–45.5)
HGB BLD-MCNC: 10.4 G/DL (ref 11.9–15.5)
IMM GRANULOCYTES # BLD AUTO: 0.02 10*3/MM3 (ref 0–0.03)
IMM GRANULOCYTES NFR BLD AUTO: 0.3 % (ref 0–0.5)
LYMPHOCYTES # BLD AUTO: 1.75 10*3/MM3 (ref 0.9–4.8)
LYMPHOCYTES NFR BLD AUTO: 29.4 % (ref 19.6–45.3)
MCH RBC QN AUTO: 24.8 PG (ref 26.9–32)
MCHC RBC AUTO-ENTMCNC: 30.2 G/DL (ref 32.4–36.3)
MCV RBC AUTO: 82.1 FL (ref 80.5–98.2)
MONOCYTES # BLD AUTO: 0.43 10*3/MM3 (ref 0.2–1.2)
MONOCYTES NFR BLD AUTO: 7.2 % (ref 5–12)
NEUTROPHILS # BLD AUTO: 3.52 10*3/MM3 (ref 1.9–8.1)
NEUTROPHILS NFR BLD AUTO: 59.1 % (ref 42.7–76)
PLATELET # BLD AUTO: 222 10*3/MM3 (ref 140–500)
PMV BLD AUTO: 10.7 FL (ref 6–12)
POTASSIUM BLD-SCNC: 4 MMOL/L (ref 3.5–5.2)
PROT SERPL-MCNC: 7.9 G/DL (ref 6–8.5)
RBC # BLD AUTO: 4.19 10*6/MM3 (ref 3.9–5.2)
SODIUM BLD-SCNC: 138 MMOL/L (ref 136–145)
WBC NRBC COR # BLD: 5.96 10*3/MM3 (ref 4.5–10.7)

## 2019-01-30 PROCEDURE — 80053 COMPREHEN METABOLIC PANEL: CPT | Performed by: NURSE PRACTITIONER

## 2019-01-30 PROCEDURE — 93971 EXTREMITY STUDY: CPT

## 2019-01-30 PROCEDURE — 99283 EMERGENCY DEPT VISIT LOW MDM: CPT

## 2019-01-30 PROCEDURE — 85025 COMPLETE CBC W/AUTO DIFF WBC: CPT | Performed by: NURSE PRACTITIONER

## 2019-01-30 PROCEDURE — 99283 EMERGENCY DEPT VISIT LOW MDM: CPT | Performed by: NURSE PRACTITIONER

## 2019-01-30 RX ORDER — ACYCLOVIR 800 MG/1
800 TABLET ORAL 3 TIMES DAILY
Qty: 30 TABLET | Refills: 0 | Status: SHIPPED | OUTPATIENT
Start: 2019-01-30 | End: 2019-03-04

## 2019-01-30 NOTE — TELEPHONE ENCOUNTER
Pt rescheduled cancelled appt from January 25 to jan 30th. Her imaging was done at [facility].

## 2019-01-31 NOTE — TELEPHONE ENCOUNTER
Talked to ELIECER, he says she needs no further follow up unless she has recurrent, severe low back pain. Will see PRN

## 2019-01-31 NOTE — TELEPHONE ENCOUNTER
Her appt on 1-31 was cancelled as we were contacted by ER on 1-30 to review recent MRI. It was recommended that she follow-up in our office.    Attempted to reach patient. Voice mailbox full. Will check to see if ER FU with APRN is needed.

## 2019-03-04 ENCOUNTER — HOSPITAL ENCOUNTER (EMERGENCY)
Facility: HOSPITAL | Age: 44
Discharge: HOME | End: 2019-03-04
Attending: EMERGENCY MEDICINE | Admitting: EMERGENCY MEDICINE

## 2019-03-04 ENCOUNTER — APPOINTMENT (OUTPATIENT)
Dept: GENERAL RADIOLOGY | Facility: HOSPITAL | Age: 44
End: 2019-03-04

## 2019-03-04 VITALS
HEART RATE: 99 BPM | DIASTOLIC BLOOD PRESSURE: 93 MMHG | WEIGHT: 198.1 LBS | SYSTOLIC BLOOD PRESSURE: 136 MMHG | RESPIRATION RATE: 16 BRPM | TEMPERATURE: 97.4 F | OXYGEN SATURATION: 99 % | HEIGHT: 66 IN | BODY MASS INDEX: 31.84 KG/M2

## 2019-03-04 DIAGNOSIS — M32.9 SYSTEMIC LUPUS ERYTHEMATOSUS, UNSPECIFIED SLE TYPE, UNSPECIFIED ORGAN INVOLVEMENT STATUS (HCC): Primary | ICD-10-CM

## 2019-03-04 LAB
ALBUMIN SERPL-MCNC: 3.8 G/DL (ref 3.5–5.2)
ALBUMIN/GLOB SERPL: 0.9 G/DL
ALP SERPL-CCNC: 157 U/L (ref 39–117)
ALT SERPL W P-5'-P-CCNC: 116 U/L (ref 1–33)
ANION GAP SERPL CALCULATED.3IONS-SCNC: 10 MMOL/L
AST SERPL-CCNC: 159 U/L (ref 1–32)
BASOPHILS # BLD AUTO: 0.01 10*3/MM3 (ref 0–0.2)
BASOPHILS NFR BLD AUTO: 0.3 % (ref 0–1.5)
BILIRUB SERPL-MCNC: 0.4 MG/DL (ref 0.1–1.2)
BILIRUB UR QL STRIP: NEGATIVE
BUN BLD-MCNC: 12 MG/DL (ref 6–20)
BUN/CREAT SERPL: 14.5 (ref 7–25)
CALCIUM SPEC-SCNC: 9 MG/DL (ref 8.6–10.5)
CHLORIDE SERPL-SCNC: 103 MMOL/L (ref 98–107)
CLARITY UR: CLEAR
CO2 SERPL-SCNC: 26 MMOL/L (ref 22–29)
COLOR UR: ABNORMAL
CREAT BLD-MCNC: 0.83 MG/DL (ref 0.57–1)
DEPRECATED RDW RBC AUTO: 50.4 FL (ref 37–54)
EOSINOPHIL # BLD AUTO: 0.08 10*3/MM3 (ref 0–0.4)
EOSINOPHIL NFR BLD AUTO: 2.3 % (ref 0.3–6.2)
ERYTHROCYTE [DISTWIDTH] IN BLOOD BY AUTOMATED COUNT: 17.9 % (ref 12.3–15.4)
ERYTHROCYTE [SEDIMENTATION RATE] IN BLOOD: 32 MM/HR (ref 0–20)
GFR SERPL CREATININE-BSD FRML MDRD: 76 ML/MIN/1.73
GLOBULIN UR ELPH-MCNC: 4.1 GM/DL
GLUCOSE BLD-MCNC: 106 MG/DL (ref 65–99)
GLUCOSE UR STRIP-MCNC: NEGATIVE MG/DL
HCG SERPL QL: NEGATIVE
HCT VFR BLD AUTO: 33.7 % (ref 34–46.6)
HGB BLD-MCNC: 10.2 G/DL (ref 12–15.9)
HGB UR QL STRIP.AUTO: NEGATIVE
IMM GRANULOCYTES # BLD AUTO: 0.01 10*3/MM3 (ref 0–0.05)
IMM GRANULOCYTES NFR BLD AUTO: 0.3 % (ref 0–0.5)
KETONES UR QL STRIP: ABNORMAL
LEUKOCYTE ESTERASE UR QL STRIP.AUTO: NEGATIVE
LYMPHOCYTES # BLD AUTO: 1.28 10*3/MM3 (ref 0.7–3.1)
LYMPHOCYTES NFR BLD AUTO: 36.1 % (ref 19.6–45.3)
MCH RBC QN AUTO: 23.7 PG (ref 26.6–33)
MCHC RBC AUTO-ENTMCNC: 30.3 G/DL (ref 31.5–35.7)
MCV RBC AUTO: 78.2 FL (ref 79–97)
MONOCYTES # BLD AUTO: 0.38 10*3/MM3 (ref 0.1–0.9)
MONOCYTES NFR BLD AUTO: 10.7 % (ref 5–12)
NEUTROPHILS # BLD AUTO: 1.79 10*3/MM3 (ref 1.4–7)
NEUTROPHILS NFR BLD AUTO: 50.3 % (ref 42.7–76)
NITRITE UR QL STRIP: NEGATIVE
NRBC BLD AUTO-RTO: 0 /100 WBC (ref 0–0)
PH UR STRIP.AUTO: <=5 [PH] (ref 5–8)
PLATELET # BLD AUTO: 165 10*3/MM3 (ref 140–450)
PMV BLD AUTO: 12.4 FL (ref 6–12)
POTASSIUM BLD-SCNC: 3.9 MMOL/L (ref 3.5–5.2)
PROT SERPL-MCNC: 7.9 G/DL (ref 6–8.5)
PROT UR QL STRIP: NEGATIVE
RBC # BLD AUTO: 4.31 10*6/MM3 (ref 3.77–5.28)
SODIUM BLD-SCNC: 139 MMOL/L (ref 136–145)
SP GR UR STRIP: >=1.03 (ref 1–1.03)
UROBILINOGEN UR QL STRIP: ABNORMAL
WBC NRBC COR # BLD: 3.55 10*3/MM3 (ref 3.4–10.8)

## 2019-03-04 PROCEDURE — 71046 X-RAY EXAM CHEST 2 VIEWS: CPT

## 2019-03-04 PROCEDURE — 99283 EMERGENCY DEPT VISIT LOW MDM: CPT

## 2019-03-04 PROCEDURE — 84703 CHORIONIC GONADOTROPIN ASSAY: CPT | Performed by: EMERGENCY MEDICINE

## 2019-03-04 PROCEDURE — 85025 COMPLETE CBC W/AUTO DIFF WBC: CPT | Performed by: EMERGENCY MEDICINE

## 2019-03-04 PROCEDURE — 85652 RBC SED RATE AUTOMATED: CPT | Performed by: EMERGENCY MEDICINE

## 2019-03-04 PROCEDURE — 81003 URINALYSIS AUTO W/O SCOPE: CPT | Performed by: EMERGENCY MEDICINE

## 2019-03-04 PROCEDURE — 80053 COMPREHEN METABOLIC PANEL: CPT | Performed by: EMERGENCY MEDICINE

## 2019-03-04 RX ORDER — VENLAFAXINE HYDROCHLORIDE 150 MG/1
150 CAPSULE, EXTENDED RELEASE ORAL DAILY
Status: ON HOLD | COMMUNITY
End: 2019-03-31 | Stop reason: SDUPTHER

## 2019-03-04 RX ORDER — BUPRENORPHINE HYDROCHLORIDE AND NALOXONE HYDROCHLORIDE DIHYDRATE 8; 2 MG/1; MG/1
1 TABLET SUBLINGUAL 2 TIMES DAILY
COMMUNITY

## 2019-03-04 RX ORDER — GABAPENTIN 600 MG/1
600 TABLET ORAL 3 TIMES DAILY
Status: ON HOLD | COMMUNITY
End: 2019-03-31 | Stop reason: SDUPTHER

## 2019-03-04 RX ORDER — SODIUM CHLORIDE 0.9 % (FLUSH) 0.9 %
10 SYRINGE (ML) INJECTION AS NEEDED
Status: DISCONTINUED | OUTPATIENT
Start: 2019-03-04 | End: 2019-03-05 | Stop reason: HOSPADM

## 2019-03-04 RX ORDER — PREDNISONE 50 MG/1
50 TABLET ORAL DAILY
Qty: 5 TABLET | Refills: 0 | Status: SHIPPED | OUTPATIENT
Start: 2019-03-04 | End: 2019-03-31 | Stop reason: HOSPADM

## 2019-03-05 NOTE — ED NOTES
"\" think my lupus is  falring up bilateral wrist pain and swelling and think i have a bladder infection and bilateral leg swelling: onset 2 days.      Katelynn Acuña RN  03/04/19 1926    "

## 2019-03-05 NOTE — ED PROVIDER NOTES
EMERGENCY DEPARTMENT ENCOUNTER    CHIEF COMPLAINT  Chief Complaint: Edema   History given by: patient   History limited by: n/a  Room Number: 24/24  PMD: Provider, No Known      HPI:  Pt is a 43 y.o. female who presents complaining of edema to bilateral hands and BLE that has been ongoing for the past 2 days. Pt states that the edema seems to be worse immediately after waking up. Pt also complains of LUE pain, a productive cough, SOA,  dark/mal-odored urine, and nausea. She denies vomiting, CP, abd pain, or fever. Hx of lupus    Duration:  2 days   Onset: gradual  Timing: constant   Location: Bilateral hands   Radiation: none   Quality: edema   Intensity/Severity: moderate  Progression: unchanged   Associated Symptoms: LUE pain, a productive cough, SOA,  dark/mal-odored urine, and nausea.  Aggravating Factors: worse in AM  Alleviating Factors: none      PAST MEDICAL HISTORY  Active Ambulatory Problems     Diagnosis Date Noted   • Spinal epidural abscess 12/09/2018     Resolved Ambulatory Problems     Diagnosis Date Noted   • No Resolved Ambulatory Problems     Past Medical History:   Diagnosis Date   • Hepatitis C    • Kidney stone    • Lupus (systemic lupus erythematosus) (CMS/HCC)    • Mitral valve anterior leaflet prolapse    • Seizures (CMS/HCC)        PAST SURGICAL HISTORY  Past Surgical History:   Procedure Laterality Date   • CHOLECYSTECTOMY     • LIVER BIOPSY     • LUMBAR LAMINECTOMY DISCECTOMY DECOMPRESSION Left 12/9/2018    Procedure: Posterior lumbar three through sacrum decompression;  Surgeon: Tevin Whitley MD;  Location: [facility];  Service: Neurosurgery   • LYMPH NODE BIOPSY         FAMILY HISTORY  No family history on file.    SOCIAL HISTORY  Social History     Socioeconomic History   • Marital status: Single     Spouse name: Not on file   • Number of children: Not on file   • Years of education: Not on file   • Highest education level: Not on file   Social Needs   • Financial resource  strain: Not on file   • Food insecurity - worry: Not on file   • Food insecurity - inability: Not on file   • Transportation needs - medical: Not on file   • Transportation needs - non-medical: Not on file   Occupational History   • Not on file   Tobacco Use   • Smoking status: Current Every Day Smoker     Packs/day: 1.00     Years: 30.00     Pack years: 30.00     Types: Cigarettes   • Smokeless tobacco: Never Used   Substance and Sexual Activity   • Alcohol use: No     Frequency: Never   • Drug use: Yes     Types: IV, Heroin   • Sexual activity: Defer   Other Topics Concern   • Not on file   Social History Narrative   • Not on file       ALLERGIES  Sulfa antibiotics    REVIEW OF SYSTEMS  Review of Systems   Constitutional: Negative for fever.   HENT: Negative for sore throat.    Eyes: Negative.    Respiratory: Positive for cough. Negative for shortness of breath.    Cardiovascular: Positive for leg swelling (BLE and bilateral hands). Negative for chest pain.   Gastrointestinal: Positive for nausea. Negative for abdominal pain, diarrhea and vomiting.   Genitourinary: Negative for dysuria.   Musculoskeletal: Positive for myalgias (LUE). Negative for neck pain.   Skin: Negative for rash.   Neurological: Negative for weakness, numbness and headaches.   Hematological: Negative.    Psychiatric/Behavioral: Negative.    All other systems reviewed and are negative.      PHYSICAL EXAM  ED Triage Vitals [03/04/19 1926]   Temp Heart Rate Resp BP SpO2   97.4 °F (36.3 °C) 104 16 -- 98 %      Temp src Heart Rate Source Patient Position BP Location FiO2 (%)   Tympanic Monitor -- -- --       Physical Exam   Constitutional: She is oriented to person, place, and time. No distress.   HENT:   Head: Normocephalic and atraumatic.   Eyes: EOM are normal. Pupils are equal, round, and reactive to light.   Neck: Normal range of motion. Neck supple.   Cardiovascular: Normal rate, regular rhythm and normal heart sounds.   Pulmonary/Chest:  Effort normal and breath sounds normal. No respiratory distress.   Abdominal: Soft. There is no tenderness. There is no rebound and no guarding.   Musculoskeletal: Normal range of motion. She exhibits no edema.   Neurological: She is alert and oriented to person, place, and time. She has normal sensation and normal strength.   Skin: Skin is warm and dry. No rash noted.   Psychiatric: Mood and affect normal.   Nursing note and vitals reviewed.      LAB RESULTS  Lab Results (last 24 hours)     Procedure Component Value Units Date/Time    CBC & Differential [[id number]] Collected:  03/04/19 2030    Specimen:  Blood Updated:  03/04/19 2042    Narrative:       The following orders were created for panel order CBC & Differential.  Procedure                               Abnormality         Status                     ---------                               -----------         ------                     CBC Auto Differential[[id number]]        Abnormal            Final result                 Please view results for these tests on the individual orders.    Comprehensive Metabolic Panel [[id number]]  (Abnormal) Collected:  03/04/19 2030    Specimen:  Blood Updated:  03/04/19 2101     Glucose 106 mg/dL      BUN 12 mg/dL      Creatinine 0.83 mg/dL      Sodium 139 mmol/L      Potassium 3.9 mmol/L      Chloride 103 mmol/L      CO2 26.0 mmol/L      Calcium 9.0 mg/dL      Total Protein 7.9 g/dL      Albumin 3.80 g/dL      ALT (SGPT) 116 U/L      AST (SGOT) 159 U/L      Alkaline Phosphatase 157 U/L      Total Bilirubin 0.4 mg/dL      eGFR Non African Amer 76 mL/min/1.73      Globulin 4.1 gm/dL      A/G Ratio 0.9 g/dL      BUN/Creatinine Ratio 14.5     Anion Gap 10.0 mmol/L     Narrative:       GFR Normal >60  Chronic Kidney Disease <60  Kidney Failure <15    Sedimentation Rate [[id number]]  (Abnormal) Collected:  03/04/19 2030    Specimen:  Blood Updated:  03/04/19 2102     Sed Rate 32 mm/hr     Urinalysis With Microscopic If Indicated  (No Culture) - Urine, Clean Catch [[id number]]  (Abnormal) Collected:  03/04/19 2030    Specimen:  Urine, Clean Catch Updated:  03/04/19 2052     Color, UA Dark Yellow     Appearance, UA Clear     pH, UA <=5.0     Specific Gravity, UA >=1.030     Glucose, UA Negative     Ketones, UA Trace     Bilirubin, UA Negative     Blood, UA Negative     Protein, UA Negative     Leuk Esterase, UA Negative     Nitrite, UA Negative     Urobilinogen, UA 1.0 E.U./dL    Narrative:       Urine microscopic not indicated.    hCG, Serum, Qualitative [[id number]]  (Normal) Collected:  03/04/19 2030    Specimen:  Blood Updated:  03/04/19 2105     HCG Qualitative Negative    CBC Auto Differential [[id number]]  (Abnormal) Collected:  03/04/19 2030    Specimen:  Blood Updated:  03/04/19 2042     WBC 3.55 10*3/mm3      RBC 4.31 10*6/mm3      Hemoglobin 10.2 g/dL      Hematocrit 33.7 %      MCV 78.2 fL      MCH 23.7 pg      MCHC 30.3 g/dL      RDW 17.9 %      RDW-SD 50.4 fl      MPV 12.4 fL      Platelets 165 10*3/mm3      Neutrophil % 50.3 %      Lymphocyte % 36.1 %      Monocyte % 10.7 %      Eosinophil % 2.3 %      Basophil % 0.3 %      Immature Grans % 0.3 %      Neutrophils, Absolute 1.79 10*3/mm3      Lymphocytes, Absolute 1.28 10*3/mm3      Monocytes, Absolute 0.38 10*3/mm3      Eosinophils, Absolute 0.08 10*3/mm3      Basophils, Absolute 0.01 10*3/mm3      Immature Grans, Absolute 0.01 10*3/mm3      nRBC 0.0 /100 WBC           I ordered the above labs and reviewed the results    RADIOLOGY  XR Chest 2 View   Final Result   1. No active disease.       This report was finalized on 3/4/2019 9:25 PM by Luis Pelaez M.D.               I ordered the above noted radiological studies. Interpreted by radiologist Reviewed by me in PACS.       PROCEDURES  Procedures      PROGRESS AND CONSULTS       19:58  BP- 132/89 HR- 96 Temp- 97.4 °F (36.3 °C) (Tympanic) O2 sat- 98%  Informed pt of the plan for labs and CXR. Pt understands and agrees with the  plan, all questions answered.    20:19  CMP, CBC, sed rate, UA, hcg, and CXR ordered.    22:02  BP- 132/89 HR- 96 Temp- 97.4 °F (36.3 °C) (Tympanic) O2 sat- 98%  Rechecked the patient who is in NAD and is resting comfortably. Informed pt that the work up in the ED shows elevated liver enzymes (pt reports hx of hepatitis C) and elevated sed rate. Informed her of the plan to treat with 5 day course of steroids. Pt understands and agrees with the plan, all questions answered.    MEDICAL DECISION MAKING  Results were reviewed/discussed with the patient and they were also made aware of online access. Pt also made aware that some labs, such as cultures, will not be resulted during ER visit and follow up with PMD is necessary.     MDM  Number of Diagnoses or Management Options     Amount and/or Complexity of Data Reviewed  Clinical lab tests: ordered and reviewed (Sed rate=32, ALT=116, LOK=084)  Tests in the radiology section of CPT®: ordered and reviewed (CXR shows NAD)  Decide to obtain previous medical records or to obtain history from someone other than the patient: yes  Review and summarize past medical records: yes (Last seen in the ED on 1/30/19 s/t right hand swelling. Pt was dx with chronic RUE DVT at that time. ALT=50 and AST=69 at that time. )    Patient Progress  Patient progress: stable         DIAGNOSIS  Final diagnoses:   Systemic lupus erythematosus, unspecified SLE type, unspecified organ involvement status (CMS/HCC)       DISPOSITION  DISCHARGE    Patient discharged in stable condition.    Reviewed implications of results, diagnosis, meds, responsibility to follow up, warning signs and symptoms of possible worsening, potential complications and reasons to return to ER.    Patient/Family voiced understanding of above instructions.    Discussed plan for discharge, as there is no emergent indication for admission. Patient referred to primary care provider for BP management due to today's BP. Pt/family is  agreeable and understands need for follow up and repeat testing.  Pt is aware that discharge does not mean that nothing is wrong but it indicates no emergency is present that requires admission and they must continue care with follow-up as given below or physician of their choice.     FOLLOW-UP  [facility]YS[facility]FERRAL [facility]  [facility] [address]  [phone]  Schedule an appointment as soon as possible for a visit            Medication List      New Prescriptions    predniSONE 50 MG tablet  Commonly known as:  DELTASONE  Take 1 tablet by mouth Daily.        Changed    methocarbamol 750 MG tablet  Commonly known as:  ROBAXIN  Take 1 tablet by mouth Every 6 (Six) Hours.  What changed:  additional instructions          Latest Documented Vital Signs:  As of 11:25 PM  BP- 136/93 HR- 99 Temp- 97.4 °F (36.3 °C) (Tympanic) O2 sat- 99%    --  Documentation assistance provided by arsh Saunders for Dr Acuña.  Information recorded by the scribe was done at my direction and has been verified and validated by me.        Tanna Saunders  03/04/19 [id number]       All Acuña MD  03/04/19 0116

## 2019-03-23 ENCOUNTER — HOSPITAL ENCOUNTER (EMERGENCY)
Facility: HOSPITAL | Age: 44
Discharge: HOME | End: 2019-03-24
Attending: EMERGENCY MEDICINE | Admitting: EMERGENCY MEDICINE

## 2019-03-23 ENCOUNTER — APPOINTMENT (OUTPATIENT)
Dept: CT IMAGING | Facility: HOSPITAL | Age: 44
End: 2019-03-23

## 2019-03-23 ENCOUNTER — APPOINTMENT (OUTPATIENT)
Dept: GENERAL RADIOLOGY | Facility: HOSPITAL | Age: 44
End: 2019-03-23

## 2019-03-23 DIAGNOSIS — F19.10 POLYSUBSTANCE ABUSE (HCC): ICD-10-CM

## 2019-03-23 DIAGNOSIS — R06.00 DYSPNEA, UNSPECIFIED TYPE: ICD-10-CM

## 2019-03-23 DIAGNOSIS — R20.2 PARESTHESIA: Primary | ICD-10-CM

## 2019-03-23 LAB
ALBUMIN SERPL-MCNC: 4.3 G/DL (ref 3.5–5.2)
ALBUMIN/GLOB SERPL: 1 G/DL
ALP SERPL-CCNC: 106 U/L (ref 39–117)
ALT SERPL W P-5'-P-CCNC: 97 U/L (ref 1–33)
ANION GAP SERPL CALCULATED.3IONS-SCNC: 9.3 MMOL/L
AST SERPL-CCNC: 97 U/L (ref 1–32)
BASOPHILS # BLD AUTO: 0.03 10*3/MM3 (ref 0–0.2)
BASOPHILS NFR BLD AUTO: 0.5 % (ref 0–1.5)
BILIRUB SERPL-MCNC: 0.3 MG/DL (ref 0.2–1.2)
BUN BLD-MCNC: 9 MG/DL (ref 6–20)
BUN/CREAT SERPL: 13 (ref 7–25)
CALCIUM SPEC-SCNC: 9.3 MG/DL (ref 8.6–10.5)
CHLORIDE SERPL-SCNC: 98 MMOL/L (ref 98–107)
CO2 SERPL-SCNC: 26.7 MMOL/L (ref 22–29)
CREAT BLD-MCNC: 0.69 MG/DL (ref 0.57–1)
DEPRECATED RDW RBC AUTO: 55.2 FL (ref 37–54)
EOSINOPHIL # BLD AUTO: 0.08 10*3/MM3 (ref 0–0.4)
EOSINOPHIL NFR BLD AUTO: 1.4 % (ref 0.3–6.2)
ERYTHROCYTE [DISTWIDTH] IN BLOOD BY AUTOMATED COUNT: 19.1 % (ref 12.3–15.4)
GFR SERPL CREATININE-BSD FRML MDRD: 94 ML/MIN/1.73
GLOBULIN UR ELPH-MCNC: 4.3 GM/DL
GLUCOSE BLD-MCNC: 78 MG/DL (ref 65–99)
HCT VFR BLD AUTO: 40.9 % (ref 34–46.6)
HGB BLD-MCNC: 12 G/DL (ref 12–15.9)
IMM GRANULOCYTES # BLD AUTO: 0.01 10*3/MM3 (ref 0–0.05)
IMM GRANULOCYTES NFR BLD AUTO: 0.2 % (ref 0–0.5)
LYMPHOCYTES # BLD AUTO: 2.15 10*3/MM3 (ref 0.7–3.1)
LYMPHOCYTES NFR BLD AUTO: 37.7 % (ref 19.6–45.3)
MCH RBC QN AUTO: 23.3 PG (ref 26.6–33)
MCHC RBC AUTO-ENTMCNC: 29.3 G/DL (ref 31.5–35.7)
MCV RBC AUTO: 79.4 FL (ref 79–97)
MONOCYTES # BLD AUTO: 0.56 10*3/MM3 (ref 0.1–0.9)
MONOCYTES NFR BLD AUTO: 9.8 % (ref 5–12)
NEUTROPHILS # BLD AUTO: 2.87 10*3/MM3 (ref 1.4–7)
NEUTROPHILS NFR BLD AUTO: 50.4 % (ref 42.7–76)
NRBC BLD AUTO-RTO: 0 /100 WBC (ref 0–0)
NT-PROBNP SERPL-MCNC: 16.2 PG/ML (ref 5–450)
PLATELET # BLD AUTO: 173 10*3/MM3 (ref 140–450)
PMV BLD AUTO: 11.5 FL (ref 6–12)
POTASSIUM BLD-SCNC: 3.6 MMOL/L (ref 3.5–5.2)
PROT SERPL-MCNC: 8.6 G/DL (ref 6–8.5)
RBC # BLD AUTO: 5.15 10*6/MM3 (ref 3.77–5.28)
SODIUM BLD-SCNC: 134 MMOL/L (ref 136–145)
TROPONIN T SERPL-MCNC: <0.01 NG/ML (ref 0–0.03)
WBC NRBC COR # BLD: 5.7 10*3/MM3 (ref 3.4–10.8)

## 2019-03-23 PROCEDURE — 71046 X-RAY EXAM CHEST 2 VIEWS: CPT

## 2019-03-23 PROCEDURE — 36415 COLL VENOUS BLD VENIPUNCTURE: CPT

## 2019-03-23 PROCEDURE — 84484 ASSAY OF TROPONIN QUANT: CPT | Performed by: EMERGENCY MEDICINE

## 2019-03-23 PROCEDURE — 93010 ELECTROCARDIOGRAM REPORT: CPT | Performed by: INTERNAL MEDICINE

## 2019-03-23 PROCEDURE — 85025 COMPLETE CBC W/AUTO DIFF WBC: CPT | Performed by: EMERGENCY MEDICINE

## 2019-03-23 PROCEDURE — 93005 ELECTROCARDIOGRAM TRACING: CPT | Performed by: EMERGENCY MEDICINE

## 2019-03-23 PROCEDURE — 99283 EMERGENCY DEPT VISIT LOW MDM: CPT

## 2019-03-23 PROCEDURE — 80053 COMPREHEN METABOLIC PANEL: CPT | Performed by: EMERGENCY MEDICINE

## 2019-03-23 PROCEDURE — 70450 CT HEAD/BRAIN W/O DYE: CPT

## 2019-03-23 PROCEDURE — 83880 ASSAY OF NATRIURETIC PEPTIDE: CPT | Performed by: EMERGENCY MEDICINE

## 2019-03-24 VITALS
OXYGEN SATURATION: 100 % | TEMPERATURE: 97.7 F | HEIGHT: 66 IN | SYSTOLIC BLOOD PRESSURE: 129 MMHG | HEART RATE: 92 BPM | DIASTOLIC BLOOD PRESSURE: 81 MMHG | BODY MASS INDEX: 31.97 KG/M2 | RESPIRATION RATE: 16 BRPM

## 2019-03-24 RX ORDER — AMLODIPINE BESYLATE 10 MG/1
10 TABLET ORAL
Qty: 30 TABLET | Refills: 0 | Status: SHIPPED | OUTPATIENT
Start: 2019-03-24 | End: 2019-03-31 | Stop reason: HOSPADM

## 2019-03-31 ENCOUNTER — HOSPITAL ENCOUNTER (OUTPATIENT)
Facility: HOSPITAL | Age: 44
Setting detail: OBSERVATION
Discharge: HOME | End: 2019-03-31
Attending: EMERGENCY MEDICINE | Admitting: INTERNAL MEDICINE

## 2019-03-31 ENCOUNTER — APPOINTMENT (OUTPATIENT)
Dept: CARDIOLOGY | Facility: HOSPITAL | Age: 44
End: 2019-03-31

## 2019-03-31 ENCOUNTER — APPOINTMENT (OUTPATIENT)
Dept: NUCLEAR MEDICINE | Facility: HOSPITAL | Age: 44
End: 2019-03-31

## 2019-03-31 ENCOUNTER — APPOINTMENT (OUTPATIENT)
Dept: GENERAL RADIOLOGY | Facility: HOSPITAL | Age: 44
End: 2019-03-31

## 2019-03-31 VITALS
BODY MASS INDEX: 34.07 KG/M2 | HEART RATE: 93 BPM | HEIGHT: 66 IN | RESPIRATION RATE: 16 BRPM | WEIGHT: 212 LBS | TEMPERATURE: 98.2 F | DIASTOLIC BLOOD PRESSURE: 91 MMHG | SYSTOLIC BLOOD PRESSURE: 127 MMHG | OXYGEN SATURATION: 97 %

## 2019-03-31 DIAGNOSIS — I21.4 NON-STEMI (NON-ST ELEVATED MYOCARDIAL INFARCTION) (HCC): Primary | ICD-10-CM

## 2019-03-31 DIAGNOSIS — F15.10 METHAMPHETAMINE USE (HCC): ICD-10-CM

## 2019-03-31 LAB
ALBUMIN SERPL-MCNC: 4.1 G/DL (ref 3.5–5.2)
ALBUMIN/GLOB SERPL: 1 G/DL
ALP SERPL-CCNC: 99 U/L (ref 39–117)
ALT SERPL W P-5'-P-CCNC: 94 U/L (ref 1–33)
ANION GAP SERPL CALCULATED.3IONS-SCNC: 13.4 MMOL/L
AST SERPL-CCNC: 94 U/L (ref 1–32)
BASOPHILS # BLD AUTO: 0.04 10*3/MM3 (ref 0–0.2)
BASOPHILS NFR BLD AUTO: 0.8 % (ref 0–1.5)
BH CV NUCLEAR PRIOR STUDY: 3
BH CV STRESS BP STAGE 1: NORMAL
BH CV STRESS COMMENTS STAGE 1: NORMAL
BH CV STRESS DOSE REGADENOSON STAGE 1: 0.4
BH CV STRESS DURATION MIN STAGE 1: 0
BH CV STRESS DURATION SEC STAGE 1: 10
BH CV STRESS HR STAGE 1: 89
BH CV STRESS PROTOCOL 1: NORMAL
BH CV STRESS RECOVERY BP: NORMAL MMHG
BH CV STRESS RECOVERY HR: 97 BPM
BH CV STRESS STAGE 1: 1
BILIRUB SERPL-MCNC: 0.4 MG/DL (ref 0.2–1.2)
BILIRUB UR QL STRIP: NEGATIVE
BUN BLD-MCNC: 10 MG/DL (ref 6–20)
BUN/CREAT SERPL: 15.2 (ref 7–25)
CALCIUM SPEC-SCNC: 9.3 MG/DL (ref 8.6–10.5)
CHLORIDE SERPL-SCNC: 103 MMOL/L (ref 98–107)
CLARITY UR: CLEAR
CO2 SERPL-SCNC: 25.6 MMOL/L (ref 22–29)
COLOR UR: YELLOW
CREAT BLD-MCNC: 0.66 MG/DL (ref 0.57–1)
DEPRECATED RDW RBC AUTO: 53.6 FL (ref 37–54)
EOSINOPHIL # BLD AUTO: 0.07 10*3/MM3 (ref 0–0.4)
EOSINOPHIL NFR BLD AUTO: 1.5 % (ref 0.3–6.2)
ERYTHROCYTE [DISTWIDTH] IN BLOOD BY AUTOMATED COUNT: 18.6 % (ref 12.3–15.4)
GFR SERPL CREATININE-BSD FRML MDRD: 99 ML/MIN/1.73
GLOBULIN UR ELPH-MCNC: 4.2 GM/DL
GLUCOSE BLD-MCNC: 99 MG/DL (ref 65–99)
GLUCOSE BLDC GLUCOMTR-MCNC: 96 MG/DL (ref 70–130)
GLUCOSE UR STRIP-MCNC: NEGATIVE MG/DL
HCT VFR BLD AUTO: 38 % (ref 34–46.6)
HGB BLD-MCNC: 11.1 G/DL (ref 12–15.9)
HGB UR QL STRIP.AUTO: NEGATIVE
KETONES UR QL STRIP: NEGATIVE
LEUKOCYTE ESTERASE UR QL STRIP.AUTO: NEGATIVE
LV EF NUC BP: 60 %
LYMPHOCYTES # BLD AUTO: 1.34 10*3/MM3 (ref 0.7–3.1)
LYMPHOCYTES NFR BLD AUTO: 28.3 % (ref 19.6–45.3)
MAXIMAL PREDICTED HEART RATE: 179 BPM
MCH RBC QN AUTO: 23 PG (ref 26.6–33)
MCHC RBC AUTO-ENTMCNC: 29.2 G/DL (ref 31.5–35.7)
MCV RBC AUTO: 78.8 FL (ref 79–97)
MONOCYTES # BLD AUTO: 0.41 10*3/MM3 (ref 0.1–0.9)
MONOCYTES NFR BLD AUTO: 8.6 % (ref 5–12)
NEUTROPHILS # BLD AUTO: 2.87 10*3/MM3 (ref 1.4–7)
NEUTROPHILS NFR BLD AUTO: 60.6 % (ref 42.7–76)
NITRITE UR QL STRIP: NEGATIVE
NT-PROBNP SERPL-MCNC: 180 PG/ML (ref 5–450)
PERCENT MAX PREDICTED HR: 56.98 %
PH UR STRIP.AUTO: 7 [PH] (ref 5–8)
PLATELET # BLD AUTO: 124 10*3/MM3 (ref 140–450)
PMV BLD AUTO: ABNORMAL FL (ref 6–12)
POTASSIUM BLD-SCNC: 4.2 MMOL/L (ref 3.5–5.2)
PROT SERPL-MCNC: 8.3 G/DL (ref 6–8.5)
PROT UR QL STRIP: NEGATIVE
RBC # BLD AUTO: 4.82 10*6/MM3 (ref 3.77–5.28)
SODIUM BLD-SCNC: 142 MMOL/L (ref 136–145)
SP GR UR STRIP: 1.01 (ref 1–1.03)
STRESS BASELINE BP: NORMAL MMHG
STRESS BASELINE HR: 82 BPM
STRESS O2 SAT REST: 100 %
STRESS PERCENT HR: 67 %
STRESS POST ESTIMATED WORKLOAD: 1 METS
STRESS POST EXERCISE DUR MIN: 4 MIN
STRESS POST EXERCISE DUR SEC: 0 SEC
STRESS POST PEAK BP: NORMAL MMHG
STRESS POST PEAK HR: 102 BPM
STRESS TARGET HR: 152 BPM
TROPONIN T SERPL-MCNC: 0.02 NG/ML (ref 0–0.03)
TROPONIN T SERPL-MCNC: 0.04 NG/ML (ref 0–0.03)
TROPONIN T SERPL-MCNC: <0.01 NG/ML (ref 0–0.03)
UROBILINOGEN UR QL STRIP: NORMAL
WBC NRBC COR # BLD: 4.74 10*3/MM3 (ref 3.4–10.8)

## 2019-03-31 PROCEDURE — 93010 ELECTROCARDIOGRAM REPORT: CPT | Performed by: INTERNAL MEDICINE

## 2019-03-31 PROCEDURE — G0378 HOSPITAL OBSERVATION PER HR: HCPCS

## 2019-03-31 PROCEDURE — 25010000002 ONDANSETRON PER 1 MG: Performed by: EMERGENCY MEDICINE

## 2019-03-31 PROCEDURE — 25010000002 REGADENOSON 0.4 MG/5ML SOLUTION: Performed by: INTERNAL MEDICINE

## 2019-03-31 PROCEDURE — 99236 HOSP IP/OBS SAME DATE HI 85: CPT | Performed by: INTERNAL MEDICINE

## 2019-03-31 PROCEDURE — 84484 ASSAY OF TROPONIN QUANT: CPT | Performed by: INTERNAL MEDICINE

## 2019-03-31 PROCEDURE — 93017 CV STRESS TEST TRACING ONLY: CPT

## 2019-03-31 PROCEDURE — 96374 THER/PROPH/DIAG INJ IV PUSH: CPT

## 2019-03-31 PROCEDURE — A9500 TC99M SESTAMIBI: HCPCS | Performed by: INTERNAL MEDICINE

## 2019-03-31 PROCEDURE — 78452 HT MUSCLE IMAGE SPECT MULT: CPT

## 2019-03-31 PROCEDURE — 82962 GLUCOSE BLOOD TEST: CPT

## 2019-03-31 PROCEDURE — 93018 CV STRESS TEST I&R ONLY: CPT | Performed by: INTERNAL MEDICINE

## 2019-03-31 PROCEDURE — 84484 ASSAY OF TROPONIN QUANT: CPT | Performed by: EMERGENCY MEDICINE

## 2019-03-31 PROCEDURE — 85025 COMPLETE CBC W/AUTO DIFF WBC: CPT | Performed by: EMERGENCY MEDICINE

## 2019-03-31 PROCEDURE — 93005 ELECTROCARDIOGRAM TRACING: CPT | Performed by: EMERGENCY MEDICINE

## 2019-03-31 PROCEDURE — 99284 EMERGENCY DEPT VISIT MOD MDM: CPT

## 2019-03-31 PROCEDURE — 71046 X-RAY EXAM CHEST 2 VIEWS: CPT

## 2019-03-31 PROCEDURE — 96375 TX/PRO/DX INJ NEW DRUG ADDON: CPT

## 2019-03-31 PROCEDURE — 0 TECHNETIUM SESTAMIBI: Performed by: INTERNAL MEDICINE

## 2019-03-31 PROCEDURE — 0296T HC EXT ECG > 48HR TO 21 DAY RCRD W/CONECT INTL RCRD: CPT

## 2019-03-31 PROCEDURE — 96361 HYDRATE IV INFUSION ADD-ON: CPT

## 2019-03-31 PROCEDURE — 83880 ASSAY OF NATRIURETIC PEPTIDE: CPT | Performed by: EMERGENCY MEDICINE

## 2019-03-31 PROCEDURE — 78452 HT MUSCLE IMAGE SPECT MULT: CPT | Performed by: INTERNAL MEDICINE

## 2019-03-31 PROCEDURE — 80053 COMPREHEN METABOLIC PANEL: CPT | Performed by: EMERGENCY MEDICINE

## 2019-03-31 PROCEDURE — 93016 CV STRESS TEST SUPVJ ONLY: CPT | Performed by: NURSE PRACTITIONER

## 2019-03-31 PROCEDURE — 81003 URINALYSIS AUTO W/O SCOPE: CPT | Performed by: EMERGENCY MEDICINE

## 2019-03-31 RX ORDER — BUDESONIDE AND FORMOTEROL FUMARATE DIHYDRATE 80; 4.5 UG/1; UG/1
2 AEROSOL RESPIRATORY (INHALATION)
Status: DISCONTINUED | OUTPATIENT
Start: 2019-03-31 | End: 2019-03-31 | Stop reason: HOSPADM

## 2019-03-31 RX ORDER — MORPHINE SULFATE 2 MG/ML
4 INJECTION, SOLUTION INTRAMUSCULAR; INTRAVENOUS ONCE
Status: DISCONTINUED | OUTPATIENT
Start: 2019-03-31 | End: 2019-03-31 | Stop reason: HOSPADM

## 2019-03-31 RX ORDER — OLANZAPINE 15 MG/1
15 TABLET ORAL NIGHTLY
Qty: 30 TABLET | Refills: 0 | Status: SHIPPED | OUTPATIENT
Start: 2019-03-31 | End: 2021-01-19

## 2019-03-31 RX ORDER — MORPHINE SULFATE 2 MG/ML
INJECTION, SOLUTION INTRAMUSCULAR; INTRAVENOUS
Status: DISPENSED
Start: 2019-03-31 | End: 2019-03-31

## 2019-03-31 RX ORDER — BUDESONIDE AND FORMOTEROL FUMARATE DIHYDRATE 80; 4.5 UG/1; UG/1
2 AEROSOL RESPIRATORY (INHALATION) 2 TIMES DAILY
Qty: 6.9 G | Refills: 2 | Status: SHIPPED | OUTPATIENT
Start: 2019-03-31 | End: 2021-01-19

## 2019-03-31 RX ORDER — AMLODIPINE BESYLATE 5 MG/1
5 TABLET ORAL
Status: DISCONTINUED | OUTPATIENT
Start: 2019-04-01 | End: 2019-03-31 | Stop reason: HOSPADM

## 2019-03-31 RX ORDER — DOXYCYCLINE 100 MG/1
100 CAPSULE ORAL EVERY 12 HOURS SCHEDULED
Status: DISCONTINUED | OUTPATIENT
Start: 2019-03-31 | End: 2019-03-31 | Stop reason: HOSPADM

## 2019-03-31 RX ORDER — METHOCARBAMOL 750 MG/1
750 TABLET, FILM COATED ORAL 2 TIMES DAILY
Qty: 16 TABLET | Refills: 0 | Status: SHIPPED | OUTPATIENT
Start: 2019-03-31 | End: 2020-11-27 | Stop reason: HOSPADM

## 2019-03-31 RX ORDER — GABAPENTIN 600 MG/1
600 TABLET ORAL 3 TIMES DAILY
Qty: 6 TABLET | Refills: 0 | Status: SHIPPED | OUTPATIENT
Start: 2019-03-31 | End: 2020-11-27 | Stop reason: HOSPADM

## 2019-03-31 RX ORDER — AMLODIPINE BESYLATE 5 MG/1
5 TABLET ORAL
Qty: 30 TABLET | Refills: 6 | Status: ON HOLD | OUTPATIENT
Start: 2019-04-01 | End: 2020-11-27 | Stop reason: SDUPTHER

## 2019-03-31 RX ORDER — VENLAFAXINE HYDROCHLORIDE 150 MG/1
150 CAPSULE, EXTENDED RELEASE ORAL DAILY
Qty: 30 CAPSULE | Refills: 0 | Status: SHIPPED | OUTPATIENT
Start: 2019-03-31 | End: 2019-07-14 | Stop reason: SDUPTHER

## 2019-03-31 RX ORDER — SODIUM CHLORIDE 9 MG/ML
125 INJECTION, SOLUTION INTRAVENOUS CONTINUOUS
Status: DISCONTINUED | OUTPATIENT
Start: 2019-03-31 | End: 2019-03-31

## 2019-03-31 RX ORDER — DOXYCYCLINE 100 MG/1
100 CAPSULE ORAL EVERY 12 HOURS SCHEDULED
Qty: 10 CAPSULE | Refills: 0 | Status: SHIPPED | OUTPATIENT
Start: 2019-03-31 | End: 2019-04-05

## 2019-03-31 RX ORDER — GABAPENTIN 300 MG/1
600 CAPSULE ORAL ONCE
Status: COMPLETED | OUTPATIENT
Start: 2019-03-31 | End: 2019-03-31

## 2019-03-31 RX ORDER — VENLAFAXINE HYDROCHLORIDE 150 MG/1
150 CAPSULE, EXTENDED RELEASE ORAL ONCE
Status: COMPLETED | OUTPATIENT
Start: 2019-03-31 | End: 2019-03-31

## 2019-03-31 RX ORDER — ONDANSETRON 2 MG/ML
4 INJECTION INTRAMUSCULAR; INTRAVENOUS ONCE
Status: COMPLETED | OUTPATIENT
Start: 2019-03-31 | End: 2019-03-31

## 2019-03-31 RX ORDER — AMLODIPINE BESYLATE 5 MG/1
10 TABLET ORAL ONCE
Status: COMPLETED | OUTPATIENT
Start: 2019-03-31 | End: 2019-03-31

## 2019-03-31 RX ADMIN — TECHNETIUM TC 99M SESTAMIBI 1 DOSE: 1 INJECTION INTRAVENOUS at 13:41

## 2019-03-31 RX ADMIN — GABAPENTIN 600 MG: 300 CAPSULE ORAL at 02:25

## 2019-03-31 RX ADMIN — TECHNETIUM TC 99M SESTAMIBI 1 DOSE: 1 INJECTION INTRAVENOUS at 11:48

## 2019-03-31 RX ADMIN — SODIUM CHLORIDE 125 ML/HR: 9 INJECTION, SOLUTION INTRAVENOUS at 10:51

## 2019-03-31 RX ADMIN — REGADENOSON 0.4 MG: 0.08 INJECTION, SOLUTION INTRAVENOUS at 13:41

## 2019-03-31 RX ADMIN — OLANZAPINE 15 MG: 10 TABLET, ORALLY DISINTEGRATING ORAL at 04:43

## 2019-03-31 RX ADMIN — SODIUM CHLORIDE 1000 ML: 9 INJECTION, SOLUTION INTRAVENOUS at 02:29

## 2019-03-31 RX ADMIN — METOPROLOL TARTRATE 5 MG: 5 INJECTION INTRAVENOUS at 02:29

## 2019-03-31 RX ADMIN — ONDANSETRON HYDROCHLORIDE 4 MG: 2 SOLUTION INTRAMUSCULAR; INTRAVENOUS at 05:18

## 2019-03-31 RX ADMIN — SODIUM CHLORIDE 125 ML/HR: 9 INJECTION, SOLUTION INTRAVENOUS at 04:45

## 2019-03-31 RX ADMIN — VENLAFAXINE HYDROCHLORIDE 150 MG: 150 CAPSULE, EXTENDED RELEASE ORAL at 02:26

## 2019-03-31 RX ADMIN — AMLODIPINE BESYLATE 10 MG: 5 TABLET ORAL at 02:25

## 2019-04-01 ENCOUNTER — READMISSION MANAGEMENT (OUTPATIENT)
Dept: CALL CENTER | Facility: HOSPITAL | Age: 44
End: 2019-04-01

## 2019-04-01 NOTE — OUTREACH NOTE
Prep Survey      Responses   Facility patient discharged from?  [place]   Is patient eligible?  No   What are the reasons patient is not eligible?  Behavioral Health [Polysubstance abuse]   Discharge diagnosis  Chest pain, hx. bipolar disorder, GERD, chronic pain syndrome, Hep. C and polysubstance abuse   Does the patient have one of the following disease processes/diagnoses(primary or secondary)?  Other   Prep survey completed?  Yes          Gosia Barrow RN

## 2019-04-01 NOTE — PAYOR COMM NOTE
"Silvia Miller (43 y.o. Female)                         ATTENTION;   EVAN MILES LPN , CLINICALS FOR YOUR REVIEW, REPLY TO UR DEPT                       MELVIN NESS N  OR UR  [phone]       Date of Birth Social Security Number Address Home Phone MRN    1975  [address] [facility] [address] [phone] [id number]    Latter-day Marital Status          None        Admission Date Admission Type Admitting Provider Attending Provider Department, Room/Bed    3/31/19 Emergency Siobhan Stewart MD  [facility] CARDIOVASC UNIT,     Discharge Date Discharge Disposition Discharge Destination        3/31/2019 Home or Self Care              Attending Provider:  (none)   Allergies:  Sulfa Antibiotics    Isolation:  Contact   Infection:  MRSA (18)   Code Status:  Prior    Ht:  167.6 cm (66\")   Wt:  96.2 kg (212 lb)    Admission Cmt:  None   Principal Problem:  None                Active Insurance as of 3/31/2019     Primary Coverage     Payor Plan Insurance Group Employer/Plan Group    [facility] [employer] [facility] [employer] [facility]      Payor Plan Address Payor Plan Phone Number Payor Plan Fax Number Effective Dates    [address] [address]   2017 - None Entered    [place] [address]       Subscriber Name Subscriber Birth Date Member ID       SILVIA MILLER 1975 [id number]                 Emergency Contacts      (Rel.) Home Phone Work Phone Mobile Phone    Aldo Nieves (Spouse) [phone] -- --    Lissa Klever (Daughter) [phone] -- --               History & Physical      Raphael Chavis MD at 3/31/2019 10:40 AM                   Patient Name: Silvia Miller  Age/Sex: 43 y.o. female  : 1975  MRN: [id number]    Date of Admission: 3/31/2019  Date of Encounter Visit: 19  Encounter Provider: Raphael Chavis MD  Place of Service: [facility]      Referring " Provider: Siobhan Stewart MD  Patient Care Team:  Provider, No Known as PCP - General    Subjective:     Admitted/Consulted for: chest pain    Chief Complaint: chest pain       History of Present Illness:  43 y.o. female with history of bipolar disorder, GERD, chronic pain syndrome, hepatitis C, and polysubstance abuse.  Patient states that she has had a productive cough with green sputum and mild shortness of breath for the past 1-2 weeks.  She has not had any fever chills or rigors.  She states that she yesterday had chest discomfort that lasted for about an hour prior to her arrival to [facility].  She states that her heart was racing and they told her that her heart rate was above 200 bpm.  It sounds like she had a disagreement with the staff there and left the ER without being admitted.  She then came to our ER and was noted to be in a sinus tachycardia.  Her cardiac biomarkers in the ER were indeterminate at 0.037.  Her proBNP was normal.  She had no leukocytosis.  Her transaminases were mildly elevated.  She does have a history of hepatitis C.  Per her report she had used methamphetamine prior to her arrival.  This morning she states that she has mild chest discomfort.  She is somnolent but arousable.        Past Medical History:  Past Medical History:   Diagnosis Date   • Hepatitis C    • Kidney stone    • Lupus (systemic lupus erythematosus) (CMS/HCC)    • Mitral valve anterior leaflet prolapse    • Seizures (CMS/HCC)        Past Surgical History:   Procedure Laterality Date   • BACK SURGERY     • CHOLECYSTECTOMY     • LIVER BIOPSY     • LUMBAR LAMINECTOMY DISCECTOMY DECOMPRESSION Left 12/9/2018    Procedure: Posterior lumbar three through sacrum decompression;  Surgeon: Tevin Whitley MD;  Location: [facility];  Service: Neurosurgery   • LYMPH NODE BIOPSY         Home Medications:   Medications Prior to Admission   Medication Sig Dispense Refill Last Dose   • amLODIPine  (NORVASC) 10 MG tablet Take 1 tablet by mouth Daily. 30 tablet 0 Past Week at Unknown time   • buprenorphine-naloxone (SUBOXONE) 8-2 MG per SL tablet Place 1 tablet under the tongue 2 (Two) Times a Day.   3/31/2019 at Unknown time   • gabapentin (NEURONTIN) 600 MG tablet Take 600 mg by mouth 3 (Three) Times a Day.   Past Week at Unknown time   • methocarbamol (ROBAXIN) 750 MG tablet Take 1 tablet by mouth Every 6 (Six) Hours. (Patient taking differently: Take 750 mg by mouth Every 6 (Six) Hours. Takes this med 1 to 2 times  Per day.) 16 tablet 0 Past Week at Unknown time   • predniSONE (DELTASONE) 50 MG tablet Take 1 tablet by mouth Daily. 5 tablet 0 Past Week at Unknown time   • venlafaxine XR (EFFEXOR XR) 150 MG 24 hr capsule Take 150 mg by mouth Daily.   Past Week at Unknown time   • nicotine (NICODERM CQ) 21 MG/24HR patch Place 1 patch on the skin as directed by provider Daily. 21 patch 0        Allergies:  Allergies   Allergen Reactions   • Sulfa Antibiotics Nausea And Vomiting and Swelling     Patient states when she took Sulfa medication, her throat started to swell.       Past Social History:  Social History     Socioeconomic History   • Marital status:      Spouse name: Not on file   • Number of children: Not on file   • Years of education: Not on file   • Highest education level: Not on file   Tobacco Use   • Smoking status: Current Every Day Smoker     Packs/day: 1.00     Years: 30.00     Pack years: 30.00     Types: Cigarettes   • Smokeless tobacco: Never Used   Substance and Sexual Activity   • Alcohol use: No     Frequency: Never   • Drug use: Yes     Types: IV, Heroin, Methamphetamines     Comment: pt states she no longer uses heroin, she now uses meth   • Sexual activity: Defer        Past Family History:  History reviewed. No pertinent family history.      Review of Systems:  All systems reviewed. Pertinent positives identified in HPI. All other systems are negative.         Objective:  "    Objective:  Temp:  [97.5 °F (36.4 °C)-98 °F (36.7 °C)] 97.5 °F (36.4 °C)  Heart Rate:  [] 81  Resp:  [16-18] 16  BP: (100-151)/() 100/70    Intake/Output Summary (Last 24 hours) at 3/31/2019 1057  Last data filed at 3/31/2019 0259  Gross per 24 hour   Intake 1000 ml   Output --   Net 1000 ml     Body mass index is 34.22 kg/m².      03/31/19  0232   Weight: 96.2 kg (212 lb)           Physical Exam:   Temp:  [97.5 °F (36.4 °C)-98 °F (36.7 °C)] 97.5 °F (36.4 °C)  Heart Rate:  [] 81  Resp:  [16-18] 16  BP: (100-151)/() 100/70    Intake/Output Summary (Last 24 hours) at 3/31/2019 1057  Last data filed at 3/31/2019 0259  Gross per 24 hour   Intake 1000 ml   Output --   Net 1000 ml     Flowsheet Rows      First Filed Value   Admission Height  167.6 cm (66\") Documented at 03/31/2019 0136   Admission Weight  96.2 kg (212 lb) Documented at 03/31/2019 0232          General Appearance:   Somnolent but arousable.   Head:    Normocephalic, without obvious abnormality, atraumatic       Neck:   No adenopathy, supple, no thyromegaly, no carotid bruit, no    JVD   Lungs:     Clear to auscultation bilaterally, no wheezes, rales, or     rhonchi    Heart:    Normal rate, regular rhythm,  No murmur, rub, or gallop   Chest Wall:    No abnormalities observed   Abdomen:     Normal bowel sounds, soft, non-tender, non-distended,            no rebound tenderness   Extremities:   No cyanosis, clubbing, or edema   Pulses:   Pulses palpable and equal bilaterally   Skin:   No bleeding or rash       Neurologic:   Cranial nerves 2 - 12 grossly intact, sensation intact               Lab Review:     Results from last 7 days   Lab Units 03/31/19  0208   SODIUM mmol/L 142   POTASSIUM mmol/L 4.2   CHLORIDE mmol/L 103   CO2 mmol/L 25.6   BUN mg/dL 10   CREATININE mg/dL 0.66   GLUCOSE mg/dL 99   CALCIUM mg/dL 9.3       Results from last 7 days   Lab Units 03/31/19  0706 03/31/19  0208   TROPONIN T ng/mL 0.022 0.037*     Results " from last 7 days   Lab Units 03/31/19  0208   WBC 10*3/mm3 4.74   HEMOGLOBIN g/dL 11.1*   HEMATOCRIT % 38.0   PLATELETS 10*3/mm3 124*                     Results from last 7 days   Lab Units 03/31/19  0208   PROBNP pg/mL 180.0               Imaging:    Imaging Results (most recent)     Procedure Component Value Units Date/Time    XR Chest 2 View [[id number]] Collected:  03/31/19 0239     Updated:  03/31/19 0243    Narrative:       PA AND LATERAL CHEST X-RAY     CLINICAL HISTORY: palpitations     COMPARISON: 03/23/2019.     FINDINGS: PA and lateral views of the chest were obtained. The lungs are  well expanded and appear clear. Heart size and pulmonary vascularity are  normal. No pleural effusions.             Impression:       1. No active disease.     This report was finalized on 3/31/2019 2:39 AM by Luis Pelaez M.D.             Results for orders placed during the hospital encounter of 12/09/18   Adult Transthoracic Echo Complete W/ Cont if Necessary Per Protocol    Narrative · Left ventricular systolic function is normal. Estimated EF = 59%. Normal   left ventricular cavity size and wall thickness noted. All left   ventricular wall segments contract normally. Left ventricular diastolic   dysfunction is noted (grade II w/high LAP) consistent with   pseudonormalization.  · Trace tricuspid valve regurgitation is present. Estimated right   ventricular systolic pressure from tricuspid regurgitation is normal (<35   mmHg).          Telemetry:      EKG:         BASELINE:       I personally viewed and interpreted the patient's EKG/Telemetry data.    Assessment:   Assessment/Plan   1.  Polysubstance abuse  2.  Indeterminate troponin  3.  Chest pain  4.  Methamphetamine abuse  5.  Cough.  6.  Elevated transaminases  7.  History of hepatitis C    I will have the hospitalist team stop by just to evaluate her in regards to her cough and ongoing drug abuse    I am going to set her up with a Lexiscan stress test, however I  "expect this to be negative.    I will also get her set up with  zio patch to evaluate for arrhythmia.  Restart metoprolol tartrate 25 mg p.o. twice daily.  Decrease amlodipine to 5 mg daily to allow for this.  She may be able to go home today.      Thank you for allowing me to participate in the care of Silvia Velazquez. Feel free to contact me directly with any further questions or concerns.    Raphael Chavis MD  [facility]  03/31/19  10:57 AM      Electronically signed by Raphael Chavis MD at 3/31/2019 10:58 AM          Emergency Department Notes      Ruthie Carbone, RN at 3/31/2019  1:49 AM        While walking pt back to treatment room, pt admits to this RN that she did meth tonight. Report given to FANG Knapp    Electronically signed by Ruthie Carbone RN at 3/31/2019  1:50 AM     Aldo Barriga MD at 3/31/2019  1:53 AM           EMERGENCY DEPARTMENT ENCOUNTER    CHIEF COMPLAINT  Chief Complaint: Chest Pain  History given by: pt  History limited by: none  Room Number: 12/12  PMD: Provider, No Known  Neurosurgery: Dr Whitley    HPI:  Pt is a 43 y.o. female who presents complaining of chest pain that started 1.5 hours ago. Pt admits to productive cough with green sputum x multiple weeks and dizziness. Pt reports she went to another hospital PTA here where her heart rate was 255. Pt reports the hospital would not let her family come back and reports she left that hospital and came here. Pt reports she has not been able to take her appropriate medications (amlodipine and seizure medications) for the past five days because she has not been able to get her appropriate prescriptions.    Duration:  1.5 hours  Onset: gradual  Timing: constant  Location: chest  Radiation: none  Quality: \"pain\"  Intensity/Severity: moderate  Progression: unchanged  Associated Symptoms: productive cough with green sputum x multiple weeks and dizziness  Aggravating Factors: none  Alleviating Factors: " none  Previous Episodes: none  Treatment before arrival: none    PAST MEDICAL HISTORY  Active Ambulatory Problems     Diagnosis Date Noted   • Spinal epidural abscess 12/09/2018     Resolved Ambulatory Problems     Diagnosis Date Noted   • No Resolved Ambulatory Problems     Past Medical History:   Diagnosis Date   • Hepatitis C    • Kidney stone    • Lupus (systemic lupus erythematosus) (CMS/HCC)    • Mitral valve anterior leaflet prolapse    • Seizures (CMS/HCC)        PAST SURGICAL HISTORY  Past Surgical History:   Procedure Laterality Date   • CHOLECYSTECTOMY     • LIVER BIOPSY     • LUMBAR LAMINECTOMY DISCECTOMY DECOMPRESSION Left 12/9/2018    Procedure: Posterior lumbar three through sacrum decompression;  Surgeon: Tevin Whitley MD;  [facility] OR;  Service: Neurosurgery   • LYMPH NODE BIOPSY         FAMILY HISTORY  History reviewed. No pertinent family history.    SOCIAL HISTORY  Social History     Socioeconomic History   • Marital status:      Spouse name: Not on file   • Number of children: Not on file   • Years of education: Not on file   • Highest education level: Not on file   Tobacco Use   • Smoking status: Current Every Day Smoker     Packs/day: 1.00     Years: 30.00     Pack years: 30.00     Types: Cigarettes   • Smokeless tobacco: Never Used   Substance and Sexual Activity   • Alcohol use: No     Frequency: Never   • Drug use: Yes     Types: IV, Heroin, Methamphetamines     Comment: pt states she no longer uses heroin, she now uses meth   • Sexual activity: Defer       ALLERGIES  Sulfa antibiotics    REVIEW OF SYSTEMS  Review of Systems   Constitutional: Negative for fever.   HENT: Negative for sore throat.    Eyes: Negative.    Respiratory: Positive for cough (productive with green sputum x multiple weeks). Negative for shortness of breath.    Cardiovascular: Positive for chest pain.   Gastrointestinal: Negative for abdominal pain, diarrhea and vomiting.   Genitourinary:  Negative for dysuria.   Musculoskeletal: Negative for neck pain.   Skin: Negative for rash.   Neurological: Positive for dizziness. Negative for weakness, numbness and headaches.   Hematological: Negative.    Psychiatric/Behavioral: Negative.    All other systems reviewed and are negative.      PHYSICAL EXAM  ED Triage Vitals [03/31/19 0136]   Temp Heart Rate Resp BP SpO2   98 °F (36.7 °C) 120 18 -- 98 %      Temp src Heart Rate Source Patient Position BP Location FiO2 (%)   Tympanic -- -- -- --       Physical Exam   Constitutional: She is oriented to person, place, and time. No distress.   HENT:   Head: Normocephalic and atraumatic.   Eyes: EOM are normal. Pupils are equal, round, and reactive to light.   Neck: Normal range of motion. Neck supple.   Cardiovascular: Regular rhythm and normal heart sounds. Tachycardia present.   Pulmonary/Chest: Effort normal and breath sounds normal. No respiratory distress.   Abdominal: Soft. There is no tenderness. There is no rebound and no guarding.   Musculoskeletal: Normal range of motion. She exhibits no edema.   Neurological: She is alert and oriented to person, place, and time. She has normal sensation and normal strength.   Skin: Skin is warm and dry. No rash noted.   Psychiatric: Affect normal. Her mood appears anxious (mild).   Nursing note and vitals reviewed.      LAB RESULTS  Lab Results (last 24 hours)     Procedure Component Value Units Date/Time    CBC & Differential [[id number]] Collected:  03/31/19 0208    Specimen:  Blood Updated:  03/31/19 0223    Narrative:       The following orders were created for panel order CBC & Differential.  Procedure                               Abnormality         Status                     ---------                               -----------         ------                     CBC Auto Differential[[id number]]        Abnormal            Final result                 Please view results for these tests on the individual orders.     Comprehensive Metabolic Panel [[id number]]  (Abnormal) Collected:  03/31/19 0208    Specimen:  Blood Updated:  03/31/19 0241     Glucose 99 mg/dL      BUN 10 mg/dL      Creatinine 0.66 mg/dL      Sodium 142 mmol/L      Potassium 4.2 mmol/L      Chloride 103 mmol/L      CO2 25.6 mmol/L      Calcium 9.3 mg/dL      Total Protein 8.3 g/dL      Albumin 4.10 g/dL      ALT (SGPT) 94 U/L      AST (SGOT) 94 U/L      Alkaline Phosphatase 99 U/L      Total Bilirubin 0.4 mg/dL      eGFR Non African Amer 99 mL/min/1.73      Globulin 4.2 gm/dL      A/G Ratio 1.0 g/dL      BUN/Creatinine Ratio 15.2     Anion Gap 13.4 mmol/L     Narrative:       GFR Normal >60  Chronic Kidney Disease <60  Kidney Failure <15    BNP [[id number]]  (Normal) Collected:  03/31/19 0208    Specimen:  Blood Updated:  03/31/19 0240     proBNP 180.0 pg/mL     Narrative:       Among patients with dyspnea, NT-proBNP is highly sensitive for the detection of acute congestive heart failure. In addition NT-proBNP of <300 pg/ml effectively rules out acute congestive heart failure with 99% negative predictive value.    Troponin [[id number]]  (Abnormal) Collected:  03/31/19 0208    Specimen:  Blood Updated:  03/31/19 0245     Troponin T 0.037 ng/mL     Narrative:       Troponin T Reference Range:  <= 0.03 ng/mL-   Negative for AMI  >0.03 ng/mL-     Abnormal for myocardial necrosis.  Clinicians would have to utilize clinical acumen, EKG, Troponin and serial changes to determine if it is an Acute Myocardial Infarction or myocardial injury due to an underlying chronic condition.     Urinalysis With Microscopic If Indicated (No Culture) - Urine, Clean Catch [[id number]]  (Normal) Collected:  03/31/19 0208    Specimen:  Urine, Clean Catch Updated:  03/31/19 0219     Color, UA Yellow     Appearance, UA Clear     pH, UA 7.0     Specific Gravity, UA 1.007     Glucose, UA Negative     Ketones, UA Negative     Bilirubin, UA Negative     Blood, UA Negative     Protein, UA Negative      Leuk Esterase, UA Negative     Nitrite, UA Negative     Urobilinogen, UA 0.2 E.U./dL    Narrative:       Urine microscopic not indicated.    CBC Auto Differential [[id number]]  (Abnormal) Collected:  03/31/19 0208    Specimen:  Blood Updated:  03/31/19 0223     WBC 4.74 10*3/mm3      RBC 4.82 10*6/mm3      Hemoglobin 11.1 g/dL      Hematocrit 38.0 %      MCV 78.8 fL      MCH 23.0 pg      MCHC 29.2 g/dL      RDW 18.6 %      RDW-SD 53.6 fl      MPV -- fL      Comment: Not calculated        Platelets 124 10*3/mm3      Neutrophil % 60.6 %      Lymphocyte % 28.3 %      Monocyte % 8.6 %      Eosinophil % 1.5 %      Basophil % 0.8 %      Neutrophils, Absolute 2.87 10*3/mm3      Lymphocytes, Absolute 1.34 10*3/mm3      Monocytes, Absolute 0.41 10*3/mm3      Eosinophils, Absolute 0.07 10*3/mm3      Basophils, Absolute 0.04 10*3/mm3           I ordered the above labs and reviewed the results    RADIOLOGY  XR Chest 2 View   Final Result   1. No active disease.       This report was finalized on 3/31/2019 2:39 AM by Luis Pelaez M.D.               I ordered the above noted radiological studies. Interpreted by radiologist. Reviewed by me in PACS.       PROCEDURES  Procedures  EKG          EKG time: 0151  Rhythm/Rate: sinus tachycardia rate 120  ST and T waves: No acute changes     Interpreted Contemporaneously by me, independently viewed  Unchanged compared to prior 3/23/19 except for rate      PROGRESS AND CONSULTS       0200  Ordered labs and chest XR for further viewing. Ordered IV fluids. Ordered the pt a dose of all of the medications that she has missed for the past five days including lopressor for tachycardia and amlodipine for hypertension.    0216  Nurse reports that the pt admits to meth use today.    0256  Ordered cardiology consult.    0253  Rechecked patient who is resting. HR 96. /111. Discussed all lab and test results. Discussed plan to admit the pt for observation. Pt understands and agrees with the  plan. All questions have been answered.    0258  Discussed case with Dr Mart, cardiology  Reviewed history, exam, results and treatments.  Discussed concerns and plan of care. Dr Mart accepts pt to be admitted to telemetry.  Ordered Zyprexa (pts regular dose).      MEDICAL DECISION MAKING  Results were reviewed/discussed with the patient and they were also made aware of online access. Pt also made aware that some labs, such as cultures, will not be resulted during ER visit and follow up with PMD is necessary.     MDM  Number of Diagnoses or Management Options     Amount and/or Complexity of Data Reviewed  Clinical lab tests: reviewed and ordered (Troponin 0.037)  Tests in the radiology section of CPT®:  ordered and reviewed (Chest XR - NAD)  Tests in the medicine section of CPT®:  reviewed and ordered (Refer to the procedure section of the note for EKG results)  Decide to obtain previous medical records or to obtain history from someone other than the patient: yes (EPIC)  Discuss the patient with other providers: yes (Dr Mart)           DIAGNOSIS  Final diagnoses:   Non-STEMI (non-ST elevated myocardial infarction) (CMS/[facility])   Methamphetamine use (CMS/[facility])       DISPOSITION  ADMISSION    Discussed treatment plan and reason for admission with pt/family and admitting physician.  Pt/family voiced understanding of the plan for admission for further testing/treatment as needed.         Latest Documented Vital Signs:  As of 3:06 AM  BP- (!) 141/111 HR- 92 Temp- 98 °F (36.7 °C) (Tympanic) O2 sat- 100%    --  Documentation assistance provided by arsh Cohen for Dr Barriga.  Information recorded by the scribe was done at my direction and has been verified and validated by me.                 Jaqueline Cohen  03/31/19 0306       Aldo Barriga MD  03/31/19 0711      Electronically signed by Aldo Barriga MD at 3/31/2019  7:11 AM     Aria Henson, RN at 3/31/2019  2:17 AM        Pt to ED  "for cp and palpitations. Pt states it feels hard to take a breath and c/o SOA and states \"it feels like there is something in my throat\". Oxygen is 100% RA, trachea midline, RR 19. Pt reports she snorted a \"tiny\" amount of meth earlier and she doesn't think it is related because the cp started approx 1 hr after snorting the meth. Pt has not had home medications for 5 days.     Aria Henson, FANG  03/31/19 0218       Aria Henson RN  03/31/19 0248      Electronically signed by Aria Henson RN at 3/31/2019  2:48 AM     Aria Henson RN at 3/31/2019  2:45 AM               Sixto, Aria, RN  03/31/19 0248      Electronically signed by Aria Henson RN at 3/31/2019  2:48 AM     Lisa Gonsales RN at 3/31/2019  4:49 AM          Nursing report ED to floor  Silvia Velazquez  43 y.o.  female    HPI (triage note):   Chief Complaint   Patient presents with   • Chest Pain   • Palpitations       Admitting doctor:   Siobhan Stewart MD    Admitting diagnosis:   The primary encounter diagnosis was Non-STEMI (non-ST elevated myocardial infarction) (CMS/HCC). A diagnosis of Methamphetamine use (CMS/HCC) was also pertinent to this visit.    Code status:   Current Code Status     Date Active Code Status Order ID Comments User Context       Prior          Allergies:   Sulfa antibiotics    Weight:       03/31/19  0232   Weight: 96.2 kg (212 lb)       Most recent vitals:   Vitals:    03/31/19 0234 03/31/19 0242 03/31/19 0256 03/31/19 0448   BP: (!) 141/111   151/87   BP Location:    Left arm   Patient Position:    Lying   Pulse: 93 90 92 85   Resp:    18   Temp:       TempSrc:       SpO2: 100% 100% 100% 99%   Weight:       Height:           Active LDAs/IV Access:   Lines, Drains & Airways    Active LDAs     None                Labs (abnormal labs have a star):   Labs Reviewed   COMPREHENSIVE METABOLIC PANEL - Abnormal; Notable for the following components:       Result Value    ALT (SGPT) 94 (*)     AST (SGOT) 94 (*)     All " other components within normal limits    Narrative:     GFR Normal >60  Chronic Kidney Disease <60  Kidney Failure <15   TROPONIN (IN-HOUSE) - Abnormal; Notable for the following components:    Troponin T 0.037 (*)     All other components within normal limits    Narrative:     Troponin T Reference Range:  <= 0.03 ng/mL-   Negative for AMI  >0.03 ng/mL-     Abnormal for myocardial necrosis.  Clinicians would have to utilize clinical acumen, EKG, Troponin and serial changes to determine if it is an Acute Myocardial Infarction or myocardial injury due to an underlying chronic condition.    CBC WITH AUTO DIFFERENTIAL - Abnormal; Notable for the following components:    Hemoglobin 11.1 (*)     MCV 78.8 (*)     MCH 23.0 (*)     MCHC 29.2 (*)     RDW 18.6 (*)     Platelets 124 (*)     All other components within normal limits   BNP (IN-HOUSE) - Normal    Narrative:     Among patients with dyspnea, NT-proBNP is highly sensitive for the detection of acute congestive heart failure. In addition NT-proBNP of <300 pg/ml effectively rules out acute congestive heart failure with 99% negative predictive value.   URINALYSIS W/ MICROSCOPIC IF INDICATED (NO CULTURE) - Normal    Narrative:     Urine microscopic not indicated.   CBC AND DIFFERENTIAL    Narrative:     The following orders were created for panel order CBC & Differential.  Procedure                               Abnormality         Status                     ---------                               -----------         ------                     CBC Auto Differential[[id number]]        Abnormal            Final result                 Please view results for these tests on the individual orders.       EKG:   ECG 12 Lead   Preliminary Result   HEART RATE= 120  bpm   RR Interval= 500  ms   TX Interval= 153  ms   P Horizontal Axis= 23  deg   P Front Axis= 59  deg   QRSD Interval= 82  ms   QT Interval= 311  ms   QRS Axis= 40  deg   T Wave Axis= 53  deg   - BORDERLINE ECG -    Sinus tachycardia   Consider left ventricular hypertrophy   Electronically Signed By:    Date and Time of Study: 2019-03-31 01:51:02          Meds given in ED:   Medications   sodium chloride 0.9 % infusion (125 mL/hr Intravenous New Bag 3/31/19 0445)   morphine injection 4 mg (not administered)   ondansetron (ZOFRAN) injection 4 mg (not administered)   sodium chloride 0.9 % bolus 1,000 mL (0 mL Intravenous Stopped 3/31/19 0259)   metoprolol tartrate (LOPRESSOR) injection 5 mg (5 mg Intravenous Given 3/31/19 0229)   amLODIPine (NORVASC) tablet 10 mg (10 mg Oral Given 3/31/19 0225)   gabapentin (NEURONTIN) capsule 600 mg (600 mg Oral Given 3/31/19 0225)   venlafaxine XR (EFFEXOR-XR) 24 hr capsule 150 mg (150 mg Oral Given 3/31/19 0226)   OLANZapine zydis (zyPREXA) disintegrating tablet 15 mg (15 mg Oral Given 3/31/19 0443)       Imaging results:  Xr Chest 2 View    Result Date: 3/31/2019  1. No active disease.  This report was finalized on 3/31/2019 2:39 AM by Luis Pelaez M.D.        Ambulatory status:   No ambulatory issues noted     Social issues:   Social History     Socioeconomic History   • Marital status:      Spouse name: Not on file   • Number of children: Not on file   • Years of education: Not on file   • Highest education level: Not on file   Tobacco Use   • Smoking status: Current Every Day Smoker     Packs/day: 1.00     Years: 30.00     Pack years: 30.00     Types: Cigarettes   • Smokeless tobacco: Never Used   Substance and Sexual Activity   • Alcohol use: No     Frequency: Never   • Drug use: Yes     Types: IV, Heroin, Methamphetamines     Comment: pt states she no longer uses heroin, she now uses meth   • Sexual activity: Defer        Lisa Gonsales RN  03/31/19 0450      Electronically signed by Lisa Gonsales RN at 3/31/2019  4:50 AM       ICU Vital Signs     Row Name 03/31/19 1556 03/31/19 1554 03/31/19 1113 03/31/19 1112 03/31/19 0751       Vitals    Temp  98.2 °F  (36.8 °C)  --  --  --  97.5 °F (36.4 °C)    Temp src  Oral  --  --  --  Oral    Pulse  93  --  --  92  81    Heart Rate Source  Monitor  --  --  --  Monitor    Resp  16  --  --  --  16    Resp Rate Source  Visual  --  --  --  Visual    BP  127/91  --  114/68  --  100/70    Noninvasive MAP (mmHg)  108  --  85  --  81    BP Location  --  --  --  --  Left arm    BP Method  --  --  --  --  Automatic    Patient Position  --  --  --  --  Lying       Oxygen Therapy    SpO2  --  97 %  --  --  95 %    Pulse Oximetry Type  --  Intermittent  --  --  Continuous    Device (Oxygen Therapy)  --  room air  --  --  room air    Row Name 03/31/19 0750 03/31/19 0610 03/31/19 0606 03/31/19 0602 03/31/19 04:48:03       Vitals    Temp  --  --  --  97.5 °F (36.4 °C)  --    Temp src  --  --  --  Oral  --    Pulse  --  --  85  83  85    Heart Rate Source  --  --  Monitor  Monitor  Monitor    Resp  --  --  --  18  18    Resp Rate Source  --  --  --  Visual  Visual    BP  --  --  129/85  129/85  151/87    Noninvasive MAP (mmHg)  --  --  --  100  --    BP Location  --  --  Right arm  Left arm  Left arm    BP Method  --  --  Automatic  Automatic  Automatic    Patient Position  --  --  Lying  Lying  Lying       Oxygen Therapy    SpO2  --  --  97 %  96 %  99 %    Pulse Oximetry Type  --  --  Continuous  Continuous  Continuous    Device (Oxygen Therapy)  room air  room air  room air  room air  room air    Oximetry Probe Site Changed  --  --  No  --  --    Row Name 03/31/19 0256 03/31/19 0242 03/31/19 0234 03/31/19 0232 03/31/19 0209       Height and Weight    Weight  --  --  --  96.2 kg (212 lb)  --    Weight Method  --  --  --  Bed scale  --       Vitals    Pulse  92  90  93  109  118    BP  --  --  141/111  (Abnormal)   136/107  (Abnormal)   --    Noninvasive MAP (mmHg)  --  --  121  122  --       Oxygen Therapy    SpO2  100 %  100 %  100 %  100 %  100 %    Row Name 03/31/19 0205 03/31/19 0136                Height and Weight    Height  --  167.6  "cm (66\")       Height Method  --  Stated       Ideal Body Weight (IBW) (kg)  --  59.58       Weight in (lb) to have BMI = 25  --  154.6          Vitals    Temp  --  98 °F (36.7 °C)       Temp src  --  Tympanic       Pulse  121  (Abnormal)   120       Resp  --  18       Resp Rate Source  --  Visual       BP  140/103  (Abnormal)   --       Noninvasive MAP (mmHg)  116  --          Oxygen Therapy    SpO2  100 %  98 %       Pulse Oximetry Type  --  Intermittent       Device (Oxygen Therapy)  --  room air           Lines, Drains & Airways    Active LDAs     None         Inactive LDAs     Name:   Placement date:   Placement time:   Removal date:   Removal time:   Site:   Days:    [REMOVED] Peripheral IV 03/31/19 0206 Right Antecubital   03/31/19    0206    03/31/19    1830    Antecubital   less than 1                Hospital Medications (all)       Dose Frequency Start End    amLODIPine (NORVASC) tablet 10 mg 10 mg Once 3/31/2019 3/31/2019    Sig - Route: Take 2 tablets by mouth 1 (One) Time. - Oral    gabapentin (NEURONTIN) capsule 600 mg 600 mg Once 3/31/2019 3/31/2019    Sig - Route: Take 2 capsules by mouth 1 (One) Time. - Oral    metoprolol tartrate (LOPRESSOR) injection 5 mg 5 mg Once 3/31/2019 3/31/2019    Sig - Route: Infuse 5 mL into a venous catheter 1 (One) Time. - Intravenous    OLANZapine zydis (zyPREXA) disintegrating tablet 15 mg 15 mg Once 3/31/2019 3/31/2019    Sig - Route: Take 15 mg by mouth 1 (One) Time. - Oral    ondansetron (ZOFRAN) injection 4 mg 4 mg Once 3/31/2019 3/31/2019    Sig - Route: Infuse 2 mL into a venous catheter 1 (One) Time. - Intravenous    regadenoson (LEXISCAN) injection 0.4 mg 0.4 mg Once in Imaging 3/31/2019 3/31/2019    Sig - Route: Infuse 5 mL into a venous catheter Once. - Intravenous    sodium chloride 0.9 % bolus 1,000 mL 1,000 mL Once 3/31/2019 3/31/2019    Sig - Route: Infuse 1,000 mL into a venous catheter 1 (One) Time. - Intravenous    technetium sestamibi (CARDIOLITE) " injection 1 dose 1 dose Once in Imaging 3/31/2019 3/31/2019    Sig - Route: Infuse 1 dose into a venous catheter Once. - Intravenous    technetium sestamibi (CARDIOLITE) injection 1 dose 1 dose Once in Imaging 3/31/2019 3/31/2019    Sig - Route: Infuse 1 dose into a venous catheter Once. - Intravenous    venlafaxine XR (EFFEXOR-XR) 24 hr capsule 150 mg 150 mg Once 3/31/2019 3/31/2019    Sig - Route: Take 1 capsule by mouth 1 (One) Time. - Oral    amLODIPine (NORVASC) tablet 5 mg (Discontinued) 5 mg Every 24 Hours Scheduled 4/1/2019 3/31/2019    Sig - Route: Take 1 tablet by mouth Daily. - Oral    Reason for Discontinue: Patient Discharge    budesonide-formoterol (SYMBICORT) 80-4.5 MCG/ACT inhaler 2 puff (Discontinued) 2 puff 2 Times Daily - RT 3/31/2019 3/31/2019    Sig - Route: Inhale 2 puffs 2 (Two) Times a Day. - Inhalation    Reason for Discontinue: Patient Discharge    doxycycline (MONODOX) capsule 100 mg (Discontinued) 100 mg Every 12 Hours Scheduled 3/31/2019 3/31/2019    Sig - Route: Take 1 capsule by mouth Every 12 (Twelve) Hours. - Oral    Reason for Discontinue: Patient Discharge    metoprolol tartrate (LOPRESSOR) tablet 25 mg (Discontinued) 25 mg Every 12 Hours Scheduled 4/1/2019 3/31/2019    Sig - Route: Take 1 tablet by mouth Every 12 (Twelve) Hours. - Oral    Reason for Discontinue: Patient Discharge    morphine injection 4 mg (Discontinued) 4 mg Once 3/31/2019 3/31/2019    Sig - Route: Infuse 2 mL into a venous catheter 1 (One) Time. - Intravenous    Reason for Discontinue: Patient Discharge    sodium chloride 0.9 % infusion (Discontinued) 125 mL/hr Continuous 3/31/2019 3/31/2019    Sig - Route: Infuse 125 mL/hr into a venous catheter Continuous. - Intravenous          Orders (last 72 hrs)     Start     Ordered    04/01/19 0900  amLODIPine (NORVASC) tablet 5 mg  Every 24 Hours Scheduled,   Status:  Discontinued      03/31/19 1059    04/01/19 0900  metoprolol tartrate (LOPRESSOR) tablet 25 mg  Every  12 Hours Scheduled,   Status:  Discontinued      03/31/19 1059    04/01/19 0600  Comprehensive Metabolic Panel  Morning Draw,   Status:  Canceled      03/31/19 1510    04/01/19 0600  CBC (No Diff)  Morning Draw,   Status:  Canceled      03/31/19 1510    04/01/19 0600  TSH  Morning Draw,   Status:  Canceled      03/31/19 1513    04/01/19 0600  Iron Profile  Morning Draw,   Status:  Canceled      03/31/19 1513    04/01/19 0600  Folate  Morning Draw,   Status:  Canceled      03/31/19 1513    04/01/19 0600  Vitamin B12  Morning Draw,   Status:  Canceled      03/31/19 1513    04/01/19 0000  metoprolol tartrate (LOPRESSOR) 25 MG tablet  Every 12 Hours Scheduled      03/31/19 1619    04/01/19 0000  amLODIPine (NORVASC) 5 MG tablet  Every 24 Hours Scheduled      03/31/19 1619    03/31/19 2130  budesonide-formoterol (SYMBICORT) 80-4.5 MCG/ACT inhaler 2 puff  2 Times Daily - RT,   Status:  Discontinued      03/31/19 1447    03/31/19 2100  doxycycline (MONODOX) capsule 100 mg  Every 12 Hours Scheduled,   Status:  Discontinued      03/31/19 1447    03/31/19 1620  Discharge patient  Once      03/31/19 1619    03/31/19 1618  Inpatient Admission  Once      03/31/19 1617    03/31/19 1521  Diet Regular  Diet Effective Now,   Status:  Canceled      03/31/19 1520    03/31/19 1447  Respiratory Culture - Sputum, Cough  Once      03/31/19 1447    03/31/19 1447  Gram Stain  Once      03/31/19 1447    03/31/19 1340  technetium sestamibi (CARDIOLITE) injection 1 dose  Once in Imaging      03/31/19 1340    03/31/19 1340  regadenoson (LEXISCAN) injection 0.4 mg  Once in Imaging      03/31/19 1340    03/31/19 1148  technetium sestamibi (CARDIOLITE) injection 1 dose  Once in Imaging      03/31/19 1148    03/31/19 1100  Inpatient Internal Medicine Consult  Once     Specialty:  Internal Medicine  Provider:  Tao Lopez MD    03/31/19 1059    03/31/19 1045  POC Glucose Once  Once      03/31/19 1042    03/31/19 1043  Stress Test With  Myocardial Perfusion One Day  Once      03/31/19 1042    03/31/19 1043  Holter Monitor - 72 Hour Up To 21 Days  Once      03/31/19 1042    03/31/19 0557  Troponin  Now Then Every 6 Hours      03/31/19 0556    03/31/19 0556  NPO Diet NPO Except: Ice Chips, Sips With Meds  Diet Effective Now,   Status:  Canceled      03/31/19 0556    03/31/19 0439  morphine injection 4 mg  Once,   Status:  Discontinued      03/31/19 0437    03/31/19 0439  ondansetron (ZOFRAN) injection 4 mg  Once      03/31/19 0437    03/31/19 0303  OLANZapine zydis (zyPREXA) disintegrating tablet 15 mg  Once      03/31/19 0301    03/31/19 0303  Inpatient Admission  Once      03/31/19 0304    03/31/19 0248  LCG (on-call MD unless specified)  Once     Specialty:  Cardiology  Provider:  (Not yet assigned)    03/31/19 0256    03/31/19 0220  Manual Differential  Once,   Status:  Canceled      03/31/19 0219    03/31/19 0202  sodium chloride 0.9 % bolus 1,000 mL  Once      03/31/19 0200    03/31/19 0202  sodium chloride 0.9 % infusion  Continuous,   Status:  Discontinued      03/31/19 0200    03/31/19 0202  metoprolol tartrate (LOPRESSOR) injection 5 mg  Once      03/31/19 0200    03/31/19 0202  amLODIPine (NORVASC) tablet 10 mg  Once      03/31/19 0200    03/31/19 0202  gabapentin (NEURONTIN) capsule 600 mg  Once      03/31/19 0200    03/31/19 0202  venlafaxine XR (EFFEXOR-XR) 24 hr capsule 150 mg  Once      03/31/19 0200    03/31/19 0159  CBC & Differential  Once      03/31/19 0200    03/31/19 0159  Comprehensive Metabolic Panel  Once      03/31/19 0200    03/31/19 0159  BNP  Once      03/31/19 0200    03/31/19 0159  Troponin  Once      03/31/19 0200    03/31/19 0159  XR Chest 2 View  1 Time Imaging      03/31/19 0200    03/31/19 0159  Monitor Blood Pressure  Per Hospital Policy      03/31/19 0200    03/31/19 0159  Cardiac Monitoring  Once      03/31/19 0200    03/31/19 0159  Pulse Oximetry, Continuous  Per Hospital Policy      03/31/19 0200    03/31/19  0159  Urinalysis With Microscopic If Indicated (No Culture) - Urine, Clean Catch  Once      19 0200    19 0159  CBC Auto Differential  PROCEDURE ONCE      19 0200    19 0138  ECG 12 Lead  Once      19 0138    19 0000  budesonide-formoterol (SYMBICORT) 80-4.5 MCG/ACT inhaler  2 Times Daily      19 1619    19 0000  doxycycline (MONODOX) 100 MG capsule  Every 12 Hours Scheduled      19 1619    19 0000  gabapentin (NEURONTIN) 600 MG tablet  3 Times Daily      19 1738    19 0000  venlafaxine XR (EFFEXOR XR) 150 MG 24 hr capsule  Daily      19 1738    19 0000  OLANZapine (ZYPREXA) 15 MG tablet  Nightly      19 1738    19 0000  methocarbamol (ROBAXIN) 750 MG tablet  2 Times Daily      19    --  SCANNED EKG      19 0000                  Consult Notes       Nallely Ceron MD at 3/31/2019  2:48 PM      Consult Orders    1. Inpatient Internal Medicine Consult [[id number]] ordered by Raphael Chavis MD at 19 1059                  Patient Identification:  Name: Silvia Velazquez  Age/Sex: 43 y.o. female  :  1975  MRN: [id number]         Primary Care Physician: Provider, No Known  Room:  [facility]              Date of Consult: 19    Requesting Physician: Dr. Chavis    Reason for Consultation: Evaluation and management of cough    HPI: This is a 43-year-old woman who is unknown to our service.  She was admitted to the cardiology service for chest pain with an elevated troponin level.  She has had productive cough for the last 3-4 weeks.  She has not had wheezing or sore throat.  She does have dyspnea on exertion.  No sore throat or earache.  Some sinus congestion.  No nasal drainage.  No chills.  She may have had a fever but not in the last day or 2.  Other than chest pain and palpitations, no other associated symptoms or exacerbating or alleviating factors    Past Medical History:   Diagnosis  Date   • Hepatitis C    • Kidney stone    • Lupus (systemic lupus erythematosus) (CMS/HCC)    • Mitral valve anterior leaflet prolapse    • Seizures (CMS/HCC)        Past Surgical History:   Procedure Laterality Date   • BACK SURGERY     • CHOLECYSTECTOMY     • LIVER BIOPSY     • LUMBAR LAMINECTOMY DISCECTOMY DECOMPRESSION Left 12/9/2018    Procedure: Posterior lumbar three through sacrum decompression;  Surgeon: Tevin Whitley MD;  Location: [facility] OR;  Service: Neurosurgery   • LYMPH NODE BIOPSY         Medications Prior to Admission   Medication Sig Dispense Refill Last Dose   • amLODIPine (NORVASC) 10 MG tablet Take 1 tablet by mouth Daily. 30 tablet 0 Past Week at Unknown time   • buprenorphine-naloxone (SUBOXONE) 8-2 MG per SL tablet Place 1 tablet under the tongue 2 (Two) Times a Day.   3/31/2019 at Unknown time   • gabapentin (NEURONTIN) 600 MG tablet Take 600 mg by mouth 3 (Three) Times a Day.   Past Week at Unknown time   • methocarbamol (ROBAXIN) 750 MG tablet Take 1 tablet by mouth Every 6 (Six) Hours. (Patient taking differently: Take 750 mg by mouth Every 6 (Six) Hours. Takes this med 1 to 2 times  Per day.) 16 tablet 0 Past Week at Unknown time   • predniSONE (DELTASONE) 50 MG tablet Take 1 tablet by mouth Daily. 5 tablet 0 Past Week at Unknown time   • venlafaxine XR (EFFEXOR XR) 150 MG 24 hr capsule Take 150 mg by mouth Daily.   Past Week at Unknown time   • nicotine (NICODERM CQ) 21 MG/24HR patch Place 1 patch on the skin as directed by provider Daily. 21 patch 0      Allergies:  Sulfa antibiotics    Social History     Tobacco Use   • Smoking status: Current Every Day Smoker     Packs/day: 1.00     Years: 30.00     Pack years: 30.00     Types: Cigarettes   • Smokeless tobacco: Never Used   Substance Use Topics   • Alcohol use: No     Frequency: Never   • Drug use: Yes     Types: IV, Heroin, Methamphetamines     Comment: pt states she no longer uses heroin, she now uses meth       Family  history  Son has asthma.  No COPD.  Positive heart disease.  No stroke.  Multiple people with cancers: Leukemia, lung cancer, brain cancer, breast cancer    Review of Systems   Constitutional: Positive for fatigue, fever and unexpected weight change. Negative for activity change.        She has gained 50 pounds since December 2018   HENT: Positive for ear pain. Negative for congestion, postnasal drip and sore throat.    Eyes: Negative for visual disturbance.   Respiratory: Positive for cough and shortness of breath. Negative for wheezing.    Cardiovascular: Positive for chest pain, palpitations and leg swelling.   Gastrointestinal: Positive for constipation and diarrhea. Negative for abdominal pain, nausea and vomiting.        Usually has constipation from Suboxone.  Had diarrhea one day but not recently   Endocrine: Negative for polydipsia and polyuria.   Genitourinary: Negative for difficulty urinating and dysuria.   Musculoskeletal: Positive for arthralgias.   Skin: Negative for rash and wound.   Neurological: Positive for numbness. Negative for dizziness.   Hematological: Bruises/bleeds easily.   Psychiatric/Behavioral: Negative for agitation and behavioral problems.          Vital Signs  Temp:  [97.5 °F (36.4 °C)-98 °F (36.7 °C)] 97.5 °F (36.4 °C)  Heart Rate:  [] 92  Resp:  [16-18] 16  BP: (100-151)/() 114/68  Body mass index is 34.22 kg/m².    Physical Exam   Constitutional: She appears well-developed and well-nourished. No distress.   HENT:   Head: Normocephalic.   Right Ear: External ear normal.   Left Ear: External ear normal.   Nose: Nose normal.   Mouth/Throat: Oropharynx is clear and moist. No oropharyngeal exudate.   Eyes: Conjunctivae and EOM are normal. Pupils are equal, round, and reactive to light. Right eye exhibits no discharge. Left eye exhibits no discharge. No scleral icterus.   Neck: Normal range of motion. Neck supple. No JVD present. No tracheal deviation present. No thyromegaly  present.   Cardiovascular: Normal rate, regular rhythm and intact distal pulses.   Murmur heard.  Grade 3/6 systolic murmur right and left sternal borders   Pulmonary/Chest: No respiratory distress. She has wheezes. She has no rales.   Breath sounds are slightly decreased though equal.  A few light expiratory wheezes noted   Abdominal: Soft. Bowel sounds are normal. She exhibits no distension. There is no tenderness.   No hepatosplenomegaly   Musculoskeletal: She exhibits no edema, tenderness or deformity.   No increased warmth or effusions over the joints   Lymphadenopathy:     She has no cervical adenopathy.   Neurological: No cranial nerve deficit.   Skin: Skin is warm and dry. No rash noted. She is not diaphoretic.   Small ecchymoses left upper abdomen.  Excoriations on shins with a few small ecchymoses   Psychiatric: She has a normal mood and affect. Her behavior is normal.   Nursing note and vitals reviewed.      Results Review:    I reviewed the patient's new clinical results.    Assessment/Plan  1.  Acute bacterial bronchitis in a smoker with past history of MRSA.  Will start doxycycline.  Sputum Gram stain and culture ordered.  2.  Underlying COPD/asthma with exacerbation.  She continues to smoke.  Start Symbicort.  She would prefer not to use albuterol or similar because of elevated heart rate and sensation of palpitations.  Outpatient PFTs in a couple weeks  3.  Elevated troponin with chest pain.  Stress test done per cardiology.  Result is pending  4.  Hepatitis C.  Transaminase elevation noted.  They are a little higher than in January but improved from 3/4/2019.  Could also be of muscle origin and related to above.  Will follow  5.  (Benign) heart murmur.  Patient says she has had since childhood.  Echocardiogram December 2018 without significant valvular disease.  6.  History of lupus.  She has an appointment with [facility] for primary care physician.  Encouraged her to keep this.  She was given prednisone  through the ER on 3/4/2019.  No evidence at this time to restart.    7.  History of drug abuse.  Patient maintains that she has been sober for the last 2 months but there is mention in the history that she used methamphetamine yesterday.  No evidence of withdrawal at this time  8.  Mild thrombocytopenia and anemia.  Hemoglobin is fairly close to earlier this year.  Platelets are a little less.  Will check B12 level, folate and iron studies    Thank you very much for asking A to be involved in this patient's care.  We will follow along with you.      Nallely Ceron MD  [facility]  03/31/19  2:48 PM          Electronically signed by Nallely Ceron MD at 3/31/2019  3:13 PM

## 2019-04-01 NOTE — DISCHARGE SUMMARY
Date of Discharge:  4/1/2019  Date of Admit: 3/31/2019    Discharge Diagnosis:  Chest pain  Indeterminate troponin  Bipolar disorder  Methamphetamine abuse  Hepatitis C  Bronchitis  COPD    Hospital Course:   43 y.o. female with history of bipolar disorder, GERD, chronic pain syndrome, hepatitis C, and polysubstance abuse.  Patient states that she has had a productive cough with green sputum and mild shortness of breath for the past 1-2 weeks.  She has not had any fever chills or rigors.  She states that she yesterday had chest discomfort that lasted for about an hour prior to her arrival to what sounds to be [facility].  She states that her heart was racing and they told her that her heart rate was above 200 bpm.  It sounds like she had a disagreement with the staff there and left the ER without being admitted.  She then came to our ER and was noted to be in a sinus tachycardia.  Her cardiac biomarkers in the ER were indeterminate at 0.037.  Her proBNP was normal.  She had no leukocytosis.  Her transaminases were mildly elevated.  She does have a history of hepatitis C.  Per her report she had used methamphetamine prior to her arrival.  She was also given antibiotics by the internal medicine team for bronchitis.  She is felt to be appropriate for discharge after her negative stress test    A 2 week Zio was placed.         Procedures Performed  · Findings consistent with a normal ECG stress test.  · Left ventricular ejection fraction is normal (Calculated EF = 60%).  · Myocardial perfusion imaging indicates a normal myocardial perfusion study with no evidence of ischemia.  · Impressions are consistent with a low risk study.       Consults     Date and Time Order Name Status Description    3/31/2019 1059 Inpatient Internal Medicine Consult Completed     3/31/2019 0256 LCG (on-call MD unless specified) Completed           Pertinent Test Results:   Discharge Physical Exam:    General Appearance No acute distress   Neck  No adenopathy, supple, trachea midline, no thyromegaly, no carotid bruit, no JVD   Lungs Clear to auscultation,respirations regular, even and unlabored   Heart Regular rhythm and normal rate, normal S1 and S2, no murmur, no gallop, no rub, no click   Chest wall No abnormalities observed   Abdomen Normal bowel sounds, no masses, no hepatomegaly, soft   Extremities Moves all extremities well, no edema, no cyanosis, no redness   Neurological Alert and oriented x 3     Discharge Medications     Discharge Medications      New Medications      Instructions Start Date   budesonide-formoterol 80-4.5 MCG/ACT inhaler  Commonly known as:  SYMBICORT   2 puffs, Inhalation, 2 Times Daily      doxycycline 100 MG capsule  Commonly known as:  MONODOX   100 mg, Oral, Every 12 Hours Scheduled      metoprolol tartrate 25 MG tablet  Commonly known as:  LOPRESSOR   25 mg, Oral, Every 12 Hours Scheduled      OLANZapine 15 MG tablet  Commonly known as:  ZYPREXA   15 mg, Oral, Nightly         Changes to Medications      Instructions Start Date   amLODIPine 5 MG tablet  Commonly known as:  NORVASC  What changed:    · medication strength  · how much to take   5 mg, Oral, Every 24 Hours Scheduled      methocarbamol 750 MG tablet  Commonly known as:  ROBAXIN  What changed:  when to take this   750 mg, Oral, 2 Times Daily         Continue These Medications      Instructions Start Date   buprenorphine-naloxone 8-2 MG per SL tablet  Commonly known as:  SUBOXONE   1 tablet, Sublingual, 2 Times Daily      gabapentin 600 MG tablet  Commonly known as:  NEURONTIN   600 mg, Oral, 3 Times Daily      nicotine 21 MG/24HR patch  Commonly known as:  NICODERM CQ   1 patch, Transdermal, Every 24 Hours Scheduled      venlafaxine  MG 24 hr capsule  Commonly known as:  EFFEXOR XR   150 mg, Oral, Daily         Stop These Medications    predniSONE 50 MG tablet  Commonly known as:  DELTASONE            Discharge Diet: cardiac    Activity at Discharge: ad  víctor    Discharge disposition: ad víctor    Condition on Discharge: good    Follow-up Appointments  No future appointments.      Test Results Pending at Discharge       Raphael Chavis MD  04/01/19  1:24 PM    Dictated utilizing Dragon dictation

## 2019-04-01 NOTE — PROGRESS NOTES
Case Management Discharge Note    Final Note: Discharged home.     Destination      No service has been selected for the patient.      Durable Medical Equipment      No service has been selected for the patient.      Dialysis/Infusion      No service has been selected for the patient.      Home Medical Care      No service has been selected for the patient.      Therapy      No service has been selected for the patient.      Community Resources      No service has been selected for the patient.        Transportation Services  Private: Car    Final Discharge Disposition Code: 01 - home or self-care

## 2019-04-07 ENCOUNTER — TELEPHONE (OUTPATIENT)
Dept: CARDIOLOGY | Facility: CLINIC | Age: 44
End: 2019-04-07

## 2019-04-07 ENCOUNTER — HOSPITAL ENCOUNTER (EMERGENCY)
Facility: HOSPITAL | Age: 44
Discharge: HOME | End: 2019-04-08
Attending: EMERGENCY MEDICINE | Admitting: EMERGENCY MEDICINE

## 2019-04-07 ENCOUNTER — APPOINTMENT (OUTPATIENT)
Dept: GENERAL RADIOLOGY | Facility: HOSPITAL | Age: 44
End: 2019-04-07

## 2019-04-07 DIAGNOSIS — R07.89 ATYPICAL CHEST PAIN: Primary | ICD-10-CM

## 2019-04-07 PROCEDURE — 71046 X-RAY EXAM CHEST 2 VIEWS: CPT

## 2019-04-07 PROCEDURE — 93005 ELECTROCARDIOGRAM TRACING: CPT

## 2019-04-07 PROCEDURE — 84484 ASSAY OF TROPONIN QUANT: CPT | Performed by: EMERGENCY MEDICINE

## 2019-04-07 PROCEDURE — 93010 ELECTROCARDIOGRAM REPORT: CPT | Performed by: INTERNAL MEDICINE

## 2019-04-07 PROCEDURE — 93005 ELECTROCARDIOGRAM TRACING: CPT | Performed by: EMERGENCY MEDICINE

## 2019-04-07 PROCEDURE — 99284 EMERGENCY DEPT VISIT MOD MDM: CPT

## 2019-04-07 PROCEDURE — 80053 COMPREHEN METABOLIC PANEL: CPT | Performed by: EMERGENCY MEDICINE

## 2019-04-07 PROCEDURE — 85025 COMPLETE CBC W/AUTO DIFF WBC: CPT | Performed by: EMERGENCY MEDICINE

## 2019-04-07 RX ORDER — ASPIRIN 81 MG/1
324 TABLET, CHEWABLE ORAL ONCE
Status: DISCONTINUED | OUTPATIENT
Start: 2019-04-07 | End: 2019-04-08 | Stop reason: HOSPADM

## 2019-04-07 RX ORDER — SODIUM CHLORIDE 0.9 % (FLUSH) 0.9 %
10 SYRINGE (ML) INJECTION AS NEEDED
Status: DISCONTINUED | OUTPATIENT
Start: 2019-04-07 | End: 2019-04-08 | Stop reason: HOSPADM

## 2019-04-07 NOTE — TELEPHONE ENCOUNTER
04/07/19  2:28 PM  Silvia Velazquez  1975  [phone]    Ms. Velazquez was discharged home on 4/1/19 with Zio patch. It will no longer adhere to her check. She cannot find the box to mail it in.    Flor KEEN RN

## 2019-04-08 VITALS
SYSTOLIC BLOOD PRESSURE: 110 MMHG | HEART RATE: 75 BPM | RESPIRATION RATE: 16 BRPM | DIASTOLIC BLOOD PRESSURE: 65 MMHG | HEIGHT: 66 IN | WEIGHT: 208 LBS | OXYGEN SATURATION: 91 % | BODY MASS INDEX: 33.43 KG/M2 | TEMPERATURE: 98.6 F

## 2019-04-08 LAB
ALBUMIN SERPL-MCNC: 3.8 G/DL (ref 3.5–5.2)
ALBUMIN/GLOB SERPL: 1 G/DL
ALP SERPL-CCNC: 110 U/L (ref 39–117)
ALT SERPL W P-5'-P-CCNC: 111 U/L (ref 1–33)
ANION GAP SERPL CALCULATED.3IONS-SCNC: 13.8 MMOL/L
AST SERPL-CCNC: 117 U/L (ref 1–32)
BASOPHILS # BLD AUTO: 0.03 10*3/MM3 (ref 0–0.2)
BASOPHILS NFR BLD AUTO: 0.6 % (ref 0–1.5)
BILIRUB SERPL-MCNC: 0.2 MG/DL (ref 0.2–1.2)
BUN BLD-MCNC: 13 MG/DL (ref 6–20)
BUN/CREAT SERPL: 21.3 (ref 7–25)
CALCIUM SPEC-SCNC: 8.8 MG/DL (ref 8.6–10.5)
CHLORIDE SERPL-SCNC: 100 MMOL/L (ref 98–107)
CO2 SERPL-SCNC: 25.2 MMOL/L (ref 22–29)
CREAT BLD-MCNC: 0.61 MG/DL (ref 0.57–1)
DEPRECATED RDW RBC AUTO: 55.3 FL (ref 37–54)
EOSINOPHIL # BLD AUTO: 0.15 10*3/MM3 (ref 0–0.4)
EOSINOPHIL NFR BLD AUTO: 3 % (ref 0.3–6.2)
ERYTHROCYTE [DISTWIDTH] IN BLOOD BY AUTOMATED COUNT: 19.1 % (ref 12.3–15.4)
GFR SERPL CREATININE-BSD FRML MDRD: 108 ML/MIN/1.73
GLOBULIN UR ELPH-MCNC: 3.8 GM/DL
GLUCOSE BLD-MCNC: 128 MG/DL (ref 65–99)
HCT VFR BLD AUTO: 34.7 % (ref 34–46.6)
HGB BLD-MCNC: 10.3 G/DL (ref 12–15.9)
HOLD SPECIMEN: NORMAL
HOLD SPECIMEN: NORMAL
IMM GRANULOCYTES # BLD AUTO: 0.01 10*3/MM3 (ref 0–0.05)
IMM GRANULOCYTES NFR BLD AUTO: 0.2 % (ref 0–0.5)
LYMPHOCYTES # BLD AUTO: 2.02 10*3/MM3 (ref 0.7–3.1)
LYMPHOCYTES NFR BLD AUTO: 40.9 % (ref 19.6–45.3)
MCH RBC QN AUTO: 23.6 PG (ref 26.6–33)
MCHC RBC AUTO-ENTMCNC: 29.7 G/DL (ref 31.5–35.7)
MCV RBC AUTO: 79.6 FL (ref 79–97)
MONOCYTES # BLD AUTO: 0.46 10*3/MM3 (ref 0.1–0.9)
MONOCYTES NFR BLD AUTO: 9.3 % (ref 5–12)
NEUTROPHILS # BLD AUTO: 2.27 10*3/MM3 (ref 1.4–7)
NEUTROPHILS NFR BLD AUTO: 46 % (ref 42.7–76)
NRBC BLD AUTO-RTO: 0 /100 WBC (ref 0–0)
PLATELET # BLD AUTO: 135 10*3/MM3 (ref 140–450)
POTASSIUM BLD-SCNC: 3.7 MMOL/L (ref 3.5–5.2)
PROT SERPL-MCNC: 7.6 G/DL (ref 6–8.5)
RBC # BLD AUTO: 4.36 10*6/MM3 (ref 3.77–5.28)
SODIUM BLD-SCNC: 139 MMOL/L (ref 136–145)
TROPONIN T SERPL-MCNC: <0.01 NG/ML (ref 0–0.03)
WBC NRBC COR # BLD: 4.94 10*3/MM3 (ref 3.4–10.8)
WHOLE BLOOD HOLD SPECIMEN: NORMAL
WHOLE BLOOD HOLD SPECIMEN: NORMAL

## 2019-04-08 NOTE — ED NOTES
"Pt taken to room by RN, pt states \"my chest pain is completely gone since my EKG was done\"     Selena Enriquez RN  04/07/19 3849    "

## 2019-04-08 NOTE — ED NOTES
"Pt reports chest pain that started 25 minutes ago. Pt states \"I had a heart attack 2 days ago and they sent me home on a monitor but it fell off. They did a stress test but it was normal. My arm is numb its turning blue.\" PT reports SOA, but has had no issues talking.       Patricia Humphries, RN  04/07/19 3978    "

## 2019-04-08 NOTE — ED PROVIDER NOTES
EMERGENCY DEPARTMENT ENCOUNTER    CHIEF COMPLAINT  Chief Complaint: CP  History given by: pt  History limited by: none  Room Number: 22/22  PMD: Provider, No Known  Cardiologist- Dr. Chavis    HPI:  Pt is a 43 y.o. female who presents complaining of left anterior upper chest tightness that began around 1930 tonight while cleaning her house. Pt also  states that her discomfort resolved after about one hour. Pt denies N/V/D, cough, cold, leg swelling or leg pain but complains of associated SOA. Pt states that her ZIO patch fell off prior to this episode occuring. Pt states that she has been clean from heroin since 12/8/18 and is 5 days clean from methamphetamine.    Duration:  1 hours  Onset: sudden  Timing: constant  Location:  Left anterior upper chest  Radiation: none  Quality: tightness  Intensity/Severity: moderate  Progression: resolved  Associated Symptoms: SOA, diaphoresis  Aggravating Factors: none  Alleviating Factors: none  Previous Episodes: none  Treatment before arrival: none    PAST MEDICAL HISTORY  Active Ambulatory Problems     Diagnosis Date Noted   • Spinal epidural abscess 12/09/2018   • Non-STEMI (non-ST elevated myocardial infarction) (CMS/HCC) 03/31/2019     Resolved Ambulatory Problems     Diagnosis Date Noted   • No Resolved Ambulatory Problems     Past Medical History:   Diagnosis Date   • Hepatitis C    • Kidney stone    • Lupus (systemic lupus erythematosus) (CMS/HCC)    • Mitral valve anterior leaflet prolapse    • Seizures (CMS/HCC)        PAST SURGICAL HISTORY  Past Surgical History:   Procedure Laterality Date   • BACK SURGERY     • CHOLECYSTECTOMY     • LIVER BIOPSY     • LUMBAR LAMINECTOMY DISCECTOMY DECOMPRESSION Left 12/9/2018    Procedure: Posterior lumbar three through sacrum decompression;  Surgeon: Tevin Whitley MD;  Location: [facility];  Service: Neurosurgery   • LYMPH NODE BIOPSY         FAMILY HISTORY  No family history on file.    SOCIAL HISTORY  Social History      Socioeconomic History   • Marital status:      Spouse name: Not on file   • Number of children: Not on file   • Years of education: Not on file   • Highest education level: Not on file   Tobacco Use   • Smoking status: Current Every Day Smoker     Packs/day: 1.00     Years: 30.00     Pack years: 30.00     Types: Cigarettes   • Smokeless tobacco: Never Used   Substance and Sexual Activity   • Alcohol use: No     Frequency: Never   • Drug use: Yes     Types: IV, Heroin, Methamphetamines     Comment: pt states she no longer uses heroin, she now uses meth   • Sexual activity: Defer       ALLERGIES  Sulfa antibiotics    REVIEW OF SYSTEMS  Review of Systems   Constitutional: Positive for diaphoresis. Negative for fever.   HENT: Negative for sore throat.    Eyes: Negative.    Respiratory: Positive for shortness of breath. Negative for cough.    Cardiovascular: Positive for chest pain.   Gastrointestinal: Negative for abdominal pain, diarrhea and vomiting.   Genitourinary: Negative for dysuria.   Musculoskeletal: Negative for neck pain.   Skin: Negative for rash.   Neurological: Negative for weakness, numbness and headaches.   Hematological: Negative.    Psychiatric/Behavioral: Negative.    All other systems reviewed and are negative.      PHYSICAL EXAM  ED Triage Vitals   Temp Heart Rate Resp BP SpO2   04/07/19 2223 04/07/19 2143 04/07/19 2143 04/07/19 2223 04/07/19 2143   98.6 °F (37 °C) 102 17 120/83 98 %      Temp src Heart Rate Source Patient Position BP Location FiO2 (%)   04/07/19 2223 04/07/19 2143 -- -- --   Tympanic Monitor          Physical Exam   Constitutional: She is oriented to person, place, and time.  Non-toxic appearance. She appears distressed (mild).   HENT:   Head: Normocephalic and atraumatic.   Eyes: EOM are normal.   Neck: Normal range of motion. Neck supple.   Cardiovascular: Normal rate, regular rhythm, normal heart sounds and intact distal pulses.   No murmur heard.  Pulses:        Posterior tibial pulses are 2+ on the right side, and 2+ on the left side.   Pulmonary/Chest: Effort normal and breath sounds normal. No respiratory distress. She exhibits tenderness (left anterior upper chest wall tenderness that reproduces her chief complaint).   Abdominal: Soft. Bowel sounds are normal. There is no tenderness. There is no rebound and no guarding.   Musculoskeletal: Normal range of motion. She exhibits no edema.   Neurological: She is alert and oriented to person, place, and time.   Skin: Skin is warm and dry.   Psychiatric: Affect normal.   Nursing note and vitals reviewed.      LAB RESULTS  Lab Results (last 24 hours)     Procedure Component Value Units Date/Time    CBC & Differential [[id number]] Collected:  04/07/19 2343    Specimen:  Blood Updated:  04/08/19 0003    Narrative:       The following orders were created for panel order CBC & Differential.  Procedure                               Abnormality         Status                     ---------                               -----------         ------                     CBC Auto Differential[[id number]]        Abnormal            Final result                 Please view results for these tests on the individual orders.    Comprehensive Metabolic Panel [[id number]]  (Abnormal) Collected:  04/07/19 2343    Specimen:  Blood Updated:  04/08/19 0019     Glucose 128 mg/dL      BUN 13 mg/dL      Creatinine 0.61 mg/dL      Sodium 139 mmol/L      Potassium 3.7 mmol/L      Chloride 100 mmol/L      CO2 25.2 mmol/L      Calcium 8.8 mg/dL      Total Protein 7.6 g/dL      Albumin 3.80 g/dL      ALT (SGPT) 111 U/L      AST (SGOT) 117 U/L      Alkaline Phosphatase 110 U/L      Total Bilirubin 0.2 mg/dL      eGFR Non African Amer 108 mL/min/1.73      Globulin 3.8 gm/dL      A/G Ratio 1.0 g/dL      BUN/Creatinine Ratio 21.3     Anion Gap 13.8 mmol/L     Narrative:       GFR Normal >60  Chronic Kidney Disease <60  Kidney Failure <15    Troponin [[id number]]   (Normal) Collected:  04/07/19 2343    Specimen:  Blood Updated:  04/08/19 0016     Troponin T <0.010 ng/mL     Narrative:       Troponin T Reference Range:  <= 0.03 ng/mL-   Negative for AMI  >0.03 ng/mL-     Abnormal for myocardial necrosis.  Clinicians would have to utilize clinical acumen, EKG, Troponin and serial changes to determine if it is an Acute Myocardial Infarction or myocardial injury due to an underlying chronic condition.     CBC Auto Differential [[id number]]  (Abnormal) Collected:  04/07/19 2343    Specimen:  Blood Updated:  04/08/19 0003     WBC 4.94 10*3/mm3      RBC 4.36 10*6/mm3      Hemoglobin 10.3 g/dL      Hematocrit 34.7 %      MCV 79.6 fL      MCH 23.6 pg      MCHC 29.7 g/dL      RDW 19.1 %      RDW-SD 55.3 fl      Platelets 135 10*3/mm3      Neutrophil % 46.0 %      Lymphocyte % 40.9 %      Monocyte % 9.3 %      Eosinophil % 3.0 %      Basophil % 0.6 %      Immature Grans % 0.2 %      Neutrophils, Absolute 2.27 10*3/mm3      Lymphocytes, Absolute 2.02 10*3/mm3      Monocytes, Absolute 0.46 10*3/mm3      Eosinophils, Absolute 0.15 10*3/mm3      Basophils, Absolute 0.03 10*3/mm3      Immature Grans, Absolute 0.01 10*3/mm3      nRBC 0.0 /100 WBC           I ordered the above labs and reviewed the results    RADIOLOGY  XR Chest 2 View   Final Result   No acute findings. Overall diminished lung volumes, with some mild   bibasilar atelectasis.       This report was finalized on 4/7/2019 11:55 PM by Dr. Kristina Richards M.D.               I ordered the above noted radiological studies. Interpreted by radiologist. Reviewed by me in PACS.       PROCEDURES  Procedures  EKG          EKG time: 2144  Rhythm/Rate: sinus rhythm, 90s   P waves and ME: LAE, nml SUSANNE  QRS, axis: LVH  ST and T waves: unremarkable ST, T wave findings     Interpreted Contemporaneously by me, independently viewed  Minimal change compared to previous 03/31/19, sinus tachy has resolved        PROGRESS AND CONSULTS     1696-  Ordered blood work, troponin, EKG and CXR for further evaluation. Ordered ASA for CP.    0018- Discussed the plan to order lab and imaging studies for further evaluation. Pt understands and agrees with the plan, all questions answered.    0133- Rechecked pt. Pt is resting comfortably. Notified pt of her lab and imaging results, including her negative troponin. Discussed the plan to discharge the pt home with instructions to follow up with Dr. Chavis to turn in her ZIO patch and to find a PCP for outpatient follow up. Pt understands and agrees with the plan, all questions answered.      MEDICAL DECISION MAKING  Results were reviewed/discussed with the patient and they were also made aware of online access. Pt also made aware that some labs, such as cultures, will not be resulted during ER visit and follow up with PMD is necessary.     MDM  Number of Diagnoses or Management Options  Atypical chest pain:      Amount and/or Complexity of Data Reviewed  Clinical lab tests: ordered and reviewed (Troponin <0.010)  Tests in the radiology section of CPT®: ordered and reviewed (CXR shows nothing acute)  Tests in the medicine section of CPT®: ordered and reviewed (See EKG procedure note)  Decide to obtain previous medical records or to obtain history from someone other than the patient: yes  Review and summarize past medical records: yes (Pt was admitted from 3/31 until 4/1/19 for a NSTEMI and methamphetamine abuse. Pt had marginal troponin elevation and was tachycardic in the ED and admitted to methamphetamine use just PTA. Pt had a normal stress test during admission as well. Pt was discharged with a ZIO patch.)  Independent visualization of images, tracings, or specimens: yes    Patient Progress  Patient progress: stable         DIAGNOSIS  Final diagnoses:   Atypical chest pain       DISPOSITION  DISCHARGE    Patient discharged in stable condition.    Reviewed implications of results, diagnosis, meds, responsibility to  follow up, warning signs and symptoms of possible worsening, potential complications and reasons to return to ER.    Patient/Family voiced understanding of above instructions.    Discussed plan for discharge, as there is no emergent indication for admission. Patient referred to primary care provider for BP management due to today's BP. Pt/family is agreeable and understands need for follow up and repeat testing.  Pt is aware that discharge does not mean that nothing is wrong but it indicates no emergency is present that requires admission and they must continue care with follow-up as given below or physician of their choice.     FOLLOW-UP  PATIENT LIAISON [place] [address]  [phone]  Schedule an appointment as soon as possible for a visit       Raphael Chavis MD  [address] [address]  [phone]    Schedule an appointment as soon as possible for a visit            Medication List      No changes were made to your prescriptions during this visit.         Latest Documented Vital Signs:  As of 2:34 AM  BP- 110/65 HR- 75 Temp- 98.6 °F (37 °C) (Tympanic) O2 sat- 91%    --  Documentation assistance provided by reg Calle and Kerry Knapp for Dr. Carrillo.  Information recorded by the scribe was done at my direction and has been verified and validated by me.     Kerry Knapp  04/08/19 0138     Anneliese Calle  04/08/19 0240       Lesa Carrillo MD  04/08/19 0889

## 2019-04-08 NOTE — DISCHARGE INSTRUCTIONS
You are advised to follow closely with your PCP or Dr. Chavis in 2-3 days for recheck, final results of lab work and imaging testing, and further testing/treatment as needed.    Please return to the emergency department immediately with chest pain different than usual for you, shortness of air, abdominal pain, persistent vomiting/fever, blood in emesis or stool, lightheadedness/fainting, problems with speech, one sided weakness/numbness, new incontinence, problems with vision or for worsening of symptoms or other concerns.

## 2019-04-10 ENCOUNTER — TELEPHONE (OUTPATIENT)
Dept: CARDIOLOGY | Facility: CLINIC | Age: 44
End: 2019-04-10

## 2019-04-10 NOTE — TELEPHONE ENCOUNTER
Pt called. She said that she was back in the ER on 4/7/19 and was told to follow back up with us. So what are your recommendations about that. As we don't have any openings for a little bit.    Nayla

## 2019-04-11 ENCOUNTER — APPOINTMENT (OUTPATIENT)
Dept: GENERAL RADIOLOGY | Facility: HOSPITAL | Age: 44
End: 2019-04-11

## 2019-04-11 ENCOUNTER — HOSPITAL ENCOUNTER (EMERGENCY)
Facility: HOSPITAL | Age: 44
Discharge: HOME | End: 2019-04-12
Attending: EMERGENCY MEDICINE | Admitting: EMERGENCY MEDICINE

## 2019-04-11 DIAGNOSIS — R07.9 CHEST PAIN, UNSPECIFIED TYPE: ICD-10-CM

## 2019-04-11 DIAGNOSIS — R00.2 PALPITATIONS: Primary | ICD-10-CM

## 2019-04-11 LAB
ALBUMIN SERPL-MCNC: 4 G/DL (ref 3.5–5.2)
ALBUMIN/GLOB SERPL: 1 G/DL
ALP SERPL-CCNC: 114 U/L (ref 39–117)
ALT SERPL W P-5'-P-CCNC: 124 U/L (ref 1–33)
ANION GAP SERPL CALCULATED.3IONS-SCNC: 7.7 MMOL/L
AST SERPL-CCNC: 112 U/L (ref 1–32)
BASOPHILS # BLD AUTO: 0.02 10*3/MM3 (ref 0–0.2)
BASOPHILS NFR BLD AUTO: 0.4 % (ref 0–1.5)
BILIRUB SERPL-MCNC: 0.3 MG/DL (ref 0.2–1.2)
BUN BLD-MCNC: 13 MG/DL (ref 6–20)
BUN/CREAT SERPL: 20 (ref 7–25)
CALCIUM SPEC-SCNC: 8.7 MG/DL (ref 8.6–10.5)
CHLORIDE SERPL-SCNC: 103 MMOL/L (ref 98–107)
CO2 SERPL-SCNC: 28.3 MMOL/L (ref 22–29)
CREAT BLD-MCNC: 0.65 MG/DL (ref 0.57–1)
DEPRECATED RDW RBC AUTO: 54 FL (ref 37–54)
EOSINOPHIL # BLD AUTO: 0.11 10*3/MM3 (ref 0–0.4)
EOSINOPHIL NFR BLD AUTO: 2 % (ref 0.3–6.2)
ERYTHROCYTE [DISTWIDTH] IN BLOOD BY AUTOMATED COUNT: 19.3 % (ref 12.3–15.4)
GFR SERPL CREATININE-BSD FRML MDRD: 100 ML/MIN/1.73
GLOBULIN UR ELPH-MCNC: 4 GM/DL
GLUCOSE BLD-MCNC: 102 MG/DL (ref 65–99)
HCG SERPL QL: NEGATIVE
HCT VFR BLD AUTO: 36.4 % (ref 34–46.6)
HGB BLD-MCNC: 10.9 G/DL (ref 12–15.9)
HOLD SPECIMEN: NORMAL
HOLD SPECIMEN: NORMAL
IMM GRANULOCYTES # BLD AUTO: 0.01 10*3/MM3 (ref 0–0.05)
IMM GRANULOCYTES NFR BLD AUTO: 0.2 % (ref 0–0.5)
LYMPHOCYTES # BLD AUTO: 1.66 10*3/MM3 (ref 0.7–3.1)
LYMPHOCYTES NFR BLD AUTO: 30.9 % (ref 19.6–45.3)
MCH RBC QN AUTO: 23.5 PG (ref 26.6–33)
MCHC RBC AUTO-ENTMCNC: 29.9 G/DL (ref 31.5–35.7)
MCV RBC AUTO: 78.6 FL (ref 79–97)
MONOCYTES # BLD AUTO: 0.59 10*3/MM3 (ref 0.1–0.9)
MONOCYTES NFR BLD AUTO: 11 % (ref 5–12)
NEUTROPHILS # BLD AUTO: 2.98 10*3/MM3 (ref 1.4–7)
NEUTROPHILS NFR BLD AUTO: 55.5 % (ref 42.7–76)
NRBC BLD AUTO-RTO: 0 /100 WBC (ref 0–0)
PLATELET # BLD AUTO: 158 10*3/MM3 (ref 140–450)
POTASSIUM BLD-SCNC: 4 MMOL/L (ref 3.5–5.2)
PROT SERPL-MCNC: 8 G/DL (ref 6–8.5)
RBC # BLD AUTO: 4.63 10*6/MM3 (ref 3.77–5.28)
SODIUM BLD-SCNC: 139 MMOL/L (ref 136–145)
TROPONIN T SERPL-MCNC: <0.01 NG/ML (ref 0–0.03)
WBC NRBC COR # BLD: 5.37 10*3/MM3 (ref 3.4–10.8)
WHOLE BLOOD HOLD SPECIMEN: NORMAL
WHOLE BLOOD HOLD SPECIMEN: NORMAL

## 2019-04-11 PROCEDURE — 85025 COMPLETE CBC W/AUTO DIFF WBC: CPT

## 2019-04-11 PROCEDURE — 93005 ELECTROCARDIOGRAM TRACING: CPT | Performed by: EMERGENCY MEDICINE

## 2019-04-11 PROCEDURE — 84703 CHORIONIC GONADOTROPIN ASSAY: CPT | Performed by: EMERGENCY MEDICINE

## 2019-04-11 PROCEDURE — 93005 ELECTROCARDIOGRAM TRACING: CPT

## 2019-04-11 PROCEDURE — 93010 ELECTROCARDIOGRAM REPORT: CPT | Performed by: INTERNAL MEDICINE

## 2019-04-11 PROCEDURE — 36415 COLL VENOUS BLD VENIPUNCTURE: CPT

## 2019-04-11 PROCEDURE — 84484 ASSAY OF TROPONIN QUANT: CPT

## 2019-04-11 PROCEDURE — 71046 X-RAY EXAM CHEST 2 VIEWS: CPT

## 2019-04-11 PROCEDURE — 80053 COMPREHEN METABOLIC PANEL: CPT

## 2019-04-11 PROCEDURE — 99284 EMERGENCY DEPT VISIT MOD MDM: CPT

## 2019-04-11 RX ORDER — SODIUM CHLORIDE 0.9 % (FLUSH) 0.9 %
10 SYRINGE (ML) INJECTION AS NEEDED
Status: DISCONTINUED | OUTPATIENT
Start: 2019-04-11 | End: 2019-04-12 | Stop reason: HOSPADM

## 2019-04-11 RX ORDER — ASPIRIN 325 MG
325 TABLET ORAL ONCE
Status: COMPLETED | OUTPATIENT
Start: 2019-04-11 | End: 2019-04-12

## 2019-04-12 VITALS
WEIGHT: 204 LBS | RESPIRATION RATE: 14 BRPM | OXYGEN SATURATION: 97 % | HEART RATE: 86 BPM | HEIGHT: 66 IN | DIASTOLIC BLOOD PRESSURE: 77 MMHG | TEMPERATURE: 97.7 F | SYSTOLIC BLOOD PRESSURE: 119 MMHG | BODY MASS INDEX: 32.78 KG/M2

## 2019-04-12 RX ORDER — ASPIRIN 81 MG/1
81 TABLET ORAL DAILY
Qty: 30 TABLET | Refills: 0 | Status: SHIPPED | OUTPATIENT
Start: 2019-04-12 | End: 2021-03-22

## 2019-04-12 RX ORDER — NICOTINE 21 MG/24HR
1 PATCH, TRANSDERMAL 24 HOURS TRANSDERMAL EVERY 24 HOURS
Qty: 14 PATCH | Refills: 0 | Status: SHIPPED | OUTPATIENT
Start: 2019-04-12 | End: 2021-03-22

## 2019-04-12 RX ADMIN — ASPIRIN 325 MG: 325 TABLET ORAL at 00:05

## 2019-04-12 NOTE — DISCHARGE INSTRUCTIONS
Avoid stimulants, reduce your nicotine, 14 mg nicotine patches to help stop smoking. Go to decaffeinated beverages.

## 2019-04-12 NOTE — ED PROVIDER NOTES
" EMERGENCY DEPARTMENT ENCOUNTER    CHIEF COMPLAINT  Chief Complaint: Palpitations   History given by: patient   History limited by: n/a   Room Number: 14/14  PMD: Provider, No Known      HPI:  Pt is a 43 y.o. female who presents complaining of palpitations that began this evening after consuming caffiene. SHe describes the palpitations as a rapid heart rate and a \"flip flop\" sensation. Pt also reports associated chest pain and SOA. She states that the sx lasted for about 10 minutes prior to resolving. Pt states that she was recently admitted after an MI, and was d/c with a ZIO patch, which came off after only 2 days. She states that she has attempted to schedule cardiology f/u without success     Duration:  10 minutes   Onset: sudden  Timing: constant   Location: cardiac   Radiation: n/a   Quality: rapid \"flip-flop\"  Intensity/Severity: moderate  Progression: unchanged   Associated Symptoms: CP, SOA  Aggravating Factors: none  Alleviating Factors: none    PAST MEDICAL HISTORY  Active Ambulatory Problems     Diagnosis Date Noted   • Spinal epidural abscess 12/09/2018   • Non-STEMI (non-ST elevated myocardial infarction) (CMS/HCC) 03/31/2019     Resolved Ambulatory Problems     Diagnosis Date Noted   • No Resolved Ambulatory Problems     Past Medical History:   Diagnosis Date   • Hepatitis C    • Kidney stone    • Lupus (systemic lupus erythematosus) (CMS/HCC)    • Mitral valve anterior leaflet prolapse    • Seizures (CMS/HCC)        PAST SURGICAL HISTORY  Past Surgical History:   Procedure Laterality Date   • BACK SURGERY     • CHOLECYSTECTOMY     • LIVER BIOPSY     • LUMBAR LAMINECTOMY DISCECTOMY DECOMPRESSION Left 12/9/2018    Procedure: Posterior lumbar three through sacrum decompression;  Surgeon: Tevin Whitley MD;  Location: [facility];  Service: Neurosurgery   • LYMPH NODE BIOPSY         FAMILY HISTORY  No family history on file.    SOCIAL HISTORY  Social History     Socioeconomic History   • Marital " status:      Spouse name: Not on file   • Number of children: Not on file   • Years of education: Not on file   • Highest education level: Not on file   Tobacco Use   • Smoking status: Current Every Day Smoker     Packs/day: 1.00     Years: 30.00     Pack years: 30.00     Types: Cigarettes   • Smokeless tobacco: Never Used   Substance and Sexual Activity   • Alcohol use: No     Frequency: Never   • Drug use: Yes     Types: IV, Heroin, Methamphetamines     Comment: pt states she no longer uses heroin, she now uses meth   • Sexual activity: Defer       ALLERGIES  Sulfa antibiotics    REVIEW OF SYSTEMS  Review of Systems   Constitutional: Negative for fever.   HENT: Negative for sore throat.    Eyes: Negative.    Respiratory: Positive for shortness of breath. Negative for cough.    Cardiovascular: Positive for chest pain and palpitations.   Gastrointestinal: Negative for abdominal pain, diarrhea and vomiting.   Genitourinary: Negative for dysuria.   Musculoskeletal: Negative for neck pain.   Skin: Negative for rash.   Neurological: Negative for weakness, numbness and headaches.   Hematological: Negative.    Psychiatric/Behavioral: Negative.    All other systems reviewed and are negative.      PHYSICAL EXAM  ED Triage Vitals   Temp Heart Rate Resp BP SpO2   04/11/19 2145 04/11/19 2145 04/11/19 2145 04/11/19 2227 04/11/19 2145   99.5 °F (37.5 °C) 112 18 127/78 97 %      Temp src Heart Rate Source Patient Position BP Location FiO2 (%)   04/11/19 2145 04/11/19 2145 04/11/19 2349 04/11/19 2349 --   Tympanic Monitor Lying Right arm        Physical Exam   Constitutional: She is oriented to person, place, and time. No distress.   HENT:   Head: Normocephalic and atraumatic.   Eyes: EOM are normal. Pupils are equal, round, and reactive to light.   Neck: Normal range of motion. Neck supple.   Cardiovascular: Normal rate, regular rhythm and normal heart sounds.   Pulmonary/Chest: Effort normal and breath sounds normal. No  respiratory distress.   Abdominal: Soft. There is no tenderness. There is no rebound and no guarding.   Musculoskeletal: Normal range of motion. She exhibits no edema.   Neurological: She is alert and oriented to person, place, and time. She has normal sensation and normal strength.   Skin: Skin is warm and dry. No rash noted.   Psychiatric: Mood and affect normal.   Nursing note and vitals reviewed.      LAB RESULTS  Lab Results (last 24 hours)     Procedure Component Value Units Date/Time    CBC & Differential [[id number]] Collected:  04/11/19 2229    Specimen:  Blood Updated:  04/11/19 2313    Narrative:       The following orders were created for panel order CBC & Differential.  Procedure                               Abnormality         Status                     ---------                               -----------         ------                     CBC Auto Differential[[id number]]        Abnormal            Final result                 Please view results for these tests on the individual orders.    Comprehensive Metabolic Panel [[id number]]  (Abnormal) Collected:  04/11/19 2229    Specimen:  Blood Updated:  04/11/19 2321     Glucose 102 mg/dL      BUN 13 mg/dL      Creatinine 0.65 mg/dL      Sodium 139 mmol/L      Potassium 4.0 mmol/L      Chloride 103 mmol/L      CO2 28.3 mmol/L      Calcium 8.7 mg/dL      Total Protein 8.0 g/dL      Albumin 4.00 g/dL      ALT (SGPT) 124 U/L      AST (SGOT) 112 U/L      Alkaline Phosphatase 114 U/L      Total Bilirubin 0.3 mg/dL      eGFR Non African Amer 100 mL/min/1.73      Globulin 4.0 gm/dL      A/G Ratio 1.0 g/dL      BUN/Creatinine Ratio 20.0     Anion Gap 7.7 mmol/L     Narrative:       GFR Normal >60  Chronic Kidney Disease <60  Kidney Failure <15    Troponin [[id number]]  (Normal) Collected:  04/11/19 2229    Specimen:  Blood Updated:  04/11/19 2321     Troponin T <0.010 ng/mL     Narrative:       Troponin T Reference Range:  <= 0.03 ng/mL-   Negative for AMI  >0.03  ng/mL-     Abnormal for myocardial necrosis.  Clinicians would have to utilize clinical acumen, EKG, Troponin and serial changes to determine if it is an Acute Myocardial Infarction or myocardial injury due to an underlying chronic condition.     hCG, Serum, Qualitative [[id number]]  (Normal) Collected:  04/11/19 2229    Specimen:  Blood Updated:  04/11/19 2347     HCG Qualitative Negative    CBC Auto Differential [[id number]]  (Abnormal) Collected:  04/11/19 2229    Specimen:  Blood Updated:  04/11/19 2313     WBC 5.37 10*3/mm3      RBC 4.63 10*6/mm3      Hemoglobin 10.9 g/dL      Hematocrit 36.4 %      MCV 78.6 fL      MCH 23.5 pg      MCHC 29.9 g/dL      RDW 19.3 %      RDW-SD 54.0 fl      Platelets 158 10*3/mm3      Neutrophil % 55.5 %      Lymphocyte % 30.9 %      Monocyte % 11.0 %      Eosinophil % 2.0 %      Basophil % 0.4 %      Immature Grans % 0.2 %      Neutrophils, Absolute 2.98 10*3/mm3      Lymphocytes, Absolute 1.66 10*3/mm3      Monocytes, Absolute 0.59 10*3/mm3      Eosinophils, Absolute 0.11 10*3/mm3      Basophils, Absolute 0.02 10*3/mm3      Immature Grans, Absolute 0.01 10*3/mm3      nRBC 0.0 /100 WBC           I ordered the above labs and reviewed the results    RADIOLOGY  XR Chest 2 View   Final Result   1. No active disease.       This report was finalized on 4/11/2019 10:30 PM by Luis Pelaez M.D.               I ordered the above noted radiological studies. Interpreted by radiologist. Reviewed by me in PACS.       PROCEDURES  Procedures    EKG          EKG time: 21:37  Rhythm/Rate: Sinus tahcycardia 109  P waves and IL: nml  QRS, axis LVH by voltage criteria   ST and T waves: nml     Interpreted Contemporaneously by me, independently viewed  unchanged compared to prior 4/7/19    PROGRESS AND CONSULTS       23:54  BP- 119/77 HR- 84 Temp- 97.7 °F (36.5 °C) (Oral) O2 sat- 96%  Informed pt of the plan to review labs and EKG. Will have another Zio patch replaced in the ED. Pt understands and  agrees with the plan, all questions answered.    00:01  Per cardiology, the pt will have to mail in her Zio patch, they cannot replace it.     00:02  BP- 119/77 HR- 84 Temp- 97.7 °F (36.5 °C) (Oral) O2 sat- 96%  Rechecked the patient who is in NAD and is resting comfortably. Informed pt that the labs show NAD. Informed pt to mail in her Zio patch, d/c any caffeine, stimulant, or nicotine use. Pt will be discharged and is to f/u with her cardiology. Pt understands and agrees with the plan, all questions answered.    MEDICAL DECISION MAKING  Results were reviewed/discussed with the patient and they were also made aware of online access. Pt also made aware that some labs, such as cultures, will not be resulted during ER visit and follow up with PMD is necessary.     MDM  Number of Diagnoses or Management Options     Amount and/or Complexity of Data Reviewed  Clinical lab tests: ordered and reviewed (Troponin is <0.010)  Tests in the radiology section of CPT®: ordered and reviewed (CXR shows NAD)  Tests in the medicine section of CPT®: ordered and reviewed (See EKG procedure note. )    Patient Progress  Patient progress: stable         DIAGNOSIS  Final diagnoses:   Palpitations   Chest pain, unspecified type       DISPOSITION  DISCHARGE    Patient discharged in stable condition.    Reviewed implications of results, diagnosis, meds, responsibility to follow up, warning signs and symptoms of possible worsening, potential complications and reasons to return to ER.    Patient/Family voiced understanding of above instructions.    Discussed plan for discharge, as there is no emergent indication for admission. Patient referred to primary care provider for BP management due to today's BP. Pt/family is agreeable and understands need for follow up and repeat testing.  Pt is aware that discharge does not mean that nothing is wrong but it indicates no emergency is present that requires admission and they must continue care with  follow-up as given below or physician of their choice.     FOLLOW-UP  Raphael Chavis MD  [address]  [address]  [facility] [address]  [phone]    Call       [facility] REFERRAL SERVICE  [place] [id number] [phone]  Call   to arrange for family doctor         Medication List      New Prescriptions    aspirin 81 MG EC tablet  Take 1 tablet by mouth Daily.     nicotine 14 MG/24HR patch  Commonly known as:  NICODERM CQ  Place 1 patch on the skin as directed by provider Daily.  Replaces:  nicotine 21 MG/24HR patch        Stop    nicotine 21 MG/24HR patch  Commonly known as:  NICODERM CQ  Replaced by:  nicotine 14 MG/24HR patch          Latest Documented Vital Signs:  As of 12:13 AM  BP- 119/77 HR- 86 Temp- 97.7 °F (36.5 °C) (Oral) O2 sat- 97%    --  Documentation assistance provided by arsh Saunders for Dr Barriga.  Information recorded by the scribe was done at my direction and has been verified and validated by me.     Tanna Saunders  04/12/19 0013       Aldo Barriga MD  04/12/19 0701

## 2019-04-12 NOTE — ED NOTES
Pt to ED via PV. Pt c/o CP and SOB x 10 minutes. Pt hx MI.      Armida Brennan, RN  04/11/19 6987

## 2019-04-16 ENCOUNTER — HOSPITAL ENCOUNTER (EMERGENCY)
Facility: HOSPITAL | Age: 44
Discharge: HOME | End: 2019-04-16
Attending: EMERGENCY MEDICINE | Admitting: EMERGENCY MEDICINE

## 2019-04-16 VITALS
WEIGHT: 207 LBS | TEMPERATURE: 98.2 F | RESPIRATION RATE: 16 BRPM | SYSTOLIC BLOOD PRESSURE: 139 MMHG | OXYGEN SATURATION: 98 % | DIASTOLIC BLOOD PRESSURE: 86 MMHG | BODY MASS INDEX: 33.27 KG/M2 | HEIGHT: 66 IN | HEART RATE: 89 BPM

## 2019-04-16 DIAGNOSIS — H66.002 ACUTE SUPPURATIVE OTITIS MEDIA OF LEFT EAR WITHOUT SPONTANEOUS RUPTURE OF TYMPANIC MEMBRANE, RECURRENCE NOT SPECIFIED: Primary | ICD-10-CM

## 2019-04-16 PROCEDURE — 99283 EMERGENCY DEPT VISIT LOW MDM: CPT

## 2019-04-16 PROCEDURE — 63710000001 DEXAMETHASONE PER 0.25 MG: Performed by: EMERGENCY MEDICINE

## 2019-04-16 RX ORDER — AMOXICILLIN 875 MG/1
875 TABLET, COATED ORAL 2 TIMES DAILY
Qty: 20 TABLET | Refills: 0 | Status: SHIPPED | OUTPATIENT
Start: 2019-04-16 | End: 2019-04-26

## 2019-04-16 RX ORDER — GUAIFENESIN 600 MG/1
1200 TABLET, EXTENDED RELEASE ORAL 2 TIMES DAILY
Qty: 40 TABLET | Refills: 0 | Status: SHIPPED | OUTPATIENT
Start: 2019-04-16 | End: 2019-04-26

## 2019-04-16 RX ORDER — FLUTICASONE PROPIONATE 50 MCG
2 SPRAY, SUSPENSION (ML) NASAL DAILY
Qty: 15.8 ML | Refills: 0 | Status: SHIPPED | OUTPATIENT
Start: 2019-04-16 | End: 2021-01-19

## 2019-04-16 RX ADMIN — DEXAMETHASONE 10 MG: 4 TABLET ORAL at 09:48

## 2019-05-01 ENCOUNTER — OFFICE VISIT (OUTPATIENT)
Dept: CARDIOLOGY | Facility: CLINIC | Age: 44
End: 2019-05-01

## 2019-05-01 VITALS
DIASTOLIC BLOOD PRESSURE: 84 MMHG | HEART RATE: 77 BPM | SYSTOLIC BLOOD PRESSURE: 124 MMHG | WEIGHT: 212 LBS | HEIGHT: 66 IN | BODY MASS INDEX: 34.07 KG/M2

## 2019-05-01 DIAGNOSIS — R07.9 CHEST PAIN, UNSPECIFIED TYPE: ICD-10-CM

## 2019-05-01 DIAGNOSIS — R00.2 PALPITATIONS: Primary | ICD-10-CM

## 2019-05-01 PROCEDURE — 93000 ELECTROCARDIOGRAM COMPLETE: CPT | Performed by: NURSE PRACTITIONER

## 2019-05-01 PROCEDURE — 99214 OFFICE O/P EST MOD 30 MIN: CPT | Performed by: NURSE PRACTITIONER

## 2019-05-01 NOTE — PROGRESS NOTES
Date of Office Visit: 2019  Encounter Provider: SYLWIA Nelson  Place of Service: [facility] CARDIOLOGY  Patient Name: Silvia Velazquez  :1975    Chief Complaint   Patient presents with   • Palpitations   :     HPI: Silvia Velazquez is a 43 y.o. female, new to me, who presents today for follow-up.  Old records have been obtained and reviewed by me.  She is a patient with a medical history of bipolar disorder, GERD, chronic pain syndrome, hepatitis C, and polysubstance abuse.  In 2019, she presented to the ER with complaints of chest pain, a productive cough with green sputum, and dizziness.  She was noted to be in a sinus tachycardia.  Her biomarkers in the ER were indeterminate at 0.037, her proBNP was normal, she had no leukocytosis, her transaminases were mildly elevated.  She does have a history of hepatitis C.  Per the patient's report, she had used methamphetamine prior to her arrival.  She was given antibiotics by internal medicine for bronchitis.  A 2-week ZIO patch was placed.  She had a negative stress test and was felt to be appropriate for discharge on 2019.  On 2019, she again presented to the ED with complaints of left anterior upper chest tightness.  Her blood work, troponin, EKG, and chest x-ray were all on revealing and she was discharged home with instructions to follow-up with Dr. Chavis in regards to her ZIO patch and to find a PCP for outpatient follow-up.  On 2019 she again presented to the ED with complaints of palpitations and associated chest pain and shortness of air.  Workup was again unrevealing.  Evidently there was an issue with the first Zio patch and it fell off three days after it was placed.  According to the note from the ED, she had another ZIO Patch placed.  She was informed to mail in the ZIO Patch, DC any caffeine, stimulant, or nicotine use, and follow-up with cardiology.   Since being discharged from  the ED, she reports no change in her symptoms.  She reports random stabbing chest pains that last anywhere from a few seconds to a few minutes.    The pain is reportedly worse when she's stressed or upset.  She has daily palpitations that also occur randomly and last a few seconds to a few minutes.  She has a hard time catching her breath, but does not feel it is any worse with exertion.  She has dizziness that comes and goes.  She is fatigued.  She reports widespread swelling in her hands, feet, and face.  All of these symptoms are not made better or worse by anything and have no pattern.  She reports that she weighed 138 pounds in December and keeps gaining weight.  She states that she eats a healthy diet consisting mostly of vegetables and lean proteins.  She tried to walk about a mile everyday reports that sometimes she develops these symptoms and sometimes not.  She is smoking 1ppd.  She was drinking 1/2 pint of alcohol/day until three days ago.  She is currently living at [facility] in [place].  She states that another Zio patch was never placed after the first one fell off.  She also says she has been in contact with the company and has not received a straightforward answer as to what to do.      Past Medical History:   Diagnosis Date   • Chest pain    • Hepatitis C    • Kidney stone    • Lupus (systemic lupus erythematosus) (CMS/HCC)    • Mitral valve anterior leaflet prolapse    • NSTEMI (non-ST elevated myocardial infarction) (CMS/HCC)    • Otitis    • Palpitations    • Seizures (CMS/HCC)    • Spinal epidural abscess        Past Surgical History:   Procedure Laterality Date   • BACK SURGERY     • CHOLECYSTECTOMY     • LIVER BIOPSY     • LUMBAR LAMINECTOMY DISCECTOMY DECOMPRESSION Left 12/9/2018    Procedure: Posterior lumbar three through sacrum decompression;  Surgeon: Tevin Whitley MD;  Location: [facility] OR;  Service: Neurosurgery   • LYMPH NODE BIOPSY         Social History      Socioeconomic History   • Marital status:      Spouse name: Not on file   • Number of children: Not on file   • Years of education: Not on file   • Highest education level: Not on file   Tobacco Use   • Smoking status: Current Every Day Smoker     Packs/day: 1.00     Years: 30.00     Pack years: 30.00     Types: Cigarettes   • Smokeless tobacco: Never Used   Substance and Sexual Activity   • Alcohol use: No     Frequency: Never   • Drug use: Yes     Types: IV, Heroin, Methamphetamines     Comment: pt states she no longer uses heroin, she now uses meth   • Sexual activity: Defer       History reviewed. No pertinent family history.    Review of Systems   Constitution: Positive for malaise/fatigue. Negative for chills and fever.   Cardiovascular: Positive for chest pain and palpitations. Negative for dyspnea on exertion, leg swelling, near-syncope, orthopnea, paroxysmal nocturnal dyspnea and syncope.   Respiratory: Positive for shortness of breath. Negative for cough.    Musculoskeletal: Negative for joint pain, joint swelling and myalgias.   Gastrointestinal: Negative for abdominal pain, diarrhea, melena, nausea and vomiting.   Genitourinary: Negative for frequency and hematuria.   Neurological: Positive for dizziness and light-headedness. Negative for numbness, paresthesias and seizures.   Allergic/Immunologic: Negative.    All other systems reviewed and are negative.      Allergies   Allergen Reactions   • Sulfa Antibiotics Nausea And Vomiting and Swelling     Patient states when she took Sulfa medication, her throat started to swell.         Current Outpatient Medications:   •  amLODIPine (NORVASC) 5 MG tablet, Take 1 tablet by mouth Daily. (Patient taking differently: Take 10 mg by mouth Daily.), Disp: 30 tablet, Rfl: 6  •  aspirin 81 MG EC tablet, Take 1 tablet by mouth Daily., Disp: 30 tablet, Rfl: 0  •  buprenorphine-naloxone (SUBOXONE) 8-2 MG per SL tablet, Place 1 tablet under the tongue 2 (Two)  "Times a Day., Disp: , Rfl:   •  gabapentin (NEURONTIN) 600 MG tablet, Take 1 tablet by mouth 3 (Three) Times a Day., Disp: 6 tablet, Rfl: 0  •  metoprolol tartrate (LOPRESSOR) 25 MG tablet, Take 1 tablet by mouth Every 12 (Twelve) Hours., Disp: 60 tablet, Rfl: 3  •  nicotine (NICODERM CQ) 14 MG/24HR patch, Place 1 patch on the skin as directed by provider Daily., Disp: 14 patch, Rfl: 0  •  OLANZapine (ZYPREXA) 15 MG tablet, Take 1 tablet by mouth Every Night., Disp: 30 tablet, Rfl: 0  •  venlafaxine XR (EFFEXOR XR) 150 MG 24 hr capsule, Take 1 capsule by mouth Daily., Disp: 30 capsule, Rfl: 0  •  budesonide-formoterol (SYMBICORT) 80-4.5 MCG/ACT inhaler, Inhale 2 puffs 2 (Two) Times a Day., Disp: 6.9 g, Rfl: 2  •  fluticasone (FLONASE) 50 MCG/ACT nasal spray, 2 sprays into the nostril(s) as directed by provider Daily., Disp: 15.8 mL, Rfl: 0  •  methocarbamol (ROBAXIN) 750 MG tablet, Take 1 tablet by mouth 2 (Two) Times a Day., Disp: 16 tablet, Rfl: 0      Objective:     Vitals:    05/01/19 1150 05/01/19 1154   BP: 124/88 124/84   BP Location: Right arm Left arm   Pulse: 77    Weight: 96.2 kg (212 lb)    Height: 167.6 cm (66\")      Body mass index is 34.22 kg/m².    PHYSICAL EXAM:    Physical Exam   Constitutional: She is oriented to person, place, and time. She appears well-developed and well-nourished. No distress.   HENT:   Head: Normocephalic and atraumatic.   Eyes: Pupils are equal, round, and reactive to light.   Neck: No JVD present. No thyromegaly present.   Cardiovascular: Normal rate, regular rhythm, normal heart sounds and intact distal pulses.   No murmur heard.  Pulmonary/Chest: Effort normal and breath sounds normal. No respiratory distress.   Abdominal: Soft. Bowel sounds are normal. She exhibits no distension. There is no splenomegaly or hepatomegaly. There is no tenderness.   Musculoskeletal: Normal range of motion. She exhibits no edema.   Neurological: She is alert and oriented to person, place, and " time.   Skin: Skin is warm and dry. She is not diaphoretic. No erythema.   Psychiatric: Her behavior is normal. Judgment normal. Her mood appears anxious.         ECG 12 Lead  Date/Time: 5/1/2019 12:03 PM  Performed by: Zuri Tobin APRN  Authorized by: Zuri Tobin APRN   Comparison: compared with previous ECG from 4/11/2019  Similar to previous ECG  Rate: normal  BPM: 77  T flattening: aVL    Clinical impression: normal ECG  Comments: Indication: Palpitations              Assessment:       Diagnosis Plan   1. Palpitations  ECG 12 Lead    Holter Monitor - 72 Hour Up To 21 Days   2. Chest pain, unspecified type       Orders Placed This Encounter   Procedures   • Holter Monitor - 72 Hour Up To 21 Days     Zio- two weeks     Standing Status:   Future     Standing Expiration Date:   4/30/2020     Order Specific Question:   Reason for exam?     Answer:   Palpitations   • ECG 12 Lead     This order was created via procedure documentation          Plan:       1. Palpitations.  Her EKG is normal.  Unfortunately the Zio patch results are not available and I'm not even sure what to make of the whole situation.  I'm just going to place another order for another Zio patch and we will go from there.      2. Chest pain.  She is chest pain free in the exam room today.  Her description of the chest pain is vague.  It does not sound anginal in nature.  It comes on randomly at rest and is not made worse with exertion.  She reports it is worse when she is upset.  I feel there is a psychosocial factor to this as well as the rest of her symptoms.  Previous cardiac workup has been negative.  Her troponin in the ER was indeterminate.  The myocardial perfusion imaging indicated no evidence of ischemia.  Her EKG is unchanged.  She evidently admitted to methamphetamine use while in the hospital and I suspect this may be ongoing.       I'm not really sure what to make of her symptoms, but I assured her I did not feel they  were related to her heart.  I'm not going to make any changes to her medical regimen.  Further recommendations will be made pending the results of the Zio patch.        As always, it has been a pleasure to participate in your patient's care.      Sincerely,         SYLWIA Stinson

## 2019-05-09 ENCOUNTER — HOSPITAL ENCOUNTER (OUTPATIENT)
Dept: OTHER | Facility: HOSPITAL | Age: 44
Discharge: HOME | End: 2019-05-09

## 2019-05-09 LAB
BASOPHILS # BLD AUTO: 0.02 10*3/UL (ref 0–0.2)
BASOPHILS NFR BLD AUTO: 0.5 % (ref 0–3)
CONV ABS IMM GRAN: 0.01 10*3/UL (ref 0–0.2)
CONV IMMATURE GRAN: 0.3 % (ref 0–1.8)
DEPRECATED RDW RBC AUTO: 54.3 FL (ref 36.4–46.3)
EOSINOPHIL # BLD AUTO: 0.13 10*3/UL (ref 0–0.7)
EOSINOPHIL # BLD AUTO: 3.3 % (ref 0–7)
ERYTHROCYTE [DISTWIDTH] IN BLOOD BY AUTOMATED COUNT: 18.8 % (ref 11.7–14.4)
ERYTHROCYTE [SEDIMENTATION RATE] IN BLOOD: 14 MM/H (ref 0–20)
HBA1C MFR BLD: 11.1 G/DL (ref 12–16)
HCT VFR BLD AUTO: 36.6 % (ref 37–47)
LYMPHOCYTES # BLD AUTO: 1.25 10*3/UL (ref 1–5)
MCH RBC QN AUTO: 24.2 PG (ref 27–31)
MCHC RBC AUTO-ENTMCNC: 30.3 G/DL (ref 33–37)
MCV RBC AUTO: 79.9 FL (ref 81–99)
MONOCYTES # BLD AUTO: 0.32 10*3/UL (ref 0.2–1.2)
MONOCYTES NFR BLD AUTO: 8.1 % (ref 3–10)
NEUTROPHILS # BLD AUTO: 2.24 10*3/UL (ref 2–8)
NEUTROPHILS NFR BLD AUTO: 56.3 % (ref 30–85)
NRBC CBCN: 0 % (ref 0–0.7)
PLATELET # BLD AUTO: 131 10*3/UL (ref 130–400)
PMV BLD AUTO: ABNORMAL FL (ref 9.4–12.3)
RBC # BLD AUTO: 4.58 10*6/UL (ref 4.2–5.4)
VARIANT LYMPHS NFR BLD MANUAL: 31.5 % (ref 20–45)
WBC # BLD AUTO: 3.97 10*3/UL (ref 4.8–10.8)

## 2019-05-14 LAB — BACTERIA BLD CULT: NORMAL

## 2019-07-14 ENCOUNTER — HOSPITAL ENCOUNTER (EMERGENCY)
Facility: HOSPITAL | Age: 44
Discharge: HOME | End: 2019-07-15
Attending: EMERGENCY MEDICINE | Admitting: EMERGENCY MEDICINE

## 2019-07-14 ENCOUNTER — APPOINTMENT (OUTPATIENT)
Dept: GENERAL RADIOLOGY | Facility: HOSPITAL | Age: 44
End: 2019-07-14

## 2019-07-14 DIAGNOSIS — L03.116 CELLULITIS OF LEFT LEG: ICD-10-CM

## 2019-07-14 DIAGNOSIS — L08.9 WOUND INFECTION: Primary | ICD-10-CM

## 2019-07-14 DIAGNOSIS — T14.8XXA WOUND INFECTION: Primary | ICD-10-CM

## 2019-07-14 LAB
ALBUMIN SERPL-MCNC: 4.4 G/DL (ref 3.5–5.2)
ALBUMIN/GLOB SERPL: 0.9 G/DL
ALP SERPL-CCNC: 187 U/L (ref 39–117)
ALT SERPL W P-5'-P-CCNC: 55 U/L (ref 1–33)
ANION GAP SERPL CALCULATED.3IONS-SCNC: 16.1 MMOL/L (ref 5–15)
AST SERPL-CCNC: 81 U/L (ref 1–32)
BASOPHILS # BLD AUTO: 0.03 10*3/MM3 (ref 0–0.2)
BASOPHILS NFR BLD AUTO: 0.5 % (ref 0–1.5)
BILIRUB SERPL-MCNC: 0.6 MG/DL (ref 0.2–1.2)
BUN BLD-MCNC: 7 MG/DL (ref 6–20)
BUN/CREAT SERPL: 9.5 (ref 7–25)
CALCIUM SPEC-SCNC: 9.6 MG/DL (ref 8.6–10.5)
CHLORIDE SERPL-SCNC: 97 MMOL/L (ref 98–107)
CO2 SERPL-SCNC: 27.9 MMOL/L (ref 22–29)
CREAT BLD-MCNC: 0.74 MG/DL (ref 0.57–1)
CRP SERPL-MCNC: 0.71 MG/DL (ref 0–0.5)
D-LACTATE SERPL-SCNC: 1.3 MMOL/L (ref 0.5–2)
DEPRECATED RDW RBC AUTO: 52.2 FL (ref 37–54)
EOSINOPHIL # BLD AUTO: 0.16 10*3/MM3 (ref 0–0.4)
EOSINOPHIL NFR BLD AUTO: 2.7 % (ref 0.3–6.2)
ERYTHROCYTE [DISTWIDTH] IN BLOOD BY AUTOMATED COUNT: 17.2 % (ref 12.3–15.4)
ERYTHROCYTE [SEDIMENTATION RATE] IN BLOOD: 38 MM/HR (ref 0–20)
GFR SERPL CREATININE-BSD FRML MDRD: 86 ML/MIN/1.73
GLOBULIN UR ELPH-MCNC: 4.7 GM/DL
GLUCOSE BLD-MCNC: 82 MG/DL (ref 65–99)
HCT VFR BLD AUTO: 38.5 % (ref 34–46.6)
HGB BLD-MCNC: 11.9 G/DL (ref 12–15.9)
IMM GRANULOCYTES # BLD AUTO: 0.01 10*3/MM3 (ref 0–0.05)
IMM GRANULOCYTES NFR BLD AUTO: 0.2 % (ref 0–0.5)
LYMPHOCYTES # BLD AUTO: 1.61 10*3/MM3 (ref 0.7–3.1)
LYMPHOCYTES NFR BLD AUTO: 27.4 % (ref 19.6–45.3)
MCH RBC QN AUTO: 25.8 PG (ref 26.6–33)
MCHC RBC AUTO-ENTMCNC: 30.9 G/DL (ref 31.5–35.7)
MCV RBC AUTO: 83.5 FL (ref 79–97)
MONOCYTES # BLD AUTO: 0.32 10*3/MM3 (ref 0.1–0.9)
MONOCYTES NFR BLD AUTO: 5.4 % (ref 5–12)
NEUTROPHILS # BLD AUTO: 3.75 10*3/MM3 (ref 1.7–7)
NEUTROPHILS NFR BLD AUTO: 63.8 % (ref 42.7–76)
NRBC BLD AUTO-RTO: 0 /100 WBC (ref 0–0.2)
PLATELET # BLD AUTO: 286 10*3/MM3 (ref 140–450)
PMV BLD AUTO: 12 FL (ref 6–12)
POTASSIUM BLD-SCNC: 3.4 MMOL/L (ref 3.5–5.2)
PROT SERPL-MCNC: 9.1 G/DL (ref 6–8.5)
RBC # BLD AUTO: 4.61 10*6/MM3 (ref 3.77–5.28)
SODIUM BLD-SCNC: 141 MMOL/L (ref 136–145)
WBC NRBC COR # BLD: 5.88 10*3/MM3 (ref 3.4–10.8)

## 2019-07-14 PROCEDURE — 86140 C-REACTIVE PROTEIN: CPT | Performed by: EMERGENCY MEDICINE

## 2019-07-14 PROCEDURE — 83605 ASSAY OF LACTIC ACID: CPT | Performed by: EMERGENCY MEDICINE

## 2019-07-14 PROCEDURE — 73590 X-RAY EXAM OF LOWER LEG: CPT

## 2019-07-14 PROCEDURE — 85652 RBC SED RATE AUTOMATED: CPT | Performed by: EMERGENCY MEDICINE

## 2019-07-14 PROCEDURE — 80053 COMPREHEN METABOLIC PANEL: CPT | Performed by: EMERGENCY MEDICINE

## 2019-07-14 PROCEDURE — 99284 EMERGENCY DEPT VISIT MOD MDM: CPT

## 2019-07-14 PROCEDURE — 96366 THER/PROPH/DIAG IV INF ADDON: CPT

## 2019-07-14 PROCEDURE — 85025 COMPLETE CBC W/AUTO DIFF WBC: CPT | Performed by: EMERGENCY MEDICINE

## 2019-07-14 PROCEDURE — 25010000002 VANCOMYCIN 10 G RECONSTITUTED SOLUTION: Performed by: EMERGENCY MEDICINE

## 2019-07-14 PROCEDURE — 87040 BLOOD CULTURE FOR BACTERIA: CPT | Performed by: EMERGENCY MEDICINE

## 2019-07-14 PROCEDURE — 96365 THER/PROPH/DIAG IV INF INIT: CPT

## 2019-07-14 RX ORDER — VENLAFAXINE HYDROCHLORIDE 150 MG/1
150 CAPSULE, EXTENDED RELEASE ORAL DAILY
Qty: 30 CAPSULE | Refills: 0 | Status: SHIPPED | OUTPATIENT
Start: 2019-07-14 | End: 2021-09-02 | Stop reason: SDUPTHER

## 2019-07-14 RX ORDER — DOXYCYCLINE 100 MG/1
100 CAPSULE ORAL 2 TIMES DAILY
Qty: 28 CAPSULE | Refills: 0 | Status: SHIPPED | OUTPATIENT
Start: 2019-07-14 | End: 2020-11-27 | Stop reason: HOSPADM

## 2019-07-14 RX ORDER — VENLAFAXINE HYDROCHLORIDE 150 MG/1
150 CAPSULE, EXTENDED RELEASE ORAL
Status: DISCONTINUED | OUTPATIENT
Start: 2019-07-15 | End: 2019-07-15

## 2019-07-14 RX ADMIN — SODIUM CHLORIDE 1000 ML: 9 INJECTION, SOLUTION INTRAVENOUS at 23:01

## 2019-07-14 RX ADMIN — SODIUM CHLORIDE 2250 MG: 9 INJECTION, SOLUTION INTRAVENOUS at 23:17

## 2019-07-15 VITALS
SYSTOLIC BLOOD PRESSURE: 110 MMHG | HEIGHT: 66 IN | TEMPERATURE: 97.8 F | DIASTOLIC BLOOD PRESSURE: 62 MMHG | RESPIRATION RATE: 16 BRPM | BODY MASS INDEX: 40.18 KG/M2 | HEART RATE: 82 BPM | WEIGHT: 250 LBS | OXYGEN SATURATION: 92 %

## 2019-07-15 PROCEDURE — 96366 THER/PROPH/DIAG IV INF ADDON: CPT

## 2019-07-15 RX ORDER — VENLAFAXINE HYDROCHLORIDE 150 MG/1
150 CAPSULE, EXTENDED RELEASE ORAL ONCE
Status: COMPLETED | OUTPATIENT
Start: 2019-07-15 | End: 2019-07-15

## 2019-07-15 RX ADMIN — VENLAFAXINE HYDROCHLORIDE 150 MG: 150 CAPSULE, EXTENDED RELEASE ORAL at 01:10

## 2019-07-19 LAB
BACTERIA SPEC AEROBE CULT: NORMAL
BACTERIA SPEC AEROBE CULT: NORMAL

## 2019-07-27 ENCOUNTER — HOSPITAL ENCOUNTER (EMERGENCY)
Facility: HOSPITAL | Age: 44
Discharge: LEFT BEFORE DISCHARGE | End: 2019-07-27
Attending: EMERGENCY MEDICINE | Admitting: EMERGENCY MEDICINE

## 2019-07-27 VITALS
SYSTOLIC BLOOD PRESSURE: 129 MMHG | HEART RATE: 116 BPM | OXYGEN SATURATION: 97 % | DIASTOLIC BLOOD PRESSURE: 86 MMHG | RESPIRATION RATE: 18 BRPM | BODY MASS INDEX: 36.16 KG/M2 | HEIGHT: 66 IN | WEIGHT: 225 LBS | TEMPERATURE: 98.1 F

## 2019-07-27 DIAGNOSIS — F19.10 SUBSTANCE ABUSE (HCC): Primary | ICD-10-CM

## 2019-07-27 PROCEDURE — 93010 ELECTROCARDIOGRAM REPORT: CPT | Performed by: INTERNAL MEDICINE

## 2019-07-27 PROCEDURE — 99211 OFF/OP EST MAY X REQ PHY/QHP: CPT

## 2019-07-27 PROCEDURE — 93005 ELECTROCARDIOGRAM TRACING: CPT | Performed by: EMERGENCY MEDICINE

## 2019-07-27 PROCEDURE — 93005 ELECTROCARDIOGRAM TRACING: CPT

## 2019-08-11 ENCOUNTER — APPOINTMENT (OUTPATIENT)
Dept: GENERAL RADIOLOGY | Facility: HOSPITAL | Age: 44
End: 2019-08-11

## 2019-08-11 ENCOUNTER — HOSPITAL ENCOUNTER (EMERGENCY)
Facility: HOSPITAL | Age: 44
Discharge: HOME | End: 2019-08-11
Attending: EMERGENCY MEDICINE | Admitting: EMERGENCY MEDICINE

## 2019-08-11 VITALS
RESPIRATION RATE: 16 BRPM | HEIGHT: 66 IN | OXYGEN SATURATION: 95 % | SYSTOLIC BLOOD PRESSURE: 118 MMHG | DIASTOLIC BLOOD PRESSURE: 77 MMHG | TEMPERATURE: 98.4 F | HEART RATE: 91 BPM | BODY MASS INDEX: 36.96 KG/M2 | WEIGHT: 230 LBS

## 2019-08-11 DIAGNOSIS — Z87.81 S/P ORIF (OPEN REDUCTION INTERNAL FIXATION) FRACTURE: ICD-10-CM

## 2019-08-11 DIAGNOSIS — Z98.890 S/P ORIF (OPEN REDUCTION INTERNAL FIXATION) FRACTURE: ICD-10-CM

## 2019-08-11 DIAGNOSIS — M89.8X6 PAIN IN LEFT TIBIA: Primary | ICD-10-CM

## 2019-08-11 PROCEDURE — 73552 X-RAY EXAM OF FEMUR 2/>: CPT

## 2019-08-11 PROCEDURE — 99283 EMERGENCY DEPT VISIT LOW MDM: CPT

## 2019-08-11 PROCEDURE — 73590 X-RAY EXAM OF LOWER LEG: CPT

## 2019-08-11 PROCEDURE — 72170 X-RAY EXAM OF PELVIS: CPT

## 2019-08-11 RX ORDER — ETODOLAC 200 MG/1
200 CAPSULE ORAL EVERY 8 HOURS
Qty: 20 CAPSULE | Refills: 0 | Status: SHIPPED | OUTPATIENT
Start: 2019-08-11 | End: 2020-11-27 | Stop reason: HOSPADM

## 2019-08-11 NOTE — ED NOTES
Pt c/o left leg pain that started last night with no known injuries. Pt had surgery after breaking her left leg four weeks ago.      Temitope Garrido, FANG  08/11/19 8852

## 2019-08-11 NOTE — ED PROVIDER NOTES
" EMERGENCY DEPARTMENT ENCOUNTER    CHIEF COMPLAINT  Chief Complaint: left leg pain  History given by: pt  History limited by: none  Room Number: 08/08  PMD: System, Provider Not In      HPI:  Pt is a 43 y.o. female who presents complaining of left lower leg pain radiating up into the hip that started yesterday. Pt states she had surgery to the injury by Dr Felder four weeks ago. Pt denies fever or chills. Pt states she takes suboxone for chronic pain.    Duration:  yeterday  Onset: gradual  Timing: constant  Location: left lower leg  Radiation: up the leg to the hip  Quality: \"pain\"  Intensity/Severity: moderate  Progression: unchanged  Associated Symptoms: none  Aggravating Factors: movement; ambulation  Alleviating Factors: none  Previous Episodes: none  Treatment before arrival: none    PAST MEDICAL HISTORY  Active Ambulatory Problems     Diagnosis Date Noted   • Spinal epidural abscess 12/09/2018   • Non-STEMI (non-ST elevated myocardial infarction) (CMS/HCC) 03/31/2019   • Chest pain 05/01/2019     Resolved Ambulatory Problems     Diagnosis Date Noted   • No Resolved Ambulatory Problems     Past Medical History:   Diagnosis Date   • Chest pain    • Hepatitis C    • Kidney stone    • Lupus (systemic lupus erythematosus) (CMS/HCC)    • Mitral valve anterior leaflet prolapse    • NSTEMI (non-ST elevated myocardial infarction) (CMS/HCC)    • Otitis    • Palpitations    • Seizures (CMS/HCC)    • Spinal epidural abscess        PAST SURGICAL HISTORY  Past Surgical History:   Procedure Laterality Date   • BACK SURGERY     • CHOLECYSTECTOMY     • LIVER BIOPSY     • LUMBAR LAMINECTOMY DISCECTOMY DECOMPRESSION Left 12/9/2018    Procedure: Posterior lumbar three through sacrum decompression;  Surgeon: Tevin Whitley MD;  Location: [facility];  Service: Neurosurgery   • LYMPH NODE BIOPSY         FAMILY HISTORY  No family history on file.    SOCIAL HISTORY  Social History     Socioeconomic History   • Marital status: " Legally      Spouse name: Not on file   • Number of children: Not on file   • Years of education: Not on file   • Highest education level: Not on file   Tobacco Use   • Smoking status: Current Every Day Smoker     Packs/day: 1.00     Years: 30.00     Pack years: 30.00     Types: Cigarettes   • Smokeless tobacco: Never Used   Substance and Sexual Activity   • Alcohol use: No     Frequency: Never   • Drug use: Yes     Types: IV, Heroin, Methamphetamines     Comment: pt states she no longer uses heroin, she now uses meth   • Sexual activity: Defer       ALLERGIES  Sulfa antibiotics    REVIEW OF SYSTEMS  Review of Systems   Constitutional: Negative for fever.   Respiratory: Negative for shortness of breath.    Cardiovascular: Negative for chest pain.   Musculoskeletal: Positive for myalgias (left lower leg).   All other systems reviewed and are negative.      PHYSICAL EXAM  ED Triage Vitals   Temp Pulse Resp BP SpO2   -- -- -- -- --      Temp src Heart Rate Source Patient Position BP Location FiO2 (%)   -- -- -- -- --       Physical Exam   Constitutional: She is oriented to person, place, and time. No distress.   HENT:   Head: Normocephalic and atraumatic.   Eyes: EOM are normal. Pupils are equal, round, and reactive to light.   Neck: Normal range of motion. Neck supple.   Cardiovascular: Normal rate, regular rhythm, normal heart sounds and intact distal pulses.   Pulmonary/Chest: Effort normal and breath sounds normal. No respiratory distress.   Abdominal: Soft. There is no tenderness. There is no rebound and no guarding.   Musculoskeletal: Normal range of motion. She exhibits tenderness (left lateral hip, left proximal tibia, diffuse femur). She exhibits no edema.   Neurological: She is alert and oriented to person, place, and time. She has normal sensation and normal strength.   Skin: Skin is warm and dry. No rash noted.   Psychiatric: Mood and affect normal.   Nursing note and vitals  reviewed.        RADIOLOGY  XR Tibia Fibula 2 View Left   Final Result   1. Healing fractures of the distal tibia and fibula with postsurgical   changes from ORIF.       This report was finalized on 8/11/2019 7:30 PM by Luis Pelaez M.D.          XR Pelvis 1 or 2 View   Final Result   No acute osseous finding.               TWO-VIEW LEFT FEMUR       HISTORY: left sidedpain; M89.8X6-Other specified disorders of bone,   lower leg       FINDINGS: 2 views of the left femur were submitted. There is no fracture   or dislocation. Soft tissues appear unremarkable.               IMPRESSION:   1. No acute osseous abnormality.       This report was finalized on 8/11/2019 7:29 PM by Luis Pelaez M.D.          XR Femur 2 View Left   Final Result   No acute osseous finding.               TWO-VIEW LEFT FEMUR       HISTORY: left sidedpain; M89.8X6-Other specified disorders of bone,   lower leg       FINDINGS: 2 views of the left femur were submitted. There is no fracture   or dislocation. Soft tissues appear unremarkable.               IMPRESSION:   1. No acute osseous abnormality.       This report was finalized on 8/11/2019 7:29 PM by Luis Pelaez M.D.               I ordered the above noted radiological studies. Interpreted by radiologist.  Reviewed by me in PACS.       PROCEDURES  Procedures        PROGRESS AND CONSULTS     1835  Reviewed Keegan    2004  Rechecked patient who is resting. Discussed XR results. Discussed plan to discharge the pt. Pt understands and agrees with the plan. All questions have been answered.          MEDICAL DECISION MAKING  Results were reviewed/discussed with the patient and they were also made aware of online access. Pt also made aware that some labs, such as cultures, will not be resulted during ER visit and follow up with PMD is necessary.     MDM  Number of Diagnoses or Management Options  Pain in left tibia:      Amount and/or Complexity of Data Reviewed  Tests in the radiology section of  CPT®: ordered and reviewed (Pelvis XR - NAD  Femur XR - NAD  Tib/Fib XR - 1. Healing fractures of the distal tibia and fibula with postsurgical changes from ORIF.)  Decide to obtain previous medical records or to obtain history from someone other than the patient: yes (Epic)  Review and summarize past medical records: yes (Pt put on doxycycline on 7/14/19.  Pt left AMA on 7/27/19 after a meth relapse.)           DIAGNOSIS  Final diagnoses:   Pain in left tibia   S/P ORIF (open reduction internal fixation) fracture       DISPOSITION  DISCHARGE    Patient discharged in stable condition.    Reviewed implications of results, diagnosis, meds, responsibility to follow up, warning signs and symptoms of possible worsening, potential complications and reasons to return to ER.    Patient/Family voiced understanding of above instructions.    Discussed plan for discharge, as there is no emergent indication for admission. Patient referred to primary care provider for BP management due to today's BP. Pt/family is agreeable and understands need for follow up and repeat testing.  Pt is aware that discharge does not mean that nothing is wrong but it indicates no emergency is present that requires admission and they must continue care with follow-up as given below or physician of their choice.     FOLLOW-UP  No follow-up provider specified.       Medication List      New Prescriptions    etodolac 200 MG capsule  Commonly known as:  LODINE  Take 1 capsule by mouth Every 8 (Eight) Hours.        Changed    amLODIPine 5 MG tablet  Commonly known as:  NORVASC  Take 1 tablet by mouth Daily.  What changed:    how much to take  when to take this              Latest Documented Vital Signs:  As of 8:07 PM  BP- 118/77 HR- 91 Temp- 98.4 °F (36.9 °C) O2 sat- 95%    --  Documentation assistance provided by arsh Cohen for Dr Boone.  Information recorded by the arsh was done at my direction and has been verified and validated by  me.       Jaqueline Cohne  08/11/19 2007       Juanito Boone MD  08/11/19 [id number]

## 2019-09-24 ENCOUNTER — HOSPITAL ENCOUNTER (EMERGENCY)
Facility: HOSPITAL | Age: 44
Discharge: HOME | End: 2019-09-25
Attending: EMERGENCY MEDICINE | Admitting: EMERGENCY MEDICINE

## 2019-09-24 DIAGNOSIS — F11.93 OPIOID WITHDRAWAL (HCC): Primary | ICD-10-CM

## 2019-09-24 DIAGNOSIS — R19.7 DIARRHEA, UNSPECIFIED TYPE: ICD-10-CM

## 2019-09-24 DIAGNOSIS — R11.2 NON-INTRACTABLE VOMITING WITH NAUSEA, UNSPECIFIED VOMITING TYPE: ICD-10-CM

## 2019-09-24 LAB
ALBUMIN SERPL-MCNC: 4.2 G/DL (ref 3.5–5.2)
ALBUMIN/GLOB SERPL: 1.1 G/DL
ALP SERPL-CCNC: 171 U/L (ref 39–117)
ALT SERPL W P-5'-P-CCNC: 31 U/L (ref 1–33)
ANION GAP SERPL CALCULATED.3IONS-SCNC: 14 MMOL/L (ref 5–15)
AST SERPL-CCNC: 39 U/L (ref 1–32)
BASOPHILS # BLD AUTO: 0.03 10*3/MM3 (ref 0–0.2)
BASOPHILS NFR BLD AUTO: 0.5 % (ref 0–1.5)
BILIRUB SERPL-MCNC: 0.6 MG/DL (ref 0.2–1.2)
BUN BLD-MCNC: 14 MG/DL (ref 6–20)
BUN/CREAT SERPL: 19.2 (ref 7–25)
CALCIUM SPEC-SCNC: 9.1 MG/DL (ref 8.6–10.5)
CHLORIDE SERPL-SCNC: 99 MMOL/L (ref 98–107)
CO2 SERPL-SCNC: 24 MMOL/L (ref 22–29)
CREAT BLD-MCNC: 0.73 MG/DL (ref 0.57–1)
DEPRECATED RDW RBC AUTO: 46.2 FL (ref 37–54)
EOSINOPHIL # BLD AUTO: 0.05 10*3/MM3 (ref 0–0.4)
EOSINOPHIL NFR BLD AUTO: 0.8 % (ref 0.3–6.2)
ERYTHROCYTE [DISTWIDTH] IN BLOOD BY AUTOMATED COUNT: 16.2 % (ref 12.3–15.4)
GFR SERPL CREATININE-BSD FRML MDRD: 87 ML/MIN/1.73
GLOBULIN UR ELPH-MCNC: 3.8 GM/DL
GLUCOSE BLD-MCNC: 112 MG/DL (ref 65–99)
HCG SERPL QL: NEGATIVE
HCT VFR BLD AUTO: 41 % (ref 34–46.6)
HGB BLD-MCNC: 12.7 G/DL (ref 12–15.9)
IMM GRANULOCYTES # BLD AUTO: 0.02 10*3/MM3 (ref 0–0.05)
IMM GRANULOCYTES NFR BLD AUTO: 0.3 % (ref 0–0.5)
LIPASE SERPL-CCNC: 31 U/L (ref 13–60)
LYMPHOCYTES # BLD AUTO: 1.57 10*3/MM3 (ref 0.7–3.1)
LYMPHOCYTES NFR BLD AUTO: 24.9 % (ref 19.6–45.3)
MCH RBC QN AUTO: 24.8 PG (ref 26.6–33)
MCHC RBC AUTO-ENTMCNC: 31 G/DL (ref 31.5–35.7)
MCV RBC AUTO: 79.9 FL (ref 79–97)
MONOCYTES # BLD AUTO: 0.42 10*3/MM3 (ref 0.1–0.9)
MONOCYTES NFR BLD AUTO: 6.7 % (ref 5–12)
NEUTROPHILS # BLD AUTO: 4.22 10*3/MM3 (ref 1.7–7)
NEUTROPHILS NFR BLD AUTO: 66.8 % (ref 42.7–76)
NRBC BLD AUTO-RTO: 0 /100 WBC (ref 0–0.2)
PLATELET # BLD AUTO: 210 10*3/MM3 (ref 140–450)
PMV BLD AUTO: 12.2 FL (ref 6–12)
POTASSIUM BLD-SCNC: 3.6 MMOL/L (ref 3.5–5.2)
PROT SERPL-MCNC: 8 G/DL (ref 6–8.5)
RBC # BLD AUTO: 5.13 10*6/MM3 (ref 3.77–5.28)
SODIUM BLD-SCNC: 137 MMOL/L (ref 136–145)
WBC NRBC COR # BLD: 6.31 10*3/MM3 (ref 3.4–10.8)

## 2019-09-24 PROCEDURE — 80307 DRUG TEST PRSMV CHEM ANLYZR: CPT | Performed by: EMERGENCY MEDICINE

## 2019-09-24 PROCEDURE — 83690 ASSAY OF LIPASE: CPT | Performed by: EMERGENCY MEDICINE

## 2019-09-24 PROCEDURE — 81003 URINALYSIS AUTO W/O SCOPE: CPT | Performed by: EMERGENCY MEDICINE

## 2019-09-24 PROCEDURE — 84703 CHORIONIC GONADOTROPIN ASSAY: CPT | Performed by: EMERGENCY MEDICINE

## 2019-09-24 PROCEDURE — 85025 COMPLETE CBC W/AUTO DIFF WBC: CPT | Performed by: EMERGENCY MEDICINE

## 2019-09-24 PROCEDURE — 99284 EMERGENCY DEPT VISIT MOD MDM: CPT

## 2019-09-24 PROCEDURE — 80053 COMPREHEN METABOLIC PANEL: CPT | Performed by: EMERGENCY MEDICINE

## 2019-09-24 PROCEDURE — 96374 THER/PROPH/DIAG INJ IV PUSH: CPT

## 2019-09-24 PROCEDURE — 25010000002 PROMETHAZINE PER 50 MG: Performed by: EMERGENCY MEDICINE

## 2019-09-24 RX ORDER — ONDANSETRON 2 MG/ML
4 INJECTION INTRAMUSCULAR; INTRAVENOUS ONCE
Status: DISCONTINUED | OUTPATIENT
Start: 2019-09-24 | End: 2019-09-24

## 2019-09-24 RX ORDER — SODIUM CHLORIDE 0.9 % (FLUSH) 0.9 %
10 SYRINGE (ML) INJECTION AS NEEDED
Status: DISCONTINUED | OUTPATIENT
Start: 2019-09-24 | End: 2019-09-25 | Stop reason: HOSPADM

## 2019-09-24 RX ORDER — CLONIDINE HYDROCHLORIDE 0.1 MG/1
0.2 TABLET ORAL ONCE
Status: COMPLETED | OUTPATIENT
Start: 2019-09-24 | End: 2019-09-24

## 2019-09-24 RX ORDER — PROMETHAZINE HYDROCHLORIDE 25 MG/ML
12.5 INJECTION, SOLUTION INTRAMUSCULAR; INTRAVENOUS ONCE
Status: COMPLETED | OUTPATIENT
Start: 2019-09-24 | End: 2019-09-24

## 2019-09-24 RX ADMIN — CLONIDINE HYDROCHLORIDE 0.2 MG: 0.1 TABLET ORAL at 23:14

## 2019-09-24 RX ADMIN — PROMETHAZINE HYDROCHLORIDE 12.5 MG: 25 INJECTION INTRAMUSCULAR; INTRAVENOUS at 23:14

## 2019-09-24 RX ADMIN — SODIUM CHLORIDE, POTASSIUM CHLORIDE, SODIUM LACTATE AND CALCIUM CHLORIDE 1000 ML: 600; 310; 30; 20 INJECTION, SOLUTION INTRAVENOUS at 23:14

## 2019-09-25 VITALS
WEIGHT: 222.9 LBS | BODY MASS INDEX: 35.82 KG/M2 | HEIGHT: 66 IN | SYSTOLIC BLOOD PRESSURE: 120 MMHG | DIASTOLIC BLOOD PRESSURE: 90 MMHG | TEMPERATURE: 97.4 F | HEART RATE: 94 BPM | RESPIRATION RATE: 20 BRPM | OXYGEN SATURATION: 100 %

## 2019-09-25 LAB
AMPHET+METHAMPHET UR QL: NEGATIVE
BARBITURATES UR QL SCN: NEGATIVE
BENZODIAZ UR QL SCN: NEGATIVE
BILIRUB UR QL STRIP: NEGATIVE
CANNABINOIDS SERPL QL: NEGATIVE
CLARITY UR: CLEAR
COCAINE UR QL: NEGATIVE
COLOR UR: YELLOW
ETHANOL BLD-MCNC: <10 MG/DL (ref 0–10)
ETHANOL UR QL: <0.01 %
GLUCOSE UR STRIP-MCNC: NEGATIVE MG/DL
HGB UR QL STRIP.AUTO: NEGATIVE
KETONES UR QL STRIP: ABNORMAL
LEUKOCYTE ESTERASE UR QL STRIP.AUTO: NEGATIVE
METHADONE UR QL SCN: NEGATIVE
NITRITE UR QL STRIP: NEGATIVE
OPIATES UR QL: NEGATIVE
OXYCODONE UR QL SCN: NEGATIVE
PH UR STRIP.AUTO: 7 [PH] (ref 5–8)
PROT UR QL STRIP: NEGATIVE
SP GR UR STRIP: 1.03 (ref 1–1.03)
UROBILINOGEN UR QL STRIP: ABNORMAL

## 2019-09-25 RX ORDER — PROMETHAZINE HYDROCHLORIDE 25 MG/1
25 TABLET ORAL EVERY 6 HOURS PRN
Qty: 16 TABLET | Refills: 0 | Status: SHIPPED | OUTPATIENT
Start: 2019-09-25 | End: 2019-09-29

## 2019-09-25 RX ORDER — CLONIDINE HYDROCHLORIDE 0.1 MG/1
0.1 TABLET ORAL 4 TIMES DAILY
Qty: 16 TABLET | Refills: 0 | Status: SHIPPED | OUTPATIENT
Start: 2019-09-25 | End: 2019-09-29

## 2019-09-25 RX ORDER — ONDANSETRON 4 MG/1
4 TABLET, ORALLY DISINTEGRATING ORAL ONCE
Status: COMPLETED | OUTPATIENT
Start: 2019-09-25 | End: 2019-09-25

## 2019-09-25 RX ADMIN — ONDANSETRON 4 MG: 4 TABLET, ORALLY DISINTEGRATING ORAL at 01:18

## 2019-09-25 NOTE — ED NOTES
Pt presents to er with c/o withdrawal symptoms. States she was on suboxone but lost her insurance so she began using heroin and fentanyl again. Reports she snorts and injects drugs, last time using 3 days ago. Says she took suboxone today about 3 hours ago but it is not helping today.     A/OX4. MMM. RR labored. Scattered bruising and scabs noted to BUE.     Suellen De Leon, RN  09/24/19 [id number]

## 2019-09-25 NOTE — ED NOTES
"Pt to ED via PV. Pt restless, diaphoretic. Pt also reports vomiting. Pt states \"I took suboxone too soon and now im in withdrawal\". Pt states on suboxone for opioid use.      Armida Brennan RN  09/24/19 6533    "

## 2020-11-26 ENCOUNTER — APPOINTMENT (OUTPATIENT)
Dept: CT IMAGING | Facility: HOSPITAL | Age: 45
End: 2020-11-26

## 2020-11-26 ENCOUNTER — HOSPITAL ENCOUNTER (OUTPATIENT)
Facility: HOSPITAL | Age: 45
Setting detail: OBSERVATION
Discharge: HOME | End: 2020-11-27
Attending: EMERGENCY MEDICINE | Admitting: INTERNAL MEDICINE

## 2020-11-26 DIAGNOSIS — R42 DIZZINESS: Primary | ICD-10-CM

## 2020-11-26 DIAGNOSIS — R00.2 PALPITATIONS: ICD-10-CM

## 2020-11-26 LAB
ALBUMIN SERPL-MCNC: 3.8 G/DL (ref 3.5–5.2)
ALBUMIN/GLOB SERPL: 1.1 G/DL
ALP SERPL-CCNC: 113 U/L (ref 39–117)
ALT SERPL W P-5'-P-CCNC: 33 U/L (ref 1–33)
ANION GAP SERPL CALCULATED.3IONS-SCNC: 9.9 MMOL/L (ref 5–15)
AST SERPL-CCNC: 38 U/L (ref 1–32)
B PARAPERT DNA SPEC QL NAA+PROBE: NOT DETECTED
B PERT DNA SPEC QL NAA+PROBE: NOT DETECTED
BASOPHILS # BLD AUTO: 0.03 10*3/MM3 (ref 0–0.2)
BASOPHILS NFR BLD AUTO: 0.7 % (ref 0–1.5)
BILIRUB SERPL-MCNC: 0.3 MG/DL (ref 0–1.2)
BUN SERPL-MCNC: 10 MG/DL (ref 6–20)
BUN/CREAT SERPL: 13.9 (ref 7–25)
C PNEUM DNA NPH QL NAA+NON-PROBE: NOT DETECTED
CALCIUM SPEC-SCNC: 8.6 MG/DL (ref 8.6–10.5)
CHLORIDE SERPL-SCNC: 105 MMOL/L (ref 98–107)
CO2 SERPL-SCNC: 23.1 MMOL/L (ref 22–29)
CREAT SERPL-MCNC: 0.72 MG/DL (ref 0.57–1)
DEPRECATED RDW RBC AUTO: 42 FL (ref 37–54)
EOSINOPHIL # BLD AUTO: 0.09 10*3/MM3 (ref 0–0.4)
EOSINOPHIL NFR BLD AUTO: 2.1 % (ref 0.3–6.2)
ERYTHROCYTE [DISTWIDTH] IN BLOOD BY AUTOMATED COUNT: 15 % (ref 12.3–15.4)
FLUAV SUBTYP SPEC NAA+PROBE: NOT DETECTED
FLUBV RNA ISLT QL NAA+PROBE: NOT DETECTED
GFR SERPL CREATININE-BSD FRML MDRD: 88 ML/MIN/1.73
GLOBULIN UR ELPH-MCNC: 3.4 GM/DL
GLUCOSE BLDC GLUCOMTR-MCNC: 100 MG/DL (ref 70–130)
GLUCOSE BLDC GLUCOMTR-MCNC: 109 MG/DL (ref 70–130)
GLUCOSE SERPL-MCNC: 129 MG/DL (ref 65–99)
HADV DNA SPEC NAA+PROBE: NOT DETECTED
HCOV 229E RNA SPEC QL NAA+PROBE: NOT DETECTED
HCOV HKU1 RNA SPEC QL NAA+PROBE: NOT DETECTED
HCOV NL63 RNA SPEC QL NAA+PROBE: NOT DETECTED
HCOV OC43 RNA SPEC QL NAA+PROBE: NOT DETECTED
HCT VFR BLD AUTO: 33.6 % (ref 34–46.6)
HGB BLD-MCNC: 10.1 G/DL (ref 12–15.9)
HMPV RNA NPH QL NAA+NON-PROBE: NOT DETECTED
HPIV1 RNA SPEC QL NAA+PROBE: NOT DETECTED
HPIV2 RNA SPEC QL NAA+PROBE: NOT DETECTED
HPIV3 RNA NPH QL NAA+PROBE: NOT DETECTED
HPIV4 P GENE NPH QL NAA+PROBE: NOT DETECTED
IMM GRANULOCYTES # BLD AUTO: 0.01 10*3/MM3 (ref 0–0.05)
IMM GRANULOCYTES NFR BLD AUTO: 0.2 % (ref 0–0.5)
INR PPP: 1.04 (ref 0.9–1.1)
LYMPHOCYTES # BLD AUTO: 1.42 10*3/MM3 (ref 0.7–3.1)
LYMPHOCYTES NFR BLD AUTO: 32.4 % (ref 19.6–45.3)
M PNEUMO IGG SER IA-ACNC: NOT DETECTED
MAGNESIUM SERPL-MCNC: 1.8 MG/DL (ref 1.6–2.6)
MCH RBC QN AUTO: 23.3 PG (ref 26.6–33)
MCHC RBC AUTO-ENTMCNC: 30.1 G/DL (ref 31.5–35.7)
MCV RBC AUTO: 77.6 FL (ref 79–97)
MONOCYTES # BLD AUTO: 0.37 10*3/MM3 (ref 0.1–0.9)
MONOCYTES NFR BLD AUTO: 8.4 % (ref 5–12)
NEUTROPHILS NFR BLD AUTO: 2.46 10*3/MM3 (ref 1.7–7)
NEUTROPHILS NFR BLD AUTO: 56.2 % (ref 42.7–76)
NRBC BLD AUTO-RTO: 0 /100 WBC (ref 0–0.2)
PLATELET # BLD AUTO: 188 10*3/MM3 (ref 140–450)
PMV BLD AUTO: 12.2 FL (ref 6–12)
POTASSIUM SERPL-SCNC: 4.2 MMOL/L (ref 3.5–5.2)
PROT SERPL-MCNC: 7.2 G/DL (ref 6–8.5)
PROTHROMBIN TIME: 13.4 SECONDS (ref 11.7–14.2)
QT INTERVAL: 363 MS
RBC # BLD AUTO: 4.33 10*6/MM3 (ref 3.77–5.28)
RHINOVIRUS RNA SPEC NAA+PROBE: NOT DETECTED
RSV RNA NPH QL NAA+NON-PROBE: NOT DETECTED
SARS-COV-2 RNA NPH QL NAA+NON-PROBE: NOT DETECTED
SODIUM SERPL-SCNC: 138 MMOL/L (ref 136–145)
TROPONIN T SERPL-MCNC: <0.01 NG/ML (ref 0–0.03)
WBC # BLD AUTO: 4.38 10*3/MM3 (ref 3.4–10.8)

## 2020-11-26 PROCEDURE — 85025 COMPLETE CBC W/AUTO DIFF WBC: CPT | Performed by: EMERGENCY MEDICINE

## 2020-11-26 PROCEDURE — 83735 ASSAY OF MAGNESIUM: CPT | Performed by: EMERGENCY MEDICINE

## 2020-11-26 PROCEDURE — 99285 EMERGENCY DEPT VISIT HI MDM: CPT

## 2020-11-26 PROCEDURE — G0378 HOSPITAL OBSERVATION PER HR: HCPCS

## 2020-11-26 PROCEDURE — 96375 TX/PRO/DX INJ NEW DRUG ADDON: CPT

## 2020-11-26 PROCEDURE — 96361 HYDRATE IV INFUSION ADD-ON: CPT

## 2020-11-26 PROCEDURE — 93010 ELECTROCARDIOGRAM REPORT: CPT | Performed by: INTERNAL MEDICINE

## 2020-11-26 PROCEDURE — 0202U NFCT DS 22 TRGT SARS-COV-2: CPT | Performed by: EMERGENCY MEDICINE

## 2020-11-26 PROCEDURE — 70450 CT HEAD/BRAIN W/O DYE: CPT

## 2020-11-26 PROCEDURE — 94799 UNLISTED PULMONARY SVC/PX: CPT

## 2020-11-26 PROCEDURE — 96374 THER/PROPH/DIAG INJ IV PUSH: CPT

## 2020-11-26 PROCEDURE — 80053 COMPREHEN METABOLIC PANEL: CPT | Performed by: EMERGENCY MEDICINE

## 2020-11-26 PROCEDURE — 25010000002 LORAZEPAM PER 2 MG: Performed by: INTERNAL MEDICINE

## 2020-11-26 PROCEDURE — 82962 GLUCOSE BLOOD TEST: CPT

## 2020-11-26 PROCEDURE — 84484 ASSAY OF TROPONIN QUANT: CPT | Performed by: EMERGENCY MEDICINE

## 2020-11-26 PROCEDURE — 94640 AIRWAY INHALATION TREATMENT: CPT

## 2020-11-26 PROCEDURE — 93005 ELECTROCARDIOGRAM TRACING: CPT | Performed by: EMERGENCY MEDICINE

## 2020-11-26 PROCEDURE — 25010000002 ONDANSETRON PER 1 MG: Performed by: INTERNAL MEDICINE

## 2020-11-26 PROCEDURE — 85610 PROTHROMBIN TIME: CPT | Performed by: EMERGENCY MEDICINE

## 2020-11-26 RX ORDER — ASPIRIN 81 MG/1
324 TABLET, CHEWABLE ORAL ONCE
Status: COMPLETED | OUTPATIENT
Start: 2020-11-26 | End: 2020-11-26

## 2020-11-26 RX ORDER — BUPRENORPHINE AND NALOXONE 8; 2 MG/1; MG/1
1 FILM, SOLUBLE BUCCAL; SUBLINGUAL DAILY
Status: DISCONTINUED | OUTPATIENT
Start: 2020-11-26 | End: 2020-11-27 | Stop reason: HOSPADM

## 2020-11-26 RX ORDER — SODIUM CHLORIDE 0.9 % (FLUSH) 0.9 %
10 SYRINGE (ML) INJECTION AS NEEDED
Status: DISCONTINUED | OUTPATIENT
Start: 2020-11-26 | End: 2020-11-27 | Stop reason: HOSPADM

## 2020-11-26 RX ORDER — FLUTICASONE PROPIONATE 50 MCG
2 SPRAY, SUSPENSION (ML) NASAL DAILY
Status: DISCONTINUED | OUTPATIENT
Start: 2020-11-26 | End: 2020-11-27 | Stop reason: HOSPADM

## 2020-11-26 RX ORDER — IBUPROFEN 800 MG/1
800 TABLET ORAL EVERY 8 HOURS PRN
COMMUNITY
End: 2020-11-27 | Stop reason: HOSPADM

## 2020-11-26 RX ORDER — LORAZEPAM 2 MG/ML
0.5 INJECTION INTRAMUSCULAR ONCE
Status: COMPLETED | OUTPATIENT
Start: 2020-11-26 | End: 2020-11-26

## 2020-11-26 RX ORDER — BUDESONIDE AND FORMOTEROL FUMARATE DIHYDRATE 80; 4.5 UG/1; UG/1
2 AEROSOL RESPIRATORY (INHALATION) 2 TIMES DAILY
Status: DISCONTINUED | OUTPATIENT
Start: 2020-11-26 | End: 2020-11-27 | Stop reason: HOSPADM

## 2020-11-26 RX ORDER — ONDANSETRON 2 MG/ML
4 INJECTION INTRAMUSCULAR; INTRAVENOUS EVERY 6 HOURS PRN
Status: DISCONTINUED | OUTPATIENT
Start: 2020-11-26 | End: 2020-11-27 | Stop reason: HOSPADM

## 2020-11-26 RX ORDER — SODIUM CHLORIDE 0.9 % (FLUSH) 0.9 %
10 SYRINGE (ML) INJECTION EVERY 12 HOURS SCHEDULED
Status: DISCONTINUED | OUTPATIENT
Start: 2020-11-26 | End: 2020-11-27 | Stop reason: HOSPADM

## 2020-11-26 RX ORDER — SODIUM CHLORIDE 9 MG/ML
125 INJECTION, SOLUTION INTRAVENOUS CONTINUOUS
Status: DISCONTINUED | OUTPATIENT
Start: 2020-11-26 | End: 2020-11-27

## 2020-11-26 RX ORDER — NICOTINE 21 MG/24HR
1 PATCH, TRANSDERMAL 24 HOURS TRANSDERMAL EVERY 24 HOURS
Status: DISCONTINUED | OUTPATIENT
Start: 2020-11-26 | End: 2020-11-27 | Stop reason: HOSPADM

## 2020-11-26 RX ORDER — ASPIRIN 81 MG/1
81 TABLET ORAL DAILY
Status: DISCONTINUED | OUTPATIENT
Start: 2020-11-26 | End: 2020-11-27 | Stop reason: HOSPADM

## 2020-11-26 RX ORDER — ATORVASTATIN CALCIUM 80 MG/1
80 TABLET, FILM COATED ORAL NIGHTLY
Status: DISCONTINUED | OUTPATIENT
Start: 2020-11-26 | End: 2020-11-27 | Stop reason: HOSPADM

## 2020-11-26 RX ORDER — AMLODIPINE BESYLATE 10 MG/1
10 TABLET ORAL DAILY
Status: DISCONTINUED | OUTPATIENT
Start: 2020-11-26 | End: 2020-11-27 | Stop reason: HOSPADM

## 2020-11-26 RX ADMIN — FLUTICASONE PROPIONATE 2 SPRAY: 50 SPRAY, METERED NASAL at 18:26

## 2020-11-26 RX ADMIN — ASPIRIN 81 MG: 81 TABLET, COATED ORAL at 21:41

## 2020-11-26 RX ADMIN — BUDESONIDE AND FORMOTEROL FUMARATE DIHYDRATE 2 PUFF: 80; 4.5 AEROSOL RESPIRATORY (INHALATION) at 20:38

## 2020-11-26 RX ADMIN — SODIUM CHLORIDE 125 ML/HR: 9 INJECTION, SOLUTION INTRAVENOUS at 18:05

## 2020-11-26 RX ADMIN — LORAZEPAM 0.5 MG: 2 INJECTION INTRAMUSCULAR; INTRAVENOUS at 14:52

## 2020-11-26 RX ADMIN — ONDANSETRON 4 MG: 2 INJECTION INTRAMUSCULAR; INTRAVENOUS at 20:18

## 2020-11-26 RX ADMIN — SODIUM CHLORIDE 500 ML: 9 INJECTION, SOLUTION INTRAVENOUS at 12:31

## 2020-11-26 RX ADMIN — ASPIRIN 324 MG: 81 TABLET, CHEWABLE ORAL at 14:06

## 2020-11-26 RX ADMIN — BUPRENORPHINE AND NALOXONE 1 FILM: 8; 2 FILM BUCCAL; SUBLINGUAL at 21:41

## 2020-11-26 RX ADMIN — METOPROLOL TARTRATE 25 MG: 25 TABLET, FILM COATED ORAL at 21:41

## 2020-11-26 RX ADMIN — SODIUM CHLORIDE 125 ML/HR: 9 INJECTION, SOLUTION INTRAVENOUS at 18:26

## 2020-11-26 RX ADMIN — SODIUM CHLORIDE 125 ML/HR: 9 INJECTION, SOLUTION INTRAVENOUS at 14:07

## 2020-11-27 ENCOUNTER — APPOINTMENT (OUTPATIENT)
Dept: MRI IMAGING | Facility: HOSPITAL | Age: 45
End: 2020-11-27

## 2020-11-27 ENCOUNTER — APPOINTMENT (OUTPATIENT)
Dept: CARDIOLOGY | Facility: HOSPITAL | Age: 45
End: 2020-11-27

## 2020-11-27 ENCOUNTER — APPOINTMENT (OUTPATIENT)
Dept: CT IMAGING | Facility: HOSPITAL | Age: 45
End: 2020-11-27

## 2020-11-27 VITALS
OXYGEN SATURATION: 93 % | BODY MASS INDEX: 41.1 KG/M2 | RESPIRATION RATE: 18 BRPM | TEMPERATURE: 98 F | DIASTOLIC BLOOD PRESSURE: 61 MMHG | SYSTOLIC BLOOD PRESSURE: 117 MMHG | HEART RATE: 74 BPM | WEIGHT: 255.73 LBS | HEIGHT: 66 IN

## 2020-11-27 PROBLEM — H81.22: Status: ACTIVE | Noted: 2020-11-27

## 2020-11-27 LAB
ALBUMIN SERPL-MCNC: 3.6 G/DL (ref 3.5–5.2)
ALBUMIN/GLOB SERPL: 1.1 G/DL
ALP SERPL-CCNC: 100 U/L (ref 39–117)
ALT SERPL W P-5'-P-CCNC: 25 U/L (ref 1–33)
ANION GAP SERPL CALCULATED.3IONS-SCNC: 4.9 MMOL/L (ref 5–15)
AORTIC ARCH: 2.9 CM
AORTIC DIMENSIONLESS INDEX: 0.9 (DI)
ASCENDING AORTA: 2.8 CM
AST SERPL-CCNC: 26 U/L (ref 1–32)
BH CV ECHO MEAS - ACS: 2 CM
BH CV ECHO MEAS - AO ACC SLOPE: 0 CM/SEC^2
BH CV ECHO MEAS - AO ACC TIME: 0.11 SEC
BH CV ECHO MEAS - AO MAX PG (FULL): 5.4 MMHG
BH CV ECHO MEAS - AO MAX PG: 13.4 MMHG
BH CV ECHO MEAS - AO MEAN PG (FULL): 3 MMHG
BH CV ECHO MEAS - AO MEAN PG: 7 MMHG
BH CV ECHO MEAS - AO ROOT AREA (BSA CORRECTED): 1.4
BH CV ECHO MEAS - AO ROOT AREA: 7.3 CM^2
BH CV ECHO MEAS - AO ROOT DIAM: 3.1 CM
BH CV ECHO MEAS - AO V2 MAX: 183 CM/SEC
BH CV ECHO MEAS - AO V2 MEAN: 119 CM/SEC
BH CV ECHO MEAS - AO V2 VTI: 36.4 CM
BH CV ECHO MEAS - ASC AORTA: 2.8 CM
BH CV ECHO MEAS - AVA(I,A): 2.7 CM^2
BH CV ECHO MEAS - AVA(I,D): 2.7 CM^2
BH CV ECHO MEAS - AVA(V,A): 2.4 CM^2
BH CV ECHO MEAS - AVA(V,D): 2.4 CM^2
BH CV ECHO MEAS - BSA(HAYCOCK): 2.4 M^2
BH CV ECHO MEAS - BSA: 2.2 M^2
BH CV ECHO MEAS - BZI_BMI: 41.6 KILOGRAMS/M^2
BH CV ECHO MEAS - BZI_METRIC_HEIGHT: 167 CM
BH CV ECHO MEAS - BZI_METRIC_WEIGHT: 116 KG
BH CV ECHO MEAS - CONTRAST EF (2CH): 84.4 CM2
BH CV ECHO MEAS - CONTRAST EF 4CH: 83.8 CM2
BH CV ECHO MEAS - EDV(CUBED): 68.9 ML
BH CV ECHO MEAS - EDV(MOD-SP2): 147 ML
BH CV ECHO MEAS - EDV(MOD-SP4): 154 ML
BH CV ECHO MEAS - EDV(TEICH): 74.2 ML
BH CV ECHO MEAS - EF(CUBED): 82.3 %
BH CV ECHO MEAS - EF(MOD-BP): 83 %
BH CV ECHO MEAS - EF(TEICH): 75.6 %
BH CV ECHO MEAS - ESV(CUBED): 12.2 ML
BH CV ECHO MEAS - ESV(MOD-SP2): 23 ML
BH CV ECHO MEAS - ESV(MOD-SP4): 25 ML
BH CV ECHO MEAS - ESV(TEICH): 18.1 ML
BH CV ECHO MEAS - FS: 43.9 %
BH CV ECHO MEAS - IVS/LVPW: 1.1
BH CV ECHO MEAS - IVSD: 1.2 CM
BH CV ECHO MEAS - LV DIASTOLIC VOL/BSA (35-75): 69.5 ML/M^2
BH CV ECHO MEAS - LV MASS(C)D: 156.3 GRAMS
BH CV ECHO MEAS - LV MASS(C)DI: 70.6 GRAMS/M^2
BH CV ECHO MEAS - LV MAX PG: 8 MMHG
BH CV ECHO MEAS - LV MEAN PG: 4 MMHG
BH CV ECHO MEAS - LV SYSTOLIC VOL/BSA (12-30): 11.3 ML/M^2
BH CV ECHO MEAS - LV V1 MAX: 141 CM/SEC
BH CV ECHO MEAS - LV V1 MEAN: 91.4 CM/SEC
BH CV ECHO MEAS - LV V1 VTI: 31.7 CM
BH CV ECHO MEAS - LVIDD: 4.1 CM
BH CV ECHO MEAS - LVIDS: 2.3 CM
BH CV ECHO MEAS - LVLD AP2: 9.2 CM
BH CV ECHO MEAS - LVLD AP4: 9.4 CM
BH CV ECHO MEAS - LVLS AP2: 5.5 CM
BH CV ECHO MEAS - LVLS AP4: 6.7 CM
BH CV ECHO MEAS - LVOT AREA (M): 3.1 CM^2
BH CV ECHO MEAS - LVOT AREA: 3.1 CM^2
BH CV ECHO MEAS - LVOT DIAM: 2 CM
BH CV ECHO MEAS - LVPWD: 1.1 CM
BH CV ECHO MEAS - MV A DUR: 0.19 SEC
BH CV ECHO MEAS - MV A MAX VEL: 88.8 CM/SEC
BH CV ECHO MEAS - MV DEC SLOPE: 350 CM/SEC^2
BH CV ECHO MEAS - MV DEC TIME: 299 SEC
BH CV ECHO MEAS - MV E MAX VEL: 109 CM/SEC
BH CV ECHO MEAS - MV E/A: 1.2
BH CV ECHO MEAS - MV MEAN PG: 3 MMHG
BH CV ECHO MEAS - MV P1/2T MAX VEL: 127 CM/SEC
BH CV ECHO MEAS - MV P1/2T: 106.3 MSEC
BH CV ECHO MEAS - MV V2 MEAN: 79.3 CM/SEC
BH CV ECHO MEAS - MV V2 VTI: 45.5 CM
BH CV ECHO MEAS - MVA P1/2T LCG: 1.7 CM^2
BH CV ECHO MEAS - MVA(P1/2T): 2.1 CM^2
BH CV ECHO MEAS - MVA(VTI): 2.2 CM^2
BH CV ECHO MEAS - PA ACC SLOPE: 11.9 CM/SEC^2
BH CV ECHO MEAS - PA ACC TIME: 0.13 SEC
BH CV ECHO MEAS - PA MAX PG: 5.9 MMHG
BH CV ECHO MEAS - PA PR(ACCEL): 20.5 MMHG
BH CV ECHO MEAS - PA V2 MAX: 121.5 CM/SEC
BH CV ECHO MEAS - PI END-D VEL: 109 CM/SEC
BH CV ECHO MEAS - PULM A REVS DUR: 0.12 SEC
BH CV ECHO MEAS - PULM A REVS VEL: 21.4 CM/SEC
BH CV ECHO MEAS - PULM DIAS VEL: 31.5 CM/SEC
BH CV ECHO MEAS - PULM S/D: 1.6
BH CV ECHO MEAS - PULM SYS VEL: 49.6 CM/SEC
BH CV ECHO MEAS - QP/QS: 1.1
BH CV ECHO MEAS - RAP SYSTOLE: 5 MMHG
BH CV ECHO MEAS - RV MEAN PG: 1 MMHG
BH CV ECHO MEAS - RV V1 MEAN: 46.9 CM/SEC
BH CV ECHO MEAS - RV V1 VTI: 18.3 CM
BH CV ECHO MEAS - RVOT AREA: 5.7 CM^2
BH CV ECHO MEAS - RVOT DIAM: 2.7 CM
BH CV ECHO MEAS - RVSP: 34 MMHG
BH CV ECHO MEAS - SI(AO): 120.1 ML/M^2
BH CV ECHO MEAS - SI(CUBED): 25.6 ML/M^2
BH CV ECHO MEAS - SI(LVOT): 45 ML/M^2
BH CV ECHO MEAS - SI(MOD-SP2): 56 ML/M^2
BH CV ECHO MEAS - SI(MOD-SP4): 58.3 ML/M^2
BH CV ECHO MEAS - SI(TEICH): 25.3 ML/M^2
BH CV ECHO MEAS - SUP REN AO DIAM: 2.4 CM
BH CV ECHO MEAS - SV(AO): 265.9 ML
BH CV ECHO MEAS - SV(CUBED): 56.8 ML
BH CV ECHO MEAS - SV(LVOT): 99.6 ML
BH CV ECHO MEAS - SV(MOD-SP2): 124 ML
BH CV ECHO MEAS - SV(MOD-SP4): 129 ML
BH CV ECHO MEAS - SV(RVOT): 104.8 ML
BH CV ECHO MEAS - SV(TEICH): 56.1 ML
BH CV ECHO MEAS - TAPSE (>1.6): 2.2 CM
BH CV ECHO MEAS - TR MAX VEL: 270 CM/SEC
BH CV VAS BP RIGHT ARM: NORMAL MMHG
BH CV XLRA - RV BASE: 3.7 CM
BH CV XLRA - RV LENGTH: 3.7 CM
BH CV XLRA - RV MID: 3.2 CM
BH CV XLRA - TDI S': 15.5 CM/SEC
BILIRUB SERPL-MCNC: 0.2 MG/DL (ref 0–1.2)
BUN SERPL-MCNC: 9 MG/DL (ref 6–20)
BUN/CREAT SERPL: 13.2 (ref 7–25)
CALCIUM SPEC-SCNC: 8.1 MG/DL (ref 8.6–10.5)
CHLORIDE SERPL-SCNC: 108 MMOL/L (ref 98–107)
CHOLEST SERPL-MCNC: 133 MG/DL (ref 0–200)
CO2 SERPL-SCNC: 26.1 MMOL/L (ref 22–29)
CREAT SERPL-MCNC: 0.68 MG/DL (ref 0.57–1)
DEPRECATED RDW RBC AUTO: 41.8 FL (ref 37–54)
ERYTHROCYTE [DISTWIDTH] IN BLOOD BY AUTOMATED COUNT: 15 % (ref 12.3–15.4)
GFR SERPL CREATININE-BSD FRML MDRD: 94 ML/MIN/1.73
GLOBULIN UR ELPH-MCNC: 3.2 GM/DL
GLUCOSE BLDC GLUCOMTR-MCNC: 108 MG/DL (ref 70–130)
GLUCOSE BLDC GLUCOMTR-MCNC: 123 MG/DL (ref 70–130)
GLUCOSE BLDC GLUCOMTR-MCNC: 169 MG/DL (ref 70–130)
GLUCOSE SERPL-MCNC: 113 MG/DL (ref 65–99)
HBA1C MFR BLD: 5 % (ref 4.8–5.6)
HCT VFR BLD AUTO: 31 % (ref 34–46.6)
HDLC SERPL-MCNC: 35 MG/DL (ref 40–60)
HGB BLD-MCNC: 9.3 G/DL (ref 12–15.9)
LDLC SERPL CALC-MCNC: 83 MG/DL (ref 0–100)
LDLC/HDLC SERPL: 2.36 {RATIO}
LEFT ATRIUM VOLUME INDEX: 24.4 ML/M2
MAXIMAL PREDICTED HEART RATE: 177 BPM
MCH RBC QN AUTO: 23.1 PG (ref 26.6–33)
MCHC RBC AUTO-ENTMCNC: 30 G/DL (ref 31.5–35.7)
MCV RBC AUTO: 77.1 FL (ref 79–97)
PLATELET # BLD AUTO: 143 10*3/MM3 (ref 140–450)
PMV BLD AUTO: 12 FL (ref 6–12)
POTASSIUM SERPL-SCNC: 3.6 MMOL/L (ref 3.5–5.2)
PROT SERPL-MCNC: 6.8 G/DL (ref 6–8.5)
RBC # BLD AUTO: 4.02 10*6/MM3 (ref 3.77–5.28)
SINUS: 3.1 CM
SODIUM SERPL-SCNC: 139 MMOL/L (ref 136–145)
STJ: 2.5 CM
STRESS TARGET HR: 150 BPM
TRIGL SERPL-MCNC: 77 MG/DL (ref 0–150)
VLDLC SERPL-MCNC: 15 MG/DL (ref 5–40)
WBC # BLD AUTO: 4.11 10*3/MM3 (ref 3.4–10.8)

## 2020-11-27 PROCEDURE — 85027 COMPLETE CBC AUTOMATED: CPT | Performed by: INTERNAL MEDICINE

## 2020-11-27 PROCEDURE — G0378 HOSPITAL OBSERVATION PER HR: HCPCS

## 2020-11-27 PROCEDURE — 25010000002 LORAZEPAM PER 2 MG: Performed by: INTERNAL MEDICINE

## 2020-11-27 PROCEDURE — 93306 TTE W/DOPPLER COMPLETE: CPT

## 2020-11-27 PROCEDURE — 96361 HYDRATE IV INFUSION ADD-ON: CPT

## 2020-11-27 PROCEDURE — 94799 UNLISTED PULMONARY SVC/PX: CPT

## 2020-11-27 PROCEDURE — 0 IOPAMIDOL PER 1 ML: Performed by: INTERNAL MEDICINE

## 2020-11-27 PROCEDURE — 99214 OFFICE O/P EST MOD 30 MIN: CPT | Performed by: PSYCHIATRY & NEUROLOGY

## 2020-11-27 PROCEDURE — 96376 TX/PRO/DX INJ SAME DRUG ADON: CPT

## 2020-11-27 PROCEDURE — 70498 CT ANGIOGRAPHY NECK: CPT

## 2020-11-27 PROCEDURE — 80061 LIPID PANEL: CPT | Performed by: INTERNAL MEDICINE

## 2020-11-27 PROCEDURE — 70551 MRI BRAIN STEM W/O DYE: CPT

## 2020-11-27 PROCEDURE — 70496 CT ANGIOGRAPHY HEAD: CPT

## 2020-11-27 PROCEDURE — 93306 TTE W/DOPPLER COMPLETE: CPT | Performed by: INTERNAL MEDICINE

## 2020-11-27 PROCEDURE — 25010000002 LORAZEPAM PER 2 MG: Performed by: PSYCHIATRY & NEUROLOGY

## 2020-11-27 PROCEDURE — 82962 GLUCOSE BLOOD TEST: CPT

## 2020-11-27 PROCEDURE — 25010000002 PERFLUTREN (DEFINITY) 8.476 MG IN SODIUM CHLORIDE 0.9 % 10 ML INJECTION: Performed by: INTERNAL MEDICINE

## 2020-11-27 PROCEDURE — 83036 HEMOGLOBIN GLYCOSYLATED A1C: CPT | Performed by: INTERNAL MEDICINE

## 2020-11-27 PROCEDURE — 80053 COMPREHEN METABOLIC PANEL: CPT | Performed by: INTERNAL MEDICINE

## 2020-11-27 RX ORDER — AMLODIPINE BESYLATE 5 MG/1
10 TABLET ORAL DAILY
Start: 2020-11-27 | End: 2020-12-10 | Stop reason: ALTCHOICE

## 2020-11-27 RX ORDER — LORAZEPAM 2 MG/ML
0.5 INJECTION INTRAMUSCULAR ONCE
Status: COMPLETED | OUTPATIENT
Start: 2020-11-27 | End: 2020-11-27

## 2020-11-27 RX ORDER — LORAZEPAM 2 MG/ML
1 INJECTION INTRAMUSCULAR ONCE AS NEEDED
Status: COMPLETED | OUTPATIENT
Start: 2020-11-27 | End: 2020-11-27

## 2020-11-27 RX ORDER — ACETAMINOPHEN 325 MG/1
650 TABLET ORAL EVERY 6 HOURS PRN
Status: DISCONTINUED | OUTPATIENT
Start: 2020-11-27 | End: 2020-11-27 | Stop reason: HOSPADM

## 2020-11-27 RX ORDER — VENLAFAXINE HYDROCHLORIDE 150 MG/1
150 CAPSULE, EXTENDED RELEASE ORAL DAILY
Status: DISCONTINUED | OUTPATIENT
Start: 2020-11-27 | End: 2020-11-27 | Stop reason: HOSPADM

## 2020-11-27 RX ADMIN — SODIUM CHLORIDE, PRESERVATIVE FREE 10 ML: 5 INJECTION INTRAVENOUS at 10:25

## 2020-11-27 RX ADMIN — BUDESONIDE AND FORMOTEROL FUMARATE DIHYDRATE 2 PUFF: 80; 4.5 AEROSOL RESPIRATORY (INHALATION) at 11:02

## 2020-11-27 RX ADMIN — LORAZEPAM 1 MG: 2 INJECTION INTRAMUSCULAR; INTRAVENOUS at 14:27

## 2020-11-27 RX ADMIN — METOPROLOL TARTRATE 25 MG: 25 TABLET, FILM COATED ORAL at 16:58

## 2020-11-27 RX ADMIN — AMLODIPINE BESYLATE 10 MG: 10 TABLET ORAL at 10:24

## 2020-11-27 RX ADMIN — SODIUM CHLORIDE 125 ML/HR: 9 INJECTION, SOLUTION INTRAVENOUS at 04:50

## 2020-11-27 RX ADMIN — ASPIRIN 81 MG: 81 TABLET, COATED ORAL at 10:24

## 2020-11-27 RX ADMIN — METOPROLOL TARTRATE 25 MG: 25 TABLET, FILM COATED ORAL at 06:41

## 2020-11-27 RX ADMIN — BUPRENORPHINE AND NALOXONE 1 FILM: 8; 2 FILM BUCCAL; SUBLINGUAL at 10:24

## 2020-11-27 RX ADMIN — LORAZEPAM 0.5 MG: 2 INJECTION INTRAMUSCULAR; INTRAVENOUS at 12:14

## 2020-11-27 RX ADMIN — IOPAMIDOL 95 ML: 755 INJECTION, SOLUTION INTRAVENOUS at 09:52

## 2020-11-27 RX ADMIN — FLUTICASONE PROPIONATE 2 SPRAY: 50 SPRAY, METERED NASAL at 10:25

## 2020-11-27 RX ADMIN — PERFLUTREN 3 ML: 6.52 INJECTION, SUSPENSION INTRAVENOUS at 09:45

## 2020-11-27 RX ADMIN — VENLAFAXINE HYDROCHLORIDE 150 MG: 150 CAPSULE, EXTENDED RELEASE ORAL at 16:58

## 2020-11-28 ENCOUNTER — READMISSION MANAGEMENT (OUTPATIENT)
Dept: CALL CENTER | Facility: HOSPITAL | Age: 45
End: 2020-11-28

## 2020-11-28 NOTE — OUTREACH NOTE
Prep Survey      Responses   [facility] facility patient discharged from?  [place]   Is LACE score < 7 ?  No   Eligibility  Readm Mgmt   Discharge diagnosis  Vestibular neuritis   Does the patient have one of the following disease processes/diagnoses(primary or secondary)?  Other   Does the patient have Home health ordered?  No   Is there a DME ordered?  No   Prep survey completed?  Yes          Lucial Stewart RN

## 2020-12-02 ENCOUNTER — READMISSION MANAGEMENT (OUTPATIENT)
Dept: CALL CENTER | Facility: HOSPITAL | Age: 45
End: 2020-12-02

## 2020-12-02 NOTE — OUTREACH NOTE
Medical Week 1 Survey      Responses   [facility] patient discharged from?  [place]   Does the patient have one of the following disease processes/diagnoses(primary or secondary)?  Other   Week 1 attempt successful?  Yes   Call start time  1246   Call end time  1305   Discharge diagnosis  Vestibular neuritis   Is patient permission given to speak with other caregiver?  No   List who call center can speak with  Patient only   Meds reviewed with patient/caregiver?  Yes   Is the patient having any side effects they believe may be caused by any medication additions or changes?  No   Does the patient have all medications ordered at discharge?  Yes   Is the patient taking all medications as directed (includes completed medication regime)?  Yes   Medication comments  Takes Amlodpine 10 mg daily in the am   Does the patient have a primary care provider?   No   PCP Nursing Intervention  Connected with [facility] for PCP   Does the patient have an appointment with their PCP within 7 days of discharge?  No   What is preventing the patient from scheduling follow up appointments within 7 days of discharge?  Unsure of when or with whom   Has the patient kept scheduled appointments due by today?  N/A   Has home health visited the patient within 72 hours of discharge?  N/A   Did the patient receive a copy of their discharge instructions?  Yes   Nursing interventions  Reviewed instructions with patient, Educated on MyChart   What is the patient's perception of their health status since discharge?  Improving   Is the patient/caregiver able to teach back the hierarchy of who to call/visit for symptoms/problems? PCP, Specialist, Home health nurse, Urgent Care, ED, 911  Yes   If the patient is a current smoker, are they able to teach back resources for cessation?  Not a smoker   Additional teach back comments  Reviewed s/s and B/P readings for EMS   Week 1 call completed?  Yes          Ginette Starks RN

## 2020-12-10 ENCOUNTER — OFFICE VISIT (OUTPATIENT)
Dept: CARDIOLOGY | Facility: CLINIC | Age: 45
End: 2020-12-10

## 2020-12-10 VITALS
HEART RATE: 82 BPM | BODY MASS INDEX: 41.78 KG/M2 | SYSTOLIC BLOOD PRESSURE: 138 MMHG | DIASTOLIC BLOOD PRESSURE: 100 MMHG | HEIGHT: 66 IN | WEIGHT: 260 LBS

## 2020-12-10 DIAGNOSIS — R00.2 PALPITATIONS: Primary | ICD-10-CM

## 2020-12-10 DIAGNOSIS — I10 ESSENTIAL HYPERTENSION: ICD-10-CM

## 2020-12-10 PROCEDURE — 99214 OFFICE O/P EST MOD 30 MIN: CPT | Performed by: INTERNAL MEDICINE

## 2020-12-10 PROCEDURE — 93000 ELECTROCARDIOGRAM COMPLETE: CPT | Performed by: INTERNAL MEDICINE

## 2020-12-10 RX ORDER — AMLODIPINE BESYLATE 10 MG/1
10 TABLET ORAL DAILY
COMMUNITY
Start: 2020-11-25 | End: 2020-12-30

## 2020-12-10 NOTE — PROGRESS NOTES
Date of Office Visit: 12/10/20  Encounter Provider: Raphael Chavis MD  Place of Service: [facility] CARDIOLOGY  Patient Name: Silvia Velazquez  :1975    Chief Complaint   Patient presents with   • Follow-up     1 year   • Palpitations   • Dizziness   :     HPI:   45 y.o. female who presents today for follow-up.  Old records have been obtained and reviewed by me.  She is a patient with a medical history of bipolar disorder, GERD, chronic pain syndrome, hepatitis C, and polysubstance abuse.  In 2019, she presented to the ER with complaints of chest pain, a productive cough with green sputum, and dizziness.  She was noted to be in a sinus tachycardia.  Her biomarkers in the ER were indeterminate at 0.037, her proBNP was normal, she had no leukocytosis, her transaminases were mildly elevated.  She does have a history of hepatitis C.  Per the patient's report, she had used methamphetamine prior to her arrival.  She was given antibiotics by internal medicine for bronchitis.  A 2-week ZIO patch was placed.  She had a negative stress test and was felt to be appropriate for discharge on 2019.  On 2019, she again presented to the ED with complaints of left anterior upper chest tightness.  Her blood work, troponin, EKG, and chest x-ray were all on revealing and she was discharged home with instructions to follow-up with Dr. Chavis in regards to her ZIO patch and to find a PCP for outpatient follow-up.  On 2019 she again presented to the ED with complaints of palpitations and associated chest pain and shortness of air.  Workup was again unrevealing.  Evidently there was an issue with the first Zio patch and it fell off three days after it was placed.      The patient had multiple complaints of lightheadedness, palpitations, elevated blood pressure, and just not feeling well. Her blood pressure today is 138/100, which is certainly a little bit up.            Past Medical History:   Diagnosis Date   • Chest pain    • Depression    • Drug abstinence syndrome (CMS/HCC)    • Drug abuse (CMS/HCC)    • Ex-smoker    • Heart attack (CMS/HCC)    • Hepatitis C    • Hepatitis C    • Hypertension    • Kidney stone    • Lupus (CMS/HCC)    • Lupus (systemic lupus erythematosus) (CMS/HCC)    • Mitral valve anterior leaflet prolapse    • NSTEMI (non-ST elevated myocardial infarction) (CMS/HCC)    • Otitis    • Palpitations    • Seizures (CMS/HCC)    • Spinal epidural abscess    • Tachycardia        Past Surgical History:   Procedure Laterality Date   • BACK SURGERY     • CHOLECYSTECTOMY     • LEG SURGERY     • LIVER BIOPSY     • LUMBAR LAMINECTOMY DISCECTOMY DECOMPRESSION Left 2018    Procedure: Posterior lumbar three through sacrum decompression;  Surgeon: Tevin Whitley MD;  Location: [facility];  Service: Neurosurgery   • LYMPH NODE BIOPSY     • SPINE SURGERY         Social History     Socioeconomic History   • Marital status: Legally      Spouse name: Not on file   • Number of children: Not on file   • Years of education: Not on file   • Highest education level: Not on file   Tobacco Use   • Smoking status: Former Smoker     Packs/day: 1.00     Years: 30.00     Pack years: 30.00     Types: Cigarettes     Quit date: 2020     Years since quittin.2   • Smokeless tobacco: Never Used   Substance and Sexual Activity   • Alcohol use: No     Frequency: Never   • Drug use: Yes     Types: IV, Heroin, Methamphetamines     Comment: pt states she no longer uses heroin, she now uses meth   • Sexual activity: Defer       Family History   Problem Relation Age of Onset   • No Known Problems Mother    • No Known Problems Father        Review of Systems   Constitution: Positive for malaise/fatigue. Negative for chills and fever.   Cardiovascular: Positive for chest pain and palpitations. Negative for dyspnea on exertion, leg swelling, near-syncope, orthopnea,  "paroxysmal nocturnal dyspnea and syncope.   Respiratory: Positive for shortness of breath. Negative for cough.    Musculoskeletal: Negative for joint pain, joint swelling and myalgias.   Gastrointestinal: Negative for abdominal pain, diarrhea, melena, nausea and vomiting.   Genitourinary: Negative for frequency and hematuria.   Neurological: Positive for dizziness and light-headedness. Negative for numbness, paresthesias and seizures.   Allergic/Immunologic: Negative.    All other systems reviewed and are negative.      Allergies   Allergen Reactions   • Sulfa Antibiotics Nausea And Vomiting and Swelling     Patient states when she took Sulfa medication, her throat started to swell.         Current Outpatient Medications:   •  amLODIPine (NORVASC) 10 MG tablet, Take 10 mg by mouth Daily., Disp: , Rfl:   •  aspirin 81 MG EC tablet, Take 1 tablet by mouth Daily., Disp: 30 tablet, Rfl: 0  •  budesonide-formoterol (SYMBICORT) 80-4.5 MCG/ACT inhaler, Inhale 2 puffs 2 (Two) Times a Day., Disp: 6.9 g, Rfl: 2  •  buprenorphine-naloxone (SUBOXONE) 8-2 MG per SL tablet, Place 1 tablet under the tongue 2 (Two) Times a Day., Disp: , Rfl:   •  fluticasone (FLONASE) 50 MCG/ACT nasal spray, 2 sprays into the nostril(s) as directed by provider Daily., Disp: 15.8 mL, Rfl: 0  •  metoprolol tartrate (LOPRESSOR) 25 MG tablet, TAKE 1 TABLET BY MOUTH EVERY 12 HOURS, Disp: 60 tablet, Rfl: 5  •  nicotine (NICODERM CQ) 14 MG/24HR patch, Place 1 patch on the skin as directed by provider Daily., Disp: 14 patch, Rfl: 0  •  OLANZapine (ZYPREXA) 15 MG tablet, Take 1 tablet by mouth Every Night., Disp: 30 tablet, Rfl: 0  •  venlafaxine XR (EFFEXOR XR) 150 MG 24 hr capsule, Take 1 capsule by mouth Daily., Disp: 30 capsule, Rfl: 0      Objective:     Vitals:    12/10/20 1400   Height: 167 cm (65.75\")     Body mass index is 41.59 kg/m².    PHYSICAL EXAM:    Physical Exam   Constitutional: She is oriented to person, place, and time. She appears " well-developed and well-nourished. No distress.   HENT:   Head: Normocephalic and atraumatic.   Eyes: Pupils are equal, round, and reactive to light.   Neck: No JVD present. No thyromegaly present.   Cardiovascular: Normal rate, regular rhythm, normal heart sounds and intact distal pulses.   No murmur heard.  Pulmonary/Chest: Effort normal and breath sounds normal. No respiratory distress.   Abdominal: Soft. Bowel sounds are normal. She exhibits no distension. There is no splenomegaly or hepatomegaly. There is no abdominal tenderness.   Musculoskeletal: Normal range of motion.         General: No edema.   Neurological: She is alert and oriented to person, place, and time.   Skin: Skin is warm and dry. She is not diaphoretic. No erythema.   Psychiatric: Her behavior is normal. Judgment normal. Her mood appears anxious.         ECG 12 Lead    Date/Time: 12/10/2020 2:24 PM  Performed by: Raphael Chavis MD  Authorized by: Raphael Chavis MD   Comparison: compared with previous ECG from 7/27/2019  Similar to previous ECG  Rhythm: sinus rhythm  Rate: normal  QRS axis: normal    Clinical impression: normal ECG               11/27/2020  · Saline test results are negative.  · Estimated left ventricular EF was in disagreement with the calculated left ventricular EF. Left ventricular ejection fraction appears to be 61 - 65%. Left ventricular systolic function is normal.  · Left ventricular diastolic function was normal.  · Estimated right ventricular systolic pressure from tricuspid regurgitation is normal (<35 mmHg). Calculated right ventricular systolic pressure from tricuspid regurgitation is 34 mmHg.    3/2019  · Findings consistent with a normal ECG stress test.  · Left ventricular ejection fraction is normal (Calculated EF = 60%).  · Myocardial perfusion imaging indicates a normal myocardial perfusion study with no evidence of ischemia.  · Impressions are consistent with a low risk study.      Assessment:       No diagnosis found.  No orders of the defined types were placed in this encounter.    Plan:         A very pleasant 45-year-old female with a medical history as documented above including essential hypertension, palpitations, hepatitis C, polysubstance abuse and chronic pain syndrome who presents back to me in followup. Her blood pressure is elevated and not quite at goal. In addition, she complains of palpitations and intermittent tachycardia and has not worn a Zio patch in the past due to multiple issues wearing it.     1. Palpitations/tachycardia: I do not think this is anything significant. I think it is reasonable to go ahead and double down on her metoprolol tartrate therapy to 50 mg p.o. q.12 h. No monitor indicated at this time.   2. Essential hypertension: Not quite at goal. I think the increase in the metoprolol tartrate along with continuing the amlodipine would be a reasonable approach and should take care of things for us. I have also handwritten her a prescription for a blood pressure cuff at her request.    I will see her back in 6 mo

## 2020-12-11 ENCOUNTER — READMISSION MANAGEMENT (OUTPATIENT)
Dept: CALL CENTER | Facility: HOSPITAL | Age: 45
End: 2020-12-11

## 2020-12-11 NOTE — OUTREACH NOTE
Medical Week 2 Survey      Responses   [facility] patient discharged from?  [place]   Does the patient have one of the following disease processes/diagnoses(primary or secondary)?  Other   Week 2 attempt successful?  No   Unsuccessful attempts  Attempt 1          Kady Shepherd RN

## 2020-12-15 ENCOUNTER — TELEPHONE (OUTPATIENT)
Dept: CARDIOLOGY | Facility: CLINIC | Age: 45
End: 2020-12-15

## 2020-12-15 NOTE — TELEPHONE ENCOUNTER
Pt called. She said that with the increase from Metoprolol Tart 25mg bid to 50mg bid he BP is not really better. You also stopped her Amlodipine.    Her BP is running 140/100 and HR 79-85.  Please advise.    Thanks,  Nayla      On 12/10/20:    Plan:         A very pleasant 45-year-old female with a medical history as documented above including essential hypertension, palpitations, hepatitis C, polysubstance abuse and chronic pain syndrome who presents back to me in followup. Her blood pressure is elevated and not quite at goal. In addition, she complains of palpitations and intermittent tachycardia and has not worn a Zio patch in the past due to multiple issues wearing it.      1. Palpitations/tachycardia: I do not think this is anything significant. I think it is reasonable to go ahead and double down on her metoprolol tartrate therapy to 50 mg p.o. q.12 h. No monitor indicated at this time.   2. Essential hypertension: Not quite at goal. I think the increase in the metoprolol tartrate along with continuing the amlodipine would be a reasonable approach and should take care of things for us. I have also handwritten her a prescription for a blood pressure cuff at her request.

## 2020-12-17 NOTE — TELEPHONE ENCOUNTER
Patient states you told her to stop the amlodipine because of swelling in her ankles and feet. You told her this was maxed out. So you increased her metoprolol tart to 50mg 1 po bid    States since she has stopped the amlodipine the swelling gone and her BP is running BP running 140/112   136/93 pulse 89 low 110/72 pulse 79  After taking med. BP will go down.    Needs a refill of Metoporolol 50mg 1 po bid    [phone]

## 2020-12-18 RX ORDER — METOPROLOL TARTRATE 100 MG/1
100 TABLET ORAL 2 TIMES DAILY
Qty: 180 TABLET | Refills: 3 | Status: SHIPPED | OUTPATIENT
Start: 2020-12-18 | End: 2021-09-07 | Stop reason: SDUPTHER

## 2020-12-25 ENCOUNTER — TELEPHONE (OUTPATIENT)
Dept: CARDIOLOGY | Facility: CLINIC | Age: 45
End: 2020-12-25

## 2020-12-25 NOTE — TELEPHONE ENCOUNTER
I am the on-call provider.  Patient called the answering service.  She does not feel right.  She felt flushed, sweaty and dizzy and her right arm was tingling granted she has had this before and attributed it to her back issues and the right arm tingling went away.  She checked her blood pressure was 148/98 even after taking her 100 mg metoprolol tartrate this morning.  She went ahead and took amlodipine 10 mg about an hour ago and about 40 minutes later her blood pressure was rechecked in 149/105.  She has a headache her head feels swollen.  On the phone with me she rechecked her blood pressure was 134/95 with a heart rate of 80 and she still just didn't feel good.  I asked her to try to rest and relax and she could take an additional half metoprolol tablet  (50 mg) if BP still remained elevated and see if she felt better.  If she continued to feel poorly she was considering being evaluated in the ED since she believes her BP meds are not holding her blood pressure down and she gets symptomatic with her hypertension. Of note she just saw Dr. Chavis 12/10/2020 and had numerous complaints at that time they have been adjusting her BP meds over the phone for better control.    I informed her that she should call the office Monday for appt. to be reevaluated in the office sometime next week to address her blood pressure control and have her home cuff checked for accuracy.    Zuri- can you please follow up on this since I am out all next week?

## 2020-12-28 NOTE — TELEPHONE ENCOUNTER
Called the pt and she is still having issues with her bp.  I have scheduled her to be seen on Wednesday by you for further eval.  I have asked she bring her cuff in that day to be checked.  Thanks  Daisy Mena RN  Triage nurse

## 2020-12-30 ENCOUNTER — OFFICE VISIT (OUTPATIENT)
Dept: CARDIOLOGY | Facility: CLINIC | Age: 45
End: 2020-12-30

## 2020-12-30 VITALS
DIASTOLIC BLOOD PRESSURE: 96 MMHG | RESPIRATION RATE: 18 BRPM | HEART RATE: 71 BPM | HEIGHT: 66 IN | SYSTOLIC BLOOD PRESSURE: 126 MMHG | BODY MASS INDEX: 42.11 KG/M2 | WEIGHT: 262 LBS | OXYGEN SATURATION: 98 %

## 2020-12-30 DIAGNOSIS — I10 ESSENTIAL HYPERTENSION: Primary | ICD-10-CM

## 2020-12-30 DIAGNOSIS — R00.2 PALPITATIONS: ICD-10-CM

## 2020-12-30 DIAGNOSIS — R42 DIZZINESS: ICD-10-CM

## 2020-12-30 DIAGNOSIS — R60.0 LOWER EXTREMITY EDEMA: ICD-10-CM

## 2020-12-30 PROCEDURE — 93000 ELECTROCARDIOGRAM COMPLETE: CPT | Performed by: NURSE PRACTITIONER

## 2020-12-30 PROCEDURE — 99214 OFFICE O/P EST MOD 30 MIN: CPT | Performed by: NURSE PRACTITIONER

## 2020-12-30 RX ORDER — IBUPROFEN 800 MG/1
800 TABLET ORAL DAILY PRN
COMMUNITY
Start: 2020-12-16 | End: 2021-03-22

## 2020-12-30 RX ORDER — LOSARTAN POTASSIUM 50 MG/1
50 TABLET ORAL DAILY
Qty: 90 TABLET | Refills: 3 | Status: SHIPPED | OUTPATIENT
Start: 2020-12-30 | End: 2021-01-19 | Stop reason: SDUPTHER

## 2020-12-30 NOTE — PROGRESS NOTES
Date of Office Visit: 2020  Encounter Provider: SYLWIA Nelson  Place of Service: [facility] CARDIOLOGY  Patient Name: Silvia Velazquez  :1975    Chief Complaint   Patient presents with   • Hypertension     FOLLOW UP   • Dizziness   • Fatigue   :     HPI: Silvia Velazquez is a 45 y.o. female, known to me, who presents today for follow-up.  Old records have been obtained and reviewed by me.  She is a patient of Dr. Chavis's with a past medical history significant for bipolar, chronic pain syndrome, polysubstance abuse, hepatitis C, GERD, and sinus tachycardia.  In 2019, she presented with chest pain and was noted to be in in sinus tachycardia.  Ultimately, she underwent a stress test that was normal.  She was discharged with a ZIO.  Evidently she had significant issues with wearing the ZIO because it kept falling off.   She was last seen in the office by Dr. Chavis on 12/10/2020 at which time she had multiple complaints of lightheadedness, palpitations, elevated blood pressure, and just not feeling well.  Dr. Chavis did not feel her palpitations or tachycardia were significant.  He increased her metoprolol tartrate to 50 mg every 12 hours.  She was advised to follow-up in 6 months.  Ultimately, the metoprolol was doubled to 100 mg twice daily.   She called the office on  with complaints of not feeling right.  She reported feeling flushed, sweaty, dizzy, and reported right arm tingling.  Ultimately, she was scheduled to see me today.   She has been having difficulty with her blood pressure.  Anytime her systolic is greater than 140 or diastolic greater than 90, she develops symptoms of diaphoresis, flushing, dizziness, palpitations, and shortness of breath.  This happens a couple of times a day, usually in the morning and in the evening.  Although sometimes she feels like her medication does not work at all and it will happen during the middle of the  day.  She restarted the amlodipine a few days ago on her own.  Subsequently, she has developed lower extremity swelling.  She denies any chest pain or syncope.  She quit smoking 5 months ago and is now using a vape.  She has reportedly been sober from heroin for about 5 years and 2 years sober off of meth.    Past Medical History:   Diagnosis Date   • Chest pain    • Depression    • Drug abstinence syndrome (CMS/HCC)    • Drug abuse (CMS/HCC)    • Ex-smoker    • Heart attack (CMS/HCC)    • Hepatitis C    • Hepatitis C    • Hypertension    • Kidney stone    • Lupus (CMS/HCC)    • Lupus (systemic lupus erythematosus) (CMS/HCC)    • Mitral valve anterior leaflet prolapse    • NSTEMI (non-ST elevated myocardial infarction) (CMS/HCC)    • Otitis    • Palpitations    • Seizures (CMS/HCC)    • Spinal epidural abscess    • Tachycardia        Past Surgical History:   Procedure Laterality Date   • BACK SURGERY     • CHOLECYSTECTOMY     • LEG SURGERY     • LIVER BIOPSY     • LUMBAR LAMINECTOMY DISCECTOMY DECOMPRESSION Left 2018    Procedure: Posterior lumbar three through sacrum decompression;  Surgeon: Tevin Whitley MD;  Location: [facility];  Service: Neurosurgery   • LYMPH NODE BIOPSY     • SPINE SURGERY         Social History     Socioeconomic History   • Marital status: Legally      Spouse name: Not on file   • Number of children: Not on file   • Years of education: Not on file   • Highest education level: Not on file   Tobacco Use   • Smoking status: Former Smoker     Packs/day: 1.00     Years: 30.00     Pack years: 30.00     Types: Cigarettes     Quit date: 2020     Years since quittin.3   • Smokeless tobacco: Never Used   • Tobacco comment: E-CIG USE   Substance and Sexual Activity   • Alcohol use: No     Frequency: Never     Comment: CAFFEINE USE: 1 CUP COFFEE/ 3 COKES DAILY   • Drug use: Not Currently     Types: IV, Heroin, Methamphetamines     Comment: pt states she no longer uses  heroin, she now uses meth   • Sexual activity: Defer       Family History   Problem Relation Age of Onset   • No Known Problems Mother    • No Known Problems Father        Review of Systems   Constitution: Positive for malaise/fatigue. Negative for chills and fever.   Cardiovascular: Positive for palpitations. Negative for chest pain, dyspnea on exertion, leg swelling, near-syncope, orthopnea, paroxysmal nocturnal dyspnea and syncope.   Respiratory: Negative for cough and shortness of breath.    Musculoskeletal: Negative for joint pain, joint swelling and myalgias.   Gastrointestinal: Negative for abdominal pain, diarrhea, melena, nausea and vomiting.   Genitourinary: Negative for frequency and hematuria.   Neurological: Positive for dizziness. Negative for light-headedness, numbness, paresthesias and seizures.   Psychiatric/Behavioral: The patient is nervous/anxious.    Allergic/Immunologic: Negative.    All other systems reviewed and are negative.      Allergies   Allergen Reactions   • Sulfa Antibiotics Nausea And Vomiting and Swelling     Patient states when she took Sulfa medication, her throat started to swell.         Current Outpatient Medications:   •  aspirin 81 MG EC tablet, Take 1 tablet by mouth Daily., Disp: 30 tablet, Rfl: 0  •  buprenorphine-naloxone (SUBOXONE) 8-2 MG per SL tablet, Place 1 tablet under the tongue 2 (Two) Times a Day., Disp: , Rfl:   •  fluticasone (FLONASE) 50 MCG/ACT nasal spray, 2 sprays into the nostril(s) as directed by provider Daily., Disp: 15.8 mL, Rfl: 0  •  metoprolol tartrate (LOPRESSOR) 100 MG tablet, Take 1 tablet by mouth 2 (Two) Times a Day. (Patient taking differently: Take 100 mg by mouth 2 (Two) Times a Day. Pt taking 150 mg daily), Disp: 180 tablet, Rfl: 3  •  OLANZapine (ZYPREXA) 15 MG tablet, Take 1 tablet by mouth Every Night. (Patient taking differently: Take 15 mg by mouth Every Night. Taking 1/2 tab daily), Disp: 30 tablet, Rfl: 0  •  venlafaxine XR (EFFEXOR  "XR) 150 MG 24 hr capsule, Take 1 capsule by mouth Daily., Disp: 30 capsule, Rfl: 0  •  budesonide-formoterol (SYMBICORT) 80-4.5 MCG/ACT inhaler, Inhale 2 puffs 2 (Two) Times a Day., Disp: 6.9 g, Rfl: 2  •  ibuprofen (ADVIL,MOTRIN) 800 MG tablet, Take 800 mg by mouth Daily As Needed., Disp: , Rfl:   •  losartan (Cozaar) 50 MG tablet, Take 1 tablet by mouth Daily., Disp: 90 tablet, Rfl: 3  •  nicotine (NICODERM CQ) 14 MG/24HR patch, Place 1 patch on the skin as directed by provider Daily., Disp: 14 patch, Rfl: 0      Objective:     Vitals:    12/30/20 1309 12/30/20 1317   BP: 122/96 126/96   BP Location: Right arm Left arm   Patient Position: Sitting Sitting   Pulse: 71    Resp: 18    SpO2: 98%    Weight: 119 kg (262 lb)    Height: 167.6 cm (66\")      Body mass index is 42.29 kg/m².    PHYSICAL EXAM:    Constitutional:       General: Not in acute distress.     Appearance: Well-developed. Not diaphoretic.   Eyes:      Pupils: Pupils are equal, round, and reactive to light.   HENT:      Head: Normocephalic and atraumatic.   Neck:      Thyroid: No thyromegaly.      Vascular: No JVD.   Pulmonary:      Effort: Pulmonary effort is normal. No respiratory distress.      Breath sounds: Normal breath sounds.   Cardiovascular:      Normal rate. Regular rhythm.   Pulses:     Intact distal pulses.   Abdominal:      General: Bowel sounds are normal. There is no distension.      Palpations: Abdomen is soft. There is no hepatomegaly or splenomegaly.      Tenderness: There is no abdominal tenderness.   Musculoskeletal: Normal range of motion.   Skin:     General: Skin is warm and dry.      Findings: No erythema.   Neurological:      Mental Status: Alert and oriented to person, place, and time.   Psychiatric:         Behavior: Behavior normal.         Judgment: Judgment normal.           ECG 12 Lead    Date/Time: 12/30/2020 1:47 PM  Performed by: Zuri Tobin APRN  Authorized by: Zuri Tobin APRN   Comparison: compared " with previous ECG from 12/10/2020  Similar to previous ECG  Rhythm: sinus rhythm  Rate: normal  BPM: 64  T inversion: aVL    Clinical impression: normal ECG  Comments: Indication: Hypertension              Assessment:       Diagnosis Plan   1. Essential hypertension  Basic Metabolic Panel   2. Dizziness     3. Palpitations     4. Lower extremity edema       Orders Placed This Encounter   Procedures   • Basic Metabolic Panel     Standing Status:   Future     Standing Expiration Date:   12/30/2021   • ECG 12 Lead     This order was created via procedure documentation          Plan:       Overall, I think she is stable.  She denies any symptoms of angina or heart failure.  I would like to make some changes to her medications.  I have asked her to discontinue the amlodipine due to her lower extremity swelling.  Instead, I am going to order losartan 50 mg daily.  She will need a BMP in 10 days.  I have asked her to continue monitoring her blood pressure and keep me updated if she does not notice an improvement.  Otherwise, I will call her when I have the results of her BMP.      As always, it has been a pleasure to participate in your patient's care.      Sincerely,         SYLWIA Stinson

## 2021-01-11 ENCOUNTER — LAB (OUTPATIENT)
Dept: LAB | Facility: HOSPITAL | Age: 46
End: 2021-01-11

## 2021-01-11 DIAGNOSIS — I10 ESSENTIAL HYPERTENSION: ICD-10-CM

## 2021-01-11 LAB
ANION GAP SERPL CALCULATED.3IONS-SCNC: 8.7 MMOL/L (ref 5–15)
BUN SERPL-MCNC: 7 MG/DL (ref 6–20)
BUN/CREAT SERPL: 10.9 (ref 7–25)
CALCIUM SPEC-SCNC: 8.7 MG/DL (ref 8.6–10.5)
CHLORIDE SERPL-SCNC: 103 MMOL/L (ref 98–107)
CO2 SERPL-SCNC: 29.3 MMOL/L (ref 22–29)
CREAT SERPL-MCNC: 0.64 MG/DL (ref 0.57–1)
GFR SERPL CREATININE-BSD FRML MDRD: 101 ML/MIN/1.73
GLUCOSE SERPL-MCNC: 96 MG/DL (ref 65–99)
POTASSIUM SERPL-SCNC: 4 MMOL/L (ref 3.5–5.2)
SODIUM SERPL-SCNC: 141 MMOL/L (ref 136–145)

## 2021-01-11 PROCEDURE — 36415 COLL VENOUS BLD VENIPUNCTURE: CPT

## 2021-01-11 PROCEDURE — 80048 BASIC METABOLIC PNL TOTAL CA: CPT

## 2021-01-12 ENCOUNTER — TELEPHONE (OUTPATIENT)
Dept: CARDIOLOGY | Facility: CLINIC | Age: 46
End: 2021-01-12

## 2021-01-12 NOTE — TELEPHONE ENCOUNTER
Advised pt stable labs and that there will be no changes to her regimen at this time. Pt verbalized understanding.    NGHIA Schmitz

## 2021-01-12 NOTE — TELEPHONE ENCOUNTER
----- Message from SYLWIA Lnog sent at 1/12/2021  8:18 AM EST -----  Please let her know her labs are stable.  Continue current regimen.

## 2021-01-19 ENCOUNTER — OFFICE VISIT (OUTPATIENT)
Dept: CARDIOLOGY | Facility: CLINIC | Age: 46
End: 2021-01-19

## 2021-01-19 VITALS
BODY MASS INDEX: 41.14 KG/M2 | SYSTOLIC BLOOD PRESSURE: 117 MMHG | DIASTOLIC BLOOD PRESSURE: 79 MMHG | HEIGHT: 66 IN | WEIGHT: 256 LBS | HEART RATE: 69 BPM

## 2021-01-19 DIAGNOSIS — I10 ESSENTIAL HYPERTENSION: Primary | ICD-10-CM

## 2021-01-19 PROCEDURE — 99213 OFFICE O/P EST LOW 20 MIN: CPT | Performed by: NURSE PRACTITIONER

## 2021-01-19 RX ORDER — LOSARTAN POTASSIUM 50 MG/1
50 TABLET ORAL 2 TIMES DAILY
Qty: 180 TABLET | Refills: 3 | Status: SHIPPED | OUTPATIENT
Start: 2021-01-19 | End: 2021-04-08 | Stop reason: SDUPTHER

## 2021-01-19 NOTE — PROGRESS NOTES
Date of Office Visit: 2021  Encounter Provider: SYLWIA Nelson  Place of Service: [facility] CARDIOLOGY  Patient Name: Silvia Velazquez  :1975    Chief Complaint   Patient presents with   • Hypertension     FOLLOW UP   • Palpitations   :     HPI: Silvia Velazquez is a 45 y.o. female who presents today for follow-up.  Old records have been obtained and reviewed by me.  She is a patient of Dr. Chavis's with a past medical history significant for bipolar, chronic pain syndrome, polysubstance abuse, hepatitis C, GERD, and sinus tachycardia.  In 2019, she presented with chest pain and was noted to be in in sinus tachycardia.  Ultimately, she underwent a stress test that was normal.  She was discharged with a ZIO.  Evidently she had significant issues with wearing the ZIO because it kept falling off.              She was seen in the office by Dr. Chavis on 12/10/2020 at which time she had multiple complaints of lightheadedness, palpitations, elevated blood pressure, and just not feeling well.  Dr. Chavis did not feel her palpitations or tachycardia were significant.  He increased her metoprolol tartrate to 50 mg every 12 hours.  She was advised to follow-up in 6 months.  Ultimately, the metoprolol was doubled to 100 mg twice daily.              She called the office on Kierra with complaints of not feeling right.  She reported feeling flushed, sweaty, dizzy, and reported right arm tingling.  She followed up with me on 2020 at which time her main complaint was difficulty with her blood pressure.  Subsequently, she had restarted the amlodipine on her own and had again developed lower extremity swelling.  Ultimately, I discontinued amlodipine and instead started her on losartan.     She called to schedule another appointment due to continued issues with her blood pressure.  She has agreed to a telehealth visit for follow-up.      She reports that the  losartan did great with her blood pressure for about a week and then she started having high blood pressures in the evenings.  Evidently she called the office and was told to take an extra dose of the losartan in the evenings.  Ever since making this change, her blood pressures have regulated and are now very well controlled, mostly in the 110s to 120s systolic.  She denies any chest pain, shortness of breath, palpitations, edema, dizziness, or syncope.  She has been having headaches which she is attributing to decreasing her caffeine intake.    Past Medical History:   Diagnosis Date   • Chest pain    • Depression    • Drug abstinence syndrome (CMS/HCC)    • Drug abuse (CMS/HCC)    • Ex-smoker    • Heart attack (CMS/HCC)    • Hepatitis C    • Hepatitis C    • Hypertension    • Kidney stone    • Lupus (CMS/HCC)    • Lupus (systemic lupus erythematosus) (CMS/HCC)    • Mitral valve anterior leaflet prolapse    • NSTEMI (non-ST elevated myocardial infarction) (CMS/HCC)    • Otitis    • Palpitations    • Seizures (CMS/HCC)    • Spinal epidural abscess    • Tachycardia        Past Surgical History:   Procedure Laterality Date   • BACK SURGERY     • CHOLECYSTECTOMY     • LEG SURGERY     • LIVER BIOPSY     • LUMBAR LAMINECTOMY DISCECTOMY DECOMPRESSION Left 2018    Procedure: Posterior lumbar three through sacrum decompression;  Surgeon: Tevin Whitley MD;  Location: [facility];  Service: Neurosurgery   • LYMPH NODE BIOPSY     • SPINE SURGERY         Social History     Socioeconomic History   • Marital status: Legally      Spouse name: Not on file   • Number of children: Not on file   • Years of education: Not on file   • Highest education level: Not on file   Tobacco Use   • Smoking status: Former Smoker     Packs/day: 1.00     Years: 30.00     Pack years: 30.00     Types: Cigarettes     Quit date: 2020     Years since quittin.4   • Smokeless tobacco: Never Used   • Tobacco comment: E-CIG USE    Substance and Sexual Activity   • Alcohol use: No     Frequency: Never     Comment: CAFFEINE USE: 1 CUP COFFEE/ 2 COKES DAILY   • Drug use: Not Currently     Types: IV, Heroin, Methamphetamines     Comment: pt states she no longer uses heroin, she now uses meth   • Sexual activity: Defer       Family History   Problem Relation Age of Onset   • No Known Problems Mother    • No Known Problems Father        Review of Systems   Constitution: Negative.   Cardiovascular: Negative.  Negative for chest pain, dyspnea on exertion, leg swelling, orthopnea, paroxysmal nocturnal dyspnea and syncope.   Respiratory: Negative.    Hematologic/Lymphatic: Negative for bleeding problem.   Musculoskeletal: Negative for falls.   Gastrointestinal: Negative for melena.   Neurological: Positive for headaches. Negative for dizziness and light-headedness.       Allergies   Allergen Reactions   • Sulfa Antibiotics Nausea And Vomiting and Swelling     Patient states when she took Sulfa medication, her throat started to swell.         Current Outpatient Medications:   •  aspirin 81 MG EC tablet, Take 1 tablet by mouth Daily., Disp: 30 tablet, Rfl: 0  •  buprenorphine-naloxone (SUBOXONE) 8-2 MG per SL tablet, Place 1 tablet under the tongue 2 (Two) Times a Day., Disp: , Rfl:   •  ibuprofen (ADVIL,MOTRIN) 800 MG tablet, Take 800 mg by mouth Daily As Needed., Disp: , Rfl:   •  losartan (Cozaar) 50 MG tablet, Take 1 tablet by mouth 2 (Two) Times a Day., Disp: 180 tablet, Rfl: 3  •  metoprolol tartrate (LOPRESSOR) 100 MG tablet, Take 1 tablet by mouth 2 (Two) Times a Day. (Patient taking differently: Take 100 mg by mouth 2 (Two) Times a Day. Pt taking 150 mg daily), Disp: 180 tablet, Rfl: 3  •  nicotine (NICODERM CQ) 14 MG/24HR patch, Place 1 patch on the skin as directed by provider Daily., Disp: 14 patch, Rfl: 0  •  venlafaxine XR (EFFEXOR XR) 150 MG 24 hr capsule, Take 1 capsule by mouth Daily., Disp: 30 capsule, Rfl: 0      Objective:  "    Vitals:    01/19/21 1059   BP: 117/79   Pulse: 69   Weight: 116 kg (256 lb)   Height: 167.6 cm (66\")     Body mass index is 41.32 kg/m².    PHYSICAL EXAM:    Constitutional:       Appearance: Healthy appearance.   Pulmonary:      Effort: Pulmonary effort is normal.   Musculoskeletal: Normal range of motion.   Neurological:      Mental Status: Alert and oriented to person, place and time.             Assessment:       Diagnosis Plan   1. Essential hypertension       No orders of the defined types were placed in this encounter.         Plan:       Overall I think she is stable.  She denies any symptoms of angina or heart failure.  Her blood pressure looks great.  I agree with continuing the twice daily dosing of losartan.  I will send in a prescription for her.  Otherwise, I am not going to make any changes.  She will follow-up with Dr. Chavis as scheduled in December of this year.      This patient has consented to a telehealth visit via video. The visit was scheduled as a video visit to comply with patient safety concerns in accordance with CDC recommendations.  All vitals recorded within this visit are reported by the patient.  I spent 10 minutes in total including but not limited to the 5 minutes spent in direct conversation with this patient.       As always, it has been a pleasure to participate in your patient's care.      Sincerely,         SYLWIA Stinson  "

## 2021-03-22 ENCOUNTER — OFFICE VISIT (OUTPATIENT)
Dept: FAMILY MEDICINE CLINIC | Facility: CLINIC | Age: 46
End: 2021-03-22

## 2021-03-22 ENCOUNTER — TELEPHONE (OUTPATIENT)
Dept: NEUROSURGERY | Facility: CLINIC | Age: 46
End: 2021-03-22

## 2021-03-22 VITALS
HEART RATE: 67 BPM | OXYGEN SATURATION: 100 % | TEMPERATURE: 96.9 F | HEIGHT: 66 IN | DIASTOLIC BLOOD PRESSURE: 80 MMHG | BODY MASS INDEX: 41.59 KG/M2 | WEIGHT: 258.8 LBS | SYSTOLIC BLOOD PRESSURE: 130 MMHG

## 2021-03-22 DIAGNOSIS — R63.5 WEIGHT GAIN: Primary | ICD-10-CM

## 2021-03-22 DIAGNOSIS — M54.50 CHRONIC BILATERAL LOW BACK PAIN WITHOUT SCIATICA: ICD-10-CM

## 2021-03-22 DIAGNOSIS — G89.29 CHRONIC BILATERAL LOW BACK PAIN WITHOUT SCIATICA: ICD-10-CM

## 2021-03-22 PROCEDURE — 99214 OFFICE O/P EST MOD 30 MIN: CPT | Performed by: NURSE PRACTITIONER

## 2021-03-22 RX ORDER — LORATADINE 10 MG/1
10 TABLET ORAL DAILY
COMMUNITY
Start: 2021-03-01 | End: 2021-09-07 | Stop reason: SDUPTHER

## 2021-03-22 RX ORDER — DOCUSATE SODIUM 100 MG/1
CAPSULE, LIQUID FILLED ORAL
COMMUNITY
Start: 2021-02-09 | End: 2022-07-19 | Stop reason: SDUPTHER

## 2021-03-22 RX ORDER — POLYETHYLENE GLYCOL 3350 17 G/17G
POWDER, FOR SOLUTION ORAL
COMMUNITY
Start: 2021-02-09 | End: 2021-09-22

## 2021-03-22 RX ORDER — METAXALONE 800 MG/1
800 TABLET ORAL 3 TIMES DAILY PRN
Qty: 60 TABLET | Refills: 0 | Status: SHIPPED | OUTPATIENT
Start: 2021-03-22 | End: 2021-03-24

## 2021-03-22 NOTE — TELEPHONE ENCOUNTER
Patient saw PCP Suhas Oseguera today and was given RX for Metaxalone 800 mg and referred to physical therapy. Patient had surgery with Dr. Whitley 12/9/2018. She had no follow-ups in office after her surgery. She was seen in the ER a couple times. She called the office and was told she can call if she has a flare-up.       Called the patient back and explained we cannot give her anything until we have seen her. She can be scheduled first available as she has not been seen since surgery 12/9/2018. She was transferred to the [facility].

## 2021-03-22 NOTE — TELEPHONE ENCOUNTER
Provider: PREVIOUS PT OF DR ROSENBERG, MISSED AND OR WAS CANCELED APPTS    Caller:GIGI MILLER  Relationship to Patient: SELF    Phone Number: [phone]    Pharmacy:    Reason for Call: PT IN A LOT OF PAIN   When was the patient last seen: N/A  When did it start: SEVERAL WEEKS AGO  Where is it located: LOWER BACK   Characteristics of symptom/severity:10/10  Timing- Is it constant or intermittent: CONSTANT  What makes it worse: STANDING  What makes it better: BACK BRACE AND MEDICATION     What therapies/medications have you tried: TAKING THIS MEDICATION HELPS THE MOST WITH PAIN METHOCARBAMOL, SHE IS TWO LEFT AND WOULD LIKE TO KNOW IF SHE CAN GET A PRESCRIPTION FOR MORE OR IF THERE IS OTHER OPTIONS   PT WOULD LIKE AN APPT AND OR TO SPEAK WITH SOMEONE IN REGARDS TO HER PAIN.    THANK YOU

## 2021-03-22 NOTE — PROGRESS NOTES
"Chief Complaint  Back Pain (Patient is wanting to establish primary care. She has a history of spine surgery and she used to be on methocarbnol and she is wanting to get another prescription for that ( wore mask and goggles) ) and Weight Gain (Patient is wanting to discuss possible medication to help her lose weight she has gained 120 pounds in the past year )    Subjective          Silvia Velazquez presents to [facility] PRIMARY [facility]  History of Present Illness  Patient presenting to the office today.    Patient has a history of back pain and back surgery and has back pain ever since.  She states that she has gained 120 pounds within the last year which is causing her to have significant increase in back pain.  She is requesting a muscle relaxer today to help her with her back pain.  She states she has not had physical therapy in a very long time and does not believe that she had any after her spinal surgery that she can remember.  We talked at length how that would help her with her back pain and she is willing to go to PT.    Regarding her weight gain patient states that she has tried every single diet and the weight never comes off.  She states that she is counting calories which is not helped.  She is not aware of any thyroid issues in her past or in her family's past.    Objective   Vital Signs:   /80   Pulse 67   Temp 96.9 °F (36.1 °C)   Ht 167.6 cm (66\")   Wt 117 kg (258 lb 12.8 oz)   SpO2 100%   BMI 41.77 kg/m²     Physical Exam  Vitals reviewed.   Constitutional:       General: She is not in acute distress.     Appearance: She is well-developed.   HENT:      Head: Normocephalic.   Cardiovascular:      Rate and Rhythm: Normal rate and regular rhythm.      Heart sounds: Normal heart sounds.   Pulmonary:      Effort: Pulmonary effort is normal.      Breath sounds: Normal breath sounds.   Neurological:      Mental Status: She is alert and oriented to person, place, and time.      " Gait: Gait normal.   Psychiatric:         Behavior: Behavior normal.         Thought Content: Thought content normal.         Judgment: Judgment normal.        Result Review :   The following data was reviewed by: SYLWIA Calvert on 03/22/2021:  CMP    CMP 11/26/20 11/27/20 1/11/21   Glucose 129 (A) 113 (A) 96   BUN 10 9 7   Creatinine 0.72 0.68 0.64   eGFR Non African Am 88 94 101   Sodium 138 139 141   Potassium 4.2 3.6 4.0   Chloride 105 108 (A) 103   Calcium 8.6 8.1 (A) 8.7   Albumin 3.80 3.60    Total Bilirubin 0.3 0.2    Alkaline Phosphatase 113 100    AST (SGOT) 38 (A) 26    ALT (SGPT) 33 25    (A) Abnormal value       Comments are available for some flowsheets but are not being displayed.           CBC w/diff    CBC w/Diff 11/26/20 11/27/20   WBC 4.38 4.11   RBC 4.33 4.02   Hemoglobin 10.1 (A) 9.3 (A)   Hematocrit 33.6 (A) 31.0 (A)   MCV 77.6 (A) 77.1 (A)   MCH 23.3 (A) 23.1 (A)   MCHC 30.1 (A) 30.0 (A)   RDW 15.0 15.0   Platelets 188 143   Neutrophil Rel % 56.2    Immature Granulocyte Rel % 0.2    Lymphocyte Rel % 32.4    Monocyte Rel % 8.4    Eosinophil Rel % 2.1    Basophil Rel % 0.7    (A) Abnormal value            Lipid Panel    Lipid Panel 11/27/20   Total Cholesterol 133   Triglycerides 77   HDL Cholesterol 35 (A)   VLDL Cholesterol 15   LDL Cholesterol  83   LDL/HDL Ratio 2.36   (A) Abnormal value            Most Recent A1C    HGBA1C Most Recent 11/27/20   Hemoglobin A1C 5.00                     Assessment and Plan    Diagnoses and all orders for this visit:    1. Weight gain (Primary)  -     TSH    2. Chronic bilateral low back pain without sciatica  -     Ambulatory Referral to Physical Therapy Evaluate and treat  -     metaxalone (Skelaxin) 800 MG tablet; Take 1 tablet by mouth 3 (Three) Times a Day As Needed for Muscle Spasms.  Dispense: 60 tablet; Refill: 0        Follow Up   No follow-ups on file.  Patient was given instructions and counseling regarding her condition or for health  maintenance advice. Please see specific information pulled into the AVS if appropriate.     We talked at length regarding her weight gain and I suggested her to use weight watchers really stick with the program and the weight will come off.  We will check her thyroid levels today to see if that is contributing to her weight.  Patient verbalizes agreement.    I am calling in a muscle relaxer for her back also sending her to physical therapy.    Suddenly at the end of the visit patient got up and walked out and stated that she would get her labs later.    Mask and googles worn

## 2021-03-22 NOTE — TELEPHONE ENCOUNTER
Caller: GIGI MILLER  Relationship to Patient: SELF    Phone Number:[phone]    Reason for Call: PT CALLED TO GET SCHEDULED. [facility] DOES NOT HAVE ACCESS TO [facility] SCHEDULE.      S/W [facility]ER AND SHE SAID THEIR SCHEDULES ARE NOT OPEN YET.  SHE SAID THEY SHOULD BE NEXT WEEK.       WENT TO GO BACK TO PT AND CALL DISAPPEARED. CALLED PT BACK AND LVM FOR HER TO CALL.

## 2021-03-23 NOTE — TELEPHONE ENCOUNTER
PATIENT CALLING BECAUSE THE MEDICATION metaxalone (Skelaxin) 800 MG tablet THAT WE CALLED IN FOR HER IS NOT COVERED BY HER INSURANCE... PATIENT WOULD LIKE TO KNOW IF THERE IS ANYTHING ELSE SIMILAR THAT WE COULD CALL IN FOR HER THAT IS COVERED??    [facility]/pharmacy #[id number] - [place], [place] - [address] AT IN [address] [place] - [phone] [facility] [phone] FX      PATIENT CAN BE REACHED AT: [phone]

## 2021-03-24 RX ORDER — METHOCARBAMOL 500 MG/1
TABLET, FILM COATED ORAL
Qty: 60 TABLET | Refills: 0 | Status: SHIPPED | OUTPATIENT
Start: 2021-03-24 | End: 2021-04-20 | Stop reason: SDUPTHER

## 2021-03-31 ENCOUNTER — TELEPHONE (OUTPATIENT)
Dept: FAMILY MEDICINE CLINIC | Facility: CLINIC | Age: 46
End: 2021-03-31

## 2021-03-31 NOTE — TELEPHONE ENCOUNTER
Pt was possibly dehydrated at the time of her scheduled lab draw. Unfortunately the [facility] phlebotomist was unable to get a blood specimen for the TSH order. Pt mentioned at checkout that she is scheduled for an additional lab draw with another physician, and will give our office a call to send lab order too.

## 2021-04-05 NOTE — PROGRESS NOTES
Subjective   Patient ID: Silvia Velazquez is a 45 y.o. female is here today for follow-up. No recent imaging.     She returns to the office with increased back and L leg pain over the past 2 months. She is currently going to PT twice a week and taking Robaxin 500 mg BID for pain.     She had left posterior lumbar three through sacrum decompression by Dr Whitley 12/9/18 for history of epidural abscess secondary to IV drug abuse.  She never followed up after surgery.  She was last seen by Daisy Montilla as a consult to the ER in January 2019 for increased back pain.  MRI imaging was reviewed which demonstrated stable postoperative changes with no new imaging or intervention.at that time.  She was recommended to be discharged home from the ER from neurosurgical standpoint.  Recommendations were made from refraining from any ongoing drug use, particularly IV drug use.    Patient states that she never really got relief from her back pain after her infection.  She states that it really progressed last month.  Patient denies any IV drug abuse.  She states last time she did use was after completion of her IV antibiotics approximately a year ago.  Patient describes back pain more in her upper lumbar spine that does radiate down into her left leg and buttock and anterior thigh.  It does not go below her knee.  She has numbness and tingling along same distribution.  She states she has been having some chills and nausea but no fever.  Patient states that standing does make her back pain work.  She has been taking muscle relaxer from her primary care doctor that seems to work.  She is also been going to physical therapy that is helping somewhat.  She did visit the emergency room a couple days ago with chest pain but did have back pain complaints as well.  There was no imaging.  But her sed rate was elevated.    Back Pain  This is a chronic problem. The current episode started more than 1 year ago. The problem has been gradually  "worsening since onset. The pain is present in the lumbar spine and gluteal. The quality of the pain is described as aching and burning. The pain radiates to the left thigh. The pain is moderate. The pain is worse during the day. The symptoms are aggravated by standing. Associated symptoms include leg pain, numbness and weakness. Pertinent negatives include no bladder incontinence or bowel incontinence. Risk factors include obesity. She has tried muscle relaxant (PT) for the symptoms. The treatment provided mild relief.       The following portions of the patient's history were reviewed and updated as appropriate: allergies, current medications, past family history, past medical history, past social history, past surgical history and problem list.    Review of Systems   Gastrointestinal: Negative for bowel incontinence.   Genitourinary: Positive for frequency and urgency. Negative for bladder incontinence and difficulty urinating.   Musculoskeletal: Positive for back pain (L leg pain ). Negative for gait problem.   Neurological: Positive for weakness and numbness.   All other systems reviewed and are negative.          Objective     Vitals:    04/26/21 0922   BP: 120/90   Pulse: 65   Temp: 98.2 °F (36.8 °C)   Weight: 119 kg (262 lb 9.6 oz)   Height: 167.6 cm (66\")     Body mass index is 42.38 kg/m².      Physical Exam  Vitals reviewed.   Eyes:      Pupils: Pupils are equal, round, and reactive to light.   Pulmonary:      Effort: Pulmonary effort is normal.   Neurological:      Mental Status: She is oriented to person, place, and time.      Gait: Gait is intact.      Deep Tendon Reflexes: Strength normal.   Psychiatric:         Speech: Speech normal.       Neurologic Exam     Mental Status   Oriented to person, place, and time.   Speech: speech is normal   Level of consciousness: alert    Cranial Nerves     CN III, IV, VI   Pupils are equal, round, and reactive to light.    Motor Exam     Strength   Strength 5/5 " throughout.     Sensory Exam   Light touch normal.     Gait, Coordination, and Reflexes     Gait  Gait: normal    Reflexes   Reflexes 2+ except as noted.     Negative straight leg raise test  Positive TTP upper to mid lumbar spine    Assessment/Plan   Independent Review of Radiographic Studies:      I personally reviewed the images from the following studies.    MRI 1/17/2019    There is a recurrent, peripherally enhancing fluid  collection consistent with abscess on the left surrounding and  contiguous with the L4-5 facet joint. This creates mild stenosis of the  left half of the spinal canal and stenosis of the left lateral recess at  L4-5. Additionally, there is abnormal bone marrow edema and enhancement  in the left L4 and L5 pedicles and in the bone around the L4-5 facet  joint consistent with osteomyelitis. Some mildly exaggerated synovial  enhancement at the right L4-5 facet joint with very subtle adjacent  marrow signal change suggests there may be infection at this joint as  well with early adjacent osteomyelitis. There is a small fluid  collection in the subcutaneous fat along the incision site. There is  otherwise no residual fluid collection nor is there evidence of septic  arthritis or osteomyelitis elsewhere.     Medical Decision Making:      Ms. Velazquez is a 45-year-old female being seen for follow-up of her back pain.  Patient failed to follow-up previous appointments.  She was seen in the ED on 1/30/2019 stated above.  Was felt that her MRI imaging was stable revealing postoperative changes.  patient has had a increase of back pain that she reports is similar to 2 years ago when she had her infection.  She states she has been clean for about a year but did use IV drugs again after her IV antibiotics were completed.  She reports that she did complete her course of IV antibiotics.  Her sed rate was elevated but patient does have lupus so this could be from her lupus but with patient's other  complaints of chills and nausea, we will need to repeat her MRI with and without contrast as well as do a CRP as well.  We will follow her back in the office after imaging has been obtained for further assessment evaluation and recommended treatment modalities.      Diagnoses and all orders for this visit:    1. Chronic left-sided low back pain without sciatica (Primary)    2. Lumbar radiculopathy  -     MRI Lumbar Spine With & Without Contrast; Future  -     C-reactive Protein; Future    3. Pain of left lower extremity      Return for Next scheduled follow up.        This patient was examined wearing appropriate personal protective equipment.     Silvia Velazquez  reports that she quit smoking about 7 months ago. Her smoking use included cigarettes. She has a 30.00 pack-year smoking history. She has never used smokeless tobacco..  She continues with the cigarettes.  I have educated her on the risk of diseases from using tobacco products such as cancer, COPD and heart disease.     I advised her to quit and she is not willing to quit.    I spent 4 minutes counseling the patient.     Pt reports no tobacco use. Continued cessation recommended.     Pts Blood pressure reviewed. Recommendations for  a low-salt diet and exercise to maintain/improve BP in addition to taking any prescribed medications.      Patient's Body mass index is 42.38 kg/m². BMI is above normal parameters. Recommendations include: exercise counseling and nutrition counseling.      SYLWIA Giron  04/26/21  10:10 EDT

## 2021-04-08 RX ORDER — LOSARTAN POTASSIUM 50 MG/1
50 TABLET ORAL 2 TIMES DAILY
Qty: 180 TABLET | Refills: 3 | Status: SHIPPED | OUTPATIENT
Start: 2021-04-08 | End: 2022-05-12 | Stop reason: SDUPTHER

## 2021-04-08 NOTE — TELEPHONE ENCOUNTER
Caller: Silvia Velazquez    Relationship: Self    Best call back number: [phone]    Medication needed:   Requested Prescriptions     Pending Prescriptions Disp Refills   • losartan (Cozaar) 50 MG tablet 180 tablet 3     Sig: Take 1 tablet by mouth 2 (Two) Times a Day.       When do you need the refill by: asap    What additional details did the patient provide when requesting the medication: out of medication    Does the patient have less than a 3 day supply:  [x] Yes  [] No    What is the patient's preferred pharmacy: [id number] [place], [place] - [address] AT [facility] [phone] [facility] [phone]

## 2021-04-09 ENCOUNTER — TREATMENT (OUTPATIENT)
Dept: PHYSICAL THERAPY | Facility: CLINIC | Age: 46
End: 2021-04-09

## 2021-04-09 DIAGNOSIS — G89.29 CHRONIC BILATERAL LOW BACK PAIN WITHOUT SCIATICA: Primary | ICD-10-CM

## 2021-04-09 DIAGNOSIS — R68.89 DECREASED FUNCTIONAL ACTIVITY TOLERANCE: ICD-10-CM

## 2021-04-09 DIAGNOSIS — M54.50 CHRONIC BILATERAL LOW BACK PAIN WITHOUT SCIATICA: Primary | ICD-10-CM

## 2021-04-09 PROCEDURE — 97161 PT EVAL LOW COMPLEX 20 MIN: CPT | Performed by: PHYSICAL THERAPIST

## 2021-04-09 NOTE — PATIENT INSTRUCTIONS
Pt was educated on findings of evaluation, purpose of treatment and goals for therapy. Treatment options discussed and questions answered.

## 2021-04-09 NOTE — PROGRESS NOTES
Physical Therapy Initial Evaluation and Plan of Care    Patient: Silvia Velazquez   : 1975  Diagnosis/ICD-10 Code:  Chronic bilateral low back pain without sciatica [M54.5, G89.29]  Referring practitioner: SYLWIA Calvert    Subjective Evaluation    History of Present Illness  Mechanism of injury: Pt reports she had emergency spine surgery 2 years of years ago and never had any PT afterward. Pt reports she had sepsis and infection in her spine and had emergency surgery. Reports having difficulty with walking prolonged distances. Pt reports she has gained 120 in the last year.  Reports has back brace. Reports pain occurs across mid back but >on L.   PMH includes: L tib/fib fracture and surgery and reports 2 heart attacks    Pain  Current pain ratin  At best pain ratin  At worst pain rating: 10  Location: low back   Quality: tight, sharp, pressure, throbbing and dull ache  Relieving factors: medications, rest and change in position  Aggravating factors: prolonged positioning, ambulation, stairs, standing and lifting  Progression: no change    Hand dominance: right    Treatments  Current treatment: physical therapy  Patient Goals  Patient goals for therapy: decreased pain, increased motion, increased strength, independence with ADLs/IADLs, return to sport/leisure activities and return to work  Patient goal: get stronger and decrease  pain            Objective          Active Range of Motion     Lumbar   Flexion: Active lumbar flexion: fingers to mid-shin    Left lateral flexion: Active left lumbar lateral flexion: hand to mid thigh  with pain  Right lateral flexion: Active right lumbar lateral flexion: hand to mid thigh  with pain  Left rotation: Active left lumbar rotation: 50% with pain  Right rotation: Active right lumbar rotation: 50% with pain    Strength/Myotome Testing     Left Hip   Planes of Motion   Flexion: 4+  Abduction: 4    Right Hip   Planes of Motion   Flexion: 4+  Abduction:  4    Left Knee   Flexion: 5  Extension: 5    Right Knee   Flexion: 5  Extension: 5    Left Ankle/Foot   Dorsiflexion: 5    Right Ankle/Foot   Dorsiflexion: 5    Functional Assessment     Comments  Owestry back: 37 (74%)          Assessment & Plan     Assessment  Impairments: abnormal or restricted ROM, activity intolerance, impaired physical strength, lacks appropriate home exercise program and pain with function  Assessment details: Pt presents to PT with symptoms consistent with low back pain, decreased functional activity tolerance.  Pt would benefit from skilled PT intervention to address the deficits noted.     Prognosis: good  Functional Limitations: lifting, sleeping, walking, uncomfortable because of pain, moving in bed, standing and unable to perform repetitive tasks  Goals  Plan Goals: SHORT TERM GOALS: Time for Goal Achievement: 4 weeks    1.  Patient to be compliant and progression of HEP                             2.  Pain level < 6/10 at worst with mentioned activities to improve function  3.  Increased thoracic, lumbar and SIJ mobility to allow for increased lumbar AROM with less pain.  4.  Increased lumbar AROM to by 25% in all planes to allow for increased ease with sit-stand transfers and functional activities    LONG TERM GOALS: Time for Goal Achievement: 2 months  1.  Pt. to score < 50 % on Back Index  2.  Pain level < 4/10 with all listed activities to return to normal.  3.  Lumbar AROM to WFL to allow for return to household & recreational activities w/o increased symptoms  4.  (B) LE and lower abdominal strength to 5/5 to allow for pushing, pulling and activities to occur without pain (driving, sitting, household  & Job requirements)        Plan  Therapy options: will be seen for skilled physical therapy services  Planned modality interventions: cryotherapy, electrical stimulation/Russian stimulation, TENS, thermotherapy (hydrocollator packs) and ultrasound  Other planned modality  interventions: Dry Needling  Planned therapy interventions: body mechanics training, flexibility, functional ROM exercises, home exercise program, manual therapy, neuromuscular re-education, postural training, spinal/joint mobilization, stretching, strengthening, soft tissue mobilization, therapeutic activities and abdominal trunk stabilization  Frequency: 2x week  Duration in visits: 16  Treatment plan discussed with: patient        Manual Therapy:          mins  32046;  Therapeutic Exercise:          mins  60944;     Neuromuscular Brennon:         mins  72226;    Therapeutic Activity:           mins  06305;     Gait Training:            mins  93187;     Ultrasound:           mins  18834;    Electrical Stimulation:         mins  06792 ( );  Dry Needling           mins self-pay  Traction           mins 69475  Canalith Repositioning         mins 63032      Timed Treatment:     mins   Total Treatment:     30   mins    PT SIGNATURE: Barbara Garrido PT   [place] Lic #[id number]    DATE TREATMENT INITIATED: 4/9/2021    Initial Certification  Certification Period: 7/8/2021  I certify that the therapy services are furnished while this patient is under my care.  The services outlined above are required by this patient, and will be reviewed every 90 days.     PHYSICIAN: Suhas Oseguera APRN      DATE:     Please sign and return via fax to [phone].. Thank you, [facility] Physical Therapy.

## 2021-04-13 ENCOUNTER — TREATMENT (OUTPATIENT)
Dept: PHYSICAL THERAPY | Facility: CLINIC | Age: 46
End: 2021-04-13

## 2021-04-13 DIAGNOSIS — R68.89 DECREASED FUNCTIONAL ACTIVITY TOLERANCE: ICD-10-CM

## 2021-04-13 DIAGNOSIS — M54.50 CHRONIC BILATERAL LOW BACK PAIN WITHOUT SCIATICA: Primary | ICD-10-CM

## 2021-04-13 DIAGNOSIS — G89.29 CHRONIC BILATERAL LOW BACK PAIN WITHOUT SCIATICA: Primary | ICD-10-CM

## 2021-04-13 PROCEDURE — 97140 MANUAL THERAPY 1/> REGIONS: CPT | Performed by: PHYSICAL THERAPIST

## 2021-04-13 PROCEDURE — 97530 THERAPEUTIC ACTIVITIES: CPT | Performed by: PHYSICAL THERAPIST

## 2021-04-13 PROCEDURE — 97014 ELECTRIC STIMULATION THERAPY: CPT | Performed by: PHYSICAL THERAPIST

## 2021-04-15 ENCOUNTER — TREATMENT (OUTPATIENT)
Dept: PHYSICAL THERAPY | Facility: CLINIC | Age: 46
End: 2021-04-15

## 2021-04-15 DIAGNOSIS — G89.29 CHRONIC BILATERAL LOW BACK PAIN WITHOUT SCIATICA: Primary | ICD-10-CM

## 2021-04-15 DIAGNOSIS — M54.50 CHRONIC BILATERAL LOW BACK PAIN WITHOUT SCIATICA: Primary | ICD-10-CM

## 2021-04-15 DIAGNOSIS — R68.89 DECREASED FUNCTIONAL ACTIVITY TOLERANCE: ICD-10-CM

## 2021-04-15 PROCEDURE — 97014 ELECTRIC STIMULATION THERAPY: CPT | Performed by: PHYSICAL THERAPIST

## 2021-04-15 PROCEDURE — 97530 THERAPEUTIC ACTIVITIES: CPT | Performed by: PHYSICAL THERAPIST

## 2021-04-15 PROCEDURE — 97110 THERAPEUTIC EXERCISES: CPT | Performed by: PHYSICAL THERAPIST

## 2021-04-15 PROCEDURE — 97112 NEUROMUSCULAR REEDUCATION: CPT | Performed by: PHYSICAL THERAPIST

## 2021-04-15 NOTE — PROGRESS NOTES
Physical Therapy Daily Treatment Note      Patient: Silvia Velazquez   : 1975  Referring practitioner: SYLWIA Calvert  Date of Initial Visit: Type: THERAPY  Noted: 2021  Today's Date: 4/15/2021  Patient seen for 3 sessions         Silvia Velazquez reports:  Sore from exercises/activitiy the other day.  Found mm relaxer and taking them, they help take the edge off and make back more tolerable.  Have decreased walk distance, intensity/frequency as well.  Felt relief with modality application last session        Subjective     Objective   NAD with transitional movements/position changes    See Exercise, Manual, and Modality Logs for complete treatment.   Added:  LTR, SKTC with towel, piriformis stretch(cross legged) with towel, supine hs stretch with foot pump for nerve gliding, ppt, step up knee  with TA contraction, balance board(ROM and balance) fwd/bwd and side/side    Increased NuStep to 10 min Lv 5  Your Access Code  [email or web address]    Assessment/Plan  Subjectively reports improved pain control with medication and exhibits improved capability for exercise performance/participation in both supine and weightbearing positions.  Benefits from verbal/tactile cues to ensure proper exercise performance/technique.  Positive response to modality application to low back area.        Progress strengthening /stabilization /functional activity as symptoms allow.  Include bridges, abd bracing with ZAC Wilks           Timed:  Manual Therapy:         mins  00906;  Therapeutic Exercise:     20    mins  34745;     Neuromuscular Brennon:   12     mins  96733;    Therapeutic Activity:     10     mins  66209;     Gait Training:           mins  39876;     Ultrasound:          mins  03168;      Untimed:  Electrical Stimulation:  15       mins  08300 ( );  Mechanical Traction:         mins  24529;     Timed Treatment:   42   mins   Total Treatment:   57     mins  Dipak Guerrero PTA  Physical Therapist   Assistant  [id number]

## 2021-04-16 ENCOUNTER — TELEPHONE (OUTPATIENT)
Dept: CARDIOLOGY | Facility: CLINIC | Age: 46
End: 2021-04-16

## 2021-04-17 NOTE — TELEPHONE ENCOUNTER
"Patient called the answering service to report elevated blood pressures oer the last week, in the 140-145s/90s.  She states it just makes her feel uncomfortable and her head feels \"puffy\".  She denies any CP but does have some SOA with exertion.  I told her it would be best to call the office on Monday to schedule an appointment, but that if her symptoms changed or worsened she may need to be evaluated in the ER.     "

## 2021-04-19 ENCOUNTER — TELEPHONE (OUTPATIENT)
Dept: PHYSICAL THERAPY | Facility: CLINIC | Age: 46
End: 2021-04-19

## 2021-04-19 ENCOUNTER — TELEPHONE (OUTPATIENT)
Dept: NEUROSURGERY | Facility: CLINIC | Age: 46
End: 2021-04-19

## 2021-04-19 NOTE — TELEPHONE ENCOUNTER
Patient has not followed up in the office since her surgery. She had a posterior lumbar three through sacrum decompression for epidural abscess by Dr. Whitley on 12-09-18. She is scheduled to come into the office on 04-26-21 to see you for back pain.     Patient called complaining of knot protruding out of her back that started the day before yesterday. She also complained of nausea, hot flashes, chills and feeling like she is going to pass out. Patient thinks the infection is coming back. She has no current imaging. She would like to know what she should do.

## 2021-04-20 ENCOUNTER — OFFICE VISIT (OUTPATIENT)
Dept: FAMILY MEDICINE CLINIC | Facility: CLINIC | Age: 46
End: 2021-04-20

## 2021-04-20 VITALS
WEIGHT: 264.4 LBS | DIASTOLIC BLOOD PRESSURE: 70 MMHG | TEMPERATURE: 98.4 F | SYSTOLIC BLOOD PRESSURE: 126 MMHG | HEIGHT: 66 IN | OXYGEN SATURATION: 98 % | BODY MASS INDEX: 42.49 KG/M2 | HEART RATE: 74 BPM

## 2021-04-20 DIAGNOSIS — M32.9 SYSTEMIC LUPUS ERYTHEMATOSUS, UNSPECIFIED SLE TYPE, UNSPECIFIED ORGAN INVOLVEMENT STATUS (HCC): Primary | ICD-10-CM

## 2021-04-20 DIAGNOSIS — M54.50 CHRONIC BILATERAL LOW BACK PAIN WITHOUT SCIATICA: ICD-10-CM

## 2021-04-20 DIAGNOSIS — R63.5 WEIGHT GAIN: ICD-10-CM

## 2021-04-20 DIAGNOSIS — G89.29 CHRONIC BILATERAL LOW BACK PAIN WITHOUT SCIATICA: ICD-10-CM

## 2021-04-20 DIAGNOSIS — R11.0 NAUSEA: ICD-10-CM

## 2021-04-20 PROCEDURE — 99214 OFFICE O/P EST MOD 30 MIN: CPT | Performed by: NURSE PRACTITIONER

## 2021-04-20 RX ORDER — ONDANSETRON 4 MG/1
4 TABLET, ORALLY DISINTEGRATING ORAL EVERY 8 HOURS PRN
Qty: 30 TABLET | Refills: 0 | Status: SHIPPED | OUTPATIENT
Start: 2021-04-20 | End: 2021-05-24 | Stop reason: SDUPTHER

## 2021-04-20 RX ORDER — METHOCARBAMOL 500 MG/1
TABLET, FILM COATED ORAL
Qty: 60 TABLET | Refills: 0 | Status: SHIPPED | OUTPATIENT
Start: 2021-04-20 | End: 2021-07-16 | Stop reason: SDUPTHER

## 2021-04-20 NOTE — PROGRESS NOTES
"Chief Complaint  Back Pain (Patient is here due to back pain and now she has knot on her spine and feeling nauseaous ( wore mask and goggles) )    Subjective          Silvia Velazquez presents to [facility]  History of Present Illness  #1 chronic back pain-patient has an upcoming visit next Monday with a neurosurgeon but until she gets to that visit she will like a refill on her muscle relaxer that she is using on a as needed basis.  She is going to physical therapy and she is mainly using it to help her get through the physical therapy because of the pain.  She did have a knot pop up on her spine where she is had previous surgery but is since gone down.    #2 Lupus-patient is not seen a rheumatologist in several years.  She does not know if she is having an active flare which was part of the knot on the back and her pain but she is needing to see a specialist for this.    3.  Weight gain-patient was supposed to get her thyroid checked at last visit but she failed to do that.  They were unable to get any blood from her vein so she is requesting to go to [facility] which has always been successful with drawing blood from her.    4.  Nausea-patient states that she is very nauseous at times due to the pain coming from her back and requesting Zofran for this.    Objective   Vital Signs:   /70   Pulse 74   Temp 98.4 °F (36.9 °C)   Ht 167.6 cm (65.98\")   Wt 120 kg (264 lb 6.4 oz)   SpO2 98%   BMI 42.70 kg/m²     Physical Exam  Vitals reviewed.   Constitutional:       General: She is not in acute distress.     Appearance: She is well-developed.   HENT:      Head: Normocephalic.   Cardiovascular:      Rate and Rhythm: Normal rate and regular rhythm.      Heart sounds: Normal heart sounds.   Pulmonary:      Effort: Pulmonary effort is normal.      Breath sounds: Normal breath sounds.   Neurological:      Mental Status: She is alert and oriented to person, place, and time.      " Gait: Gait normal.   Psychiatric:         Behavior: Behavior normal.         Thought Content: Thought content normal.         Judgment: Judgment normal.        Result Review :                 Assessment and Plan    Diagnoses and all orders for this visit:    1. Systemic lupus erythematosus, unspecified SLE type, unspecified organ involvement status (CMS/[facility]) (Primary)  -     Ambulatory Referral to Rheumatology    2. Weight gain  -     TSH    3. Nausea  -     ondansetron ODT (Zofran ODT) 4 MG disintegrating tablet; Place 1 tablet on the tongue Every 8 (Eight) Hours As Needed for Nausea.  Dispense: 30 tablet; Refill: 0    4. Chronic bilateral low back pain without sciatica  -     methocarbamol (Robaxin) 500 MG tablet; Take 1 po tid prn  Dispense: 60 tablet; Refill: 0        Follow Up   No follow-ups on file.  Patient was given instructions and counseling regarding her condition or for health maintenance advice. Please see specific information pulled into the AVS if appropriate.     Continue with all medications.  Follow-up after lab.  Will refill muscle relaxer so she can use it on an as-needed basis.  Follow-up with neurosurgeon.  And referral out to rheumatology for her lupus.    Mask and googles worn

## 2021-04-23 ENCOUNTER — APPOINTMENT (OUTPATIENT)
Dept: GENERAL RADIOLOGY | Facility: HOSPITAL | Age: 46
End: 2021-04-23

## 2021-04-23 ENCOUNTER — HOSPITAL ENCOUNTER (EMERGENCY)
Facility: HOSPITAL | Age: 46
Discharge: HOME | End: 2021-04-23
Attending: EMERGENCY MEDICINE | Admitting: EMERGENCY MEDICINE

## 2021-04-23 VITALS
OXYGEN SATURATION: 96 % | HEIGHT: 66 IN | SYSTOLIC BLOOD PRESSURE: 125 MMHG | HEART RATE: 73 BPM | TEMPERATURE: 97.9 F | RESPIRATION RATE: 17 BRPM | DIASTOLIC BLOOD PRESSURE: 81 MMHG | BODY MASS INDEX: 42.43 KG/M2 | WEIGHT: 264 LBS

## 2021-04-23 DIAGNOSIS — R07.9 CHEST PAIN IN ADULT: Primary | ICD-10-CM

## 2021-04-23 DIAGNOSIS — K21.00 GASTROESOPHAGEAL REFLUX DISEASE WITH ESOPHAGITIS, UNSPECIFIED WHETHER HEMORRHAGE: ICD-10-CM

## 2021-04-23 LAB
ALBUMIN SERPL-MCNC: 3.6 G/DL (ref 3.5–5.2)
ALBUMIN/GLOB SERPL: 1 G/DL
ALP SERPL-CCNC: 100 U/L (ref 39–117)
ALT SERPL W P-5'-P-CCNC: 27 U/L (ref 1–33)
ANION GAP SERPL CALCULATED.3IONS-SCNC: 6.9 MMOL/L (ref 5–15)
AST SERPL-CCNC: 24 U/L (ref 1–32)
BASOPHILS # BLD AUTO: 0.03 10*3/MM3 (ref 0–0.2)
BASOPHILS NFR BLD AUTO: 0.6 % (ref 0–1.5)
BILIRUB SERPL-MCNC: 0.3 MG/DL (ref 0–1.2)
BILIRUB UR QL STRIP: NEGATIVE
BUN SERPL-MCNC: 13 MG/DL (ref 6–20)
BUN/CREAT SERPL: 17.8 (ref 7–25)
CALCIUM SPEC-SCNC: 9.1 MG/DL (ref 8.6–10.5)
CHLORIDE SERPL-SCNC: 102 MMOL/L (ref 98–107)
CLARITY UR: CLEAR
CO2 SERPL-SCNC: 26.1 MMOL/L (ref 22–29)
COLOR UR: YELLOW
CREAT SERPL-MCNC: 0.73 MG/DL (ref 0.57–1)
DEPRECATED RDW RBC AUTO: 41.2 FL (ref 37–54)
EOSINOPHIL # BLD AUTO: 0.15 10*3/MM3 (ref 0–0.4)
EOSINOPHIL NFR BLD AUTO: 2.9 % (ref 0.3–6.2)
ERYTHROCYTE [DISTWIDTH] IN BLOOD BY AUTOMATED COUNT: 15.8 % (ref 12.3–15.4)
ERYTHROCYTE [SEDIMENTATION RATE] IN BLOOD: 54 MM/HR (ref 0–20)
GFR SERPL CREATININE-BSD FRML MDRD: 87 ML/MIN/1.73
GLOBULIN UR ELPH-MCNC: 3.7 GM/DL
GLUCOSE SERPL-MCNC: 125 MG/DL (ref 65–99)
GLUCOSE UR STRIP-MCNC: NEGATIVE MG/DL
HCT VFR BLD AUTO: 30 % (ref 34–46.6)
HGB BLD-MCNC: 8.7 G/DL (ref 12–15.9)
HGB UR QL STRIP.AUTO: NEGATIVE
IMM GRANULOCYTES # BLD AUTO: 0.01 10*3/MM3 (ref 0–0.05)
IMM GRANULOCYTES NFR BLD AUTO: 0.2 % (ref 0–0.5)
KETONES UR QL STRIP: NEGATIVE
LEUKOCYTE ESTERASE UR QL STRIP.AUTO: NEGATIVE
LYMPHOCYTES # BLD AUTO: 1.54 10*3/MM3 (ref 0.7–3.1)
LYMPHOCYTES NFR BLD AUTO: 29.3 % (ref 19.6–45.3)
MCH RBC QN AUTO: 21.1 PG (ref 26.6–33)
MCHC RBC AUTO-ENTMCNC: 29 G/DL (ref 31.5–35.7)
MCV RBC AUTO: 72.8 FL (ref 79–97)
MONOCYTES # BLD AUTO: 0.48 10*3/MM3 (ref 0.1–0.9)
MONOCYTES NFR BLD AUTO: 9.1 % (ref 5–12)
NEUTROPHILS NFR BLD AUTO: 3.04 10*3/MM3 (ref 1.7–7)
NEUTROPHILS NFR BLD AUTO: 57.9 % (ref 42.7–76)
NITRITE UR QL STRIP: NEGATIVE
NRBC BLD AUTO-RTO: 0.2 /100 WBC (ref 0–0.2)
NT-PROBNP SERPL-MCNC: 82.4 PG/ML (ref 0–450)
PH UR STRIP.AUTO: 6 [PH] (ref 5–8)
PLAT MORPH BLD: NORMAL
PLATELET # BLD AUTO: 173 10*3/MM3 (ref 140–450)
PMV BLD AUTO: 11.3 FL (ref 6–12)
POTASSIUM SERPL-SCNC: 4.1 MMOL/L (ref 3.5–5.2)
PROT SERPL-MCNC: 7.3 G/DL (ref 6–8.5)
PROT UR QL STRIP: NEGATIVE
QT INTERVAL: 346 MS
RBC # BLD AUTO: 4.12 10*6/MM3 (ref 3.77–5.28)
RBC MORPH BLD: NORMAL
SODIUM SERPL-SCNC: 135 MMOL/L (ref 136–145)
SP GR UR STRIP: 1.01 (ref 1–1.03)
TROPONIN T SERPL-MCNC: <0.01 NG/ML (ref 0–0.03)
TROPONIN T SERPL-MCNC: <0.01 NG/ML (ref 0–0.03)
UROBILINOGEN UR QL STRIP: NORMAL
WBC # BLD AUTO: 5.25 10*3/MM3 (ref 3.4–10.8)
WBC MORPH BLD: NORMAL

## 2021-04-23 PROCEDURE — 71045 X-RAY EXAM CHEST 1 VIEW: CPT

## 2021-04-23 PROCEDURE — 81003 URINALYSIS AUTO W/O SCOPE: CPT | Performed by: EMERGENCY MEDICINE

## 2021-04-23 PROCEDURE — 93005 ELECTROCARDIOGRAM TRACING: CPT | Performed by: EMERGENCY MEDICINE

## 2021-04-23 PROCEDURE — 84484 ASSAY OF TROPONIN QUANT: CPT | Performed by: EMERGENCY MEDICINE

## 2021-04-23 PROCEDURE — 93010 ELECTROCARDIOGRAM REPORT: CPT | Performed by: INTERNAL MEDICINE

## 2021-04-23 PROCEDURE — 85025 COMPLETE CBC W/AUTO DIFF WBC: CPT | Performed by: EMERGENCY MEDICINE

## 2021-04-23 PROCEDURE — 93005 ELECTROCARDIOGRAM TRACING: CPT

## 2021-04-23 PROCEDURE — 85652 RBC SED RATE AUTOMATED: CPT | Performed by: EMERGENCY MEDICINE

## 2021-04-23 PROCEDURE — 80053 COMPREHEN METABOLIC PANEL: CPT | Performed by: EMERGENCY MEDICINE

## 2021-04-23 PROCEDURE — 83880 ASSAY OF NATRIURETIC PEPTIDE: CPT | Performed by: EMERGENCY MEDICINE

## 2021-04-23 PROCEDURE — 85007 BL SMEAR W/DIFF WBC COUNT: CPT | Performed by: EMERGENCY MEDICINE

## 2021-04-23 PROCEDURE — 99283 EMERGENCY DEPT VISIT LOW MDM: CPT

## 2021-04-23 RX ORDER — SUCRALFATE 1 G/1
1 TABLET ORAL 4 TIMES DAILY
Qty: 60 TABLET | Refills: 0 | Status: SHIPPED | OUTPATIENT
Start: 2021-04-23 | End: 2021-05-17 | Stop reason: SDUPTHER

## 2021-04-23 RX ORDER — OMEPRAZOLE 40 MG/1
40 CAPSULE, DELAYED RELEASE ORAL 2 TIMES DAILY
Qty: 60 CAPSULE | Refills: 0 | Status: SHIPPED | OUTPATIENT
Start: 2021-04-23 | End: 2021-05-17 | Stop reason: SDUPTHER

## 2021-04-23 RX ORDER — SODIUM CHLORIDE 0.9 % (FLUSH) 0.9 %
10 SYRINGE (ML) INJECTION AS NEEDED
Status: DISCONTINUED | OUTPATIENT
Start: 2021-04-23 | End: 2021-04-23 | Stop reason: HOSPADM

## 2021-04-23 NOTE — ED TRIAGE NOTES
"States \"every time I walk it feels like my heart is bumping up against my chest\" states it feels like her chest is bruised on the inside onset last PM. States she thinks her Lupus may be acting up again because she has also had back pain and just doesn't feel well      Mask placed on patient in triage. Triage staff wore appropriate PPE during interaction with patient.     "

## 2021-04-23 NOTE — ED PROVIDER NOTES
EMERGENCY DEPARTMENT ENCOUNTER    Room Number:  19/19  Date of encounter:  4/26/2021  PCP: Suhas Oseguera APRN  Historian: Patient      HPI:  Chief Complaint: Chest pain  A complete HPI/ROS/PMH/PSH/SH/FH are unobtainable due to: Nothing    Context: Silvia Velazquez is a 45 y.o. female who presents to the ED c/o left-sided chest discomfort for the last couple days.  Patient said that she feels like her heart is just beating hard up against the inside of her chest on the left side.  It is intermittent and nothing makes it better or worse.  There is no radiation of the pain, but she does feel little short of breath at times.  She has no leg swelling, she also has some nonradiating low back pain, and she is actually concerned that she may have a flareup of her lupus.  She was referred to the ED by her primary care physician after seeing her in the office earlier today.      PAST MEDICAL HISTORY  Active Ambulatory Problems     Diagnosis Date Noted   • Spinal epidural abscess 12/09/2018   • Non-STEMI (non-ST elevated myocardial infarction) (CMS/HCC) - 2 years ago 03/31/2019   • Chest pain 05/01/2019   • Dizziness 11/26/2020   • Drug abuse (CMS/HCC)    • Essential hypertension    • Lupus (systemic lupus erythematosus) (CMS/HCC)    • Palpitations    • Vestibular neuritis, left 11/27/2020   • Lower extremity edema 12/30/2020   • Weight gain 03/22/2021   • Chronic bilateral low back pain without sciatica 03/22/2021   • Nausea 04/20/2021     Resolved Ambulatory Problems     Diagnosis Date Noted   • No Resolved Ambulatory Problems     Past Medical History:   Diagnosis Date   • Depression    • Drug abstinence syndrome (CMS/HCC)    • Ex-smoker    • Heart attack (CMS/HCC)    • Hepatitis C    • Hepatitis C    • Hypertension    • Kidney stone    • Lupus (CMS/HCC)    • Mitral valve anterior leaflet prolapse    • NSTEMI (non-ST elevated myocardial infarction) (CMS/HCC)    • Otitis    • Seizures (CMS/HCC)    • Tachycardia           PAST SURGICAL HISTORY  Past Surgical History:   Procedure Laterality Date   • BACK SURGERY     • CHOLECYSTECTOMY     • LEG SURGERY     • LIVER BIOPSY     • LUMBAR LAMINECTOMY DISCECTOMY DECOMPRESSION Left 2018    Procedure: Posterior lumbar three through sacrum decompression;  Surgeon: Tevin Whitley MD;  Location: [facility] OR;  Service: Neurosurgery   • LYMPH NODE BIOPSY     • SPINE SURGERY           FAMILY HISTORY  Family History   Problem Relation Age of Onset   • No Known Problems Mother    • No Known Problems Father          SOCIAL HISTORY  Social History     Socioeconomic History   • Marital status: Legally      Spouse name: Not on file   • Number of children: Not on file   • Years of education: Not on file   • Highest education level: Not on file   Tobacco Use   • Smoking status: Former Smoker     Packs/day: 1.00     Years: 30.00     Pack years: 30.00     Types: Cigarettes     Quit date: 2020     Years since quittin.6   • Smokeless tobacco: Never Used   • Tobacco comment: E-CIG USE   Substance and Sexual Activity   • Alcohol use: No     Comment: CAFFEINE USE: 1 CUP COFFEE/ 2 COKES DAILY   • Drug use: Not Currently     Types: IV, Heroin, Methamphetamines     Comment: pt states she no longer uses heroin, she now uses meth   • Sexual activity: Defer         ALLERGIES  Sulfa antibiotics        REVIEW OF SYSTEMS  Review of Systems     All systems reviewed and negative except for those discussed in HPI.       PHYSICAL EXAM    I have reviewed the triage vital signs and nursing notes.    ED Triage Vitals   Temp Heart Rate Resp BP SpO2   21 1027 21 1027 21 1027 21 1034 21 1027   97.9 °F (36.6 °C) 96 17 129/74 97 %      Temp src Heart Rate Source Patient Position BP Location FiO2 (%)   21 1027 -- 21 1034 21 1034 --   Tympanic  Sitting Right arm        Physical Exam  GENERAL: not distressed, awake and alert nontoxic-appearing  HENT:  nares patent  EYES: no scleral icterus  CV: regular rhythm, regular rate  RESPIRATORY: normal effort  ABDOMEN: soft, nontender palpation, bowel sounds present.  No CVA tenderness, mild SI joint tenderness palpation without swelling or erythema  MUSCULOSKELETAL: no deformity  NEURO: alert, moves all extremities, follows commands  SKIN: warm, dry        LAB RESULTS  No results found for this or any previous visit (from the past 24 hour(s)).    Ordered the above labs and independently reviewed the results.        RADIOLOGY  No Radiology Exams Resulted Within Past 24 Hours    I ordered the above noted radiological studies. Reviewed by me and discussed with radiologist.  See dictation for official radiology interpretation.      PROCEDURES    Procedures      MEDICATIONS GIVEN IN ER    Medications - No data to display      PROGRESS, DATA ANALYSIS, CONSULTS, AND MEDICAL DECISION MAKING    All labs have been independently reviewed by me.  All radiology studies have been reviewed by me and discussed with radiologist dictating the report.   EKG's independently viewed and interpreted by me.  Discussion below represents my analysis of pertinent findings related to patient's condition, differential diagnosis, treatment plan and final disposition.        ED Course as of Apr 26 0137   Mon Apr 26, 2021   0135 CBC shows hemoglobin 8.7 which is been slowly declining over the last few months.  I did ask her if she is had some black stools and she did say that from time to time she has along with some epigastric burning.  I suspect that she has a component of gastritis.  This could in fact be the cause of her pain    [DP]   0136 Her chemistry is unremarkable    [DP]   0136 Troponin is negative x2    [DP]   0136 proBNP is normal    [DP]   0136 Sed rate is elevated    [DP]   0136 Urinalysis negative    [DP]   0136 EKG at 1720  Sinus rhythm at 72  Normal [place], QRS and QT  No acute ST segment changes are seen to suggest ischemia, and this is  unchanged compared to December 2020    [DP]   0136 Chest x-ray shows no acute disease      [DP]   0136 Patient has heart score of 2    [DP]   0137 Overall, I think her pain is GI in origin.  I think she may also have a component of a slow upper GI bleed.  I have advised her of this and that she needs to see a GI physician ASAP.  I have also placed her on a PPI and recommended iron supplementation.    [DP]      ED Course User Index  [DP] Soto Rao MD           PPE: Both the patient and I wore a surgical mask throughout the entire patient encounter. I wore protective goggles.     AS OF 01:37 EDT VITALS:    BP - 125/81  HR - 73  TEMP - 97.9 °F (36.6 °C) (Tympanic)  O2 SATS - 96%        DIAGNOSIS  Final diagnoses:   Chest pain in adult   Gastroesophageal reflux disease with esophagitis, unspecified whether hemorrhage         DISPOSITION  Discharge           Soto Rao MD  04/26/21 0137

## 2021-04-26 ENCOUNTER — OFFICE VISIT (OUTPATIENT)
Dept: NEUROSURGERY | Facility: CLINIC | Age: 46
End: 2021-04-26

## 2021-04-26 ENCOUNTER — LAB (OUTPATIENT)
Dept: LAB | Facility: HOSPITAL | Age: 46
End: 2021-04-26

## 2021-04-26 VITALS
WEIGHT: 262.6 LBS | SYSTOLIC BLOOD PRESSURE: 120 MMHG | HEART RATE: 65 BPM | BODY MASS INDEX: 42.2 KG/M2 | DIASTOLIC BLOOD PRESSURE: 90 MMHG | TEMPERATURE: 98.2 F | HEIGHT: 66 IN

## 2021-04-26 DIAGNOSIS — M79.605 PAIN OF LEFT LOWER EXTREMITY: ICD-10-CM

## 2021-04-26 DIAGNOSIS — M54.50 CHRONIC LEFT-SIDED LOW BACK PAIN WITHOUT SCIATICA: Primary | ICD-10-CM

## 2021-04-26 DIAGNOSIS — M54.16 LUMBAR RADICULOPATHY: ICD-10-CM

## 2021-04-26 DIAGNOSIS — G89.29 CHRONIC LEFT-SIDED LOW BACK PAIN WITHOUT SCIATICA: Primary | ICD-10-CM

## 2021-04-26 LAB
CRP SERPL-MCNC: 0.6 MG/DL (ref 0–0.5)
TSH SERPL DL<=0.05 MIU/L-ACNC: 3.31 UIU/ML (ref 0.27–4.2)

## 2021-04-26 PROCEDURE — 86140 C-REACTIVE PROTEIN: CPT

## 2021-04-26 PROCEDURE — 36415 COLL VENOUS BLD VENIPUNCTURE: CPT

## 2021-04-26 PROCEDURE — 99214 OFFICE O/P EST MOD 30 MIN: CPT | Performed by: NURSE PRACTITIONER

## 2021-04-26 PROCEDURE — 84443 ASSAY THYROID STIM HORMONE: CPT | Performed by: NURSE PRACTITIONER

## 2021-04-26 RX ORDER — ACETAMINOPHEN 500 MG
500 TABLET ORAL EVERY 6 HOURS PRN
COMMUNITY
End: 2021-08-27

## 2021-04-28 ENCOUNTER — TELEPHONE (OUTPATIENT)
Dept: PHYSICAL THERAPY | Facility: CLINIC | Age: 46
End: 2021-04-28

## 2021-05-05 ENCOUNTER — TELEPHONE (OUTPATIENT)
Dept: FAMILY MEDICINE CLINIC | Facility: CLINIC | Age: 46
End: 2021-05-05

## 2021-05-05 DIAGNOSIS — B19.20 HEPATITIS C VIRUS INFECTION WITHOUT HEPATIC COMA, UNSPECIFIED CHRONICITY: Primary | ICD-10-CM

## 2021-05-05 NOTE — TELEPHONE ENCOUNTER
Caller: Silvia Velazquez    Relationship: Self    Best call back number:    Medication needed:   Requested Prescriptions      No prescriptions requested or ordered in this encounter   When do you need the refill by:   ZYPREXA 10MG IN THE MORNING AND 15MG AT NIGHT    What additional details did the patient provide when requesting the medication:     Does the patient have less than a 3 day supply:  [x] Yes  [] No    What is the patient's preferred pharmacy:  [facility]  [address] [place] [address]  [phone]

## 2021-05-05 NOTE — TELEPHONE ENCOUNTER
Caller: Silvia Velazquez    Relationship: Self    Best call back number:     What is the medical concern/diagnosis:     What specialty or service is being requested: GASTRO    What is the provider, practice or medical service name: DR MARGY SCHAEFER    What is the office location:     What is the office phone number:     Any additional details: PATIENT IS CALLING IN ASKING IF DR BOOKER GARCIA CAN WRITE HER A REFERRAL TO SEE DR MARGY SCHAEFER FOR GASTRO

## 2021-05-06 NOTE — TELEPHONE ENCOUNTER
Spoke to patient and she said she has Hep C and wants to be referred to Gastro is that where she needs to be referred or to another specialist?

## 2021-05-07 ENCOUNTER — TELEPHONE (OUTPATIENT)
Dept: GASTROENTEROLOGY | Facility: CLINIC | Age: 46
End: 2021-05-07

## 2021-05-11 ENCOUNTER — APPOINTMENT (OUTPATIENT)
Dept: CT IMAGING | Facility: HOSPITAL | Age: 46
End: 2021-05-11

## 2021-05-11 ENCOUNTER — HOSPITAL ENCOUNTER (EMERGENCY)
Facility: HOSPITAL | Age: 46
Discharge: HOME | End: 2021-05-11
Attending: EMERGENCY MEDICINE | Admitting: EMERGENCY MEDICINE

## 2021-05-11 VITALS
BODY MASS INDEX: 42.27 KG/M2 | RESPIRATION RATE: 16 BRPM | DIASTOLIC BLOOD PRESSURE: 82 MMHG | SYSTOLIC BLOOD PRESSURE: 134 MMHG | HEART RATE: 74 BPM | TEMPERATURE: 98.2 F | OXYGEN SATURATION: 95 % | WEIGHT: 263 LBS | HEIGHT: 66 IN

## 2021-05-11 DIAGNOSIS — D50.9 MICROCYTIC ANEMIA: Primary | ICD-10-CM

## 2021-05-11 LAB
ALBUMIN SERPL-MCNC: 3.9 G/DL (ref 3.5–5.2)
ALBUMIN/GLOB SERPL: 1.1 G/DL
ALP SERPL-CCNC: 111 U/L (ref 39–117)
ALT SERPL W P-5'-P-CCNC: 32 U/L (ref 1–33)
AMPHET+METHAMPHET UR QL: NEGATIVE
ANION GAP SERPL CALCULATED.3IONS-SCNC: 8.6 MMOL/L (ref 5–15)
AST SERPL-CCNC: 35 U/L (ref 1–32)
BACTERIA UR QL AUTO: ABNORMAL /HPF
BARBITURATES UR QL SCN: NEGATIVE
BASOPHILS # BLD AUTO: 0.03 10*3/MM3 (ref 0–0.2)
BASOPHILS NFR BLD AUTO: 0.6 % (ref 0–1.5)
BENZODIAZ UR QL SCN: NEGATIVE
BILIRUB SERPL-MCNC: 0.5 MG/DL (ref 0–1.2)
BILIRUB UR QL STRIP: NEGATIVE
BUN SERPL-MCNC: 13 MG/DL (ref 6–20)
BUN/CREAT SERPL: 19.4 (ref 7–25)
CALCIUM SPEC-SCNC: 8.8 MG/DL (ref 8.6–10.5)
CANNABINOIDS SERPL QL: NEGATIVE
CHLORIDE SERPL-SCNC: 103 MMOL/L (ref 98–107)
CLARITY UR: CLEAR
CO2 SERPL-SCNC: 26.4 MMOL/L (ref 22–29)
COCAINE UR QL: NEGATIVE
COLOR UR: YELLOW
CREAT SERPL-MCNC: 0.67 MG/DL (ref 0.57–1)
DEPRECATED RDW RBC AUTO: 38.7 FL (ref 37–54)
EOSINOPHIL # BLD AUTO: 0.16 10*3/MM3 (ref 0–0.4)
EOSINOPHIL NFR BLD AUTO: 3.1 % (ref 0.3–6.2)
ERYTHROCYTE [DISTWIDTH] IN BLOOD BY AUTOMATED COUNT: 15.9 % (ref 12.3–15.4)
ETHANOL BLD-MCNC: <10 MG/DL (ref 0–10)
ETHANOL UR QL: <0.01 %
FERRITIN SERPL-MCNC: 8.5 NG/ML (ref 13–150)
GFR SERPL CREATININE-BSD FRML MDRD: 96 ML/MIN/1.73
GLOBULIN UR ELPH-MCNC: 3.7 GM/DL
GLUCOSE SERPL-MCNC: 103 MG/DL (ref 65–99)
GLUCOSE UR STRIP-MCNC: NEGATIVE MG/DL
HCG SERPL QL: NEGATIVE
HCT VFR BLD AUTO: 27.6 % (ref 34–46.6)
HGB BLD-MCNC: 8.1 G/DL (ref 12–15.9)
HGB UR QL STRIP.AUTO: NEGATIVE
HYALINE CASTS UR QL AUTO: ABNORMAL /LPF
IRON 24H UR-MRATE: 34 MCG/DL (ref 37–145)
IRON SATN MFR SERPL: 5 % (ref 20–50)
KETONES UR QL STRIP: ABNORMAL
LEUKOCYTE ESTERASE UR QL STRIP.AUTO: ABNORMAL
LYMPHOCYTES # BLD AUTO: 1.32 10*3/MM3 (ref 0.7–3.1)
LYMPHOCYTES NFR BLD AUTO: 25.4 % (ref 19.6–45.3)
MCH RBC QN AUTO: 20.1 PG (ref 26.6–33)
MCHC RBC AUTO-ENTMCNC: 29.3 G/DL (ref 31.5–35.7)
MCV RBC AUTO: 68.7 FL (ref 79–97)
METHADONE UR QL SCN: NEGATIVE
MONOCYTES # BLD AUTO: 0.57 10*3/MM3 (ref 0.1–0.9)
MONOCYTES NFR BLD AUTO: 11 % (ref 5–12)
NEUTROPHILS NFR BLD AUTO: 3.09 10*3/MM3 (ref 1.7–7)
NEUTROPHILS NFR BLD AUTO: 59.5 % (ref 42.7–76)
NITRITE UR QL STRIP: NEGATIVE
OPIATES UR QL: NEGATIVE
OXYCODONE UR QL SCN: NEGATIVE
PH UR STRIP.AUTO: 6 [PH] (ref 5–8)
PLATELET # BLD AUTO: 164 10*3/MM3 (ref 140–450)
PMV BLD AUTO: 11.3 FL (ref 6–12)
POTASSIUM SERPL-SCNC: 4.1 MMOL/L (ref 3.5–5.2)
PROT SERPL-MCNC: 7.6 G/DL (ref 6–8.5)
PROT UR QL STRIP: NEGATIVE
RBC # BLD AUTO: 4.02 10*6/MM3 (ref 3.77–5.28)
RBC # UR: ABNORMAL /HPF
REF LAB TEST METHOD: ABNORMAL
SODIUM SERPL-SCNC: 138 MMOL/L (ref 136–145)
SP GR UR STRIP: 1.03 (ref 1–1.03)
SQUAMOUS #/AREA URNS HPF: ABNORMAL /HPF
TIBC SERPL-MCNC: 651 MCG/DL (ref 298–536)
TRANSFERRIN SERPL-MCNC: 437 MG/DL (ref 200–360)
UROBILINOGEN UR QL STRIP: ABNORMAL
WBC # BLD AUTO: 5.19 10*3/MM3 (ref 3.4–10.8)
WBC UR QL AUTO: ABNORMAL /HPF

## 2021-05-11 PROCEDURE — 99283 EMERGENCY DEPT VISIT LOW MDM: CPT

## 2021-05-11 PROCEDURE — 80307 DRUG TEST PRSMV CHEM ANLYZR: CPT | Performed by: PHYSICIAN ASSISTANT

## 2021-05-11 PROCEDURE — 84466 ASSAY OF TRANSFERRIN: CPT | Performed by: EMERGENCY MEDICINE

## 2021-05-11 PROCEDURE — 70450 CT HEAD/BRAIN W/O DYE: CPT

## 2021-05-11 PROCEDURE — 84703 CHORIONIC GONADOTROPIN ASSAY: CPT | Performed by: PHYSICIAN ASSISTANT

## 2021-05-11 PROCEDURE — 80053 COMPREHEN METABOLIC PANEL: CPT | Performed by: PHYSICIAN ASSISTANT

## 2021-05-11 PROCEDURE — 82077 ASSAY SPEC XCP UR&BREATH IA: CPT | Performed by: PHYSICIAN ASSISTANT

## 2021-05-11 PROCEDURE — 83540 ASSAY OF IRON: CPT | Performed by: EMERGENCY MEDICINE

## 2021-05-11 PROCEDURE — 82728 ASSAY OF FERRITIN: CPT | Performed by: EMERGENCY MEDICINE

## 2021-05-11 PROCEDURE — 81001 URINALYSIS AUTO W/SCOPE: CPT | Performed by: PHYSICIAN ASSISTANT

## 2021-05-11 PROCEDURE — 85025 COMPLETE CBC W/AUTO DIFF WBC: CPT | Performed by: PHYSICIAN ASSISTANT

## 2021-05-11 RX ORDER — LANOLIN ALCOHOL/MO/W.PET/CERES
CREAM (GRAM) TOPICAL
Qty: 90 TABLET | Refills: 0 | Status: SHIPPED | OUTPATIENT
Start: 2021-05-11 | End: 2021-08-27

## 2021-05-11 RX ORDER — SODIUM CHLORIDE 0.9 % (FLUSH) 0.9 %
10 SYRINGE (ML) INJECTION AS NEEDED
Status: DISCONTINUED | OUTPATIENT
Start: 2021-05-11 | End: 2021-05-11 | Stop reason: HOSPADM

## 2021-05-15 ENCOUNTER — HOSPITAL ENCOUNTER (EMERGENCY)
Facility: HOSPITAL | Age: 46
Discharge: LEFT BEFORE DISCHARGE | End: 2021-05-16
Attending: EMERGENCY MEDICINE

## 2021-05-15 VITALS
RESPIRATION RATE: 18 BRPM | HEIGHT: 66 IN | SYSTOLIC BLOOD PRESSURE: 122 MMHG | DIASTOLIC BLOOD PRESSURE: 85 MMHG | BODY MASS INDEX: 42.75 KG/M2 | WEIGHT: 266 LBS | HEART RATE: 87 BPM | OXYGEN SATURATION: 92 % | TEMPERATURE: 98.6 F

## 2021-05-15 DIAGNOSIS — M54.50 MIDLINE LOW BACK PAIN WITHOUT SCIATICA, UNSPECIFIED CHRONICITY: Primary | ICD-10-CM

## 2021-05-15 LAB
BILIRUB UR QL STRIP: NEGATIVE
CLARITY UR: CLEAR
COLOR UR: YELLOW
DEPRECATED RDW RBC AUTO: 41.9 FL (ref 37–54)
ERYTHROCYTE [DISTWIDTH] IN BLOOD BY AUTOMATED COUNT: 16.3 % (ref 12.3–15.4)
ERYTHROCYTE [SEDIMENTATION RATE] IN BLOOD: 56 MM/HR (ref 0–20)
GLUCOSE UR STRIP-MCNC: NEGATIVE MG/DL
HCT VFR BLD AUTO: 28.4 % (ref 34–46.6)
HGB BLD-MCNC: 8.3 G/DL (ref 12–15.9)
HGB UR QL STRIP.AUTO: NEGATIVE
KETONES UR QL STRIP: NEGATIVE
LEUKOCYTE ESTERASE UR QL STRIP.AUTO: NEGATIVE
MCH RBC QN AUTO: 21 PG (ref 26.6–33)
MCHC RBC AUTO-ENTMCNC: 29.2 G/DL (ref 31.5–35.7)
MCV RBC AUTO: 71.7 FL (ref 79–97)
NITRITE UR QL STRIP: NEGATIVE
PH UR STRIP.AUTO: 5.5 [PH] (ref 5–8)
PLATELET # BLD AUTO: 150 10*3/MM3 (ref 140–450)
PMV BLD AUTO: 11.4 FL (ref 6–12)
PROT UR QL STRIP: NEGATIVE
RBC # BLD AUTO: 3.96 10*6/MM3 (ref 3.77–5.28)
SP GR UR STRIP: 1.02 (ref 1–1.03)
UROBILINOGEN UR QL STRIP: NORMAL
WBC # BLD AUTO: 5.45 10*3/MM3 (ref 3.4–10.8)

## 2021-05-15 PROCEDURE — 80053 COMPREHEN METABOLIC PANEL: CPT | Performed by: EMERGENCY MEDICINE

## 2021-05-15 PROCEDURE — 85007 BL SMEAR W/DIFF WBC COUNT: CPT | Performed by: EMERGENCY MEDICINE

## 2021-05-15 PROCEDURE — 84145 PROCALCITONIN (PCT): CPT | Performed by: EMERGENCY MEDICINE

## 2021-05-15 PROCEDURE — 99283 EMERGENCY DEPT VISIT LOW MDM: CPT

## 2021-05-15 PROCEDURE — 85652 RBC SED RATE AUTOMATED: CPT | Performed by: EMERGENCY MEDICINE

## 2021-05-15 PROCEDURE — 81003 URINALYSIS AUTO W/O SCOPE: CPT | Performed by: EMERGENCY MEDICINE

## 2021-05-15 PROCEDURE — 85025 COMPLETE CBC W/AUTO DIFF WBC: CPT | Performed by: EMERGENCY MEDICINE

## 2021-05-15 PROCEDURE — 83605 ASSAY OF LACTIC ACID: CPT | Performed by: EMERGENCY MEDICINE

## 2021-05-15 PROCEDURE — 86140 C-REACTIVE PROTEIN: CPT | Performed by: EMERGENCY MEDICINE

## 2021-05-15 PROCEDURE — 87040 BLOOD CULTURE FOR BACTERIA: CPT | Performed by: EMERGENCY MEDICINE

## 2021-05-15 RX ORDER — SODIUM CHLORIDE 0.9 % (FLUSH) 0.9 %
10 SYRINGE (ML) INJECTION AS NEEDED
Status: DISCONTINUED | OUTPATIENT
Start: 2021-05-15 | End: 2021-05-16 | Stop reason: HOSPADM

## 2021-05-15 RX ORDER — METHOCARBAMOL 750 MG/1
750 TABLET, FILM COATED ORAL ONCE
Status: COMPLETED | OUTPATIENT
Start: 2021-05-15 | End: 2021-05-16

## 2021-05-15 RX ORDER — OLANZAPINE 15 MG/1
15 TABLET ORAL NIGHTLY
COMMUNITY
End: 2021-12-09 | Stop reason: SDUPTHER

## 2021-05-16 ENCOUNTER — APPOINTMENT (OUTPATIENT)
Dept: CT IMAGING | Facility: HOSPITAL | Age: 46
End: 2021-05-16

## 2021-05-16 LAB
ALBUMIN SERPL-MCNC: 3.8 G/DL (ref 3.5–5.2)
ALBUMIN/GLOB SERPL: 1.1 G/DL
ALP SERPL-CCNC: 105 U/L (ref 39–117)
ALT SERPL W P-5'-P-CCNC: 27 U/L (ref 1–33)
ANION GAP SERPL CALCULATED.3IONS-SCNC: 5 MMOL/L (ref 5–15)
AST SERPL-CCNC: 30 U/L (ref 1–32)
BASOPHILS # BLD MANUAL: 0.05 10*3/MM3 (ref 0–0.2)
BASOPHILS NFR BLD AUTO: 1 % (ref 0–1.5)
BILIRUB SERPL-MCNC: 0.3 MG/DL (ref 0–1.2)
BUN SERPL-MCNC: 9 MG/DL (ref 6–20)
BUN/CREAT SERPL: 13.2 (ref 7–25)
CALCIUM SPEC-SCNC: 8.6 MG/DL (ref 8.6–10.5)
CHLORIDE SERPL-SCNC: 105 MMOL/L (ref 98–107)
CO2 SERPL-SCNC: 29 MMOL/L (ref 22–29)
CREAT SERPL-MCNC: 0.68 MG/DL (ref 0.57–1)
CRP SERPL-MCNC: 1.55 MG/DL (ref 0–0.5)
D-LACTATE SERPL-SCNC: 1.3 MMOL/L (ref 0.5–2)
EOSINOPHIL # BLD MANUAL: 0.11 10*3/MM3 (ref 0–0.4)
EOSINOPHIL NFR BLD MANUAL: 2 % (ref 0.3–6.2)
GFR SERPL CREATININE-BSD FRML MDRD: 94 ML/MIN/1.73
GIANT PLATELETS: ABNORMAL
GLOBULIN UR ELPH-MCNC: 3.6 GM/DL
GLUCOSE SERPL-MCNC: 95 MG/DL (ref 65–99)
LYMPHOCYTES # BLD MANUAL: 1.11 10*3/MM3 (ref 0.7–3.1)
LYMPHOCYTES NFR BLD MANUAL: 2 % (ref 5–12)
LYMPHOCYTES NFR BLD MANUAL: 20.4 % (ref 19.6–45.3)
METAMYELOCYTES NFR BLD MANUAL: 1 % (ref 0–0)
MONOCYTES # BLD AUTO: 0.11 10*3/MM3 (ref 0.1–0.9)
NEUTROPHILS # BLD AUTO: 4.01 10*3/MM3 (ref 1.7–7)
NEUTROPHILS NFR BLD MANUAL: 73.5 % (ref 42.7–76)
POTASSIUM SERPL-SCNC: 4.1 MMOL/L (ref 3.5–5.2)
PROCALCITONIN SERPL-MCNC: 0.07 NG/ML (ref 0–0.25)
PROT SERPL-MCNC: 7.4 G/DL (ref 6–8.5)
RBC MORPH BLD: NORMAL
SODIUM SERPL-SCNC: 139 MMOL/L (ref 136–145)
WBC MORPH BLD: NORMAL

## 2021-05-16 PROCEDURE — 72131 CT LUMBAR SPINE W/O DYE: CPT

## 2021-05-16 PROCEDURE — 87040 BLOOD CULTURE FOR BACTERIA: CPT | Performed by: EMERGENCY MEDICINE

## 2021-05-16 RX ADMIN — METHOCARBAMOL TABLETS 750 MG: 750 TABLET, COATED ORAL at 00:02

## 2021-05-16 NOTE — ED PROVIDER NOTES
EMERGENCY DEPARTMENT ENCOUNTER    Room Number:  HALC/C  Date of encounter:  5/16/2021  PCP: Suhas Oseguera APRN  Historian: Patient      HPI:  Chief Complaint: Back pain      Context: Silvia Velazquez is a 45 y.o. female who presents to the ED c/o 4 to 6 weeks of worsening back pain.  Patient has a history of MRSA in her spine requiring back surgery several years ago due to IV drug abuse.  The patient states she is now clean of IV drugs and is on Suboxone.  She states her back pain has returned over the past 4 to 6 weeks and she saw her neurosurgeon on April 26 who ordered blood work and an MRI.  The patient was not able to get her MRI performed because she needed sedation.  She presents to the ER this Saturday night for continued pain.  The patient denies IV drug abuse, fevers, chills, saddle anesthesia, lower extremity weakness, lower extremity numbness, or bowel or bladder incontinence.      PAST MEDICAL HISTORY  Active Ambulatory Problems     Diagnosis Date Noted   • Spinal epidural abscess 12/09/2018   • Non-STEMI (non-ST elevated myocardial infarction) (CMS/HCC) - 2 years ago 03/31/2019   • Chest pain 05/01/2019   • Dizziness 11/26/2020   • Drug abuse (CMS/HCC)    • Essential hypertension    • Lupus (systemic lupus erythematosus) (CMS/HCC)    • Palpitations    • Vestibular neuritis, left 11/27/2020   • Lower extremity edema 12/30/2020   • Weight gain 03/22/2021   • Chronic left-sided low back pain without sciatica 03/22/2021   • Nausea 04/20/2021   • Lumbar radiculopathy 04/26/2021   • Pain of left lower extremity 04/26/2021     Resolved Ambulatory Problems     Diagnosis Date Noted   • No Resolved Ambulatory Problems     Past Medical History:   Diagnosis Date   • Depression    • Drug abstinence syndrome (CMS/HCC)    • Ex-smoker    • Heart attack (CMS/HCC)    • Hepatitis C    • Hepatitis C    • Hypertension    • Kidney stone    • Lupus (CMS/HCC)    • Mitral valve anterior leaflet prolapse    • NSTEMI (non-ST  elevated myocardial infarction) (CMS/HCC)    • Otitis    • Seizures (CMS/HCC)    • Tachycardia          PAST SURGICAL HISTORY  Past Surgical History:   Procedure Laterality Date   • BACK SURGERY     • CHOLECYSTECTOMY     • LEG SURGERY     • LIVER BIOPSY     • LUMBAR LAMINECTOMY DISCECTOMY DECOMPRESSION Left 2018    Procedure: Posterior lumbar three through sacrum decompression;  Surgeon: Tevin Whitley MD;  Location: [facility] OR;  Service: Neurosurgery   • LYMPH NODE BIOPSY     • SPINE SURGERY           FAMILY HISTORY  Family History   Problem Relation Age of Onset   • Diabetes Mother    • No Known Problems Father          SOCIAL HISTORY  Social History     Socioeconomic History   • Marital status: Legally      Spouse name: Not on file   • Number of children: Not on file   • Years of education: Not on file   • Highest education level: Not on file   Tobacco Use   • Smoking status: Former Smoker     Packs/day: 1.00     Years: 30.00     Pack years: 30.00     Types: Cigarettes     Quit date: 2020     Years since quittin.7   • Smokeless tobacco: Never Used   • Tobacco comment: E-CIG USE   Vaping Use   • Vaping Use: Every day   • Substances: Nicotine   Substance and Sexual Activity   • Alcohol use: No     Comment: CAFFEINE USE: 1 CUP COFFEE/ 2 COKES DAILY   • Drug use: Not Currently     Types: IV, Heroin, Methamphetamines     Comment: pt states she no longer uses heroin, she now uses meth   • Sexual activity: Defer         ALLERGIES  Sulfa antibiotics        REVIEW OF SYSTEMS  Review of Systems     Patient denies headache, neck pain, chest pain, fevers, chills, cough, shortness of breath, known COVID-19 exposure, abdominal pain, vomiting, diarrhea, lower extremity pain or lower extremity swelling or focal neuro deficit    All systems reviewed and negative except for those discussed in HPI.     PHYSICAL EXAM    I have reviewed the triage vital signs and nursing notes.    ED Triage Vitals    Temp Heart Rate Resp BP SpO2   05/15/21 2131 05/15/21 2131 05/15/21 2131 05/15/21 2247 05/15/21 2131   98.6 °F (37 °C) 92 18 122/85 92 %      Temp src Heart Rate Source Patient Position BP Location FiO2 (%)   -- 05/15/21 2131 -- -- --    Monitor          GENERAL: 45-year-old well developed, well nourished in no acute distress  HENT: NCAT, neck supple, trachea midline  EYES: no scleral icterus, PERRL, normal conjunctiva  CV: regular rhythm, regular rate, no murmur  RESPIRATORY: unlabored effort, CTAB  ABDOMEN: soft, non-tender, non-distended, bowel sounds present  MUSCULOSKELETAL: no gross deformity, no pedal edema, no calf tenderness  Back: The patient has a midline incision in her lumbar spine with what feels like scar tissue at the superior aspect  NEURO: alert,  sensory and motor function intact, speech clear, mental status normal.  The patient is ambulatory in the ER without difficulty  SKIN: warm, dry, no rash  PSYCH:  Appropriate mood and affect      PPE  Pt does not present with symptoms for COVID19; however, I was wearing a mask and goggles throughout all patient interaction.    Vital signs and nursing notes reviewed.      LAB RESULTS  Recent Results (from the past 24 hour(s))   Comprehensive Metabolic Panel    Collection Time: 05/15/21 11:34 PM    Specimen: Blood   Result Value Ref Range    Glucose 95 65 - 99 mg/dL    BUN 9 6 - 20 mg/dL    Creatinine 0.68 0.57 - 1.00 mg/dL    Sodium 139 136 - 145 mmol/L    Potassium 4.1 3.5 - 5.2 mmol/L    Chloride 105 98 - 107 mmol/L    CO2 29.0 22.0 - 29.0 mmol/L    Calcium 8.6 8.6 - 10.5 mg/dL    Total Protein 7.4 6.0 - 8.5 g/dL    Albumin 3.80 3.50 - 5.20 g/dL    ALT (SGPT) 27 1 - 33 U/L    AST (SGOT) 30 1 - 32 U/L    Alkaline Phosphatase 105 39 - 117 U/L    Total Bilirubin 0.3 0.0 - 1.2 mg/dL    eGFR Non African Amer 94 >60 mL/min/1.73    Globulin 3.6 gm/dL    A/G Ratio 1.1 g/dL    BUN/Creatinine Ratio 13.2 7.0 - 25.0    Anion Gap 5.0 5.0 - 15.0 mmol/L   Lactic Acid,  Plasma    Collection Time: 05/15/21 11:34 PM    Specimen: Blood   Result Value Ref Range    Lactate 1.3 0.5 - 2.0 mmol/L   Procalcitonin    Collection Time: 05/15/21 11:34 PM    Specimen: Blood   Result Value Ref Range    Procalcitonin 0.07 0.00 - 0.25 ng/mL   Sedimentation Rate    Collection Time: 05/15/21 11:34 PM    Specimen: Blood   Result Value Ref Range    Sed Rate 56 (H) 0 - 20 mm/hr   C-reactive Protein    Collection Time: 05/15/21 11:34 PM    Specimen: Blood   Result Value Ref Range    C-Reactive Protein 1.55 (H) 0.00 - 0.50 mg/dL   CBC Auto Differential    Collection Time: 05/15/21 11:34 PM    Specimen: Blood   Result Value Ref Range    WBC 5.45 3.40 - 10.80 10*3/mm3    RBC 3.96 3.77 - 5.28 10*6/mm3    Hemoglobin 8.3 (L) 12.0 - 15.9 g/dL    Hematocrit 28.4 (L) 34.0 - 46.6 %    MCV 71.7 (L) 79.0 - 97.0 fL    MCH 21.0 (L) 26.6 - 33.0 pg    MCHC 29.2 (L) 31.5 - 35.7 g/dL    RDW 16.3 (H) 12.3 - 15.4 %    RDW-SD 41.9 37.0 - 54.0 fl    MPV 11.4 6.0 - 12.0 fL    Platelets 150 140 - 450 10*3/mm3   Manual Differential    Collection Time: 05/15/21 11:34 PM    Specimen: Blood   Result Value Ref Range    Neutrophil % 73.5 42.7 - 76.0 %    Lymphocyte % 20.4 19.6 - 45.3 %    Monocyte % 2.0 (L) 5.0 - 12.0 %    Eosinophil % 2.0 0.3 - 6.2 %    Basophil % 1.0 0.0 - 1.5 %    Metamyelocyte % 1.0 (H) 0.0 - 0.0 %    Neutrophils Absolute 4.01 1.70 - 7.00 10*3/mm3    Lymphocytes Absolute 1.11 0.70 - 3.10 10*3/mm3    Monocytes Absolute 0.11 0.10 - 0.90 10*3/mm3    Eosinophils Absolute 0.11 0.00 - 0.40 10*3/mm3    Basophils Absolute 0.05 0.00 - 0.20 10*3/mm3    RBC Morphology Normal Normal    WBC Morphology Normal Normal    Giant Platelets Slight/1+ None Seen   Urinalysis With Microscopic If Indicated (No Culture) - Urine, Clean Catch    Collection Time: 05/15/21 11:39 PM    Specimen: Urine, Clean Catch   Result Value Ref Range    Color, UA Yellow Yellow, Straw    Appearance, UA Clear Clear    pH, UA 5.5 5.0 - 8.0    Specific  Gravity, UA 1.024 1.005 - 1.030    Glucose, UA Negative Negative    Ketones, UA Negative Negative    Bilirubin, UA Negative Negative    Blood, UA Negative Negative    Protein, UA Negative Negative    Leuk Esterase, UA Negative Negative    Nitrite, UA Negative Negative    Urobilinogen, UA 1.0 E.U./dL 0.2 - 1.0 E.U./dL       Ordered the above labs and independently reviewed the results.        RADIOLOGY  CT Lumbar Spine Without Contrast    Result Date: 5/16/2021  CT OF THE LUMBAR SPINE  HISTORY: Back pain and numbness at the superior aspect of the patient's lumbar incision.  COMPARISON: 01/17/2019  TECHNIQUE: Axial CT imaging was obtained through the lumbar spine. Coronal and sagittal reformatted images were obtained.  FINDINGS: Examination is degraded by the lack of IV contrast material in a patient with history of prior epidural abscess and L3-S1 laminectomy. Lumbar vertebral body alignment appears within normal limits. Again, there has been prior L3-S1 laminectomy. There is some minimal intervertebral disc space narrowing noted at L4-L5 is stable when compared to prior exam. No abnormalities of the endplates are seen to suggest discitis/osteomyelitis. However, there is some lucency and widening of the L4-L5 facet joint on the left. The patient had a prior fluid collection involving the left L4-L5 facet joint, and this may reflect sequela of that infection, as the margins appear fairly sclerotic. There is also some mild widening of the facet joint on the right at L4-L5, which may be more apparent than on prior study. These findings are poorly assessed on this unenhanced CT. Prior study also demonstrated a rim-enhancing collection within the left side of the canal, with associated mass effect on the thecal sac. Again, this cannot be assessed on the current study.  T12-L1: There is no canal stenosis or neural foraminal narrowing. L1-L2: There is no canal stenosis or neural foraminal narrowing. L2-L3: There is no canal  stenosis or neural foraminal narrowing. L3-L4: There is no canal stenosis. There may be some minimal bilateral neural foraminal narrowing. L4-L5: There is disc bulge, which appears to be resulting in bilateral neural foraminal narrowing. L5-S1: There may be some mild bilateral neural foraminal narrowing.  Postoperative changes in the superficial paraspinous soft tissues. No definite fluid collection is seen.       1. Technically limited study. There is some stranding identified within the superficial paraspinous soft tissues, but the appearance is more keeping with scar tissue. Discrete organized collection is not identified. 2. There is widening of the facet joint at L4-L5, particularly on the left. On prior examination, the patient did have rim-enhancing collection within the canal, as well as abnormal enhancement and fluid involving the left facet joint at this level. Appearance may be related to prior infection, although MRI would be more sensitive for assessment.  Radiation dose reduction techniques were utilized, including automated exposure control and exposure modulation based on body size.  This report was finalized on 5/16/2021 12:36 AM by Dr. Kristina Richards M.D.        I ordered the above noted radiological studies. Independently reviewed by me and discussed with radiologist.  See dictation above for official radiology interpretation.      PROCEDURES    Procedures        MEDICATIONS GIVEN IN ER    Medications   sodium chloride 0.9 % flush 10 mL (has no administration in time range)   methocarbamol (ROBAXIN) tablet 750 mg (750 mg Oral Given 5/16/21 0002)         PROGRESS, DATA ANALYSIS, CONSULTS, AND MEDICAL DECISION MAKING    All labs have been independently reviewed by me.  All radiology studies have been reviewed by me and discussed with radiologist dictating report.   EKG's independently reviewed by me.  Discussion below represents my analysis of pertinent findings related to patient's condition,  differential diagnosis, treatment plan and final disposition.      ED Course as of May 16 0129   Sat May 15, 2021   2318 The patient is concerned because she states she was septic last time she had back pain.  I advised her I will check labs and I can do a CT of her lumbar spine but MRI is not in the hospital at this time of night on the weekend.    [GP]   Sun May 16, 2021   0037 Patient left AMA prior to my review of her labs and CT scan.    [GP]      ED Course User Index  [GP] Gino Mckeon MD               AS OF 01:29 EDT VITALS:    BP - 122/85  HR - 87  TEMP - 98.6 °F (37 °C)  02 SATS - 92%        DIAGNOSIS  Final diagnoses:   Midline low back pain without sciatica, unspecified chronicity         DISPOSITION  The patient left AGAINST MEDICAL ADVICE        EMR Dragon/Transcription disclaimer:   Much of this encounter note is an electronic transcription/translation of spoken language to printed text.        Gino Mckeon MD  05/16/21 0130

## 2021-05-16 NOTE — ED NOTES
"Pt c/o lower back pain that started couple years ago. Pt states that 4-6wks ago pain became worse. Reports \"knots\" intermittently x3wks. Pt went to have mri done but was unable to be sedated. Pt had surgery couple years ago due to infection r/t drug use. Pt last use 1yr ago. Pt denies fever. Pain does not radiate. Reports n/v. Pt has some relief with robaxin    Pt had mask on when placed in room. RN wore goggles and surgical mask upon entering room.      Irena Skaggs, RN  05/15/21 3442    "

## 2021-05-16 NOTE — ED NOTES
Pt from home c/o of back pain. Pt was toni for MRI at [place] but did not go because they couldn't sedate her. No other sx. This rn wearing mask and goggles. Pt wearing mask during encounter.        Jennifer Russo, FANG  05/15/21 7754

## 2021-05-16 NOTE — ED NOTES
Pt reports to this RN that she is uncomfortable staying in the ER at this time due to concerns of mary the covid virus. Pt reports that she will contact her PCP in regards to obtaining results from her tests this evening. Pt left with her , ambulatory and a/o x4.          Tyree Wilson, RN  05/16/21 0042

## 2021-05-17 ENCOUNTER — TELEPHONE (OUTPATIENT)
Dept: FAMILY MEDICINE CLINIC | Facility: CLINIC | Age: 46
End: 2021-05-17

## 2021-05-17 NOTE — TELEPHONE ENCOUNTER
Caller: Silvia Velazquez    Relationship: Self    Best call back number: [phone]    Medication needed:   Requested Prescriptions     Pending Prescriptions Disp Refills   • omeprazole (priLOSEC) 40 MG capsule 60 capsule 0     Sig: Take 1 capsule by mouth 2 (Two) Times a Day.   • sucralfate (CARAFATE) 1 g tablet 60 tablet 0     Sig: Take 1 tablet by mouth 4 (Four) Times a Day.     - methocarbamol (Robaxin) 500 MG tablet    When do you need the refill by: 05/17/2021    What additional details did the patient provide when requesting the medication: THE PATIENT RECEIVED THESE MEDICATIONS FROM THE E.R. AND NEEDS REFILLS. SHE STATES THAT SHE NEEDS THESE MEDICATIONS AND WOULD LIKE TO TAKE MORE OF THE PRILOSEC, BUT SHE STATES THAT HER INSURANCE DOES NOT COVER THE MEDICATION AS IT IS CURRENTLY WRITTEN.     THE PATIENT WOULD ALSO LIKE TO LOOK INTO A REFERRAL TO A DR. CAMILO DOBBINS FOR WEIGHT LOSS SURGERY. PLEASE ADVISE ON THIS.      Does the patient have less than a 3 day supply:  [x] Yes  [] No    What is the patient's preferred pharmacy: [id number] - [place], [place] - [address] [place] - [phone] [facility] [phone]

## 2021-05-18 RX ORDER — SUCRALFATE 1 G/1
1 TABLET ORAL 4 TIMES DAILY
Qty: 60 TABLET | Refills: 0 | Status: SHIPPED | OUTPATIENT
Start: 2021-05-18 | End: 2021-06-23 | Stop reason: SDUPTHER

## 2021-05-18 RX ORDER — OMEPRAZOLE 40 MG/1
40 CAPSULE, DELAYED RELEASE ORAL 2 TIMES DAILY
Qty: 60 CAPSULE | Refills: 0 | Status: SHIPPED | OUTPATIENT
Start: 2021-05-18 | End: 2021-08-12 | Stop reason: SDUPTHER

## 2021-05-20 LAB — BACTERIA SPEC AEROBE CULT: NORMAL

## 2021-05-21 LAB — BACTERIA SPEC AEROBE CULT: NORMAL

## 2021-05-24 ENCOUNTER — TELEPHONE (OUTPATIENT)
Dept: FAMILY MEDICINE CLINIC | Facility: CLINIC | Age: 46
End: 2021-05-24

## 2021-05-24 ENCOUNTER — OFFICE VISIT (OUTPATIENT)
Dept: FAMILY MEDICINE CLINIC | Facility: CLINIC | Age: 46
End: 2021-05-24

## 2021-05-24 VITALS
TEMPERATURE: 98.4 F | WEIGHT: 260 LBS | HEART RATE: 67 BPM | SYSTOLIC BLOOD PRESSURE: 124 MMHG | BODY MASS INDEX: 41.78 KG/M2 | DIASTOLIC BLOOD PRESSURE: 70 MMHG | OXYGEN SATURATION: 98 % | HEIGHT: 66 IN

## 2021-05-24 DIAGNOSIS — F40.232 FEAR ASSOCIATED WITH HEALTHCARE: ICD-10-CM

## 2021-05-24 DIAGNOSIS — R11.0 NAUSEA: ICD-10-CM

## 2021-05-24 DIAGNOSIS — E66.01 MORBID (SEVERE) OBESITY DUE TO EXCESS CALORIES (HCC): Primary | ICD-10-CM

## 2021-05-24 PROCEDURE — 99214 OFFICE O/P EST MOD 30 MIN: CPT | Performed by: NURSE PRACTITIONER

## 2021-05-24 RX ORDER — DIAZEPAM 10 MG/1
TABLET ORAL
Qty: 1 TABLET | Refills: 0 | Status: SHIPPED | OUTPATIENT
Start: 2021-05-24 | End: 2021-08-27

## 2021-05-24 RX ORDER — DIAZEPAM 10 MG/1
10 TABLET ORAL 2 TIMES DAILY PRN
Qty: 1 TABLET | Refills: 0 | Status: SHIPPED | OUTPATIENT
Start: 2021-05-24 | End: 2021-08-27

## 2021-05-24 RX ORDER — ONDANSETRON 4 MG/1
4 TABLET, ORALLY DISINTEGRATING ORAL EVERY 8 HOURS PRN
Qty: 30 TABLET | Refills: 0 | Status: SHIPPED | OUTPATIENT
Start: 2021-05-24 | End: 2021-08-02

## 2021-05-24 NOTE — PROGRESS NOTES
"Chief Complaint  knot on spine (Patient has a knot on her spine that is painful ( wore mask and goggles) ), Weight Gain (Patient still has not heard about her bariatric appt ), and Nausea (Patient is needing something for nausea because her iron supplement is causing her nausea )    Subjective          Silvia Velazquez presents to [facility]  History of Present Illness  1. Obesity- requesting a referral to bariatric surgeon to help with this.  She did call the office for referral but the referral was never placed.  We will place 1 today.  Patient verbalized understanding    2. Low back pain and knot on spine- Saw Lucila Pradhan in neurosurgery group on 4/26/21 who ordered MRI of Lspine but patient was unable to go through with it bc of panic.  Since then she has gone to the Er and had CT of l spine that showed scar tissue and widening of the facet joint l4-l5 and suggested an MRI.    She is still using Robaxin twice daily for pain and going to physical therapy twice a week.  She is concerned regarding a knot on her spine which I saw her last time and referred her back to neuro who therefore set up the MRI which she is yet to do.  Patient states in the past is taken Valium to get her through an MRI and I am willing to give her this if she can get it set up.    3. Nausea- using iron on M,W,F for low iron and it is causing her to be nauseated when she takes it and requesting Zofran.      Objective   Vital Signs:   /70   Pulse 67   Temp 98.4 °F (36.9 °C)   Ht 167.6 cm (65.98\")   Wt 118 kg (260 lb)   SpO2 98%   BMI 41.99 kg/m²     Physical Exam  Vitals reviewed.   Constitutional:       General: She is not in acute distress.     Appearance: She is well-developed.   HENT:      Head: Normocephalic.   Cardiovascular:      Rate and Rhythm: Normal rate and regular rhythm.      Heart sounds: Normal heart sounds.   Pulmonary:      Effort: Pulmonary effort is normal.      Breath sounds: " Normal breath sounds.   Neurological:      Mental Status: She is alert and oriented to person, place, and time.      Gait: Gait normal.   Psychiatric:         Behavior: Behavior normal.         Thought Content: Thought content normal.         Judgment: Judgment normal.        Result Review :   The following data was reviewed by: SYLWIA Calvert on 05/24/2021:  CMP    CMP 4/23/21 5/11/21 5/15/21   Glucose 125 (A) 103 (A) 95   BUN 13 13 9   Creatinine 0.73 0.67 0.68   eGFR Non African Am 87 96 94   Sodium 135 (A) 138 139   Potassium 4.1 4.1 4.1   Chloride 102 103 105   Calcium 9.1 8.8 8.6   Albumin 3.60 3.90 3.80   Total Bilirubin 0.3 0.5 0.3   Alkaline Phosphatase 100 111 105   AST (SGOT) 24 35 (A) 30   ALT (SGPT) 27 32 27   (A) Abnormal value            CBC w/diff    CBC w/Diff 4/23/21 5/11/21 5/15/21   WBC 5.25 5.19 5.45   RBC 4.12 4.02 3.96   Hemoglobin 8.7 (A) 8.1 (A) 8.3 (A)   Hematocrit 30.0 (A) 27.6 (A) 28.4 (A)   MCV 72.8 (A) 68.7 (A) 71.7 (A)   MCH 21.1 (A) 20.1 (A) 21.0 (A)   MCHC 29.0 (A) 29.3 (A) 29.2 (A)   RDW 15.8 (A) 15.9 (A) 16.3 (A)   Platelets 173 164 150   Neutrophil Rel % 57.9 59.5    Immature Granulocyte Rel % 0.2     Lymphocyte Rel % 29.3 25.4    Monocyte Rel % 9.1 11.0    Eosinophil Rel % 2.9 3.1    Basophil Rel % 0.6 0.6    (A) Abnormal value            Lipid Panel    Lipid Panel 11/27/20   Total Cholesterol 133   Triglycerides 77   HDL Cholesterol 35 (A)   VLDL Cholesterol 15   LDL Cholesterol  83   LDL/HDL Ratio 2.36   (A) Abnormal value            TSH    TSH 4/26/21   TSH 3.310           Data reviewed: Consultant notes neurosurgery 4/26/21 and Recent hospitalization notes 5/15/21 ER          Assessment and Plan    Diagnoses and all orders for this visit:    1. Morbid (severe) obesity due to excess calories (CMS/HCC) (Primary)  -     Ambulatory Referral to Bariatric Surgery    2. Fear associated with healthcare  -     diazePAM (Valium) 10 MG tablet; Take 1 tablet by mouth 2 (Two) Times a  Day As Needed for Anxiety.  Dispense: 1 tablet; Refill: 0  -     diazePAM (Valium) 10 MG tablet; Take 30 mins prior to MRI  Dispense: 1 tablet; Refill: 0    3. Nausea  -     ondansetron ODT (Zofran ODT) 4 MG disintegrating tablet; Place 1 tablet on the tongue Every 8 (Eight) Hours As Needed for Nausea.  Dispense: 30 tablet; Refill: 0        Follow Up   No follow-ups on file.  Patient was given instructions and counseling regarding her condition or for health maintenance advice. Please see specific information pulled into the AVS if appropriate.     #1 referral to bariatric  #2 Valium 10 mg given for her to take 30 minutes prior to her test and then follow-up with neurosurgeon regarding MRI test results.  She understands she needs to call today to get an MRI ASAP  #3 refill of Zofran    Mask and googles worn

## 2021-05-24 NOTE — TELEPHONE ENCOUNTER
Pt was seen today and is now wondering if SYLWIA Oseguera would send her in some lidocaine patches. Please Advise.

## 2021-05-24 NOTE — TELEPHONE ENCOUNTER
Patient was seen this morning for knots on her back and she is calling back now stating they are leaking a clear/yellowish color. It started a couple mins ago. Please advise.

## 2021-06-11 ENCOUNTER — TELEPHONE (OUTPATIENT)
Dept: FAMILY MEDICINE CLINIC | Facility: CLINIC | Age: 46
End: 2021-06-11

## 2021-06-11 DIAGNOSIS — D50.9 IRON DEFICIENCY ANEMIA, UNSPECIFIED IRON DEFICIENCY ANEMIA TYPE: Primary | ICD-10-CM

## 2021-06-11 NOTE — TELEPHONE ENCOUNTER
Caller: Silvia Velazquez    Relationship: Self    Best call back number: [phone]    What medication are you requesting: ESTROGEN    What are your current symptoms: HOT FLASHES    If a prescription is needed, what is your preferred pharmacy and phone number: [id number] - [place], [place] - [address] AT [address] - [phone] [facility] [phone] FX     PATIENT STATES THAT MOTHER TAKES ESTROGEN FOR HOT FLASHES AND IS REQUESTING TO HAVE THE SAME RX TO HELP WITH HOT FLASHES

## 2021-06-14 NOTE — TELEPHONE ENCOUNTER
Patient is aware    She has 3other questions first one being she is getting an MRI and you prescribed her a valium to take her prior due to anxiety her suboxone doctor suggested she get a prescription of xanax to take prior instead of valium because it gets out of your system faster    Other question is she is taking ferrous sulfate due to anemia 3 times a week and she is having a hard time tolerating the tablets they are making her sick she is wanting to know if she can do an iron infusion instead?    She is also requesting a letter of support to give to her bariatric doctor stating that you are agree with her getting the surgery

## 2021-06-14 NOTE — TELEPHONE ENCOUNTER
I will not change the Valium    If she wants iron infusion she has to see a hematologist  And she has to be seen here to get stuff sign

## 2021-06-14 NOTE — TELEPHONE ENCOUNTER
You saw her in May but not pertaining to this. Do you want her to make an appt before you prescribe?

## 2021-06-20 DIAGNOSIS — M54.16 LUMBAR RADICULOPATHY: ICD-10-CM

## 2021-06-23 ENCOUNTER — LAB (OUTPATIENT)
Dept: LAB | Facility: HOSPITAL | Age: 46
End: 2021-06-23

## 2021-06-23 ENCOUNTER — OFFICE VISIT (OUTPATIENT)
Dept: GASTROENTEROLOGY | Facility: CLINIC | Age: 46
End: 2021-06-23

## 2021-06-23 VITALS
WEIGHT: 258 LBS | SYSTOLIC BLOOD PRESSURE: 125 MMHG | HEIGHT: 66 IN | DIASTOLIC BLOOD PRESSURE: 72 MMHG | BODY MASS INDEX: 41.46 KG/M2

## 2021-06-23 DIAGNOSIS — R12 HEARTBURN: ICD-10-CM

## 2021-06-23 DIAGNOSIS — B18.2 CHRONIC HEPATITIS C WITHOUT HEPATIC COMA (HCC): Primary | Chronic | ICD-10-CM

## 2021-06-23 DIAGNOSIS — D50.0 IRON DEFICIENCY ANEMIA DUE TO CHRONIC BLOOD LOSS: Chronic | ICD-10-CM

## 2021-06-23 LAB — HCV AB SER DONR QL: REACTIVE

## 2021-06-23 PROCEDURE — 99204 OFFICE O/P NEW MOD 45 MIN: CPT | Performed by: INTERNAL MEDICINE

## 2021-06-23 PROCEDURE — 87902 NFCT AGT GNTYP ALYS HEP C: CPT | Performed by: INTERNAL MEDICINE

## 2021-06-23 PROCEDURE — 86803 HEPATITIS C AB TEST: CPT | Performed by: INTERNAL MEDICINE

## 2021-06-23 PROCEDURE — 36415 COLL VENOUS BLD VENIPUNCTURE: CPT | Performed by: INTERNAL MEDICINE

## 2021-06-23 PROCEDURE — 87522 HEPATITIS C REVRS TRNSCRPJ: CPT | Performed by: INTERNAL MEDICINE

## 2021-06-23 RX ORDER — SODIUM CHLORIDE, SODIUM LACTATE, POTASSIUM CHLORIDE, CALCIUM CHLORIDE 600; 310; 30; 20 MG/100ML; MG/100ML; MG/100ML; MG/100ML
30 INJECTION, SOLUTION INTRAVENOUS CONTINUOUS
Status: CANCELLED | OUTPATIENT
Start: 2021-08-20

## 2021-06-23 RX ORDER — SUCRALFATE 1 G/1
1 TABLET ORAL
Qty: 90 TABLET | Refills: 1 | Status: SHIPPED | OUTPATIENT
Start: 2021-06-23 | End: 2021-08-12

## 2021-06-23 NOTE — PROGRESS NOTES
"Chief Complaint   Patient presents with   • Hepatitis C   • Heartburn       Subjective     HPI    Silvia Velazquez is a 45 y.o. female with a past medical history noted below who presents for history of hepatitis C, heartburn, anemia.    Reports a diagnosis of hep C many years ago.  Prior IVDA.  Reports clean x 3 years.  She is on suboxone.  She never got treatment, saw Miguel Ángel Obando once.  Has been on omeprazole, carafate.  Feels carafate helped, needs a refill.      ER visit in April for severe chest pain, MI ruled out and she was told she had GERD as the cause of her symptoms.    She has a pronounced iron deficiency anemia.  She reports heavy periods.  Occasionally sees bright red per rectum with wiping.  \"Always constipated.\"  Again, on suboxone    No known GI malignancies in her family.        Today's visit was in the office.  Both the patient and I were wearing face masks and proper hand hygiene was performed before and after the physical exam.           Current Outpatient Medications:   •  acetaminophen (TYLENOL) 500 MG tablet, Take 500 mg by mouth Every 6 (Six) Hours As Needed for Mild Pain ., Disp: , Rfl:   •  buprenorphine-naloxone (SUBOXONE) 8-2 MG per SL tablet, Place 1 tablet under the tongue 2 (Two) Times a Day., Disp: , Rfl:   •  docusate sodium (COLACE) 100 MG capsule, TAKE 1 CAPSULE BY MOUTH TWICE A DAY AS NEEDED, Disp: , Rfl:   •  ferrous sulfate 325 (65 FE) MG EC tablet, Take once a day on Monday, Wednesday, and Friday., Disp: 90 tablet, Rfl: 0  •  loratadine (CLARITIN) 10 MG tablet, Take 10 mg by mouth Daily., Disp: , Rfl:   •  losartan (Cozaar) 50 MG tablet, Take 1 tablet by mouth 2 (Two) Times a Day., Disp: 180 tablet, Rfl: 3  •  methocarbamol (Robaxin) 500 MG tablet, Take 1 po tid prn, Disp: 60 tablet, Rfl: 0  •  metoprolol tartrate (LOPRESSOR) 100 MG tablet, Take 1 tablet by mouth 2 (Two) Times a Day., Disp: 180 tablet, Rfl: 3  •  OLANZapine (zyPREXA) 15 MG tablet, Take 15 mg by mouth Every " Night., Disp: , Rfl:   •  omeprazole (priLOSEC) 40 MG capsule, Take 1 capsule by mouth 2 (Two) Times a Day., Disp: 60 capsule, Rfl: 0  •  ondansetron ODT (Zofran ODT) 4 MG disintegrating tablet, Place 1 tablet on the tongue Every 8 (Eight) Hours As Needed for Nausea., Disp: 30 tablet, Rfl: 0  •  polyethylene glycol (MIRALAX) 17 g packet, TAKE 1 PACKET (17 GRAM) MIXED WITH 8 OZ. WATER, JUICE, SODA, COFFEE OR TEA BY ORAL ROUTE ONCE DAILY, Disp: , Rfl:   •  venlafaxine XR (EFFEXOR XR) 150 MG 24 hr capsule, Take 1 capsule by mouth Daily., Disp: 30 capsule, Rfl: 0  •  diazePAM (Valium) 10 MG tablet, Take 1 tablet by mouth 2 (Two) Times a Day As Needed for Anxiety., Disp: 1 tablet, Rfl: 0  •  diazePAM (Valium) 10 MG tablet, Take 30 mins prior to MRI, Disp: 1 tablet, Rfl: 0  •  sucralfate (CARAFATE) 1 g tablet, Take 1 tablet by mouth 3 (Three) Times a Day Before Meals., Disp: 90 tablet, Rfl: 1      Objective     Vitals:    06/23/21 1609   BP: 125/72         06/23/21  1609   Weight: 117 kg (258 lb)     Body mass index is 41.67 kg/m².    Physical Exam  Vitals reviewed.   Constitutional:       General: She is not in acute distress.     Appearance: Normal appearance. She is well-developed. She is obese. She is not diaphoretic.   HENT:      Head: Normocephalic.   Neck:      Thyroid: No thyromegaly.   Cardiovascular:      Rate and Rhythm: Normal rate and regular rhythm.   Pulmonary:      Effort: Pulmonary effort is normal. No respiratory distress.      Breath sounds: Normal breath sounds. No wheezing.   Abdominal:      General: Bowel sounds are normal. There is no distension.      Palpations: Abdomen is soft. Abdomen is not rigid. There is no mass.      Tenderness: There is no abdominal tenderness. There is no guarding or rebound.      Hernia: No hernia is present.   Genitourinary:     Comments: Rectal exam deferred  Musculoskeletal:         General: No tenderness.   Neurological:      Mental Status: She is alert and oriented to  person, place, and time.      Motor: No atrophy.      Coordination: Coordination normal.   Psychiatric:         Behavior: Behavior normal.         Thought Content: Thought content normal.         Judgment: Judgment normal.             WBC   Date Value Ref Range Status   05/15/2021 5.45 3.40 - 10.80 10*3/mm3 Final     RBC   Date Value Ref Range Status   05/15/2021 3.96 3.77 - 5.28 10*6/mm3 Final     Hemoglobin   Date Value Ref Range Status   05/15/2021 8.3 (L) 12.0 - 15.9 g/dL Final     Hematocrit   Date Value Ref Range Status   05/15/2021 28.4 (L) 34.0 - 46.6 % Final     MCV   Date Value Ref Range Status   05/15/2021 71.7 (L) 79.0 - 97.0 fL Final     MCH   Date Value Ref Range Status   05/15/2021 21.0 (L) 26.6 - 33.0 pg Final     MCHC   Date Value Ref Range Status   05/15/2021 29.2 (L) 31.5 - 35.7 g/dL Final     RDW   Date Value Ref Range Status   05/15/2021 16.3 (H) 12.3 - 15.4 % Final     RDW-SD   Date Value Ref Range Status   05/15/2021 41.9 37.0 - 54.0 fl Final     MPV   Date Value Ref Range Status   05/15/2021 11.4 6.0 - 12.0 fL Final     Platelets   Date Value Ref Range Status   05/15/2021 150 140 - 450 10*3/mm3 Final     Neutrophil %   Date Value Ref Range Status   05/11/2021 59.5 42.7 - 76.0 % Final     Lymphocyte %   Date Value Ref Range Status   05/11/2021 25.4 19.6 - 45.3 % Final     Monocyte %   Date Value Ref Range Status   05/11/2021 11.0 5.0 - 12.0 % Final     Eosinophil %   Date Value Ref Range Status   05/11/2021 3.1 0.3 - 6.2 % Final     Basophil %   Date Value Ref Range Status   05/11/2021 0.6 0.0 - 1.5 % Final     Immature Grans %   Date Value Ref Range Status   04/23/2021 0.2 0.0 - 0.5 % Final     Neutrophils Absolute   Date Value Ref Range Status   05/15/2021 4.01 1.70 - 7.00 10*3/mm3 Final     Neutrophils, Absolute   Date Value Ref Range Status   05/11/2021 3.09 1.70 - 7.00 10*3/mm3 Final     Lymphocytes, Absolute   Date Value Ref Range Status   05/11/2021 1.32 0.70 - 3.10 10*3/mm3 Final      Monocytes, Absolute   Date Value Ref Range Status   05/11/2021 0.57 0.10 - 0.90 10*3/mm3 Final     Eosinophils Absolute   Date Value Ref Range Status   05/15/2021 0.11 0.00 - 0.40 10*3/mm3 Final     Eosinophils, Absolute   Date Value Ref Range Status   05/11/2021 0.16 0.00 - 0.40 10*3/mm3 Final     Basophils Absolute   Date Value Ref Range Status   05/15/2021 0.05 0.00 - 0.20 10*3/mm3 Final     Basophils, Absolute   Date Value Ref Range Status   05/11/2021 0.03 0.00 - 0.20 10*3/mm3 Final     Immature Grans, Absolute   Date Value Ref Range Status   04/23/2021 0.01 0.00 - 0.05 10*3/mm3 Final     nRBC   Date Value Ref Range Status   04/23/2021 0.2 0.0 - 0.2 /100 WBC Final       Lab Results   Component Value Date    GLUCOSE 95 05/15/2021    BUN 9 05/15/2021    CREATININE 0.68 05/15/2021    EGFRIFNONA 94 05/15/2021    BCR 13.2 05/15/2021    CO2 29.0 05/15/2021    CALCIUM 8.6 05/15/2021    ALBUMIN 3.80 05/15/2021    AST 30 05/15/2021    ALT 27 05/15/2021         Imaging Results (Last 7 Days)     ** No results found for the last 168 hours. **          I personally reviewed data as detailed below:     The labs listed above along with 5/11/21 iron studies    Office notes from: PCP Suhas Oseguera 5/24/21    Pathology from: 2009 liver biopsy, inflammation, steatosis, periportal fibrosis without bridging      No notes on file    Assessment/Plan    1. Chronic hep C: never treated, on suboxone for hx of IVDA, clean x 3 years    2. Iron deficiency anemia: pronounced, suspect due to menorrhagia but she does reports some bright red blood per rectum    3. Heartburn: on ppi, carafate    Plan  Update HCV labs to consider therapy  Continue ppi  Continue carafate  EGD and colonoscopy to further evaluate HAL, heartburn      Diagnoses and all orders for this visit:    1. Chronic hepatitis C without hepatic coma (CMS/HCC) (Primary)  -     Hepatitis C Genotype  -     Hepatitis C Antibody  -     Hepatitis C RNA, Quantitative, PCR  (graph)    2. Iron deficiency anemia due to chronic blood loss  -     Case Request; Standing  -     Follow Anesthesia Guidelines / Standing Orders; Future  -     Obtain Informed Consent; Future  -     Implement Anesthesia Orders Day of Procedure; Standing  -     Obtain Informed Consent; Standing  -     lactated ringers infusion  -     Case Request    3. Heartburn  -     Case Request; Standing  -     Follow Anesthesia Guidelines / Standing Orders; Future  -     Obtain Informed Consent; Future  -     Implement Anesthesia Orders Day of Procedure; Standing  -     Obtain Informed Consent; Standing  -     lactated ringers infusion  -     Case Request    Other orders  -     sucralfate (CARAFATE) 1 g tablet; Take 1 tablet by mouth 3 (Three) Times a Day Before Meals.  Dispense: 90 tablet; Refill: 1        I have discussed the above plan with the patient.  They verbalize understanding and are in agreement with the plan.  They have been advised to contact the office for any questions, concerns, or changes related to their health.    Dictated utilizing Dragon dictation

## 2021-06-25 ENCOUNTER — TELEPHONE (OUTPATIENT)
Dept: NEUROSURGERY | Facility: CLINIC | Age: 46
End: 2021-06-25

## 2021-06-25 LAB
HCV GENTYP SERPL NAA+PROBE: NORMAL
HCV RNA SERPL NAA+PROBE-ACNC: NORMAL IU/ML
HCV RNA SERPL NAA+PROBE-LOG IU: 6.98 LOG10 IU/ML
LABORATORY COMMENT REPORT: NORMAL
TEST INFORMATION: NORMAL

## 2021-06-25 NOTE — TELEPHONE ENCOUNTER
S/W patient and informed her that we have her scheduled to see  on 07.22.2021 for an MRI F/U  ----- Message from Anselmo Mak MD sent at 6/23/2021  9:54 AM EDT -----  Regarding: RE: Pt appointment  I think it will have to be next available.  ----- Message -----  From: Kalee Giraldo MA  Sent: 6/23/2021   9:34 AM EDT  To: Anselmo Mak MD  Subject: FW: Pt appointment                               Dr. Mak how soon would you like to see Ms. Velazquez?  ----- Message -----  From: Lucila Pradhan APRN  Sent: 6/23/2021   9:06 AM EDT  To: Kalee Giraldo MA  Subject: Pt appointment                                   This patient will need to be seen by Dr. Mak for f/u of abnormal imaging. She had surgery in 2019 with Dr. Whitley.   ----- Message -----  From: Zach Kelly Incoming  Sent: 6/20/2021   4:11 PM EDT  To: SYLWIA Giron

## 2021-07-01 ENCOUNTER — TELEPHONE (OUTPATIENT)
Dept: GASTROENTEROLOGY | Facility: CLINIC | Age: 46
End: 2021-07-01

## 2021-07-01 DIAGNOSIS — B18.2 CHRONIC HEPATITIS C WITHOUT HEPATIC COMA (HCC): Primary | ICD-10-CM

## 2021-07-01 NOTE — TELEPHONE ENCOUNTER
----- Message from Lizabeth Hanson MD sent at 7/1/2021  2:20 PM EDT -----  Labs confirm ongoing hepatitis C infection    Will reach out to her regarding next set of labs in anticipation of getting approval through her insurance for treatment

## 2021-07-01 NOTE — TELEPHONE ENCOUNTER
----- Message from Lizabeth Hanson MD sent at 7/1/2021  2:20 PM EDT -----  Regarding: hep C  [place]-- I know the requirements have changed for the baseline labs, can you please let me know and I will order this to get started for her.  I think she has [employer] better [employer] so will need to go with what ever they cover for treating her hep C    thanks

## 2021-07-08 ENCOUNTER — CONSULT (OUTPATIENT)
Dept: ONCOLOGY | Facility: CLINIC | Age: 46
End: 2021-07-08

## 2021-07-08 ENCOUNTER — LAB (OUTPATIENT)
Dept: OTHER | Facility: HOSPITAL | Age: 46
End: 2021-07-08

## 2021-07-08 VITALS
TEMPERATURE: 96.9 F | OXYGEN SATURATION: 94 % | HEART RATE: 64 BPM | HEIGHT: 66 IN | SYSTOLIC BLOOD PRESSURE: 103 MMHG | BODY MASS INDEX: 41.62 KG/M2 | RESPIRATION RATE: 18 BRPM | DIASTOLIC BLOOD PRESSURE: 61 MMHG | WEIGHT: 259 LBS

## 2021-07-08 DIAGNOSIS — D50.0 IRON DEFICIENCY ANEMIA DUE TO CHRONIC BLOOD LOSS: Primary | ICD-10-CM

## 2021-07-08 DIAGNOSIS — D50.0 IRON DEFICIENCY ANEMIA DUE TO CHRONIC BLOOD LOSS: ICD-10-CM

## 2021-07-08 PROBLEM — T45.4X5A ADVERSE EFFECT OF IRON: Status: ACTIVE | Noted: 2021-07-08

## 2021-07-08 LAB
ANISOCYTOSIS BLD QL: NORMAL
BASOPHILS # BLD AUTO: 0.02 10*3/MM3 (ref 0–0.2)
BASOPHILS NFR BLD AUTO: 0.4 % (ref 0–1.5)
DEPRECATED RDW RBC AUTO: 48.9 FL (ref 37–54)
EOSINOPHIL # BLD AUTO: 0.15 10*3/MM3 (ref 0–0.4)
EOSINOPHIL NFR BLD AUTO: 3 % (ref 0.3–6.2)
ERYTHROCYTE [DISTWIDTH] IN BLOOD BY AUTOMATED COUNT: 18.7 % (ref 12.3–15.4)
FERRITIN SERPL-MCNC: 20.6 NG/ML (ref 13–150)
HCT VFR BLD AUTO: 30.7 % (ref 34–46.6)
HGB BLD-MCNC: 8.9 G/DL (ref 12–15.9)
HYPOCHROMIA BLD QL: NORMAL
IMM GRANULOCYTES # BLD AUTO: 0.01 10*3/MM3 (ref 0–0.05)
IMM GRANULOCYTES NFR BLD AUTO: 0.2 % (ref 0–0.5)
IRON 24H UR-MRATE: 215 MCG/DL (ref 37–145)
IRON SATN MFR SERPL: 41 % (ref 20–50)
LYMPHOCYTES # BLD AUTO: 1.59 10*3/MM3 (ref 0.7–3.1)
LYMPHOCYTES NFR BLD AUTO: 31.3 % (ref 19.6–45.3)
MCH RBC QN AUTO: 21.2 PG (ref 26.6–33)
MCHC RBC AUTO-ENTMCNC: 29 G/DL (ref 31.5–35.7)
MCV RBC AUTO: 73.3 FL (ref 79–97)
MONOCYTES # BLD AUTO: 0.53 10*3/MM3 (ref 0.1–0.9)
MONOCYTES NFR BLD AUTO: 10.4 % (ref 5–12)
NEUTROPHILS NFR BLD AUTO: 2.78 10*3/MM3 (ref 1.7–7)
NEUTROPHILS NFR BLD AUTO: 54.7 % (ref 42.7–76)
NRBC BLD AUTO-RTO: 0 /100 WBC (ref 0–0.2)
OVALOCYTES BLD QL SMEAR: NORMAL
PLAT MORPH BLD: NORMAL
PLATELET # BLD AUTO: 204 10*3/MM3 (ref 140–450)
PMV BLD AUTO: 11.8 FL (ref 6–12)
RBC # BLD AUTO: 4.19 10*6/MM3 (ref 3.77–5.28)
TIBC SERPL-MCNC: 529 MCG/DL (ref 298–536)
TRANSFERRIN SERPL-MCNC: 355 MG/DL (ref 200–360)
WBC # BLD AUTO: 5.08 10*3/MM3 (ref 3.4–10.8)
WBC MORPH BLD: NORMAL

## 2021-07-08 PROCEDURE — 84466 ASSAY OF TRANSFERRIN: CPT | Performed by: INTERNAL MEDICINE

## 2021-07-08 PROCEDURE — 82728 ASSAY OF FERRITIN: CPT | Performed by: INTERNAL MEDICINE

## 2021-07-08 PROCEDURE — 85007 BL SMEAR W/DIFF WBC COUNT: CPT | Performed by: INTERNAL MEDICINE

## 2021-07-08 PROCEDURE — 83540 ASSAY OF IRON: CPT | Performed by: INTERNAL MEDICINE

## 2021-07-08 PROCEDURE — 99204 OFFICE O/P NEW MOD 45 MIN: CPT | Performed by: INTERNAL MEDICINE

## 2021-07-08 PROCEDURE — 36415 COLL VENOUS BLD VENIPUNCTURE: CPT

## 2021-07-08 PROCEDURE — 85025 COMPLETE CBC W/AUTO DIFF WBC: CPT | Performed by: INTERNAL MEDICINE

## 2021-07-08 RX ORDER — SODIUM CHLORIDE 9 MG/ML
250 INJECTION, SOLUTION INTRAVENOUS ONCE
Status: CANCELLED | OUTPATIENT
Start: 2021-07-15

## 2021-07-08 RX ORDER — PROCHLORPERAZINE MALEATE 10 MG
10 TABLET ORAL ONCE
Status: CANCELLED | OUTPATIENT
Start: 2021-07-22 | End: 2021-07-20

## 2021-07-08 RX ORDER — PROCHLORPERAZINE MALEATE 10 MG
10 TABLET ORAL ONCE
Status: CANCELLED | OUTPATIENT
Start: 2021-07-15 | End: 2021-07-13

## 2021-07-08 RX ORDER — SODIUM CHLORIDE 9 MG/ML
250 INJECTION, SOLUTION INTRAVENOUS ONCE
Status: CANCELLED | OUTPATIENT
Start: 2021-07-22

## 2021-07-08 NOTE — PROGRESS NOTES
Subjective   Silvia Velazquez is a 45 y.o. female.  Referred by Suhas Oseguera for iron deficiency anemia    History of Present Illness   Ms. Velazquez is a 45-year-old premenopausal lady with history of hepatitis C, history of drug use, hypertension, SLE(she has not seen a rheumatologist), MRSA infection presents for further evaluation of anemia.She has had anemia please since 2018.  Initially MCV was normal but subsequently noted to have low MCV with the most recent CBC on 7/8/2021 showing a hemoglobin of 8.9 and MCV of 73.3.  WBC count and platelet count has remained relatively normal except for some mild thrombocytopenia in 2019.  Per patient she has been anemic all her life since her teenage years.  She would take oral iron to help with the anemia and would have improvement.  After she was noted to be anemic she started oral iron about 3 weeks ago.  Unfortunately she is not able to tolerate the iron supplements and she is having significant amount of nausea, abdominal pain, constipation to a point where she is not able to take the supplements.  She has not had menstrual cycles for 10 years when she was doing IV drugs but subsequently had menorrhagia for a year.  She has started using homeopathic medications for the past 2 months which helped with her heavy menstrual cycles and over the past 2 months she had fairly normal menstrual cycles.  She is scheduled to see OB/GYN next month.  She also reports bright red blood per rectum which has been going on for several months.  She has seen Dr. Steel who plans to do an EGD and colonoscopy early August.  Patient denies any fevers chills or night sweats.  She reports that her diet is fairly poor and does not eat much meat.    She denies any malignancies in her immediate family.  Her maternal aunt had breast cancer.  Distant relatives with lung cancer and renal cancer.    The following portions of the patient's history were reviewed and updated as appropriate: allergies,  current medications, past family history, past medical history, past social history, past surgical history and problem list.    Past Medical History:   Diagnosis Date   • Chest pain    • Depression    • Drug abstinence syndrome (CMS/HCC)    • Drug abuse (CMS/HCC)    • Ex-smoker    • Heart attack (CMS/HCC)    • Hepatitis C    • Hepatitis C    • Hypertension    • Kidney stone    • Lupus (CMS/HCC)    • Lupus (systemic lupus erythematosus) (CMS/HCC)    • Mitral valve anterior leaflet prolapse    • NSTEMI (non-ST elevated myocardial infarction) (CMS/HCC)    • Otitis    • Palpitations    • Seizures (CMS/HCC)    • Spinal epidural abscess    • Tachycardia         Past Surgical History:   Procedure Laterality Date   • BACK SURGERY     • CHOLECYSTECTOMY     • LEG SURGERY     • LIVER BIOPSY     • LUMBAR LAMINECTOMY DISCECTOMY DECOMPRESSION Left 2018    Procedure: Posterior lumbar three through sacrum decompression;  Surgeon: Tevin Whitley MD;  Location: [facility];  Service: Neurosurgery   • LYMPH NODE BIOPSY     • SPINE SURGERY          Family History   Problem Relation Age of Onset   • Diabetes Mother    • No Known Problems Father         Social History     Socioeconomic History   • Marital status: Legally      Spouse name: Not on file   • Number of children: Not on file   • Years of education: Not on file   • Highest education level: Not on file   Tobacco Use   • Smoking status: Former Smoker     Packs/day: 1.00     Years: 30.00     Pack years: 30.00     Types: Cigarettes     Quit date: 2020     Years since quittin.8   • Smokeless tobacco: Never Used   • Tobacco comment: E-CIG USE   Vaping Use   • Vaping Use: Every day   • Substances: Nicotine   Substance and Sexual Activity   • Alcohol use: No     Comment: CAFFEINE USE: 1 CUP COFFEE/ 2 COKES DAILY   • Drug use: Not Currently     Types: IV, Heroin, Methamphetamines     Comment: pt states she no longer uses heroin, she now uses meth   • Sexual  "activity: Defer        OB History    No obstetric history on file.          Allergies   Allergen Reactions   • Sulfa Antibiotics Nausea And Vomiting and Swelling     Patient states when she took Sulfa medication, her throat started to swell.            Review of Systems   Constitutional: Positive for fatigue.   HENT: Negative.    Eyes: Negative.    Respiratory: Negative.    Gastrointestinal: Positive for abdominal pain, constipation and nausea.   Endocrine: Negative.    Genitourinary: Negative.    Musculoskeletal: Negative.    Allergic/Immunologic: Negative.    Neurological: Negative.    Hematological: Negative.    Psychiatric/Behavioral: Negative.          Objective   Height 167.6 cm (65.98\"), not currently breastfeeding.   Physical Exam  Constitutional:       General: She is not in acute distress.     Appearance: Normal appearance. She is obese. She is not ill-appearing, toxic-appearing or diaphoretic.   HENT:      Head: Normocephalic and atraumatic.      Right Ear: External ear normal.      Left Ear: External ear normal.      Nose: Nose normal. No congestion or rhinorrhea.      Mouth/Throat:      Mouth: Mucous membranes are moist.      Pharynx: Oropharynx is clear. No oropharyngeal exudate or posterior oropharyngeal erythema.   Eyes:      Extraocular Movements: Extraocular movements intact.      Conjunctiva/sclera: Conjunctivae normal.      Pupils: Pupils are equal, round, and reactive to light.   Cardiovascular:      Rate and Rhythm: Normal rate and regular rhythm.   Pulmonary:      Effort: Pulmonary effort is normal.      Breath sounds: Normal breath sounds.   Abdominal:      General: Abdomen is flat. Bowel sounds are normal.      Palpations: Abdomen is soft.   Musculoskeletal:         General: Normal range of motion.      Cervical back: Normal range of motion.   Skin:     General: Skin is warm and dry.      Comments: Multiple tattoos on her extremities.   Neurological:      General: No focal deficit present. "      Mental Status: She is alert and oriented to person, place, and time. Mental status is at baseline.   Psychiatric:         Mood and Affect: Mood normal.         Behavior: Behavior normal.         Thought Content: Thought content normal.         Judgment: Judgment normal.           Lab on 07/08/2021   Component Date Value Ref Range Status   • WBC 07/08/2021 5.08  3.40 - 10.80 10*3/mm3 Final   • RBC 07/08/2021 4.19  3.77 - 5.28 10*6/mm3 Final   • Hemoglobin 07/08/2021 8.9* 12.0 - 15.9 g/dL Final   • Hematocrit 07/08/2021 30.7* 34.0 - 46.6 % Final   • MCV 07/08/2021 73.3* 79.0 - 97.0 fL Final   • MCH 07/08/2021 21.2* 26.6 - 33.0 pg Final   • MCHC 07/08/2021 29.0* 31.5 - 35.7 g/dL Final   • RDW 07/08/2021 18.7* 12.3 - 15.4 % Final   • RDW-SD 07/08/2021 48.9  37.0 - 54.0 fl Final   • MPV 07/08/2021 11.8  6.0 - 12.0 fL Final   • Platelets 07/08/2021 204  140 - 450 10*3/mm3 Final   • Neutrophil % 07/08/2021 54.7  42.7 - 76.0 % Final   • Lymphocyte % 07/08/2021 31.3  19.6 - 45.3 % Final   • Monocyte % 07/08/2021 10.4  5.0 - 12.0 % Final   • Eosinophil % 07/08/2021 3.0  0.3 - 6.2 % Final   • Basophil % 07/08/2021 0.4  0.0 - 1.5 % Final   • Immature Grans % 07/08/2021 0.2  0.0 - 0.5 % Final   • Neutrophils, Absolute 07/08/2021 2.78  1.70 - 7.00 10*3/mm3 Final   • Lymphocytes, Absolute 07/08/2021 1.59  0.70 - 3.10 10*3/mm3 Final   • Monocytes, Absolute 07/08/2021 0.53  0.10 - 0.90 10*3/mm3 Final   • Eosinophils, Absolute 07/08/2021 0.15  0.00 - 0.40 10*3/mm3 Final   • Basophils, Absolute 07/08/2021 0.02  0.00 - 0.20 10*3/mm3 Final   • Immature Grans, Absolute 07/08/2021 0.01  0.00 - 0.05 10*3/mm3 Final   • nRBC 07/08/2021 0.0  0.0 - 0.2 /100 WBC Final   • Anisocytosis 07/08/2021 Slight/1+  None Seen Final   • Hypochromia 07/08/2021 Mod/2+  None Seen Final   • Ovalocytes 07/08/2021 Slight/1+  None Seen Final   • WBC Morphology 07/08/2021 Normal  Normal Final   • Platelet Morphology 07/08/2021 Normal  Normal Final   Office  Visit on 06/23/2021   Component Date Value Ref Range Status   • Please note 06/23/2021 Comment   Final    This test was developed and its performance characteristics determined  by [employer].  It has not been cleared or approved by the [place] Food and  Drug Administration.  The FDA has determined that such clearance or approval is not  necessary. This test is used for clinical purposes.  It should not be  regarded as investigational or for research.   • Hepatitis C Genotype 06/23/2021 1a   Final   • Hepatitis C Ab 06/23/2021 Reactive* Non-Reactive Final   • Hepatitis C Quantitation 06/23/2021 [id number]  IU/mL Final   • HCV log10 06/23/2021 6.980  log10 IU/mL Final   • Test Information 06/23/2021 Comment   Final    The quantitative range of this assay is 15 IU/mL to 100 million IU/mL.        No radiology results for the last 30 days.     Multiple labs reviewed and summarized in HPI  CBC from today shows a hemoglobin of 8.9 with an MCV of 73.3.  WBC normal at 5.08, platelets normal at 204    Hepatitis C antibody reactive  Hepatitis C quantification-[id number]    Peripheral smear reviewed and noted to have microcytic hypochromic RBC.  WBC and platelet count as well as morphology appears normal      Assessment/Plan     *Iron deficiency anemia  · 5/11/2021 iron saturation extremely low at 5%, TIBC elevated at 651, transferrin elevated at 437 consistent with iron deficiency  · Ferritin low at 8.5 again consistent with iron deficiency  · Patient reports being intolerant to oral iron  · She has had severe nausea vomiting abdominal pain and constipation when she takes oral iron  · For this reason she would defer from IV iron  · Recommend Injectafer 750 mg IV x2  · I would not pursue any further work-up of anemia since there is a clear etiology  · If there is no improvement in CBC with IV iron then further work-up for anemia will be pursued.    *Blood per rectum  · Schedule for EGD and colonoscopy by Dr. Steel in August  2021  · Follow-up on the results    *Hepatitis C-plan noted for treatment    *?  SLE-CRP and ESR elevated.  She has an appointment with rheumatology in December 2021    *Screening-recommend starting screening mammograms.  She plans to discuss this with OB/GYN    *Menorrhagia-previously heavy periods but now in the past 2 months she has had regular menstrual cycles.  They have been normal.    *Obesity-patient plans on undergoing a bariatric surgery for the same.    *IV drug abuse-she has been sober for 3 years now.    *Follow-up-6 weeks

## 2021-07-15 ENCOUNTER — INFUSION (OUTPATIENT)
Dept: ONCOLOGY | Facility: HOSPITAL | Age: 46
End: 2021-07-15

## 2021-07-15 VITALS
SYSTOLIC BLOOD PRESSURE: 107 MMHG | RESPIRATION RATE: 18 BRPM | TEMPERATURE: 97.1 F | WEIGHT: 257.2 LBS | HEIGHT: 66 IN | HEART RATE: 69 BPM | BODY MASS INDEX: 41.33 KG/M2 | OXYGEN SATURATION: 96 % | DIASTOLIC BLOOD PRESSURE: 73 MMHG

## 2021-07-15 DIAGNOSIS — T45.4X5A ADVERSE EFFECT OF IRON, INITIAL ENCOUNTER: Primary | ICD-10-CM

## 2021-07-15 DIAGNOSIS — D50.0 IRON DEFICIENCY ANEMIA DUE TO CHRONIC BLOOD LOSS: ICD-10-CM

## 2021-07-15 PROCEDURE — 25010000002 FERRIC CARBOXYMALTOSE 750 MG/15ML SOLUTION: Performed by: INTERNAL MEDICINE

## 2021-07-15 PROCEDURE — 96374 THER/PROPH/DIAG INJ IV PUSH: CPT

## 2021-07-15 PROCEDURE — 63710000001 PROCHLORPERAZINE MALEATE PER 5 MG: Performed by: INTERNAL MEDICINE

## 2021-07-15 RX ORDER — SODIUM CHLORIDE 9 MG/ML
250 INJECTION, SOLUTION INTRAVENOUS ONCE
Status: COMPLETED | OUTPATIENT
Start: 2021-07-15 | End: 2021-07-15

## 2021-07-15 RX ORDER — PROCHLORPERAZINE MALEATE 5 MG/1
10 TABLET ORAL ONCE
Status: COMPLETED | OUTPATIENT
Start: 2021-07-15 | End: 2021-07-15

## 2021-07-15 RX ADMIN — FERRIC CARBOXYMALTOSE INJECTION 750 MG: 50 INJECTION, SOLUTION INTRAVENOUS at 14:18

## 2021-07-15 RX ADMIN — SODIUM CHLORIDE 250 ML: 9 INJECTION, SOLUTION INTRAVENOUS at 14:19

## 2021-07-15 RX ADMIN — PROCHLORPERAZINE MALEATE 10 MG: 5 TABLET ORAL at 13:55

## 2021-07-16 DIAGNOSIS — M54.50 CHRONIC BILATERAL LOW BACK PAIN WITHOUT SCIATICA: ICD-10-CM

## 2021-07-16 DIAGNOSIS — G89.29 CHRONIC BILATERAL LOW BACK PAIN WITHOUT SCIATICA: ICD-10-CM

## 2021-07-18 NOTE — DISCHARGE PLACEMENT REQUEST
"Silvia Miller (43 y.o. Female)     Date of Birth Social Security Number Address Home Phone MRN    1975  [address] [place] [address] [place] [facility] [phone] [id number]    [employer]az[place] Marital Status          [employer] Single       Admission Date Admission Type Admitting Provider Attending Provider Department, Room/Bed    12/9/18 Emergency Gomez Quick MD Nidadavolu, Vinay Gopal, MD [address], [address]/1    Discharge Date Discharge Disposition Discharge Destination                       Attending Provider:  Yasmani Encarnacion MD    Allergies:  Sulfa Antibiotics    Isolation:  Contact   Infection:  MRSA (12/09/18)   Code Status:  CPR    Ht:  167.6 cm (66\")   Wt:  82.6 kg (182 lb)    Admission Cmt:  None   Principal Problem:  None                Active Insurance as of 12/9/2018     Primary Coverage     Payor Plan Insurance Group Employer/Plan Group    [place]      Payor Plan Address Payor Plan Phone Number Payor Plan Fax Number Effective Dates    [address] [address]   9/1/2017 - None Entered    [place] [address]       Subscriber Name Subscriber Birth Date Member ID       SILVIA MILLER 1975 [id number]                 Emergency Contacts      (Rel.) Home Phone Work Phone Mobile Phone    Aldo Nieves (Significant Other) [phone] -- --            Insurance Information                [employer] [employer] [place] Phone:     Subscriber: Siliva Miller Subscriber#: [id number]    Group#:  Precert#:              History & Physical      Med Rao MD at 12/9/2018 11:17 PM          Group: [facility]         CRITICAL CARE ADMISSION    Patient Identification:  Silvia Miller  43 y.o.  female  1975  [id number]              CC: Epidural abscess    History of Present Illness:  43-year-old  female with a history of IV drug abuse.  She presents with severe low back pain.  This " has been present for couple of days.  Described as severe.  It is associated with bowel/bladder dysfunction, fever, nausea, vomiting and flank pain.  She has also developed weakness in her lower extremities as well as abdominal pain.  Symptoms seem to be slightly better now after surgery.  She just underwent epidural abscess drainage and lumbar decompression by Dr. Tevin Whitley.  Alleviated by IV narcotics.    I reviewed operative note dictated with Dr. Whitley.    Review of Systems   Constitutional: Positive for diaphoresis, fatigue and fever.   HENT: Negative for ear discharge and sore throat.    Eyes: Negative for pain and visual disturbance.   Respiratory: Negative for cough and shortness of breath.    Cardiovascular: Negative for chest pain and leg swelling.   Gastrointestinal: Positive for abdominal pain, nausea and vomiting. Negative for diarrhea.   Endocrine: Negative for cold intolerance and polyuria.   Genitourinary: Negative for dysuria and hematuria.   Musculoskeletal: Positive for arthralgias, back pain and myalgias. Negative for joint swelling.   Skin: Negative for rash and wound.   Neurological: Negative for speech difficulty and numbness.   Hematological: Negative for adenopathy. Does not bruise/bleed easily.   Psychiatric/Behavioral: Negative for agitation and confusion.       Past Medical History:  Past Medical History:   Diagnosis Date   • Hepatitis C    • Kidney stone    • Lupus (systemic lupus erythematosus) (CMS/HCC)    • Mitral valve anterior leaflet prolapse    • Seizures (CMS/HCC)        Past Surgical History:  Past Surgical History:   Procedure Laterality Date   • CHOLECYSTECTOMY     • LIVER BIOPSY     • LYMPH NODE BIOPSY          Home Meds:  Medications Prior to Admission   Medication Sig Dispense Refill Last Dose   • doxycycline (MONODOX) 100 MG capsule Take 1 capsule by mouth 2 (Two) Times a Day. 20 capsule 0    • ibuprofen (ADVIL,MOTRIN) 600 MG tablet Take 1 tablet by mouth 3 (Three)  "Times a Day. 24 tablet 0        Allergies:  Allergies   Allergen Reactions   • Sulfa Antibiotics Nausea And Vomiting and Swelling       Social History:   Social History     Socioeconomic History   • Marital status: Single     Spouse name: Not on file   • Number of children: Not on file   • Years of education: Not on file   • Highest education level: Not on file   Social Needs   • Financial resource strain: Not on file   • Food insecurity - worry: Not on file   • Food insecurity - inability: Not on file   • Transportation needs - medical: Not on file   • Transportation needs - non-medical: Not on file   Occupational History   • Not on file   Tobacco Use   • Smoking status: Current Every Day Smoker     Packs/day: 1.00     Years: 30.00     Pack years: 30.00     Types: Cigarettes   • Smokeless tobacco: Never Used   Substance and Sexual Activity   • Alcohol use: No     Frequency: Never   • Drug use: Yes     Types: IV, Heroin   • Sexual activity: Defer   Other Topics Concern   • Not on file   Social History Narrative   • Not on file       Family History:  History reviewed. No pertinent family history.    Physical Exam:  /89   Pulse 81   Temp 99 °F (37.2 °C) (Oral)   Resp 16   Ht 167.6 cm (66\")   Wt 76.2 kg (168 lb)   LMP  (LMP Unknown)   SpO2 100%   BMI 27.12 kg/m²   Body mass index is 27.12 kg/m². 100% 76.2 kg (168 lb)  Physical Exam   Constitutional:   She is quite groggy after anesthesia.  She is arousable and very tearful   HENT:   Head: Normocephalic.   Mouth/Throat: Oropharynx is clear and moist.   Eyes: Conjunctivae and EOM are normal. No scleral icterus.   Neck: No tracheal deviation present. No thyromegaly present.   Cardiovascular: Normal rate, regular rhythm and normal heart sounds.   Pulmonary/Chest: No respiratory distress. She has no wheezes. She exhibits no tenderness.   Abdominal: She exhibits no distension and no mass. There is no tenderness.   Musculoskeletal: She exhibits no deformity. "   Range of motion in lower extremities is extremely limited.  She just underwent lumbar surgery   Neurological: She is alert. No cranial nerve deficit.   Some decreased muscular tone in her lower extremities.  She does distinguish to light touch on both lower extremities.  Normal strength 5 over 5 in both upper extremities.   Skin:   She has multiple excoriations and maculopapular as well as pustular lesions and ulcerations throughout her entire body, covering her face, trunk, abdomen and extremities.   Psychiatric:   She is very tearful, very poor insight.       LABS:  Lab Results   Component Value Date    CALCIUM 9.0 12/09/2018     Results from last 7 days   Lab Units  12/09/18   1436  12/08/18   0208   SODIUM mmol/L  135*  131*   POTASSIUM mmol/L  3.2*  3.6   CHLORIDE mmol/L  94*  92*   CO2 mmol/L  29.3*  25.3   BUN mg/dL  8  9   CREATININE mg/dL  0.67  0.68   GLUCOSE mg/dL  98  118*   CALCIUM mg/dL  9.0  8.8   WBC 10*3/mm3  11.29*  11.59*   HEMOGLOBIN g/dL  10.7*  10.4*   PLATELETS 10*3/mm3  194  197   ALT (SGPT) U/L  52*  68*   AST (SGOT) U/L  59*  77*   PROCALCITONIN ng/mL   --   0.83*     Lab Results   Component Value Date    CKTOTAL 84 12/08/2018     Results from last 7 days   Lab Units  12/08/18   0208   CK TOTAL U/L  84     Results from last 7 days   Lab Units  12/08/18   0243  12/08/18   0208   BLOODCX   Staphylococcus aureus*  Staphylococcus aureus*   BCIDPCR   Staphylococcus aureus. mecA (methicillin resistance gene) detected. Identification by BCID PCR.*   --      Results from last 7 days   Lab Units  12/09/18   1519  12/08/18   0208   PROCALCITONIN ng/mL   --   0.83*   LACTATE mmol/L  2.9*  1.5                 Results from last 7 days   Lab Units  12/08/18   0243  12/08/18   0208   BLOODCX   Staphylococcus aureus*  Staphylococcus aureus*   BCIDPCR   Staphylococcus aureus. mecA (methicillin resistance gene) detected. Identification by BCID PCR.*   --      No results found for: TSH  Estimated Creatinine  Clearance: 115.3 mL/min (by C-G formula based on SCr of 0.67 mg/dL).  Results from last 7 days   Lab Units  12/08/18   0229   NITRITE UA   Negative   WBC UA /HPF  3-5*   BACTERIA UA /HPF  Trace*   SQUAM EPITHEL UA /HPF  3-6*        Imaging: I personally visualized the images of scans/x-rays performed within last 3 days.      Assessment:  Staphylococcus aureus bacteremia  Epidural abscess  Status post lumbar decompression surgery  Back pain  Urinary incontinence  Hyponatremia  Hypokalemia  IV drug abuse  Chronic multifactorial anemia  Elevated liver enzymes      Recommendations:  The patient will be admitted to the intensive care unit.  She just underwent lumbar decompression surgery for epidural abscess causing bowel and bladder incontinence.  She will receive intravenous vancomycin  Consult infectious diseases.  Monitor lower extremities for any neurological changes.  Treat pain with oral narcotics, avoid IV narcotics if possible.  Lovenox for DVT prophylaxis when okay with neurosurgery.  SCD compression devices to legs for now.    Total critical care time 35 minutes        Med Rao MD  12/9/2018  11:17 PM      Much of this encounter note is an electronic transcription/translation of spoken language to printed text using Dragon Software.    Electronically signed by Med Rao MD at 12/9/2018 11:26 PM     Tevin Whitley MD at 12/9/2018  8:41 PM          Patient name: Silvia Velazquez  Referring Provider: Perry Cárdenas MD  Reason for Consultation: Epidural abcess    Patient Care Team:  Provider, No Known as PCP - General    Chief complaint: Pain and leg pain and inability to urinate    Subjective .     History of present illness:    Patient is a 43 y.o. right handed female who presents with a varying history of low back pain and bilateral leg pain.  She is crying out in pain.  The history is obtained from the chart and Dr. Cárdenas and the patient.    She is a trouble urinating, she has numbness in her  peridium, she is not lost stool.  She has trouble initiating stream of urine.    She had low back pain.  This is been present for a long time.  It is Much worse.  She's been seen in the emergency room recently.  She's been seen a couple of different emergency rooms.    Patient has a history IV drug abuse.    With ongoing back pain and fever chills etc. she underwent blood cultures yesterday in the emergency room here.  She was called back because the blood cultures were positive for MRSA.  With the complaints as described above she underwent an MRI of the lumbar spine to demonstrates an enhancing lesions in the paraspinous musculature in the epidural space severely compressing the thecal sac consistent with epidural abscess.    Review of Systems  Review of Systems   Constitutional: Positive for fever and unexpected weight change.   HENT: Negative for dental problem.    Eyes: Negative for visual disturbance.   Respiratory: Negative for chest tightness.    Cardiovascular: Negative for chest pain, palpitations and leg swelling.   Endocrine: Negative for polyuria.   Genitourinary: Positive for difficulty urinating.   Musculoskeletal: Negative for joint swelling.   Skin: Positive for rash and wound.   Neurological: Negative for dizziness and light-headedness.   Hematological: Bruises/bleeds easily.   Psychiatric/Behavioral: Negative for confusion, dysphoric mood and hallucinations.       History  PAST MEDICAL HISTORY  Past Medical History:   Diagnosis Date   • Hepatitis C    • Kidney stone    • Lupus (systemic lupus erythematosus) (CMS/HCC)    • Mitral valve anterior leaflet prolapse    • Seizures (CMS/HCC)        PAST SURGICAL HISTORY  Past Surgical History:   Procedure Laterality Date   • CHOLECYSTECTOMY     • LIVER BIOPSY     • LYMPH NODE BIOPSY         FAMILY HISTORY  History reviewed. No pertinent family history.    SOCIAL HISTORY  Social History     Tobacco Use   • Smoking status: Current Every Day Smoker      Packs/day: 1.00     Years: 30.00     Pack years: 30.00     Types: Cigarettes   • Smokeless tobacco: Never Used   Substance Use Topics   • Alcohol use: No     Frequency: Never   • Drug use: Yes     Types: IV, Heroin     not   unemployed      Allergies:  Sulfa antibiotics    MEDICATIONS:  Medications Prior to Admission   Medication Sig Dispense Refill Last Dose   • doxycycline (MONODOX) 100 MG capsule Take 1 capsule by mouth 2 (Two) Times a Day. 20 capsule 0    • ibuprofen (ADVIL,MOTRIN) 600 MG tablet Take 1 tablet by mouth 3 (Three) Times a Day. 24 tablet 0        Objective     Results Review:  LABS:    Admission on 12/09/2018   Component Date Value Ref Range Status   • Extra Tube 12/09/2018 hold for add-on   Final    Auto resulted   • Extra Tube 12/09/2018 Hold for add-ons.   Final    Auto resulted.   • Extra Tube 12/09/2018 hold for add-on   Final    Auto resulted   • Extra Tube 12/09/2018 Hold for add-ons.   Final    Auto resulted.   • Glucose 12/09/2018 98  65 - 99 mg/dL Final   • BUN 12/09/2018 8  6 - 20 mg/dL Final   • Creatinine 12/09/2018 0.67  0.57 - 1.00 mg/dL Final   • Sodium 12/09/2018 135* 136 - 145 mmol/L Final   • Potassium 12/09/2018 3.2* 3.5 - 5.2 mmol/L Final   • Chloride 12/09/2018 94* 98 - 107 mmol/L Final   • CO2 12/09/2018 29.3* 22.0 - 29.0 mmol/L Final   • Calcium 12/09/2018 9.0  8.6 - 10.5 mg/dL Final   • Total Protein 12/09/2018 7.8  6.0 - 8.5 g/dL Final   • Albumin 12/09/2018 3.10* 3.50 - 5.20 g/dL Final   • ALT (SGPT) 12/09/2018 52* 1 - 33 U/L Final   • AST (SGOT) 12/09/2018 59* 1 - 32 U/L Final   • Alkaline Phosphatase 12/09/2018 82  39 - 117 U/L Final   • Total Bilirubin 12/09/2018 0.8  0.1 - 1.2 mg/dL Final   • eGFR Non African Amer 12/09/2018 97  >60 mL/min/1.73 Final   • Globulin 12/09/2018 4.7  gm/dL Final   • A/G Ratio 12/09/2018 0.7  g/dL Final   • BUN/Creatinine Ratio 12/09/2018 11.9  7.0 - 25.0 Final   • Anion Gap 12/09/2018 11.7  mmol/L Final   • C-Reactive Protein  12/09/2018 18.57* 0.00 - 0.50 mg/dL Final   • Sed Rate 12/09/2018 66* 0 - 20 mm/hr Final   • Lactate 12/09/2018 2.9* 0.5 - 2.0 mmol/L Final   • WBC 12/09/2018 11.29* 4.50 - 10.70 10*3/mm3 Final   • RBC 12/09/2018 3.86* 3.90 - 5.20 10*6/mm3 Final   • Hemoglobin 12/09/2018 10.7* 11.9 - 15.5 g/dL Final   • Hematocrit 12/09/2018 34.5* 35.6 - 45.5 % Final   • MCV 12/09/2018 89.4  80.5 - 98.2 fL Final   • MCH 12/09/2018 27.7  26.9 - 32.0 pg Final   • MCHC 12/09/2018 31.0* 32.4 - 36.3 g/dL Final   • RDW 12/09/2018 16.5* 11.7 - 13.0 % Final   • RDW-SD 12/09/2018 53.9  37.0 - 54.0 fl Final   • MPV 12/09/2018 10.8  6.0 - 12.0 fL Final   • Platelets 12/09/2018 194  140 - 500 10*3/mm3 Final   • Neutrophil % 12/09/2018 82.1* 42.7 - 76.0 % Final   • Lymphocyte % 12/09/2018 7.7* 19.6 - 45.3 % Final   • Monocyte % 12/09/2018 9.0  5.0 - 12.0 % Final   • Eosinophil % 12/09/2018 0.6  0.3 - 6.2 % Final   • Basophil % 12/09/2018 0.1  0.0 - 1.5 % Final   • Immature Grans % 12/09/2018 0.5  0.0 - 0.5 % Final   • Neutrophils, Absolute 12/09/2018 9.26* 1.90 - 8.10 10*3/mm3 Final   • Lymphocytes, Absolute 12/09/2018 0.87* 0.90 - 4.80 10*3/mm3 Final   • Monocytes, Absolute 12/09/2018 1.02  0.20 - 1.20 10*3/mm3 Final   • Eosinophils, Absolute 12/09/2018 0.07  0.00 - 0.70 10*3/mm3 Final   • Basophils, Absolute 12/09/2018 0.01  0.00 - 0.20 10*3/mm3 Final   • Immature Grans, Absolute 12/09/2018 0.06* 0.00 - 0.03 10*3/mm3 Final   • Extra Tube 12/09/2018 Hold for add-ons.   Final    Auto resulted.       DIAGNOSTICS:  I personally reviewed the MRI scan of lumbar spine this demonstrates an enormous epidural abscess from L3 to sacrum.  There is also paraspinous muscular abscess.    Results Review:   I reviewed the patient's new clinical results.  I personally viewed and interpreted the patient's imaging    Vital Signs   Temp:  [97.8 °F (36.6 °C)-98.6 °F (37 °C)] 98.6 °F (37 °C)  Heart Rate:  [] 93  Resp:  [15-18] 18  BP: (133-153)/(77-98)  137/91    Physical Exam:  Physical Exam   Constitutional: She is oriented to person, place, and time. She appears well-developed and well-nourished. She is cooperative.   HENT:   Head: Normocephalic and atraumatic.   Eyes: EOM are normal. Pupils are equal, round, and reactive to light. No scleral icterus.   Neck: Normal range of motion. Neck supple.   Cardiovascular: Normal rate, regular rhythm and intact distal pulses.   No murmur heard.  Pulmonary/Chest: Effort normal and breath sounds normal.   Abdominal: Soft. Bowel sounds are normal. There is no tenderness.   Genitourinary:   Genitourinary Comments: Absent bulbocavernosus reflex.  Decreased anal tone.  Decreased sensation to both light touch and pinprick in the pudendal area and perianal region.   Musculoskeletal: Normal range of motion.   Neurological: She is alert and oriented to person, place, and time. She displays no atrophy. No cranial nerve deficit or sensory deficit. She exhibits normal muscle tone. She has an abnormal Romberg Test. She has a normal Finger-Nose-Finger Test, a normal Heel to Barnes Test and a normal Tandem Gait Test. She displays a negative Romberg sign. Coordination and gait normal. GCS eye subscore is 4. GCS verbal subscore is 5. GCS motor subscore is 6.   Reflex Scores:       Tricep reflexes are 2+ on the right side and 2+ on the left side.       Bicep reflexes are 2+ on the right side and 2+ on the left side.       Brachioradialis reflexes are 2+ on the right side and 2+ on the left side.       Patellar reflexes are 0 on the right side and 0 on the left side.       Achilles reflexes are 0 on the right side and 0 on the left side.  I got her up to walk and she was able to take a few steps but bent over severely complaining of pain in her knees buckled.   Skin: Skin is warm, dry and intact.   Scabs on the arms and torso and breasts and legs   Psychiatric: She has a normal mood and affect. Her speech is normal and behavior is normal.  Judgment and thought content normal. Cognition and memory are normal.   Vitals reviewed.    Neurologic Exam     Mental Status   Oriented to person, place, and time.   Registration: recalls 3 of 3 objects. Recall at 5 minutes: recalls 3 of 3 objects. Follows 3 step commands.   Attention: normal. Concentration: normal.   Speech: speech is normal   Level of consciousness: alert  Knowledge: good.   Able to name object. Able to read. Able to repeat. Able to write. Normal comprehension.     Cranial Nerves     CN II   Visual fields full to confrontation.     CN III, IV, VI   Pupils are equal, round, and reactive to light.  Extraocular motions are normal.   Right pupil: Consensual response: intact.   Left pupil: Consensual response: intact.   CN III: no CN III palsy  CN VI: no CN VI palsy  Nystagmus: none   Diplopia: none  Ophthalmoparesis: none  Upgaze: normal  Downgaze: normal  Conjugate gaze: present  Vestibulo-ocular reflex: present    CN V   Facial sensation intact.     CN VII   Facial expression full, symmetric.     CN VIII   CN VIII normal.     CN IX, X   CN IX normal.     CN XI   CN XI normal.     CN XII   CN XII normal.     Motor Exam   Muscle bulk: normal  Overall muscle tone: normal  Right arm tone: normal  Left arm tone: normal  Right arm pronator drift: absent  Left arm pronator drift: absent  Right leg tone: normal  Left leg tone: normal    Strength   Strength 5/5 except as noted.   Right neck flexion: 5/5  Left neck flexion: 5/5  Right neck extension: 5/5  Left neck extension: 5/5  Right deltoid: 5/5  Left deltoid: 5/5  Right biceps: 5/5  Left biceps: 5/5  Right triceps: 5/5  Left triceps: 5/5  Right wrist flexion: 5/5  Left wrist flexion: 5/5  Right wrist extension: 5/5  Left wrist extension: 5/5  Right interossei: 5/5  Left interossei: 5/5  Right abdominals: 5/5  Left abdominals: 5/5  Right iliopsoas: 3/5  Left iliopsoas: 3/5  Right quadriceps: 4/5  Left quadriceps: 4/5  Right hamstring: 3/5  Left  hamstring: 3/5  Right glutei: 4/5  Left glutei: 4/5  Right anterior tibial: 4/5  Left anterior tibial: 4/5  Right posterior tibial: 4/5  Left posterior tibial: 4/5  Right peroneal: 4/5  Left peroneal: 4/5  Right gastroc: 4/5  Left gastroc: 4/5    Sensory Exam   Light touch normal.   Vibration normal.   Proprioception normal.   Pinprick normal.     Gait, Coordination, and Reflexes     Coordination   Romberg: positive  Finger to nose coordination: normal  Heel to shin coordination: normal  Tandem walking coordination: normal    Tremor   Resting tremor: absent  Intention tremor: absent  Action tremor: absent    Reflexes   Right brachioradialis: 2+  Left brachioradialis: 2+  Right biceps: 2+  Left biceps: 2+  Right triceps: 2+  Left triceps: 2+  Right patellar: 0  Left patellar: 0  Right achilles: 0  Left achilles: 0  Right : 2+  Left : 2+  Right plantar: normal  Left plantar: normal  Right Chin: absent  Left Chin: absent  Right ankle clonus: absent  Left ankle clonus: absent      Assessment/Plan       Spinal epidural abscess      PLAN: The risks, benefits, and alternatives of lumbar decompression  were explained in detail to the patient. The alternative is not to perform an operative procedure. The benefit should be, though is not guaranteed, primarily a potential reduction in the neurosensory and/or motor dysfunction; secondarily, a possible reduction in back pain. The risks include, but are not limited to, the possibility of death, infection, stroke, bleeding, blindness, paralysis, blindness, lack of improvement in the patient's pain syndrome, worsening of the pain syndrome, meningitis, osteomyelitis, recurrent disc herniation or stenosis, need for further operative intervention, recurrence of the pain syndrome, sexual dysfunction, incontinence, worsening of inability to urinate without catheterization, inability to have a normal bowel movement, epidural scarring resulting in chronic back pain, leakage  "of cerebral spinal fluid at the time of surgery or after recovery from surgery requiring further operative intervention,spinal instability. I also explained the realistic expectations as they pertain to the procedure. The patient voiced understanding of these risks, benefits, alternatives, and realistic expectations and requests that we proceed with the operative intervention.    I explained to the patient that I'm hopeful that she'll regain some of her the use of her neurologic function however it is not clear that we'll be the case.  I explained that the alternative is nonoperative management that this was suboptimal considering the amount of compression of the neural elements.    I discussed the patients findings and my recommendations with patient and consulting provider    Tevin Whitley MD  12/09/18  8:41 PM    Electronically signed by Tevin Whitley MD at 12/9/2018  8:49 PM       Hospital Medications (active)       Dose Frequency Start End    amLODIPine (NORVASC) tablet 10 mg 10 mg Every 24 Hours Scheduled 12/13/2018     Sig - Route: Take 1 tablet by mouth Daily. - Oral    CloNIDine (CATAPRES) tablet 0.1 mg 0.1 mg 4 Times Daily PRN 12/13/2018 12/14/2018    Sig - Route: Take 1 tablet by mouth 4 (Four) Times a Day As Needed for Other (See Administration Instructions). - Oral    Linked Group 1:  \"Followed by\" Linked Group Details        CloNIDine (CATAPRES) tablet 0.1 mg 0.1 mg 3 Times Daily PRN 12/14/2018 12/15/2018    Sig - Route: Take 1 tablet by mouth 3 (Three) Times a Day As Needed for Other (See Administration Instructions). - Oral    Linked Group 1:  \"Followed by\" Linked Group Details        CloNIDine (CATAPRES) tablet 0.1 mg 0.1 mg 2 Times Daily PRN 12/15/2018 12/16/2018    Sig - Route: Take 1 tablet by mouth 2 (Two) Times a Day As Needed for Other (See Administration Instructions). - Oral    Linked Group 1:  \"Followed by\" Linked Group Details        CloNIDine (CATAPRES) tablet 0.1 mg 0.1 mg Once " "As Needed 12/16/2018 12/17/2018    Sig - Route: Take 1 tablet by mouth 1 (One) Time As Needed for Other (See Administration Instructions). - Oral    Linked Group 1:  \"Followed by\" Linked Group Details        docusate sodium (COLACE) capsule 100 mg 100 mg Daily 12/15/2018     Sig - Route: Take 1 capsule by mouth Daily. - Oral    hydrALAZINE (APRESOLINE) injection 20 mg 20 mg Every 4 Hours PRN 12/13/2018     Sig - Route: Infuse 1 mL into a venous catheter Every 4 (Four) Hours As Needed for High Blood Pressure. - Intravenous    HYDROmorphone (DILAUDID) injection 0.5 mg 0.5 mg Every 3 Hours PRN 12/12/2018 12/17/2018    Sig - Route: Infuse 0.5 mL into a venous catheter Every 3 (Three) Hours As Needed for Severe Pain  (breakthrough pain). - Intravenous    magnesium oxide (MAG-OX) tablet 400 mg 400 mg 2 Times Daily 12/11/2018     Sig - Route: Take 1 tablet by mouth 2 (Two) Times a Day. - Oral    Magnesium Sulfate 2 gram / 50mL Infusion (GIVE X 3 BAGS TO EQUAL 6GM TOTAL DOSE) - Mg 1.1 - 1.5 mg/dl 2 g As Needed 12/11/2018     Sig - Route: Infuse 50 mL into a venous catheter As Needed (See Administration Instructions). - Intravenous    Linked Group 2:  \"Or\" Linked Group Details        Magnesium Sulfate 2 gram Bolus, followed by 8 gram infusion (total Mg dose 10 grams)- Mg less than or equal to 1mg/dL 2 g As Needed 12/11/2018     Sig - Route: Infuse 50 mL into a venous catheter As Needed (See Administration Instructions). - Intravenous    Linked Group 2:  \"Or\" Linked Group Details        Magnesium Sulfate 4 gram infusion- Mg 1.6-1.9 mg/dL 4 g As Needed 12/11/2018     Sig - Route: Infuse 100 mL into a venous catheter As Needed (See Administration Instructions). - Intravenous    Linked Group 2:  \"Or\" Linked Group Details        methadone (DOLOPHINE) tablet 25 mg 25 mg Once 12/14/2018 12/14/2018    Sig - Route: Take 5 tablets by mouth 1 (One) Time. - Oral    methadone (DOLOPHINE) tablet 30 mg 30 mg Every 8 Hours Scheduled " "12/14/2018 12/23/2018    Sig - Route: Take 3 tablets by mouth Every 8 (Eight) Hours,  - Oral    methocarbamol (ROBAXIN) tablet 750 mg 750 mg Every 6 Hours Scheduled 12/12/2018     Sig - Route: Take 1 tablet by mouth Every 6 (Six) Hours. - Oral    naloxone (NARCAN) injection 0.1 mg 0.1 mg Every 5 Minutes PRN 12/9/2018     Sig - Route: Infuse 0.25 mL into a venous catheter Every 5 (Five) Minutes As Needed for Opioid Reversal or Respiratory Depression (see administration instructions). - Intravenous    nicotine (NICODERM CQ) 21 MG/24HR patch 1 patch 1 patch Every 24 Hours Scheduled 12/12/2018     Sig - Route: Place 1 patch on the skin as directed by provider Daily. - Transdermal    ondansetron (ZOFRAN) injection 4 mg 4 mg Every 6 Hours PRN 12/9/2018     Sig - Route: Infuse 2 mL into a venous catheter Every 6 (Six) Hours As Needed for Nausea or Vomiting. - Intravenous    Linked Group 3:  \"Or\" Linked Group Details        ondansetron (ZOFRAN) tablet 4 mg 4 mg Every 6 Hours PRN 12/9/2018     Sig - Route: Take 1 tablet by mouth Every 6 (Six) Hours As Needed for Nausea or Vomiting. - Oral    Linked Group 3:  \"Or\" Linked Group Details        ondansetron ODT (ZOFRAN-ODT) disintegrating tablet 4 mg 4 mg Every 6 Hours PRN 12/9/2018     Sig - Route: Take 1 tablet by mouth Every 6 (Six) Hours As Needed for Nausea or Vomiting. - Oral    Linked Group 3:  \"Or\" Linked Group Details        oxyCODONE (ROXICODONE) immediate release tablet 10 mg 10 mg Every 6 Hours Scheduled 12/11/2018 12/21/2018    Sig - Route: Take 2 tablets by mouth Every 6 (Six) Hours. - Oral    oxyCODONE-acetaminophen (PERCOCET)  MG per tablet 1 tablet 1 tablet Every 4 Hours PRN 12/9/2018 12/19/2018    Sig - Route: Take 1 tablet by mouth Every 4 (Four) Hours As Needed for Severe Pain . - Oral    Pharmacy to dose vancomycin  Continuous PRN 12/9/2018 12/23/2018    Sig - Route: Continuous As Needed for Consult. - Does not apply    potassium chloride (KLOR-CON) " "packet 40 mEq 40 mEq As Needed 12/11/2018     Sig - Route: Take 40 mEq by mouth As Needed (potassium replacement, see admin instructions). - Oral    potassium chloride (MICRO-K) CR capsule 40 mEq 40 mEq As Needed 12/11/2018     Sig - Route: Take 4 capsules by mouth As Needed (potassium replacement.  see admin instructions). - Oral    potassium chloride (MICRO-K) CR capsule 40 mEq 40 mEq Daily 12/11/2018     Sig - Route: Take 4 capsules by mouth Daily. - Oral    sennosides-docusate sodium (SENOKOT-S) 8.6-50 MG tablet 2 tablet 2 tablet Nightly 12/14/2018     Sig - Route: Take 2 tablets by mouth Every Night. - Oral    sodium chloride 0.9 % flush 10 mL 10 mL As Needed 12/9/2018     Sig - Route: Infuse 10 mL into a venous catheter As Needed for Line Care. - Intravenous    Linked Group 4:  \"And\" Linked Group Details        vancomycin 1500 mg/500 mL 0.9% NS IVPB ([facility]) 1,500 mg Every 8 Hours 12/14/2018 12/24/2018    Sig - Route: Infuse 500 mL into a venous catheter Every 8 (Eight) Hours. - Intravenous    docusate sodium (COLACE) capsule 100 mg (Discontinued) 100 mg 2 Times Daily PRN 12/9/2018 12/14/2018    Sig - Route: Take 1 capsule by mouth 2 (Two) Times a Day As Needed for Constipation. - Oral    methadone (DOLOPHINE) tablet 25 mg (Discontinued) 25 mg Once 12/14/2018 12/14/2018    Sig - Route: Take 25 mg by mouth 1 (One) Time. - Oral    Reason for Discontinue: Reorder    methadone (DOLOPHINE) tablet 5 mg (Discontinued) 5 mg Every 8 Hours Scheduled 12/13/2018 12/14/2018    Sig - Route: Take 1 tablet by mouth Every 8 (Eight) Hours. - Oral    vancomycin 1250 mg/250 mL 0.9% NS IVPB ([facility]) (Discontinued) 1,250 mg Every 8 Hours 12/13/2018 12/14/2018    Sig - Route: Infuse 250 mL into a venous catheter Every 8 (Eight) Hours. - Intravenous          " 55

## 2021-07-19 RX ORDER — METHOCARBAMOL 500 MG/1
TABLET, FILM COATED ORAL
Qty: 60 TABLET | Refills: 0 | Status: SHIPPED | OUTPATIENT
Start: 2021-07-19 | End: 2021-09-07 | Stop reason: SDUPTHER

## 2021-07-22 ENCOUNTER — INFUSION (OUTPATIENT)
Dept: ONCOLOGY | Facility: HOSPITAL | Age: 46
End: 2021-07-22

## 2021-07-22 ENCOUNTER — OFFICE VISIT (OUTPATIENT)
Dept: NEUROSURGERY | Facility: CLINIC | Age: 46
End: 2021-07-22

## 2021-07-22 VITALS
BODY MASS INDEX: 41.78 KG/M2 | TEMPERATURE: 96.8 F | RESPIRATION RATE: 18 BRPM | OXYGEN SATURATION: 96 % | HEIGHT: 66 IN | DIASTOLIC BLOOD PRESSURE: 71 MMHG | SYSTOLIC BLOOD PRESSURE: 112 MMHG | HEART RATE: 69 BPM | WEIGHT: 260 LBS

## 2021-07-22 VITALS
DIASTOLIC BLOOD PRESSURE: 80 MMHG | TEMPERATURE: 98 F | HEART RATE: 88 BPM | BODY MASS INDEX: 41.33 KG/M2 | HEIGHT: 66 IN | WEIGHT: 257.2 LBS | SYSTOLIC BLOOD PRESSURE: 110 MMHG

## 2021-07-22 DIAGNOSIS — D50.0 IRON DEFICIENCY ANEMIA DUE TO CHRONIC BLOOD LOSS: ICD-10-CM

## 2021-07-22 DIAGNOSIS — G89.29 CHRONIC LEFT-SIDED LOW BACK PAIN WITHOUT SCIATICA: Primary | ICD-10-CM

## 2021-07-22 DIAGNOSIS — M54.50 CHRONIC LEFT-SIDED LOW BACK PAIN WITHOUT SCIATICA: Primary | ICD-10-CM

## 2021-07-22 DIAGNOSIS — T45.4X5A ADVERSE EFFECT OF IRON, INITIAL ENCOUNTER: Primary | ICD-10-CM

## 2021-07-22 PROCEDURE — 96374 THER/PROPH/DIAG INJ IV PUSH: CPT

## 2021-07-22 PROCEDURE — 25010000002 FERRIC CARBOXYMALTOSE 750 MG/15ML SOLUTION 15 ML VIAL: Performed by: INTERNAL MEDICINE

## 2021-07-22 PROCEDURE — 63710000001 PROCHLORPERAZINE MALEATE PER 5 MG: Performed by: INTERNAL MEDICINE

## 2021-07-22 PROCEDURE — 99214 OFFICE O/P EST MOD 30 MIN: CPT | Performed by: NEUROLOGICAL SURGERY

## 2021-07-22 PROCEDURE — 96365 THER/PROPH/DIAG IV INF INIT: CPT

## 2021-07-22 RX ORDER — PROCHLORPERAZINE MALEATE 5 MG/1
10 TABLET ORAL ONCE
Status: COMPLETED | OUTPATIENT
Start: 2021-07-22 | End: 2021-07-22

## 2021-07-22 RX ORDER — SODIUM CHLORIDE 9 MG/ML
250 INJECTION, SOLUTION INTRAVENOUS ONCE
Status: COMPLETED | OUTPATIENT
Start: 2021-07-22 | End: 2021-07-22

## 2021-07-22 RX ADMIN — FERRIC CARBOXYMALTOSE INJECTION 750 MG: 50 INJECTION, SOLUTION INTRAVENOUS at 14:19

## 2021-07-22 RX ADMIN — SODIUM CHLORIDE 250 ML: 9 INJECTION, SOLUTION INTRAVENOUS at 14:18

## 2021-07-22 RX ADMIN — PROCHLORPERAZINE MALEATE 10 MG: 5 TABLET ORAL at 14:18

## 2021-07-22 NOTE — PROGRESS NOTES
Subjective   Patient ID: Silvia Clinton is a 45 y.o. female is here today for follow-up with a new MRI Lumbar that was done at [facility] on 06/15/2021.    Today patient states that she has a hole in the middle of her back that leaks a green fluid. Patient states that she has constant back pain. Patient also states that she has N/T in B/L lower extremities.     Patient, Provider, and MA are all wearing a mask in our office today.     History of Present Illness     This patient has been having a lot of pain in her back.  Is mostly located across her lower back on both sides.  She has some numbness and tingling in her legs but not a lot of pain shooting into her legs.  She has no difficulty with bowel bladder control.  She had surgery with Dr. Whitley in 2018 and did well from that but over the last year or year and a half the pain has come back.  She has had no recent treatment.  She has done physical therapy in the past.    The following portions of the patient's history were reviewed and updated as appropriate: allergies, current medications, past family history, past medical history, past social history, past surgical history and problem list.    Review of Systems   Constitutional: Negative for chills and fever.   HENT: Negative for congestion.    Genitourinary: Negative for difficulty urinating and dysuria.   Musculoskeletal: Positive for back pain and gait problem. Negative for neck pain and neck stiffness.   Neurological: Positive for weakness.       I have reviewed the review of systems as documented by my MA.      Objective     There were no vitals filed for this visit.  There is no height or weight on file to calculate BMI.      Physical Exam  Eyes:      Extraocular Movements: EOM normal.      Pupils: Pupils are equal, round, and reactive to light.   Neurological:      Mental Status: She is alert and oriented to person, place, and time.      Coordination: Finger-Nose-Finger Test and Heel to Shin Test  normal.      Gait: Gait is intact.      Deep Tendon Reflexes:      Reflex Scores:       Tricep reflexes are 2+ on the right side and 2+ on the left side.       Bicep reflexes are 2+ on the right side and 2+ on the left side.       Brachioradialis reflexes are 2+ on the right side and 2+ on the left side.       Patellar reflexes are 2+ on the right side and 2+ on the left side.       Achilles reflexes are 2+ on the right side and 2+ on the left side.  Psychiatric:         Speech: Speech normal.       Neurologic Exam     Mental Status   Oriented to person, place, and time.   Registration of memory: Good recent and remote memory.   Attention: normal. Concentration: normal.   Speech: speech is normal   Level of consciousness: alert  Knowledge: consistent with education.     Cranial Nerves     CN II   Visual fields full to confrontation.   Visual acuity: normal    CN III, IV, VI   Pupils are equal, round, and reactive to light.  Extraocular motions are normal.     CN V   Facial sensation intact.   Right corneal reflex: normal  Left corneal reflex: normal    CN VII   Facial expression full, symmetric.   Right facial weakness: none  Left facial weakness: none    CN VIII   Hearing: intact    CN IX, X   Palate: symmetric    CN XI   Right sternocleidomastoid strength: normal  Left sternocleidomastoid strength: normal    CN XII   Tongue: not atrophic  Tongue deviation: none    Motor Exam   Muscle bulk: normal  Right arm tone: normal  Left arm tone: normal  Right leg tone: normal  Left leg tone: normal    Strength   Strength 5/5 except as noted.     Sensory Exam   Light touch normal.     Gait, Coordination, and Reflexes     Gait  Gait: normal    Coordination   Finger to nose coordination: normal  Heel to shin coordination: normal    Reflexes   Right brachioradialis: 2+  Left brachioradialis: 2+  Right biceps: 2+  Left biceps: 2+  Right triceps: 2+  Left triceps: 2+  Right patellar: 2+  Left patellar: 2+  Right achilles:  2+  Left achilles: 2+  Right : 2+  Left : 2+          Assessment/Plan   Independent Review of Radiographic Studies:      I personally reviewed the images from the following studies.    I reviewed her MRI of the lumbar spine which was done on Charla 15 of this year.  This does show a widely patent canal and neuroforamina at L5-S1.  L4-5 is fairly open as well.  There is a mild spondylolisthesis at that level.  L3-4, L2-3 and L1-2 are all open as well.    Medical Decision Making:      Told the patient I would see a lot of pressure on the nerves in her lower back.  Consequently I do not see anything here that we could do surgery for.  I did recommend that she try some further physical therapy and some weight loss.  She is to call if anything gets any worse.    There are no diagnoses linked to this encounter.  No follow-ups on file.

## 2021-07-29 ENCOUNTER — TELEPHONE (OUTPATIENT)
Dept: FAMILY MEDICINE CLINIC | Facility: CLINIC | Age: 46
End: 2021-07-29

## 2021-07-29 DIAGNOSIS — Z79.890 HORMONE REPLACEMENT THERAPY: Primary | ICD-10-CM

## 2021-08-02 DIAGNOSIS — R11.0 NAUSEA: ICD-10-CM

## 2021-08-02 RX ORDER — ONDANSETRON 4 MG/1
4 TABLET, ORALLY DISINTEGRATING ORAL EVERY 8 HOURS PRN
Qty: 30 TABLET | Refills: 12 | Status: SHIPPED | OUTPATIENT
Start: 2021-08-02 | End: 2021-11-16 | Stop reason: SDUPTHER

## 2021-08-10 ENCOUNTER — TELEPHONE (OUTPATIENT)
Dept: GASTROENTEROLOGY | Facility: CLINIC | Age: 46
End: 2021-08-10

## 2021-08-10 NOTE — TELEPHONE ENCOUNTER
----- Message from Chip Robb Rep sent at 8/10/2021 11:37 AM EDT -----  Regarding: Prescription  Contact: [phone]  Pt called regarding prescription for Plenbu not being covered under insurance.  Please contact pt regarding this.  Thank You.

## 2021-08-12 RX ORDER — SUCRALFATE 1 G/1
1 TABLET ORAL
Qty: 90 TABLET | Refills: 1 | Status: SHIPPED | OUTPATIENT
Start: 2021-08-12 | End: 2021-10-26

## 2021-08-12 RX ORDER — OMEPRAZOLE 40 MG/1
40 CAPSULE, DELAYED RELEASE ORAL 2 TIMES DAILY
Qty: 180 CAPSULE | Refills: 3 | Status: SHIPPED | OUTPATIENT
Start: 2021-08-12 | End: 2021-11-16

## 2021-08-12 NOTE — TELEPHONE ENCOUNTER
Rx Refill Note  Requested Prescriptions     Pending Prescriptions Disp Refills   • omeprazole (priLOSEC) 40 MG capsule 180 capsule 3     Sig: Take 1 capsule by mouth 2 (Two) Times a Day.      Last office visit with prescribing clinician: 5/24/2021      Next office visit with prescribing clinician: Visit date not found            Kerry Keen MA/LMR  08/12/21, 10:59 EDT

## 2021-08-12 NOTE — TELEPHONE ENCOUNTER
Caller: Silvia Clinton CM    Relationship: Self    Best call back number: [phone]    Medication needed:   Requested Prescriptions     Pending Prescriptions Disp Refills   • omeprazole (priLOSEC) 40 MG capsule 60 capsule 0     Sig: Take 1 capsule by mouth 2 (Two) Times a Day.       When do you need the refill by: 08/12/2021    What additional details did the patient provide when requesting the medication: PATIENT STATED THAT HER GASTRO DR PRESCRIBED THE MEDICATION FOR HER BUT SHE IS UNABLE TO SPEAK TO ANYONE WITH THE GASTRO OFFICE AND SHE WOULD LIKE SYLWIA GARCIA WOULD REFILL MEDICATION. PATIENT IS OUT OF MEDICATION. SHE WOULD ALSO LIKE TO KNOW IF SYLWIA GARCIA WOULD WRITE A PRESCRIPTION OUT FOR VITAMIN D3 AND TYLENOL 500 MG.       Does the patient have less than a 3 day supply:  [x] Yes  [] No    What is the patient's preferred pharmacy: [id number] [place], [place] - [address] AT [facility] - [phone] [facility] [phone]

## 2021-08-17 ENCOUNTER — TRANSCRIBE ORDERS (OUTPATIENT)
Dept: SLEEP MEDICINE | Facility: HOSPITAL | Age: 46
End: 2021-08-17

## 2021-08-17 DIAGNOSIS — Z01.818 OTHER SPECIFIED PRE-OPERATIVE EXAMINATION: Primary | ICD-10-CM

## 2021-08-18 ENCOUNTER — LAB (OUTPATIENT)
Dept: LAB | Facility: HOSPITAL | Age: 46
End: 2021-08-18

## 2021-08-18 DIAGNOSIS — Z01.818 OTHER SPECIFIED PRE-OPERATIVE EXAMINATION: ICD-10-CM

## 2021-08-18 LAB — SARS-COV-2 ORF1AB RESP QL NAA+PROBE: NOT DETECTED

## 2021-08-18 PROCEDURE — U0004 COV-19 TEST NON-CDC HGH THRU: HCPCS

## 2021-08-18 PROCEDURE — C9803 HOPD COVID-19 SPEC COLLECT: HCPCS

## 2021-08-20 ENCOUNTER — TELEPHONE (OUTPATIENT)
Dept: GASTROENTEROLOGY | Facility: CLINIC | Age: 46
End: 2021-08-20

## 2021-08-25 ENCOUNTER — HOSPITAL ENCOUNTER (EMERGENCY)
Facility: HOSPITAL | Age: 46
Discharge: LEFT BEFORE DISCHARGE | End: 2021-08-25

## 2021-08-25 ENCOUNTER — TELEPHONE (OUTPATIENT)
Dept: FAMILY MEDICINE CLINIC | Facility: CLINIC | Age: 46
End: 2021-08-25

## 2021-08-25 VITALS — TEMPERATURE: 97.5 F | RESPIRATION RATE: 16 BRPM | HEART RATE: 64 BPM | OXYGEN SATURATION: 100 %

## 2021-08-25 PROCEDURE — 99211 OFF/OP EST MAY X REQ PHY/QHP: CPT

## 2021-08-25 RX ORDER — SODIUM CHLORIDE 0.9 % (FLUSH) 0.9 %
10 SYRINGE (ML) INJECTION AS NEEDED
Status: DISCONTINUED | OUTPATIENT
Start: 2021-08-25 | End: 2021-08-25 | Stop reason: HOSPADM

## 2021-08-25 NOTE — ED TRIAGE NOTES
"Patient to triage desk reported she is not going to wait this long to be seen and is going to go home or another hospital. Advised patient in the need to stay. Patient stated she is going home.\"   "

## 2021-08-25 NOTE — TELEPHONE ENCOUNTER
Pt tried to go to ER for vaginal bleeding but had to wait outside the ER and didn't feel like waiting anymore and went home. She said she is currently on her period and is bleeding more heavily than she ever has before. She stated that she is going through and ultra tampon and level 5 pads in 30 mins. She said now that she is home with her feel elevated, it is slowing down a little but she is already anemic and has low hemoglobin. I scheduled her for first available for SYLWIA Oseguera on Friday     She doesn't currently have an OBGYN. Please Advise.

## 2021-08-25 NOTE — ED NOTES
Pt states she started her period 2 days ago.  States starting yesterday she began to have heavier bleeding than usual.  Pt states she is changing her Maxi pad every hour due to bleeding.   Pt denies syncope.  Mask placed on patient in triage.  Triage RN wearing mask throughout encounter.       Carlito Brown, RN  08/25/21 2071

## 2021-08-27 ENCOUNTER — OFFICE VISIT (OUTPATIENT)
Dept: FAMILY MEDICINE CLINIC | Facility: CLINIC | Age: 46
End: 2021-08-27

## 2021-08-27 VITALS
DIASTOLIC BLOOD PRESSURE: 66 MMHG | HEIGHT: 66 IN | HEART RATE: 69 BPM | TEMPERATURE: 98.4 F | SYSTOLIC BLOOD PRESSURE: 122 MMHG | OXYGEN SATURATION: 98 % | BODY MASS INDEX: 41.75 KG/M2 | WEIGHT: 259.8 LBS

## 2021-08-27 DIAGNOSIS — E66.01 MORBID (SEVERE) OBESITY DUE TO EXCESS CALORIES (HCC): Primary | ICD-10-CM

## 2021-08-27 PROCEDURE — 99213 OFFICE O/P EST LOW 20 MIN: CPT | Performed by: NURSE PRACTITIONER

## 2021-08-27 NOTE — PROGRESS NOTES
"Chief Complaint  Obesity (Patient is here needing ash to fill out forms for her to have bariatric surgery ( wore mask and goggles) )    Subjective          Silvia Clinton presents to [facility]  History of Present Illness  Obesity- Pt has appointment with bariatric surgery in Sept and here today for 1st of 3 month weight ins.    Exercise- walking increased at work 3 days a week and 4 days a week she is walking 30 mins at night    Diet- currently not doing anything for diet. She would like to start low carb diet.      Objective   Vital Signs:   /66   Pulse 69   Temp 98.4 °F (36.9 °C)   Ht 167.6 cm (65.98\")   Wt 118 kg (259 lb 12.8 oz)   SpO2 98%   BMI 41.95 kg/m²     Physical Exam  Vitals reviewed.   Constitutional:       General: She is not in acute distress.     Appearance: She is well-developed.   HENT:      Head: Normocephalic.   Cardiovascular:      Rate and Rhythm: Normal rate and regular rhythm.      Heart sounds: Normal heart sounds.   Pulmonary:      Effort: Pulmonary effort is normal.      Breath sounds: Normal breath sounds.   Neurological:      Mental Status: She is alert and oriented to person, place, and time.      Gait: Gait normal.   Psychiatric:         Behavior: Behavior normal.         Thought Content: Thought content normal.         Judgment: Judgment normal.        Result Review :   The following data was reviewed by: SYLWIA Calvert on 08/27/2021:  CMP    CMP 4/23/21 5/11/21 5/15/21   Glucose 125 (A) 103 (A) 95   BUN 13 13 9   Creatinine 0.73 0.67 0.68   eGFR Non African Am 87 96 94   Sodium 135 (A) 138 139   Potassium 4.1 4.1 4.1   Chloride 102 103 105   Calcium 9.1 8.8 8.6   Albumin 3.60 3.90 3.80   Total Bilirubin 0.3 0.5 0.3   Alkaline Phosphatase 100 111 105   AST (SGOT) 24 35 (A) 30   ALT (SGPT) 27 32 27   (A) Abnormal value            CBC w/diff    CBC w/Diff 5/11/21 5/15/21 7/8/21   WBC 5.19 5.45 5.08   RBC 4.02 3.96 4.19   Hemoglobin 8.1 " (A) 8.3 (A) 8.9 (A)   Hematocrit 27.6 (A) 28.4 (A) 30.7 (A)   MCV 68.7 (A) 71.7 (A) 73.3 (A)   MCH 20.1 (A) 21.0 (A) 21.2 (A)   MCHC 29.3 (A) 29.2 (A) 29.0 (A)   RDW 15.9 (A) 16.3 (A) 18.7 (A)   Platelets 164 150 204   Neutrophil Rel % 59.5  54.7   Immature Granulocyte Rel %   0.2   Lymphocyte Rel % 25.4  31.3   Monocyte Rel % 11.0  10.4   Eosinophil Rel % 3.1  3.0   Basophil Rel % 0.6  0.4   (A) Abnormal value            Lipid Panel    Lipid Panel 11/27/20   Total Cholesterol 133   Triglycerides 77   HDL Cholesterol 35 (A)   VLDL Cholesterol 15   LDL Cholesterol  83   LDL/HDL Ratio 2.36   (A) Abnormal value            TSH    TSH 4/26/21   TSH 3.310                     Assessment and Plan    Diagnoses and all orders for this visit:    1. Morbid (severe) obesity due to excess calories (CMS/HCC) (Primary)        Follow Up   Return in about 4 weeks (around 9/24/2021) for Recheck.  Patient was given instructions and counseling regarding her condition or for health maintenance advice. Please see specific information pulled into the AVS if appropriate.     Move more increase walking to 5 nights a week  Low carb diet  rto in 4 weeks     Mask and googles worn

## 2021-09-02 RX ORDER — VENLAFAXINE HYDROCHLORIDE 150 MG/1
150 CAPSULE, EXTENDED RELEASE ORAL DAILY
Qty: 90 CAPSULE | Refills: 3 | Status: SHIPPED | OUTPATIENT
Start: 2021-09-02 | End: 2022-03-08 | Stop reason: SDUPTHER

## 2021-09-02 NOTE — TELEPHONE ENCOUNTER
Rx Refill Note  Requested Prescriptions     Pending Prescriptions Disp Refills   • venlafaxine XR (Effexor XR) 150 MG 24 hr capsule 30 capsule 0     Sig: Take 1 capsule by mouth Daily.      Last office visit with prescribing clinician: 8/27/2021      Next office visit with prescribing clinician: 9/27/2021     Okay to send?       Annalise Silver MA  09/02/21, 15:09 EDT

## 2021-09-02 NOTE — TELEPHONE ENCOUNTER
Pt is requesting refill for Effexor XR. I do not see where this has been filled recently but she states [facility] just filled it last month. She is curious if she can fill for 6 months at a time instead of 1 month      [id number] - [facility]

## 2021-09-04 RX ORDER — METOPROLOL TARTRATE 100 MG/1
100 TABLET ORAL 2 TIMES DAILY
Qty: 180 TABLET | Refills: 3 | Status: CANCELLED | OUTPATIENT
Start: 2021-09-04

## 2021-09-07 DIAGNOSIS — M54.50 CHRONIC BILATERAL LOW BACK PAIN WITHOUT SCIATICA: ICD-10-CM

## 2021-09-07 DIAGNOSIS — G89.29 CHRONIC BILATERAL LOW BACK PAIN WITHOUT SCIATICA: ICD-10-CM

## 2021-09-07 RX ORDER — METHOCARBAMOL 500 MG/1
TABLET, FILM COATED ORAL
Qty: 60 TABLET | Refills: 0 | Status: SHIPPED | OUTPATIENT
Start: 2021-09-07 | End: 2021-10-26

## 2021-09-07 RX ORDER — LORATADINE 10 MG/1
10 TABLET ORAL DAILY
Qty: 90 TABLET | Refills: 1 | Status: SHIPPED | OUTPATIENT
Start: 2021-09-07 | End: 2022-02-07

## 2021-09-07 RX ORDER — METOPROLOL TARTRATE 100 MG/1
100 TABLET ORAL 2 TIMES DAILY
Qty: 180 TABLET | Refills: 1 | Status: SHIPPED | OUTPATIENT
Start: 2021-09-07 | End: 2022-02-28

## 2021-09-07 NOTE — TELEPHONE ENCOUNTER
Rx Refill Note  Requested Prescriptions     Pending Prescriptions Disp Refills   • loratadine (CLARITIN) 10 MG tablet 90 tablet 1     Sig: Take 1 tablet by mouth Daily.   • methocarbamol (Robaxin) 500 MG tablet 60 tablet 0     Sig: Take 1 po tid prn   • metoprolol tartrate (LOPRESSOR) 100 MG tablet 180 tablet 1     Sig: Take 1 tablet by mouth 2 (Two) Times a Day.      Last office visit with prescribing clinician: 8/27/2021      Next office visit with prescribing clinician: 9/27/2021            Annalise Silver MA  09/07/21, 08:22 EDT

## 2021-09-17 PROBLEM — K59.00 CONSTIPATION: Status: ACTIVE | Noted: 2021-09-17

## 2021-09-17 PROBLEM — R79.89 ELEVATED LFTS: Status: ACTIVE | Noted: 2021-09-17

## 2021-09-17 PROBLEM — F41.9 ANXIETY: Status: ACTIVE | Noted: 2021-09-17

## 2021-09-17 PROBLEM — F31.81 BIPOLAR 2 DISORDER: Status: ACTIVE | Noted: 2021-09-17

## 2021-09-17 PROBLEM — R53.83 FATIGUE: Status: ACTIVE | Noted: 2021-09-17

## 2021-09-17 PROBLEM — R01.1 HEART MURMUR: Status: ACTIVE | Noted: 2021-09-17

## 2021-09-17 PROBLEM — B19.20 HEPATITIS C: Status: ACTIVE | Noted: 2021-09-17

## 2021-09-17 PROBLEM — N94.6 DYSMENORRHEA: Status: ACTIVE | Noted: 2021-09-17

## 2021-09-17 PROBLEM — T78.40XA ALLERGIES: Status: ACTIVE | Noted: 2021-09-17

## 2021-09-22 ENCOUNTER — CONSULT (OUTPATIENT)
Dept: BARIATRICS/WEIGHT MGMT | Facility: CLINIC | Age: 46
End: 2021-09-22

## 2021-09-22 VITALS
BODY MASS INDEX: 43.02 KG/M2 | SYSTOLIC BLOOD PRESSURE: 125 MMHG | RESPIRATION RATE: 18 BRPM | HEART RATE: 68 BPM | TEMPERATURE: 98 F | WEIGHT: 252 LBS | DIASTOLIC BLOOD PRESSURE: 87 MMHG | HEIGHT: 64 IN

## 2021-09-22 DIAGNOSIS — I21.4 NON-STEMI (NON-ST ELEVATED MYOCARDIAL INFARCTION) (HCC): ICD-10-CM

## 2021-09-22 DIAGNOSIS — B18.2 CHRONIC HEPATITIS C WITHOUT HEPATIC COMA (HCC): ICD-10-CM

## 2021-09-22 DIAGNOSIS — E66.01 OBESITY, CLASS III, BMI 40-49.9 (MORBID OBESITY) (HCC): Primary | ICD-10-CM

## 2021-09-22 DIAGNOSIS — F40.232 FEAR ASSOCIATED WITH HEALTHCARE: ICD-10-CM

## 2021-09-22 DIAGNOSIS — F41.9 ANXIETY: ICD-10-CM

## 2021-09-22 DIAGNOSIS — R79.89 ELEVATED LFTS: ICD-10-CM

## 2021-09-22 DIAGNOSIS — M32.9 SYSTEMIC LUPUS ERYTHEMATOSUS, UNSPECIFIED SLE TYPE, UNSPECIFIED ORGAN INVOLVEMENT STATUS (HCC): ICD-10-CM

## 2021-09-22 DIAGNOSIS — R60.0 LOWER EXTREMITY EDEMA: ICD-10-CM

## 2021-09-22 DIAGNOSIS — N94.6 DYSMENORRHEA: ICD-10-CM

## 2021-09-22 DIAGNOSIS — K21.9 GASTROESOPHAGEAL REFLUX DISEASE, UNSPECIFIED WHETHER ESOPHAGITIS PRESENT: ICD-10-CM

## 2021-09-22 DIAGNOSIS — F19.11 HISTORY OF DRUG ABUSE (HCC): ICD-10-CM

## 2021-09-22 DIAGNOSIS — Z71.3 DIETARY COUNSELING: ICD-10-CM

## 2021-09-22 DIAGNOSIS — F31.81 BIPOLAR 2 DISORDER (HCC): ICD-10-CM

## 2021-09-22 DIAGNOSIS — M54.50 CHRONIC LEFT-SIDED LOW BACK PAIN WITHOUT SCIATICA: ICD-10-CM

## 2021-09-22 DIAGNOSIS — I10 ESSENTIAL HYPERTENSION: ICD-10-CM

## 2021-09-22 DIAGNOSIS — D50.0 IRON DEFICIENCY ANEMIA DUE TO CHRONIC BLOOD LOSS: ICD-10-CM

## 2021-09-22 DIAGNOSIS — G89.29 CHRONIC LEFT-SIDED LOW BACK PAIN WITHOUT SCIATICA: ICD-10-CM

## 2021-09-22 PROBLEM — R12 HEARTBURN: Status: RESOLVED | Noted: 2021-06-23 | Resolved: 2021-09-22

## 2021-09-22 PROCEDURE — 99215 OFFICE O/P EST HI 40 MIN: CPT | Performed by: NURSE PRACTITIONER

## 2021-09-22 NOTE — PROGRESS NOTES
[facility]IATRIC SURGERY  [address] [id number] [phone]  [address] [id number] [phone]  Dept: [phone]  9/22/2021      Silvia Clinton.  [id number]  [id number]  1975  female      Chief Complaint of weight gain; unable to maintain weight loss    History of Present Illness:   Silvia is a 46 y.o. female who presents today for evaluation, education and consultation regarding weight loss surgery. The patient is interested in the sleeve gastrectomy.      Diet History:Silvia has been overweight for at least 2.5 years, has been 35 pounds or more overweight for at least 2.5 years, has been 100 pounds or more overweight for 2 or more years and started dieting at age 45.  The most weight Silvia lost was 20 pounds on fasting and maintained the weight loss for 1 month. Silvia describes her eating habits as snacking between meals and drinking high calorie drinks such as soda and coffee. Silvia Clinton has tried Atkins, Fasting, reduced calorie, exercising, OTC medications, meal replacement shakes and meditation among others with success of losing up to 20 pounds, but in each instance regained the weight.     See dietician documentation for complete history.    Bariatric Surgery Evaluation: The patient is being seen for an initial visit for bariatric surgery evaluation and education.     Bariatric Co-morbidities:  hypertension, cardiovascular disease, back pain, GERD, edema, menstrual irregularities, mental health disease and tobacco use  She does have a history of drug and alcohol abuse but has been clean for over 3.5 years.  She is currently vaping with nicotine several times daily.  Educated on stop use of nicotine 6 weeks prior to surgery.     Patient Active Problem List   Diagnosis   • Spinal epidural abscess   • Non-STEMI (non-ST elevated myocardial infarction) (CMS/HCC) - 2 years ago   • Chest pain    • Dizziness   • Drug abuse (CMS/HCC)   • Essential hypertension   • Lupus (systemic lupus erythematosus) (CMS/HCC)   • Palpitations   • Vestibular neuritis, left   • Lower extremity edema   • Weight gain   • Chronic left-sided low back pain without sciatica   • Nausea   • Lumbar radiculopathy   • Pain of left lower extremity   • Obesity, Class III, BMI 40-49.9 (morbid obesity) (HCC)   • Fear associated with healthcare   • Iron deficiency anemia due to chronic blood loss   • Adverse effect of iron   • Fatigue   • Allergies   • Heart murmur   • Constipation   • Elevated LFTs   • Hepatitis C   • Dysmenorrhea   • Bipolar 2 disorder (CMS/HCC)   • Anxiety   • GERD (gastroesophageal reflux disease)   • History of drug abuse (HCC)   • Dietary counseling       Past Medical History:   Diagnosis Date   • Chest pain    • Depression    • Drug abstinence syndrome (CMS/HCC)    • Drug abuse (CMS/HCC)    • Ex-smoker    • Heart attack (CMS/HCC)    • Hepatitis C    • Hepatitis C    • Hypertension    • Kidney stone    • Lupus (CMS/HCC)    • Lupus (systemic lupus erythematosus) (CMS/HCC)    • Mitral valve anterior leaflet prolapse    • NSTEMI (non-ST elevated myocardial infarction) (CMS/HCC)    • Otitis    • Palpitations    • Seizures (CMS/HCC)    • Spinal epidural abscess    • Tachycardia        Past Surgical History:   Procedure Laterality Date   • BACK SURGERY     • CHOLECYSTECTOMY     • LEG SURGERY      broke tibula,fibula, steel noah   • LIVER BIOPSY     • LUMBAR LAMINECTOMY DISCECTOMY DECOMPRESSION Left 12/9/2018    Procedure: Posterior lumbar three through sacrum decompression;  Surgeon: Tevin Whitley MD;  Location: [facility];  Service: Neurosurgery   • LYMPH NODE BIOPSY     • SPINE SURGERY         Allergies   Allergen Reactions   • Sulfa Antibiotics Nausea And Vomiting and Swelling     Patient states when she took Sulfa medication, her throat started to swell.         Current Outpatient Medications:   •   buprenorphine-naloxone (SUBOXONE) 8-2 MG per SL tablet, Place 1 tablet under the tongue 2 (Two) Times a Day., Disp: , Rfl:   •  docusate sodium (COLACE) 100 MG capsule, TAKE 1 CAPSULE BY MOUTH TWICE A DAY AS NEEDED, Disp: , Rfl:   •  loratadine (CLARITIN) 10 MG tablet, Take 1 tablet by mouth Daily., Disp: 90 tablet, Rfl: 1  •  losartan (Cozaar) 50 MG tablet, Take 1 tablet by mouth 2 (Two) Times a Day., Disp: 180 tablet, Rfl: 3  •  methocarbamol (Robaxin) 500 MG tablet, Take 1 po tid prn, Disp: 60 tablet, Rfl: 0  •  metoprolol tartrate (LOPRESSOR) 100 MG tablet, Take 1 tablet by mouth 2 (Two) Times a Day., Disp: 180 tablet, Rfl: 1  •  omeprazole (priLOSEC) 40 MG capsule, Take 1 capsule by mouth 2 (Two) Times a Day., Disp: 180 capsule, Rfl: 3  •  ondansetron ODT (ZOFRAN-ODT) 4 MG disintegrating tablet, PLACE 1 TABLET ON THE TONGUE EVERY 8 (EIGHT) HOURS AS NEEDED FOR NAUSEA., Disp: 30 tablet, Rfl: 12  •  venlafaxine XR (Effexor XR) 150 MG 24 hr capsule, Take 1 capsule by mouth Daily., Disp: 90 capsule, Rfl: 3  •  OLANZapine (zyPREXA) 15 MG tablet, Take 15 mg by mouth Every Night., Disp: , Rfl:   •  sucralfate (CARAFATE) 1 g tablet, TAKE 1 TABLET BY MOUTH 3 (THREE) TIMES A DAY BEFORE MEALS., Disp: 90 tablet, Rfl: 1    Social History     Socioeconomic History   • Marital status: Legally      Spouse name: Not on file   • Number of children: Not on file   • Years of education: Not on file   • Highest education level: Not on file   Tobacco Use   • Smoking status: Former Smoker     Packs/day: 1.00     Years: 30.00     Pack years: 30.00     Types: Cigarettes     Quit date: 2020     Years since quittin.0   • Smokeless tobacco: Never Used   • Tobacco comment: E-CIG USE   Vaping Use   • Vaping Use: Every day   • Start date: 2020   • Substances: Nicotine, Flavoring   • Devices: Pre-filled or refillable cartridge, Refillable tank   Substance and Sexual Activity   • Alcohol use: No     Comment: CAFFEINE USE: 1  CUP COFFEE/ 2 COKES DAILY   • Drug use: Not Currently     Types: IV, Heroin, Methamphetamines     Comment: pt states she no longer uses heroin, she now uses meth   • Sexual activity: Not Currently       Family History   Problem Relation Age of Onset   • Diabetes Mother    • No Known Problems Father          Review of Systems:  Review of Systems   Constitutional: Positive for unexpected weight change.   Respiratory: Negative for chest tightness and shortness of breath.    Cardiovascular: Positive for palpitations and leg swelling. Negative for chest pain.        Palpitations with increased caffeine intake   Gastrointestinal:        GERD   Genitourinary: Positive for menstrual problem.   Musculoskeletal: Positive for arthralgias, back pain and myalgias.   All other systems reviewed and are negative.      Physical Exam:  Vital Signs:  Weight: 114 kg (252 lb)   Body mass index is 43.5 kg/m².  Temp: 98 °F (36.7 °C)   Heart Rate: 68   BP: 125/87     Physical Exam  Vitals reviewed.   Constitutional:       Appearance: Normal appearance. She is morbidly obese.   HENT:      Head: Normocephalic and atraumatic.      Mouth/Throat:      Mouth: Mucous membranes are moist.   Eyes:      General: No scleral icterus.     Extraocular Movements: Extraocular movements intact.      Conjunctiva/sclera: Conjunctivae normal.      Pupils: Pupils are equal, round, and reactive to light.   Cardiovascular:      Rate and Rhythm: Normal rate and regular rhythm.      Heart sounds: Normal heart sounds.   Pulmonary:      Effort: Pulmonary effort is normal. No respiratory distress.      Breath sounds: Normal breath sounds.   Abdominal:      General: Bowel sounds are normal. There is no distension.      Palpations: Abdomen is soft. There is no mass.      Tenderness: There is no abdominal tenderness. There is no guarding.   Musculoskeletal:         General: Normal range of motion.      Cervical back: Normal range of motion and neck supple.   Skin:      General: Skin is warm and dry.   Neurological:      General: No focal deficit present.      Mental Status: She is alert and oriented to person, place, and time.   Psychiatric:         Mood and Affect: Mood normal.         Behavior: Behavior normal.         Thought Content: Thought content normal.         Judgment: Judgment normal.            Assessment:         Silvia Clinton is a 46 y.o. year old female with medically complicated severe obesity. Weight: 114 kg (252 lb), Body mass index is 43.5 kg/m². and weight related problems including hypertension, cardiovascular disease, back pain, GERD, edema, menstrual irregularities, mental health disease and tobacco use.    I explained in detail, potential surgical options of interest to the patient including the RNY gastric bypass, sleeve gastrectomy, and gastric band while considering the patient's medical history. At this time, the patient expressed interest in the Laparoscopic Sleeve  All of those procedures can be performed laparoscopically but there is a chance to convert to open if any technical challenges or complications do occur.  Bariatric surgery is not cosmetic surgery but rather a tool to help a patient make a life-long commitment lifestyle changes including diet, exercise, behavior changes, and taking supplemental vitamins and minerals.    Due to the patient's BMI, history, and co-morbidities related to potential surgical complications were evaluated. Due to Silvia Clinton's risk factors female will obtain the following prior to being scheduled for surgical intervention:    A letter of medical support as well as a history and physical must be obtained from her primary care provider. The patient was given a copy of a sample form, that will suffice as their letter to take to their primary are provider.    A referral for pre-operative psychological evaluation was ordered for the patient to evaluate candidacy as well as provide mental health support, should  it be warranted before or after surgery.    Radiologic studies chest xray 4/23/2021, Cardiology studies EKG 4/23/2021 and Consultant notes from cardiology, gastroenterology, and primary care provider were reviewed and  EGD with biopsy, psychology clearance and [id number] ordered at this time. These will be drawn and patient will be notified with results. Patient will complete new, pre-operative radiology prior to being scheduled for surgery. Silvia Clinton will obtain clearance from a cardiologist prior to surgery. She has a cardiologist that she is current with, Dr. Wynn, and she will follow up with that office.  I will also order pulmonary clearance for her.  A referral will be placed for this.     Silvia Clinton was screened for sleep apnea in our office today and based on their results she is low risk. The risks, as they relate to chronic hypercapnia r/t untreated CAROLINA were discussed with the patient and She verbalized understanding.     A pre-operative diagnostic esophagogastroduodenoscopy with biopsy for evaluation will be ordered and scheduled for this patient. The risks and benefits of the procedure were discussed with the patient in detail and all questions were answered.  Possibility of perforation, bleeding, aspiration, anoxic brain injury, respiratory and/or cardiac arrest and death were discussed.   She received handouts regarding, all questions were answered.     Silvia Clinton verbalized understanding related to COVID-19 pre-procedure testing policies and has consented to a preoperative test 48-72 hours before She's scheduled EGD and bariatric surgical procedure.     The risks, benefits, alternatives, and potential complications of all of the sleeve gastrectomy explained in detail including, but not limited to death, anesthesia and medication adverse effect/DVT, pulmonary embolism, trocar site/incisional hernia, wound infection, abdominal infection, bleeding, failure to lose weight or gain  weight and change in body image, metabolic complications with calcium, thiamine, vitamin B12, folate, iron, and anemia.    The patient was advised to start a high protein, low fat and low carbohydrate diet  start routine exercise including but not limited to 150 minutes per week. The patient received a resistance band along with a handout of exercises. The patient was given individualized information by our dietician along with handouts.     The patient was given information regarding the DEE educational video. DEE is an internet based educational video which explains the sourgical procedure and answers basic questions regarding the procedure. The patient was provided with instructions and a password to watch the video.    The patient understands the surgical procedures and the different surgical options that are available.  She understands the lifestyle changes that would be required after surgery and has agreed to participate in a pre-operative and postoperative weight management program.  She also expressed understanding of possible risks, had several questions answered and desires to proceed.    I think she is a good candidate for this surgery, and is interested in a sleeve gastrectomy.    Encounter Diagnoses   Name Primary?   • Obesity, Class III, BMI 40-49.9 (morbid obesity) (HCC) Yes   • Gastroesophageal reflux disease, unspecified whether esophagitis present    • Non-STEMI (non-ST elevated myocardial infarction) (CMS/HCC) - 2 years ago    • Essential hypertension    • Elevated LFTs    • Chronic hepatitis C without hepatic coma (HCC)    • Dysmenorrhea    • Iron deficiency anemia due to chronic blood loss    • History of drug abuse (HCC)    • Anxiety    • Bipolar 2 disorder (CMS/HCC)    • Fear associated with healthcare    • Systemic lupus erythematosus, unspecified SLE type, unspecified organ involvement status (HCC)    • Chronic left-sided low back pain without sciatica    • Lower extremity edema    •  Dietary counseling        Plan:    Patient will be evaluated by a bariatric dietician psychologist before undergoing a multidisciplinary review of her candidacy. We discussed weight loss requirements prior to surgery and rationale, as well as other program requirements to ensure the safest approach to surgery. We spent time discussing different surgical procedures and plan of care throughout their lifespan to ensure long term success in achieving and maintaining a healthier weight. Patient will proceed with preoperative lab work, radiology, and endoscopy after obtaining agreed upon specialty, clearances, sleep study, and letter of support from her primary care provider.    Total time spent during this encounter today was 60 minutes and includes preparing for the visit, reviewing tests, performing a medically appropriate examination and/or evaluations, counseling and educating the patient/family/caregiver, ordering medications, tests, or procedures, documenting information in the medical record and independently interpreting results and communicating that information with the patient/family/caregiver  Milana Barcenas, APRN  9/22/2021

## 2021-09-23 ENCOUNTER — PATIENT ROUNDING (BHMG ONLY) (OUTPATIENT)
Dept: BARIATRICS/WEIGHT MGMT | Facility: CLINIC | Age: 46
End: 2021-09-23

## 2021-09-23 NOTE — PROGRESS NOTES
September 23, 2021    Hello, may I speak with iSlvia Clinton?    My name is Reyes      I am  with [facility]  [address] [facility] [address] [address] [address] [phone].    Before we get started may I verify your date of birth? 1975    I am calling to officially welcome you to our practice and ask about your recent visit. Is this a good time to talk? yes    Tell me about your visit with us. What things went well?  Things went well the NP was thorough didn't fagan our visit. MA make me feel comfortable.        We're always looking for ways to make our patients' experiences even better. Do you have recommendations on ways we may improve?  no    Overall were you satisfied with your first visit to our practice? yes       I appreciate you taking the time to speak with me today. Is there anything else I can do for you? no      Thank you, and have a great day.

## 2021-09-27 ENCOUNTER — OFFICE VISIT (OUTPATIENT)
Dept: FAMILY MEDICINE CLINIC | Facility: CLINIC | Age: 46
End: 2021-09-27

## 2021-09-27 VITALS
WEIGHT: 255 LBS | HEIGHT: 66 IN | OXYGEN SATURATION: 94 % | SYSTOLIC BLOOD PRESSURE: 132 MMHG | DIASTOLIC BLOOD PRESSURE: 68 MMHG | BODY MASS INDEX: 40.98 KG/M2 | HEART RATE: 70 BPM | TEMPERATURE: 97.3 F

## 2021-09-27 DIAGNOSIS — E66.01 OBESITY, CLASS III, BMI 40-49.9 (MORBID OBESITY) (HCC): Primary | ICD-10-CM

## 2021-09-27 PROCEDURE — 99213 OFFICE O/P EST LOW 20 MIN: CPT | Performed by: NURSE PRACTITIONER

## 2021-09-27 NOTE — PROGRESS NOTES
"Chief Complaint  morbid obesity (Patient is here for 4 week f/u on her weight for bariatric surgery ( wore mask and goggles) )    Subjective          Silvia Clinton presents to [facility]  History of Present Illness  Patient presenting for her second of 3 monthly visit for bariatric surgery.  She is planning on getting a gastric sleeve and already had consultation from bariatric surgeon.  She has been working on low-carb diet but she states she cannot stopped drinking sodas and that seems to be her biggest problem.  She has had a weight loss of 5 pounds in the last 4 weeks.  She is walking 5 days a week at 30-minute intervals  Objective   Vital Signs:   /68   Pulse 70   Temp 97.3 °F (36.3 °C)   Ht 167.6 cm (66\")   Wt 116 kg (255 lb)   SpO2 94%   BMI 41.16 kg/m²     Physical Exam  Vitals reviewed.   Constitutional:       General: She is not in acute distress.     Appearance: She is well-developed.   HENT:      Head: Normocephalic.   Cardiovascular:      Rate and Rhythm: Normal rate and regular rhythm.      Heart sounds: Normal heart sounds.   Pulmonary:      Effort: Pulmonary effort is normal.      Breath sounds: Normal breath sounds.   Neurological:      Mental Status: She is alert and oriented to person, place, and time.      Gait: Gait normal.   Psychiatric:         Behavior: Behavior normal.         Thought Content: Thought content normal.         Judgment: Judgment normal.        Result Review :   The following data was reviewed by: SYLWIA Calvert on 09/27/2021:  CMP    CMP 4/23/21 5/11/21 5/15/21   Glucose 125 (A) 103 (A) 95   BUN 13 13 9   Creatinine 0.73 0.67 0.68   eGFR Non African Am 87 96 94   Sodium 135 (A) 138 139   Potassium 4.1 4.1 4.1   Chloride 102 103 105   Calcium 9.1 8.8 8.6   Albumin 3.60 3.90 3.80   Total Bilirubin 0.3 0.5 0.3   Alkaline Phosphatase 100 111 105   AST (SGOT) 24 35 (A) 30   ALT (SGPT) 27 32 27   (A) Abnormal value            CBC " w/diff    CBC w/Diff 5/11/21 5/15/21 7/8/21   WBC 5.19 5.45 5.08   RBC 4.02 3.96 4.19   Hemoglobin 8.1 (A) 8.3 (A) 8.9 (A)   Hematocrit 27.6 (A) 28.4 (A) 30.7 (A)   MCV 68.7 (A) 71.7 (A) 73.3 (A)   MCH 20.1 (A) 21.0 (A) 21.2 (A)   MCHC 29.3 (A) 29.2 (A) 29.0 (A)   RDW 15.9 (A) 16.3 (A) 18.7 (A)   Platelets 164 150 204   Neutrophil Rel % 59.5  54.7   Immature Granulocyte Rel %   0.2   Lymphocyte Rel % 25.4  31.3   Monocyte Rel % 11.0  10.4   Eosinophil Rel % 3.1  3.0   Basophil Rel % 0.6  0.4   (A) Abnormal value            Lipid Panel    Lipid Panel 11/27/20   Total Cholesterol 133   Triglycerides 77   HDL Cholesterol 35 (A)   VLDL Cholesterol 15   LDL Cholesterol  83   LDL/HDL Ratio 2.36   (A) Abnormal value            TSH    TSH 4/26/21   TSH 3.310           Most Recent A1C    HGBA1C Most Recent 11/27/20   Hemoglobin A1C 5.00                     Assessment and Plan    Diagnoses and all orders for this visit:    1. Obesity, Class III, BMI 40-49.9 (morbid obesity) (HCC) (Primary)        Follow Up   Return in about 4 weeks (around 10/25/2021) for Recheck.  Patient was given instructions and counseling regarding her condition or for health maintenance advice. Please see specific information pulled into the AVS if appropriate.     Continue with walking low-carb it needs to switch to diet drinks or stop sodas altogether.  Return to the office in 4 weeks for last and final check    Mask and googles worn

## 2021-09-28 ENCOUNTER — TELEPHONE (OUTPATIENT)
Dept: CARDIOLOGY | Facility: CLINIC | Age: 46
End: 2021-09-28

## 2021-09-28 NOTE — TELEPHONE ENCOUNTER
"Received call this evening regarding elevated b/p---140/90's---makes her feel her head feel full and she feels \"puffy\"---she just took her losartan and metoprolol about an hour ago    Instructed her to give it a little more time for those meds to work but could take an extra 1/2 dose of losartan later this evening if persistently elevated.     Can someone follow up with her tomorrow  "

## 2021-09-29 RX ORDER — AMLODIPINE BESYLATE 5 MG/1
5 TABLET ORAL DAILY
Qty: 30 TABLET | Refills: 0 | Status: SHIPPED | OUTPATIENT
Start: 2021-09-29 | End: 2021-10-08

## 2021-09-29 NOTE — TELEPHONE ENCOUNTER
Ms. Clinton states that she feels better today. She did take and extra losartan 25mg yesterday evening and has no complaints today. I asked her to check her BP and it was 150/94 HR 67. She took her AM meds around 0600.    She did state that she thinks she didn't do well with avoiding sodium this last weekend.    Thank you,  Trinidad Hameed RN  [facility]  Triage

## 2021-09-29 NOTE — TELEPHONE ENCOUNTER
Recommendations reviewed with the patient and she verbalized understanding.    Thank you,  Trinidad Hameed RN  [facility]  Triage

## 2021-09-29 NOTE — TELEPHONE ENCOUNTER
It looks like she's already taking 50 mg twice daily.  I have called in a prescription for amlodipine 5 mg daily.

## 2021-09-29 NOTE — TELEPHONE ENCOUNTER
Zuri, I put her on your schedule for Monday morning.    After she took her BP this morning and it was high, she went on and took another half dose of losartan (25mg) like she did last night.    Do you want her to keep taking that if her BP is elevated?      Thank you,  Trinidad Hameed RN  [facility]  Triage

## 2021-10-07 RX ORDER — SUCRALFATE 1 G/1
1 TABLET ORAL
Qty: 90 TABLET | Refills: 1 | OUTPATIENT
Start: 2021-10-07

## 2021-10-08 RX ORDER — AMLODIPINE BESYLATE 5 MG/1
TABLET ORAL
Qty: 30 TABLET | Refills: 0 | Status: SHIPPED | OUTPATIENT
Start: 2021-10-08 | End: 2021-10-15 | Stop reason: SDUPTHER

## 2021-10-13 ENCOUNTER — OFFICE VISIT (OUTPATIENT)
Dept: OBSTETRICS AND GYNECOLOGY | Age: 46
End: 2021-10-13

## 2021-10-13 VITALS
DIASTOLIC BLOOD PRESSURE: 70 MMHG | SYSTOLIC BLOOD PRESSURE: 116 MMHG | HEIGHT: 66 IN | BODY MASS INDEX: 40.18 KG/M2 | WEIGHT: 250 LBS

## 2021-10-13 DIAGNOSIS — E66.01 MORBID OBESITY WITH BMI OF 40.0-44.9, ADULT (HCC): ICD-10-CM

## 2021-10-13 DIAGNOSIS — Z01.419 WELL WOMAN EXAM WITH ROUTINE GYNECOLOGICAL EXAM: Primary | ICD-10-CM

## 2021-10-13 DIAGNOSIS — Z11.3 SCREEN FOR STD (SEXUALLY TRANSMITTED DISEASE): ICD-10-CM

## 2021-10-13 DIAGNOSIS — Z12.4 SCREENING FOR CERVICAL CANCER: ICD-10-CM

## 2021-10-13 DIAGNOSIS — N89.8 VAGINAL ODOR: ICD-10-CM

## 2021-10-13 DIAGNOSIS — Z12.31 ENCOUNTER FOR SCREENING MAMMOGRAM FOR MALIGNANT NEOPLASM OF BREAST: ICD-10-CM

## 2021-10-13 DIAGNOSIS — N92.0 MENORRHAGIA WITH REGULAR CYCLE: ICD-10-CM

## 2021-10-13 PROCEDURE — 99213 OFFICE O/P EST LOW 20 MIN: CPT | Performed by: NURSE PRACTITIONER

## 2021-10-13 PROCEDURE — 2014F MENTAL STATUS ASSESS: CPT | Performed by: NURSE PRACTITIONER

## 2021-10-13 PROCEDURE — 99396 PREV VISIT EST AGE 40-64: CPT | Performed by: NURSE PRACTITIONER

## 2021-10-13 PROCEDURE — 3008F BODY MASS INDEX DOCD: CPT | Performed by: NURSE PRACTITIONER

## 2021-10-13 RX ORDER — ACETAMINOPHEN AND CODEINE PHOSPHATE 120; 12 MG/5ML; MG/5ML
1 SOLUTION ORAL DAILY
Qty: 28 TABLET | Refills: 12 | Status: SHIPPED | OUTPATIENT
Start: 2021-10-13 | End: 2022-11-10 | Stop reason: SDUPTHER

## 2021-10-13 NOTE — PROGRESS NOTES
Subjective     Chief Complaint   Patient presents with   • Gynecologic Exam     no mg, no csy, last pap 2010 normal.  Noticed green discharge when removing tampon, odor, periods have been heavy.  Would like to discuss HRT       History of Present Illness    Silvia Clinton is a 46 y.o. No obstetric history on file. who presents for annual exam.    New GYN  Here for annual exam and a few other complaints today  Hx of heroin abuse - clean 4 years   Works at [employer]    Pap - last around 2010, no hx of abnormal's   Mammogram - never had one.     Sees SYLWIA Calvert for her primary care - has had wellness labs  She has anemia and sees hematology   She in process of getting gastric sleeve - plans to have this about 2 months from now    She is not SA  She states he noticed some yellow/green discharge when removing her tampon  She denies any vaginal discharge or pelvic pain  She has noticed some odor  Would like std screening  She has had serum std screening alrady    She states her periods have been very heavy   She is still having monthly periods  Would like something to help with heavy periods          Obstetric History:  OB History    No obstetric history on file.        Menstrual History:     Patient's last menstrual period was 09/23/2021 (approximate).         Current contraception: abstinence  History of abnormal Pap smear: no  Received Gardasil immunization: no  Perform regular self breast exam: yes - regularly   Family history of uterine or ovarian cancer: no  Family History of colon cancer: no  Family history of breast cancer: yes - Maternal great aunt    Mammogram: ordered.  Colonoscopy: not indicated.  DEXA: not indicated.    Exercise: moderately active - walks daily   Calcium/Vitamin D: adequate intake    The following portions of the patient's history were reviewed and updated as appropriate: allergies, current medications, past family history, past medical history, past social history, past surgical  history and problem list.    Review of Systems   Constitutional: Negative.    Respiratory: Negative.    Cardiovascular: Negative.    Gastrointestinal: Negative.    Genitourinary: Positive for menstrual problem.        Vaginal odor   Skin: Negative.    Psychiatric/Behavioral: Negative.            Objective   Physical Exam  Constitutional:       General: She is awake.      Appearance: Normal appearance. She is well-developed. She is obese.   HENT:      Head: Normocephalic and atraumatic.      Nose: Nose normal.   Neck:      Thyroid: No thyroid mass, thyromegaly or thyroid tenderness.   Cardiovascular:      Rate and Rhythm: Normal rate and regular rhythm.      Pulses: Normal pulses.      Heart sounds: Normal heart sounds.   Pulmonary:      Effort: Pulmonary effort is normal.      Breath sounds: Normal breath sounds.   Chest:   Breasts: Breasts are symmetrical.      Right: Normal. No swelling, bleeding, inverted nipple, mass, nipple discharge, skin change, tenderness or supraclavicular adenopathy.      Left: Normal. No swelling, bleeding, inverted nipple, mass, nipple discharge, skin change, tenderness or supraclavicular adenopathy.       Abdominal:      General: Abdomen is flat. Bowel sounds are normal.      Palpations: Abdomen is soft.      Tenderness: There is no abdominal tenderness.   Genitourinary:     General: Normal vulva.      Labia:         Right: No rash, tenderness, lesion or injury.         Left: No rash, tenderness, lesion or injury.       Urethra: No prolapse, urethral pain, urethral swelling or urethral lesion.      Vagina: Normal. No signs of injury. No vaginal discharge, erythema, tenderness, bleeding, lesions or prolapsed vaginal walls.      Cervix: No discharge, friability, lesion, erythema or cervical bleeding.      Uterus: Normal. Not enlarged, not tender and no uterine prolapse.       Adnexa: Right adnexa normal and left adnexa normal.        Right: No mass, tenderness or fullness.          Left:  "No mass, tenderness or fullness.        Rectum: Normal. No mass.      Comments: Limited exam due to body habitus   Musculoskeletal:      Cervical back: Normal range of motion and neck supple.   Lymphadenopathy:      Upper Body:      Right upper body: No supraclavicular adenopathy.      Left upper body: No supraclavicular adenopathy.   Skin:     General: Skin is warm and dry.   Neurological:      General: No focal deficit present.      Mental Status: She is alert and oriented to person, place, and time.   Psychiatric:         Mood and Affect: Mood normal.         Behavior: Behavior normal. Behavior is cooperative.         Thought Content: Thought content normal.         Judgment: Judgment normal.         /70   Ht 167.6 cm (66\")   Wt 113 kg (250 lb)   LMP 09/23/2021 (Approximate)   BMI 40.35 kg/m²     Assessment/Plan   Diagnoses and all orders for this visit:    1. Well woman exam with routine gynecological exam (Primary)  -     IGP, Apt HPV,rfx 16 / 18,45    2. Encounter for screening mammogram for malignant neoplasm of breast  -     Mammo screening digital tomosynthesis bilateral w CAD; Future    3. Screening for cervical cancer  -     IGP, Apt HPV,rfx 16 / 18,45    4. Menorrhagia with regular cycle    5. Morbid obesity with BMI of 40.0-44.9, adult (HCC)    6. Screen for STD (sexually transmitted disease)  -     NuSwab VG+ - Swab, Vagina    7. Vaginal odor  -     NuSwab VG+ - Swab, Vagina    Other orders  -     norethindrone (MICRONOR) 0.35 MG tablet; Take 1 tablet by mouth Daily.  Dispense: 28 tablet; Refill: 12        All questions answered.  Breast self exam technique reviewed and patient encouraged to perform self-exam monthly.  Discussed healthy lifestyle modifications.  Recommended 30 minutes of aerobic exercise five times per week.  Discussed calcium needs to prevent osteoporosis.    -Pap smear and std, bv and yeast screening  -Mammogram ordered  -Will try micronor for heavy bleeding. Also discussed " IUD but patient declines at this time  -F/u 3-4 months

## 2021-10-14 RX ORDER — SUCRALFATE 1 G/1
1 TABLET ORAL
Qty: 90 TABLET | Refills: 1 | OUTPATIENT
Start: 2021-10-14

## 2021-10-15 ENCOUNTER — TELEPHONE (OUTPATIENT)
Dept: CARDIOLOGY | Facility: CLINIC | Age: 46
End: 2021-10-15

## 2021-10-15 LAB
A VAGINAE DNA VAG QL NAA+PROBE: ABNORMAL SCORE
BVAB2 DNA VAG QL NAA+PROBE: ABNORMAL SCORE
C ALBICANS DNA VAG QL NAA+PROBE: NEGATIVE
C GLABRATA DNA VAG QL NAA+PROBE: NEGATIVE
C TRACH DNA VAG QL NAA+PROBE: NEGATIVE
CYTOLOGIST CVX/VAG CYTO: NORMAL
CYTOLOGY CVX/VAG DOC CYTO: NORMAL
CYTOLOGY CVX/VAG DOC THIN PREP: NORMAL
DX ICD CODE: NORMAL
HIV 1 & 2 AB SER-IMP: NORMAL
HPV I/H RISK 4 DNA CVX QL PROBE+SIG AMP: NEGATIVE
MEGA1 DNA VAG QL NAA+PROBE: ABNORMAL SCORE
N GONORRHOEA DNA VAG QL NAA+PROBE: NEGATIVE
OTHER STN SPEC: NORMAL
STAT OF ADQ CVX/VAG CYTO-IMP: NORMAL
T VAGINALIS DNA VAG QL NAA+PROBE: NEGATIVE

## 2021-10-15 RX ORDER — AMLODIPINE BESYLATE 5 MG/1
5 TABLET ORAL DAILY
Qty: 30 TABLET | Refills: 3 | Status: SHIPPED | OUTPATIENT
Start: 2021-10-15 | End: 2021-10-18 | Stop reason: SDUPTHER

## 2021-10-18 RX ORDER — METRONIDAZOLE 500 MG/1
500 TABLET ORAL 2 TIMES DAILY
Qty: 14 TABLET | Refills: 0 | Status: SHIPPED | OUTPATIENT
Start: 2021-10-18 | End: 2021-10-25

## 2021-10-18 RX ORDER — AMLODIPINE BESYLATE 5 MG/1
5 TABLET ORAL 2 TIMES DAILY
Qty: 60 TABLET | Refills: 3 | Status: SHIPPED | OUTPATIENT
Start: 2021-10-18 | End: 2022-02-09

## 2021-10-18 NOTE — TELEPHONE ENCOUNTER
Patient has been taking her Amlodipine 5 MG BID.    She is out of medication.    You sent in the refill.   However, she can't get if filled because it is too soon as the bottle said 1 tablet daily.  She was taking 1 tablet BID.      Patient wanted to know if you could send in a new rx for Amlodipine 5 MG BID so she can get it filled? OF course have her take it BID.      Please advise

## 2021-10-19 ENCOUNTER — TELEPHONE (OUTPATIENT)
Dept: FAMILY MEDICINE CLINIC | Facility: CLINIC | Age: 46
End: 2021-10-19

## 2021-10-19 ENCOUNTER — TRANSCRIBE ORDERS (OUTPATIENT)
Dept: ADMINISTRATIVE | Facility: HOSPITAL | Age: 46
End: 2021-10-19

## 2021-10-19 DIAGNOSIS — Z12.31 VISIT FOR SCREENING MAMMOGRAM: Primary | ICD-10-CM

## 2021-10-19 NOTE — TELEPHONE ENCOUNTER
Caller: Mary Beth Silvia CM    Relationship: Self    Best call back number: [phone]*    What medication are you requesting: TOLNAFTATE ANTI FUNGAL? FOR GROIN AREA FROM SWEATING. PATIENT WOULD LIKE THIS MEDICATION IN CREAM FORM AND ALSO SPRAY FORM.    What are your current symptoms: ITCHING, REDNESS, BURNING    How long have you been experiencing symptoms: 2 WEEKS    Have you had these symptoms before:    [x] Yes  [] No    Have you been treated for these symptoms before:   [x] Yes  [] No    If a prescription is needed, what is your preferred pharmacy and phone number:      [facility]/pharmacy #[id number] - [place], [place] - [address] AT IN THE [place] - [phone] [facility] [phone]   [phone]    Additional notes:

## 2021-10-19 NOTE — TELEPHONE ENCOUNTER
Rx Refill Note  Requested Prescriptions      No prescriptions requested or ordered in this encounter      Last office visit with prescribing clinician: 9/27/2021      Next office visit with prescribing clinician: 10/26/2021            Annalise Silver MA  10/19/21, 10:50 EDT

## 2021-10-20 RX ORDER — NYSTATIN 100000 [USP'U]/G
POWDER TOPICAL
Qty: 60 G | Refills: 0 | Status: SHIPPED | OUTPATIENT
Start: 2021-10-20 | End: 2022-02-02

## 2021-10-26 DIAGNOSIS — M54.50 CHRONIC BILATERAL LOW BACK PAIN WITHOUT SCIATICA: ICD-10-CM

## 2021-10-26 DIAGNOSIS — G89.29 CHRONIC BILATERAL LOW BACK PAIN WITHOUT SCIATICA: ICD-10-CM

## 2021-10-26 RX ORDER — SUCRALFATE 1 G/1
1 TABLET ORAL
Qty: 90 TABLET | Refills: 1 | Status: SHIPPED | OUTPATIENT
Start: 2021-10-26 | End: 2021-12-23

## 2021-10-26 RX ORDER — METHOCARBAMOL 500 MG/1
TABLET, FILM COATED ORAL
Qty: 60 TABLET | Refills: 0 | Status: SHIPPED | OUTPATIENT
Start: 2021-10-26 | End: 2021-11-30

## 2021-10-26 NOTE — TELEPHONE ENCOUNTER
Rx Refill Note  Requested Prescriptions     Pending Prescriptions Disp Refills   • methocarbamol (ROBAXIN) 500 MG tablet [Pharmacy Med Name: METHOCARBAMOL 500 MG TABLET] 60 tablet 0     Sig: TAKE 1 TABLET BY MOUTH 3 TIMES A DAY AS NEEDED      Last office visit with prescribing clinician: 9/27/2021      Next office visit with prescribing clinician: 11/4/2021            Ken Brennan MA  10/26/21, 09:48 EDT

## 2021-10-26 NOTE — TELEPHONE ENCOUNTER
Call to pt.  Advise per DR Hanson note.  Verb understanding.      States will go to [facility] lab after work today to obtain labs.     States would like to now schedule scopes that had been cancelled because needs these for bariatic surgery.      Message to DR Hanson.

## 2021-10-26 NOTE — TELEPHONE ENCOUNTER
I will refill her Carafate    We were planning to get labs in order to start the process to get her treated for hepatitis C.  [place] had put in the orders back in the summer.  Can we please get those done- she can come in at her convenience.  That will enable us to start the process to get insurance approval for her treatment.  Thank you

## 2021-10-27 ENCOUNTER — TELEPHONE (OUTPATIENT)
Dept: ONCOLOGY | Facility: CLINIC | Age: 46
End: 2021-10-27

## 2021-10-27 ENCOUNTER — LAB (OUTPATIENT)
Dept: LAB | Facility: HOSPITAL | Age: 46
End: 2021-10-27

## 2021-10-27 DIAGNOSIS — G89.29 CHRONIC LEFT-SIDED LOW BACK PAIN WITHOUT SCIATICA: ICD-10-CM

## 2021-10-27 DIAGNOSIS — N94.6 DYSMENORRHEA: ICD-10-CM

## 2021-10-27 DIAGNOSIS — I10 ESSENTIAL HYPERTENSION: ICD-10-CM

## 2021-10-27 DIAGNOSIS — R79.89 ELEVATED LFTS: ICD-10-CM

## 2021-10-27 DIAGNOSIS — Z71.3 DIETARY COUNSELING: ICD-10-CM

## 2021-10-27 DIAGNOSIS — F41.9 ANXIETY: ICD-10-CM

## 2021-10-27 DIAGNOSIS — K21.9 GASTROESOPHAGEAL REFLUX DISEASE, UNSPECIFIED WHETHER ESOPHAGITIS PRESENT: ICD-10-CM

## 2021-10-27 DIAGNOSIS — I21.4 NON-STEMI (NON-ST ELEVATED MYOCARDIAL INFARCTION) (HCC): ICD-10-CM

## 2021-10-27 DIAGNOSIS — F19.11 HISTORY OF DRUG ABUSE (HCC): ICD-10-CM

## 2021-10-27 DIAGNOSIS — E66.01 OBESITY, CLASS III, BMI 40-49.9 (MORBID OBESITY) (HCC): ICD-10-CM

## 2021-10-27 DIAGNOSIS — B18.2 CHRONIC HEPATITIS C WITHOUT HEPATIC COMA (HCC): ICD-10-CM

## 2021-10-27 DIAGNOSIS — M32.9 SYSTEMIC LUPUS ERYTHEMATOSUS, UNSPECIFIED SLE TYPE, UNSPECIFIED ORGAN INVOLVEMENT STATUS (HCC): ICD-10-CM

## 2021-10-27 DIAGNOSIS — D50.0 IRON DEFICIENCY ANEMIA DUE TO CHRONIC BLOOD LOSS: ICD-10-CM

## 2021-10-27 DIAGNOSIS — F40.232 FEAR ASSOCIATED WITH HEALTHCARE: ICD-10-CM

## 2021-10-27 DIAGNOSIS — F31.81 BIPOLAR 2 DISORDER (HCC): ICD-10-CM

## 2021-10-27 DIAGNOSIS — R60.0 LOWER EXTREMITY EDEMA: ICD-10-CM

## 2021-10-27 DIAGNOSIS — M54.50 CHRONIC LEFT-SIDED LOW BACK PAIN WITHOUT SCIATICA: ICD-10-CM

## 2021-10-27 LAB
BASOPHILS # BLD AUTO: 0.04 10*3/MM3 (ref 0–0.2)
BASOPHILS NFR BLD AUTO: 0.8 % (ref 0–1.5)
DEPRECATED RDW RBC AUTO: 47.1 FL (ref 37–54)
EOSINOPHIL # BLD AUTO: 0.18 10*3/MM3 (ref 0–0.4)
EOSINOPHIL NFR BLD AUTO: 3.5 % (ref 0.3–6.2)
ERYTHROCYTE [DISTWIDTH] IN BLOOD BY AUTOMATED COUNT: 14.4 % (ref 12.3–15.4)
FERRITIN SERPL-MCNC: 128 NG/ML (ref 13–150)
HBA1C MFR BLD: 5.2 % (ref 4.8–5.6)
HCT VFR BLD AUTO: 36.4 % (ref 34–46.6)
HGB BLD-MCNC: 11.9 G/DL (ref 12–15.9)
HIV1+2 AB SER QL: NORMAL
IMM GRANULOCYTES # BLD AUTO: 0.02 10*3/MM3 (ref 0–0.05)
IMM GRANULOCYTES NFR BLD AUTO: 0.4 % (ref 0–0.5)
INR PPP: 0.99 (ref 0.9–1.1)
IRON 24H UR-MRATE: 55 MCG/DL (ref 37–145)
IRON SATN MFR SERPL: 15 % (ref 20–50)
LYMPHOCYTES # BLD AUTO: 1.99 10*3/MM3 (ref 0.7–3.1)
LYMPHOCYTES NFR BLD AUTO: 39 % (ref 19.6–45.3)
MCH RBC QN AUTO: 29.7 PG (ref 26.6–33)
MCHC RBC AUTO-ENTMCNC: 32.7 G/DL (ref 31.5–35.7)
MCV RBC AUTO: 90.8 FL (ref 79–97)
MONOCYTES # BLD AUTO: 0.3 10*3/MM3 (ref 0.1–0.9)
MONOCYTES NFR BLD AUTO: 5.9 % (ref 5–12)
NEUTROPHILS NFR BLD AUTO: 2.57 10*3/MM3 (ref 1.7–7)
NEUTROPHILS NFR BLD AUTO: 50.4 % (ref 42.7–76)
NRBC BLD AUTO-RTO: 0 /100 WBC (ref 0–0.2)
PLATELET # BLD AUTO: 177 10*3/MM3 (ref 140–450)
PMV BLD AUTO: 11.7 FL (ref 6–12)
PROTHROMBIN TIME: 12.9 SECONDS (ref 11.7–14.2)
RBC # BLD AUTO: 4.01 10*6/MM3 (ref 3.77–5.28)
TIBC SERPL-MCNC: 356 MCG/DL (ref 298–536)
TRANSFERRIN SERPL-MCNC: 239 MG/DL (ref 200–360)
WBC # BLD AUTO: 5.1 10*3/MM3 (ref 3.4–10.8)

## 2021-10-27 PROCEDURE — 87902 NFCT AGT GNTYP ALYS HEP C: CPT

## 2021-10-27 PROCEDURE — 87522 HEPATITIS C REVRS TRNSCRPJ: CPT

## 2021-10-27 PROCEDURE — 83036 HEMOGLOBIN GLYCOSYLATED A1C: CPT

## 2021-10-27 PROCEDURE — G0432 EIA HIV-1/HIV-2 SCREEN: HCPCS

## 2021-10-27 PROCEDURE — 81596 NFCT DS CHRNC HCV 6 ASSAYS: CPT | Performed by: INTERNAL MEDICINE

## 2021-10-27 PROCEDURE — 86705 HEP B CORE ANTIBODY IGM: CPT | Performed by: INTERNAL MEDICINE

## 2021-10-27 PROCEDURE — 86709 HEPATITIS A IGM ANTIBODY: CPT | Performed by: INTERNAL MEDICINE

## 2021-10-27 PROCEDURE — 84466 ASSAY OF TRANSFERRIN: CPT

## 2021-10-27 PROCEDURE — 83540 ASSAY OF IRON: CPT

## 2021-10-27 PROCEDURE — 86706 HEP B SURFACE ANTIBODY: CPT | Performed by: INTERNAL MEDICINE

## 2021-10-27 PROCEDURE — 80053 COMPREHEN METABOLIC PANEL: CPT | Performed by: INTERNAL MEDICINE

## 2021-10-27 PROCEDURE — 84703 CHORIONIC GONADOTROPIN ASSAY: CPT | Performed by: INTERNAL MEDICINE

## 2021-10-27 PROCEDURE — 85610 PROTHROMBIN TIME: CPT

## 2021-10-27 PROCEDURE — 85025 COMPLETE CBC W/AUTO DIFF WBC: CPT

## 2021-10-27 PROCEDURE — 80307 DRUG TEST PRSMV CHEM ANLYZR: CPT | Performed by: INTERNAL MEDICINE

## 2021-10-27 PROCEDURE — 82728 ASSAY OF FERRITIN: CPT

## 2021-10-27 PROCEDURE — 86708 HEPATITIS A ANTIBODY: CPT | Performed by: INTERNAL MEDICINE

## 2021-10-27 PROCEDURE — 87340 HEPATITIS B SURFACE AG IA: CPT | Performed by: INTERNAL MEDICINE

## 2021-10-27 NOTE — TELEPHONE ENCOUNTER
Returned Silvia's call, advised her there are iron levels in her chart ordered.  She states she missed her last appt. Message sent to appt desk to schedule follow up.

## 2021-10-29 LAB
HCV GENTYP SERPL NAA+PROBE: NORMAL
HCV RNA SERPL NAA+PROBE-ACNC: NORMAL IU/ML
HCV RNA SERPL NAA+PROBE-ACNC: NORMAL IU/ML
HCV RNA SERPL NAA+PROBE-LOG IU: 7.27 LOG10 IU/ML
LABORATORY COMMENT REPORT: NORMAL
TEST INFORMATION: NORMAL

## 2021-11-01 ENCOUNTER — TELEPHONE (OUTPATIENT)
Dept: GASTROENTEROLOGY | Facility: CLINIC | Age: 46
End: 2021-11-01

## 2021-11-05 ENCOUNTER — TELEPHONE (OUTPATIENT)
Dept: FAMILY MEDICINE CLINIC | Facility: CLINIC | Age: 46
End: 2021-11-05

## 2021-11-05 NOTE — TELEPHONE ENCOUNTER
So I have seen her once a month for 2 to 3 months yet she is no-show to where you guys are telling me she should be discharged?  Is been coming once a month and I do not see the reason to discharge her and yes I will see her

## 2021-11-05 NOTE — TELEPHONE ENCOUNTER
Pt called in today to schedule her 4 week f/u for her last visit before bariatric surgery  You are requesting to dismiss this patient due to frequent no-shows. Are you okay with seeing this patient one more time before the final dismissal is in [facility]? Please advise.

## 2021-11-08 DIAGNOSIS — D50.0 IRON DEFICIENCY ANEMIA DUE TO CHRONIC BLOOD LOSS: Primary | ICD-10-CM

## 2021-11-10 ENCOUNTER — TELEPHONE (OUTPATIENT)
Dept: OBSTETRICS AND GYNECOLOGY | Facility: CLINIC | Age: 46
End: 2021-11-10

## 2021-11-10 RX ORDER — FLUCONAZOLE 150 MG/1
150 TABLET ORAL ONCE
Qty: 1 TABLET | Refills: 0 | Status: SHIPPED | OUTPATIENT
Start: 2021-11-10 | End: 2021-11-10

## 2021-11-10 NOTE — TELEPHONE ENCOUNTER
Patient calling c/o yeast symptoms after taking an antibiotic.  She is requesting Diflucan RX be sent to pharmacy on file.

## 2021-11-16 ENCOUNTER — OFFICE VISIT (OUTPATIENT)
Dept: FAMILY MEDICINE CLINIC | Facility: CLINIC | Age: 46
End: 2021-11-16

## 2021-11-16 VITALS
WEIGHT: 247.2 LBS | HEIGHT: 66 IN | OXYGEN SATURATION: 100 % | HEART RATE: 74 BPM | BODY MASS INDEX: 39.73 KG/M2 | DIASTOLIC BLOOD PRESSURE: 74 MMHG | SYSTOLIC BLOOD PRESSURE: 126 MMHG | TEMPERATURE: 98 F

## 2021-11-16 DIAGNOSIS — K64.0 GRADE I HEMORRHOIDS: ICD-10-CM

## 2021-11-16 DIAGNOSIS — N89.8 VAGINAL ITCHING: ICD-10-CM

## 2021-11-16 DIAGNOSIS — R11.0 NAUSEA: ICD-10-CM

## 2021-11-16 DIAGNOSIS — E66.01 OBESITY, CLASS III, BMI 40-49.9 (MORBID OBESITY) (HCC): ICD-10-CM

## 2021-11-16 DIAGNOSIS — K21.9 GASTROESOPHAGEAL REFLUX DISEASE, UNSPECIFIED WHETHER ESOPHAGITIS PRESENT: ICD-10-CM

## 2021-11-16 DIAGNOSIS — B18.2 CHRONIC HEPATITIS C WITHOUT HEPATIC COMA (HCC): Primary | ICD-10-CM

## 2021-11-16 PROCEDURE — 99214 OFFICE O/P EST MOD 30 MIN: CPT | Performed by: NURSE PRACTITIONER

## 2021-11-16 RX ORDER — DIAPER,BRIEF,INFANT-TODD,DISP
1 EACH MISCELLANEOUS 2 TIMES DAILY
Qty: 1 EACH | Refills: 1 | Status: SHIPPED | OUTPATIENT
Start: 2021-11-16 | End: 2022-02-02 | Stop reason: SDUPTHER

## 2021-11-16 RX ORDER — ONDANSETRON 4 MG/1
4 TABLET, ORALLY DISINTEGRATING ORAL EVERY 8 HOURS PRN
Qty: 30 TABLET | Refills: 12 | Status: SHIPPED | OUTPATIENT
Start: 2021-11-16 | End: 2021-12-20 | Stop reason: SDUPTHER

## 2021-11-16 RX ORDER — VELPATASVIR AND SOFOSBUVIR 100; 400 MG/1; MG/1
TABLET, FILM COATED ORAL
COMMUNITY
Start: 2021-11-15 | End: 2023-02-02

## 2021-11-16 RX ORDER — OMEPRAZOLE 20 MG/1
20 CAPSULE, DELAYED RELEASE ORAL DAILY
Qty: 90 CAPSULE | Refills: 3 | Status: SHIPPED | OUTPATIENT
Start: 2021-11-16 | End: 2022-03-08 | Stop reason: SDUPTHER

## 2021-11-16 NOTE — PROGRESS NOTES
"Chief Complaint  Obesity (Patient is here for weigh in f/u ( wore mask and goggles) ), Hepatitis C (Patient is starting a new medication from a specialist tomorrow and was told it may cause nausea and headaches she is wanting a prescription for zofran ), Hemorrhoids (Patient is needing hemorrhoid cream ), Vaginal Itching (Patient is wanting to discuss after every she has her period she always has vaginal itching ), and Heartburn (Before starting new medication patient was told that omeprazole needs to be changed to only 20mg daily )    Subjective          Silvia Clinton presents to [facility]  History of Present Illness  Morbid obesity- 3 out of 3 visit prior to surgery.  Planning on gastric sleeve.  She is still doing low carb diet and has stopped drinking sodas. She has lost 8 pounds since last visit. Walking 5 days a week still.    Hep C- starting new medication that has a side effect of nausea so requesting a medication to help with that.    Vaginal itchiness after period.  She thinks the tampon is drying her out.  Saw OBGYN and everything was normal. No discharge.    Heartburn- needing Prilosec in 20mg only bc cannot take higher dose with new hep c medication.     Patient suffering from hemorrhoids which are long-term no bleeding would like a medication for this.  Objective   Vital Signs:   /74   Pulse 74   Temp 98 °F (36.7 °C)   Ht 167.6 cm (65.98\")   Wt 112 kg (247 lb 3.2 oz)   SpO2 100%   BMI 39.92 kg/m²     Physical Exam  Vitals reviewed.   Constitutional:       General: She is not in acute distress.     Appearance: She is well-developed.   HENT:      Head: Normocephalic.   Cardiovascular:      Rate and Rhythm: Normal rate and regular rhythm.      Heart sounds: Normal heart sounds.   Pulmonary:      Effort: Pulmonary effort is normal.      Breath sounds: Normal breath sounds.   Neurological:      Mental Status: She is alert and oriented to person, place, and " time.      Gait: Gait normal.   Psychiatric:         Behavior: Behavior normal.         Thought Content: Thought content normal.         Judgment: Judgment normal.        Result Review :   The following data was reviewed by: SYLWIA Calvert on 11/16/2021:  CMP    CMP 5/11/21 5/15/21 10/27/21   Glucose 103 (A) 95 111 (A)   BUN 13 9 11   Creatinine 0.67 0.68 0.78   eGFR Non African Am 96 94 80   Sodium 138 139 138   Potassium 4.1 4.1 3.7   Chloride 103 105 102   Calcium 8.8 8.6 9.4   Albumin 3.90 3.80 4.40   Total Bilirubin 0.5 0.3 0.2   Alkaline Phosphatase 111 105 80   AST (SGOT) 35 (A) 30 57 (A)   ALT (SGPT) 32 27 80 (A)   (A) Abnormal value            CBC w/diff    CBC w/Diff 5/15/21 7/8/21 10/27/21   WBC 5.45 5.08 5.10   RBC 3.96 4.19 4.01   Hemoglobin 8.3 (A) 8.9 (A) 11.9 (A)   Hematocrit 28.4 (A) 30.7 (A) 36.4   MCV 71.7 (A) 73.3 (A) 90.8   MCH 21.0 (A) 21.2 (A) 29.7   MCHC 29.2 (A) 29.0 (A) 32.7   RDW 16.3 (A) 18.7 (A) 14.4   Platelets 150 204 177   Neutrophil Rel %  54.7 50.4   Immature Granulocyte Rel %  0.2 0.4   Lymphocyte Rel %  31.3 39.0   Monocyte Rel %  10.4 5.9   Eosinophil Rel %  3.0 3.5   Basophil Rel %  0.4 0.8   (A) Abnormal value            Lipid Panel    Lipid Panel 11/27/20   Total Cholesterol 133   Triglycerides 77   HDL Cholesterol 35 (A)   VLDL Cholesterol 15   LDL Cholesterol  83   LDL/HDL Ratio 2.36   (A) Abnormal value            TSH    TSH 4/26/21   TSH 3.310           Most Recent A1C    HGBA1C Most Recent 10/27/21   Hemoglobin A1C 5.20                     Assessment and Plan    Diagnoses and all orders for this visit:    1. Chronic hepatitis C without hepatic coma (HCC) (Primary)    2. Obesity, Class III, BMI 40-49.9 (morbid obesity) (HCC)    3. Nausea  -     ondansetron ODT (ZOFRAN-ODT) 4 MG disintegrating tablet; Place 1 tablet on the tongue Every 8 (Eight) Hours As Needed for Nausea.  Dispense: 30 tablet; Refill: 12    4. Vaginal itching    5. Gastroesophageal reflux disease,  unspecified whether esophagitis present  -     omeprazole (PrilOSEC) 20 MG capsule; Take 1 capsule by mouth Daily.  Dispense: 90 capsule; Refill: 3    6. Grade I hemorrhoids  -     hydrocortisone 1 % cream; Apply 1 application topically to the appropriate area as directed 2 (Two) Times a Day.  Dispense: 1 each; Refill: 1        Follow Up   Return if symptoms worsen or fail to improve.  Patient was given instructions and counseling regarding her condition or for health maintenance advice. Please see specific information pulled into the AVS if appropriate.     Acacian refill sent to pharmacy. Prilosec 20 mg sent along with Zofran and hemorrhoid cream. 10 you same with medications. Return to the office as needed    Mask and googles worn

## 2021-11-29 DIAGNOSIS — G89.29 CHRONIC BILATERAL LOW BACK PAIN WITHOUT SCIATICA: ICD-10-CM

## 2021-11-29 DIAGNOSIS — M54.50 CHRONIC BILATERAL LOW BACK PAIN WITHOUT SCIATICA: ICD-10-CM

## 2021-11-30 RX ORDER — METHOCARBAMOL 500 MG/1
TABLET, FILM COATED ORAL
Qty: 60 TABLET | Refills: 0 | Status: SHIPPED | OUTPATIENT
Start: 2021-11-30 | End: 2021-12-20 | Stop reason: SDUPTHER

## 2021-11-30 NOTE — TELEPHONE ENCOUNTER
Caller: Silvia Clinton CM    Relationship: Self    Best call back number: [phone]    Requested Prescriptions:   Requested Prescriptions     Pending Prescriptions Disp Refills   • methocarbamol (ROBAXIN) 500 MG tablet [Pharmacy Med Name: METHOCARBAMOL 500 MG TABLET] 60 tablet 0     Sig: TAKE 1 TABLET BY MOUTH THREE TIMES A DAY AS NEEDED        Pharmacy where request should be sent: [facility]/PHARMACY #[id number] [place], [place] - [address] AT IN THE [place] - [phone] [facility] [phone]      Additional details provided by patient: PATIENT IS COMPLETLEY OUT OF [facility]    Does the patient have less than a 3 day supply:  [x] Yes  [] No    Chip Ann Rep   11/30/21 08:45 EST

## 2021-11-30 NOTE — TELEPHONE ENCOUNTER
Rx Refill Note  Requested Prescriptions     Pending Prescriptions Disp Refills   • methocarbamol (ROBAXIN) 500 MG tablet [Pharmacy Med Name: METHOCARBAMOL 500 MG TABLET] 60 tablet 0     Sig: TAKE 1 TABLET BY MOUTH THREE TIMES A DAY AS NEEDED      Last office visit with prescribing clinician: 11/16/2021      Next office visit with prescribing clinician: Visit date not found            Ken Brennan MA  11/30/21, 09:34 EST

## 2021-12-09 NOTE — TELEPHONE ENCOUNTER
Rx Refill Note  Requested Prescriptions     Pending Prescriptions Disp Refills   • OLANZapine (zyPREXA) 15 MG tablet 30 tablet 0     Sig: Take 1 tablet by mouth Every Night.      Last office visit with prescribing clinician: 11/16/2021      Next office visit with prescribing clinician: Visit date not found            Annalise Silver MA  12/09/21, 15:03 EST

## 2021-12-10 RX ORDER — OLANZAPINE 15 MG/1
15 TABLET ORAL NIGHTLY
Qty: 30 TABLET | Refills: 0 | Status: SHIPPED | OUTPATIENT
Start: 2021-12-10 | End: 2022-01-18 | Stop reason: SDUPTHER

## 2021-12-12 ENCOUNTER — HOSPITAL ENCOUNTER (EMERGENCY)
Facility: HOSPITAL | Age: 46
Discharge: HOME | End: 2021-12-12
Attending: EMERGENCY MEDICINE | Admitting: EMERGENCY MEDICINE

## 2021-12-12 ENCOUNTER — APPOINTMENT (OUTPATIENT)
Dept: GENERAL RADIOLOGY | Facility: HOSPITAL | Age: 46
End: 2021-12-12

## 2021-12-12 VITALS
DIASTOLIC BLOOD PRESSURE: 72 MMHG | RESPIRATION RATE: 16 BRPM | HEART RATE: 73 BPM | OXYGEN SATURATION: 94 % | SYSTOLIC BLOOD PRESSURE: 114 MMHG | TEMPERATURE: 98.4 F

## 2021-12-12 DIAGNOSIS — Z20.822 SUSPECTED COVID-19 VIRUS INFECTION: Primary | ICD-10-CM

## 2021-12-12 DIAGNOSIS — Z86.19 HISTORY OF HEPATITIS C: ICD-10-CM

## 2021-12-12 LAB — SARS-COV-2 RNA PNL SPEC NAA+PROBE: DETECTED

## 2021-12-12 PROCEDURE — 99283 EMERGENCY DEPT VISIT LOW MDM: CPT

## 2021-12-12 PROCEDURE — 71045 X-RAY EXAM CHEST 1 VIEW: CPT

## 2021-12-12 PROCEDURE — 87635 SARS-COV-2 COVID-19 AMP PRB: CPT | Performed by: EMERGENCY MEDICINE

## 2021-12-12 NOTE — ED TRIAGE NOTES
Pt reports body aches, fatigue and loss of taste and smell that started on Friday.     Pt was wearing a mask during assessment.  This RN wore appropriate PPE

## 2021-12-12 NOTE — DISCHARGE INSTRUCTIONS
You have been seen for suspected or confirmed novel coronavirus, also known as COVID-19.    Your symptoms are mild today and you have been deemed appropriate for discharge home.  You should take Tylenol as needed for fever or aches.  You may also take over-the-counter cough medicines.    You should quarantine at home for the next 7 days.  If you have to be in contact with family, please wear the surgical mask provided to you today.  Wash your hands frequently.    Return to the ER should you develop worsening symptoms, particularly shortness of breath.

## 2021-12-12 NOTE — ED PROVIDER NOTES
EMERGENCY DEPARTMENT ENCOUNTER    Room Number:  23/23  Date seen:  12/12/2021  PCP: Suhas Oseguera APRN  Historian: Patient      HPI:  Chief Complaint: Cough, loss of taste and smell  A complete HPI/ROS/PMH/PSH/SH/FH are unobtainable due to: Nothing  Context: Silvia Clinton is a 46 y.o. female who presents to the ED c/o mild cough and loss of taste and smell.  She reports that symptoms began on Friday.  She has received 2 Pfizer vaccines for COVID-19.  She denies fever or chills.  She reports that her roommate has had a cough and body aches as well.  She also works in a fast food restaurant a drive-through therefore is at risk for being exposed to persons having COVID-19.  She denies shortness of breath.  She denies chest pain.  She has not had prior COVID-19 infection to her knowledge.  She has been on treatment for hepatitis C for last 2 weeks.  She also has a history of lupus.            PAST MEDICAL HISTORY  Active Ambulatory Problems     Diagnosis Date Noted   • Spinal epidural abscess 12/09/2018   • Non-STEMI (non-ST elevated myocardial infarction) (CMS/[facility]) - 2 years ago 03/31/2019   • Chest pain 05/01/2019   • Dizziness 11/26/2020   • Drug abuse ([facility])    • Essential hypertension    • Lupus (systemic lupus erythematosus) ([facility])    • Palpitations    • Vestibular neuritis, left 11/27/2020   • Lower extremity edema 12/30/2020   • Weight gain 03/22/2021   • Chronic left-sided low back pain without sciatica 03/22/2021   • Nausea 04/20/2021   • Lumbar radiculopathy 04/26/2021   • Pain of left lower extremity 04/26/2021   • Obesity, Class III, BMI 40-49.9 (morbid obesity) ([facility]) 05/24/2021   • Fear associated with healthcare 05/24/2021   • Iron deficiency anemia due to chronic blood loss 06/23/2021   • Adverse effect of iron 07/08/2021   • Fatigue 09/17/2021   • Allergies 09/17/2021   • Heart murmur 09/17/2021   • Constipation 09/17/2021   • Elevated LFTs 09/17/2021   • Hepatitis C 09/17/2021   • Dysmenorrhea  2021   • Bipolar 2 disorder (HCC) 2021   • Anxiety 2021   • GERD (gastroesophageal reflux disease) 2021   • History of drug abuse ([facility]) 2021   • Dietary counseling 2021   • Vaginal itching 2021   • Grade I hemorrhoids 2021     Resolved Ambulatory Problems     Diagnosis Date Noted   • Heartburn 2021     Past Medical History:   Diagnosis Date   • Depression    • Drug abstinence syndrome (HCC)    • Ex-smoker    • Heart attack (HCC)    • Hypertension    • Kidney stone    • Lupus (HCC)    • Mitral valve anterior leaflet prolapse    • NSTEMI (non-ST elevated myocardial infarction) ([facility])    • Otitis    • Seizures (HCC)    • Tachycardia          PAST SURGICAL HISTORY  Past Surgical History:   Procedure Laterality Date   • BACK SURGERY     • CHOLECYSTECTOMY     • LEG SURGERY      broke tibula,fibula, steel noah   • LIVER BIOPSY     • LUMBAR LAMINECTOMY DISCECTOMY DECOMPRESSION Left 2018    Procedure: Posterior lumbar three through sacrum decompression;  Surgeon: Tevin Whitley MD;  Location: [facility];  Service: Neurosurgery   • LYMPH NODE BIOPSY     • SPINE SURGERY           FAMILY HISTORY  Family History   Problem Relation Age of Onset   • Diabetes Mother    • No Known Problems Father          SOCIAL HISTORY  Social History     Socioeconomic History   • Marital status: Legally    Tobacco Use   • Smoking status: Former Smoker     Packs/day: 1.00     Years: 30.00     Pack years: 30.00     Types: Cigarettes     Quit date: 2020     Years since quittin.2   • Smokeless tobacco: Never Used   • Tobacco comment: E-CIG USE   Vaping Use   • Vaping Use: Every day   • Start date: 2020   • Substances: Nicotine, Flavoring   • Devices: Pre-filled or refillable cartridge, Refillable tank   Substance and Sexual Activity   • Alcohol use: No     Comment: CAFFEINE USE: 1 CUP COFFEE/ 2 COKES DAILY   • Drug use: Not Currently     Types: IV, Heroin,  Methamphetamines     Comment: pt states she no longer uses heroin, she now uses meth   • Sexual activity: Not Currently         ALLERGIES  Sulfa antibiotics        REVIEW OF SYSTEMS  Review of Systems   Review of all 14 systems is negative other than stated in the HPI above.      PHYSICAL EXAM  ED Triage Vitals   Temp Heart Rate Resp BP SpO2   12/12/21 0940 12/12/21 0940 12/12/21 0941 12/12/21 0940 12/12/21 0940   98.4 °F (36.9 °C) 73 16 114/72 97 %      Temp src Heart Rate Source Patient Position BP Location FiO2 (%)   -- -- -- -- --                GENERAL: Awake and alert, no acute distress  HENT: nares patent  EYES: no scleral icterus, EOMI  CV: regular rhythm, normal rate  RESPIRATORY: normal effort, lungs clear auscultation bilaterally  ABDOMEN: soft, nondistended, nontender throughout  MUSCULOSKELETAL: no deformity, no peripheral edema  NEURO: alert, moves all extremities, follows commands  PSYCH:  calm, cooperative  SKIN: warm, dry, normal to inspection, no rash    Vital signs and nursing notes reviewed.          LAB RESULTS  Recent Results (from the past 24 hour(s))   COVID-19,[facility] PREM IN-HOUSE CEPHEID/YOSELIN NP SWAB IN TRANSPORT MEDIA 8-12 HR TAT - Swab, Nasopharynx    Collection Time: 12/12/21 10:20 AM    Specimen: Nasopharynx; Swab   Result Value Ref Range    COVID19 Detected (C) Not Detected - Ref. Range       Ordered the above labs and reviewed the results.        RADIOLOGY  XR Chest 1 View    Result Date: 12/12/2021  PORTABLE CHEST 12/12/2021 1030 HOURS  CLINICAL HISTORY: Cough. Less of taste and smell. Evaluate for possible COVID 19 infection.  Compared to the previous chest dated 04/23/2021.  The heart, lungs and mediastinal structures appear within normal limits. There are no infiltrates or effusions.  IMPRESSIONS: Normal chest x-ray.  This report was finalized on 12/12/2021 11:11 AM by Dr. Tao Polo M.D.        Ordered the above noted radiological studies. Reviewed by me in PACS.             PROCEDURES  Procedures              MEDICATIONS GIVEN IN ER  Medications - No data to display                MEDICAL DECISION MAKING, PROGRESS, and CONSULTS    All labs have been independently reviewed by me.  All radiology studies have been reviewed by me and discussed with radiologist dictating the report.   EKG's independently viewed and interpreted by me.  Discussion below represents my analysis of pertinent findings related to patient's condition, differential diagnosis, treatment plan and final disposition.      Differential diagnosis includes but is not limited to:  Bacterial pneumonia  COVID-19 infection  Viral URI  Bronchitis      ED Course as of 12/12/21 1533   Sun Dec 12, 2021   1042 Chest x-ray independently interpreted in PACS.  The lungs are clear bilaterally, no infiltrate, no pleural effusion.  Mild cardiac enlargement. [JR]   1042 Patient with suspected COVID-19 virus infection.  She has been vaccinated.  Chest x-ray is clear today. [JR]   1043 Oxygen saturations 97% on room air.  Patient is appropriate discharge home at this time with close PCP follow-up and return precautions.  COVID-19 testing is pending. [JR]   1113 Patient informed of positive Covid result by telephone.  Advised to quarantine for 10 days.  Return precautions reiterated. [JR]      ED Course User Index  [JR] Hi Baptiste MD              I wore an N95 mask, face shield, and gloves during this patient encounter.  Patient also wearing a surgical mask.  Hand hygeine performed before and after seeing the patient.    DIAGNOSIS  Final diagnoses:   Suspected COVID-19 virus infection   History of hepatitis C         DISPOSITION  DISCHARGE    Patient discharged in stable condition.    Reviewed implications of results, diagnosis, meds, responsibility to follow up, warning signs and symptoms of possible worsening, potential complications and reasons to return to ER.    Patient/Family voiced understanding of above  instructions.    Discussed plan for discharge, as there is no emergent indication for admission. Patient referred to primary care provider for BP management due to today's BP. Pt/family is agreeable and understands need for follow up and repeat testing.  Pt is aware that discharge does not mean that nothing is wrong but it indicates no emergency is present that requires admission and they must continue care with follow-up as given below or physician of their choice.     FOLLOW-UP  Suhas Oseguera, APRN  [address] [address]  [phone]      As needed         Medication List      No changes were made to your prescriptions during this visit.                   Latest Documented Vital Signs:  As of 15:33 EST  BP- 114/72 HR- 73 Temp- 98.4 °F (36.9 °C) O2 sat- 94%        --    Please note that portions of this were completed with a voice recognition program.          Hi Baptiste MD  12/12/21 1680

## 2021-12-13 ENCOUNTER — TELEPHONE (OUTPATIENT)
Dept: FAMILY MEDICINE CLINIC | Facility: CLINIC | Age: 46
End: 2021-12-13

## 2021-12-13 NOTE — TELEPHONE ENCOUNTER
PATIENT CALLED AND STATES SHE FOUND OUT THAT SHE IS COVID POSITIVE, LAST NIGHT. SHE IS FULLY VACCINATED, TWO SHOTS.   SHE IS REQUESTING SOMETHING TO HELP WITH STUFFY HEAD, CONGESTION, BODY ACHES, FEVER, SORE THROAT.     SHE IS REQUESTING AN INCREASE IN HER MEDICATION   methocarbamol (ROBAXIN) 500 MG tablet    SHE IS REQUESTING INHALER, STEROID AND THROAT SPRAY    [facility]/pharmacy #[id number] - [place], [place] - [address] AT IN THE [place] - [phone] [facility] [phone]   [phone]    CALL BACK NUMBER [phone]    SHE IS REQUESTING A PRESCRIPTION FOR MASKS AND GLOVES

## 2021-12-14 ENCOUNTER — TRANSCRIBE ORDERS (OUTPATIENT)
Dept: ADMINISTRATIVE | Facility: HOSPITAL | Age: 46
End: 2021-12-14

## 2021-12-14 DIAGNOSIS — U07.1 CLINICAL DIAGNOSIS OF SEVERE ACUTE RESPIRATORY SYNDROME CORONAVIRUS 2 (SARS-COV-2) DISEASE: Primary | ICD-10-CM

## 2021-12-14 NOTE — TELEPHONE ENCOUNTER
Spoke to patient and she would definitely like to get the infusion said he health department is helping with masks and gloves will fax paper work and schedule infusion and let patient know

## 2021-12-14 NOTE — TELEPHONE ENCOUNTER
Please offer her the infusion she should qualify and this will make her feel better than anything else.  She can have a prescription for mask and gloves.  For all of the symptoms that she has listed Tylenol or ibuprofen and over-the-counter medications will help with all of that if she is having trouble with breathing then she needs to be seen but the infusion will be what is best for her

## 2021-12-15 ENCOUNTER — HOSPITAL ENCOUNTER (OUTPATIENT)
Dept: INFUSION THERAPY | Facility: HOSPITAL | Age: 46
Discharge: HOME | End: 2021-12-15
Admitting: NURSE PRACTITIONER

## 2021-12-15 VITALS
SYSTOLIC BLOOD PRESSURE: 110 MMHG | TEMPERATURE: 97.1 F | OXYGEN SATURATION: 97 % | RESPIRATION RATE: 20 BRPM | DIASTOLIC BLOOD PRESSURE: 84 MMHG | HEART RATE: 74 BPM

## 2021-12-15 DIAGNOSIS — U07.1 COVID-19: Primary | ICD-10-CM

## 2021-12-15 PROCEDURE — 25010000002 INJECTION, CASIRIVIMAB AND IMDEVIMAB, 1200 MG: Performed by: NURSE PRACTITIONER

## 2021-12-15 PROCEDURE — M0243 CASIRIVI AND IMDEVI INFUSION: HCPCS | Performed by: NURSE PRACTITIONER

## 2021-12-15 PROCEDURE — 96365 THER/PROPH/DIAG IV INF INIT: CPT

## 2021-12-15 RX ORDER — DIPHENHYDRAMINE HCL 25 MG
50 CAPSULE ORAL ONCE AS NEEDED
Status: DISCONTINUED | OUTPATIENT
Start: 2021-12-15 | End: 2021-12-17 | Stop reason: HOSPADM

## 2021-12-15 RX ORDER — SODIUM CHLORIDE 9 MG/ML
30 INJECTION, SOLUTION INTRAVENOUS ONCE
Status: CANCELLED | OUTPATIENT
Start: 2021-12-15

## 2021-12-15 RX ORDER — METHYLPREDNISOLONE SODIUM SUCCINATE 125 MG/2ML
125 INJECTION, POWDER, LYOPHILIZED, FOR SOLUTION INTRAMUSCULAR; INTRAVENOUS AS NEEDED
Status: DISCONTINUED | OUTPATIENT
Start: 2021-12-15 | End: 2021-12-17 | Stop reason: HOSPADM

## 2021-12-15 RX ORDER — DIPHENHYDRAMINE HYDROCHLORIDE 50 MG/ML
50 INJECTION INTRAMUSCULAR; INTRAVENOUS ONCE AS NEEDED
Status: DISCONTINUED | OUTPATIENT
Start: 2021-12-15 | End: 2021-12-17 | Stop reason: HOSPADM

## 2021-12-15 RX ORDER — DIPHENHYDRAMINE HCL 25 MG
50 CAPSULE ORAL ONCE AS NEEDED
Status: CANCELLED | OUTPATIENT
Start: 2021-12-15

## 2021-12-15 RX ORDER — DIPHENHYDRAMINE HYDROCHLORIDE 50 MG/ML
50 INJECTION INTRAMUSCULAR; INTRAVENOUS ONCE AS NEEDED
Status: CANCELLED | OUTPATIENT
Start: 2021-12-15

## 2021-12-15 RX ORDER — METHYLPREDNISOLONE SODIUM SUCCINATE 125 MG/2ML
125 INJECTION, POWDER, LYOPHILIZED, FOR SOLUTION INTRAMUSCULAR; INTRAVENOUS AS NEEDED
Status: CANCELLED | OUTPATIENT
Start: 2021-12-15

## 2021-12-15 RX ORDER — SODIUM CHLORIDE 9 MG/ML
30 INJECTION, SOLUTION INTRAVENOUS ONCE
Status: COMPLETED | OUTPATIENT
Start: 2021-12-15 | End: 2021-12-15

## 2021-12-15 RX ADMIN — IMDEVIMAB: 1332 INJECTION, SOLUTION, CONCENTRATE INTRAVENOUS at 15:07

## 2021-12-15 RX ADMIN — SODIUM CHLORIDE 30 ML: 9 INJECTION, SOLUTION INTRAVENOUS at 15:06

## 2021-12-20 DIAGNOSIS — G89.29 CHRONIC BILATERAL LOW BACK PAIN WITHOUT SCIATICA: ICD-10-CM

## 2021-12-20 DIAGNOSIS — M54.50 CHRONIC BILATERAL LOW BACK PAIN WITHOUT SCIATICA: ICD-10-CM

## 2021-12-20 DIAGNOSIS — R11.0 NAUSEA: ICD-10-CM

## 2021-12-20 NOTE — TELEPHONE ENCOUNTER
Caller: Silvia Clinton CM    Relationship: Self    Best call back number:     Silvia Clinton (Self) [phone] (H)         Requested Prescriptions:   Requested Prescriptions     Pending Prescriptions Disp Refills   • methocarbamol (ROBAXIN) 500 MG tablet 60 tablet 0     Sig: TAKE 1 TABLET BY MOUTH THREE TIMES A DAY AS NEEDED   • ondansetron ODT (ZOFRAN-ODT) 4 MG disintegrating tablet 30 tablet 12     Sig: Place 1 tablet on the tongue Every 8 (Eight) Hours As Needed for Nausea.        [id number] [address] AT [facility] [phone] [facility] [phone]      Additional details provided by patient:     Does the patient have less than a 3 day supply:  [x] Yes  [] No    Chip Bermudez Rep   12/20/21 15:22 EST

## 2021-12-20 NOTE — TELEPHONE ENCOUNTER
Rx Refill Note  Requested Prescriptions     Pending Prescriptions Disp Refills   • methocarbamol (ROBAXIN) 500 MG tablet 90 tablet 0     Sig: TAKE 1 TABLET BY MOUTH THREE TIMES A DAY AS NEEDED   • ondansetron ODT (ZOFRAN-ODT) 4 MG disintegrating tablet 30 tablet 12     Sig: Place 1 tablet on the tongue Every 8 (Eight) Hours As Needed for Nausea.      Last office visit with prescribing clinician: 11/16/2021      Next office visit with prescribing clinician: Visit date not found            Kerry Keen MA/LMR  12/20/21, 15:50 EST

## 2021-12-21 RX ORDER — METHOCARBAMOL 500 MG/1
TABLET, FILM COATED ORAL
Qty: 90 TABLET | Refills: 0 | Status: SHIPPED | OUTPATIENT
Start: 2021-12-21 | End: 2022-01-18 | Stop reason: SDUPTHER

## 2021-12-21 RX ORDER — ONDANSETRON 4 MG/1
4 TABLET, ORALLY DISINTEGRATING ORAL EVERY 8 HOURS PRN
Qty: 30 TABLET | Refills: 12 | Status: SHIPPED | OUTPATIENT
Start: 2021-12-21 | End: 2022-02-02 | Stop reason: SDUPTHER

## 2021-12-23 RX ORDER — SUCRALFATE 1 G/1
1 TABLET ORAL
Qty: 90 TABLET | Refills: 1 | Status: SHIPPED | OUTPATIENT
Start: 2021-12-23 | End: 2022-02-03

## 2022-01-14 ENCOUNTER — TELEPHONE (OUTPATIENT)
Dept: ONCOLOGY | Facility: CLINIC | Age: 47
End: 2022-01-14

## 2022-01-14 NOTE — TELEPHONE ENCOUNTER
"Returned call to Silvia.  She reports she had labs drawn at the hospital \"a couple weeks ago\" after review of her chart, I see no new lab results from this year.  I explained the last values that are available are from October. There are no lab encounters since then.  She states the showed up on her MyChart yesterday and they are low.  She said they diane \"14 tubes\" I contacted the hospital lab who also reviewed her chart and note no new labs since October.  I called Silvia back to get more information. I asked her what date she went to the hospital and she replied \"I don't know I'd have to look at my MyChart\" I explained the last lab resulted was her covid test in December.  She said the labs were drawn right before that. Again, noted no other labs drawn since October to which she replied \"well maybe it was October\"  In this case, as I explained to her, will need to have new labs drawn to assess properly.  There are labs ordered in her chart. She states she will go to the hospital to have them drawn tomorrow.  Message to scheduling to make follow up appt in 1-2 weeks.  "

## 2022-01-14 NOTE — TELEPHONE ENCOUNTER
----- Message from Arline Gil RN sent at 1/14/2022  1:25 PM EST -----  She needs follow up= she states she's going to go to the hospital tomorrow to have the labs drawn, can you put her on one day next week or the week after would be fine.    Thank you!

## 2022-01-14 NOTE — TELEPHONE ENCOUNTER
Caller: Silvia Clinton    Relationship: Self    Best call back number: [phone]    What is the best time to reach you: ANYTIME    Who are you requesting to speak with (clinical staff, provider,  specific staff member): DR GRAVES    What was the call regarding: PT SAID HER LAB LEVELS WERE LOW AND SHE THINKS SHE WILL NEED SOME INFUSIONS SCHEDULED.     Do you require a callback: YES

## 2022-01-17 ENCOUNTER — LAB (OUTPATIENT)
Dept: LAB | Facility: HOSPITAL | Age: 47
End: 2022-01-17

## 2022-01-17 DIAGNOSIS — D50.0 IRON DEFICIENCY ANEMIA DUE TO CHRONIC BLOOD LOSS: ICD-10-CM

## 2022-01-17 LAB
ALBUMIN SERPL-MCNC: 3.9 G/DL (ref 3.5–5.2)
ALBUMIN/GLOB SERPL: 1.2 G/DL
ALP SERPL-CCNC: 85 U/L (ref 39–117)
ALT SERPL W P-5'-P-CCNC: 19 U/L (ref 1–33)
ANION GAP SERPL CALCULATED.3IONS-SCNC: 9.9 MMOL/L (ref 5–15)
AST SERPL-CCNC: 20 U/L (ref 1–32)
BASOPHILS # BLD AUTO: 0.02 10*3/MM3 (ref 0–0.2)
BASOPHILS NFR BLD AUTO: 0.4 % (ref 0–1.5)
BILIRUB SERPL-MCNC: 0.3 MG/DL (ref 0–1.2)
BUN SERPL-MCNC: 5 MG/DL (ref 6–20)
BUN/CREAT SERPL: 9.1 (ref 7–25)
CALCIUM SPEC-SCNC: 8.9 MG/DL (ref 8.6–10.5)
CHLORIDE SERPL-SCNC: 103 MMOL/L (ref 98–107)
CO2 SERPL-SCNC: 25.1 MMOL/L (ref 22–29)
CREAT SERPL-MCNC: 0.55 MG/DL (ref 0.57–1)
DEPRECATED RDW RBC AUTO: 40.7 FL (ref 37–54)
EOSINOPHIL # BLD AUTO: 0.11 10*3/MM3 (ref 0–0.4)
EOSINOPHIL NFR BLD AUTO: 2.3 % (ref 0.3–6.2)
ERYTHROCYTE [DISTWIDTH] IN BLOOD BY AUTOMATED COUNT: 12.7 % (ref 12.3–15.4)
FERRITIN SERPL-MCNC: 13.7 NG/ML (ref 13–150)
GFR SERPL CREATININE-BSD FRML MDRD: 120 ML/MIN/1.73
GLOBULIN UR ELPH-MCNC: 3.2 GM/DL
GLUCOSE SERPL-MCNC: 93 MG/DL (ref 65–99)
HCT VFR BLD AUTO: 34.8 % (ref 34–46.6)
HGB BLD-MCNC: 11.2 G/DL (ref 12–15.9)
IMM GRANULOCYTES # BLD AUTO: 0.01 10*3/MM3 (ref 0–0.05)
IMM GRANULOCYTES NFR BLD AUTO: 0.2 % (ref 0–0.5)
IRON 24H UR-MRATE: 34 MCG/DL (ref 37–145)
IRON SATN MFR SERPL: 7 % (ref 20–50)
LYMPHOCYTES # BLD AUTO: 1.7 10*3/MM3 (ref 0.7–3.1)
LYMPHOCYTES NFR BLD AUTO: 35.3 % (ref 19.6–45.3)
MCH RBC QN AUTO: 28.4 PG (ref 26.6–33)
MCHC RBC AUTO-ENTMCNC: 32.2 G/DL (ref 31.5–35.7)
MCV RBC AUTO: 88.3 FL (ref 79–97)
MONOCYTES # BLD AUTO: 0.35 10*3/MM3 (ref 0.1–0.9)
MONOCYTES NFR BLD AUTO: 7.3 % (ref 5–12)
NEUTROPHILS NFR BLD AUTO: 2.63 10*3/MM3 (ref 1.7–7)
NEUTROPHILS NFR BLD AUTO: 54.5 % (ref 42.7–76)
NRBC BLD AUTO-RTO: 0 /100 WBC (ref 0–0.2)
PLATELET # BLD AUTO: 174 10*3/MM3 (ref 140–450)
PMV BLD AUTO: 11.8 FL (ref 6–12)
POTASSIUM SERPL-SCNC: 3.9 MMOL/L (ref 3.5–5.2)
PROT SERPL-MCNC: 7.1 G/DL (ref 6–8.5)
RBC # BLD AUTO: 3.94 10*6/MM3 (ref 3.77–5.28)
SODIUM SERPL-SCNC: 138 MMOL/L (ref 136–145)
TIBC SERPL-MCNC: 511 MCG/DL (ref 298–536)
TRANSFERRIN SERPL-MCNC: 343 MG/DL (ref 200–360)
WBC NRBC COR # BLD: 4.82 10*3/MM3 (ref 3.4–10.8)

## 2022-01-17 PROCEDURE — 36415 COLL VENOUS BLD VENIPUNCTURE: CPT

## 2022-01-17 PROCEDURE — 80053 COMPREHEN METABOLIC PANEL: CPT

## 2022-01-17 PROCEDURE — 82728 ASSAY OF FERRITIN: CPT

## 2022-01-17 PROCEDURE — 84466 ASSAY OF TRANSFERRIN: CPT

## 2022-01-17 PROCEDURE — 83540 ASSAY OF IRON: CPT

## 2022-01-17 PROCEDURE — 85025 COMPLETE CBC W/AUTO DIFF WBC: CPT

## 2022-01-18 DIAGNOSIS — G89.29 CHRONIC BILATERAL LOW BACK PAIN WITHOUT SCIATICA: ICD-10-CM

## 2022-01-18 DIAGNOSIS — M54.50 CHRONIC BILATERAL LOW BACK PAIN WITHOUT SCIATICA: ICD-10-CM

## 2022-01-18 NOTE — TELEPHONE ENCOUNTER
Rx Refill Note  Requested Prescriptions     Pending Prescriptions Disp Refills   • OLANZapine (zyPREXA) 15 MG tablet 30 tablet 0     Sig: Take 1 tablet by mouth Every Night.   • methocarbamol (ROBAXIN) 500 MG tablet 90 tablet 0     Sig: TAKE 1 TABLET BY MOUTH THREE TIMES A DAY AS NEEDED      Last office visit with prescribing clinician: 11/16/2021      Next office visit with prescribing clinician: Visit date not found            Ken Brennan MA  01/18/22, 16:58 EST

## 2022-01-18 NOTE — TELEPHONE ENCOUNTER
SHE SAID THANK YOU FOR THE INFUSION !       Caller: Clinton, Silvia CM    Relationship: Self    Best call back number: [phone] (H)    Requested Prescriptions:   Requested Prescriptions     Pending Prescriptions Disp Refills   • OLANZapine (zyPREXA) 15 MG tablet 30 tablet 0     Sig: Take 1 tablet by mouth Every Night.   • methocarbamol (ROBAXIN) 500 MG tablet 90 tablet 0     Sig: TAKE 1 TABLET BY MOUTH THREE TIMES A DAY AS NEEDED      ZYPREXA DOSE HAS CHANGED AND PATIENT WANTED TO SEE WHY SHE IS TAKING LESS, SHE CANNOT REMEMBER     AND WANTED TO SEE IF YOU COULD PUT REFILLS ON BOTH     SURGERY IS AROUND June     Pharm[facility] where [facility] sent[id number] [address] AT [address] [phone] [place] [phone]      Additional details provided by patient:     Does the patient have less than a 3 day supply:  [x] Yes  [] No    Chip Bermudez   01/18/22 16:22 EST

## 2022-01-20 ENCOUNTER — TELEPHONE (OUTPATIENT)
Dept: GASTROENTEROLOGY | Facility: CLINIC | Age: 47
End: 2022-01-20

## 2022-01-20 RX ORDER — METHOCARBAMOL 500 MG/1
TABLET, FILM COATED ORAL
Qty: 90 TABLET | Refills: 0 | Status: SHIPPED | OUTPATIENT
Start: 2022-01-20 | End: 2022-02-17 | Stop reason: SDUPTHER

## 2022-01-20 RX ORDER — OLANZAPINE 15 MG/1
15 TABLET ORAL NIGHTLY
Qty: 90 TABLET | Refills: 1 | Status: SHIPPED | OUTPATIENT
Start: 2022-01-20 | End: 2022-04-05

## 2022-01-21 ENCOUNTER — APPOINTMENT (OUTPATIENT)
Dept: LAB | Facility: HOSPITAL | Age: 47
End: 2022-01-21

## 2022-01-21 ENCOUNTER — OFFICE VISIT (OUTPATIENT)
Dept: ONCOLOGY | Facility: CLINIC | Age: 47
End: 2022-01-21

## 2022-01-21 VITALS
DIASTOLIC BLOOD PRESSURE: 76 MMHG | OXYGEN SATURATION: 94 % | HEART RATE: 77 BPM | SYSTOLIC BLOOD PRESSURE: 115 MMHG | TEMPERATURE: 97.8 F | HEIGHT: 66 IN | BODY MASS INDEX: 40.6 KG/M2 | WEIGHT: 252.6 LBS | RESPIRATION RATE: 16 BRPM

## 2022-01-21 DIAGNOSIS — T45.4X5A ADVERSE EFFECT OF IRON, INITIAL ENCOUNTER: ICD-10-CM

## 2022-01-21 DIAGNOSIS — D50.0 IRON DEFICIENCY ANEMIA DUE TO CHRONIC BLOOD LOSS: Primary | ICD-10-CM

## 2022-01-21 PROCEDURE — 99214 OFFICE O/P EST MOD 30 MIN: CPT | Performed by: INTERNAL MEDICINE

## 2022-01-21 RX ORDER — PROCHLORPERAZINE MALEATE 10 MG
10 TABLET ORAL ONCE
Status: CANCELLED | OUTPATIENT
Start: 2022-01-28 | End: 2022-01-28

## 2022-01-21 RX ORDER — SODIUM CHLORIDE 9 MG/ML
250 INJECTION, SOLUTION INTRAVENOUS ONCE
Status: CANCELLED | OUTPATIENT
Start: 2022-02-04

## 2022-01-21 RX ORDER — PROCHLORPERAZINE MALEATE 10 MG
10 TABLET ORAL ONCE
Status: CANCELLED | OUTPATIENT
Start: 2022-02-04 | End: 2022-02-04

## 2022-01-21 RX ORDER — SODIUM CHLORIDE 9 MG/ML
250 INJECTION, SOLUTION INTRAVENOUS ONCE
Status: CANCELLED | OUTPATIENT
Start: 2022-01-28

## 2022-01-21 NOTE — PROGRESS NOTES
Subjective   Silvia Clinton is a 46 y.o. female.  Referred by Suhas Oseguera for iron deficiency anemia    History of Present Illness   Ms. Clinton is a 45-year-old premenopausal lady with history of hepatitis C, history of drug use, hypertension, SLE(she has not seen a rheumatologist), MRSA infection presents for further evaluation of anemia.She has had anemia since 2018.  Initially MCV was normal but subsequently noted to have low MCV with CBC on 7/8/2021 showing a hemoglobin of 8.9 and MCV of 73.3.  WBC count and platelet count has remained relatively normal except for some mild thrombocytopenia in 2019.  Per patient she has been anemic all her life since her teenage years.  She would take oral iron to help with the anemia and would have improvement.  After she was noted to be anemic she started oral iron .  Unfortunately she is not able to tolerate the iron supplements and she is having significant amount of nausea, abdominal pain, constipation to a point where she is not able to take the supplements.  She has not had menstrual cycles for 10 years when she was doing IV drugs but subsequently had menorrhagia for a year.  She has started using homeopathic medications for the past 2 months which helped with her heavy menstrual cycles and over the past 2 months she had fairly normal menstrual cycles.  She is scheduled to see OB/GYN next month.  She also reports bright red blood per rectum which has been going on for several months.  She has seen Dr. Steel.   She denies any malignancies in her immediate family.  Her maternal aunt had breast cancer.  Distant relatives with lung cancer and renal cancer.    Interval history  She had COVID-19 infection in December 2021.  Had iron studies and CBC CMP performed on 1/17/2022.  She does report menorrhagia for which she has been started on oral contraceptive pills.  EGD and C-scope were not performed as patient did not complete her prep.  She has not rescheduled them.  She  has been started on treatment for hepatitis C and has been compliant with the same.  She denies any chest pain, lightheadedness dizziness at this time.  Continues to have bright red blood per rectum reports about 2 teaspoons every day.  She has not seen her rheumatologist yet.  The following portions of the patient's history were reviewed and updated as appropriate: allergies, current medications, past family history, past medical history, past social history, past surgical history and problem list.    Past Medical History:   Diagnosis Date   • Chest pain    • Depression    • Drug abstinence syndrome (HCC)    • Drug abuse (HCC)    • Ex-smoker    • Heart attack (HCC)    • Hepatitis C    • Hepatitis C    • Hypertension    • Kidney stone    • Lupus (HCC)    • Lupus (systemic lupus erythematosus) (HCC)    • Mitral valve anterior leaflet prolapse    • NSTEMI (non-ST elevated myocardial infarction) (HCC)    • Otitis    • Palpitations    • Seizures (HCC)    • Spinal epidural abscess    • Tachycardia         Past Surgical History:   Procedure Laterality Date   • BACK SURGERY     • CHOLECYSTECTOMY     • LEG SURGERY      broke tibula,fibula, steel noah   • LIVER BIOPSY     • LUMBAR LAMINECTOMY DISCECTOMY DECOMPRESSION Left 2018    Procedure: Posterior lumbar three through sacrum decompression;  Surgeon: Tevin Whitley MD;  Location: [facility];  Service: Neurosurgery   • LYMPH NODE BIOPSY     • SPINE SURGERY          Family History   Problem Relation Age of Onset   • Diabetes Mother    • No Known Problems Father         Social History     Socioeconomic History   • Marital status: Legally    Tobacco Use   • Smoking status: Former Smoker     Packs/day: 1.00     Years: 30.00     Pack years: 30.00     Types: Cigarettes     Quit date: 2020     Years since quittin.4   • Smokeless tobacco: Never Used   • Tobacco comment: E-CIG USE   Vaping Use   • Vaping Use: Every day   • Start date: 2020   •  Substances: Nicotine, Flavoring   • Devices: Pre-filled or refillable cartridge, Refillable tank   Substance and Sexual Activity   • Alcohol use: No     Comment: CAFFEINE USE: 1 CUP COFFEE/ 2 COKES DAILY   • Drug use: Not Currently     Types: IV, Heroin, Methamphetamines     Comment: pt states she no longer uses heroin, she now uses meth   • Sexual activity: Not Currently        OB History    No obstetric history on file.          Allergies   Allergen Reactions   • Sulfa Antibiotics Nausea And Vomiting and Swelling     Patient states when she took Sulfa medication, her throat started to swell.            Review of Systems   Constitutional: Positive for fatigue.   HENT: Negative.    Eyes: Negative.    Respiratory: Negative.    Gastrointestinal: Positive for abdominal pain, blood in stool, constipation and nausea.   Endocrine: Negative.    Genitourinary: Positive for menstrual problem.   Musculoskeletal: Negative.    Allergic/Immunologic: Negative.    Neurological: Negative.    Hematological: Negative.    Psychiatric/Behavioral: Negative.          Objective   not currently breastfeeding.   Physical Exam  Constitutional:       General: She is not in acute distress.     Appearance: Normal appearance. She is obese. She is not ill-appearing, toxic-appearing or diaphoretic.   HENT:      Head: Normocephalic and atraumatic.      Right Ear: External ear normal.      Left Ear: External ear normal.      Nose: Nose normal. No congestion or rhinorrhea.      Mouth/Throat:      Mouth: Mucous membranes are moist.      Pharynx: Oropharynx is clear. No oropharyngeal exudate or posterior oropharyngeal erythema.   Eyes:      Extraocular Movements: Extraocular movements intact.      Conjunctiva/sclera: Conjunctivae normal.      Pupils: Pupils are equal, round, and reactive to light.   Cardiovascular:      Rate and Rhythm: Normal rate and regular rhythm.   Pulmonary:      Effort: Pulmonary effort is normal.      Breath sounds: Normal  breath sounds.   Abdominal:      General: Abdomen is flat. Bowel sounds are normal.      Palpations: Abdomen is soft.   Musculoskeletal:         General: Normal range of motion.      Cervical back: Normal range of motion.   Skin:     General: Skin is warm and dry.      Comments: Multiple tattoos on her extremities.   Neurological:      General: No focal deficit present.      Mental Status: She is alert and oriented to person, place, and time. Mental status is at baseline.   Psychiatric:         Mood and Affect: Mood normal.         Behavior: Behavior normal.         Thought Content: Thought content normal.         Judgment: Judgment normal.       I have reexamined the patient and the results are consistent with the previously documented exam. Dannielle Santiago MD     Lab on 01/17/2022   Component Date Value Ref Range Status   • Glucose 01/17/2022 93  65 - 99 mg/dL Final   • BUN 01/17/2022 5* 6 - 20 mg/dL Final   • Creatinine 01/17/2022 0.55* 0.57 - 1.00 mg/dL Final   • Sodium 01/17/2022 138  136 - 145 mmol/L Final   • Potassium 01/17/2022 3.9  3.5 - 5.2 mmol/L Final   • Chloride 01/17/2022 103  98 - 107 mmol/L Final   • CO2 01/17/2022 25.1  22.0 - 29.0 mmol/L Final   • Calcium 01/17/2022 8.9  8.6 - 10.5 mg/dL Final   • Total Protein 01/17/2022 7.1  6.0 - 8.5 g/dL Final   • Albumin 01/17/2022 3.90  3.50 - 5.20 g/dL Final   • ALT (SGPT) 01/17/2022 19  1 - 33 U/L Final   • AST (SGOT) 01/17/2022 20  1 - 32 U/L Final   • Alkaline Phosphatase 01/17/2022 85  39 - 117 U/L Final   • Total Bilirubin 01/17/2022 0.3  0.0 - 1.2 mg/dL Final   • eGFR Non African Amer 01/17/2022 120  >60 mL/min/1.73 Final   • Globulin 01/17/2022 3.2  gm/dL Final   • A/G Ratio 01/17/2022 1.2  g/dL Final   • BUN/Creatinine Ratio 01/17/2022 9.1  7.0 - 25.0 Final   • Anion Gap 01/17/2022 9.9  5.0 - 15.0 mmol/L Final   • Iron 01/17/2022 34* 37 - 145 mcg/dL Final   • Iron Saturation 01/17/2022 7* 20 - 50 % Final   • Transferrin 01/17/2022 343  200 - 360  mg/dL Final   • TIBC 01/17/2022 511  298 - 536 mcg/dL Final   • Ferritin 01/17/2022 13.70  13.00 - 150.00 ng/mL Final   • WBC 01/17/2022 4.82  3.40 - 10.80 10*3/mm3 Final   • RBC 01/17/2022 3.94  3.77 - 5.28 10*6/mm3 Final   • Hemoglobin 01/17/2022 11.2* 12.0 - 15.9 g/dL Final   • Hematocrit 01/17/2022 34.8  34.0 - 46.6 % Final   • MCV 01/17/2022 88.3  79.0 - 97.0 fL Final   • MCH 01/17/2022 28.4  26.6 - 33.0 pg Final   • MCHC 01/17/2022 32.2  31.5 - 35.7 g/dL Final   • RDW 01/17/2022 12.7  12.3 - 15.4 % Final   • RDW-SD 01/17/2022 40.7  37.0 - 54.0 fl Final   • MPV 01/17/2022 11.8  6.0 - 12.0 fL Final   • Platelets 01/17/2022 174  140 - 450 10*3/mm3 Final   • Neutrophil % 01/17/2022 54.5  42.7 - 76.0 % Final   • Lymphocyte % 01/17/2022 35.3  19.6 - 45.3 % Final   • Monocyte % 01/17/2022 7.3  5.0 - 12.0 % Final   • Eosinophil % 01/17/2022 2.3  0.3 - 6.2 % Final   • Basophil % 01/17/2022 0.4  0.0 - 1.5 % Final   • Immature Grans % 01/17/2022 0.2  0.0 - 0.5 % Final   • Neutrophils, Absolute 01/17/2022 2.63  1.70 - 7.00 10*3/mm3 Final   • Lymphocytes, Absolute 01/17/2022 1.70  0.70 - 3.10 10*3/mm3 Final   • Monocytes, Absolute 01/17/2022 0.35  0.10 - 0.90 10*3/mm3 Final   • Eosinophils, Absolute 01/17/2022 0.11  0.00 - 0.40 10*3/mm3 Final   • Basophils, Absolute 01/17/2022 0.02  0.00 - 0.20 10*3/mm3 Final   • Immature Grans, Absolute 01/17/2022 0.01  0.00 - 0.05 10*3/mm3 Final   • nRBC 01/17/2022 0.0  0.0 - 0.2 /100 WBC Final        No radiology results for the last 30 days.     1/17/2022 labs reviewed and summarized below  CBC with a hemoglobin of 11.2 otherwise within normal limits  CMP within normal limits  Iron profile suggestive of iron deficiency with low iron saturation of 7%, iron low at 34, TIBC 511, ferritin 13.7      Assessment/Plan     *Iron deficiency anemia  · 5/11/2021 iron saturation extremely low at 5%, TIBC elevated at 651, transferrin elevated at 437 consistent with iron deficiency  · Ferritin low at  8.5 again consistent with iron deficiency  · Patient reports being intolerant to oral iron  · She has had severe nausea vomiting abdominal pain and constipation when she takes oral iron  · Received Injectafer 750 mg IV x2 in July 2021  · Iron studies from 1/17/2022 reviewed and suggestive of iron deficiency  · Iron deficiency anemia could be secondary to ongoing menorrhagia versus GI bleed  · We will readminister Injectafer 750 mg IV x2 as patient has been intolerant to oral iron  · However it would be important to figure out the source of bleed and fix that.  · Strongly recommend that she proceed with her scopes  · Does not sound like the degree of menorrhagia is severe enough to contribute to the anemia and iron deficiency.    *Blood per rectum  · Recommend that she reschedule scopes    *Hepatitis C-on treatment with Epclusa.  Continue follow-up with GI    *?  SLE-CRP and ESR elevated.  Missed her appointment with rheumatology in December 2021    *Screening-recommend starting screening mammograms.  This has been ordered and scheduled.    *Menorrhagia-previously heavy periods but now on oral contraceptive pills.  Menorrhagia has improved    *Morbid obesity-BMI 40.8, we discussed dietary modifications and regular exercise.  She reports that she had modified her diet before however she was a little noncompliant with her diet through the holidays.  Plans to get back to healthy diet.  She has been walking about 9 miles at her work at [employer].    *IV drug abuse-she has been sober for 3 years now.    *Mstafh-ad-twpliszj IV iron once approved by insurance, follow-up with me in 3 months with iron studies.

## 2022-01-27 ENCOUNTER — TELEPHONE (OUTPATIENT)
Dept: ONCOLOGY | Facility: CLINIC | Age: 47
End: 2022-01-27

## 2022-01-27 NOTE — TELEPHONE ENCOUNTER
Provider: DR GRAVES    Caller: GIGI    Relationship to Patient: SELF    Reason for Call: GIGI IS CALLING TO GET HER INFUSIONS SCHEDULED.    PLEASE ADVISE

## 2022-01-28 ENCOUNTER — INFUSION (OUTPATIENT)
Dept: ONCOLOGY | Facility: HOSPITAL | Age: 47
End: 2022-01-28

## 2022-01-28 VITALS
TEMPERATURE: 97.4 F | HEIGHT: 66 IN | DIASTOLIC BLOOD PRESSURE: 67 MMHG | OXYGEN SATURATION: 95 % | BODY MASS INDEX: 39.86 KG/M2 | HEART RATE: 69 BPM | SYSTOLIC BLOOD PRESSURE: 100 MMHG | WEIGHT: 248 LBS | RESPIRATION RATE: 18 BRPM

## 2022-01-28 DIAGNOSIS — D50.0 IRON DEFICIENCY ANEMIA DUE TO CHRONIC BLOOD LOSS: ICD-10-CM

## 2022-01-28 DIAGNOSIS — T45.4X5A ADVERSE EFFECT OF IRON, INITIAL ENCOUNTER: Primary | ICD-10-CM

## 2022-01-28 PROCEDURE — 63710000001 PROCHLORPERAZINE MALEATE PER 5 MG: Performed by: INTERNAL MEDICINE

## 2022-01-28 PROCEDURE — 96365 THER/PROPH/DIAG IV INF INIT: CPT

## 2022-01-28 PROCEDURE — 96374 THER/PROPH/DIAG INJ IV PUSH: CPT

## 2022-01-28 PROCEDURE — 25010000002 FERRIC CARBOXYMALTOSE 750 MG/15ML SOLUTION 15 ML VIAL: Performed by: INTERNAL MEDICINE

## 2022-01-28 RX ORDER — PROCHLORPERAZINE MALEATE 5 MG/1
10 TABLET ORAL ONCE
Status: COMPLETED | OUTPATIENT
Start: 2022-01-28 | End: 2022-01-28

## 2022-01-28 RX ORDER — SODIUM CHLORIDE 9 MG/ML
250 INJECTION, SOLUTION INTRAVENOUS ONCE
Status: COMPLETED | OUTPATIENT
Start: 2022-01-28 | End: 2022-01-28

## 2022-01-28 RX ADMIN — FERRIC CARBOXYMALTOSE INJECTION 750 MG: 50 INJECTION, SOLUTION INTRAVENOUS at 14:15

## 2022-01-28 RX ADMIN — SODIUM CHLORIDE 250 ML: 9 INJECTION, SOLUTION INTRAVENOUS at 14:06

## 2022-01-28 RX ADMIN — PROCHLORPERAZINE MALEATE 10 MG: 5 TABLET ORAL at 14:06

## 2022-02-02 ENCOUNTER — TELEMEDICINE (OUTPATIENT)
Dept: FAMILY MEDICINE CLINIC | Facility: CLINIC | Age: 47
End: 2022-02-02

## 2022-02-02 ENCOUNTER — TELEPHONE (OUTPATIENT)
Dept: FAMILY MEDICINE CLINIC | Facility: CLINIC | Age: 47
End: 2022-02-02

## 2022-02-02 DIAGNOSIS — R53.83 FATIGUE, UNSPECIFIED TYPE: ICD-10-CM

## 2022-02-02 DIAGNOSIS — R50.9 FEVER, UNSPECIFIED FEVER CAUSE: Primary | ICD-10-CM

## 2022-02-02 DIAGNOSIS — R11.0 NAUSEA: ICD-10-CM

## 2022-02-02 DIAGNOSIS — K64.0 GRADE I HEMORRHOIDS: ICD-10-CM

## 2022-02-02 LAB
EXPIRATION DATE: NORMAL
FLUAV AG NPH QL: NEGATIVE
FLUBV AG NPH QL: NEGATIVE
INTERNAL CONTROL: NORMAL
Lab: NORMAL

## 2022-02-02 PROCEDURE — 87804 INFLUENZA ASSAY W/OPTIC: CPT

## 2022-02-02 PROCEDURE — 99213 OFFICE O/P EST LOW 20 MIN: CPT

## 2022-02-02 RX ORDER — ONDANSETRON 4 MG/1
4 TABLET, ORALLY DISINTEGRATING ORAL EVERY 8 HOURS PRN
Qty: 20 TABLET | Refills: 0 | Status: SHIPPED | OUTPATIENT
Start: 2022-02-02 | End: 2022-05-02 | Stop reason: SDUPTHER

## 2022-02-02 RX ORDER — DIAPER,BRIEF,INFANT-TODD,DISP
1 EACH MISCELLANEOUS 2 TIMES DAILY
Qty: 1 EACH | Refills: 1 | Status: SHIPPED | OUTPATIENT
Start: 2022-02-02 | End: 2022-02-17 | Stop reason: SDUPTHER

## 2022-02-02 NOTE — TELEPHONE ENCOUNTER
Caller: Silvia Clinton    Relationship to patient: Self    Best call back number:     Chief complaint: NOT FEELING WELL    Type of visit: SAME DAY    Requested date:     If rescheduling, when is the original appointment:     Additional notes: PATIENT IS CALLING IN STATING THAT SHE IS HAVING A FEVER NAUSEA HEADACHE AND OTHER SYMPTOMS.  I FOUND HER SEVERAL SAME DAY APPOINTMENT BUT SHE ONLY WANTS TO SEE DR GARCIA.  I TOLD HER THAT THERE WAS NOTHING AVAILABLE WITH HER AND THAT SHE COULD BE SEEN TODAY BY ANOTHER PROVIDER OR I COULD PUT A MESSAGE BACK FOR HER TO SEE IF SHE COULD BE SEEN BY DR GARCIA TODAY.  SHE WANTS TO BE CALLED BACK.

## 2022-02-02 NOTE — TELEPHONE ENCOUNTER
Pt refused same day but stated if she felt bad enough she would go to UC or ER    Stated that her fever has broken for today and has now been scheduled for 2/3/22

## 2022-02-02 NOTE — PROGRESS NOTES
Chief Complaint  Nausea (chills, body aches, headache, 4-5 days ago ), Fatigue (has taste and smell, no soa.), and Rash (poss toxic shock discuss)    Subjective          Silvia Clinton presents to [facility]  History of Present Illness     This was an audio and video enabled telemedicine encounter.  Patient consented to telemedicine. Complete through video where I was able to see the patient and the patient could see me through the entire visit. Patient present in car in [place] and myself at [address] [place] [address].    Patient presents the office due to symptoms of nausea, chills, body aches, headache, fatigue for the past 5 days.  Patient said that 6 days ago she got an iron infusion.  Usually after getting the iron infusion she feels more fatigued.  However the next day she was extremely fatigued and stayed in bed all day.  Patient also had muscle aches and a fever.  Patient was also on her period and wearing tampons.  Patient uses organic tampons is otherwise she breaks out in a rash.  Patient said 2 days ago she was feeling her worst with the fatigue and aches.  Patient had wearing a tampon for 10 hours and took it out and was started feel better after that.  Patient said that her fever broke and she is not had a fever for the past 2 days.  Patient still feels little bit achy and fatigued like her heart is tired.  Headache is getting better.  Patient worried about potential bacterial infection.  Patient said that she has had a bacteria infection in her spine before and worried that 1 could have been caused from wearing the tampon.  Patient denies any shortness of breath or wheezing.  No fever today or rash.  Symptoms are getting better.  Patient did say that she was exposed to Covid 1 week ago.  Patient had Covid over a month ago and has also been vaccinated.      Objective   Vital Signs: No vitals-telemedicine  There were no vitals taken for this visit.    Physical  Exam  Constitutional:       General: She is not in acute distress.  Pulmonary:      Effort: Pulmonary effort is normal. No respiratory distress.   Neurological:      Mental Status: She is alert.   Psychiatric:         Mood and Affect: Mood normal.        Result Review :   The following data was reviewed by: SYLWIA Sky on 02/02/2022:    Flu test negative.              Assessment and Plan    Diagnoses and all orders for this visit:    1. Fever, unspecified fever cause (Primary)  -     POCT Influenza A/B  -     COVID-19,[facility] ROUTINE, NP/OP SWAB IN TRANSPORT MEDIA OR ESWAB 72 HR TAT - Swab, Oropharynx; Future  -     COVID-19,[facility] ROUTINE, NP/OP SWAB IN TRANSPORT MEDIA OR ESWAB 72 HR TAT - Swab, Oropharynx    2. Fatigue, unspecified type  -     POCT Influenza A/B  -     COVID-19,[facility] ROUTINE, NP/OP SWAB IN TRANSPORT MEDIA OR ESWAB 72 HR TAT - Swab, Oropharynx; Future  -     COVID-19,[facility] ROUTINE, NP/OP SWAB IN TRANSPORT MEDIA OR ESWAB 72 HR TAT - Swab, Oropharynx    3. Nausea  -     ondansetron ODT (ZOFRAN-ODT) 4 MG disintegrating tablet; Place 1 tablet on the tongue Every 8 (Eight) Hours As Needed for Nausea.  Dispense: 20 tablet; Refill: 0    4. Grade I hemorrhoids  -     hydrocortisone 1 % cream; Apply 1 application topically to the appropriate area as directed 2 (Two) Times a Day.  Dispense: 1 each; Refill: 1      Pleasant 46-year-old female presents the clinic due to symptoms of fatigue, body aches, extreme fatigue, nausea.  This has been going on for the past 5 days.  Patient does have chronic nausea usually takes Zofran to help relieve it.  Patient says her symptoms are getting better and she has not had a fever for the past couple days.  Flu test was negative today and testing for COVID since she was exposed 1 week ago.    Patient is requesting refill on her Zofran for nausea and hydrocortisone for hemorrhoids.    After visit I did call patient back to let her know that flu test was  negative.  I did want to clarify about the rash.  Patient had told me during the visit she previously had a rash on her vagina that was treated by GYN and she had been taking hydrocortisone cream for it.  However it returned this past week and it feels worse than normal.    Since patient did have a recent tampon use, has had nausea, headache, and a fever, although that has resolved today, and has this new onset rash I did  patient to proceed to ER immediately to be evaluated for possible bacterial infection.  Patient said that she does not have a ride and her usual ride has the car for work.  I asked if she could try and find a ride either through Uber or a friend.  She did say she does not have that option but as soon as her ride is home around 2 AM from work she will go to the ER to try to beat the weather.  Counseled patient that I do advise to go as soon as possible.      Follow Up   Return if symptoms worsen or fail to improve.  Patient was given instructions and counseling regarding her condition or for health maintenance advice. Please see specific information pulled into the AVS if appropriate.     Medical assistant and I wore mask and eyewear protection during entire encounter.  Patient wore mask.      Electronically signed by SYLWIA Sky, 02/02/22, 4:02 PM EST.

## 2022-02-03 ENCOUNTER — TELEPHONE (OUTPATIENT)
Dept: FAMILY MEDICINE CLINIC | Facility: CLINIC | Age: 47
End: 2022-02-03

## 2022-02-03 LAB
LABCORP SARS-COV-2, NAA 2 DAY TAT: NORMAL
SARS-COV-2 RNA RESP QL NAA+PROBE: NOT DETECTED

## 2022-02-03 RX ORDER — SUCRALFATE 1 G/1
TABLET ORAL
Qty: 90 TABLET | Refills: 1 | Status: SHIPPED | OUTPATIENT
Start: 2022-02-03 | End: 2022-07-19 | Stop reason: SDUPTHER

## 2022-02-03 NOTE — TELEPHONE ENCOUNTER
Patient called. Checking in to see if she was able to go to ER. Patient said she did and they did vitals which were stable and blood work. Patient went home and waiting on test results. Patient feeling better today. No fever. Just some fatigue. Counseled patient if she starts to feel worse, fever returns, or her labs are abnormal with high white blood cell count to go back to ER. Patient understood.

## 2022-02-04 ENCOUNTER — APPOINTMENT (OUTPATIENT)
Dept: ONCOLOGY | Facility: HOSPITAL | Age: 47
End: 2022-02-04

## 2022-02-06 ENCOUNTER — HOSPITAL ENCOUNTER (EMERGENCY)
Facility: HOSPITAL | Age: 47
Discharge: HOME | End: 2022-02-07
Attending: EMERGENCY MEDICINE | Admitting: EMERGENCY MEDICINE

## 2022-02-06 ENCOUNTER — APPOINTMENT (OUTPATIENT)
Dept: CT IMAGING | Facility: HOSPITAL | Age: 47
End: 2022-02-06

## 2022-02-06 DIAGNOSIS — K59.00 CONSTIPATION, UNSPECIFIED CONSTIPATION TYPE: ICD-10-CM

## 2022-02-06 DIAGNOSIS — E87.6 HYPOKALEMIA: ICD-10-CM

## 2022-02-06 DIAGNOSIS — R10.9 LEFT FLANK PAIN: Primary | ICD-10-CM

## 2022-02-06 LAB
ALBUMIN SERPL-MCNC: 3.2 G/DL (ref 3.5–5.2)
ALBUMIN/GLOB SERPL: 1.1 G/DL
ALP SERPL-CCNC: 67 U/L (ref 39–117)
ALT SERPL W P-5'-P-CCNC: 10 U/L (ref 1–33)
ANION GAP SERPL CALCULATED.3IONS-SCNC: 7.5 MMOL/L (ref 5–15)
AST SERPL-CCNC: 17 U/L (ref 1–32)
BASOPHILS # BLD AUTO: 0.02 10*3/MM3 (ref 0–0.2)
BASOPHILS NFR BLD AUTO: 0.4 % (ref 0–1.5)
BILIRUB SERPL-MCNC: <0.2 MG/DL (ref 0–1.2)
BUN SERPL-MCNC: 13 MG/DL (ref 6–20)
BUN/CREAT SERPL: 29.5 (ref 7–25)
CALCIUM SPEC-SCNC: 6.9 MG/DL (ref 8.6–10.5)
CHLORIDE SERPL-SCNC: 112 MMOL/L (ref 98–107)
CO2 SERPL-SCNC: 22.5 MMOL/L (ref 22–29)
CREAT SERPL-MCNC: 0.44 MG/DL (ref 0.57–1)
DEPRECATED RDW RBC AUTO: 42.5 FL (ref 37–54)
EOSINOPHIL # BLD AUTO: 0.13 10*3/MM3 (ref 0–0.4)
EOSINOPHIL NFR BLD AUTO: 2.6 % (ref 0.3–6.2)
ERYTHROCYTE [DISTWIDTH] IN BLOOD BY AUTOMATED COUNT: 13.7 % (ref 12.3–15.4)
GFR SERPL CREATININE-BSD FRML MDRD: >150 ML/MIN/1.73
GLOBULIN UR ELPH-MCNC: 2.8 GM/DL
GLUCOSE SERPL-MCNC: 77 MG/DL (ref 65–99)
HCT VFR BLD AUTO: 31.3 % (ref 34–46.6)
HGB BLD-MCNC: 10.2 G/DL (ref 12–15.9)
IMM GRANULOCYTES # BLD AUTO: 0.02 10*3/MM3 (ref 0–0.05)
IMM GRANULOCYTES NFR BLD AUTO: 0.4 % (ref 0–0.5)
LIPASE SERPL-CCNC: 13 U/L (ref 13–60)
LYMPHOCYTES # BLD AUTO: 1.63 10*3/MM3 (ref 0.7–3.1)
LYMPHOCYTES NFR BLD AUTO: 32.1 % (ref 19.6–45.3)
MCH RBC QN AUTO: 28.5 PG (ref 26.6–33)
MCHC RBC AUTO-ENTMCNC: 32.6 G/DL (ref 31.5–35.7)
MCV RBC AUTO: 87.4 FL (ref 79–97)
MONOCYTES # BLD AUTO: 0.38 10*3/MM3 (ref 0.1–0.9)
MONOCYTES NFR BLD AUTO: 7.5 % (ref 5–12)
NEUTROPHILS NFR BLD AUTO: 2.89 10*3/MM3 (ref 1.7–7)
NEUTROPHILS NFR BLD AUTO: 57 % (ref 42.7–76)
NRBC BLD AUTO-RTO: 0 /100 WBC (ref 0–0.2)
PLATELET # BLD AUTO: 133 10*3/MM3 (ref 140–450)
PMV BLD AUTO: 12.2 FL (ref 6–12)
POTASSIUM SERPL-SCNC: 3.2 MMOL/L (ref 3.5–5.2)
PROT SERPL-MCNC: 6 G/DL (ref 6–8.5)
RBC # BLD AUTO: 3.58 10*6/MM3 (ref 3.77–5.28)
SODIUM SERPL-SCNC: 142 MMOL/L (ref 136–145)
WBC NRBC COR # BLD: 5.07 10*3/MM3 (ref 3.4–10.8)

## 2022-02-06 PROCEDURE — 96375 TX/PRO/DX INJ NEW DRUG ADDON: CPT

## 2022-02-06 PROCEDURE — 96374 THER/PROPH/DIAG INJ IV PUSH: CPT

## 2022-02-06 PROCEDURE — 83690 ASSAY OF LIPASE: CPT | Performed by: PHYSICIAN ASSISTANT

## 2022-02-06 PROCEDURE — 25010000002 ONDANSETRON PER 1 MG: Performed by: PHYSICIAN ASSISTANT

## 2022-02-06 PROCEDURE — 80053 COMPREHEN METABOLIC PANEL: CPT | Performed by: PHYSICIAN ASSISTANT

## 2022-02-06 PROCEDURE — 74176 CT ABD & PELVIS W/O CONTRAST: CPT

## 2022-02-06 PROCEDURE — 25010000002 KETOROLAC TROMETHAMINE PER 15 MG: Performed by: PHYSICIAN ASSISTANT

## 2022-02-06 PROCEDURE — 85025 COMPLETE CBC W/AUTO DIFF WBC: CPT | Performed by: PHYSICIAN ASSISTANT

## 2022-02-06 RX ORDER — KETOROLAC TROMETHAMINE 15 MG/ML
15 INJECTION, SOLUTION INTRAMUSCULAR; INTRAVENOUS ONCE
Status: COMPLETED | OUTPATIENT
Start: 2022-02-06 | End: 2022-02-07

## 2022-02-06 RX ORDER — POTASSIUM CHLORIDE 750 MG/1
40 TABLET, FILM COATED, EXTENDED RELEASE ORAL ONCE
Status: COMPLETED | OUTPATIENT
Start: 2022-02-06 | End: 2022-02-06

## 2022-02-06 RX ORDER — ONDANSETRON 2 MG/ML
4 INJECTION INTRAMUSCULAR; INTRAVENOUS ONCE
Status: COMPLETED | OUTPATIENT
Start: 2022-02-06 | End: 2022-02-06

## 2022-02-06 RX ORDER — SODIUM CHLORIDE 0.9 % (FLUSH) 0.9 %
10 SYRINGE (ML) INJECTION AS NEEDED
Status: DISCONTINUED | OUTPATIENT
Start: 2022-02-06 | End: 2022-02-07 | Stop reason: HOSPADM

## 2022-02-06 RX ORDER — KETOROLAC TROMETHAMINE 15 MG/ML
15 INJECTION, SOLUTION INTRAMUSCULAR; INTRAVENOUS ONCE
Status: COMPLETED | OUTPATIENT
Start: 2022-02-06 | End: 2022-02-06

## 2022-02-06 RX ADMIN — POTASSIUM CHLORIDE 40 MEQ: 750 TABLET, EXTENDED RELEASE ORAL at 23:08

## 2022-02-06 RX ADMIN — SODIUM CHLORIDE 1000 ML: 9 INJECTION, SOLUTION INTRAVENOUS at 22:32

## 2022-02-06 RX ADMIN — ONDANSETRON 4 MG: 2 INJECTION INTRAMUSCULAR; INTRAVENOUS at 21:55

## 2022-02-06 RX ADMIN — KETOROLAC TROMETHAMINE 15 MG: 15 INJECTION, SOLUTION INTRAMUSCULAR; INTRAVENOUS at 21:56

## 2022-02-07 VITALS
BODY MASS INDEX: 39.37 KG/M2 | OXYGEN SATURATION: 97 % | HEIGHT: 66 IN | TEMPERATURE: 97.5 F | RESPIRATION RATE: 20 BRPM | WEIGHT: 245 LBS | HEART RATE: 77 BPM | DIASTOLIC BLOOD PRESSURE: 90 MMHG | SYSTOLIC BLOOD PRESSURE: 134 MMHG

## 2022-02-07 LAB
B-HCG UR QL: NEGATIVE
BILIRUB UR QL STRIP: NEGATIVE
CLARITY UR: CLEAR
COLOR UR: YELLOW
GLUCOSE UR STRIP-MCNC: NEGATIVE MG/DL
HGB UR QL STRIP.AUTO: NEGATIVE
KETONES UR QL STRIP: ABNORMAL
LEUKOCYTE ESTERASE UR QL STRIP.AUTO: NEGATIVE
NITRITE UR QL STRIP: NEGATIVE
PH UR STRIP.AUTO: 7 [PH] (ref 5–8)
PROT UR QL STRIP: NEGATIVE
SP GR UR STRIP: >=1.03 (ref 1–1.03)
UROBILINOGEN UR QL STRIP: ABNORMAL

## 2022-02-07 PROCEDURE — 81025 URINE PREGNANCY TEST: CPT | Performed by: PHYSICIAN ASSISTANT

## 2022-02-07 PROCEDURE — 25010000002 KETOROLAC TROMETHAMINE PER 15 MG: Performed by: PHYSICIAN ASSISTANT

## 2022-02-07 PROCEDURE — 96376 TX/PRO/DX INJ SAME DRUG ADON: CPT

## 2022-02-07 PROCEDURE — 81003 URINALYSIS AUTO W/O SCOPE: CPT | Performed by: PHYSICIAN ASSISTANT

## 2022-02-07 PROCEDURE — 99283 EMERGENCY DEPT VISIT LOW MDM: CPT

## 2022-02-07 RX ORDER — POLYETHYLENE GLYCOL 3350 17 G/17G
17 POWDER, FOR SOLUTION ORAL DAILY
Qty: 10 EACH | Refills: 0 | Status: SHIPPED | OUTPATIENT
Start: 2022-02-07 | End: 2022-04-11 | Stop reason: SDUPTHER

## 2022-02-07 RX ORDER — LORATADINE 10 MG/1
TABLET ORAL
Qty: 60 TABLET | Refills: 5 | Status: SHIPPED | OUTPATIENT
Start: 2022-02-07 | End: 2022-03-08 | Stop reason: SDUPTHER

## 2022-02-07 RX ADMIN — KETOROLAC TROMETHAMINE 15 MG: 15 INJECTION, SOLUTION INTRAMUSCULAR; INTRAVENOUS at 00:01

## 2022-02-07 NOTE — ED PROVIDER NOTES
EMERGENCY DEPARTMENT ENCOUNTER    Room Number:  04/04  Date of encounter:  2/7/2022  PCP: Suhas Oseguera APRN  Historian: Patient      HPI:  Chief Complaint: LEFT flank pain       Context: Silvia Clinton is a 46 y.o. female with past medical history of HTN, HCV (currently under treatment), addiction, and history of kidney stones who presents to the ED c/o left flank pain.  Patient states that she had a sudden onset of a sharp left flank pain that radiates to the front.  Pain is associated with nausea and sweats.  Patient states that she had fevers last week, but tested negative for flu and Covid.  Patient denies any associated cough, sore throat, runny nose, headache, chest pain, shortness of breath, UTI symptoms, diarrhea or constipation.      PAST MEDICAL HISTORY  Active Ambulatory Problems     Diagnosis Date Noted   • Spinal epidural abscess 12/09/2018   • Non-STEMI (non-ST elevated myocardial infarction) (CMS/[facility]) - 2 years ago 03/31/2019   • Chest pain 05/01/2019   • Dizziness 11/26/2020   • Drug abuse ([facility])    • Essential hypertension    • Lupus (systemic lupus erythematosus) ([facility])    • Palpitations    • Vestibular neuritis, left 11/27/2020   • Lower extremity edema 12/30/2020   • Weight gain 03/22/2021   • Chronic left-sided low back pain without sciatica 03/22/2021   • Nausea 04/20/2021   • Lumbar radiculopathy 04/26/2021   • Pain of left lower extremity 04/26/2021   • Obesity, Class III, BMI 40-49.9 (morbid obesity) ([facility]) 05/24/2021   • Fear associated with healthcare 05/24/2021   • Iron deficiency anemia due to chronic blood loss 06/23/2021   • Adverse effect of iron 07/08/2021   • Fatigue 09/17/2021   • Allergies 09/17/2021   • Heart murmur 09/17/2021   • Constipation 09/17/2021   • Elevated LFTs 09/17/2021   • Hepatitis C 09/17/2021   • Dysmenorrhea 09/17/2021   • Bipolar 2 disorder (HCC) 09/17/2021   • Anxiety 09/17/2021   • GERD (gastroesophageal reflux disease) 09/22/2021   • History of drug abuse  (HCC) 2021   • Dietary counseling 2021   • Vaginal itching 2021   • Grade I hemorrhoids 2021   • COVID-19 2021   • Fever 2022     Resolved Ambulatory Problems     Diagnosis Date Noted   • Heartburn 2021     Past Medical History:   Diagnosis Date   • Depression    • Drug abstinence syndrome (HCC)    • Ex-smoker    • Heart attack (HCC)    • Hypertension    • Kidney stone    • Lupus (HCC)    • Mitral valve anterior leaflet prolapse    • NSTEMI (non-ST elevated myocardial infarction) (HCC)    • Otitis    • Seizures (HCC)    • Tachycardia          PAST SURGICAL HISTORY  Past Surgical History:   Procedure Laterality Date   • BACK SURGERY     • CHOLECYSTECTOMY     • LEG SURGERY      broke tibula,fibula, steel noah   • LIVER BIOPSY     • LUMBAR LAMINECTOMY DISCECTOMY DECOMPRESSION Left 2018    Procedure: Posterior lumbar three through sacrum decompression;  Surgeon: Tevin Whitley MD;  Location[facility];  Service: Neurosurgery   • LYMPH NODE BIOPSY     • SPINE SURGERY           FAMILY HISTORY  Family History   Problem Relation Age of Onset   • Diabetes Mother    • No Known Problems Father          SOCIAL HISTORY  Social History     Socioeconomic History   • Marital status: Legally    Tobacco Use   • Smoking status: Former Smoker     Packs/day: 1.00     Years: 30.00     Pack years: 30.00     Types: Cigarettes     Quit date: 2020     Years since quittin.4   • Smokeless tobacco: Never Used   • Tobacco comment: E-CIG USE   Vaping Use   • Vaping Use: Every day   • Start date: 2020   • Substances: Nicotine, Flavoring   • Devices: Pre-filled or refillable cartridge, Refillable tank   Substance and Sexual Activity   • Alcohol use: No     Comment: CAFFEINE USE: 1 CUP COFFEE/ 2 COKES DAILY   • Drug use: Not Currently     Types: IV, Heroin, Methamphetamines     Comment: pt states she no longer uses heroin, she now uses meth   • Sexual activity: Not  Currently         ALLERGIES  Sulfa antibiotics        REVIEW OF SYSTEMS  Review of Systems     All systems reviewed and negative except for those discussed in HPI.       PHYSICAL EXAM    I have reviewed the triage vital signs and nursing notes.    ED Triage Vitals   Temp Heart Rate Resp BP SpO2   02/06/22 2055 02/06/22 2055 02/06/22 2055 02/06/22 2057 02/06/22 2055   97.5 °F (36.4 °C) 84 20 133/88 100 %      Temp src Heart Rate Source Patient Position BP Location FiO2 (%)   -- -- -- -- --              Physical Exam  GENERAL: Alert and oriented x3, obese, moderate distress  HENT: moist mucous membranes  EYES: no scleral icterus, PERRL, EOMI  CV: regular rhythm, regular rate  RESPIRATORY: normal effort  ABDOMEN: soft/nontender, no CVA tenderness  MUSCULOSKELETAL: no deformity  NEURO: alert, moves all extremities, follows commands  SKIN: warm, dry        LAB RESULTS  Recent Results (from the past 24 hour(s))   Comprehensive Metabolic Panel    Collection Time: 02/06/22  9:53 PM    Specimen: Blood   Result Value Ref Range    Glucose 77 65 - 99 mg/dL    BUN 13 6 - 20 mg/dL    Creatinine 0.44 (L) 0.57 - 1.00 mg/dL    Sodium 142 136 - 145 mmol/L    Potassium 3.2 (L) 3.5 - 5.2 mmol/L    Chloride 112 (H) 98 - 107 mmol/L    CO2 22.5 22.0 - 29.0 mmol/L    Calcium 6.9 (L) 8.6 - 10.5 mg/dL    Total Protein 6.0 6.0 - 8.5 g/dL    Albumin 3.20 (L) 3.50 - 5.20 g/dL    ALT (SGPT) 10 1 - 33 U/L    AST (SGOT) 17 1 - 32 U/L    Alkaline Phosphatase 67 39 - 117 U/L    Total Bilirubin <0.2 0.0 - 1.2 mg/dL    eGFR Non African Amer >150 >60 mL/min/1.73    Globulin 2.8 gm/dL    A/G Ratio 1.1 g/dL    BUN/Creatinine Ratio 29.5 (H) 7.0 - 25.0    Anion Gap 7.5 5.0 - 15.0 mmol/L   Lipase    Collection Time: 02/06/22  9:53 PM    Specimen: Blood   Result Value Ref Range    Lipase 13 13 - 60 U/L   CBC Auto Differential    Collection Time: 02/06/22  9:53 PM    Specimen: Blood   Result Value Ref Range    WBC 5.07 3.40 - 10.80 10*3/mm3    RBC 3.58 (L)  3.77 - 5.28 10*6/mm3    Hemoglobin 10.2 (L) 12.0 - 15.9 g/dL    Hematocrit 31.3 (L) 34.0 - 46.6 %    MCV 87.4 79.0 - 97.0 fL    MCH 28.5 26.6 - 33.0 pg    MCHC 32.6 31.5 - 35.7 g/dL    RDW 13.7 12.3 - 15.4 %    RDW-SD 42.5 37.0 - 54.0 fl    MPV 12.2 (H) 6.0 - 12.0 fL    Platelets 133 (L) 140 - 450 10*3/mm3    Neutrophil % 57.0 42.7 - 76.0 %    Lymphocyte % 32.1 19.6 - 45.3 %    Monocyte % 7.5 5.0 - 12.0 %    Eosinophil % 2.6 0.3 - 6.2 %    Basophil % 0.4 0.0 - 1.5 %    Immature Grans % 0.4 0.0 - 0.5 %    Neutrophils, Absolute 2.89 1.70 - 7.00 10*3/mm3    Lymphocytes, Absolute 1.63 0.70 - 3.10 10*3/mm3    Monocytes, Absolute 0.38 0.10 - 0.90 10*3/mm3    Eosinophils, Absolute 0.13 0.00 - 0.40 10*3/mm3    Basophils, Absolute 0.02 0.00 - 0.20 10*3/mm3    Immature Grans, Absolute 0.02 0.00 - 0.05 10*3/mm3    nRBC 0.0 0.0 - 0.2 /100 WBC   Urinalysis With Microscopic If Indicated (No Culture) - Urine, Clean Catch    Collection Time: 02/07/22 12:01 AM    Specimen: Urine, Clean Catch   Result Value Ref Range    Color, UA Yellow Yellow, Straw    Appearance, UA Clear Clear    pH, UA 7.0 5.0 - 8.0    Specific Gravity, UA >=1.030 1.005 - 1.030    Glucose, UA Negative Negative    Ketones, UA Trace (A) Negative    Bilirubin, UA Negative Negative    Blood, UA Negative Negative    Protein, UA Negative Negative    Leuk Esterase, UA Negative Negative    Nitrite, UA Negative Negative    Urobilinogen, UA 1.0 E.U./dL 0.2 - 1.0 E.U./dL   Pregnancy, Urine - Urine, Clean Catch    Collection Time: 02/07/22 12:01 AM    Specimen: Urine, Clean Catch   Result Value Ref Range    HCG, Urine QL Negative Negative       Ordered the above labs and independently reviewed the results.        RADIOLOGY  CT Abdomen Pelvis Without Contrast    Result Date: 2/6/2022  CT OF THE ABDOMEN AND PELVIS WITHOUT CONTRAST  HISTORY: Left flank pain  COMPARISON: None available.  TECHNIQUE: Axial CT imaging was obtained through the abdomen and pelvis. No IV contrast was  administered.  FINDINGS: Images through the lung bases demonstrate some minimal dependent atelectasis, particularly on the left. The stomach, duodenum, adrenal glands, and pancreas appear unremarkable. Gallbladder is surgically absent. Liver is enlarged, measuring 17.9 cm in craniocaudal dimensions. No focal hepatic lesions are seen. There are some prominent lymph nodes within the loly hepatis, likely reactive. There is no intra or extrahepatic biliary dilatation. Spleen is also enlarged, measuring 14.2 cm in craniocaudal dimensions. No hydronephrosis is seen on either side. No renal stones are noted. No distal ureteral or bladder stones are seen. Urinary bladder is normal. Uterus is within normal limits. There is increased stool burden seen throughout the colon, which may reflect some constipation. There is no mechanical small bowel obstruction. The appendix is normal. Patient is status post prior lumbar laminectomy. There is some anterolisthesis of L4 on L5. No definite aggressive osseous abnormalities are seen.       1. No increased stool burden, suggesting constipation.  2. Hepatosplenomegaly.  Radiation dose reduction techniques were utilized, including automated exposure control and exposure modulation based on body size.  This report was finalized on 2/6/2022 11:21 PM by Dr. Kristina Richards M.D.        I ordered the above noted radiological studies. Reviewed by me and discussed with radiologist.  See dictation for official radiology interpretation.      PROCEDURES    Procedures      MEDICATIONS GIVEN IN ER    Medications   sodium chloride 0.9 % flush 10 mL (has no administration in time range)   sodium chloride 0.9 % bolus 1,000 mL (0 mL Intravenous Stopped 2/7/22 0101)   ondansetron (ZOFRAN) injection 4 mg (4 mg Intravenous Given 2/6/22 2155)   ketorolac (TORADOL) injection 15 mg (15 mg Intravenous Given 2/6/22 2156)   potassium chloride (K-DUR,KLOR-CON) ER tablet 40 mEq (40 mEq Oral Given 2/6/22 2308)    ketorolac (TORADOL) injection 15 mg (15 mg Intravenous Given 2/7/22 0001)         PROGRESS, DATA ANALYSIS, CONSULTS, AND MEDICAL DECISION MAKING    All labs have been independently reviewed by me.  All radiology studies have been reviewed by me and discussed with radiologist dictating the report.   EKG's independently viewed and interpreted by me.  Discussion below represents my analysis of pertinent findings related to patient's condition, differential diagnosis, treatment plan and final disposition.    DDx: Includes but not limited to kidney stone, pyelonephritis, UTI, diverticulitis, musculoskeletal pain    ED Course as of 02/07/22 0127   Sun Feb 06, 2022 2232 WBC: 5.07 [JUANIS]   2233 Hemoglobin(!): 10.2 [JUANIS]   2233 Hematocrit(!): 31.3 [JUANIS]   2233 Platelets(!): 133 [JUANIS]   2233 Sodium: 142 [JUANIS]   2233 Potassium(!): 3.2 [JUANIS]   2233 Chloride(!): 112 [JUANIS]   2233 CO2: 22.5 [UJANIS]   2233 BUN: 13 [JUANIS]   2233 Creatinine(!): 0.44 [JUANIS]   2233 Glucose: 77 [JUANIS]   2233 Lipase: 13 [JUANIS]   2233 Patient rechecked, resting comfortably bed, pain improved. [JUANIS]   2353 Patient rechecked, resting in bed, no acute distress.  Discussed her results, diagnosis, and treatment plan.  Patient expressed understanding and agrees with plan. [JUANIS]   Mon Feb 07, 2022   0045 Leukocytes, UA: Negative [JUANIS]   0045 Nitrite, UA: Negative [JUANIS]   0045 HCG, Urine QL: Negative [JUANIS]      ED Course User Index  [JUANIS] Dago Valverde PA       MDM: Patient's exam is unremarkable, there is no evidence for urinary tract affection, kidney stone, appendicitis, or any other acute infectious or emergent surgical abnormalities. Patient did show some constipation on her CT scan. Will prescribe MiraLAX. Patient's vital signs are stable, patient is safe for discharge home.    PPE: The patient wore a surgical mask throughout the entire patient encounter. I wore an N95.    AS OF 01:27 EST VITALS:    BP - 134/90  HR - 77  TEMP - 97.5 °F (36.4 °C)  O2 SATS -  97%        DIAGNOSIS  Final diagnoses:   Left flank pain   Hypokalemia   Constipation, unspecified constipation type         DISPOSITION  DISCHARGE    Patient discharged in stable condition.    Reviewed implications of results, diagnosis, meds, responsibility to follow up, warning signs and symptoms of possible worsening, potential complications and reasons to return to ER.    Patient/Family voiced understanding of above instructions.    Discussed plan for discharge, as there is no emergent indication for admission. Patient referred to primary care provider for BP management due to today's BP. Pt/family is agreeable and understands need for follow up and repeat testing.  Pt is aware that discharge does not mean that nothing is wrong but it indicates no emergency is present that requires admission and they must continue care with follow-up as given below or physician of their choice.     FOLLOW-UP  Suhas Oseguera, SYLWIA  [address] [id number] [phone]    Schedule an appointment as soon as possible for a visit in 3 days           Medication List      New Prescriptions    polyethylene glycol 17 g packet  Commonly known as: MIRALAX  Take 17 g by mouth Daily.           Where to Get Your Medications      These medications were sent to [id number] - [place], [place] - [address] [facility] [address] AT [facility] - [phone]  - [phone]   [address], [facility] [address]    Phone: [phone]   · polyethylene glycol 17 g packet                    Dago Valverde PA  02/07/22 0127

## 2022-02-07 NOTE — ED TRIAGE NOTES
Patient from home via private vehicle reporting left sided flank pain that started 20 minutes prior to arrival. Patient also reporting nausea.

## 2022-02-07 NOTE — DISCHARGE INSTRUCTIONS
Home, rest, medicine as directed, keep well-hydrated, home medicine as prescribed, follow up with PCP for recheck. Return to care with further concerns.

## 2022-02-07 NOTE — ED PROVIDER NOTES
MD ATTESTATION NOTE    The JACKY and I have discussed this patient's history, physical exam, and treatment plan.  I have reviewed the documentation and personally had a face to face interaction with the patient. I affirm the documentation and agree with the treatment and plan.  The attached note describes my personal findings.      History  46-year-old female with history of multiple medical problems including hepatitis C, prior drug abuse and spinal abscess presents with left-sided flank pain onset prior to arrival.    Physical Exam  Vital Signs reviewed  GENERAL: Alert female in mild distress.  Triage vitals reviewed and are fairly unremarkable  HENT: nares patent  EYES: no scleral icterus  CV: regular rhythm, regular rate  RESPIRATORY: normal effort clear to auscultation bilaterally  ABDOMEN: soft, mild left-sided tenderness to palpation without rebound or guarding  MUSCULOSKELETAL: no deformity  NEURO: Strength sensation and coordination are grossly intact.  Speech and mentation are unremarkable  SKIN: warm, dry      Disposition  I discussed treatment and evaluation this patient with AFIA Coon.  Patient with numerous chronic medical problems but states she has been sober from drug use for over 4 years.  She does have a history of hepatitis C.  She does have a history of kidney stones.  We will give some IV pain medication check labs and urine as well as CT scan to assess for causes of left flank pain.  Would consider kidney stone and pyelonephritis as well as pancreatitis, diverticulitis and colitis in the differential diagnosis.       Tao Wilcox MD  02/06/22 8341

## 2022-02-09 RX ORDER — AMLODIPINE BESYLATE 5 MG/1
TABLET ORAL
Qty: 60 TABLET | Refills: 3 | Status: SHIPPED | OUTPATIENT
Start: 2022-02-09 | End: 2022-02-17 | Stop reason: SDUPTHER

## 2022-02-10 ENCOUNTER — TELEPHONE (OUTPATIENT)
Dept: ONCOLOGY | Facility: CLINIC | Age: 47
End: 2022-02-10

## 2022-02-10 NOTE — TELEPHONE ENCOUNTER
Caller: GIGI    Relationship to patient: SELF    Best call back number: [phone]    Patient is needing: TO R/S 2-4-2022 INFUSION APPT. PLEASE CALL AND R/S.

## 2022-02-15 ENCOUNTER — INFUSION (OUTPATIENT)
Dept: ONCOLOGY | Facility: HOSPITAL | Age: 47
End: 2022-02-15

## 2022-02-15 VITALS
SYSTOLIC BLOOD PRESSURE: 101 MMHG | WEIGHT: 249 LBS | TEMPERATURE: 97.6 F | HEIGHT: 66 IN | DIASTOLIC BLOOD PRESSURE: 66 MMHG | OXYGEN SATURATION: 96 % | HEART RATE: 72 BPM | RESPIRATION RATE: 18 BRPM | BODY MASS INDEX: 40.02 KG/M2

## 2022-02-15 DIAGNOSIS — T45.4X5A ADVERSE EFFECT OF IRON, INITIAL ENCOUNTER: Primary | ICD-10-CM

## 2022-02-15 DIAGNOSIS — D50.0 IRON DEFICIENCY ANEMIA DUE TO CHRONIC BLOOD LOSS: ICD-10-CM

## 2022-02-15 PROCEDURE — 25010000002 FERRIC CARBOXYMALTOSE 750 MG/15ML SOLUTION 15 ML VIAL: Performed by: INTERNAL MEDICINE

## 2022-02-15 PROCEDURE — 96374 THER/PROPH/DIAG INJ IV PUSH: CPT

## 2022-02-15 PROCEDURE — 63710000001 PROCHLORPERAZINE MALEATE PER 5 MG: Performed by: INTERNAL MEDICINE

## 2022-02-15 RX ORDER — SODIUM CHLORIDE 9 MG/ML
250 INJECTION, SOLUTION INTRAVENOUS ONCE
Status: COMPLETED | OUTPATIENT
Start: 2022-02-15 | End: 2022-02-15

## 2022-02-15 RX ORDER — PROCHLORPERAZINE MALEATE 5 MG/1
10 TABLET ORAL ONCE
Status: COMPLETED | OUTPATIENT
Start: 2022-02-15 | End: 2022-02-15

## 2022-02-15 RX ADMIN — FERRIC CARBOXYMALTOSE INJECTION 750 MG: 50 INJECTION, SOLUTION INTRAVENOUS at 09:26

## 2022-02-15 RX ADMIN — PROCHLORPERAZINE MALEATE 10 MG: 5 TABLET ORAL at 09:09

## 2022-02-15 RX ADMIN — SODIUM CHLORIDE 250 ML: 9 INJECTION, SOLUTION INTRAVENOUS at 09:09

## 2022-02-17 DIAGNOSIS — G89.29 CHRONIC BILATERAL LOW BACK PAIN WITHOUT SCIATICA: ICD-10-CM

## 2022-02-17 DIAGNOSIS — M54.50 CHRONIC BILATERAL LOW BACK PAIN WITHOUT SCIATICA: ICD-10-CM

## 2022-02-17 DIAGNOSIS — K64.0 GRADE I HEMORRHOIDS: ICD-10-CM

## 2022-02-17 PROBLEM — R12 HEARTBURN: Status: ACTIVE | Noted: 2021-06-23

## 2022-02-17 RX ORDER — DIAPER,BRIEF,INFANT-TODD,DISP
1 EACH MISCELLANEOUS 2 TIMES DAILY
Qty: 1 EACH | Refills: 1 | Status: SHIPPED | OUTPATIENT
Start: 2022-02-17 | End: 2022-04-05 | Stop reason: SDUPTHER

## 2022-02-17 RX ORDER — METHOCARBAMOL 500 MG/1
TABLET, FILM COATED ORAL
Qty: 90 TABLET | Refills: 0 | Status: SHIPPED | OUTPATIENT
Start: 2022-02-17 | End: 2022-04-05 | Stop reason: SDUPTHER

## 2022-02-17 RX ORDER — AMLODIPINE BESYLATE 5 MG/1
5 TABLET ORAL 2 TIMES DAILY
Qty: 180 TABLET | Refills: 1 | Status: SHIPPED | OUTPATIENT
Start: 2022-02-17 | End: 2022-05-12 | Stop reason: SDUPTHER

## 2022-02-17 NOTE — TELEPHONE ENCOUNTER
Rx Refill Note  Requested Prescriptions     Pending Prescriptions Disp Refills   • amLODIPine (NORVASC) 5 MG tablet 60 tablet 3     Sig: Take 1 tablet by mouth 2 (Two) Times a Day.   • methocarbamol (ROBAXIN) 500 MG tablet 90 tablet 0     Sig: TAKE 1 TABLET BY MOUTH THREE TIMES A DAY AS NEEDED   • hydrocortisone 1 % cream 1 each 1     Sig: Apply 1 application topically to the appropriate area as directed 2 (Two) Times a Day.      Last office visit with prescribing clinician: 11/16/2021      Next office visit with prescribing clinician: Visit date not found            Ken Brennan MA  02/17/22, 10:44 EST

## 2022-02-17 NOTE — TELEPHONE ENCOUNTER
Caller: Silvia Clinton CM    Relationship: Self    Best call back number: [phone]*    Requested Prescriptions:   Requested Prescriptions     Pending Prescriptions Disp Refills   • amLODIPine (NORVASC) 5 MG tablet 60 tablet 3     Sig: Take 1 tablet by mouth 2 (Two) Times a Day.   • methocarbamol (ROBAXIN) 500 MG tablet 90 tablet 0     Sig: TAKE 1 TABLET BY MOUTH THREE TIMES A DAY AS NEEDED   • hydrocortisone 1 % cream 1 each 1     Sig: Apply 1 application topically to the appropriate area as directed 2 (Two) Times a Day.        Pharmacy where request should be sent: [facility] #[id number] [address] AT [place] [address] [phone]      Additional details provided by patient: PATIENT STATES SHE IS OUT OF ALL OF THESE MEDICATIONS    Does the patient have less than a 3 day supply:  [x] Yes  [] No    Mariza Lucia   02/17/22 10:06 EST

## 2022-02-28 RX ORDER — METOPROLOL TARTRATE 100 MG/1
TABLET ORAL
Qty: 180 TABLET | Refills: 1 | Status: SHIPPED | OUTPATIENT
Start: 2022-02-28 | End: 2022-03-08 | Stop reason: SDUPTHER

## 2022-03-01 ENCOUNTER — TELEPHONE (OUTPATIENT)
Dept: FAMILY MEDICINE CLINIC | Facility: CLINIC | Age: 47
End: 2022-03-01

## 2022-03-01 DIAGNOSIS — K21.9 GASTROESOPHAGEAL REFLUX DISEASE, UNSPECIFIED WHETHER ESOPHAGITIS PRESENT: ICD-10-CM

## 2022-03-01 NOTE — TELEPHONE ENCOUNTER
Caller: Clinton Silvia CM    Relationship: Self    Best call back number: [phone]  PATIENT NEEDS A NEW PRESCRIPTION SENT TO [facility] #[id number] [address] AT [facility] - [phone]  - [phone] FX    FOR OMEPRAZOLE BUT IN A 40 MG TABLET.    SHE WAS DROPPED DOWN TO 20 MG DUE TO TAKING EPCLUSA.    SHE NO LONGER TAKES THE EPCLUSA TREATMENT AND IS NEEDING US TO CALL BACK IN THE OMEPRAZOLE 40, DUE TO HER ACID REFLUX BEING BACK.    ALSO:    PATIENT HAD COVID BACK IN November 2021 AND RECEIVED THE ANTIBODY INFUSION TREATMENT.    PATIENT NEEDS TO KNOW HOW TO GO ABOUT GETTING AN ACCURATE COVID TEST THAT WILL SHOW SHE IS NEGATIVE.    PLEASE ADVISE

## 2022-03-04 NOTE — TELEPHONE ENCOUNTER
HUB OKAY TO READ     Ok for Prilosec 40    The rapid test should show a neg result       TRIED CALLING PT AND PHONE WAS NOT ACCEPTING CALLS

## 2022-03-08 DIAGNOSIS — K21.9 GASTROESOPHAGEAL REFLUX DISEASE, UNSPECIFIED WHETHER ESOPHAGITIS PRESENT: ICD-10-CM

## 2022-03-08 RX ORDER — OMEPRAZOLE 20 MG/1
20 CAPSULE, DELAYED RELEASE ORAL DAILY
Qty: 90 CAPSULE | Refills: 3 | Status: SHIPPED | OUTPATIENT
Start: 2022-03-08 | End: 2022-07-19 | Stop reason: SDUPTHER

## 2022-03-08 RX ORDER — VENLAFAXINE HYDROCHLORIDE 150 MG/1
150 CAPSULE, EXTENDED RELEASE ORAL DAILY
Qty: 90 CAPSULE | Refills: 3 | Status: SHIPPED | OUTPATIENT
Start: 2022-03-08 | End: 2022-07-19 | Stop reason: SDUPTHER

## 2022-03-08 RX ORDER — METOPROLOL TARTRATE 100 MG/1
100 TABLET ORAL 2 TIMES DAILY
Qty: 180 TABLET | Refills: 1 | Status: SHIPPED | OUTPATIENT
Start: 2022-03-08 | End: 2022-07-19 | Stop reason: SDUPTHER

## 2022-03-08 RX ORDER — LORATADINE 10 MG/1
10 TABLET ORAL DAILY
Qty: 60 TABLET | Refills: 5 | Status: SHIPPED | OUTPATIENT
Start: 2022-03-08 | End: 2022-04-19 | Stop reason: SDUPTHER

## 2022-03-08 NOTE — TELEPHONE ENCOUNTER
Caller: Silvia Clinton CM    Relationship: Self    Best call back number: [phone]    Requested Prescriptions:   Requested Prescriptions     Pending Prescriptions Disp Refills   • metoprolol tartrate (LOPRESSOR) 100 MG tablet 180 tablet 1     Sig: Take 1 tablet by mouth 2 (Two) Times a Day.   • loratadine (CLARITIN) 10 MG tablet 60 tablet 5     Sig: Take 1 tablet by mouth Daily.   • venlafaxine XR (Effexor XR) 150 MG 24 hr capsule 90 capsule 3     Sig: Take 1 capsule by mouth Daily.   • omeprazole (PrilOSEC) 20 MG capsule 90 capsule 3     Sig: Take 1 capsule by mouth Daily.        Pharmacy where request should be sent: [facility] #[id number] [address] AT [place] [id number] [place] [phone] FX     Additional details provided by patient: PATIENT STATES THE OMEPRAZOLE NEEDS TO BE ADJUSTED TO 40MG ONCE DAILY. PATIENT IS OUT    Does the patient have less than a 3 day supply:  [x] Yes  [] No    TANO Dick   03/08/22 13:38 EST

## 2022-04-05 ENCOUNTER — TELEPHONE (OUTPATIENT)
Dept: FAMILY MEDICINE CLINIC | Facility: CLINIC | Age: 47
End: 2022-04-05

## 2022-04-05 DIAGNOSIS — G89.29 CHRONIC BILATERAL LOW BACK PAIN WITHOUT SCIATICA: ICD-10-CM

## 2022-04-05 DIAGNOSIS — K64.0 GRADE I HEMORRHOIDS: ICD-10-CM

## 2022-04-05 DIAGNOSIS — M54.50 CHRONIC BILATERAL LOW BACK PAIN WITHOUT SCIATICA: ICD-10-CM

## 2022-04-05 RX ORDER — METHOCARBAMOL 500 MG/1
TABLET, FILM COATED ORAL
Qty: 90 TABLET | Refills: 0 | Status: SHIPPED | OUTPATIENT
Start: 2022-04-05 | End: 2022-05-12 | Stop reason: SDUPTHER

## 2022-04-05 RX ORDER — OLANZAPINE 15 MG/1
TABLET ORAL
Qty: 30 TABLET | Refills: 1 | Status: SHIPPED | OUTPATIENT
Start: 2022-04-05 | End: 2022-04-05

## 2022-04-05 RX ORDER — DIAPER,BRIEF,INFANT-TODD,DISP
1 EACH MISCELLANEOUS 2 TIMES DAILY
Qty: 1 EACH | Refills: 1 | Status: SHIPPED | OUTPATIENT
Start: 2022-04-05 | End: 2022-07-19 | Stop reason: SDUPTHER

## 2022-04-05 RX ORDER — ACETAMINOPHEN 500 MG
500 TABLET ORAL EVERY 6 HOURS PRN
Qty: 60 TABLET | Refills: 1 | Status: SHIPPED | OUTPATIENT
Start: 2022-04-05 | End: 2022-04-19 | Stop reason: SDUPTHER

## 2022-04-05 RX ORDER — OLANZAPINE 15 MG/1
15 TABLET ORAL NIGHTLY
Qty: 90 TABLET | Refills: 1 | Status: SHIPPED | OUTPATIENT
Start: 2022-04-05 | End: 2022-07-19 | Stop reason: SDUPTHER

## 2022-04-05 RX ORDER — VALACYCLOVIR HYDROCHLORIDE 500 MG/1
1000 TABLET, FILM COATED ORAL 2 TIMES DAILY
Qty: 8 TABLET | Refills: 1 | Status: SHIPPED | OUTPATIENT
Start: 2022-04-05 | End: 2022-04-07

## 2022-04-05 NOTE — TELEPHONE ENCOUNTER
Rx Refill Note  Requested Prescriptions     Pending Prescriptions Disp Refills   • methocarbamol (ROBAXIN) 500 MG tablet 90 tablet 0     Sig: TAKE 1 TABLET BY MOUTH THREE TIMES A DAY AS NEEDED   • OLANZapine (zyPREXA) 15 MG tablet 90 tablet 1     Sig: Take 1 tablet by mouth Every Night.   • hydrocortisone 1 % cream 1 each 1     Sig: Apply 1 application topically to the appropriate area as directed 2 (Two) Times a Day.      Last office visit with prescribing clinician: 11/16/2021      Next office visit with prescribing clinician: 4/5/2022            Ken Brennan MA  04/05/22, 09:56 EDT

## 2022-04-05 NOTE — TELEPHONE ENCOUNTER
"    Caller: Silvia Clinton    Relationship: Self    Best call back number: [phone]    What medication are you requesting: TABLET THAT STARTS WITH A \"V\" FOR HERPETIC DAYANA     What are your current symptoms: ON MIDDLE FINGERS     How long have you been experiencing symptoms: OVER WEEKEND     Have you had these symptoms before:    [x] Yes  [] No    Have you been treated for these symptoms before:   [x] Yes  [] No    If a prescription is needed, what is your preferred pharmacy and phone number: [facility] #[id number] - [place], [place] - [address] AT IN [facility] [phone] [facility] [phone]      Additional notes: STATES SHE HAS GOTTEN THIS FROM OTHER PROVIDER. STATES SHE NEEDS TO START IT SOON OR IT WILL NOT WORK FOR HER.       "

## 2022-04-05 NOTE — TELEPHONE ENCOUNTER
Called pt to inform that the prescription has been sent.  Could not reach pt.  Could not leave VM.

## 2022-04-05 NOTE — TELEPHONE ENCOUNTER
Caller: Silvia Clinton    Relationship: Self    Best call back number: [phone]    Requested Prescriptions:   Requested Prescriptions     Pending Prescriptions Disp Refills   • methocarbamol (ROBAXIN) 500 MG tablet 90 tablet 0     Sig: TAKE 1 TABLET BY MOUTH THREE TIMES A DAY AS NEEDED   • OLANZapine (zyPREXA) 15 MG tablet 90 tablet 1     Sig: Take 1 tablet by mouth Every Night.   • hydrocortisone 1 % cream 1 each 1     Sig: Apply 1 application topically to the appropriate area as directed 2 (Two) Times a Day.        Pharmacy where request should be sent: [facility] #[id number] - [place], [place] - [address] AT IN THE [facility] [phone] [facility] [phone]      Additional details provided by patient: PATIENT STATES SHE WOULD LIKE THE HYDROCORTISONE AS A CREAM NOT AN OINTMENT. LESS THAN 3 DAYS ON ALL OF THESE.    Does the patient have less than a 3 day supply:  [x] Yes  [] No    Chip Monge   04/05/22 09:40 EDT

## 2022-04-05 NOTE — TELEPHONE ENCOUNTER
I sent prescription of valacyclovir to pharmacy.  It looks like her last liver enzymes were normal, so I think it would be okay to take.  Please advise her to take 2 tablets twice a day for 2 days.

## 2022-04-05 NOTE — TELEPHONE ENCOUNTER
Caller: Clinton Silvia CM    Relationship: Self    Best call back number: [phone]    What medication are you requesting: TYLENOL     What are your current symptoms: BACK PAIN.     How long have you been experiencing symptoms: YRS  Have you had these symptoms before:    [x] Yes  [] No    Have you been treated for these symptoms before:   [x] Yes  [] No    If a prescription is needed, what is your preferred pharmacy and phone number: [id number] - [place], [place] - [address] AT IN THE [facility] [phone] [facility] [phone] FX     Additional notes: STATES HER INSURANCE WILL COVER THIS IF SHE HAS A SCRIPT FOR IT.

## 2022-04-07 RX ORDER — POLYETHYLENE GLYCOL 3350 17 G/17G
17 POWDER, FOR SOLUTION ORAL DAILY
Qty: 10 EACH | Refills: 0 | Status: CANCELLED | OUTPATIENT
Start: 2022-04-07

## 2022-04-07 NOTE — TELEPHONE ENCOUNTER
Rx Refill Note  Requested Prescriptions      No prescriptions requested or ordered in this encounter      Last office visit with prescribing clinician: 11/16/2021      Next office visit with prescribing clinician: Visit date not found            Annalise Silver MA  04/07/22, 11:11 EDT

## 2022-04-11 DIAGNOSIS — K59.00 CONSTIPATION, UNSPECIFIED CONSTIPATION TYPE: Primary | ICD-10-CM

## 2022-04-11 RX ORDER — POLYETHYLENE GLYCOL 3350 17 G/17G
17 POWDER, FOR SOLUTION ORAL DAILY
Qty: 10 EACH | Refills: 0 | Status: SHIPPED | OUTPATIENT
Start: 2022-04-11 | End: 2022-07-19 | Stop reason: SDUPTHER

## 2022-04-19 RX ORDER — ACETAMINOPHEN 500 MG
500 TABLET ORAL EVERY 6 HOURS PRN
Qty: 60 TABLET | Refills: 1 | Status: SHIPPED | OUTPATIENT
Start: 2022-04-19 | End: 2022-07-19 | Stop reason: SDUPTHER

## 2022-04-19 RX ORDER — LORATADINE 10 MG/1
10 TABLET ORAL DAILY
Qty: 60 TABLET | Refills: 5 | Status: SHIPPED | OUTPATIENT
Start: 2022-04-19 | End: 2022-07-19 | Stop reason: SDUPTHER

## 2022-04-19 NOTE — TELEPHONE ENCOUNTER
Caller: Silvia Clinton CM    Relationship: Self    Best call back number: [phone]    Requested Prescriptions:   Requested Prescriptions     Pending Prescriptions Disp Refills   • loratadine (CLARITIN) 10 MG tablet 60 tablet 5     Sig: Take 1 tablet by mouth Daily.   • acetaminophen (TYLENOL) 500 MG tablet 60 tablet 1     Sig: Take 1 tablet by mouth Every 6 (Six) Hours As Needed for Mild Pain .        Pharmacy where request should be sent: [facility]/PHARMACY #[id number] - [place], [place] - [address] AT [facility] - [phone] [facility] [phone] FX     Additional details provided by patient: PATIENT WOULD LIKE A 90 DAY SUPPY.  PATIENT IS OUT OF BOTH.      Does the patient have less than a 3 day supply:  [x] Yes  [] No    Chip Goodrich Rep   04/19/22 15:19 EDT

## 2022-04-21 ENCOUNTER — OFFICE VISIT (OUTPATIENT)
Dept: ONCOLOGY | Facility: CLINIC | Age: 47
End: 2022-04-21

## 2022-04-21 ENCOUNTER — LAB (OUTPATIENT)
Dept: OTHER | Facility: HOSPITAL | Age: 47
End: 2022-04-21

## 2022-04-21 VITALS
SYSTOLIC BLOOD PRESSURE: 102 MMHG | DIASTOLIC BLOOD PRESSURE: 68 MMHG | WEIGHT: 242.8 LBS | HEART RATE: 63 BPM | OXYGEN SATURATION: 94 % | TEMPERATURE: 97.7 F | HEIGHT: 66 IN | RESPIRATION RATE: 19 BRPM | BODY MASS INDEX: 39.02 KG/M2

## 2022-04-21 DIAGNOSIS — D50.0 IRON DEFICIENCY ANEMIA DUE TO CHRONIC BLOOD LOSS: ICD-10-CM

## 2022-04-21 DIAGNOSIS — D50.0 IRON DEFICIENCY ANEMIA DUE TO CHRONIC BLOOD LOSS: Primary | ICD-10-CM

## 2022-04-21 DIAGNOSIS — T45.4X5A ADVERSE EFFECT OF IRON, INITIAL ENCOUNTER: ICD-10-CM

## 2022-04-21 LAB
BASOPHILS # BLD AUTO: 0.02 10*3/MM3 (ref 0–0.2)
BASOPHILS NFR BLD AUTO: 0.3 % (ref 0–1.5)
DEPRECATED RDW RBC AUTO: 48.2 FL (ref 37–54)
EOSINOPHIL # BLD AUTO: 0.21 10*3/MM3 (ref 0–0.4)
EOSINOPHIL NFR BLD AUTO: 3.6 % (ref 0.3–6.2)
ERYTHROCYTE [DISTWIDTH] IN BLOOD BY AUTOMATED COUNT: 14.2 % (ref 12.3–15.4)
FERRITIN SERPL-MCNC: 185.7 NG/ML (ref 13–150)
HCT VFR BLD AUTO: 40.2 % (ref 34–46.6)
HGB BLD-MCNC: 13 G/DL (ref 12–15.9)
IMM GRANULOCYTES # BLD AUTO: 0.01 10*3/MM3 (ref 0–0.05)
IMM GRANULOCYTES NFR BLD AUTO: 0.2 % (ref 0–0.5)
IRON 24H UR-MRATE: 59 MCG/DL (ref 37–145)
IRON SATN MFR SERPL: 16 % (ref 20–50)
LYMPHOCYTES # BLD AUTO: 2.19 10*3/MM3 (ref 0.7–3.1)
LYMPHOCYTES NFR BLD AUTO: 37.6 % (ref 19.6–45.3)
MCH RBC QN AUTO: 29.6 PG (ref 26.6–33)
MCHC RBC AUTO-ENTMCNC: 32.3 G/DL (ref 31.5–35.7)
MCV RBC AUTO: 91.6 FL (ref 79–97)
MONOCYTES # BLD AUTO: 0.46 10*3/MM3 (ref 0.1–0.9)
MONOCYTES NFR BLD AUTO: 7.9 % (ref 5–12)
NEUTROPHILS NFR BLD AUTO: 2.93 10*3/MM3 (ref 1.7–7)
NEUTROPHILS NFR BLD AUTO: 50.4 % (ref 42.7–76)
NRBC BLD AUTO-RTO: 0 /100 WBC (ref 0–0.2)
PLATELET # BLD AUTO: 154 10*3/MM3 (ref 140–450)
PMV BLD AUTO: 12.3 FL (ref 6–12)
RBC # BLD AUTO: 4.39 10*6/MM3 (ref 3.77–5.28)
TIBC SERPL-MCNC: 371 MCG/DL (ref 298–536)
TRANSFERRIN SERPL-MCNC: 249 MG/DL (ref 200–360)
WBC NRBC COR # BLD: 5.82 10*3/MM3 (ref 3.4–10.8)

## 2022-04-21 PROCEDURE — 85025 COMPLETE CBC W/AUTO DIFF WBC: CPT | Performed by: INTERNAL MEDICINE

## 2022-04-21 PROCEDURE — 99214 OFFICE O/P EST MOD 30 MIN: CPT | Performed by: INTERNAL MEDICINE

## 2022-04-21 PROCEDURE — 83540 ASSAY OF IRON: CPT | Performed by: INTERNAL MEDICINE

## 2022-04-21 PROCEDURE — 82728 ASSAY OF FERRITIN: CPT | Performed by: INTERNAL MEDICINE

## 2022-04-21 PROCEDURE — 84466 ASSAY OF TRANSFERRIN: CPT | Performed by: INTERNAL MEDICINE

## 2022-04-21 PROCEDURE — 36415 COLL VENOUS BLD VENIPUNCTURE: CPT

## 2022-04-21 RX ORDER — OLANZAPINE 10 MG/1
10 TABLET ORAL DAILY
COMMUNITY
End: 2023-02-02 | Stop reason: SDUPTHER

## 2022-04-21 RX ORDER — HYDROCORTISONE 10 MG/G
CREAM TOPICAL
COMMUNITY
Start: 2022-04-06 | End: 2022-07-19 | Stop reason: SDUPTHER

## 2022-04-21 NOTE — PROGRESS NOTES
Subjective   Silvia Clinton is a 46 y.o. female.  Referred by Suhas Oseguera for iron deficiency anemia    History of Present Illness   Ms. Clinton is a 45-year-old premenopausal lady with history of hepatitis C, history of drug use, hypertension, SLE(she has not seen a rheumatologist), MRSA infection presents for further evaluation of anemia.She has had anemia since 2018.  Initially MCV was normal but subsequently noted to have low MCV with CBC on 7/8/2021 showing a hemoglobin of 8.9 and MCV of 73.3.  WBC count and platelet count has remained relatively normal except for some mild thrombocytopenia in 2019.  Per patient she has been anemic all her life since her teenage years.  She would take oral iron to help with the anemia and would have improvement.  After she was noted to be anemic she started oral iron .  Unfortunately she is not able to tolerate the iron supplements and she is having significant amount of nausea, abdominal pain, constipation to a point where she is not able to take the supplements.  She has not had menstrual cycles for 10 years when she was doing IV drugs but subsequently had menorrhagia for a year.  She has started using homeopathic medications for the past 2 months which helped with her heavy menstrual cycles and over the past 2 months she had fairly normal menstrual cycles.  She is scheduled to see OB/GYN next month.  She also reports bright red blood per rectum which has been going on for several months.  She has seen Dr. Steel.   She denies any malignancies in her immediate family.  Her maternal aunt had breast cancer.  Distant relatives with lung cancer and renal cancer.    She had COVID-19 infection in December 2021.  Received 2 doses of Injectafer in February 2022.    Interval history  She continues to have severe menorrhagia and has been having periods last about 7 days.  She has not been able to schedule the EGD and a colonoscopy.  She also missed her screening mammogram  appointment.  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  She has been busy between work and moving.  She denies any changes in weight or appetite.  She has an appointment coming up with her gynecologist which she plans to discuss uterine ablation.  .  The following portions of the patient's history were reviewed and updated as appropriate: allergies, current medications, past family history, past medical history, past social history, past surgical history and problem list.    Past Medical History:   Diagnosis Date   • Chest pain    • Depression    • Drug abstinence syndrome (HCC)    • Drug abuse (HCC)    • Ex-smoker    • Heart attack (HCC)    • Hepatitis C    • Hepatitis C    • Hypertension    • Kidney stone    • Lupus (HCC)    • Lupus (systemic lupus erythematosus) (HCC)    • Mitral valve anterior leaflet prolapse    • NSTEMI (non-ST elevated myocardial infarction) (HCC)    • Otitis    • Palpitations    • Seizures (HCC)    • Spinal epidural abscess    • Tachycardia         Past Surgical History:   Procedure Laterality Date   • BACK SURGERY     • CHOLECYSTECTOMY     • LEG SURGERY      broke tibula,fibula, steel noah   • LIVER BIOPSY     • LUMBAR LAMINECTOMY DISCECTOMY DECOMPRESSION Left 2018    Procedure: Posterior lumbar three through sacrum decompression;  Surgeon: Tevin Whitley MD;  Location: [facility];  Service: Neurosurgery   • LYMPH NODE BIOPSY     • SPINE SURGERY          Family History   Problem Relation Age of Onset   • Diabetes Mother    • No Known Problems Father         Social History     Socioeconomic History   • Marital status: Legally    Tobacco Use   • Smoking status: Former Smoker     Packs/day: 1.00     Years: 30.00     Pack years: 30.00     Types: Cigarettes     Quit date: 2020     Years since quittin.6   • Smokeless tobacco: Never Used   • Tobacco comment: E-CIG USE   Vaping Use   • Vaping Use: Every day  "  • Start date: 7/1/2020   • Substances: Nicotine, Flavoring   • Devices: Pre-filled or refillable cartridge, Refillable tank   Substance and Sexual Activity   • Alcohol use: No     Comment: CAFFEINE USE: 1 CUP COFFEE/ 2 COKES DAILY   • Drug use: Not Currently     Types: IV, Heroin, Methamphetamines     Comment: pt states she no longer uses heroin, she now uses meth   • Sexual activity: Not Currently        OB History    No obstetric history on file.          Allergies   Allergen Reactions   • Sulfa Antibiotics Nausea And Vomiting and Swelling     Patient states when she took Sulfa medication, her throat started to swell.  nausea            Review of Systems   Constitutional: Positive for fatigue.   HENT: Negative.    Eyes: Negative.    Respiratory: Negative.    Gastrointestinal: Positive for abdominal pain, blood in stool, constipation and nausea.   Endocrine: Negative.    Genitourinary: Positive for menstrual problem.   Musculoskeletal: Negative.    Allergic/Immunologic: Negative.    Neurological: Negative.    Hematological: Negative.    Psychiatric/Behavioral: Negative.      Review of systems mentioned in the HPI    Objective   Blood pressure 102/68, pulse 63, temperature 97.7 °F (36.5 °C), temperature source Temporal, resp. rate 19, height 167.6 cm (65.98\"), weight 110 kg (242 lb 12.8 oz), SpO2 94 %, not currently breastfeeding.   Physical Exam  Constitutional:       General: She is not in acute distress.     Appearance: Normal appearance. She is obese. She is not ill-appearing, toxic-appearing or diaphoretic.   HENT:      Head: Normocephalic and atraumatic.      Right Ear: External ear normal.      Left Ear: External ear normal.      Nose: Nose normal. No congestion or rhinorrhea.      Mouth/Throat:      Mouth: Mucous membranes are moist.      Pharynx: Oropharynx is clear. No oropharyngeal exudate or posterior oropharyngeal erythema.   Eyes:      Extraocular Movements: Extraocular movements intact.      " Conjunctiva/sclera: Conjunctivae normal.      Pupils: Pupils are equal, round, and reactive to light.   Cardiovascular:      Rate and Rhythm: Normal rate and regular rhythm.   Pulmonary:      Effort: Pulmonary effort is normal.      Breath sounds: Normal breath sounds.   Abdominal:      General: Abdomen is flat. Bowel sounds are normal.      Palpations: Abdomen is soft.   Musculoskeletal:         General: Normal range of motion.      Cervical back: Normal range of motion.   Skin:     General: Skin is warm and dry.      Comments: Multiple tattoos on her extremities.   Neurological:      General: No focal deficit present.      Mental Status: She is alert and oriented to person, place, and time. Mental status is at baseline.   Psychiatric:         Mood and Affect: Mood normal.         Behavior: Behavior normal.         Thought Content: Thought content normal.         Judgment: Judgment normal.       I have reexamined the patient and the results are consistent with the previously documented exam. Dannielle Santiago MD     Lab on 04/21/2022   Component Date Value Ref Range Status   • WBC 04/21/2022 5.82  3.40 - 10.80 10*3/mm3 Final   • RBC 04/21/2022 4.39  3.77 - 5.28 10*6/mm3 Final   • Hemoglobin 04/21/2022 13.0  12.0 - 15.9 g/dL Final   • Hematocrit 04/21/2022 40.2  34.0 - 46.6 % Final   • MCV 04/21/2022 91.6  79.0 - 97.0 fL Final   • MCH 04/21/2022 29.6  26.6 - 33.0 pg Final   • MCHC 04/21/2022 32.3  31.5 - 35.7 g/dL Final   • RDW 04/21/2022 14.2  12.3 - 15.4 % Final   • RDW-SD 04/21/2022 48.2  37.0 - 54.0 fl Final   • MPV 04/21/2022 12.3 (A) 6.0 - 12.0 fL Final   • Platelets 04/21/2022 154  140 - 450 10*3/mm3 Final   • Neutrophil % 04/21/2022 50.4  42.7 - 76.0 % Final   • Lymphocyte % 04/21/2022 37.6  19.6 - 45.3 % Final   • Monocyte % 04/21/2022 7.9  5.0 - 12.0 % Final   • Eosinophil % 04/21/2022 3.6  0.3 - 6.2 % Final   • Basophil % 04/21/2022 0.3  0.0 - 1.5 % Final   • Immature Grans % 04/21/2022 0.2  0.0 - 0.5 %  Final   • Neutrophils, Absolute 04/21/2022 2.93  1.70 - 7.00 10*3/mm3 Final   • Lymphocytes, Absolute 04/21/2022 2.19  0.70 - 3.10 10*3/mm3 Final   • Monocytes, Absolute 04/21/2022 0.46  0.10 - 0.90 10*3/mm3 Final   • Eosinophils, Absolute 04/21/2022 0.21  0.00 - 0.40 10*3/mm3 Final   • Basophils, Absolute 04/21/2022 0.02  0.00 - 0.20 10*3/mm3 Final   • Immature Grans, Absolute 04/21/2022 0.01  0.00 - 0.05 10*3/mm3 Final   • nRBC 04/21/2022 0.0  0.0 - 0.2 /100 WBC Final        No radiology results for the last 30 days.     4/21/2022 CBC reviewed and hemoglobin normal at 13.  Iron studies      Assessment/Plan     *Iron deficiency anemia  · 5/11/2021 iron saturation extremely low at 5%, TIBC elevated at 651, transferrin elevated at 437 consistent with iron deficiency  · Ferritin low at 8.5 again consistent with iron deficiency  · Patient reports being intolerant to oral iron  · She has had severe nausea vomiting abdominal pain and constipation when she takes oral iron  · Received Injectafer 750 mg IV x2 in July 2021  · Iron studies from 1/17/2022 reviewed and suggestive of iron deficiency  · Iron deficiency anemia could be secondary to ongoing menorrhagia versus GI bleed  ·  Injectafer 750 mg IV x2 in February 2022  · Continues to experience severe menorrhagia  · Blood per rectum has resolved since constipation has improved  · Iron studies today  · Hemoglobin normal at 13  · Since she has ongoing menorrhagia we will recheck iron studies and CBC in 2 months to assess need for iron infusions    *Blood per rectum  · Resolved since constipation has improved  · Follow-up with Dr. Steel to schedule scopes    *Hepatitis C-on treatment with Epclusa.  Continue follow-up with gastroenterology    *?  SLE-CRP and ESR elevated.  Missed her appointment with rheumatology in December 2021    *Screening-missed her screening appointment in October 2021.  Reschedule    *Menorrhagia-she is having heavy menstrual cycles again.  Follow-up  with gynecology and consider uterine ablation    *Morbid obesity-BMI 39.2, we discussed dietary modifications and regular exercise.  She reports that she had modified her diet before however she was a little noncompliant with her diet through the holidays.  Plans to get back to healthy diet.  She has been walking about 9 miles at her work at [employer].  Some decrease in weight.    *IV drug abuse-she has been sober for over 3 years now.    *Follow-up-2 months with CBC and CMP and iron studies

## 2022-05-02 DIAGNOSIS — R11.0 NAUSEA: ICD-10-CM

## 2022-05-02 RX ORDER — ONDANSETRON 4 MG/1
4 TABLET, ORALLY DISINTEGRATING ORAL EVERY 8 HOURS PRN
Qty: 20 TABLET | Refills: 0 | Status: SHIPPED | OUTPATIENT
Start: 2022-05-02 | End: 2022-07-19 | Stop reason: SDUPTHER

## 2022-05-02 NOTE — TELEPHONE ENCOUNTER
Rx Refill Note  Requested Prescriptions     Pending Prescriptions Disp Refills   • ondansetron ODT (ZOFRAN-ODT) 4 MG disintegrating tablet 20 tablet 0     Sig: Place 1 tablet on the tongue Every 8 (Eight) Hours As Needed for Nausea.      Last office visit with prescribing clinician: 11/16/2021      Next office visit with prescribing clinician: Visit date not found            Ken Brennan MA  05/02/22, 15:22 EDT

## 2022-05-02 NOTE — TELEPHONE ENCOUNTER
PATIENT CALLED FOR MEDICATION REFILL OF     ondansetron ODT (ZOFRAN-ODT) 4 MG disintegrating tablet     AND THE MEDICATION FOR HER HEPATIC DAYANA, SHE DID NOT KNOW THE NAME.    SHE IS OUT OF BOTH MEDICATIONS    [facility]/pharmacy #[id number] - [place], [place] - [address] AT IN [facility] - [phone]  - [phone]   [phone]    CALL BACK NUMBER [phone]

## 2022-05-12 DIAGNOSIS — G89.29 CHRONIC BILATERAL LOW BACK PAIN WITHOUT SCIATICA: ICD-10-CM

## 2022-05-12 DIAGNOSIS — M54.50 CHRONIC BILATERAL LOW BACK PAIN WITHOUT SCIATICA: ICD-10-CM

## 2022-05-12 RX ORDER — AMLODIPINE BESYLATE 5 MG/1
5 TABLET ORAL 2 TIMES DAILY
Qty: 180 TABLET | Refills: 0 | Status: SHIPPED | OUTPATIENT
Start: 2022-05-12 | End: 2022-07-19 | Stop reason: SDUPTHER

## 2022-05-12 RX ORDER — METHOCARBAMOL 500 MG/1
TABLET, FILM COATED ORAL
Qty: 90 TABLET | Refills: 0 | Status: SHIPPED | OUTPATIENT
Start: 2022-05-12 | End: 2022-05-31

## 2022-05-12 RX ORDER — METHOCARBAMOL 500 MG/1
TABLET, FILM COATED ORAL
Qty: 90 TABLET | Refills: 0 | OUTPATIENT
Start: 2022-05-12

## 2022-05-12 RX ORDER — LOSARTAN POTASSIUM 50 MG/1
50 TABLET ORAL 2 TIMES DAILY
Qty: 180 TABLET | Refills: 1 | Status: SHIPPED | OUTPATIENT
Start: 2022-05-12 | End: 2022-07-19 | Stop reason: SDUPTHER

## 2022-05-12 NOTE — TELEPHONE ENCOUNTER
Patient is scheduled via video with Suhas on Tues 5/17. She is completely out of medication. Ok to fill? Please advise.

## 2022-05-12 NOTE — TELEPHONE ENCOUNTER
Caller: Silvia Clinton CM    Relationship: Self    Best call back number: [phone]       Requested Prescriptions:   Requested Prescriptions     Pending Prescriptions Disp Refills   • methocarbamol (ROBAXIN) 500 MG tablet 90 tablet 0     Sig: TAKE 1 TABLET BY MOUTH THREE TIMES A DAY AS NEEDED   • amLODIPine (NORVASC) 5 MG tablet 180 tablet 1     Sig: Take 1 tablet by mouth 2 (Two) Times a Day.   • losartan (Cozaar) 50 MG tablet 180 tablet 3     Sig: Take 1 tablet by mouth 2 (Two) Times a Day.        Pharmacy where request should be sent: [facility]/PHARMACY #[id number] - [place], [place] - [address] [facility] - [phone] [facility] [phone] FX     Additional details provided by patient:     OUT OF MEDICATIONS    Does the patient have less than a 3 day supply:  [x] Yes  [] No    Chip Taveras Rep   05/12/22 11:13 EDT

## 2022-05-20 NOTE — SIGNIFICANT NOTE
12/13/18 1345   Rehab Treatment   Discipline physical therapist   Reason Treatment Not Performed patient/family declined treatment  (Patient refused due to being in a lot of pain. FANG Swanson aware. PT to follow up as time allows.)   Recommendation   PT - Next Appointment 12/14/18     
WDL; Intact.

## 2022-05-30 DIAGNOSIS — M54.50 CHRONIC BILATERAL LOW BACK PAIN WITHOUT SCIATICA: ICD-10-CM

## 2022-05-30 DIAGNOSIS — G89.29 CHRONIC BILATERAL LOW BACK PAIN WITHOUT SCIATICA: ICD-10-CM

## 2022-05-31 RX ORDER — METHOCARBAMOL 500 MG/1
TABLET, FILM COATED ORAL
Qty: 90 TABLET | Refills: 0 | Status: SHIPPED | OUTPATIENT
Start: 2022-05-31 | End: 2022-07-19 | Stop reason: SDUPTHER

## 2022-06-16 ENCOUNTER — LAB (OUTPATIENT)
Dept: OTHER | Facility: HOSPITAL | Age: 47
End: 2022-06-16

## 2022-06-16 ENCOUNTER — OFFICE VISIT (OUTPATIENT)
Dept: ONCOLOGY | Facility: CLINIC | Age: 47
End: 2022-06-16

## 2022-06-16 VITALS
WEIGHT: 236.8 LBS | DIASTOLIC BLOOD PRESSURE: 66 MMHG | TEMPERATURE: 96.8 F | BODY MASS INDEX: 38.06 KG/M2 | RESPIRATION RATE: 18 BRPM | HEIGHT: 66 IN | HEART RATE: 61 BPM | OXYGEN SATURATION: 96 % | SYSTOLIC BLOOD PRESSURE: 102 MMHG

## 2022-06-16 DIAGNOSIS — D50.0 IRON DEFICIENCY ANEMIA DUE TO CHRONIC BLOOD LOSS: ICD-10-CM

## 2022-06-16 DIAGNOSIS — T45.4X5A ADVERSE EFFECT OF IRON, INITIAL ENCOUNTER: ICD-10-CM

## 2022-06-16 DIAGNOSIS — T45.4X5A ADVERSE EFFECT OF IRON, INITIAL ENCOUNTER: Primary | ICD-10-CM

## 2022-06-16 LAB
ALBUMIN SERPL-MCNC: 4 G/DL (ref 3.5–5.2)
ALBUMIN/GLOB SERPL: 1.3 G/DL
ALP SERPL-CCNC: 71 U/L (ref 39–117)
ALT SERPL W P-5'-P-CCNC: 11 U/L (ref 1–33)
ANION GAP SERPL CALCULATED.3IONS-SCNC: 7.6 MMOL/L (ref 5–15)
AST SERPL-CCNC: 17 U/L (ref 1–32)
BASOPHILS # BLD AUTO: 0.02 10*3/MM3 (ref 0–0.2)
BASOPHILS NFR BLD AUTO: 0.4 % (ref 0–1.5)
BILIRUB SERPL-MCNC: 0.4 MG/DL (ref 0–1.2)
BUN SERPL-MCNC: 14 MG/DL (ref 6–20)
BUN/CREAT SERPL: 20 (ref 7–25)
CALCIUM SPEC-SCNC: 9 MG/DL (ref 8.6–10.5)
CHLORIDE SERPL-SCNC: 102 MMOL/L (ref 98–107)
CO2 SERPL-SCNC: 27.4 MMOL/L (ref 22–29)
CREAT SERPL-MCNC: 0.7 MG/DL (ref 0.57–1)
DEPRECATED RDW RBC AUTO: 46 FL (ref 37–54)
EGFRCR SERPLBLD CKD-EPI 2021: 109.5 ML/MIN/1.73
EOSINOPHIL # BLD AUTO: 0.2 10*3/MM3 (ref 0–0.4)
EOSINOPHIL NFR BLD AUTO: 3.7 % (ref 0.3–6.2)
ERYTHROCYTE [DISTWIDTH] IN BLOOD BY AUTOMATED COUNT: 13.3 % (ref 12.3–15.4)
FERRITIN SERPL-MCNC: 79.8 NG/ML (ref 13–150)
GLOBULIN UR ELPH-MCNC: 3.1 GM/DL
GLUCOSE SERPL-MCNC: 113 MG/DL (ref 65–99)
HCT VFR BLD AUTO: 37.1 % (ref 34–46.6)
HGB BLD-MCNC: 12.2 G/DL (ref 12–15.9)
IMM GRANULOCYTES # BLD AUTO: 0.02 10*3/MM3 (ref 0–0.05)
IMM GRANULOCYTES NFR BLD AUTO: 0.4 % (ref 0–0.5)
IRON 24H UR-MRATE: 76 MCG/DL (ref 37–145)
IRON SATN MFR SERPL: 20 % (ref 20–50)
LYMPHOCYTES # BLD AUTO: 1.93 10*3/MM3 (ref 0.7–3.1)
LYMPHOCYTES NFR BLD AUTO: 35.9 % (ref 19.6–45.3)
MCH RBC QN AUTO: 30.7 PG (ref 26.6–33)
MCHC RBC AUTO-ENTMCNC: 32.9 G/DL (ref 31.5–35.7)
MCV RBC AUTO: 93.5 FL (ref 79–97)
MONOCYTES # BLD AUTO: 0.45 10*3/MM3 (ref 0.1–0.9)
MONOCYTES NFR BLD AUTO: 8.4 % (ref 5–12)
NEUTROPHILS NFR BLD AUTO: 2.76 10*3/MM3 (ref 1.7–7)
NEUTROPHILS NFR BLD AUTO: 51.2 % (ref 42.7–76)
NRBC BLD AUTO-RTO: 0 /100 WBC (ref 0–0.2)
PLATELET # BLD AUTO: 146 10*3/MM3 (ref 140–450)
PMV BLD AUTO: 12.2 FL (ref 6–12)
POTASSIUM SERPL-SCNC: 4.1 MMOL/L (ref 3.5–5.2)
PROT SERPL-MCNC: 7.1 G/DL (ref 6–8.5)
RBC # BLD AUTO: 3.97 10*6/MM3 (ref 3.77–5.28)
SODIUM SERPL-SCNC: 137 MMOL/L (ref 136–145)
TIBC SERPL-MCNC: 381 MCG/DL (ref 298–536)
TRANSFERRIN SERPL-MCNC: 256 MG/DL (ref 200–360)
WBC NRBC COR # BLD: 5.38 10*3/MM3 (ref 3.4–10.8)

## 2022-06-16 PROCEDURE — 82728 ASSAY OF FERRITIN: CPT | Performed by: INTERNAL MEDICINE

## 2022-06-16 PROCEDURE — 36415 COLL VENOUS BLD VENIPUNCTURE: CPT

## 2022-06-16 PROCEDURE — 80053 COMPREHEN METABOLIC PANEL: CPT | Performed by: INTERNAL MEDICINE

## 2022-06-16 PROCEDURE — 85025 COMPLETE CBC W/AUTO DIFF WBC: CPT | Performed by: INTERNAL MEDICINE

## 2022-06-16 PROCEDURE — 84466 ASSAY OF TRANSFERRIN: CPT | Performed by: INTERNAL MEDICINE

## 2022-06-16 PROCEDURE — 83540 ASSAY OF IRON: CPT | Performed by: INTERNAL MEDICINE

## 2022-06-16 PROCEDURE — 99214 OFFICE O/P EST MOD 30 MIN: CPT | Performed by: INTERNAL MEDICINE

## 2022-06-16 NOTE — PROGRESS NOTES
Subjective   Silvia Clinton is a 46 y.o. female.  Referred by Suhas Oseguera for iron deficiency anemia    History of Present Illness   Ms. Clinton is a 45-year-old premenopausal lady with history of hepatitis C, history of drug use, hypertension, SLE(she has not seen a rheumatologist), MRSA infection presents for further evaluation of anemia.She has had anemia since 2018.  Initially MCV was normal but subsequently noted to have low MCV with CBC on 7/8/2021 showing a hemoglobin of 8.9 and MCV of 73.3.  WBC count and platelet count has remained relatively normal except for some mild thrombocytopenia in 2019.  Per patient she has been anemic all her life since her teenage years.  She would take oral iron to help with the anemia and would have improvement.  After she was noted to be anemic she started oral iron .  Unfortunately she is not able to tolerate the iron supplements and she is having significant amount of nausea, abdominal pain, constipation to a point where she is not able to take the supplements.  She has not had menstrual cycles for 10 years when she was doing IV drugs but subsequently had menorrhagia for a year.  She has started using homeopathic medications for the past 2 months which helped with her heavy menstrual cycles and over the past 2 months she had fairly normal menstrual cycles.  She is scheduled to see OB/GYN next month.  She also reports bright red blood per rectum which has been going on for several months.  She has seen Dr. Steel.   She denies any malignancies in her immediate family.  Her maternal aunt had breast cancer.  Distant relatives with lung cancer and renal cancer.    She had COVID-19 infection in December 2021.  Received 2 doses of Injectafer in February 2022.    Interval history  Ms. Clinton presents to the clinic today for follow-up.  She continues to have menorrhagia but using oral contraceptive pills on the days of the periods to slow them down.  She is scheduled for an  ablation next month.  She is also scheduled to see Dr. Steel for follow-up of hepatitis C and reschedule colonoscopies.  .  The following portions of the patient's history were reviewed and updated as appropriate: allergies, current medications, past family history, past medical history, past social history, past surgical history and problem list.    Past Medical History:   Diagnosis Date   • Chest pain    • Depression    • Drug abstinence syndrome (HCC)    • Drug abuse (HCC)    • Ex-smoker    • Heart attack (HCC)    • Hepatitis C    • Hepatitis C    • Hypertension    • Kidney stone    • Lupus (HCC)    • Lupus (systemic lupus erythematosus) (HCC)    • Mitral valve anterior leaflet prolapse    • NSTEMI (non-ST elevated myocardial infarction) (HCC)    • Otitis    • Palpitations    • Seizures (HCC)    • Spinal epidural abscess    • Tachycardia         Past Surgical History:   Procedure Laterality Date   • BACK SURGERY     • CHOLECYSTECTOMY     • LEG SURGERY      broke tibula,fibula, steel noah   • LIVER BIOPSY     • LUMBAR LAMINECTOMY DISCECTOMY DECOMPRESSION Left 2018    Procedure: Posterior lumbar three through sacrum decompression;  Surgeon: Tevin Whitley MD;  Location: [facility];  Service: Neurosurgery   • LYMPH NODE BIOPSY     • SPINE SURGERY          Family History   Problem Relation Age of Onset   • Diabetes Mother    • No Known Problems Father         Social History     Socioeconomic History   • Marital status: Legally    Tobacco Use   • Smoking status: Former Smoker     Packs/day: 1.00     Years: 30.00     Pack years: 30.00     Types: Cigarettes     Quit date: 2020     Years since quittin.8   • Smokeless tobacco: Never Used   • Tobacco comment: E-CIG USE   Vaping Use   • Vaping Use: Every day   • Start date: 2020   • Substances: Nicotine, Flavoring   • Devices: Pre-filled or refillable cartridge, Refillable tank   Substance and Sexual Activity   • Alcohol use: No      "Comment: CAFFEINE USE: 1 CUP COFFEE/ 2 COKES DAILY   • Drug use: Not Currently     Types: IV, Heroin, Methamphetamines     Comment: pt states she no longer uses heroin, she now uses meth   • Sexual activity: Not Currently        OB History    No obstetric history on file.          Allergies   Allergen Reactions   • Sulfa Antibiotics Nausea And Vomiting and Swelling     Patient states when she took Sulfa medication, her throat started to swell.  nausea            Review of Systems   Constitutional: Positive for fatigue.   HENT: Negative.    Eyes: Negative.    Respiratory: Negative.    Gastrointestinal: Positive for abdominal pain, blood in stool, constipation and nausea.   Endocrine: Negative.    Genitourinary: Positive for menstrual problem.   Musculoskeletal: Negative.    Allergic/Immunologic: Negative.    Neurological: Negative.    Hematological: Negative.    Psychiatric/Behavioral: Negative.      Review of systems mentioned in the HPI    Objective   Blood pressure 102/66, pulse 61, temperature 96.8 °F (36 °C), temperature source Temporal, resp. rate 18, height 167.6 cm (65.98\"), weight 107 kg (236 lb 12.8 oz), SpO2 96 %, not currently breastfeeding.   Physical Exam  Constitutional:       General: She is not in acute distress.     Appearance: Normal appearance. She is obese. She is not ill-appearing, toxic-appearing or diaphoretic.   HENT:      Head: Normocephalic and atraumatic.      Right Ear: External ear normal.      Left Ear: External ear normal.      Nose: Nose normal. No congestion or rhinorrhea.      Mouth/Throat:      Mouth: Mucous membranes are moist.      Pharynx: Oropharynx is clear. No oropharyngeal exudate or posterior oropharyngeal erythema.   Eyes:      Extraocular Movements: Extraocular movements intact.      Conjunctiva/sclera: Conjunctivae normal.      Pupils: Pupils are equal, round, and reactive to light.   Cardiovascular:      Rate and Rhythm: Normal rate and regular rhythm.   Pulmonary:      " Effort: Pulmonary effort is normal.      Breath sounds: Normal breath sounds.   Abdominal:      General: Abdomen is flat. Bowel sounds are normal.      Palpations: Abdomen is soft.   Musculoskeletal:         General: Normal range of motion.      Cervical back: Normal range of motion.   Skin:     General: Skin is warm and dry.      Comments: Multiple tattoos on her extremities.   Neurological:      General: No focal deficit present.      Mental Status: She is alert and oriented to person, place, and time. Mental status is at baseline.   Psychiatric:         Mood and Affect: Mood normal.         Behavior: Behavior normal.         Thought Content: Thought content normal.         Judgment: Judgment normal.       I have reexamined the patient and the results are consistent with the previously documented exam. Dannielle Santiago MD     Lab on 06/16/2022   Component Date Value Ref Range Status   • WBC 06/16/2022 5.38  3.40 - 10.80 10*3/mm3 Final   • RBC 06/16/2022 3.97  3.77 - 5.28 10*6/mm3 Final   • Hemoglobin 06/16/2022 12.2  12.0 - 15.9 g/dL Final   • Hematocrit 06/16/2022 37.1  34.0 - 46.6 % Final   • MCV 06/16/2022 93.5  79.0 - 97.0 fL Final   • MCH 06/16/2022 30.7  26.6 - 33.0 pg Final   • MCHC 06/16/2022 32.9  31.5 - 35.7 g/dL Final   • RDW 06/16/2022 13.3  12.3 - 15.4 % Final   • RDW-SD 06/16/2022 46.0  37.0 - 54.0 fl Final   • MPV 06/16/2022 12.2 (A) 6.0 - 12.0 fL Final   • Platelets 06/16/2022 146  140 - 450 10*3/mm3 Final   • Neutrophil % 06/16/2022 51.2  42.7 - 76.0 % Final   • Lymphocyte % 06/16/2022 35.9  19.6 - 45.3 % Final   • Monocyte % 06/16/2022 8.4  5.0 - 12.0 % Final   • Eosinophil % 06/16/2022 3.7  0.3 - 6.2 % Final   • Basophil % 06/16/2022 0.4  0.0 - 1.5 % Final   • Immature Grans % 06/16/2022 0.4  0.0 - 0.5 % Final   • Neutrophils, Absolute 06/16/2022 2.76  1.70 - 7.00 10*3/mm3 Final   • Lymphocytes, Absolute 06/16/2022 1.93  0.70 - 3.10 10*3/mm3 Final   • Monocytes, Absolute 06/16/2022 0.45  0.10 -  0.90 10*3/mm3 Final   • Eosinophils, Absolute 06/16/2022 0.20  0.00 - 0.40 10*3/mm3 Final   • Basophils, Absolute 06/16/2022 0.02  0.00 - 0.20 10*3/mm3 Final   • Immature Grans, Absolute 06/16/2022 0.02  0.00 - 0.05 10*3/mm3 Final   • nRBC 06/16/2022 0.0  0.0 - 0.2 /100 WBC Final        No radiology results for the last 30 days.     4/21/2022 CBC reviewed and hemoglobin normal at 13.  Iron studies    6/16/2022  CBC reviewed and within normal limits hemoglobin 12.2  CMP reviewed and within normal limits  Ferritin normal at 79  Iron profile shows a normal iron saturation of 20% and TIBC 381    Assessment & Plan     *Iron deficiency anemia  · 5/11/2021 iron saturation extremely low at 5%, TIBC elevated at 651, transferrin elevated at 437 consistent with iron deficiency  · Ferritin low at 8.5 again consistent with iron deficiency  · Patient reports being intolerant to oral iron  · She has had severe nausea vomiting abdominal pain and constipation when she takes oral iron  · Received Injectafer 750 mg IV x2 in July 2021  · Iron studies from 1/17/2022 reviewed and suggestive of iron deficiency  · Iron deficiency anemia could be secondary to ongoing menorrhagia versus GI bleed  ·  Injectafer 750 mg IV x2 in February 2022  · Continues to experience severe menorrhagia  · Blood per rectum has resolved since constipation has improved  · 6/16/2022-hemoglobin normal at 12.2, iron studies normal.  · Continues to experience menorrhagia however she plans to undergo an ablation of the uterus.  Hopefully this will help maintain hemoglobin and iron stores.    *Blood per rectum  · Resolved since constipation has improved  · Has a follow-up scheduled with Dr. Steel    *Hepatitis C-completed treatment with Epclusa.  Continue follow-up with gastroenterology    *?  SLE-CRP and ESR elevated.  Missed her appointment with rheumatology in December 2021    *Screening-missed her screening appointment in October 2021.   Reschedule    *Menorrhagia-she is having heavy menstrual cycles again.  Uterine ablation scheduled.    *Morbid obesity-BMI 39.2, we discussed dietary modifications and regular exercise.  She reports that she had modified her diet before however she was a little noncompliant with her diet through the holidays.  Plans to get back to healthy diet.  She has been walking about 9 miles at her work at [employer].  Weight decreased by 6 pounds since last visit.    *IV drug abuse-she has been sober for over 3 years now.    *Follow-up-6 months with CBC CMP and iron studies

## 2022-06-24 ENCOUNTER — TELEPHONE (OUTPATIENT)
Dept: FAMILY MEDICINE CLINIC | Facility: CLINIC | Age: 47
End: 2022-06-24

## 2022-06-24 NOTE — TELEPHONE ENCOUNTER
Please advise pt to go to [facility], its possible that her rash is from other causes and should be evaluated prior to prescription sent.  We are short staffed right now until the new docs have started. Thanks!

## 2022-06-24 NOTE — TELEPHONE ENCOUNTER
Caller: Clinton Silvia CM    Relationship: Self    Best call back number: [phone]    What medication are you requesting: CREAM FOR SCABIES    What are your current symptoms: ITCHING    How long have you been experiencing symptoms: WEEK    Have you had these symptoms before:    [x] Yes  [] No    Have you been treated for these symptoms before:   [x] Yes  [] No    If a prescription is needed, what is your preferred pharmacy and phone number: [id number] - [place], [place] - [address] IN THE [facility] [phone] [facility] [phone]      Additional notes: PATIENT STATES THAT ROOM MATE HAS SCABIES AND IS REQUESTING MEDICATION TO STOP THE ITCH     PLEASE CALL PATIENT IF APPOINTMENT IS NEEDED FIRST.

## 2022-06-28 RX ORDER — GLYCERIN/MIN OIL/POLYCARBOPHIL
GEL WITH APPLICATOR (GRAM) VAGINAL
Qty: 60 TABLET | Refills: 1 | OUTPATIENT
Start: 2022-06-28

## 2022-06-28 NOTE — TELEPHONE ENCOUNTER
Spoke to pt informed her what Dr. Stein advised also reminded her that ash is no longer in this office she said she will call back in July to schedule a new pt appt with Dr. Champion

## 2022-07-07 ENCOUNTER — TELEPHONE (OUTPATIENT)
Dept: FAMILY MEDICINE CLINIC | Facility: CLINIC | Age: 47
End: 2022-07-07

## 2022-07-07 DIAGNOSIS — G89.29 CHRONIC BILATERAL LOW BACK PAIN WITHOUT SCIATICA: ICD-10-CM

## 2022-07-07 DIAGNOSIS — M54.50 CHRONIC BILATERAL LOW BACK PAIN WITHOUT SCIATICA: ICD-10-CM

## 2022-07-07 RX ORDER — METHOCARBAMOL 500 MG/1
500 TABLET, FILM COATED ORAL 3 TIMES DAILY PRN
Qty: 90 TABLET | Refills: 0 | Status: CANCELLED | OUTPATIENT
Start: 2022-07-07

## 2022-07-07 RX ORDER — LORATADINE 10 MG/1
10 TABLET ORAL DAILY
Qty: 60 TABLET | Refills: 5 | Status: CANCELLED | OUTPATIENT
Start: 2022-07-07

## 2022-07-07 NOTE — TELEPHONE ENCOUNTER
Caller: Silvia Clinton CM    Relationship: Self    Best call back number: [phone]    Requested Prescriptions:   Requested Prescriptions     Pending Prescriptions Disp Refills   • methocarbamol (ROBAXIN) 500 MG tablet 90 tablet 0     Sig: Take 1 tablet by mouth 3 (Three) Times a Day As Needed.   • loratadine (CLARITIN) 10 MG tablet 60 tablet 5     Sig: Take 1 tablet by mouth Daily.        Pharmacy where request should be sent: [id number] [address] IN [facility] - [phone] [facility] [phone]      Additional details provided by patient: PATIENT COMPLETELY OUT    Does the patient have less than a 3 day supply:  [x] Yes  [] No    Chip Nichole Rep   07/07/22 10:31 EDT

## 2022-07-19 ENCOUNTER — OFFICE VISIT (OUTPATIENT)
Dept: INTERNAL MEDICINE | Facility: CLINIC | Age: 47
End: 2022-07-19

## 2022-07-19 VITALS
BODY MASS INDEX: 37.82 KG/M2 | HEIGHT: 65 IN | OXYGEN SATURATION: 97 % | DIASTOLIC BLOOD PRESSURE: 79 MMHG | RESPIRATION RATE: 18 BRPM | SYSTOLIC BLOOD PRESSURE: 115 MMHG | TEMPERATURE: 97.7 F | WEIGHT: 227 LBS | HEART RATE: 70 BPM

## 2022-07-19 DIAGNOSIS — K59.00 CONSTIPATION, UNSPECIFIED CONSTIPATION TYPE: Chronic | ICD-10-CM

## 2022-07-19 DIAGNOSIS — R11.0 NAUSEA: ICD-10-CM

## 2022-07-19 DIAGNOSIS — B00.89 HERPETIC WHITLOW OF FINGER OF RIGHT HAND WITH LYMPHANGITIS: ICD-10-CM

## 2022-07-19 DIAGNOSIS — K64.0 GRADE I HEMORRHOIDS: Chronic | ICD-10-CM

## 2022-07-19 DIAGNOSIS — I10 ESSENTIAL HYPERTENSION: Primary | Chronic | ICD-10-CM

## 2022-07-19 DIAGNOSIS — M54.50 CHRONIC BILATERAL LOW BACK PAIN WITHOUT SCIATICA: Chronic | ICD-10-CM

## 2022-07-19 DIAGNOSIS — T78.40XD ALLERGY, SUBSEQUENT ENCOUNTER: Chronic | ICD-10-CM

## 2022-07-19 DIAGNOSIS — K21.9 GASTROESOPHAGEAL REFLUX DISEASE, UNSPECIFIED WHETHER ESOPHAGITIS PRESENT: Chronic | ICD-10-CM

## 2022-07-19 DIAGNOSIS — M79.605 PAIN OF LEFT LOWER EXTREMITY: ICD-10-CM

## 2022-07-19 DIAGNOSIS — G89.29 CHRONIC BILATERAL LOW BACK PAIN WITHOUT SCIATICA: Chronic | ICD-10-CM

## 2022-07-19 DIAGNOSIS — F41.9 ANXIETY: Chronic | ICD-10-CM

## 2022-07-19 DIAGNOSIS — N30.00 ACUTE CYSTITIS WITHOUT HEMATURIA: ICD-10-CM

## 2022-07-19 DIAGNOSIS — I89.1 HERPETIC WHITLOW OF FINGER OF RIGHT HAND WITH LYMPHANGITIS: ICD-10-CM

## 2022-07-19 PROCEDURE — 99214 OFFICE O/P EST MOD 30 MIN: CPT

## 2022-07-19 RX ORDER — POLYETHYLENE GLYCOL 3350 17 G/17G
17 POWDER, FOR SOLUTION ORAL DAILY
Qty: 10 EACH | Refills: 0 | Status: SHIPPED | OUTPATIENT
Start: 2022-07-19

## 2022-07-19 RX ORDER — OLANZAPINE 15 MG/1
15 TABLET ORAL NIGHTLY
Qty: 90 TABLET | Refills: 1 | Status: SHIPPED | OUTPATIENT
Start: 2022-07-19

## 2022-07-19 RX ORDER — PRAMIPEXOLE DIHYDROCHLORIDE 0.25 MG/1
TABLET ORAL
COMMUNITY
Start: 2022-05-17 | End: 2023-02-02

## 2022-07-19 RX ORDER — METHOCARBAMOL 500 MG/1
500 TABLET, FILM COATED ORAL 3 TIMES DAILY PRN
Qty: 90 TABLET | Refills: 0 | Status: SHIPPED | OUTPATIENT
Start: 2022-07-19 | End: 2022-08-19

## 2022-07-19 RX ORDER — DIAPER,BRIEF,INFANT-TODD,DISP
1 EACH MISCELLANEOUS 2 TIMES DAILY
Qty: 1 EACH | Refills: 1 | Status: SHIPPED | OUTPATIENT
Start: 2022-07-19 | End: 2023-02-02 | Stop reason: SDUPTHER

## 2022-07-19 RX ORDER — LOSARTAN POTASSIUM 50 MG/1
50 TABLET ORAL 2 TIMES DAILY
Qty: 180 TABLET | Refills: 1 | Status: SHIPPED | OUTPATIENT
Start: 2022-07-19 | End: 2023-02-27 | Stop reason: SDUPTHER

## 2022-07-19 RX ORDER — VENLAFAXINE HYDROCHLORIDE 150 MG/1
150 CAPSULE, EXTENDED RELEASE ORAL DAILY
Qty: 90 CAPSULE | Refills: 3 | Status: SHIPPED | OUTPATIENT
Start: 2022-07-19 | End: 2022-08-22

## 2022-07-19 RX ORDER — OMEPRAZOLE 20 MG/1
20 CAPSULE, DELAYED RELEASE ORAL DAILY
Qty: 90 CAPSULE | Refills: 3 | Status: SHIPPED | OUTPATIENT
Start: 2022-07-19

## 2022-07-19 RX ORDER — ACETAMINOPHEN 500 MG
500 TABLET ORAL EVERY 6 HOURS PRN
Qty: 60 TABLET | Refills: 1 | Status: SHIPPED | OUTPATIENT
Start: 2022-07-19

## 2022-07-19 RX ORDER — AMLODIPINE BESYLATE 5 MG/1
5 TABLET ORAL 2 TIMES DAILY
Qty: 180 TABLET | Refills: 1 | Status: SHIPPED | OUTPATIENT
Start: 2022-07-19

## 2022-07-19 RX ORDER — SUCRALFATE 1 G/1
1 TABLET ORAL
Qty: 90 TABLET | Refills: 1 | Status: SHIPPED | OUTPATIENT
Start: 2022-07-19

## 2022-07-19 RX ORDER — CLONIDINE HYDROCHLORIDE 0.1 MG/1
TABLET ORAL
COMMUNITY
Start: 2022-05-17 | End: 2023-02-27

## 2022-07-19 RX ORDER — NALOXONE HYDROCHLORIDE 4 MG/.1ML
SPRAY NASAL
COMMUNITY
Start: 2022-05-12

## 2022-07-19 RX ORDER — METOPROLOL TARTRATE 100 MG/1
100 TABLET ORAL 2 TIMES DAILY
Qty: 180 TABLET | Refills: 1 | Status: SHIPPED | OUTPATIENT
Start: 2022-07-19 | End: 2023-02-27 | Stop reason: SDUPTHER

## 2022-07-19 RX ORDER — LEVOFLOXACIN 250 MG/1
250 TABLET ORAL DAILY
Qty: 3 TABLET | Refills: 0 | Status: SHIPPED | OUTPATIENT
Start: 2022-07-19 | End: 2022-07-22

## 2022-07-19 RX ORDER — DOCUSATE SODIUM 100 MG/1
100 CAPSULE, LIQUID FILLED ORAL 2 TIMES DAILY PRN
Qty: 60 CAPSULE | Refills: 2 | Status: SHIPPED | OUTPATIENT
Start: 2022-07-19

## 2022-07-19 RX ORDER — VALACYCLOVIR HYDROCHLORIDE 500 MG/1
1000 TABLET, FILM COATED ORAL 2 TIMES DAILY
Qty: 30 TABLET | Refills: 2 | Status: SHIPPED | OUTPATIENT
Start: 2022-07-19

## 2022-07-19 RX ORDER — LORATADINE 10 MG/1
10 TABLET ORAL DAILY
Qty: 60 TABLET | Refills: 5 | Status: SHIPPED | OUTPATIENT
Start: 2022-07-19

## 2022-07-19 RX ORDER — HYDROCORTISONE 10 MG/G
CREAM TOPICAL 2 TIMES DAILY
Qty: 28 G | Refills: 1 | Status: SHIPPED | OUTPATIENT
Start: 2022-07-19 | End: 2022-10-14

## 2022-07-19 RX ORDER — ONDANSETRON 4 MG/1
4 TABLET, ORALLY DISINTEGRATING ORAL EVERY 8 HOURS PRN
Qty: 20 TABLET | Refills: 0 | Status: SHIPPED | OUTPATIENT
Start: 2022-07-19 | End: 2022-08-29 | Stop reason: SDUPTHER

## 2022-07-19 RX ORDER — VALACYCLOVIR HYDROCHLORIDE 500 MG/1
1000 TABLET, FILM COATED ORAL 2 TIMES DAILY
COMMUNITY
Start: 2022-05-06 | End: 2022-07-19 | Stop reason: SDUPTHER

## 2022-07-19 RX ORDER — HYDROXYZINE 50 MG/1
TABLET, FILM COATED ORAL
COMMUNITY
Start: 2022-05-12 | End: 2023-02-02

## 2022-07-19 NOTE — PROGRESS NOTES
"Chief Complaint  Establish Care (Pt presents to us today to establish care and medication refill also thinks she has a UTI ), Med Refill, Urinary Tract Infection, Headache, and Nausea    Subjective        Silvia Clinton presents to [facility]  46-year-old female presenting to establish care and possible UTI.  History of multiple comorbidities.  Home life stressful and causes anxiety.  Urine is dark associated with nausea and headache.  Denies dysuria and fever.            Objective   Vital Signs:  /79 (BP Location: Right arm, Patient Position: Sitting, Cuff Size: Large Adult)   Pulse 70   Temp 97.7 °F (36.5 °C)   Resp 18   Ht 165.1 cm (65\")   Wt 103 kg (227 lb)   SpO2 97%   BMI 37.77 kg/m²   Estimated body mass index is 37.77 kg/m² as calculated from the following:    Height as of this encounter: 165.1 cm (65\").    Weight as of this encounter: 103 kg (227 lb).          Physical Exam  Vitals and nursing note reviewed.   Constitutional:       Appearance: Normal appearance.   HENT:      Head: Normocephalic.   Cardiovascular:      Rate and Rhythm: Normal rate.      Pulses: Normal pulses.      Heart sounds: Normal heart sounds.   Pulmonary:      Effort: Pulmonary effort is normal.      Breath sounds: Normal breath sounds.   Abdominal:      General: Abdomen is protuberant. Bowel sounds are normal.      Tenderness: There is no abdominal tenderness.   Musculoskeletal:         General: Normal range of motion.   Skin:     General: Skin is warm and dry.      Capillary Refill: Capillary refill takes less than 2 seconds.   Neurological:      General: No focal deficit present.      Mental Status: She is alert and oriented to person, place, and time.   Psychiatric:         Mood and Affect: Mood normal.         Behavior: Behavior normal.         Thought Content: Thought content normal.         Judgment: Judgment normal.        Result Review :  The following data was reviewed by: Tao " SYLWIA Casarez on 07/19/2022:  Common labs    Common Labsle 2/6/22 2/6/22 4/21/22 6/16/22 6/16/22    2153 2153  1324 1324   Glucose  77   113 (A)   BUN  13   14   Creatinine  0.44 (A)   0.70   eGFR Non African Am  >150      Sodium  142   137   Potassium  3.2 (A)   4.1   Chloride  112 (A)   102   Calcium  6.9 (A)   9.0   Albumin  3.20 (A)   4.00   Total Bilirubin  <0.2   0.4   Alkaline Phosphatase  67   71   AST (SGOT)  17   17   ALT (SGPT)  10   11   WBC 5.07  5.82 5.38    Hemoglobin 10.2 (A)  13.0 12.2    Hematocrit 31.3 (A)  40.2 37.1    Platelets 133 (A)  154 146    (A) Abnormal value            Current Outpatient Medications on File Prior to Visit   Medication Sig Dispense Refill   • buprenorphine-naloxone (SUBOXONE) 8-2 MG per SL tablet Place 1 tablet under the tongue 2 (Two) Times a Day.     • norethindrone (MICRONOR) 0.35 MG tablet Take 1 tablet by mouth Daily. 28 tablet 12   • OLANZapine (zyPREXA) 10 MG tablet Take 10 mg by mouth Daily.     • Sofosbuvir-Velpatasvir 400-100 MG tablet      • [DISCONTINUED] acetaminophen (TYLENOL) 500 MG tablet Take 1 tablet by mouth Every 6 (Six) Hours As Needed for Mild Pain . 60 tablet 1   • [DISCONTINUED] amLODIPine (NORVASC) 5 MG tablet Take 1 tablet by mouth 2 (Two) Times a Day. 180 tablet 0   • [DISCONTINUED] docusate sodium (COLACE) 100 MG capsule TAKE 1 CAPSULE BY MOUTH TWICE A DAY AS NEEDED     • [DISCONTINUED] hydrocortisone 1 % cream Apply 1 application topically to the appropriate area as directed 2 (Two) Times a Day. 1 each 1   • [DISCONTINUED] Hydrocortisone, Perianal, (PROCTOCORT) 1 % cream rectal cream APPLY 1 APPLICATION TOPICALLY TO THE APPROPRIATE AREA AS DIRECTED 2 (TWO) TIMES A DAY.     • [DISCONTINUED] loratadine (CLARITIN) 10 MG tablet Take 1 tablet by mouth Daily. 60 tablet 5   • [DISCONTINUED] losartan (Cozaar) 50 MG tablet Take 1 tablet by mouth 2 (Two) Times a Day. 180 tablet 1   • [DISCONTINUED] methocarbamol (ROBAXIN) 500 MG tablet TAKE 1 TABLET BY  MOUTH THREE TIMES A DAY AS NEEDED 90 tablet 0   • [DISCONTINUED] metoprolol tartrate (LOPRESSOR) 100 MG tablet Take 1 tablet by mouth 2 (Two) Times a Day. 180 tablet 1   • [DISCONTINUED] OLANZapine (zyPREXA) 15 MG tablet Take 1 tablet by mouth Every Night. 90 tablet 1   • [DISCONTINUED] omeprazole (PrilOSEC) 20 MG capsule Take 1 capsule by mouth Daily. 90 capsule 3   • [DISCONTINUED] ondansetron ODT (ZOFRAN-ODT) 4 MG disintegrating tablet Place 1 tablet on the tongue Every 8 (Eight) Hours As Needed for Nausea. 20 tablet 0   • [DISCONTINUED] polyethylene glycol (MIRALAX) 17 g packet Take 17 g by mouth Daily. 10 each 0   • [DISCONTINUED] sucralfate (CARAFATE) 1 g tablet TAKE 1 TABLET BY MOUTH THREE TIMES DAILY BEFORE MEALS 90 tablet 1   • [DISCONTINUED] venlafaxine XR (Effexor XR) 150 MG 24 hr capsule Take 1 capsule by mouth Daily. 90 capsule 3   • cloNIDine (CATAPRES) 0.1 MG tablet TAKE 1 TABLET BY MOUTH EVERY 12 HOURS FOR SWEATS, ELEVATED BLOOD PRESSURE, HEART RATE D/T WITHDRAWAL     • hydrOXYzine (ATARAX) 50 MG tablet TAKE 1 TABLET BY MOUTH EVERY 6 TO 8 HOURS AS NEEDED FOR INSOMNIA     • naloxone (NARCAN) 4 MG/0.1ML nasal spray INSTILL 1 SPRAY NASALLY EVERY 2-3 MINS AS NEEDED FOR OVERDOSE OR RESPIRATORY DEPRESSION.     • pramipexole (MIRAPEX) 0.25 MG tablet TAKE 1 TABLET BY MOUTH EVERY 12 HRS FOR RESTLESS LEGS.     • [DISCONTINUED] valACYclovir (VALTREX) 500 MG tablet Take 1,000 mg by mouth 2 (Two) Times a Day.       No current facility-administered medications on file prior to visit.                 Assessment and Plan   Diagnoses and all orders for this visit:    1. Essential hypertension (Primary)  -     amLODIPine (NORVASC) 5 MG tablet; Take 1 tablet by mouth 2 (Two) Times a Day.  Dispense: 180 tablet; Refill: 1  -     losartan (Cozaar) 50 MG tablet; Take 1 tablet by mouth 2 (Two) Times a Day.  Dispense: 180 tablet; Refill: 1  -     metoprolol tartrate (LOPRESSOR) 100 MG tablet; Take 1 tablet by mouth 2 (Two)  Times a Day.  Dispense: 180 tablet; Refill: 1  -     Lipid Panel w/ Chol/HDL Ratio    2. Grade I hemorrhoids  -     hydrocortisone 1 % cream; Apply 1 application topically to the appropriate area as directed 2 (Two) Times a Day.  Dispense: 1 each; Refill: 1  -     Hydrocortisone, Perianal, (PROCTOCORT) 1 % cream rectal cream; Insert  into the rectum 2 (Two) Times a Day.  Dispense: 28 g; Refill: 1    3. Chronic bilateral low back pain without sciatica  -     methocarbamol (ROBAXIN) 500 MG tablet; Take 1 tablet by mouth 3 (Three) Times a Day As Needed for Muscle Spasms.  Dispense: 90 tablet; Refill: 0    4. Gastroesophageal reflux disease, unspecified whether esophagitis present  -     omeprazole (PrilOSEC) 20 MG capsule; Take 1 capsule by mouth Daily.  Dispense: 90 capsule; Refill: 3  -     sucralfate (CARAFATE) 1 g tablet; Take 1 tablet by mouth 3 (Three) Times a Day Before Meals.  Dispense: 90 tablet; Refill: 1    5. Nausea  -     ondansetron ODT (ZOFRAN-ODT) 4 MG disintegrating tablet; Place 1 tablet on the tongue Every 8 (Eight) Hours As Needed for Nausea.  Dispense: 20 tablet; Refill: 0    6. Constipation, unspecified constipation type  -     docusate sodium (COLACE) 100 MG capsule; Take 1 capsule by mouth 2 (Two) Times a Day As Needed for Constipation.  Dispense: 60 capsule; Refill: 2  -     polyethylene glycol (MIRALAX) 17 g packet; Take 17 g by mouth Daily.  Dispense: 10 each; Refill: 0    7. Allergy, subsequent encounter  -     loratadine (CLARITIN) 10 MG tablet; Take 1 tablet by mouth Daily.  Dispense: 60 tablet; Refill: 5    8. Pain of left lower extremity  -     acetaminophen (TYLENOL) 500 MG tablet; Take 1 tablet by mouth Every 6 (Six) Hours As Needed for Mild Pain .  Dispense: 60 tablet; Refill: 1    9. Anxiety  -     OLANZapine (zyPREXA) 15 MG tablet; Take 1 tablet by mouth Every Night.  Dispense: 90 tablet; Refill: 1  -     venlafaxine XR (Effexor XR) 150 MG 24 hr capsule; Take 1 capsule by mouth  Daily.  Dispense: 90 capsule; Refill: 3    10. 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  Dispense: 30 tablet; Refill: 2    11. Acute cystitis without hematuria  -     Urinalysis With Culture If Indicated - Urine, Clean Catch  -     levoFLOXacin (Levaquin) 250 MG tablet; Take 1 tablet by mouth Daily for 3 days.  Dispense: 3 tablet; Refill: 0    12. Body mass index (BMI) of 37.0 to 37.9 in adult  -     Lipid Panel w/ Chol/HDL Ratio    Start antibiotic today. Stay hydrated. Continue medications as prescribed. Labs today. Follow-up in 3 months.    Discussed with the patient and all questioned fully answered. She will call me if any problems arise.           Follow Up   Return in about 3 months (around 10/19/2022).  Patient was given instructions and counseling regarding her condition or for health maintenance advice. Please see specific information pulled into the AVS if appropriate.

## 2022-07-19 NOTE — PATIENT INSTRUCTIONS
Start antibiotic today. Stay hydrated. Continue medications as prescribed. Labs today. Follow-up in 3 months.

## 2022-07-20 LAB
APPEARANCE UR: CLEAR
BACTERIA #/AREA URNS HPF: NORMAL /[HPF]
BILIRUB UR QL STRIP: NEGATIVE
CASTS URNS QL MICRO: NORMAL /LPF
CHOLEST SERPL-MCNC: 151 MG/DL (ref 100–199)
CHOLEST/HDLC SERPL: 4.3 RATIO (ref 0–4.4)
COLOR UR: YELLOW
EPI CELLS #/AREA URNS HPF: NORMAL /HPF (ref 0–10)
GLUCOSE UR QL STRIP: NEGATIVE
HDLC SERPL-MCNC: 35 MG/DL
HGB UR QL STRIP: NEGATIVE
KETONES UR QL STRIP: NEGATIVE
LDLC SERPL CALC-MCNC: 96 MG/DL (ref 0–99)
LEUKOCYTE ESTERASE UR QL STRIP: NEGATIVE
MICRO URNS: ABNORMAL
MICRO URNS: ABNORMAL
NITRITE UR QL STRIP: NEGATIVE
PH UR STRIP: 6 [PH] (ref 5–7.5)
PROT UR QL STRIP: NEGATIVE
RBC #/AREA URNS HPF: NORMAL /HPF (ref 0–2)
SP GR UR STRIP: >=1.03 (ref 1–1.03)
TRIGL SERPL-MCNC: 110 MG/DL (ref 0–149)
URINALYSIS REFLEX: ABNORMAL
UROBILINOGEN UR STRIP-MCNC: 1 MG/DL (ref 0.2–1)
VLDLC SERPL CALC-MCNC: 20 MG/DL (ref 5–40)
WBC #/AREA URNS HPF: NORMAL /HPF (ref 0–5)

## 2022-07-21 NOTE — PROGRESS NOTES
Cholesterol looks great. Will continue to monitor in the future.    Urine with few bacteria present and symptoms. Continue antibiotic until completion.

## 2022-08-19 ENCOUNTER — TELEPHONE (OUTPATIENT)
Dept: INTERNAL MEDICINE | Facility: CLINIC | Age: 47
End: 2022-08-19

## 2022-08-19 DIAGNOSIS — M54.50 CHRONIC BILATERAL LOW BACK PAIN WITHOUT SCIATICA: Chronic | ICD-10-CM

## 2022-08-19 DIAGNOSIS — F41.9 ANXIETY: Chronic | ICD-10-CM

## 2022-08-19 DIAGNOSIS — G89.29 CHRONIC BILATERAL LOW BACK PAIN WITHOUT SCIATICA: Chronic | ICD-10-CM

## 2022-08-19 RX ORDER — METHOCARBAMOL 500 MG/1
TABLET, FILM COATED ORAL
Qty: 90 TABLET | Refills: 0 | Status: SHIPPED | OUTPATIENT
Start: 2022-08-19 | End: 2022-10-25

## 2022-08-22 RX ORDER — VENLAFAXINE HYDROCHLORIDE 150 MG/1
CAPSULE, EXTENDED RELEASE ORAL
Qty: 90 CAPSULE | Refills: 3 | Status: SHIPPED | OUTPATIENT
Start: 2022-08-22 | End: 2023-02-27 | Stop reason: SDUPTHER

## 2022-08-25 ENCOUNTER — TELEPHONE (OUTPATIENT)
Dept: INTERNAL MEDICINE | Facility: CLINIC | Age: 47
End: 2022-08-25

## 2022-08-25 NOTE — TELEPHONE ENCOUNTER
PATIENT WAS SEEN ON July 19TH BY ELAINA ESCALANTE AND DIAGNOSED WITH A UTI IN WHICH SHE TOOK AN ANTIBIOTIC IT GOT BETTER FOR A SHORT PERIOD OF TIME BUT NOW IT IS BACK AND THIS TIME IT IS BURNING/PAINFUL WHEN SHE DOES PEE AND SHE ALSO HAS A YEAST INFECTION NOW. PATIENT IS REQUESTING 3 MED    > ANOTHER ROUND OF ANTIBIOTIC MAYBE A STRONGER ONE THIS TIME    > PYRIDIUM FOR THE DISCOMFORT      > DIFLUCAN FOR THE YEAST     [id number] [place], [place] - [address] [facility] - [phone]  - [phone]   [phone]    PATIENT> [phone]

## 2022-08-29 ENCOUNTER — OFFICE VISIT (OUTPATIENT)
Dept: INTERNAL MEDICINE | Facility: CLINIC | Age: 47
End: 2022-08-29

## 2022-08-29 VITALS
HEART RATE: 71 BPM | BODY MASS INDEX: 40.48 KG/M2 | RESPIRATION RATE: 18 BRPM | HEIGHT: 62 IN | DIASTOLIC BLOOD PRESSURE: 87 MMHG | TEMPERATURE: 97.3 F | WEIGHT: 220 LBS | OXYGEN SATURATION: 97 % | SYSTOLIC BLOOD PRESSURE: 128 MMHG

## 2022-08-29 DIAGNOSIS — R11.0 NAUSEA: ICD-10-CM

## 2022-08-29 DIAGNOSIS — B37.31 YEAST INFECTION OF THE VAGINA: ICD-10-CM

## 2022-08-29 DIAGNOSIS — N30.01 ACUTE CYSTITIS WITH HEMATURIA: Primary | ICD-10-CM

## 2022-08-29 PROCEDURE — 99213 OFFICE O/P EST LOW 20 MIN: CPT

## 2022-08-29 RX ORDER — ONDANSETRON 4 MG/1
4 TABLET, ORALLY DISINTEGRATING ORAL EVERY 8 HOURS PRN
Qty: 20 TABLET | Refills: 0 | Status: SHIPPED | OUTPATIENT
Start: 2022-08-29 | End: 2022-11-21

## 2022-08-29 RX ORDER — CEPHALEXIN 500 MG/1
500 CAPSULE ORAL 2 TIMES DAILY
Qty: 14 CAPSULE | Refills: 0 | Status: SHIPPED | OUTPATIENT
Start: 2022-08-29 | End: 2022-09-05

## 2022-08-29 RX ORDER — FLUCONAZOLE 150 MG/1
150 TABLET ORAL
Qty: 2 TABLET | Refills: 0 | Status: SHIPPED | OUTPATIENT
Start: 2022-08-29 | End: 2022-09-02

## 2022-08-29 NOTE — PATIENT INSTRUCTIONS
Keflex twice a day for 7 days. Diflucan once every 3 days for 2 dose. Zofran refill to pharmacy. Awaiting culture results. Stay hydrated with water.

## 2022-08-29 NOTE — PROGRESS NOTES
"Chief Complaint  Urinary Tract Infection (PT PRESENTS HERE WITH UTI SYMPTOMS)    Subjective        Silvia Clinton presents to [facility]  47-year-old female presenting with UTI and yeast infection.  Recently treated with Levaquin for UTI symptoms.  Stated she is prone to yeast infections after antibiotic use.  Has tried Lotrimin spray to treat with no relief.  Has noticed blood in your urine, clumping discharge had a painful \"piece of sand\" sensation.  Continues to have headaches and nausea which usually associates UTI and her history.  Denies fever and lower back pain.          Objective   Vital Signs:  /87 (BP Location: Right arm, Patient Position: Sitting, Cuff Size: Large Adult)   Pulse 71   Temp 97.3 °F (36.3 °C)   Resp 18   Ht 157.5 cm (62\")   Wt 99.8 kg (220 lb)   SpO2 97%   BMI 40.24 kg/m²   Estimated body mass index is 40.24 kg/m² as calculated from the following:    Height as of this encounter: 157.5 cm (62\").    Weight as of this encounter: 99.8 kg (220 lb).          Physical Exam  Vitals and nursing note reviewed.   Constitutional:       Appearance: Normal appearance.   HENT:      Head: Normocephalic.   Cardiovascular:      Rate and Rhythm: Normal rate.      Pulses: Normal pulses.   Pulmonary:      Effort: Pulmonary effort is normal.      Breath sounds: Normal breath sounds.   Musculoskeletal:      Cervical back: Normal range of motion.   Skin:     Capillary Refill: Capillary refill takes less than 2 seconds.   Neurological:      General: No focal deficit present.      Mental Status: She is alert and oriented to person, place, and time.   Psychiatric:         Mood and Affect: Mood normal.         Behavior: Behavior normal.         Thought Content: Thought content normal.         Judgment: Judgment normal.        Result Review :    Common labs    Common Labsle 4/21/22 6/16/22 6/16/22 7/19/22     1324 1324    Glucose   113 (A)    BUN   14    Creatinine   0.70  "   Sodium   137    Potassium   4.1    Chloride   102    Calcium   9.0    Albumin   4.00    Total Bilirubin   0.4    Alkaline Phosphatase   71    AST (SGOT)   17    ALT (SGPT)   11    WBC 5.82 5.38     Hemoglobin 13.0 12.2     Hematocrit 40.2 37.1     Platelets 154 146     Total Cholesterol    151   Triglycerides    110   HDL Cholesterol    35 (A)   LDL Cholesterol     96   (A) Abnormal value            Current Outpatient Medications on File Prior to Visit   Medication Sig Dispense Refill   • acetaminophen (TYLENOL) 500 MG tablet Take 1 tablet by mouth Every 6 (Six) Hours As Needed for Mild Pain . 60 tablet 1   • amLODIPine (NORVASC) 5 MG tablet Take 1 tablet by mouth 2 (Two) Times a Day. 180 tablet 1   • buprenorphine-naloxone (SUBOXONE) 8-2 MG per SL tablet Place 1 tablet under the tongue 2 (Two) Times a Day.     • cloNIDine (CATAPRES) 0.1 MG tablet TAKE 1 TABLET BY MOUTH EVERY 12 HOURS FOR SWEATS, ELEVATED BLOOD PRESSURE, HEART RATE D/T WITHDRAWAL     • docusate sodium (COLACE) 100 MG capsule Take 1 capsule by mouth 2 (Two) Times a Day As Needed for Constipation. 60 capsule 2   • hydrocortisone 1 % cream Apply 1 application topically to the appropriate area as directed 2 (Two) Times a Day. 1 each 1   • Hydrocortisone, Perianal, (PROCTOCORT) 1 % cream rectal cream Insert  into the rectum 2 (Two) Times a Day. 28 g 1   • hydrOXYzine (ATARAX) 50 MG tablet TAKE 1 TABLET BY MOUTH EVERY 6 TO 8 HOURS AS NEEDED FOR INSOMNIA     • loratadine (CLARITIN) 10 MG tablet Take 1 tablet by mouth Daily. 60 tablet 5   • losartan (Cozaar) 50 MG tablet Take 1 tablet by mouth 2 (Two) Times a Day. 180 tablet 1   • methocarbamol (ROBAXIN) 500 MG tablet TAKE 1 TABLET BY MOUTH 3 TIMES A DAY AS NEEDED FOR MUSCLE SPASMS. 90 tablet 0   • metoprolol tartrate (LOPRESSOR) 100 MG tablet Take 1 tablet by mouth 2 (Two) Times a Day. 180 tablet 1   • naloxone (NARCAN) 4 MG/0.1ML nasal spray INSTILL 1 SPRAY NASALLY EVERY 2-3 MINS AS NEEDED FOR OVERDOSE  OR RESPIRATORY DEPRESSION.     • norethindrone (MICRONOR) 0.35 MG tablet Take 1 tablet by mouth Daily. 28 tablet 12   • OLANZapine (zyPREXA) 10 MG tablet Take 10 mg by mouth Daily.     • OLANZapine (zyPREXA) 15 MG tablet Take 1 tablet by mouth Every Night. 90 tablet 1   • omeprazole (PrilOSEC) 20 MG capsule Take 1 capsule by mouth Daily. 90 capsule 3   • polyethylene glycol (MIRALAX) 17 g packet Take 17 g by mouth Daily. 10 each 0   • pramipexole (MIRAPEX) 0.25 MG tablet TAKE 1 TABLET BY MOUTH EVERY 12 HRS FOR RESTLESS LEGS.     • Sofosbuvir-Velpatasvir 400-100 MG tablet      • sucralfate (CARAFATE) 1 g tablet Take 1 tablet by mouth 3 (Three) Times a Day Before Meals. 90 tablet 1   • valACYclovir (VALTREX) 500 MG tablet Take 2 tablets by mouth 2 (Two) Times a Day. 30 tablet 2   • venlafaxine XR (EFFEXOR-XR) 150 MG 24 hr capsule TAKE 1 CAPSULE BY MOUTH EVERY DAY 90 capsule 3   • [DISCONTINUED] ondansetron ODT (ZOFRAN-ODT) 4 MG disintegrating tablet Place 1 tablet on the tongue Every 8 (Eight) Hours As Needed for Nausea. 20 tablet 0     No current facility-administered medications on file prior to visit.               Assessment and Plan   Diagnoses and all orders for this visit:    1. Acute cystitis with hematuria (Primary)  -     Urinalysis With Culture If Indicated - Urine, Clean Catch  -     cephalexin (Keflex) 500 MG capsule; Take 1 capsule by mouth 2 (Two) Times a Day for 7 days.  Dispense: 14 capsule; Refill: 0    2. Yeast infection of the vagina  -     Urinalysis With Culture If Indicated - Urine, Clean Catch  -     fluconazole (Diflucan) 150 MG tablet; Take 1 tablet by mouth Every 72 (Seventy-Two) Hours for 2 doses.  Dispense: 2 tablet; Refill: 0    3. Nausea  -     ondansetron ODT (ZOFRAN-ODT) 4 MG disintegrating tablet; Place 1 tablet on the tongue Every 8 (Eight) Hours As Needed for Nausea.  Dispense: 20 tablet; Refill: 0    Keflex twice a day for 7 days. Diflucan once every 3 days for 2 dose. Zofran  refill to pharmacy. Awaiting culture results. Stay hydrated with water.          Follow Up   Return for Next scheduled follow up.  Patient was given instructions and counseling regarding her condition or for health maintenance advice. Please see specific information pulled into the AVS if appropriate.

## 2022-08-30 LAB
APPEARANCE UR: CLEAR
BACTERIA #/AREA URNS HPF: NORMAL /[HPF]
BILIRUB UR QL STRIP: NEGATIVE
CASTS URNS QL MICRO: NORMAL /LPF
COLOR UR: YELLOW
EPI CELLS #/AREA URNS HPF: NORMAL /HPF (ref 0–10)
GLUCOSE UR QL STRIP: NEGATIVE
HGB UR QL STRIP: NEGATIVE
KETONES UR QL STRIP: NEGATIVE
LEUKOCYTE ESTERASE UR QL STRIP: NEGATIVE
MICRO URNS: NORMAL
MICRO URNS: NORMAL
NITRITE UR QL STRIP: NEGATIVE
PH UR STRIP: 5.5 [PH] (ref 5–7.5)
PROT UR QL STRIP: NEGATIVE
RBC #/AREA URNS HPF: NORMAL /HPF (ref 0–2)
SP GR UR STRIP: 1.02 (ref 1–1.03)
URINALYSIS REFLEX: NORMAL
UROBILINOGEN UR STRIP-MCNC: 0.2 MG/DL (ref 0.2–1)
WBC #/AREA URNS HPF: NORMAL /HPF (ref 0–5)

## 2022-08-30 NOTE — PROGRESS NOTES
Urinalysis is complete and looks perfect.  Continue taking medications as prescribed.  Stay hydrated with water.  Avoid urinary retention.  We will continue to monitor in the future to look for other causes for dysuria.

## 2022-10-05 ENCOUNTER — HOSPITAL ENCOUNTER (EMERGENCY)
Facility: HOSPITAL | Age: 47
Discharge: HOME | End: 2022-10-05

## 2022-10-05 VITALS
RESPIRATION RATE: 16 BRPM | SYSTOLIC BLOOD PRESSURE: 150 MMHG | OXYGEN SATURATION: 97 % | DIASTOLIC BLOOD PRESSURE: 94 MMHG | TEMPERATURE: 99.2 F | HEART RATE: 87 BPM

## 2022-10-05 PROCEDURE — 99211 OFF/OP EST MAY X REQ PHY/QHP: CPT

## 2022-10-06 NOTE — ED TRIAGE NOTES
Pt to ER from home with c/o rash in the groin area, left armpit, and on back that started a few weeks ago. Pt states she has spots in her nose. The rash hasn't gotten any better. Pt states rash burns and itches extremely bad. Pt to ER with mask on along with nursing staff.

## 2022-10-14 ENCOUNTER — OFFICE VISIT (OUTPATIENT)
Dept: OBSTETRICS AND GYNECOLOGY | Age: 47
End: 2022-10-14

## 2022-10-14 VITALS
DIASTOLIC BLOOD PRESSURE: 76 MMHG | BODY MASS INDEX: 36.49 KG/M2 | SYSTOLIC BLOOD PRESSURE: 122 MMHG | HEIGHT: 65 IN | WEIGHT: 219 LBS

## 2022-10-14 DIAGNOSIS — N89.8 VAGINAL DISCHARGE: ICD-10-CM

## 2022-10-14 DIAGNOSIS — B86 SCABIES: ICD-10-CM

## 2022-10-14 DIAGNOSIS — N89.8 VAGINAL ITCHING: Primary | ICD-10-CM

## 2022-10-14 PROCEDURE — 99213 OFFICE O/P EST LOW 20 MIN: CPT | Performed by: NURSE PRACTITIONER

## 2022-10-14 RX ORDER — PERMETHRIN 50 MG/G
1 CREAM TOPICAL DAILY
Qty: 10 G | Refills: 0 | Status: SHIPPED | OUTPATIENT
Start: 2022-10-14 | End: 2022-10-27 | Stop reason: SDUPTHER

## 2022-10-14 RX ORDER — NYSTATIN AND TRIAMCINOLONE ACETONIDE 100000; 1 [USP'U]/G; MG/G
1 OINTMENT TOPICAL 2 TIMES DAILY
Qty: 28 G | Refills: 0 | Status: SHIPPED | OUTPATIENT
Start: 2022-10-14 | End: 2022-10-27 | Stop reason: SDUPTHER

## 2022-10-14 NOTE — PROGRESS NOTES
[facility]   Obstetrics and Gynecology     10/14/2022      Patient:  Silvia Clinton   MR#:[id number]    Office note    Chief Complaint   Patient presents with   • Gynecologic Exam     Vaginal itching , discharge.  Rash on certain parts of body.       Subjective     History of Present Illness  47 y.o. female No obstetric history on file.  Presents for evaluation of vaginal discharge, itching, burning and exposure to scabies.  She reports every time she arrives in her boyfriend's vehicle she gets scabies.  She rode in his truck this week and now has a breakout to her bilateral lower legs, chest, fingers, fingernails.  Her vagina is very tender and it hurts when urine touches her skin.        Relevant data reviewed:      Patient Active Problem List   Diagnosis   • Spinal epidural abscess   • Non-STEMI (non-ST elevated myocardial infarction) (CMS/HCC) - 2 years ago   • Chest pain   • Dizziness   • Drug abuse (HCC)   • Essential hypertension   • Lupus (systemic lupus erythematosus) ([facility])   • Palpitations   • Vestibular neuritis, left   • Lower extremity edema   • Weight gain   • Chronic left-sided low back pain without sciatica   • Nausea   • Lumbar radiculopathy   • Pain of left lower extremity   • Obesity, Class III, BMI 40-49.9 (morbid obesity) ([facility])   • Fear associated with healthcare   • Iron deficiency anemia due to chronic blood loss   • Adverse effect of iron   • Fatigue   • Allergies   • Heart murmur   • Constipation   • Elevated LFTs   • Hepatitis C   • Dysmenorrhea   • Bipolar 2 disorder (HCC)   • Anxiety   • GERD (gastroesophageal reflux disease)   • History of drug abuse (HCC)   • Dietary counseling   • Vaginal itching   • Grade I hemorrhoids   • COVID-19   • Fever   • Heartburn       Past Medical History:   Diagnosis Date   • Chest pain    • Depression    • Drug abstinence syndrome (HCC)    • Drug abuse (HCC)    • Ex-smoker    • Heart attack (HCC)    • Hepatitis C    • Hepatitis C    •  Hypertension    • Kidney stone    • Lupus (HCC)    • Lupus (systemic lupus erythematosus) (HCC)    • Mitral valve anterior leaflet prolapse    • NSTEMI (non-ST elevated myocardial infarction) (HCC)    • Otitis    • Palpitations    • Seizures (HCC)    • Spinal epidural abscess    • Tachycardia      Past Surgical History:   Procedure Laterality Date   • BACK SURGERY     • CHOLECYSTECTOMY     • LEG SURGERY      broke tibula,fibula, steel noah   • LIVER BIOPSY     • LUMBAR LAMINECTOMY DISCECTOMY DECOMPRESSION Left 2018    Procedure: Posterior lumbar three through sacrum decompression;  Surgeon: Tevin Whitley MD;  Location: [facility];  Service: Neurosurgery   • LYMPH NODE BIOPSY     • SPINE SURGERY       Obstetric History:  OB History    No obstetric history on file.        Menstrual History:     Patient's last menstrual period was 10/13/2022.       No obstetric history on file.  Family History   Problem Relation Age of Onset   • Diabetes Mother    • No Known Problems Father      Social History     Tobacco Use   • Smoking status: Former     Packs/day: 1.00     Years: 30.00     Pack years: 30.00     Types: Cigarettes     Quit date: 2020     Years since quittin.1   • Smokeless tobacco: Never   • Tobacco comments:     E-CIG USE   Vaping Use   • Vaping Use: Every day   • Start date: 2020   • Substances: Nicotine, Flavoring   • Devices: Pre-filled or refillable cartridge, Refillable tank   Substance Use Topics   • Alcohol use: No     Comment: CAFFEINE USE: 1 CUP COFFEE/ 2 COKES DAILY   • Drug use: Not Currently     Types: IV, Heroin, Methamphetamines     Comment: pt states she no longer uses heroin, she now uses meth     Sulfa antibiotics    Current Outpatient Medications:   •  acetaminophen (TYLENOL) 500 MG tablet, Take 1 tablet by mouth Every 6 (Six) Hours As Needed for Mild Pain ., Disp: 60 tablet, Rfl: 1  •  amLODIPine (NORVASC) 5 MG tablet, Take 1 tablet by mouth 2 (Two) Times a Day., Disp: 180  tablet, Rfl: 1  •  buprenorphine-naloxone (SUBOXONE) 8-2 MG per SL tablet, Place 1 tablet under the tongue 2 (Two) Times a Day., Disp: , Rfl:   •  cloNIDine (CATAPRES) 0.1 MG tablet, TAKE 1 TABLET BY MOUTH EVERY 12 HOURS FOR SWEATS, ELEVATED BLOOD PRESSURE, HEART RATE D/T WITHDRAWAL, Disp: , Rfl:   •  docusate sodium (COLACE) 100 MG capsule, Take 1 capsule by mouth 2 (Two) Times a Day As Needed for Constipation., Disp: 60 capsule, Rfl: 2  •  hydrocortisone 1 % cream, Apply 1 application topically to the appropriate area as directed 2 (Two) Times a Day., Disp: 1 each, Rfl: 1  •  hydrOXYzine (ATARAX) 50 MG tablet, TAKE 1 TABLET BY MOUTH EVERY 6 TO 8 HOURS AS NEEDED FOR INSOMNIA, Disp: , Rfl:   •  loratadine (CLARITIN) 10 MG tablet, Take 1 tablet by mouth Daily., Disp: 60 tablet, Rfl: 5  •  losartan (Cozaar) 50 MG tablet, Take 1 tablet by mouth 2 (Two) Times a Day., Disp: 180 tablet, Rfl: 1  •  methocarbamol (ROBAXIN) 500 MG tablet, TAKE 1 TABLET BY MOUTH 3 TIMES A DAY AS NEEDED FOR MUSCLE SPASMS., Disp: 90 tablet, Rfl: 0  •  metoprolol tartrate (LOPRESSOR) 100 MG tablet, Take 1 tablet by mouth 2 (Two) Times a Day., Disp: 180 tablet, Rfl: 1  •  naloxone (NARCAN) 4 MG/0.1ML nasal spray, INSTILL 1 SPRAY NASALLY EVERY 2-3 MINS AS NEEDED FOR OVERDOSE OR RESPIRATORY DEPRESSION., Disp: , Rfl:   •  OLANZapine (zyPREXA) 10 MG tablet, Take 10 mg by mouth Daily., Disp: , Rfl:   •  omeprazole (PrilOSEC) 20 MG capsule, Take 1 capsule by mouth Daily., Disp: 90 capsule, Rfl: 3  •  ondansetron ODT (ZOFRAN-ODT) 4 MG disintegrating tablet, Place 1 tablet on the tongue Every 8 (Eight) Hours As Needed for Nausea., Disp: 20 tablet, Rfl: 0  •  polyethylene glycol (MIRALAX) 17 g packet, Take 17 g by mouth Daily., Disp: 10 each, Rfl: 0  •  pramipexole (MIRAPEX) 0.25 MG tablet, TAKE 1 TABLET BY MOUTH EVERY 12 HRS FOR RESTLESS LEGS., Disp: , Rfl:   •  sucralfate (CARAFATE) 1 g tablet, Take 1 tablet by mouth 3 (Three) Times a Day Before Meals.,  Disp: 90 tablet, Rfl: 1  •  valACYclovir (VALTREX) 500 MG tablet, Take 2 tablets by mouth 2 (Two) Times a Day., Disp: 30 tablet, Rfl: 2  •  venlafaxine XR (EFFEXOR-XR) 150 MG 24 hr capsule, TAKE 1 CAPSULE BY MOUTH EVERY DAY, Disp: 90 capsule, Rfl: 3  •  Hydrocortisone, Perianal, (PROCTOCORT) 1 % cream rectal cream, Insert  into the rectum 2 (Two) Times a Day., Disp: 28 g, Rfl: 1  •  norethindrone (MICRONOR) 0.35 MG tablet, Take 1 tablet by mouth Daily., Disp: 28 tablet, Rfl: 12  •  nystatin-triamcinolone (MYCOLOG) 168401-9.1 UNIT/GM-% ointment, Apply 1 application topically to the appropriate area as directed 2 (Two) Times a Day for 14 days., Disp: 28 g, Rfl: 0  •  OLANZapine (zyPREXA) 15 MG tablet, Take 1 tablet by mouth Every Night., Disp: 90 tablet, Rfl: 1  •  permethrin (ELIMITE) 5 % cream, Apply 1 application topically to the appropriate area as directed Daily for 10 days. Apply to entire body, leave on for 8 to 14 hours before washing off with water. Repeat this regimen daily x 7 days then twice weekly until symptoms have resolved., Disp: 10 g, Rfl: 0  •  Sofosbuvir-Velpatasvir 400-100 MG tablet, , Disp: , Rfl:     The following portions of the patient's history were reviewed and updated as appropriate: allergies, current medications, past family history, past medical history, past social history, past surgical history, and problem list.    Review of Systems   Constitutional: Negative for activity change, appetite change, chills, fatigue and fever.   Respiratory: Negative for cough and shortness of breath.    Cardiovascular: Negative for chest pain.   Gastrointestinal: Negative for constipation, diarrhea, nausea and vomiting.   Genitourinary: Positive for vaginal pain. Negative for dysuria, flank pain, genital sores, hematuria, menstrual problem and vaginal bleeding.   Skin: Positive for rash.       BP Readings from Last 3 Encounters:   10/14/22 122/76   08/29/22 128/87   07/19/22 115/79      Wt Readings from  "Last 3 Encounters:   10/14/22 99.3 kg (219 lb)   08/29/22 99.8 kg (220 lb)   07/19/22 103 kg (227 lb)      BMI: Estimated body mass index is 36.44 kg/m² as calculated from the following:    Height as of this encounter: 165.1 cm (65\").    Weight as of this encounter: 99.3 kg (219 lb). BSA: Estimated body surface area is 2.06 meters squared as calculated from the following:    Height as of this encounter: 165.1 cm (65\").    Weight as of this encounter: 99.3 kg (219 lb).    Objective   Physical Exam  Constitutional:       Appearance: Normal appearance.   HENT:      Head: Normocephalic and atraumatic.   Eyes:      Pupils: Pupils are equal, round, and reactive to light.   Pulmonary:      Effort: Pulmonary effort is normal.   Abdominal:      General: Abdomen is flat.      Palpations: Abdomen is soft.   Genitourinary:     General: Normal vulva.      Exam position: Lithotomy position.      Labia:         Right: No rash, tenderness or lesion.         Left: No rash, tenderness or lesion.       Vagina: Normal.      Cervix: Normal.      Comments: Excoriation to labia minora and vaginal introitus  Musculoskeletal:         General: Normal range of motion.      Cervical back: Normal range of motion.   Skin:     General: Skin is warm and dry.      Findings: Erythema and rash ( Crusted over, red scabies appearing lesions to bilateral ankles, chest, fingers) present.   Neurological:      Mental Status: She is alert.   Psychiatric:         Mood and Affect: Mood normal.         Behavior: Behavior normal.         Assessment & Plan     Diagnoses and all orders for this visit:    1. Vaginal itching (Primary)  -     NuSwab VG+ - Swab, Vagina  -     nystatin-triamcinolone (MYCOLOG) 153609-9.1 UNIT/GM-% ointment; Apply 1 application topically to the appropriate area as directed 2 (Two) Times a Day for 14 days.  Dispense: 28 g; Refill: 0    2. Vaginal discharge  -     NuSwab VG+ - Swab, Vagina  -     nystatin-triamcinolone (MYCOLOG) " 101641-3.1 UNIT/GM-% ointment; Apply 1 application topically to the appropriate area as directed 2 (Two) Times a Day for 14 days.  Dispense: 28 g; Refill: 0    3. Scabies  -     permethrin (ELIMITE) 5 % cream; Apply 1 application topically to the appropriate area as directed Daily for 10 days. Apply to entire body, leave on for 8 to 14 hours before washing off with water. Repeat this regimen daily x 7 days then twice weekly until symptoms have resolved.  Dispense: 10 g; Refill: 0         Return if symptoms worsen or fail to improve.    Antoinette Cornejo, SYLWIA   10/14/2022 17:01 EDT

## 2022-10-18 LAB
A VAGINAE DNA VAG QL NAA+PROBE: NORMAL SCORE
BVAB2 DNA VAG QL NAA+PROBE: NORMAL SCORE
C ALBICANS DNA VAG QL NAA+PROBE: NEGATIVE
C GLABRATA DNA VAG QL NAA+PROBE: NEGATIVE
C TRACH DNA VAG QL NAA+PROBE: NEGATIVE
MEGA1 DNA VAG QL NAA+PROBE: NORMAL SCORE
N GONORRHOEA DNA VAG QL NAA+PROBE: NEGATIVE
T VAGINALIS DNA VAG QL NAA+PROBE: NEGATIVE

## 2022-10-23 DIAGNOSIS — M54.50 CHRONIC BILATERAL LOW BACK PAIN WITHOUT SCIATICA: Chronic | ICD-10-CM

## 2022-10-23 DIAGNOSIS — G89.29 CHRONIC BILATERAL LOW BACK PAIN WITHOUT SCIATICA: Chronic | ICD-10-CM

## 2022-10-25 RX ORDER — METHOCARBAMOL 500 MG/1
TABLET, FILM COATED ORAL
Qty: 90 TABLET | Refills: 0 | Status: SHIPPED | OUTPATIENT
Start: 2022-10-25 | End: 2023-01-18

## 2022-10-26 ENCOUNTER — TELEPHONE (OUTPATIENT)
Dept: OBSTETRICS AND GYNECOLOGY | Age: 47
End: 2022-10-26

## 2022-10-26 NOTE — TELEPHONE ENCOUNTER
Caller: Silvia Clinton    Relationship to patient: Self    Best call back number: [phone]    Patient is needing: PT IS REQUESTING IF SHE CAN HAVE THE NYSTATIN OINTMENT AND PERMETHRIN CREAM BE SENT TO [facility] FOR REFILL. PT STATES SHE IS STILL HAVING SOME ITCHING. SHE CAN BE REACHED ANYTIME, OK TO [facility].

## 2022-10-27 ENCOUNTER — TELEPHONE (OUTPATIENT)
Dept: INTERNAL MEDICINE | Facility: CLINIC | Age: 47
End: 2022-10-27

## 2022-10-27 DIAGNOSIS — N89.8 VAGINAL ITCHING: ICD-10-CM

## 2022-10-27 DIAGNOSIS — N89.8 VAGINAL DISCHARGE: ICD-10-CM

## 2022-10-27 DIAGNOSIS — B86 SCABIES: ICD-10-CM

## 2022-10-27 RX ORDER — PERMETHRIN 50 MG/G
1 CREAM TOPICAL DAILY
Qty: 10 G | Refills: 0 | Status: SHIPPED | OUTPATIENT
Start: 2022-10-27 | End: 2022-11-06

## 2022-10-27 RX ORDER — NYSTATIN AND TRIAMCINOLONE ACETONIDE 100000; 1 [USP'U]/G; MG/G
1 OINTMENT TOPICAL 2 TIMES DAILY
Qty: 28 G | Refills: 0 | Status: SHIPPED | OUTPATIENT
Start: 2022-10-27 | End: 2022-11-10

## 2022-10-27 NOTE — TELEPHONE ENCOUNTER
Caller: AYSHA    Relationship: [facility]     Best call back number: [phone]    Equipment requested: MOBILITY BACK BRACE     Reason for the request: NONE GIVEN    Prescribing Provider:ELAINA ESCALANTE    Additional information or concerns:     FAX SENT 9/27/2022 , AYSHA WOULD LIKE TO KNOW IF ORDER HAS BEEN SIGNED

## 2022-10-27 NOTE — TELEPHONE ENCOUNTER
Please notify pt that meds were refilled. If not better after this round, she needs to f/u with primary care or dermatology for re evaluation

## 2022-10-31 ENCOUNTER — TELEPHONE (OUTPATIENT)
Dept: INTERNAL MEDICINE | Facility: CLINIC | Age: 47
End: 2022-10-31

## 2022-11-10 ENCOUNTER — TELEPHONE (OUTPATIENT)
Dept: OBSTETRICS AND GYNECOLOGY | Age: 47
End: 2022-11-10

## 2022-11-10 RX ORDER — ACETAMINOPHEN AND CODEINE PHOSPHATE 120; 12 MG/5ML; MG/5ML
1 SOLUTION ORAL DAILY
Qty: 28 TABLET | Refills: 12 | Status: SHIPPED | OUTPATIENT
Start: 2022-11-10 | End: 2023-11-10

## 2022-11-10 NOTE — TELEPHONE ENCOUNTER
PT calls needing a script of BCP sent in, last annual exam 10/2021 and scheduled for next annual exam on 01/10/2023. Please advise if you will send in refills to this appointment date, pt aware she will not get a yearly script until she has an updated annual exam. Pharmacy verified

## 2022-11-20 DIAGNOSIS — R11.0 NAUSEA: ICD-10-CM

## 2022-11-21 RX ORDER — ONDANSETRON 4 MG/1
4 TABLET, ORALLY DISINTEGRATING ORAL EVERY 8 HOURS PRN
Qty: 20 TABLET | Refills: 0 | Status: SHIPPED | OUTPATIENT
Start: 2022-11-21 | End: 2023-02-08 | Stop reason: SDUPTHER

## 2022-11-21 NOTE — TELEPHONE ENCOUNTER
Rx Refill Note  Requested Prescriptions     Pending Prescriptions Disp Refills   • ondansetron ODT (ZOFRAN-ODT) 4 MG disintegrating tablet [Pharmacy Med Name: ONDANSETRON ODT 4 MG TABLET] 20 tablet 0     Sig: PLACE 1 TABLET ON THE TONGUE EVERY 8 (EIGHT) HOURS AS NEEDED FOR NAUSEA.      Last office visit with prescribing clinician: 8/29/2022      Next office visit with prescribing clinician: Visit date not found            Flor Kumar  11/21/22, 10:13 EST

## 2022-11-23 DIAGNOSIS — K21.9 GASTROESOPHAGEAL REFLUX DISEASE, UNSPECIFIED WHETHER ESOPHAGITIS PRESENT: Chronic | ICD-10-CM

## 2022-11-23 RX ORDER — SUCRALFATE 1 G/1
1 TABLET ORAL
Qty: 90 TABLET | Refills: 1 | OUTPATIENT
Start: 2022-11-23

## 2022-12-15 ENCOUNTER — LAB (OUTPATIENT)
Dept: OTHER | Facility: HOSPITAL | Age: 47
End: 2022-12-15

## 2022-12-15 ENCOUNTER — OFFICE VISIT (OUTPATIENT)
Dept: ONCOLOGY | Facility: CLINIC | Age: 47
End: 2022-12-15

## 2022-12-15 VITALS
BODY MASS INDEX: 35.65 KG/M2 | HEART RATE: 65 BPM | HEIGHT: 65 IN | RESPIRATION RATE: 16 BRPM | OXYGEN SATURATION: 96 % | TEMPERATURE: 96.9 F | DIASTOLIC BLOOD PRESSURE: 76 MMHG | WEIGHT: 214 LBS | SYSTOLIC BLOOD PRESSURE: 122 MMHG

## 2022-12-15 DIAGNOSIS — D50.0 IRON DEFICIENCY ANEMIA DUE TO CHRONIC BLOOD LOSS: ICD-10-CM

## 2022-12-15 DIAGNOSIS — T45.4X5A ADVERSE EFFECT OF IRON, INITIAL ENCOUNTER: Primary | ICD-10-CM

## 2022-12-15 DIAGNOSIS — T45.4X5A ADVERSE EFFECT OF IRON, INITIAL ENCOUNTER: ICD-10-CM

## 2022-12-15 LAB
ALBUMIN SERPL-MCNC: 4.4 G/DL (ref 3.5–5.2)
ALBUMIN/GLOB SERPL: 1.3 G/DL
ALP SERPL-CCNC: 75 U/L (ref 39–117)
ALT SERPL W P-5'-P-CCNC: 10 U/L (ref 1–33)
ANION GAP SERPL CALCULATED.3IONS-SCNC: 10.2 MMOL/L (ref 5–15)
AST SERPL-CCNC: 19 U/L (ref 1–32)
BASOPHILS # BLD AUTO: 0.02 10*3/MM3 (ref 0–0.2)
BASOPHILS NFR BLD AUTO: 0.5 % (ref 0–1.5)
BILIRUB SERPL-MCNC: 0.3 MG/DL (ref 0–1.2)
BUN SERPL-MCNC: 13 MG/DL (ref 6–20)
BUN/CREAT SERPL: 21.3 (ref 7–25)
CALCIUM SPEC-SCNC: 9.7 MG/DL (ref 8.6–10.5)
CHLORIDE SERPL-SCNC: 104 MMOL/L (ref 98–107)
CO2 SERPL-SCNC: 28.8 MMOL/L (ref 22–29)
CREAT SERPL-MCNC: 0.61 MG/DL (ref 0.57–1)
DEPRECATED RDW RBC AUTO: 41.3 FL (ref 37–54)
EGFRCR SERPLBLD CKD-EPI 2021: 112.5 ML/MIN/1.73
EOSINOPHIL # BLD AUTO: 0.08 10*3/MM3 (ref 0–0.4)
EOSINOPHIL NFR BLD AUTO: 2 % (ref 0.3–6.2)
ERYTHROCYTE [DISTWIDTH] IN BLOOD BY AUTOMATED COUNT: 12.9 % (ref 12.3–15.4)
FERRITIN SERPL-MCNC: 12.9 NG/ML (ref 13–150)
GLOBULIN UR ELPH-MCNC: 3.5 GM/DL
GLUCOSE SERPL-MCNC: 124 MG/DL (ref 65–99)
HCT VFR BLD AUTO: 35.8 % (ref 34–46.6)
HGB BLD-MCNC: 11.4 G/DL (ref 12–15.9)
IMM GRANULOCYTES # BLD AUTO: 0.01 10*3/MM3 (ref 0–0.05)
IMM GRANULOCYTES NFR BLD AUTO: 0.2 % (ref 0–0.5)
IRON 24H UR-MRATE: 30 MCG/DL (ref 37–145)
IRON SATN MFR SERPL: 6 % (ref 20–50)
LYMPHOCYTES # BLD AUTO: 1 10*3/MM3 (ref 0.7–3.1)
LYMPHOCYTES NFR BLD AUTO: 24.6 % (ref 19.6–45.3)
MCH RBC QN AUTO: 28 PG (ref 26.6–33)
MCHC RBC AUTO-ENTMCNC: 31.8 G/DL (ref 31.5–35.7)
MCV RBC AUTO: 88 FL (ref 79–97)
MONOCYTES # BLD AUTO: 0.24 10*3/MM3 (ref 0.1–0.9)
MONOCYTES NFR BLD AUTO: 5.9 % (ref 5–12)
NEUTROPHILS NFR BLD AUTO: 2.71 10*3/MM3 (ref 1.7–7)
NEUTROPHILS NFR BLD AUTO: 66.8 % (ref 42.7–76)
NRBC BLD AUTO-RTO: 0 /100 WBC (ref 0–0.2)
PLATELET # BLD AUTO: 181 10*3/MM3 (ref 140–450)
PMV BLD AUTO: 11.4 FL (ref 6–12)
POTASSIUM SERPL-SCNC: 3.5 MMOL/L (ref 3.5–5.2)
PROT SERPL-MCNC: 7.9 G/DL (ref 6–8.5)
RBC # BLD AUTO: 4.07 10*6/MM3 (ref 3.77–5.28)
SODIUM SERPL-SCNC: 143 MMOL/L (ref 136–145)
TIBC SERPL-MCNC: 493 MCG/DL (ref 298–536)
TRANSFERRIN SERPL-MCNC: 331 MG/DL (ref 200–360)
WBC NRBC COR # BLD: 4.06 10*3/MM3 (ref 3.4–10.8)

## 2022-12-15 PROCEDURE — 84466 ASSAY OF TRANSFERRIN: CPT | Performed by: INTERNAL MEDICINE

## 2022-12-15 PROCEDURE — 99214 OFFICE O/P EST MOD 30 MIN: CPT | Performed by: INTERNAL MEDICINE

## 2022-12-15 PROCEDURE — 83540 ASSAY OF IRON: CPT | Performed by: INTERNAL MEDICINE

## 2022-12-15 PROCEDURE — 85025 COMPLETE CBC W/AUTO DIFF WBC: CPT | Performed by: INTERNAL MEDICINE

## 2022-12-15 PROCEDURE — 82728 ASSAY OF FERRITIN: CPT | Performed by: INTERNAL MEDICINE

## 2022-12-15 PROCEDURE — 36415 COLL VENOUS BLD VENIPUNCTURE: CPT

## 2022-12-15 PROCEDURE — 80053 COMPREHEN METABOLIC PANEL: CPT | Performed by: INTERNAL MEDICINE

## 2022-12-15 NOTE — PROGRESS NOTES
Subjective   Silvia Clinton is a 47 y.o. female.  Referred by Suhas Oseguera for iron deficiency anemia    History of Present Illness   Ms. Clinton is a 45-year-old premenopausal lady with history of hepatitis C, history of drug use, hypertension, SLE(she has not seen a rheumatologist), MRSA infection presents for further evaluation of anemia.She has had anemia since 2018.  Initially MCV was normal but subsequently noted to have low MCV with CBC on 7/8/2021 showing a hemoglobin of 8.9 and MCV of 73.3.  WBC count and platelet count has remained relatively normal except for some mild thrombocytopenia in 2019.  Per patient she has been anemic all her life since her teenage years.  She would take oral iron to help with the anemia and would have improvement.  After she was noted to be anemic she started oral iron .  Unfortunately she is not able to tolerate the iron supplements and she is having significant amount of nausea, abdominal pain, constipation to a point where she is not able to take the supplements.  She has not had menstrual cycles for 10 years when she was doing IV drugs but subsequently had menorrhagia for a year.  She has started using homeopathic medications for the past 2 months which helped with her heavy menstrual cycles and over the past 2 months she had fairly normal menstrual cycles.  She is scheduled to see OB/GYN next month.  She also reports bright red blood per rectum which has been going on for several months.  She has seen Dr. Steel.   She denies any malignancies in her immediate family.  Her maternal aunt had breast cancer.  Distant relatives with lung cancer and renal cancer.    She had COVID-19 infection in December 2021.  Received 2 doses of Injectafer in February 2022.    Interval history  Ms. Clinton presents to the clinic today for follow-up.  Uterine ablation not performed yet.  She continues to have menorrhagia.  Reports feeling tired and some amount of dyspnea.  She plans to start  a new job at [employer] next week on a third shift.    The following portions of the patient's history were reviewed and updated as appropriate: allergies, current medications, past family history, past medical history, past social history, past surgical history and problem list.    Past Medical History:   Diagnosis Date   • Chest pain    • Depression    • Drug abstinence syndrome (HCC)    • Drug abuse (HCC)    • Ex-smoker    • Heart attack (HCC)    • Hepatitis C    • Hepatitis C    • Hypertension    • Kidney stone    • Lupus (HCC)    • Lupus (systemic lupus erythematosus) (HCC)    • Mitral valve anterior leaflet prolapse    • NSTEMI (non-ST elevated myocardial infarction) (HCC)    • Otitis    • Palpitations    • Seizures (HCC)    • Spinal epidural abscess    • Tachycardia         Past Surgical History:   Procedure Laterality Date   • BACK SURGERY     • CHOLECYSTECTOMY     • LEG SURGERY      broke tibula,fibula, steel noah   • LIVER BIOPSY     • LUMBAR LAMINECTOMY DISCECTOMY DECOMPRESSION Left 2018    Procedure: Posterior lumbar three through sacrum decompression;  Surgeon: Tevin Whitley MD;  Location: [facility];  Service: Neurosurgery   • LYMPH NODE BIOPSY     • SPINE SURGERY          Family History   Problem Relation Age of Onset   • Diabetes Mother    • No Known Problems Father         Social History     Socioeconomic History   • Marital status: Legally    Tobacco Use   • Smoking status: Former     Packs/day: 1.00     Years: 30.00     Pack years: 30.00     Types: Cigarettes     Quit date: 2020     Years since quittin.3   • Smokeless tobacco: Never   • Tobacco comments:     E-CIG USE   Vaping Use   • Vaping Use: Every day   • Start date: 2020   • Substances: Nicotine, Flavoring   • Devices: Pre-filled or refillable cartridge, Refillable tank   Substance and Sexual Activity   • Alcohol use: No     Comment: CAFFEINE USE: 1 CUP COFFEE/ 2 COKES DAILY   • Drug use: Not Currently     Types:  "IV, Heroin, Methamphetamines     Comment: pt states she no longer uses heroin, she now uses meth   • Sexual activity: Not Currently        OB History    No obstetric history on file.          Allergies   Allergen Reactions   • Sulfa Antibiotics Nausea And Vomiting and Swelling     Patient states when she took Sulfa medication, her throat started to swell.  nausea            Review of Systems   Constitutional: Positive for fatigue.   HENT: Negative.    Eyes: Negative.    Respiratory: Negative.    Gastrointestinal: Positive for abdominal pain, blood in stool, constipation and nausea.   Endocrine: Negative.    Genitourinary: Positive for menstrual problem.   Musculoskeletal: Negative.    Allergic/Immunologic: Negative.    Neurological: Negative.    Hematological: Negative.    Psychiatric/Behavioral: Negative.      Review of systems as mentioned in the HPI    Objective   Blood pressure 122/76, pulse 65, temperature 96.9 °F (36.1 °C), temperature source Temporal, resp. rate 16, height 165.1 cm (65\"), weight 97.1 kg (214 lb), SpO2 96 %, not currently breastfeeding.   Physical Exam  Constitutional:       General: She is not in acute distress.     Appearance: Normal appearance. She is obese. She is not ill-appearing, toxic-appearing or diaphoretic.   HENT:      Head: Normocephalic and atraumatic.      Right Ear: External ear normal.      Left Ear: External ear normal.      Nose: Nose normal. No congestion or rhinorrhea.      Mouth/Throat:      Mouth: Mucous membranes are moist.      Pharynx: Oropharynx is clear. No oropharyngeal exudate or posterior oropharyngeal erythema.   Eyes:      Extraocular Movements: Extraocular movements intact.      Conjunctiva/sclera: Conjunctivae normal.      Pupils: Pupils are equal, round, and reactive to light.   Cardiovascular:      Rate and Rhythm: Normal rate and regular rhythm.   Pulmonary:      Effort: Pulmonary effort is normal.      Breath sounds: Normal breath sounds.   Abdominal:     "  General: Abdomen is flat. Bowel sounds are normal.      Palpations: Abdomen is soft.   Musculoskeletal:         General: Normal range of motion.      Cervical back: Normal range of motion.   Skin:     General: Skin is warm and dry.      Comments: Multiple tattoos on her extremities.   Neurological:      General: No focal deficit present.      Mental Status: She is alert and oriented to person, place, and time. Mental status is at baseline.   Psychiatric:         Mood and Affect: Mood normal.         Behavior: Behavior normal.         Thought Content: Thought content normal.         Judgment: Judgment normal.       I have reexamined the patient and the results are consistent with the previously documented exam. Dannielle Santiago MD     Lab on 12/15/2022   Component Date Value Ref Range Status   • Glucose 12/15/2022 124 (H)  65 - 99 mg/dL Final   • BUN 12/15/2022 13  6 - 20 mg/dL Final   • Creatinine 12/15/2022 0.61  0.57 - 1.00 mg/dL Final   • Sodium 12/15/2022 143  136 - 145 mmol/L Final   • Potassium 12/15/2022 3.5  3.5 - 5.2 mmol/L Final   • Chloride 12/15/2022 104  98 - 107 mmol/L Final   • CO2 12/15/2022 28.8  22.0 - 29.0 mmol/L Final   • Calcium 12/15/2022 9.7  8.6 - 10.5 mg/dL Final   • Total Protein 12/15/2022 7.9  6.0 - 8.5 g/dL Final   • Albumin 12/15/2022 4.40  3.50 - 5.20 g/dL Final   • ALT (SGPT) 12/15/2022 10  1 - 33 U/L Final   • AST (SGOT) 12/15/2022 19  1 - 32 U/L Final   • Alkaline Phosphatase 12/15/2022 75  39 - 117 U/L Final   • Total Bilirubin 12/15/2022 0.3  0.0 - 1.2 mg/dL Final   • Globulin 12/15/2022 3.5  gm/dL Final   • A/G Ratio 12/15/2022 1.3  g/dL Final   • BUN/Creatinine Ratio 12/15/2022 21.3  7.0 - 25.0 Final   • Anion Gap 12/15/2022 10.2  5.0 - 15.0 mmol/L Final   • eGFR 12/15/2022 112.5  >60.0 mL/min/1.73 Final    National Kidney Foundation and American Society of Nephrology (ASN) Task Force recommended calculation based on the Chronic Kidney Disease Epidemiology Collaboration  (CKD-EPI) equation refit without adjustment for race.   • Iron 12/15/2022 30 (L)  37 - 145 mcg/dL Final   • Iron Saturation 12/15/2022 6 (L)  20 - 50 % Final   • Transferrin 12/15/2022 331  200 - 360 mg/dL Final   • TIBC 12/15/2022 493  298 - 536 mcg/dL Final   • Ferritin 12/15/2022 12.90 (L)  13.00 - 150.00 ng/mL Final   • WBC 12/15/2022 4.06  3.40 - 10.80 10*3/mm3 Final   • RBC 12/15/2022 4.07  3.77 - 5.28 10*6/mm3 Final   • Hemoglobin 12/15/2022 11.4 (L)  12.0 - 15.9 g/dL Final   • Hematocrit 12/15/2022 35.8  34.0 - 46.6 % Final   • MCV 12/15/2022 88.0  79.0 - 97.0 fL Final   • MCH 12/15/2022 28.0  26.6 - 33.0 pg Final   • MCHC 12/15/2022 31.8  31.5 - 35.7 g/dL Final   • RDW 12/15/2022 12.9  12.3 - 15.4 % Final   • RDW-SD 12/15/2022 41.3  37.0 - 54.0 fl Final   • MPV 12/15/2022 11.4  6.0 - 12.0 fL Final   • Platelets 12/15/2022 181  140 - 450 10*3/mm3 Final   • Neutrophil % 12/15/2022 66.8  42.7 - 76.0 % Final   • Lymphocyte % 12/15/2022 24.6  19.6 - 45.3 % Final   • Monocyte % 12/15/2022 5.9  5.0 - 12.0 % Final   • Eosinophil % 12/15/2022 2.0  0.3 - 6.2 % Final   • Basophil % 12/15/2022 0.5  0.0 - 1.5 % Final   • Immature Grans % 12/15/2022 0.2  0.0 - 0.5 % Final   • Neutrophils, Absolute 12/15/2022 2.71  1.70 - 7.00 10*3/mm3 Final   • Lymphocytes, Absolute 12/15/2022 1.00  0.70 - 3.10 10*3/mm3 Final   • Monocytes, Absolute 12/15/2022 0.24  0.10 - 0.90 10*3/mm3 Final   • Eosinophils, Absolute 12/15/2022 0.08  0.00 - 0.40 10*3/mm3 Final   • Basophils, Absolute 12/15/2022 0.02  0.00 - 0.20 10*3/mm3 Final   • Immature Grans, Absolute 12/15/2022 0.01  0.00 - 0.05 10*3/mm3 Final   • nRBC 12/15/2022 0.0  0.0 - 0.2 /100 WBC Final        No radiology results for the last 30 days.     4/21/2022 CBC reviewed and hemoglobin normal at 13.  Iron studies    6/16/2022  CBC reviewed and within normal limits hemoglobin 12.2  CMP reviewed and within normal limits  Ferritin normal at 79  Iron profile shows a normal iron saturation  of 20% and TIBC 381    Assessment & Plan     *Iron deficiency anemia  · 5/11/2021 iron saturation extremely low at 5%, TIBC elevated at 651, transferrin elevated at 437 consistent with iron deficiency  · Ferritin low at 8.5 again consistent with iron deficiency  · Patient reports being intolerant to oral iron  · She has had severe nausea vomiting abdominal pain and constipation when she takes oral iron  · Received Injectafer 750 mg IV x2 in July 2021  · Iron studies from 1/17/2022 reviewed and suggestive of iron deficiency  · Iron deficiency anemia could be secondary to ongoing menorrhagia versus GI bleed  ·  Injectafer 750 mg IV x2 in February 2022  · Continues to experience severe menorrhagia  · Blood per rectum has resolved since constipation has improved  · 6/16/2022-hemoglobin normal at 12.2, iron studies normal.  · 12/15/2022 CBC reviewed with WBC 4.06, hemoglobin 11.4 and platelets 181,000  · CMP reviewed and within normal limits  · Iron saturation low at 6% with a TIBC of 493, ferritin low at 12.9  · Patient unable to tolerate oral iron  · Therefore we will administer Injectafer x2    *Blood per rectum  · Resolved since constipation has improved  · She has not had a follow-up with gastroenterology.  · Denies any bright red blood per rectum    *Hepatitis C-completed treatment with Epclusa.  Continue follow-up with gastroenterology    *?  SLE-CRP and ESR elevated.  Missed her appointment with rheumatology in December 2021    *Screening-missed her screening appointment in October 2021.  Screening mammogram not performed yet.    *Menorrhagia-she is having heavy menstrual cycles again.  Has not undergone uterine ablation yet.    *Morbid obesity-BMI 35.6.  We discussed lifestyle changes.    *IV drug abuse-she has been sober for over 3 years now.    *Follow-up-3 months with CBC, CMP and iron studies in 6 months with myself.

## 2023-01-01 NOTE — ED PROVIDER NOTES
" EMERGENCY DEPARTMENT ENCOUNTER    Room Number:  14/14  Date of encounter:  9/25/2019  PCP: System, Provider Not In  Historian: Patient and partner      HPI:  Chief Complaint: \"I am going through withdrawal\"  A complete HPI/ROS/PMH/PSH/SH/FH are unobtainable due to: None    Context: Silvia Velazquez is a 44 y.o. female who presents to the ED c/o symptoms of opiate withdrawal.  She states that she thinks she is going through withdrawal because she just restarted taking her Suboxone.  She is a rather agitated patient and has difficulty focusing.  She states that she has been having vomiting and diarrhea for the past 4 hours prior to arrival in the emergency department.  She states that her symptoms actually preceded her first dose of Suboxone, which was 3 hours ago.  She states that she had been off Suboxone for 3 weeks and started using fentanyl.  She states that she injects fentanyl.  She then stopped using fentanyl 2 days ago.  She notes the aforementioned symptoms of vomiting and diarrhea.  She notes associated chills and feeling sweaty as well as restlessness.  She states this feels just like when she had opiate withdrawal in the past.  The patient is very convinced that Suboxone is what is causing her withdrawal.  She denies having any fever.  She notes mild abdominal pain.  No chest pain or shortness of breath.  No back pain.  No sick contacts with recent symptoms.    PAST MEDICAL HISTORY  Active Ambulatory Problems     Diagnosis Date Noted   • Spinal epidural abscess 12/09/2018   • Non-STEMI (non-ST elevated myocardial infarction) (CMS/[facility]) 03/31/2019   • Chest pain 05/01/2019     Resolved Ambulatory Problems     Diagnosis Date Noted   • No Resolved Ambulatory Problems     Past Medical History:   Diagnosis Date   • Chest pain    • Hepatitis C    • Kidney stone    • Lupus (systemic lupus erythematosus) (CMS/[facility])    • Mitral valve anterior leaflet prolapse    • NSTEMI (non-ST elevated myocardial infarction) " (CMS/HCC)    • Otitis    • Palpitations    • Seizures (CMS/HCC)    • Spinal epidural abscess          PAST SURGICAL HISTORY  Past Surgical History:   Procedure Laterality Date   • BACK SURGERY     • CHOLECYSTECTOMY     • LIVER BIOPSY     • LUMBAR LAMINECTOMY DISCECTOMY DECOMPRESSION Left 12/9/2018    Procedure: Posterior lumbar three through sacrum decompression;  Surgeon: Tevin Whitley MD;  Location: [facility] OR;  Service: Neurosurgery   • LYMPH NODE BIOPSY           FAMILY HISTORY  History reviewed. No pertinent family history.      SOCIAL HISTORY  Social History     Socioeconomic History   • Marital status: Legally      Spouse name: Not on file   • Number of children: Not on file   • Years of education: Not on file   • Highest education level: Not on file   Tobacco Use   • Smoking status: Current Every Day Smoker     Packs/day: 1.00     Years: 30.00     Pack years: 30.00     Types: Cigarettes   • Smokeless tobacco: Never Used   Substance and Sexual Activity   • Alcohol use: No     Frequency: Never   • Drug use: Yes     Types: IV, Heroin, Methamphetamines     Comment: pt states she no longer uses heroin, she now uses meth   • Sexual activity: Defer         ALLERGIES  Sulfa antibiotics        REVIEW OF SYSTEMS  Review of Systems     All systems reviewed and negative except for those discussed in HPI.       PHYSICAL EXAM    I have reviewed the triage vital signs and nursing notes.    ED Triage Vitals   Temp Heart Rate Resp BP SpO2   09/24/19 2220 09/24/19 2219 09/24/19 2219 09/24/19 2220 09/24/19 2219   97.4 °F (36.3 °C) 94 18 147/86 100 %      Temp src Heart Rate Source Patient Position BP Location FiO2 (%)   09/24/19 2220 09/24/19 2243 09/24/19 2220 09/24/19 2220 --   Tympanic Monitor Sitting Right arm        Physical Exam  GENERAL: Disheveled, appears older than stated age, uncomfortable, nontoxic  HENT: nares patent  EYES: no scleral icterus  CV: regular rhythm, regular rate, no  murmur  RESPIRATORY: normal effort, clear to auscultation bilaterally  ABDOMEN: soft, nontender  MUSCULOSKELETAL: no deformity no midline spinal tenderness  NEURO: alert, moves all extremities, follows commands  SKIN: warm, dry.  Track marks to bilateral arms        LAB RESULTS  Recent Results (from the past 24 hour(s))   Comprehensive Metabolic Panel    Collection Time: 09/24/19 11:08 PM   Result Value Ref Range    Glucose 112 (H) 65 - 99 mg/dL    BUN 14 6 - 20 mg/dL    Creatinine 0.73 0.57 - 1.00 mg/dL    Sodium 137 136 - 145 mmol/L    Potassium 3.6 3.5 - 5.2 mmol/L    Chloride 99 98 - 107 mmol/L    CO2 24.0 22.0 - 29.0 mmol/L    Calcium 9.1 8.6 - 10.5 mg/dL    Total Protein 8.0 6.0 - 8.5 g/dL    Albumin 4.20 3.50 - 5.20 g/dL    ALT (SGPT) 31 1 - 33 U/L    AST (SGOT) 39 (H) 1 - 32 U/L    Alkaline Phosphatase 171 (H) 39 - 117 U/L    Total Bilirubin 0.6 0.2 - 1.2 mg/dL    eGFR Non African Amer 87 >60 mL/min/1.73    Globulin 3.8 gm/dL    A/G Ratio 1.1 g/dL    BUN/Creatinine Ratio 19.2 7.0 - 25.0    Anion Gap 14.0 5.0 - 15.0 mmol/L   Lipase    Collection Time: 09/24/19 11:08 PM   Result Value Ref Range    Lipase 31 13 - 60 U/L   hCG, Serum, Qualitative    Collection Time: 09/24/19 11:08 PM   Result Value Ref Range    HCG Qualitative Negative Negative   CBC Auto Differential    Collection Time: 09/24/19 11:08 PM   Result Value Ref Range    WBC 6.31 3.40 - 10.80 10*3/mm3    RBC 5.13 3.77 - 5.28 10*6/mm3    Hemoglobin 12.7 12.0 - 15.9 g/dL    Hematocrit 41.0 34.0 - 46.6 %    MCV 79.9 79.0 - 97.0 fL    MCH 24.8 (L) 26.6 - 33.0 pg    MCHC 31.0 (L) 31.5 - 35.7 g/dL    RDW 16.2 (H) 12.3 - 15.4 %    RDW-SD 46.2 37.0 - 54.0 fl    MPV 12.2 (H) 6.0 - 12.0 fL    Platelets 210 140 - 450 10*3/mm3    Neutrophil % 66.8 42.7 - 76.0 %    Lymphocyte % 24.9 19.6 - 45.3 %    Monocyte % 6.7 5.0 - 12.0 %    Eosinophil % 0.8 0.3 - 6.2 %    Basophil % 0.5 0.0 - 1.5 %    Immature Grans % 0.3 0.0 - 0.5 %    Neutrophils, Absolute 4.22 1.70 -  7.00 10*3/mm3    Lymphocytes, Absolute 1.57 0.70 - 3.10 10*3/mm3    Monocytes, Absolute 0.42 0.10 - 0.90 10*3/mm3    Eosinophils, Absolute 0.05 0.00 - 0.40 10*3/mm3    Basophils, Absolute 0.03 0.00 - 0.20 10*3/mm3    Immature Grans, Absolute 0.02 0.00 - 0.05 10*3/mm3    nRBC 0.0 0.0 - 0.2 /100 WBC   Ethanol    Collection Time: 09/24/19 11:08 PM   Result Value Ref Range    Ethanol <10 0 - 10 mg/dL    Ethanol % <0.010 %   Urinalysis With Microscopic If Indicated (No Culture) - Urine, Clean Catch    Collection Time: 09/24/19 11:09 PM   Result Value Ref Range    Color, UA Yellow Yellow, Straw    Appearance, UA Clear Clear    pH, UA 7.0 5.0 - 8.0    Specific Gravity, UA 1.028 1.005 - 1.030    Glucose, UA Negative Negative    Ketones, UA Trace (A) Negative    Bilirubin, UA Negative Negative    Blood, UA Negative Negative    Protein, UA Negative Negative    Leuk Esterase, UA Negative Negative    Nitrite, UA Negative Negative    Urobilinogen, UA 1.0 E.U./dL 0.2 - 1.0 E.U./dL   Urine Drug Screen - Urine, Clean Catch    Collection Time: 09/24/19 11:09 PM   Result Value Ref Range    Amphet/Methamphet, Screen Negative Negative    Barbiturates Screen, Urine Negative Negative    Benzodiazepine Screen, Urine Negative Negative    Cocaine Screen, Urine Negative Negative    Opiate Screen Negative Negative    THC, Screen, Urine Negative Negative    Methadone Screen, Urine Negative Negative    Oxycodone Screen, Urine Negative Negative       Ordered the above labs and independently reviewed the results.        RADIOLOGY  No Radiology Exams Resulted Within Past 24 Hours    I ordered the above noted radiological studies. Reviewed by me and discussed with radiologist.  See dictation for official radiology interpretation.      PROCEDURES    Procedures      MEDICATIONS GIVEN IN ER    Medications   sodium chloride 0.9 % flush 10 mL (not administered)   sodium chloride 0.9 % flush 10 mL (not administered)   lactated ringers bolus 1,000 mL  (1,000 mL Intravenous New Bag 9/24/19 2314)   cloNIDine (CATAPRES) tablet 0.2 mg (0.2 mg Oral Given 9/24/19 2314)   promethazine (PHENERGAN) injection 12.5 mg (12.5 mg Intravenous Given 9/24/19 2314)   ondansetron ODT (ZOFRAN-ODT) disintegrating tablet 4 mg (4 mg Oral Given 9/25/19 0118)         PROGRESS, DATA ANALYSIS, CONSULTS, AND MEDICAL DECISION MAKING    All labs have been independently reviewed by me.  All radiology studies have been reviewed by me and discussed with radiologist dictating the report.   EKG's independently viewed and interpreted by me.  Discussion below represents my analysis of pertinent findings related to patient's condition, differential diagnosis, treatment plan and final disposition.    Patient presents complaining of vomiting and diarrhea.  Differential includes opiate withdrawal, gastroenteritis, hepatobiliary pathology, small bowel obstruction, appendicitis, pyelonephritis, atypical ACS.  She denies having any chest pain.  The timing of her symptoms do seem to correlate with opiate withdrawal, especially given that her symptoms preceded the use of Suboxone after an interval of being free of opioids for almost 2 days after discontinuation of fentanyl.    She has no leukocytosis.  Her abdomen on repeat exam is nontender and I do not believe that she needs a CT scan of her abdomen.  She states that she is feeling better with the administration of clonidine and Phenergan.  I encouraged the patient to restart her Suboxone she states that she has some of those pills.  She will follow-up with her prescribing physician.    ED Course as of Sep 25 0147   Wed Sep 25, 2019   0110 No leukocytosis WBC: 6.31 [TD]      ED Course User Index  [TD] Alvin Ochoa II, MD       AS OF 1:47 AM VITALS:    BP - 139/55  HR - 75  TEMP - 97.4 °F (36.3 °C) (Tympanic)  02 SATS - 100%        DIAGNOSIS  Final diagnoses:   Opioid withdrawal (CMS/HCC)   Non-intractable vomiting with nausea, unspecified vomiting  type   Diarrhea, unspecified type         DISPOSITION  DISCHARGE    FOLLOW-UP  PATIENT LIAISON [facility] [address]  [phone]  Schedule an appointment as soon as possible for a visit   As needed         Medication List      New Prescriptions    cloNIDine 0.1 MG tablet  Commonly known as:  CATAPRES  Take 1 tablet by mouth 4 (Four) Times a Day for 4 days.     promethazine 25 MG tablet  Commonly known as:  PHENERGAN  Take 1 tablet by mouth Every 6 (Six) Hours As Needed for Nausea or   Vomiting for up to 4 days.        Changed    amLODIPine 5 MG tablet  Commonly known as:  NORVASC  Take 1 tablet by mouth Daily.  What changed:    how much to take  when to take this                   Alvin Ochoa II, MD  09/25/19 0733     Statement Selected

## 2023-01-05 ENCOUNTER — TELEPHONE (OUTPATIENT)
Dept: ONCOLOGY | Facility: CLINIC | Age: 48
End: 2023-01-05
Payer: COMMERCIAL

## 2023-01-05 NOTE — TELEPHONE ENCOUNTER
Caller: Silvia Clinton    Relationship to patient: Self    Best call back number: [phone]    Chief complaint: PATIENT HAS NOT HEARD BACK IN REGARDS TO SCHEDULING HER INJECTIONS FOR INJECTAFER, PLEASE CALL TO [facility].    Type of visit: INJECTION    Requested date: ASAP    Additional notes: SEE OFFICE NOTE FROM 12/15/2022, WHERE IS STATES INJECTAFER X 2

## 2023-01-12 ENCOUNTER — INFUSION (OUTPATIENT)
Dept: ONCOLOGY | Facility: HOSPITAL | Age: 48
End: 2023-01-12
Payer: COMMERCIAL

## 2023-01-12 VITALS
WEIGHT: 224.8 LBS | BODY MASS INDEX: 36.13 KG/M2 | SYSTOLIC BLOOD PRESSURE: 126 MMHG | OXYGEN SATURATION: 98 % | HEIGHT: 66 IN | DIASTOLIC BLOOD PRESSURE: 83 MMHG | HEART RATE: 84 BPM | TEMPERATURE: 97 F | RESPIRATION RATE: 18 BRPM

## 2023-01-12 PROCEDURE — G0463 HOSPITAL OUTPT CLINIC VISIT: HCPCS

## 2023-01-13 DIAGNOSIS — M54.50 CHRONIC BILATERAL LOW BACK PAIN WITHOUT SCIATICA: Chronic | ICD-10-CM

## 2023-01-13 DIAGNOSIS — G89.29 CHRONIC BILATERAL LOW BACK PAIN WITHOUT SCIATICA: Chronic | ICD-10-CM

## 2023-01-16 RX ORDER — METHOCARBAMOL 500 MG/1
TABLET, FILM COATED ORAL
Qty: 90 TABLET | Refills: 0 | OUTPATIENT
Start: 2023-01-16

## 2023-01-17 DIAGNOSIS — G89.29 CHRONIC BILATERAL LOW BACK PAIN WITHOUT SCIATICA: Chronic | ICD-10-CM

## 2023-01-17 DIAGNOSIS — M54.50 CHRONIC BILATERAL LOW BACK PAIN WITHOUT SCIATICA: Chronic | ICD-10-CM

## 2023-01-18 RX ORDER — METHOCARBAMOL 500 MG/1
TABLET, FILM COATED ORAL
Qty: 90 TABLET | Refills: 0 | Status: SHIPPED | OUTPATIENT
Start: 2023-01-18 | End: 2023-03-22

## 2023-01-19 ENCOUNTER — INFUSION (OUTPATIENT)
Dept: ONCOLOGY | Facility: HOSPITAL | Age: 48
End: 2023-01-19
Payer: COMMERCIAL

## 2023-01-19 VITALS
HEART RATE: 59 BPM | HEIGHT: 66 IN | RESPIRATION RATE: 18 BRPM | TEMPERATURE: 98.4 F | BODY MASS INDEX: 36.64 KG/M2 | WEIGHT: 228 LBS | SYSTOLIC BLOOD PRESSURE: 108 MMHG | DIASTOLIC BLOOD PRESSURE: 71 MMHG | OXYGEN SATURATION: 98 %

## 2023-01-19 DIAGNOSIS — D50.0 IRON DEFICIENCY ANEMIA DUE TO CHRONIC BLOOD LOSS: ICD-10-CM

## 2023-01-19 DIAGNOSIS — T45.4X5A ADVERSE EFFECT OF IRON, INITIAL ENCOUNTER: Primary | ICD-10-CM

## 2023-01-19 PROCEDURE — 25010000002 FERRIC CARBOXYMALTOSE 750 MG/15ML SOLUTION 15 ML VIAL: Performed by: NURSE PRACTITIONER

## 2023-01-19 PROCEDURE — 63710000001 PROCHLORPERAZINE MALEATE PER 5 MG: Performed by: NURSE PRACTITIONER

## 2023-01-19 PROCEDURE — 96374 THER/PROPH/DIAG INJ IV PUSH: CPT

## 2023-01-19 RX ORDER — PROCHLORPERAZINE MALEATE 5 MG/1
10 TABLET ORAL ONCE
Status: COMPLETED | OUTPATIENT
Start: 2023-01-19 | End: 2023-01-19

## 2023-01-19 RX ORDER — SODIUM CHLORIDE 9 MG/ML
250 INJECTION, SOLUTION INTRAVENOUS ONCE
Status: COMPLETED | OUTPATIENT
Start: 2023-01-19 | End: 2023-01-19

## 2023-01-19 RX ADMIN — SODIUM CHLORIDE 250 ML: 9 INJECTION, SOLUTION INTRAVENOUS at 11:21

## 2023-01-19 RX ADMIN — FERRIC CARBOXYMALTOSE INJECTION 750 MG: 50 INJECTION, SOLUTION INTRAVENOUS at 11:22

## 2023-01-19 RX ADMIN — PROCHLORPERAZINE MALEATE 10 MG: 5 TABLET ORAL at 11:21

## 2023-01-26 ENCOUNTER — TELEPHONE (OUTPATIENT)
Dept: INTERNAL MEDICINE | Facility: CLINIC | Age: 48
End: 2023-01-26
Payer: COMMERCIAL

## 2023-01-26 ENCOUNTER — INFUSION (OUTPATIENT)
Dept: ONCOLOGY | Facility: HOSPITAL | Age: 48
End: 2023-01-26
Payer: COMMERCIAL

## 2023-01-26 VITALS
SYSTOLIC BLOOD PRESSURE: 106 MMHG | TEMPERATURE: 96.8 F | DIASTOLIC BLOOD PRESSURE: 71 MMHG | HEART RATE: 70 BPM | WEIGHT: 226.5 LBS | RESPIRATION RATE: 18 BRPM | BODY MASS INDEX: 36.4 KG/M2 | HEIGHT: 66 IN | OXYGEN SATURATION: 100 %

## 2023-01-26 DIAGNOSIS — T45.4X5A ADVERSE EFFECT OF IRON, INITIAL ENCOUNTER: Primary | ICD-10-CM

## 2023-01-26 DIAGNOSIS — D50.0 IRON DEFICIENCY ANEMIA DUE TO CHRONIC BLOOD LOSS: ICD-10-CM

## 2023-01-26 PROCEDURE — 63710000001 PROCHLORPERAZINE MALEATE PER 5 MG: Performed by: INTERNAL MEDICINE

## 2023-01-26 PROCEDURE — 25010000002 FERRIC CARBOXYMALTOSE 750 MG/15ML SOLUTION 15 ML VIAL: Performed by: INTERNAL MEDICINE

## 2023-01-26 PROCEDURE — 96374 THER/PROPH/DIAG INJ IV PUSH: CPT

## 2023-01-26 RX ORDER — PROCHLORPERAZINE MALEATE 5 MG/1
10 TABLET ORAL ONCE
Status: COMPLETED | OUTPATIENT
Start: 2023-01-26 | End: 2023-01-26

## 2023-01-26 RX ORDER — SODIUM CHLORIDE 9 MG/ML
250 INJECTION, SOLUTION INTRAVENOUS ONCE
Status: COMPLETED | OUTPATIENT
Start: 2023-01-26 | End: 2023-01-26

## 2023-01-26 RX ADMIN — PROCHLORPERAZINE MALEATE 10 MG: 5 TABLET ORAL at 15:24

## 2023-01-26 RX ADMIN — SODIUM CHLORIDE 250 ML: 9 INJECTION, SOLUTION INTRAVENOUS at 15:24

## 2023-01-26 RX ADMIN — FERRIC CARBOXYMALTOSE INJECTION 750 MG: 50 INJECTION, SOLUTION INTRAVENOUS at 15:29

## 2023-01-26 NOTE — TELEPHONE ENCOUNTER
Patient was transferred to office. She stated she was trying to get new provider. I have informed her that we not do that in our practice, She established with Tao 7-19-22 and felton snot have history here our practice

## 2023-02-02 ENCOUNTER — PATIENT ROUNDING (BHMG ONLY) (OUTPATIENT)
Dept: INTERNAL MEDICINE | Facility: CLINIC | Age: 48
End: 2023-02-02
Payer: COMMERCIAL

## 2023-02-02 ENCOUNTER — OFFICE VISIT (OUTPATIENT)
Dept: INTERNAL MEDICINE | Facility: CLINIC | Age: 48
End: 2023-02-02
Payer: COMMERCIAL

## 2023-02-02 ENCOUNTER — TELEPHONE (OUTPATIENT)
Dept: INTERNAL MEDICINE | Facility: CLINIC | Age: 48
End: 2023-02-02

## 2023-02-02 VITALS
HEIGHT: 66 IN | DIASTOLIC BLOOD PRESSURE: 70 MMHG | OXYGEN SATURATION: 97 % | TEMPERATURE: 97.3 F | HEART RATE: 67 BPM | SYSTOLIC BLOOD PRESSURE: 94 MMHG | BODY MASS INDEX: 36.16 KG/M2 | WEIGHT: 225 LBS

## 2023-02-02 DIAGNOSIS — D50.0 IRON DEFICIENCY ANEMIA DUE TO CHRONIC BLOOD LOSS: ICD-10-CM

## 2023-02-02 DIAGNOSIS — F19.11 HISTORY OF DRUG ABUSE: ICD-10-CM

## 2023-02-02 DIAGNOSIS — F31.81 BIPOLAR 2 DISORDER: ICD-10-CM

## 2023-02-02 DIAGNOSIS — I10 ESSENTIAL HYPERTENSION: Primary | ICD-10-CM

## 2023-02-02 DIAGNOSIS — R73.9 HYPERGLYCEMIA: ICD-10-CM

## 2023-02-02 DIAGNOSIS — B35.1 FUNGAL NAIL INFECTION: ICD-10-CM

## 2023-02-02 DIAGNOSIS — I21.4 NON-STEMI (NON-ST ELEVATED MYOCARDIAL INFARCTION): ICD-10-CM

## 2023-02-02 DIAGNOSIS — Z00.00 HEALTHCARE MAINTENANCE: ICD-10-CM

## 2023-02-02 DIAGNOSIS — B18.2 CHRONIC HEPATITIS C WITHOUT HEPATIC COMA: ICD-10-CM

## 2023-02-02 DIAGNOSIS — K64.0 GRADE I HEMORRHOIDS: Chronic | ICD-10-CM

## 2023-02-02 DIAGNOSIS — E66.01 OBESITY, CLASS III, BMI 40-49.9 (MORBID OBESITY): ICD-10-CM

## 2023-02-02 DIAGNOSIS — R41.3 MEMORY CHANGES: ICD-10-CM

## 2023-02-02 PROBLEM — G43.009 MIGRAINE WITHOUT AURA: Status: ACTIVE | Noted: 2023-02-02

## 2023-02-02 PROBLEM — N89.8 VAGINAL ITCHING: Status: RESOLVED | Noted: 2021-11-16 | Resolved: 2023-02-02

## 2023-02-02 PROBLEM — R12 HEARTBURN: Status: RESOLVED | Noted: 2021-06-23 | Resolved: 2023-02-02

## 2023-02-02 PROBLEM — R11.0 NAUSEA: Status: RESOLVED | Noted: 2021-04-20 | Resolved: 2023-02-02

## 2023-02-02 PROBLEM — R07.9 CHEST PAIN: Status: RESOLVED | Noted: 2019-05-01 | Resolved: 2023-02-02

## 2023-02-02 PROCEDURE — 99214 OFFICE O/P EST MOD 30 MIN: CPT | Performed by: NURSE PRACTITIONER

## 2023-02-02 RX ORDER — DIAPER,BRIEF,INFANT-TODD,DISP
1 EACH MISCELLANEOUS 2 TIMES DAILY
Qty: 1 EACH | Refills: 1 | Status: SHIPPED | OUTPATIENT
Start: 2023-02-02

## 2023-02-02 NOTE — TELEPHONE ENCOUNTER
PATIENT IS ASKING THE BLOOD PRESSUE MONITOR AND CUFF BOTH BE SENT TO [id number] - [place], [place] - [address] AT IN [address] [place] - [phone] [facility] [phone]   [phone]

## 2023-02-02 NOTE — PROGRESS NOTES
February 2, 2023    Patient rounding obtained at checkout, patient was referred to us by the hub and she did not have any problems making her new patient appointment.  Patient stated her first visit went amazing and she would absolutely refer us to her friends and family.

## 2023-02-02 NOTE — PROGRESS NOTES
Subjective   Silvia Clinton is a 47 y.o. female.     Chief Complaint   Patient presents with   • Establish Care     Patient is here to establish care    • Nail Problem     Patient complains of toe fungus on toe for 6 months    • Depression     Patient complains of nightmares and depression   • Fatigue   • Memory Loss     Patient complains of memory loss and hard of thinking         History of Present Illness   She is here today as a new patient to establish care.  She is recently lost over 40 pounds through diet and exercise.  Previous PCP Tao Casarez with [facility].  She has noticed a change in memory over the past 6 months.  She will forget things easily, lose items around her house. She notes foggy thinking.    She is also noticed toenail fungus on her left great toe.  This started a while ago.  She is interested in talking about treatment for this.    History of MI- approximately 2-3 years ago x 2. She is managed by cardiology. She is followed once a year.  She is due for follow-up.  She notes occasional palpitations.  She denies any chest pain, shortness of breath, activity intolerance, syncope, lower extremity edema, PND, orthopnea.  HTN- her BP has been running low, averaging 90/60-70. She notes orthostasis and fatigue.  She has recently lost over 40 pounds.  She is currently on amlodipine 5 mg twice daily, losartan 50 mg twice daily and metoprolol tartrate 100 mg twice daily.    SLE- she is due to see rheumatology back. She has not had a lupus flare in several years.    Bipolar disorder- she was previously being managed by her PCP. She is currently on Effexor 150 mg daily with Zyprexa 15 mg nightly PRN.  She feels like her mood is stable but does note worsening depression.  This typically occurs seasonally in the wintertime.  She notes February is a difficult time for her as it is the month when her son was murdered and also his birthday.  She has previously seen a therapist.  She denies any ashlyn or  SI.    Seizures- occurred when she was using IV drugs. She has been seizure free for the past 4-5 years.     Hepatitis C infection- she is followed by GI, Dr. Hanson. She completed treatment with sofosbuvir-velpatasvir.  She is due to follow-up with GI.  History of drug abuse- she has been sober for 5 years. She is currently in the Suboxone program.  She uses clonidine 0.1 mg PRN at night rarely.      Migraine Headaches- started at the age of 11-12. She has on average 1-2 migraines a month. She uses tylenol PRN.     Chronic Back pain/Arthritis- hx of spinal epidural abscess requiring surgical intervention. She was not able to complete PT after surgery.  She notes chronic low back pain.  She is currently using tylenol with PRN robaxin.     GERD/Gastritis- she currently uses omeprazole 20 mg daily with carafate 1 gram PRN. She has a difficult time swallowing the Carafate pill.  She denies any dysphagia or odynophagia.  Colon cancer screening- she has never had a c-scope. She previously had blood in the stool, but this stopped.  She has a history of hemorrhoids for which she uses hydrocortisone 1% as needed.  She takes Colace and MiraLAX as needed for constipation.  She denies any change in bowel habits, change in stool caliber, BRBPR, melena, abdominal pain.    Anemia- she is followed by hematology. She receives iron infusions on average every 2-3 months.   Menstrual problem/GYN- she is supposed to see GYN to discuss an ablation for heavy menstrual bleeding. She uses micronor during her menses.  Last pap completed October 2021.  She is due for mammogram.  She is not currently sexually active.    She has 4 children.  She is starting a new job at [employer] today.  She will be working third shift.    The following portions of the patient's history were reviewed and updated as appropriate: allergies, current medications, past family history, past medical history, past social history, past surgical history and problem  list.    Review of Systems   Constitutional: Positive for fatigue. Negative for chills and fever.   Respiratory: Negative for cough, chest tightness, shortness of breath and wheezing.    Cardiovascular: Negative for chest pain, palpitations and leg swelling.   Gastrointestinal: Negative for abdominal pain and blood in stool.   Neurological: Positive for light-headedness and memory problem. Negative for dizziness, tremors, seizures, syncope, facial asymmetry, speech difficulty, weakness, numbness, headache and confusion.   Psychiatric/Behavioral: Positive for depressed mood. Negative for suicidal ideas. The patient is not nervous/anxious.        Objective   Physical Exam  Constitutional:       Appearance: She is well-developed.   Neck:      Thyroid: No thyroid mass, thyromegaly or thyroid tenderness.      Vascular: No carotid bruit.      Trachea: Trachea normal.   Cardiovascular:      Rate and Rhythm: Normal rate and regular rhythm.      Chest Wall: PMI is not displaced.      Pulses:           Radial pulses are 2+ on the right side and 2+ on the left side.        Dorsalis pedis pulses are 2+ on the right side and 2+ on the left side.        Posterior tibial pulses are 2+ on the right side and 2+ on the left side.      Heart sounds: S1 normal and S2 normal.   Pulmonary:      Effort: Pulmonary effort is normal.      Breath sounds: Normal breath sounds.   Musculoskeletal:      Right lower leg: No edema.      Left lower leg: No edema.   Lymphadenopathy:      Head:      Right side of head: No submental, submandibular, tonsillar or occipital adenopathy.      Left side of head: No submental, submandibular, tonsillar or occipital adenopathy.      Cervical: No cervical adenopathy.   Skin:     General: Skin is warm and dry.      Capillary Refill: Capillary refill takes less than 2 seconds.      Nails: There is no clubbing.   Neurological:      General: No focal deficit present.      Mental Status: She is alert and oriented to  person, place, and time. Mental status is at baseline.   Psychiatric:         Attention and Perception: Attention normal.         Mood and Affect: Mood and affect normal.         Speech: Speech normal.         Behavior: Behavior normal.         Thought Content: Thought content normal.         Cognition and Memory: Cognition normal.         Vitals:    02/02/23 0815   BP: 94/70   Pulse: 67   Temp: 97.3 °F (36.3 °C)   SpO2: 97%      Body mass index is 36.33 kg/m².    Assessment & Plan   Diagnoses and all orders for this visit:    1. Essential hypertension (Primary)  -     Lipid Panel With LDL / HDL Ratio; Future  -     TSH; Future  -     T4, Free; Future    2. Grade I hemorrhoids  -     hydrocortisone 1 % cream; Apply 1 application topically to the appropriate area as directed 2 (Two) Times a Day.  Dispense: 1 each; Refill: 1  -     CBC & Differential; Future    3. Fungal nail infection  -     ciclopirox (Penlac) 8 % solution; Apply  topically to the appropriate area as directed Every Night.  Dispense: 1 each; Refill: 1    4. Bipolar 2 disorder (HCC)  -     Ambulatory Referral to Psychiatry  -     CBC & Differential; Future    5. Non-STEMI (non-ST elevated myocardial infarction) (CMS/HCC) - 2 years ago  -     Lipid Panel With LDL / HDL Ratio; Future    6. Chronic hepatitis C without hepatic coma (HCC)  -     CBC & Differential; Future  -     Comprehensive Metabolic Panel; Future    7. History of drug abuse (HCC)  -     Ambulatory Referral to Psychiatry  -     CBC & Differential; Future  -     Comprehensive Metabolic Panel; Future    8. Memory changes  -     CBC & Differential; Future  -     Vitamin B12; Future  -     Folate; Future  -     Comprehensive Metabolic Panel; Future    9. Hyperglycemia  -     Hemoglobin A1c; Future  -     Comprehensive Metabolic Panel; Future    10. Iron deficiency anemia due to chronic blood loss  -     CBC & Differential; Future    11. Healthcare maintenance  -     CBC & Differential;  Future  -     Hemoglobin A1c; Future  -     Lipid Panel With LDL / HDL Ratio; Future  -     TSH; Future  -     T4, Free; Future  -     Vitamin B12; Future  -     Folate; Future  -     Comprehensive Metabolic Panel; Future    12. Obesity, Class III, BMI 40-49.9 (morbid obesity) (HCC)  -     CBC & Differential; Future  -     Hemoglobin A1c; Future  -     Lipid Panel With LDL / HDL Ratio; Future  -     TSH; Future  -     T4, Free; Future  -     Vitamin B12; Future  -     Folate; Future  -     Comprehensive Metabolic Panel; Future      1.  HTN/non-STEMI-BP too low and she is symptomatic.  Recommend holding p.m. dose of amlodipine.  Encouraged her to contact cardiology for follow-up and to discuss hypotension for further medication management.  Check labs in 1 week.  2.  Bipolar 2 disorder- referral placed to [facility] for further management.  3.  Hepatitis C infection- encouraged her to follow-up with GI.  She is also due for C-scope.  4.  History of drug abuse- encouraged her to continue to abstain from drug use.  Follow-up with Suboxone program monthly as scheduled.  5.  Fungal nail infection-start Penlac topical every night at bedtime.  Clean nail off after seventh night and reapply.  Continue use for 12 weeks.  6.  Memory changes- patient did not have time today to complete Mini-Mental status exam.  We will complete this at next office visit.  We will check labs next week including CBC, B12 and folate.  Would consider referral for full neuropsych testing versus referral to neurology for further evaluation.    Follow-up in 4 weeks for CPE and labs.

## 2023-02-03 RX ORDER — BENZOCAINE/MENTHOL 6 MG-10 MG
LOZENGE MUCOUS MEMBRANE
Qty: 1 EACH | Refills: 0 | Status: SHIPPED | OUTPATIENT
Start: 2023-02-03

## 2023-02-08 DIAGNOSIS — R11.0 NAUSEA: ICD-10-CM

## 2023-02-08 RX ORDER — ONDANSETRON 4 MG/1
4 TABLET, ORALLY DISINTEGRATING ORAL EVERY 8 HOURS PRN
Qty: 20 TABLET | Refills: 0 | Status: SHIPPED | OUTPATIENT
Start: 2023-02-08 | End: 2023-02-09 | Stop reason: SDUPTHER

## 2023-02-09 DIAGNOSIS — R11.0 NAUSEA: ICD-10-CM

## 2023-02-09 DIAGNOSIS — I10 ESSENTIAL HYPERTENSION: Primary | ICD-10-CM

## 2023-02-09 RX ORDER — ONDANSETRON 4 MG/1
4 TABLET, ORALLY DISINTEGRATING ORAL EVERY 8 HOURS PRN
Qty: 20 TABLET | Refills: 0 | Status: SHIPPED | OUTPATIENT
Start: 2023-02-09

## 2023-02-09 NOTE — TELEPHONE ENCOUNTER
----- Message from SYLWIA Charles sent at 2/2/2023  9:07 AM EST -----  Can we send a prescription to [facility] for a wrist BP monitor and cuff?

## 2023-02-22 DIAGNOSIS — I10 ESSENTIAL HYPERTENSION: Chronic | ICD-10-CM

## 2023-02-22 RX ORDER — LOSARTAN POTASSIUM 50 MG/1
TABLET ORAL
Qty: 180 TABLET | Refills: 1 | OUTPATIENT
Start: 2023-02-22

## 2023-02-27 ENCOUNTER — TELEMEDICINE (OUTPATIENT)
Dept: INTERNAL MEDICINE | Facility: CLINIC | Age: 48
End: 2023-02-27
Payer: COMMERCIAL

## 2023-02-27 VITALS — BODY MASS INDEX: 36.16 KG/M2 | WEIGHT: 225 LBS | HEIGHT: 66 IN

## 2023-02-27 DIAGNOSIS — F41.9 ANXIETY: Chronic | ICD-10-CM

## 2023-02-27 DIAGNOSIS — I10 ESSENTIAL HYPERTENSION: Chronic | ICD-10-CM

## 2023-02-27 DIAGNOSIS — U07.1 COVID-19 VIRUS INFECTION: Primary | ICD-10-CM

## 2023-02-27 DIAGNOSIS — M54.50 CHRONIC BILATERAL LOW BACK PAIN WITHOUT SCIATICA: Chronic | ICD-10-CM

## 2023-02-27 DIAGNOSIS — G89.29 CHRONIC BILATERAL LOW BACK PAIN WITHOUT SCIATICA: Chronic | ICD-10-CM

## 2023-02-27 LAB
EXPIRATION DATE: ABNORMAL
INTERNAL CONTROL: ABNORMAL
Lab: ABNORMAL
SARS-COV-2 AG UPPER RESP QL IA.RAPID: DETECTED

## 2023-02-27 PROCEDURE — 87426 SARSCOV CORONAVIRUS AG IA: CPT | Performed by: NURSE PRACTITIONER

## 2023-02-27 PROCEDURE — 99213 OFFICE O/P EST LOW 20 MIN: CPT | Performed by: NURSE PRACTITIONER

## 2023-02-27 RX ORDER — LOSARTAN POTASSIUM 50 MG/1
50 TABLET ORAL 2 TIMES DAILY
Qty: 180 TABLET | Refills: 1 | Status: SHIPPED | OUTPATIENT
Start: 2023-02-27

## 2023-02-27 RX ORDER — VENLAFAXINE HYDROCHLORIDE 150 MG/1
150 CAPSULE, EXTENDED RELEASE ORAL DAILY
Qty: 90 CAPSULE | Refills: 1 | Status: SHIPPED | OUTPATIENT
Start: 2023-02-27

## 2023-02-27 RX ORDER — METOPROLOL TARTRATE 100 MG/1
100 TABLET ORAL 2 TIMES DAILY
Qty: 180 TABLET | Refills: 1 | Status: SHIPPED | OUTPATIENT
Start: 2023-02-27

## 2023-02-27 RX ORDER — FLUTICASONE PROPIONATE 50 MCG
2 SPRAY, SUSPENSION (ML) NASAL DAILY
Qty: 18.2 ML | Refills: 1 | Status: SHIPPED | OUTPATIENT
Start: 2023-02-27

## 2023-02-27 RX ORDER — ALBUTEROL SULFATE 90 UG/1
2 AEROSOL, METERED RESPIRATORY (INHALATION) EVERY 4 HOURS PRN
Qty: 8 G | Refills: 0 | Status: SHIPPED | OUTPATIENT
Start: 2023-02-27

## 2023-02-27 RX ORDER — GUAIFENESIN 600 MG/1
600 TABLET, EXTENDED RELEASE ORAL 2 TIMES DAILY
Qty: 30 TABLET | Refills: 0 | Status: SHIPPED | OUTPATIENT
Start: 2023-02-27

## 2023-02-27 NOTE — PROGRESS NOTES
Subjective   Silvia Clinton is a 47 y.o. female.     Chief Complaint   Patient presents with   • Covid Positive      This morning.     • Fatigue   • Nasal Congestion   • Fever   • URI   • Sore Throat   • Shortness of Breath   • Wheezing   • Headache        History of Present Illness   You have chosen to receive care through a telehealth visit.  Do you consent to use a video/audio connection for your medical care today? Yes    She is here today for a telehealth visit using D[employer]im[employer] from her home.  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  She is COVID-positive. Symptoms started 5 days ago. She is experiencing a HA, sinus congestion, sore throat and burning in her chest. She has an intermittent cough, productive of white sputum. She notes subjective fever. She has fatigue. She notes SOB with activity and wheezing.   She feels like chest congestion is becoming worse.   She has tried sudafed with some improvement.     She is also needing refills on her losartan, metoprolol and Effexor today.    The following portions of the patient's history were reviewed and updated as appropriate: allergies, current medications, past family history, past medical history, past social history, past surgical history and problem list.    Review of Systems   Constitutional: Positive for chills, fatigue and fever.   HENT: Positive for congestion, postnasal drip, rhinorrhea, sinus pressure and sore throat. Negative for ear pain and trouble swallowing.    Respiratory: Positive for cough, chest tightness, shortness of breath and wheezing.    Cardiovascular: Negative for chest pain, palpitations and leg swelling.   Gastrointestinal: Negative for diarrhea, nausea and vomiting.   Musculoskeletal: Positive for myalgias.   Neurological: Positive for headache.       Objective   Physical Exam  Constitutional:       General: She is awake.      Appearance: She is ill-appearing.   Pulmonary:      Effort: Pulmonary effort is normal.   Neurological:       Mental Status: She is alert and oriented to person, place, and time.   Psychiatric:         Attention and Perception: Attention and perception normal.         Mood and Affect: Mood and affect normal.         Speech: Speech normal.         Behavior: Behavior normal. Behavior is cooperative.         Thought Content: Thought content normal.         Cognition and Memory: Cognition and memory normal.         Judgment: Judgment normal.         There were no vitals filed for this visit.       Assessment & Plan   Diagnoses and all orders for this visit:    1. COVID-19 virus infection (Primary)  -     fluticasone (FLONASE) 50 MCG/ACT nasal spray; 2 sprays into the nostril(s) as directed by provider Daily.  Dispense: 18.2 mL; Refill: 1  -     guaiFENesin (Mucinex) 600 MG 12 hr tablet; Take 1 tablet by mouth 2 (Two) Times a Day.  Dispense: 30 tablet; Refill: 0  -     albuterol sulfate  (90 Base) MCG/ACT inhaler; Inhale 2 puffs Every 4 (Four) Hours As Needed for Wheezing or Shortness of Air.  Dispense: 8 g; Refill: 0    2. Essential hypertension  -     losartan (Cozaar) 50 MG tablet; Take 1 tablet by mouth 2 (Two) Times a Day.  Dispense: 180 tablet; Refill: 1  -     metoprolol tartrate (LOPRESSOR) 100 MG tablet; Take 1 tablet by mouth 2 (Two) Times a Day.  Dispense: 180 tablet; Refill: 1    3. Anxiety  -     venlafaxine XR (EFFEXOR-XR) 150 MG 24 hr capsule; Take 1 capsule by mouth Daily.  Dispense: 90 capsule; Refill: 1      1.  COVID-19 virus infection-she is outside the window for antiviral treatment.  Recommend using an albuterol inhaler 1 to 2 puffs every 4-6 hours as needed for any shortness of breath or wheezing.  Also recommend starting a nasal steroid spray 1 to 2 puffs in each nostril daily along with plain Mucinex.  Encouraged her to hydrate well with fluids, vitamin C, vitamin D.  She is needing an office COVID test today for her work and will need an additional negative COVID test from our office in order for  her to return to work.  Recommend quarantine at home for 5 days from symptom onset and extend this to 7 days from symptom onset if still febrile on day 5.  Wear a mask for 10 days from symptom onset when in public.  Encouraged her to ambulate frequently as tolerated.  Notify for worsening symptoms.  To ER with severe symptoms or acute respiratory distress.    Doximity visit lasting 10 minutes.

## 2023-02-27 NOTE — ADDENDUM NOTE
"Subjective   Magnus Hartley is a 89 y.o. female who presents c/o frequent urination x 2 days. Was recently admitted to [facility] for elevated blood pressure and med some meds changed.     History of Present Illness   Has had urinary infections in the past. Having frequency, urgency and burning. Follows with gynecology typically for this, but missed FU due to recent hospitalization. Has pessary.   The following portions of the patient's history were reviewed and updated as appropriate: allergies, current medications, past family history, past medical history, past social history, past surgical history and problem list.    Review of Systems   Constitutional: Negative for activity change, chills and fever.   Gastrointestinal: Negative for abdominal distention, abdominal pain and vomiting.   Genitourinary: Positive for dysuria and frequency. Negative for difficulty urinating, flank pain, genital sores and urgency.   Neurological: Negative for dizziness, light-headedness and headaches.   Psychiatric/Behavioral: Negative.      /82  Pulse 72  Temp 98.5 °F (36.9 °C) (Oral)   Ht 64.5\" (163.8 cm)  Wt 126 lb (57.2 kg)  SpO2 98%  BMI 21.29 kg/m2    Objective   Physical Exam   Constitutional: She appears well-developed and well-nourished. No distress.   Abdominal: Soft. Bowel sounds are normal. There is no tenderness (no cva tenderness, mild suprapubic tenderness).   Psychiatric: She has a normal mood and affect. Her behavior is normal. Judgment and thought content normal.   Nursing note and vitals reviewed.    Assessment/Plan   Problems Addressed this Visit     None      Visit Diagnoses     Frequent urination    -  Primary    Relevant Orders    POCT urinalysis dipstick, automated (Completed)    Urine culture (clean catch)        Amlodipine was added. Continue present medications. Will cover for UTI, send urine for culture. May need work in sooner with gyn, If infection not settling, as pessary change overdue. FU if not " Addended by: MOLLY HOUSER on: 2/27/2023 12:20 PM     Modules accepted: Orders     settling.

## 2023-02-28 RX ORDER — METHOCARBAMOL 500 MG/1
TABLET, FILM COATED ORAL
Qty: 90 TABLET | Refills: 0 | OUTPATIENT
Start: 2023-02-28

## 2023-03-08 DIAGNOSIS — M54.50 CHRONIC BILATERAL LOW BACK PAIN WITHOUT SCIATICA: Chronic | ICD-10-CM

## 2023-03-08 DIAGNOSIS — G89.29 CHRONIC BILATERAL LOW BACK PAIN WITHOUT SCIATICA: Chronic | ICD-10-CM

## 2023-03-09 RX ORDER — METHOCARBAMOL 500 MG/1
TABLET, FILM COATED ORAL
Qty: 90 TABLET | Refills: 0 | OUTPATIENT
Start: 2023-03-09

## 2023-03-13 ENCOUNTER — HOSPITAL ENCOUNTER (EMERGENCY)
Facility: HOSPITAL | Age: 48
Discharge: LEFT BEFORE DISCHARGE | End: 2023-03-14
Payer: COMMERCIAL

## 2023-03-13 VITALS
DIASTOLIC BLOOD PRESSURE: 70 MMHG | SYSTOLIC BLOOD PRESSURE: 135 MMHG | OXYGEN SATURATION: 99 % | RESPIRATION RATE: 15 BRPM | HEART RATE: 83 BPM | TEMPERATURE: 98.3 F | BODY MASS INDEX: 37.49 KG/M2 | WEIGHT: 225 LBS | HEIGHT: 65 IN

## 2023-03-13 DIAGNOSIS — M54.50 CHRONIC BILATERAL LOW BACK PAIN WITHOUT SCIATICA: Chronic | ICD-10-CM

## 2023-03-13 DIAGNOSIS — G89.29 CHRONIC BILATERAL LOW BACK PAIN WITHOUT SCIATICA: Chronic | ICD-10-CM

## 2023-03-13 PROCEDURE — 99211 OFF/OP EST MAY X REQ PHY/QHP: CPT

## 2023-03-13 RX ORDER — METHOCARBAMOL 500 MG/1
500 TABLET, FILM COATED ORAL 3 TIMES DAILY PRN
Qty: 90 TABLET | Refills: 0 | OUTPATIENT
Start: 2023-03-13

## 2023-03-13 NOTE — TELEPHONE ENCOUNTER
Caller: Silvia Clinton CM    Relationship: Self    Best call back number: [phone]    Requested Prescriptions:   Requested Prescriptions     Pending Prescriptions Disp Refills   • methocarbamol (ROBAXIN) 500 MG tablet 90 tablet 0     Sig: Take 1 tablet by mouth 3 (Three) Times a Day As Needed for Muscle Spasms.        Pharmacy where request should be sent: [facility]ACY #[id number] - [place], [place] - [address] AT [facility] [phone] [facility] [phone]      Additional details provided by patient: PATIENT IS OUT    Does the patient have less than a 3 day supply:  [x] Yes  [] No    Would you like a call back once the refill request has been completed: [] Yes [x] No    If the office needs to give you a call back, can they leave a voicemail: [] Yes [x] No    Chip Rebollar Rep   03/13/23 12:32 EDT

## 2023-03-14 NOTE — ED NOTES
Patient approached triage desk and reports that she is not having any increase in her symptoms. She reports that she feels comfortable going home and taking benadryl and will follow up with her PCP in the morning. She appears in NAD at this time.

## 2023-03-14 NOTE — PROGRESS NOTES
Subjective   Silvia Clinton is a 47 y.o. female.  Referred by Suhas Oseguera for iron deficiency anemia    History of Present Illness   Ms. Clinton is a 45-year-old premenopausal lady with history of hepatitis C, history of drug use, hypertension, SLE(she has not seen a rheumatologist), MRSA infection presents for further evaluation of anemia.She has had anemia since 2018.  Initially MCV was normal but subsequently noted to have low MCV with CBC on 7/8/2021 showing a hemoglobin of 8.9 and MCV of 73.3.  WBC count and platelet count has remained relatively normal except for some mild thrombocytopenia in 2019.  Per patient she has been anemic all her life since her teenage years.  She would take oral iron to help with the anemia and would have improvement.  After she was noted to be anemic she started oral iron .  Unfortunately she is not able to tolerate the iron supplements and she is having significant amount of nausea, abdominal pain, constipation to a point where she is not able to take the supplements.  She has not had menstrual cycles for 10 years when she was doing IV drugs but subsequently had menorrhagia for a year.  She has started using homeopathic medications for the past 2 months which helped with her heavy menstrual cycles and over the past 2 months she had fairly normal menstrual cycles.  She is scheduled to see OB/GYN next month.  She also reports bright red blood per rectum which has been going on for several months.  She has seen Dr. Steel.   She denies any malignancies in her immediate family.  Her maternal aunt had breast cancer.  Distant relatives with lung cancer and renal cancer.    She had COVID-19 infection in December 2021.  Received 2 doses of Injectafer in February 2022.    Interval history  The patient returns today for 3-month lab review and follow-up.  She received Injectafer 1/9/2023 - 1/26/2023.  Her symptoms significantly improved after receiving Injectafer.  Unfortunately she was  diagnosed with COVID-19 a few weeks ago, and since then has been feeling very fatigued.  She is eating and drinking adequately.  Bowels moving regularly.  Denies signs or symptoms of bleeding.  No new concerns today aside from her persistent fatigue since being diagnosed with COVID.    The following portions of the patient's history were reviewed and updated as appropriate: allergies, current medications, past family history, past medical history, past social history, past surgical history and problem list.    Past Medical History:   Diagnosis Date   • Chest pain    • Depression    • Drug abstinence syndrome (HCC)    • Drug abuse (HCC)    • Ex-smoker    • Heart attack (HCC)    • Hepatitis C    • Hepatitis C    • Hypertension    • Kidney stone    • Lupus (HCC)    • Lupus (systemic lupus erythematosus) (HCC)    • Mitral valve anterior leaflet prolapse    • NSTEMI (non-ST elevated myocardial infarction) (HCC)    • Otitis    • Palpitations    • Seizures (HCC)    • Spinal epidural abscess    • Tachycardia         Past Surgical History:   Procedure Laterality Date   • BACK SURGERY     • CHOLECYSTECTOMY     • LEG SURGERY      broke tibula,fibula, steel noah   • LIVER BIOPSY     • LUMBAR LAMINECTOMY DISCECTOMY DECOMPRESSION Left 12/9/2018    Procedure: Posterior lumbar three through sacrum decompression;  Surgeon: Tevin Whitley MD;  Location: [facility];  Service: Neurosurgery   • LYMPH NODE BIOPSY     • SPINE SURGERY          Family History   Problem Relation Age of Onset   • Bipolar disorder Mother    • Diabetes Mother    • No Known Problems Father    • No Known Problems Sister    • No Known Problems Brother    • Nephrolithiasis Maternal Grandmother    • Bipolar disorder Maternal Grandmother    • Breast cancer Maternal Aunt    • Liver cancer Maternal Uncle    • Brain cancer Maternal Uncle    • Lung cancer Maternal Uncle    • Leukemia Cousin    • Colon cancer Neg Hx    • Cervical cancer Neg Hx    • Uterine cancer  "Neg Hx    • Ovarian cancer Neg Hx    • Thyroid cancer Neg Hx         Social History     Socioeconomic History   • Marital status: Legally    Tobacco Use   • Smoking status: Former     Packs/day: 1.00     Years: 30.00     Pack years: 30.00     Types: Cigarettes     Quit date: 2020     Years since quittin.5   • Smokeless tobacco: Never   • Tobacco comments:     E-CIG USE   Vaping Use   • Vaping Use: Every day   • Start date: 2020   • Substances: Nicotine, Flavoring   • Devices: Pre-filled or refillable cartridge, Refillable tank   Substance and Sexual Activity   • Alcohol use: Not Currently     Comment: CAFFEINE USE: 1 CUP COFFEE/ 2 COKES DAILY   • Drug use: Not Currently     Types: IV, Heroin, Methamphetamines     Comment: pt states she no longer uses heroin, she now uses meth   • Sexual activity: Not Currently        OB History    No obstetric history on file.          Allergies   Allergen Reactions   • Strawberry Hives     \"FELT LIKE BURNING, HEAT AND SWELLING\"   • Sulfa Antibiotics Nausea And Vomiting and Swelling     Patient states when she took Sulfa medication, her throat started to swell.  nausea     Review of Systems   Constitutional: Positive for fatigue.   HENT: Negative.    Eyes: Negative.    Respiratory: Negative.    Gastrointestinal: Negative for abdominal pain, blood in stool, constipation and nausea.   Endocrine: Negative.    Genitourinary: Positive for menstrual problem.   Musculoskeletal: Negative.    Allergic/Immunologic: Negative.    Neurological: Negative.    Hematological: Negative.    Psychiatric/Behavioral: Negative.      Review of systems as mentioned in the HPI    Objective   Blood pressure 110/72, pulse 60, temperature 98 °F (36.7 °C), temperature source Temporal, resp. rate 16, height 167 cm (65.75\"), weight 102 kg (224 lb 12.8 oz), last menstrual period 2023, SpO2 97 %, not currently breastfeeding.     Physical Exam  Vitals reviewed.   Constitutional:       " Appearance: Normal appearance. She is obese.   HENT:      Head: Normocephalic and atraumatic.      Nose: Nose normal. No congestion.      Mouth/Throat:      Mouth: Mucous membranes are moist.      Pharynx: Oropharynx is clear.   Eyes:      Conjunctiva/sclera: Conjunctivae normal.      Pupils: Pupils are equal, round, and reactive to light.   Cardiovascular:      Rate and Rhythm: Normal rate and regular rhythm.      Heart sounds: Normal heart sounds.   Pulmonary:      Effort: Pulmonary effort is normal.      Breath sounds: Normal breath sounds.   Abdominal:      General: Abdomen is flat. Bowel sounds are normal.      Palpations: Abdomen is soft.   Musculoskeletal:         General: Normal range of motion.      Cervical back: Normal range of motion.   Skin:     General: Skin is warm and dry.      Findings: No rash.      Comments: Multiple tattoos on her extremities.   Neurological:      General: No focal deficit present.      Mental Status: She is alert and oriented to person, place, and time. Mental status is at baseline.      Motor: No weakness.   Psychiatric:         Mood and Affect: Mood normal.         Behavior: Behavior normal.         Thought Content: Thought content normal.         Judgment: Judgment normal.       I have reexamined the patient and the results are consistent with the previously documented exam. SYLWIA Lizarraga     Lab on 03/16/2023   Component Date Value Ref Range Status   • Glucose 03/16/2023 91  65 - 99 mg/dL Final   • BUN 03/16/2023 13  6 - 20 mg/dL Final   • Creatinine 03/16/2023 0.63  0.57 - 1.00 mg/dL Final   • Sodium 03/16/2023 140  136 - 145 mmol/L Final   • Potassium 03/16/2023 4.1  3.5 - 5.2 mmol/L Final   • Chloride 03/16/2023 103  98 - 107 mmol/L Final   • CO2 03/16/2023 27.1  22.0 - 29.0 mmol/L Final   • Calcium 03/16/2023 9.3  8.6 - 10.5 mg/dL Final   • Total Protein 03/16/2023 7.7  6.0 - 8.5 g/dL Final   • Albumin 03/16/2023 4.4  3.5 - 5.2 g/dL Final   • ALT (SGPT) 03/16/2023  11  1 - 33 U/L Final   • AST (SGOT) 03/16/2023 17  1 - 32 U/L Final   • Alkaline Phosphatase 03/16/2023 76  39 - 117 U/L Final   • Total Bilirubin 03/16/2023 0.5  0.0 - 1.2 mg/dL Final   • Globulin 03/16/2023 3.3  gm/dL Final   • A/G Ratio 03/16/2023 1.3  g/dL Final   • BUN/Creatinine Ratio 03/16/2023 20.6  7.0 - 25.0 Final   • Anion Gap 03/16/2023 9.9  5.0 - 15.0 mmol/L Final   • eGFR 03/16/2023 111.6  >60.0 mL/min/1.73 Final   • Ferritin 03/16/2023 130.90  13.00 - 150.00 ng/mL Final   • Iron 03/16/2023 101  37 - 145 mcg/dL Final   • Iron Saturation 03/16/2023 27  20 - 50 % Final   • Transferrin 03/16/2023 252  200 - 360 mg/dL Final   • TIBC 03/16/2023 375  298 - 536 mcg/dL Final   • WBC 03/16/2023 4.99  3.40 - 10.80 10*3/mm3 Final   • RBC 03/16/2023 4.62  3.77 - 5.28 10*6/mm3 Final   • Hemoglobin 03/16/2023 13.5  12.0 - 15.9 g/dL Final   • Hematocrit 03/16/2023 41.9  34.0 - 46.6 % Final   • MCV 03/16/2023 90.7  79.0 - 97.0 fL Final   • MCH 03/16/2023 29.2  26.6 - 33.0 pg Final   • MCHC 03/16/2023 32.2  31.5 - 35.7 g/dL Final   • RDW 03/16/2023 17.1 (H)  12.3 - 15.4 % Final   • RDW-SD 03/16/2023 56.7 (H)  37.0 - 54.0 fl Final   • MPV 03/16/2023 12.3 (H)  6.0 - 12.0 fL Final   • Platelets 03/16/2023 180  140 - 450 10*3/mm3 Final   • Neutrophil % 03/16/2023 59.7  42.7 - 76.0 % Final   • Lymphocyte % 03/16/2023 28.3  19.6 - 45.3 % Final   • Monocyte % 03/16/2023 9.2  5.0 - 12.0 % Final   • Eosinophil % 03/16/2023 2.2  0.3 - 6.2 % Final   • Basophil % 03/16/2023 0.4  0.0 - 1.5 % Final   • Immature Grans % 03/16/2023 0.2  0.0 - 0.5 % Final   • Neutrophils, Absolute 03/16/2023 2.98  1.70 - 7.00 10*3/mm3 Final   • Lymphocytes, Absolute 03/16/2023 1.41  0.70 - 3.10 10*3/mm3 Final   • Monocytes, Absolute 03/16/2023 0.46  0.10 - 0.90 10*3/mm3 Final   • Eosinophils, Absolute 03/16/2023 0.11  0.00 - 0.40 10*3/mm3 Final   • Basophils, Absolute 03/16/2023 0.02  0.00 - 0.20 10*3/mm3 Final   • Immature Grans, Absolute 03/16/2023  0.01  0.00 - 0.05 10*3/mm3 Final   • nRBC 03/16/2023 0.0  0.0 - 0.2 /100 WBC Final   Telemedicine on 02/27/2023   Component Date Value Ref Range Status   • SARS Antigen 02/27/2023 Detected (A)  Not Detected, Presumptive Negative Final   • Internal Control 02/27/2023 Passed  Passed Final   • Lot Number 02/27/2023 [id number]   Final   • Expiration Date 02/27/2023 06/04/2023   Final        No radiology results for the last 30 days.     4/21/2022 CBC reviewed and hemoglobin normal at 13.  Iron studies    6/16/2022  CBC reviewed and within normal limits hemoglobin 12.2  CMP reviewed and within normal limits  Ferritin normal at 79  Iron profile shows a normal iron saturation of 20% and TIBC 381    3/16/2023  CBC-WBC 4.99, ANC 2.98, hemoglobin 13.5, platelets 180,000.  Iron saturation 27%, ferritin 130.9, TIBC 375.    Assessment & Plan     *Iron deficiency anemia  · 5/11/2021 iron saturation extremely low at 5%, TIBC elevated at 651, transferrin elevated at 437 consistent with iron deficiency  · Ferritin low at 8.5 again consistent with iron deficiency  · Patient reports being intolerant to oral iron  · She has had severe nausea vomiting abdominal pain and constipation when she takes oral iron  · Received Injectafer 750 mg IV x2 in July 2021  · Iron studies from 1/17/2022 reviewed and suggestive of iron deficiency  · Iron deficiency anemia could be secondary to ongoing menorrhagia versus GI bleed  ·  Injectafer 750 mg IV x2 in February 2022  · Continues to experience severe menorrhagia  · Blood per rectum has resolved since constipation has improved  · 6/16/2022-hemoglobin normal at 12.2, iron studies normal.  · 12/15/2022 CBC reviewed with WBC 4.06, hemoglobin 11.4 and platelets 181,000  · CMP reviewed and within normal limits  · Iron saturation low at 6% with a TIBC of 493, ferritin low at 12.9  · Patient unable to tolerate oral iron  · Injectafer x2 1/2023.  · 3/16/2023: Felt that her symptoms improved after receiving  Injectafer.  Unfortunately was diagnosed with COVID-19 3 weeks ago, and since then has had persistent fatigue.  Her iron studies today are adequate with iron saturation 27%, ferritin 130.9, TIBC 375.  She is currently not in need of iron replacement.  Recommended she follow-up with her PCP if fatigue persists.    *Blood per rectum  · Resolved since constipation has improved  · She has not had a follow-up with gastroenterology.  · Denies any bright red blood per rectum    *Hepatitis C-completed treatment with Epclusa.  Continue follow-up with gastroenterology    *?  SLE-CRP and ESR elevated.  Missed her appointment with rheumatology in December 2021    *Screening-missed her screening appointment in October 2021.  Screening mammogram not performed yet.    *Menorrhagia-she is having heavy menstrual cycles again.  Has not undergone uterine ablation yet.    *Morbid obesity-BMI 35.6.  We discussed lifestyle changes.    *IV drug abuse-she has been sober for over 3 years now.    PLAN:   · No need for iron replacement at this time.  · Return in 3 months for CBC, CMP, ferritin, iron profile and MD follow-up.    Elmira Fowler, SYLWIA  03/16/23

## 2023-03-14 NOTE — ED TRIAGE NOTES
Pt presents to the ER after drinking strawberry puree and noticed she had a red rash over her arms and legs. Pt states she had large patches of it initially but it continued to spread. Denies dyspnea or oral swelling, no facial edema noted or difficulty speaking. Reports she has had this happen previously but thought it was due to something else she consumed and not the strawberries. Took 25mg Benadryl PTA.

## 2023-03-16 ENCOUNTER — OFFICE VISIT (OUTPATIENT)
Dept: ONCOLOGY | Facility: CLINIC | Age: 48
End: 2023-03-16
Payer: COMMERCIAL

## 2023-03-16 ENCOUNTER — LAB (OUTPATIENT)
Dept: OTHER | Facility: HOSPITAL | Age: 48
End: 2023-03-16
Payer: COMMERCIAL

## 2023-03-16 VITALS
OXYGEN SATURATION: 97 % | TEMPERATURE: 98 F | RESPIRATION RATE: 16 BRPM | DIASTOLIC BLOOD PRESSURE: 72 MMHG | BODY MASS INDEX: 36.13 KG/M2 | HEIGHT: 66 IN | SYSTOLIC BLOOD PRESSURE: 110 MMHG | HEART RATE: 60 BPM | WEIGHT: 224.8 LBS

## 2023-03-16 DIAGNOSIS — T45.4X5A ADVERSE EFFECT OF IRON, INITIAL ENCOUNTER: ICD-10-CM

## 2023-03-16 DIAGNOSIS — D50.0 IRON DEFICIENCY ANEMIA DUE TO CHRONIC BLOOD LOSS: Primary | ICD-10-CM

## 2023-03-16 DIAGNOSIS — D50.0 IRON DEFICIENCY ANEMIA DUE TO CHRONIC BLOOD LOSS: ICD-10-CM

## 2023-03-16 LAB
ALBUMIN SERPL-MCNC: 4.4 G/DL (ref 3.5–5.2)
ALBUMIN/GLOB SERPL: 1.3 G/DL
ALP SERPL-CCNC: 76 U/L (ref 39–117)
ALT SERPL W P-5'-P-CCNC: 11 U/L (ref 1–33)
ANION GAP SERPL CALCULATED.3IONS-SCNC: 9.9 MMOL/L (ref 5–15)
AST SERPL-CCNC: 17 U/L (ref 1–32)
BASOPHILS # BLD AUTO: 0.02 10*3/MM3 (ref 0–0.2)
BASOPHILS NFR BLD AUTO: 0.4 % (ref 0–1.5)
BILIRUB SERPL-MCNC: 0.5 MG/DL (ref 0–1.2)
BUN SERPL-MCNC: 13 MG/DL (ref 6–20)
BUN/CREAT SERPL: 20.6 (ref 7–25)
CALCIUM SPEC-SCNC: 9.3 MG/DL (ref 8.6–10.5)
CHLORIDE SERPL-SCNC: 103 MMOL/L (ref 98–107)
CO2 SERPL-SCNC: 27.1 MMOL/L (ref 22–29)
CREAT SERPL-MCNC: 0.63 MG/DL (ref 0.57–1)
DEPRECATED RDW RBC AUTO: 56.7 FL (ref 37–54)
EGFRCR SERPLBLD CKD-EPI 2021: 111.6 ML/MIN/1.73
EOSINOPHIL # BLD AUTO: 0.11 10*3/MM3 (ref 0–0.4)
EOSINOPHIL NFR BLD AUTO: 2.2 % (ref 0.3–6.2)
ERYTHROCYTE [DISTWIDTH] IN BLOOD BY AUTOMATED COUNT: 17.1 % (ref 12.3–15.4)
FERRITIN SERPL-MCNC: 130.9 NG/ML (ref 13–150)
GLOBULIN UR ELPH-MCNC: 3.3 GM/DL
GLUCOSE SERPL-MCNC: 91 MG/DL (ref 65–99)
HCT VFR BLD AUTO: 41.9 % (ref 34–46.6)
HGB BLD-MCNC: 13.5 G/DL (ref 12–15.9)
IMM GRANULOCYTES # BLD AUTO: 0.01 10*3/MM3 (ref 0–0.05)
IMM GRANULOCYTES NFR BLD AUTO: 0.2 % (ref 0–0.5)
IRON 24H UR-MRATE: 101 MCG/DL (ref 37–145)
IRON SATN MFR SERPL: 27 % (ref 20–50)
LYMPHOCYTES # BLD AUTO: 1.41 10*3/MM3 (ref 0.7–3.1)
LYMPHOCYTES NFR BLD AUTO: 28.3 % (ref 19.6–45.3)
MCH RBC QN AUTO: 29.2 PG (ref 26.6–33)
MCHC RBC AUTO-ENTMCNC: 32.2 G/DL (ref 31.5–35.7)
MCV RBC AUTO: 90.7 FL (ref 79–97)
MONOCYTES # BLD AUTO: 0.46 10*3/MM3 (ref 0.1–0.9)
MONOCYTES NFR BLD AUTO: 9.2 % (ref 5–12)
NEUTROPHILS NFR BLD AUTO: 2.98 10*3/MM3 (ref 1.7–7)
NEUTROPHILS NFR BLD AUTO: 59.7 % (ref 42.7–76)
NRBC BLD AUTO-RTO: 0 /100 WBC (ref 0–0.2)
PLATELET # BLD AUTO: 180 10*3/MM3 (ref 140–450)
PMV BLD AUTO: 12.3 FL (ref 6–12)
POTASSIUM SERPL-SCNC: 4.1 MMOL/L (ref 3.5–5.2)
PROT SERPL-MCNC: 7.7 G/DL (ref 6–8.5)
RBC # BLD AUTO: 4.62 10*6/MM3 (ref 3.77–5.28)
SODIUM SERPL-SCNC: 140 MMOL/L (ref 136–145)
TIBC SERPL-MCNC: 375 MCG/DL (ref 298–536)
TRANSFERRIN SERPL-MCNC: 252 MG/DL (ref 200–360)
WBC NRBC COR # BLD: 4.99 10*3/MM3 (ref 3.4–10.8)

## 2023-03-16 PROCEDURE — 85025 COMPLETE CBC W/AUTO DIFF WBC: CPT | Performed by: NURSE PRACTITIONER

## 2023-03-16 PROCEDURE — 3074F SYST BP LT 130 MM HG: CPT | Performed by: NURSE PRACTITIONER

## 2023-03-16 PROCEDURE — 1159F MED LIST DOCD IN RCRD: CPT | Performed by: NURSE PRACTITIONER

## 2023-03-16 PROCEDURE — 1126F AMNT PAIN NOTED NONE PRSNT: CPT | Performed by: NURSE PRACTITIONER

## 2023-03-16 PROCEDURE — 80053 COMPREHEN METABOLIC PANEL: CPT | Performed by: NURSE PRACTITIONER

## 2023-03-16 PROCEDURE — 84466 ASSAY OF TRANSFERRIN: CPT | Performed by: NURSE PRACTITIONER

## 2023-03-16 PROCEDURE — 99213 OFFICE O/P EST LOW 20 MIN: CPT | Performed by: NURSE PRACTITIONER

## 2023-03-16 PROCEDURE — 83540 ASSAY OF IRON: CPT | Performed by: NURSE PRACTITIONER

## 2023-03-16 PROCEDURE — 36415 COLL VENOUS BLD VENIPUNCTURE: CPT

## 2023-03-16 PROCEDURE — 82728 ASSAY OF FERRITIN: CPT | Performed by: NURSE PRACTITIONER

## 2023-03-16 PROCEDURE — 3078F DIAST BP <80 MM HG: CPT | Performed by: NURSE PRACTITIONER

## 2023-03-16 PROCEDURE — 1160F RVW MEDS BY RX/DR IN RCRD: CPT | Performed by: NURSE PRACTITIONER

## 2023-03-20 DIAGNOSIS — G89.29 CHRONIC BILATERAL LOW BACK PAIN WITHOUT SCIATICA: Chronic | ICD-10-CM

## 2023-03-20 DIAGNOSIS — M54.50 CHRONIC BILATERAL LOW BACK PAIN WITHOUT SCIATICA: Chronic | ICD-10-CM

## 2023-03-20 RX ORDER — METHOCARBAMOL 500 MG/1
500 TABLET, FILM COATED ORAL 3 TIMES DAILY PRN
Qty: 90 TABLET | Refills: 0 | OUTPATIENT
Start: 2023-03-20

## 2023-03-20 NOTE — TELEPHONE ENCOUNTER
Caller: Silvia Clinton CM    Relationship: Self    Best call back number:[phone]    Requested Prescriptions:   Requested Prescriptions     Pending Prescriptions Disp Refills   • methocarbamol (ROBAXIN) 500 MG tablet 90 tablet 0     Sig: Take 1 tablet by mouth 3 (Three) Times a Day As Needed for Muscle Spasms.        Pharmacy where request should be sent: [facility] #[id number] - [place], [place] - [address] AT [facility] - [phone] [facility] [phone]      Additional details provided by patient: OUT OF MEDICATION     Does the patient have less than a 3 day supply:  [x] Yes  [] No    Would you like a call back once the refill request has been completed: [] Yes [] No    If the office needs to give you a call back, can they leave a voicemail: [] Yes [] No    Chip Phillips Rep   03/20/23 13:44 EDT

## 2023-03-21 DIAGNOSIS — G89.29 CHRONIC BILATERAL LOW BACK PAIN WITHOUT SCIATICA: Chronic | ICD-10-CM

## 2023-03-21 DIAGNOSIS — M54.50 CHRONIC BILATERAL LOW BACK PAIN WITHOUT SCIATICA: Chronic | ICD-10-CM

## 2023-03-22 ENCOUNTER — TELEPHONE (OUTPATIENT)
Dept: INTERNAL MEDICINE | Facility: CLINIC | Age: 48
End: 2023-03-22
Payer: COMMERCIAL

## 2023-03-22 RX ORDER — METHOCARBAMOL 500 MG/1
TABLET, FILM COATED ORAL
Qty: 90 TABLET | Refills: 0 | Status: SHIPPED | OUTPATIENT
Start: 2023-03-22

## 2023-03-31 ENCOUNTER — HOSPITAL ENCOUNTER (EMERGENCY)
Facility: HOSPITAL | Age: 48
Discharge: HOME | End: 2023-03-31
Attending: NURSE PRACTITIONER | Admitting: EMERGENCY MEDICINE
Payer: COMMERCIAL

## 2023-03-31 VITALS
SYSTOLIC BLOOD PRESSURE: 112 MMHG | BODY MASS INDEX: 36.16 KG/M2 | HEIGHT: 66 IN | OXYGEN SATURATION: 96 % | RESPIRATION RATE: 16 BRPM | TEMPERATURE: 98.5 F | WEIGHT: 225 LBS | DIASTOLIC BLOOD PRESSURE: 78 MMHG | HEART RATE: 60 BPM

## 2023-03-31 DIAGNOSIS — R42 DIZZINESS: Primary | ICD-10-CM

## 2023-03-31 LAB
ALBUMIN SERPL-MCNC: 4.3 G/DL (ref 3.5–5.2)
ALBUMIN/GLOB SERPL: 1.4 G/DL
ALP SERPL-CCNC: 71 U/L (ref 39–117)
ALT SERPL W P-5'-P-CCNC: 12 U/L (ref 1–33)
ANION GAP SERPL CALCULATED.3IONS-SCNC: 11.4 MMOL/L (ref 5–15)
AST SERPL-CCNC: 26 U/L (ref 1–32)
BACTERIA UR QL AUTO: ABNORMAL /HPF
BASOPHILS # BLD AUTO: 0.02 10*3/MM3 (ref 0–0.2)
BASOPHILS NFR BLD AUTO: 0.5 % (ref 0–1.5)
BILIRUB SERPL-MCNC: 0.3 MG/DL (ref 0–1.2)
BILIRUB UR QL STRIP: NEGATIVE
BUN SERPL-MCNC: 16 MG/DL (ref 6–20)
BUN/CREAT SERPL: 21.9 (ref 7–25)
CALCIUM SPEC-SCNC: 9.5 MG/DL (ref 8.6–10.5)
CHLORIDE SERPL-SCNC: 97 MMOL/L (ref 98–107)
CLARITY UR: CLEAR
CO2 SERPL-SCNC: 26.6 MMOL/L (ref 22–29)
COLOR UR: YELLOW
CREAT SERPL-MCNC: 0.73 MG/DL (ref 0.57–1)
DEPRECATED RDW RBC AUTO: 57 FL (ref 37–54)
EGFRCR SERPLBLD CKD-EPI 2021: 103.5 ML/MIN/1.73
EOSINOPHIL # BLD AUTO: 0.1 10*3/MM3 (ref 0–0.4)
EOSINOPHIL NFR BLD AUTO: 2.5 % (ref 0.3–6.2)
ERYTHROCYTE [DISTWIDTH] IN BLOOD BY AUTOMATED COUNT: 16.9 % (ref 12.3–15.4)
GLOBULIN UR ELPH-MCNC: 3 GM/DL
GLUCOSE SERPL-MCNC: 90 MG/DL (ref 65–99)
GLUCOSE UR STRIP-MCNC: NEGATIVE MG/DL
HCG SERPL QL: NEGATIVE
HCT VFR BLD AUTO: 36.1 % (ref 34–46.6)
HGB BLD-MCNC: 11.2 G/DL (ref 12–15.9)
HGB UR QL STRIP.AUTO: ABNORMAL
HYALINE CASTS UR QL AUTO: ABNORMAL /LPF
IMM GRANULOCYTES # BLD AUTO: 0.01 10*3/MM3 (ref 0–0.05)
IMM GRANULOCYTES NFR BLD AUTO: 0.2 % (ref 0–0.5)
KETONES UR QL STRIP: NEGATIVE
LEUKOCYTE ESTERASE UR QL STRIP.AUTO: ABNORMAL
LYMPHOCYTES # BLD AUTO: 1.49 10*3/MM3 (ref 0.7–3.1)
LYMPHOCYTES NFR BLD AUTO: 37 % (ref 19.6–45.3)
MAGNESIUM SERPL-MCNC: 1.9 MG/DL (ref 1.6–2.6)
MCH RBC QN AUTO: 28.7 PG (ref 26.6–33)
MCHC RBC AUTO-ENTMCNC: 31 G/DL (ref 31.5–35.7)
MCV RBC AUTO: 92.6 FL (ref 79–97)
MONOCYTES # BLD AUTO: 0.3 10*3/MM3 (ref 0.1–0.9)
MONOCYTES NFR BLD AUTO: 7.4 % (ref 5–12)
NEUTROPHILS NFR BLD AUTO: 2.11 10*3/MM3 (ref 1.7–7)
NEUTROPHILS NFR BLD AUTO: 52.4 % (ref 42.7–76)
NITRITE UR QL STRIP: NEGATIVE
NRBC BLD AUTO-RTO: 0 /100 WBC (ref 0–0.2)
PH UR STRIP.AUTO: 7 [PH] (ref 5–8)
PLATELET # BLD AUTO: 173 10*3/MM3 (ref 140–450)
PMV BLD AUTO: 12.1 FL (ref 6–12)
POTASSIUM SERPL-SCNC: 4.3 MMOL/L (ref 3.5–5.2)
PROT SERPL-MCNC: 7.3 G/DL (ref 6–8.5)
PROT UR QL STRIP: NEGATIVE
QT INTERVAL: 417 MS
RBC # BLD AUTO: 3.9 10*6/MM3 (ref 3.77–5.28)
RBC # UR STRIP: ABNORMAL /HPF
REF LAB TEST METHOD: ABNORMAL
SODIUM SERPL-SCNC: 135 MMOL/L (ref 136–145)
SP GR UR STRIP: 1.01 (ref 1–1.03)
SQUAMOUS #/AREA URNS HPF: ABNORMAL /HPF
TROPONIN T SERPL HS-MCNC: 12 NG/L
TSH SERPL DL<=0.05 MIU/L-ACNC: 1.65 UIU/ML (ref 0.27–4.2)
UROBILINOGEN UR QL STRIP: ABNORMAL
WBC # UR STRIP: ABNORMAL /HPF
WBC NRBC COR # BLD: 4.03 10*3/MM3 (ref 3.4–10.8)

## 2023-03-31 PROCEDURE — 81001 URINALYSIS AUTO W/SCOPE: CPT | Performed by: NURSE PRACTITIONER

## 2023-03-31 PROCEDURE — 36415 COLL VENOUS BLD VENIPUNCTURE: CPT

## 2023-03-31 PROCEDURE — 85025 COMPLETE CBC W/AUTO DIFF WBC: CPT | Performed by: PHYSICIAN ASSISTANT

## 2023-03-31 PROCEDURE — 93010 ELECTROCARDIOGRAM REPORT: CPT | Performed by: INTERNAL MEDICINE

## 2023-03-31 PROCEDURE — 84443 ASSAY THYROID STIM HORMONE: CPT | Performed by: PHYSICIAN ASSISTANT

## 2023-03-31 PROCEDURE — 93005 ELECTROCARDIOGRAM TRACING: CPT

## 2023-03-31 PROCEDURE — 80053 COMPREHEN METABOLIC PANEL: CPT | Performed by: PHYSICIAN ASSISTANT

## 2023-03-31 PROCEDURE — 83735 ASSAY OF MAGNESIUM: CPT | Performed by: PHYSICIAN ASSISTANT

## 2023-03-31 PROCEDURE — 84484 ASSAY OF TROPONIN QUANT: CPT | Performed by: PHYSICIAN ASSISTANT

## 2023-03-31 PROCEDURE — 99283 EMERGENCY DEPT VISIT LOW MDM: CPT

## 2023-03-31 PROCEDURE — 84703 CHORIONIC GONADOTROPIN ASSAY: CPT | Performed by: PHYSICIAN ASSISTANT

## 2023-03-31 RX ORDER — MECLIZINE HYDROCHLORIDE 25 MG/1
25 TABLET ORAL 3 TIMES DAILY PRN
Qty: 30 TABLET | Refills: 0 | Status: SHIPPED | OUTPATIENT
Start: 2023-03-31

## 2023-03-31 RX ORDER — MECLIZINE HYDROCHLORIDE 25 MG/1
25 TABLET ORAL ONCE
Status: COMPLETED | OUTPATIENT
Start: 2023-03-31 | End: 2023-03-31

## 2023-03-31 RX ADMIN — MECLIZINE HYDROCHLORIDE 25 MG: 25 TABLET ORAL at 15:11

## 2023-03-31 NOTE — ED PROVIDER NOTES
EMERGENCY DEPARTMENT ENCOUNTER    Room Number:  02/02  Date seen:  3/31/2023  PCP: Lady Sullivan APRN  Historian:       HPI:  Chief Complaint: Patient  A complete HPI/ROS/PMH/PSH/SH/FH are unobtainable due to: Dizziness nothing  Context: Silvia Clinton is a pleasant afebrile ambulatory 47 y.o.  female with past medical history of iron deficiency anemia, vestibular neuritis, lupus, hypertension, GERD, anxiety, and bipolar disease who presents to the ED c/o episodic dizziness over the last 4 days.    States it is worse when she turns her head and better with closing her eyes and resting.  She denies any headache, vision changes, focal weakness.        PAST MEDICAL HISTORY  Active Ambulatory Problems     Diagnosis Date Noted   • Spinal epidural abscess 12/09/2018   • Non-STEMI (non-ST elevated myocardial infarction) (CMS/[facility]) - 2 years ago 03/31/2019   • Dizziness 11/26/2020   • Essential hypertension    • Lupus (systemic lupus erythematosus) ([facility])    • Palpitations    • Vestibular neuritis, left 11/27/2020   • Lower extremity edema 12/30/2020   • Weight gain 03/22/2021   • Chronic left-sided low back pain without sciatica 03/22/2021   • Lumbar radiculopathy 04/26/2021   • Pain of left lower extremity 04/26/2021   • Obesity, Class III, BMI 40-49.9 (morbid obesity) ([facility]) 05/24/2021   • Fear associated with healthcare 05/24/2021   • Iron deficiency anemia due to chronic blood loss 06/23/2021   • Adverse effect of iron 07/08/2021   • Fatigue 09/17/2021   • Allergies 09/17/2021   • Heart murmur 09/17/2021   • Constipation 09/17/2021   • Elevated LFTs 09/17/2021   • Hepatitis C 09/17/2021   • Dysmenorrhea 09/17/2021   • Bipolar 2 disorder ([facility]) 09/17/2021   • Anxiety 09/17/2021   • GERD (gastroesophageal reflux disease) 09/22/2021   • History of drug abuse ([facility]) 09/22/2021   • Dietary counseling 09/22/2021   • Grade I hemorrhoids 11/16/2021   • COVID-19 12/14/2021   • Fever 02/02/2022   • Migraine without  aura 2023     Resolved Ambulatory Problems     Diagnosis Date Noted   • Chest pain 2019   • Drug abuse (HCC)    • Nausea 2021   • Heartburn 2021   • Vaginal itching 2021   • Heartburn 2021     Past Medical History:   Diagnosis Date   • Depression    • Drug abstinence syndrome (HCC)    • Ex-smoker    • Heart attack (HCC)    • Hypertension    • Kidney stone    • Lupus (HCC)    • Mitral valve anterior leaflet prolapse    • NSTEMI (non-ST elevated myocardial infarction) (HCC)    • Otitis    • Seizures (HCC)    • Tachycardia          PAST SURGICAL HISTORY  Past Surgical History:   Procedure Laterality Date   • BACK SURGERY     • CHOLECYSTECTOMY     • LEG SURGERY      broke tibula,fibula, steel noah   • LIVER BIOPSY     • LUMBAR LAMINECTOMY DISCECTOMY DECOMPRESSION Left 2018    Procedure: Posterior lumbar three through sacrum decompression;  Surgeon: Tevin Whitley MD;  Location: [facility];  Service: Neurosurgery   • LYMPH NODE BIOPSY     • SPINE SURGERY           FAMILY HISTORY  Family History   Problem Relation Age of Onset   • Bipolar disorder Mother    • Diabetes Mother    • No Known Problems Father    • No Known Problems Sister    • No Known Problems Brother    • Nephrolithiasis Maternal Grandmother    • Bipolar disorder Maternal Grandmother    • Breast cancer Maternal Aunt    • Liver cancer Maternal Uncle    • Brain cancer Maternal Uncle    • Lung cancer Maternal Uncle    • Leukemia Cousin    • Colon cancer Neg Hx    • Cervical cancer Neg Hx    • Uterine cancer Neg Hx    • Ovarian cancer Neg Hx    • Thyroid cancer Neg Hx          SOCIAL HISTORY  Social History     Socioeconomic History   • Marital status: Legally    Tobacco Use   • Smoking status: Former     Packs/day: 1.00     Years: 30.00     Pack years: 30.00     Types: Cigarettes     Quit date: 2020     Years since quittin.5   • Smokeless tobacco: Never   • Tobacco comments:     E-CIG USE    Vaping Use   • Vaping Use: Every day   • Start date: 7/1/2020   • Substances: Nicotine, Flavoring   • Devices: Pre-filled or refillable cartridge, Refillable tank   Substance and Sexual Activity   • Alcohol use: Not Currently     Comment: CAFFEINE USE: 1 CUP COFFEE/ 2 COKES DAILY   • Drug use: Not Currently     Types: IV, Heroin, Methamphetamines     Comment: pt states she no longer uses heroin, she now uses meth   • Sexual activity: Not Currently         ALLERGIES  Strawberry and Sulfa antibiotics        REVIEW OF SYSTEMS  Review of Systems   Constitutional: Negative.    HENT: Negative.    Eyes: Negative.    Respiratory: Negative.    Cardiovascular: Negative.    Gastrointestinal: Negative.    Endocrine: Negative.    Musculoskeletal: Negative.    Skin: Negative.    Allergic/Immunologic: Negative.    Neurological: Positive for dizziness. Negative for tremors, seizures, syncope, speech difficulty, weakness, light-headedness, numbness and headaches.   Hematological: Negative.    Psychiatric/Behavioral: Negative.      PHYSICAL EXAM  ED Triage Vitals   Temp Heart Rate Resp BP SpO2   03/31/23 1252 03/31/23 1252 03/31/23 1256 03/31/23 1256 03/31/23 1252   98.5 °F (36.9 °C) 76 16 130/88 99 %      Temp src Heart Rate Source Patient Position BP Location FiO2 (%)   03/31/23 1252 03/31/23 1252 -- -- --   Tympanic Monitor          Physical Exam      GENERAL: alert & oriented x 4, no acute distress  HENT: nares patent, mucous membranes moist & intact  EYES: no scleral icterus, no injection  CV: regular rhythm, normal rate, no peripheral edema  RESPIRATORY: normal effort, clear to all fields bilaterally, able to speak in full sentences without hint of distress  ABDOMEN: soft and nontender diffusely, abdominal obesity, no rebound or guarding  MUSCULOSKELETAL: no deformity, normal active range of motion to all extremities  NEURO: alert, moves all extremities, follows commands, left hickey pulsatile horizontal nystagmus   PSYCH:   calm, cooperative, anxious affect  SKIN: warm, dry and intact      Vital signs and nursing notes reviewed.          LAB RESULTS  Recent Results (from the past 24 hour(s))   Comprehensive Metabolic Panel    Collection Time: 03/31/23  1:16 PM    Specimen: Blood   Result Value Ref Range    Glucose 90 65 - 99 mg/dL    BUN 16 6 - 20 mg/dL    Creatinine 0.73 0.57 - 1.00 mg/dL    Sodium 135 (L) 136 - 145 mmol/L    Potassium 4.3 3.5 - 5.2 mmol/L    Chloride 97 (L) 98 - 107 mmol/L    CO2 26.6 22.0 - 29.0 mmol/L    Calcium 9.5 8.6 - 10.5 mg/dL    Total Protein 7.3 6.0 - 8.5 g/dL    Albumin 4.3 3.5 - 5.2 g/dL    ALT (SGPT) 12 1 - 33 U/L    AST (SGOT) 26 1 - 32 U/L    Alkaline Phosphatase 71 39 - 117 U/L    Total Bilirubin 0.3 0.0 - 1.2 mg/dL    Globulin 3.0 gm/dL    A/G Ratio 1.4 g/dL    BUN/Creatinine Ratio 21.9 7.0 - 25.0    Anion Gap 11.4 5.0 - 15.0 mmol/L    eGFR 103.5 >60.0 mL/min/1.73   hCG, Serum, Qualitative    Collection Time: 03/31/23  1:16 PM    Specimen: Blood   Result Value Ref Range    HCG Qualitative Negative Negative   Single High Sensitivity Troponin T    Collection Time: 03/31/23  1:16 PM    Specimen: Blood   Result Value Ref Range    HS Troponin T 12 (H) <10 ng/L   TSH    Collection Time: 03/31/23  1:16 PM    Specimen: Blood   Result Value Ref Range    TSH 1.650 0.270 - 4.200 uIU/mL   Magnesium    Collection Time: 03/31/23  1:16 PM    Specimen: Blood   Result Value Ref Range    Magnesium 1.9 1.6 - 2.6 mg/dL   CBC Auto Differential    Collection Time: 03/31/23  1:16 PM    Specimen: Blood   Result Value Ref Range    WBC 4.03 3.40 - 10.80 10*3/mm3    RBC 3.90 3.77 - 5.28 10*6/mm3    Hemoglobin 11.2 (L) 12.0 - 15.9 g/dL    Hematocrit 36.1 34.0 - 46.6 %    MCV 92.6 79.0 - 97.0 fL    MCH 28.7 26.6 - 33.0 pg    MCHC 31.0 (L) 31.5 - 35.7 g/dL    RDW 16.9 (H) 12.3 - 15.4 %    RDW-SD 57.0 (H) 37.0 - 54.0 fl    MPV 12.1 (H) 6.0 - 12.0 fL    Platelets 173 140 - 450 10*3/mm3    Neutrophil % 52.4 42.7 - 76.0 %     Lymphocyte % 37.0 19.6 - 45.3 %    Monocyte % 7.4 5.0 - 12.0 %    Eosinophil % 2.5 0.3 - 6.2 %    Basophil % 0.5 0.0 - 1.5 %    Immature Grans % 0.2 0.0 - 0.5 %    Neutrophils, Absolute 2.11 1.70 - 7.00 10*3/mm3    Lymphocytes, Absolute 1.49 0.70 - 3.10 10*3/mm3    Monocytes, Absolute 0.30 0.10 - 0.90 10*3/mm3    Eosinophils, Absolute 0.10 0.00 - 0.40 10*3/mm3    Basophils, Absolute 0.02 0.00 - 0.20 10*3/mm3    Immature Grans, Absolute 0.01 0.00 - 0.05 10*3/mm3    nRBC 0.0 0.0 - 0.2 /100 WBC   ECG 12 Lead Other; Dizzy    Collection Time: 03/31/23  1:42 PM   Result Value Ref Range    QT Interval 417 ms       Ordered the above labs and reviewed the results.        RADIOLOGY  No Radiology Exams Resulted Within Past 24 Hours    Ordered the above noted radiological studies. Reviewed by me in PACS.            PROCEDURES  Procedures            MEDICATIONS GIVEN IN ER  Medications   meclizine (ANTIVERT) tablet 25 mg (25 mg Oral Given 3/31/23 1511)           MEDICAL DECISION MAKING, PROGRESS, and CONSULTS    All labs have been independently reviewed by me.  All radiology studies have been reviewed by me and I have also reviewed the radiology report.   EKG's independently viewed and interpreted by me.  Discussion below represents my analysis of pertinent findings related to patient's condition, differential diagnosis, treatment plan and final disposition.      Additional sources:  - Discussed/ obtained information from independent historians: Patient's boyfriend at bedside reports that patient's dizziness is attributed to head turning but if she keeps her head still she feels that she is able to drive and drove to the emergency department today.    - External (non-ED) record review: Office visit 3/16/2023 with oncology SYLWIA Espinal for iron deficiency anemia due to chronic blood loss, patient has had anemia since 2018 with CBC on 7/8/2021 shows hemoglobin of 8.9 MCV of 73.3.  Patient reports she is unable to tolerate  oral iron supplements due to nausea, abdominal pain and constipation.  Patient reports that she did not have menstrual cycles for 10 years when she was using IV drugs and subsequently had menorrhagia for a year, over the past 2 months she has been using homeopathic medications which has helped her heavy menstrual cycles and reports over the last 2 months that she has had normal menstrual cycles and is scheduled to follow-up with OB/GYN for consideration of uterine ablation.  Patient reports that she is been sober for IV drugs for the last 3 years.  Note indicates that there is no need for iron replacement at this time but follow-up in 3 months for repeat CBC, CMP, ferritin, iron profile and physician evaluation.    - Chronic or social conditions impacting care: Patient has a history of IV drug use but is been abstinent for the last 3 years    - Shared decision making: I discussed outpatient vestibular therapy with patient versus medication management versus PCP follow-up and she reports that she would like to try oral meclizine upon discharge today      Orders placed during this visit:  Orders Placed This Encounter   Procedures   • Comprehensive Metabolic Panel   • hCG, Serum, Qualitative   • Single High Sensitivity Troponin T   • TSH   • Magnesium   • CBC Auto Differential   • ECG 12 Lead Other; Dizzy   • CBC & Differential         Additional orders considered but not ordered:  I have considered admitting patient to the hospital and obtaining a brain MRI however she has no focal deficits, ambulated her without any need for assistance which she tolerated well, she has no neurologic red flags and is mentating well.        Differential diagnosis includes but is not limited to:    Vertigo, posterior circulation CVA, orthostasis      Independent interpretation of labs, radiology studies, and discussions with consultants:  ED Course as of 03/31/23 2343   Fri Mar 31, 2023   1500 Pt c/o dizzy spells for the past few days,  states when it occurs her heart races, she describes it as room spinning, no vision changes, had covid 1 month ago. She does describe some urinary frequency, no fever or chills. No coufgh or respiratory symptoms [AH]   1524 Hemoglobin(!): 11.2  3 months ago 11.4, stable [AH]   1527 EKG per my personal interpretation, normal sinus rhythm, rate of 58, QTc 410, no evidence of ischemia,   [AH]   1533 Patient here with room spinning like sensation Gate on the last 4 days which is reproducible upon my exam with head turning and she does have some horizontal pulsatile nystagmus.  I have ambulated her and she is able to ambulate without any need for assistance.  She does endorse some urinary frequency but her urinalysis has no bacteria, nitrites and very few WBCs.  Plan to culture this.  She has remained abstinent from IV drugs over the last 3 years and I do not feel that any imaging is warranted at this time based on her benign presentation today.  Plan to administer oral meclizine and discharged home for outpatient vestibular therapy and PCP follow-up.  Patient is agreeable to plan. [AH]   1721 Patient reports resolution of symptoms after taking a nap and oral meclizine.  Agreeable to discharge home with PCP follow-up and outpatient vestibular therapy [AH]      ED Course User Index  [AH] Marion Cooper APRN             I have worn appropriate PPE during this patient encounter, sanitized my hands both with entering and exiting patient's room.      DIAGNOSIS  Final diagnoses:   Dizziness         DISPOSITION  home          Latest Documented Vital Signs:  As of 14:59 EDT  BP- 109/69 HR- 58 Temp- 98.5 °F (36.9 °C) (Tympanic) O2 sat- 93%              --    Please note that portions of this were completed with a voice recognition program.       Note Disclaimer: At [facility], we believe that sharing information builds trust and better relationships. You are receiving this note because you are receiving care at [facility] or recently visited. It is possible you will see health information before a provider has talked with you about it. This kind of information can be easy to misunderstand. To help you fully understand what it means for your health, we urge you to discuss this note with your provider.           Marion Cooper, SYLWIA  03/31/23 1008

## 2023-03-31 NOTE — ED TRIAGE NOTES
Patient reports dizziness for 4 days now, states that it comes and goes is worse when she is up and moving around. SHe reports that at times she thinks she might have heart palpitations. She states when she is sitting down she feels fine, but states that it happens when she is at work.

## 2023-03-31 NOTE — Clinical Note
[facility] EMERGENCY DEPARTMENT  [address] [facility] [address]  Phone: [phone]    Silvia Clinton was seen and treated in our emergency department on 3/31/2023.  She may return to work on 04/01/2023.         Thank you for choosing [facility].    Alda Valenzuela RN

## 2023-03-31 NOTE — ED PROVIDER NOTES
MD ATTESTATION NOTE    The JACKY and I have discussed this patient's history, physical exam, and treatment plan.  I have reviewed the documentation and personally had a face to face interaction with the patient. I affirm the documentation and agree with the treatment and plan.  The attached note describes my personal findings.      I provided a substantive portion of the care of the patient.  I personally performed the physical exam in its entirety, and below are my findings.  For this patient encounter, the patient wore surgical mask, I wore full protective PPE including N95 and eye protection.      Brief HPI: This patient presents for evaluation of intermittent episodes of dizziness over the past several days.  She says that these dizziness spells come on relatively quickly and last for only a brief period before resolving.  In fact, she tells me that some of the episodes only last for 30 seconds or so.  However today, she had an episode that lasted much longer.  She says that it felt like the room was spinning around her.    PHYSICAL EXAM  ED Triage Vitals   Temp Heart Rate Resp BP SpO2   03/31/23 1252 03/31/23 1252 03/31/23 1256 03/31/23 1256 03/31/23 1252   98.5 °F (36.9 °C) 76 16 130/88 99 %      Temp src Heart Rate Source Patient Position BP Location FiO2 (%)   03/31/23 1252 03/31/23 1252 -- -- --   Tympanic Monitor            GENERAL: Pleasant lady, resting calmly in the bed, sits right up to the edge of the bed when I walked in the room to talk with me and shows no sign of dizziness or acute distress  HENT: nares patent, normocephalic and atraumatic, moist mucous membranes  EYES: no scleral icterus, EOMI, normal conjunctiva, no nystagmus noted  CV: regular rhythm, normal rate  RESPIRATORY: normal effort, no stridor  MUSCULOSKELETAL: no deformity  NEURO: alert, moves all extremities, follows commands  PSYCH:  calm, cooperative  SKIN: warm, dry    Vital signs and nursing notes reviewed.        Plan: Multiple  "labs checked along with EKG here.  No metabolic abnormalities of concern are noted.  Patient's symptoms have completely subsided.  I think she is safe to discharge home for continued outpatient management of what seems to be most likely BPPV.  Will encourage prompt follow-up with PCP in the next week.  We will discussed with her usual \"return to ER\" instructions prior to discharge.     Lauro Cosby MD  03/31/23 1112    "

## 2023-04-10 ENCOUNTER — TELEMEDICINE (OUTPATIENT)
Dept: FAMILY MEDICINE CLINIC | Facility: TELEHEALTH | Age: 48
End: 2023-04-10
Payer: COMMERCIAL

## 2023-04-10 VITALS — HEIGHT: 66 IN | WEIGHT: 225 LBS | BODY MASS INDEX: 36.16 KG/M2

## 2023-04-10 DIAGNOSIS — R11.2 NAUSEA AND VOMITING, UNSPECIFIED VOMITING TYPE: Primary | ICD-10-CM

## 2023-04-10 PROCEDURE — 99213 OFFICE O/P EST LOW 20 MIN: CPT | Performed by: NURSE PRACTITIONER

## 2023-04-10 RX ORDER — ONDANSETRON HYDROCHLORIDE 8 MG/1
8 TABLET, FILM COATED ORAL EVERY 8 HOURS PRN
Qty: 9 TABLET | Refills: 0 | Status: SHIPPED | OUTPATIENT
Start: 2023-04-10 | End: 2023-04-19

## 2023-04-10 NOTE — PROGRESS NOTES
You have chosen to receive care through a telehealth visit.  Do you consent to use a video/audio connection for your medical care today? Yes     CHIEF COMPLAINT  Cc: nausea    HPI  Silvia Clinton is a 47 y.o. female  presents with complaint of nausea that started this morning about 3 am. She is requesting a refill on Zofran. She vomited once. She is still having nausea. She did drink a little coke zero and it has stayed down so far. No exposure to a stomach virus that she is aware of at this time except her cat is vomiting. She also thinks that her lupus may be flaring up and is reporting some post COVID symptoms. She reports having COVID 03/2023. She is wondering if her nausea is from a home  dinner that she ate last night which may have been a little old.    Review of Systems   Constitutional: Positive for fatigue.   HENT: Negative for congestion and sore throat.    Respiratory: Positive for shortness of breath (post covid). Negative for cough.    Cardiovascular: Negative for chest pain.   Gastrointestinal: Positive for blood in stool (from constipation and hemorrhoids), nausea and vomiting (this am). Negative for abdominal distention and abdominal pain. Constipation: chronic.       Past Medical History:   Diagnosis Date   • Chest pain    • Depression    • Drug abstinence syndrome    • Drug abuse    • Ex-smoker    • Heart attack    • Hepatitis C    • Hepatitis C    • Hypertension    • Kidney stone    • Lupus    • Lupus (systemic lupus erythematosus)    • Mitral valve anterior leaflet prolapse    • NSTEMI (non-ST elevated myocardial infarction)    • Otitis    • Palpitations    • Seizures    • Spinal epidural abscess    • Tachycardia        Family History   Problem Relation Age of Onset   • Bipolar disorder Mother    • Diabetes Mother    • No Known Problems Father    • No Known Problems Sister    • No Known Problems Brother    • Nephrolithiasis Maternal Grandmother    • Bipolar disorder Maternal Grandmother   "  • Breast cancer Maternal Aunt    • Liver cancer Maternal Uncle    • Brain cancer Maternal Uncle    • Lung cancer Maternal Uncle    • Leukemia Cousin    • Colon cancer Neg Hx    • Cervical cancer Neg Hx    • Uterine cancer Neg Hx    • Ovarian cancer Neg Hx    • Thyroid cancer Neg Hx        Social History     Socioeconomic History   • Marital status: Legally    Tobacco Use   • Smoking status: Former     Packs/day: 1.00     Years: 30.00     Pack years: 30.00     Types: Cigarettes     Quit date: 2020     Years since quittin.6   • Smokeless tobacco: Never   • Tobacco comments:     E-CIG USE   Vaping Use   • Vaping Use: Every day   • Start date: 2020   • Substances: Nicotine, Flavoring   • Devices: Pre-filled or refillable cartridge, Refillable tank   Substance and Sexual Activity   • Alcohol use: Not Currently     Comment: CAFFEINE USE: 1 CUP COFFEE/ 2 COKES DAILY   • Drug use: Not Currently     Types: IV, Heroin, Methamphetamines     Comment: pt states she no longer uses heroin, she now uses meth   • Sexual activity: Not Currently       Silvia Clinton  reports that she quit smoking about 2 years ago. Her smoking use included cigarettes. She has a 30.00 pack-year smoking history. She has never used smokeless tobacco..She does use e cigs..     This is to advise her to quit.    Ht 167.6 cm (66\")   Wt 102 kg (225 lb)   Breastfeeding No   BMI 36.32 kg/m²     PHYSICAL EXAM  Physical Exam   Constitutional: She is oriented to person, place, and time. She appears well-developed and well-nourished.   HENT:   Head: Normocephalic and atraumatic.   Right Ear: External ear normal.   Left Ear: External ear normal.   Nose: Nose normal.   Eyes: Lids are normal. Right eye exhibits no discharge and no exudate. Left eye exhibits no discharge and no exudate. Right conjunctiva is not injected. Left conjunctiva is not injected.   Pulmonary/Chest: No accessory muscle usage. No tachypnea and no bradypnea.  No " respiratory distress.No use of oxygen by nasal cannulaNo use of oxygen by mask noted.  Abdominal: There is abdominal tenderness (resolved) in the right upper quadrant.   Neurological: She is alert and oriented to person, place, and time. No cranial nerve deficit.   Skin: Her skin appears normal.  Psychiatric: She has a normal mood and affect. Her speech is normal and behavior is normal. Judgment and thought content normal.       Results for orders placed or performed during the hospital encounter of 03/31/23   Comprehensive Metabolic Panel    Specimen: Blood   Result Value Ref Range    Glucose 90 65 - 99 mg/dL    BUN 16 6 - 20 mg/dL    Creatinine 0.73 0.57 - 1.00 mg/dL    Sodium 135 (L) 136 - 145 mmol/L    Potassium 4.3 3.5 - 5.2 mmol/L    Chloride 97 (L) 98 - 107 mmol/L    CO2 26.6 22.0 - 29.0 mmol/L    Calcium 9.5 8.6 - 10.5 mg/dL    Total Protein 7.3 6.0 - 8.5 g/dL    Albumin 4.3 3.5 - 5.2 g/dL    ALT (SGPT) 12 1 - 33 U/L    AST (SGOT) 26 1 - 32 U/L    Alkaline Phosphatase 71 39 - 117 U/L    Total Bilirubin 0.3 0.0 - 1.2 mg/dL    Globulin 3.0 gm/dL    A/G Ratio 1.4 g/dL    BUN/Creatinine Ratio 21.9 7.0 - 25.0    Anion Gap 11.4 5.0 - 15.0 mmol/L    eGFR 103.5 >60.0 mL/min/1.73   hCG, Serum, Qualitative    Specimen: Blood   Result Value Ref Range    HCG Qualitative Negative Negative   Single High Sensitivity Troponin T    Specimen: Blood   Result Value Ref Range    HS Troponin T 12 (H) <10 ng/L   TSH    Specimen: Blood   Result Value Ref Range    TSH 1.650 0.270 - 4.200 uIU/mL   Magnesium    Specimen: Blood   Result Value Ref Range    Magnesium 1.9 1.6 - 2.6 mg/dL   CBC Auto Differential    Specimen: Blood   Result Value Ref Range    WBC 4.03 3.40 - 10.80 10*3/mm3    RBC 3.90 3.77 - 5.28 10*6/mm3    Hemoglobin 11.2 (L) 12.0 - 15.9 g/dL    Hematocrit 36.1 34.0 - 46.6 %    MCV 92.6 79.0 - 97.0 fL    MCH 28.7 26.6 - 33.0 pg    MCHC 31.0 (L) 31.5 - 35.7 g/dL    RDW 16.9 (H) 12.3 - 15.4 %    RDW-SD 57.0 (H) 37.0 - 54.0  fl    MPV 12.1 (H) 6.0 - 12.0 fL    Platelets 173 140 - 450 10*3/mm3    Neutrophil % 52.4 42.7 - 76.0 %    Lymphocyte % 37.0 19.6 - 45.3 %    Monocyte % 7.4 5.0 - 12.0 %    Eosinophil % 2.5 0.3 - 6.2 %    Basophil % 0.5 0.0 - 1.5 %    Immature Grans % 0.2 0.0 - 0.5 %    Neutrophils, Absolute 2.11 1.70 - 7.00 10*3/mm3    Lymphocytes, Absolute 1.49 0.70 - 3.10 10*3/mm3    Monocytes, Absolute 0.30 0.10 - 0.90 10*3/mm3    Eosinophils, Absolute 0.10 0.00 - 0.40 10*3/mm3    Basophils, Absolute 0.02 0.00 - 0.20 10*3/mm3    Immature Grans, Absolute 0.01 0.00 - 0.05 10*3/mm3    nRBC 0.0 0.0 - 0.2 /100 WBC   Urinalysis With Culture If Indicated - Urine, Clean Catch    Specimen: Urine, Clean Catch   Result Value Ref Range    Color, UA Yellow Yellow, Straw    Appearance, UA Clear Clear    pH, UA 7.0 5.0 - 8.0    Specific Gravity, UA 1.009 1.005 - 1.030    Glucose, UA Negative Negative    Ketones, UA Negative Negative    Bilirubin, UA Negative Negative    Blood, UA Trace (A) Negative    Protein, UA Negative Negative    Leuk Esterase, UA Trace (A) Negative    Nitrite, UA Negative Negative    Urobilinogen, UA 0.2 E.U./dL 0.2 - 1.0 E.U./dL   Urinalysis, Microscopic Only - Urine, Clean Catch    Specimen: Urine, Clean Catch   Result Value Ref Range    RBC, UA 3-5 (A) None Seen, 0-2 /HPF    WBC, UA 3-5 (A) None Seen, 0-2 /HPF    Bacteria, UA None Seen None Seen /HPF    Squamous Epithelial Cells, UA 3-6 (A) None Seen, 0-2 /HPF    Hyaline Casts, UA None Seen None Seen /LPF    Methodology Automated Microscopy    ECG 12 Lead Other; Dizzy   Result Value Ref Range    QT Interval 417 ms       Diagnoses and all orders for this visit:    1. Nausea and vomiting, unspecified vomiting type (Primary)    Other orders  -     ondansetron (Zofran) 8 MG tablet; Take 1 tablet by mouth Every 8 (Eight) Hours As Needed for Nausea for up to 9 days.  Dispense: 9 tablet; Refill: 0    Hydrate well. Water, Pedialyte and Gatorade are best.  TRICIA diet: Bananas,  Rice, Applesauce, Toast and Tea  Advised to follow up with primary care provider related to lupus and post COVID symptoms. She has 04/14/2023 appointment    FOLLOW-UP  If symptoms worsen or persist follow up with PCP.Virtual Care or Urgent Care  Keep appt with primary care carlos on 04/14/2023    Patient verbalizes understanding of medication dosage, comfort measures, instructions for treatment and follow-up.    Anabel Miller, APRN  04/10/2023  10:25 EDT    The use of a video visit has been reviewed with the patient and verbal informed consent has been obtained. Myself and Silvia Clinton participated in this visit. The patient is located in [address] [place] [address].    I am located in [place]. [employer] and [employer] were utilized. I spent 25 minutes in the patient's chart for this visit.

## 2023-04-28 DIAGNOSIS — I89.1 HERPETIC WHITLOW OF FINGER OF RIGHT HAND WITH LYMPHANGITIS: ICD-10-CM

## 2023-04-28 DIAGNOSIS — B00.89 HERPETIC WHITLOW OF FINGER OF RIGHT HAND WITH LYMPHANGITIS: ICD-10-CM

## 2023-04-28 RX ORDER — VALACYCLOVIR HYDROCHLORIDE 500 MG/1
1000 TABLET, FILM COATED ORAL 2 TIMES DAILY
Qty: 30 TABLET | Refills: 2 | OUTPATIENT
Start: 2023-04-28

## 2023-04-28 NOTE — TELEPHONE ENCOUNTER
Caller: Silvia Clinton CM    Relationship: Self    Best call back number: [phone]     Requested Prescriptions:   Requested Prescriptions     Pending Prescriptions Disp Refills   • valACYclovir (VALTREX) 500 MG tablet 30 tablet 2     Sig: Take 2 tablets by mouth 2 (Two) Times a Day.        Pharmacy where request should be sent: [facility]/PHARMACY #[id number] - [place], [place] - [address] AT [facility] - [phone] [facility] [phone]      Last office visit with prescribing clinician: 2/2/2023   Last telemedicine visit with prescribing clinician: Visit date not found   Next office visit with prescribing clinician: Visit date not found     Does the patient have less than a 3 day supply:  [x] Yes  [] No    Would you like a call back once the refill request has been completed: [] Yes [x] No    If the office needs to give you a call back, can they leave a voicemail: [] Yes [x] No    Chip Kuo Rep   04/28/23 11:13 EDT

## 2023-05-17 ENCOUNTER — TELEPHONE (OUTPATIENT)
Dept: INTERNAL MEDICINE | Facility: CLINIC | Age: 48
End: 2023-05-17
Payer: COMMERCIAL

## 2023-05-17 DIAGNOSIS — U07.1 COVID-19 VIRUS INFECTION: ICD-10-CM

## 2023-05-17 DIAGNOSIS — R73.9 HYPERGLYCEMIA: ICD-10-CM

## 2023-05-17 DIAGNOSIS — Z00.00 HEALTHCARE MAINTENANCE: Primary | ICD-10-CM

## 2023-05-17 DIAGNOSIS — D50.0 IRON DEFICIENCY ANEMIA DUE TO CHRONIC BLOOD LOSS: ICD-10-CM

## 2023-05-17 DIAGNOSIS — I10 ESSENTIAL HYPERTENSION: ICD-10-CM

## 2023-05-17 RX ORDER — ALBUTEROL SULFATE 90 UG/1
2 AEROSOL, METERED RESPIRATORY (INHALATION) EVERY 4 HOURS PRN
Qty: 8 G | Refills: 0 | Status: SHIPPED | OUTPATIENT
Start: 2023-05-17

## 2023-05-17 NOTE — TELEPHONE ENCOUNTER
Caller: Silvia Clinton    Relationship: Self    Best call back number: [phone]    What form or medical record are you requesting: LAB ORDERS    Who is requesting this form or medical record from you:PATIENT    How would you like to receive the form or medical records (pick-up, mail, fax):   PICK-UP    Timeframe paperwork needed: ASAP    Additional notes: PATIENT IS REQUESTING TO GET HER LAB ORDERS AS SHE LIKES TO GO TO THE HOSPITAL TO GET THEM DONE AND IS REQUESTING IF SHE CAN COME PICK THOSE UP. PATIENT IS REQUESTING A CALLBACK TO KNOW WHEN READY.

## 2023-05-17 NOTE — TELEPHONE ENCOUNTER
Caller: Silvia Clinton CM    Relationship: Self    Best call back number: [phone]     Requested Prescriptions:   Requested Prescriptions     Pending Prescriptions Disp Refills   • albuterol sulfate  (90 Base) MCG/ACT inhaler 8 g 0     Sig: Inhale 2 puffs Every 4 (Four) Hours As Needed for Wheezing or Shortness of Air.        Pharmacy where request should be sent: [facility]/PHARMACY #[id number] - [place], [place] - [address]  IN [facility] [phone] [facility] [phone]      Last office visit with prescribing clinician: 2/2/2023   Last telemedicine visit with prescribing clinician: 4/28/2023   Next office visit with prescribing clinician: Visit date not found     Does the patient have less than a 3 day supply:  [x] Yes  [] No    Would you like a call back once the refill request has been completed: [] Yes [x] No    If the office needs to give you a call back, can they leave a voicemail: [] Yes [x] No    Chip Kuo Rep   05/17/23 10:21 EDT

## 2023-05-18 ENCOUNTER — OFFICE VISIT (OUTPATIENT)
Dept: INTERNAL MEDICINE | Facility: CLINIC | Age: 48
End: 2023-05-18
Payer: COMMERCIAL

## 2023-05-18 VITALS
DIASTOLIC BLOOD PRESSURE: 80 MMHG | HEART RATE: 71 BPM | WEIGHT: 230 LBS | BODY MASS INDEX: 36.96 KG/M2 | OXYGEN SATURATION: 99 % | TEMPERATURE: 97.7 F | SYSTOLIC BLOOD PRESSURE: 124 MMHG | HEIGHT: 66 IN

## 2023-05-18 DIAGNOSIS — T78.40XD ALLERGY, SUBSEQUENT ENCOUNTER: Chronic | ICD-10-CM

## 2023-05-18 DIAGNOSIS — J06.9 ACUTE URI: Primary | ICD-10-CM

## 2023-05-18 LAB
EXPIRATION DATE: NORMAL
FLUAV AG UPPER RESP QL IA.RAPID: NOT DETECTED
FLUBV AG UPPER RESP QL IA.RAPID: NOT DETECTED
INTERNAL CONTROL: NORMAL
Lab: NORMAL
SARS-COV-2 AG UPPER RESP QL IA.RAPID: NOT DETECTED

## 2023-05-18 RX ORDER — LORATADINE 10 MG/1
10 TABLET ORAL DAILY
Qty: 90 TABLET | Refills: 1 | Status: SHIPPED | OUTPATIENT
Start: 2023-05-18

## 2023-05-18 NOTE — PROGRESS NOTES
Marilyn Clinton is a 47 y.o. female c/o cough, congestion, sob, dizziness, chills X 1 week.    History of Present Illness   Last week with sore throat and cougb and fatigue  Feels flushed  No fevers.  She has been having chills  She does have diarrhea on and off  Her symptoms are gradually getting a little better.  The following portions of the patient's history were reviewed and updated as appropriate: allergies, current medications, past medical history, past social history and problem list.  She does have a contacts with similar symptoms  Review of Systems    Objective   Physical Exam  Vitals reviewed.   Constitutional:       Appearance: She is well-developed.   HENT:      Head: Normocephalic and atraumatic.      Right Ear: External ear normal.      Left Ear: External ear normal.   Eyes:      Conjunctiva/sclera: Conjunctivae normal.      Pupils: Pupils are equal, round, and reactive to light.   Neck:      Thyroid: No thyromegaly.      Trachea: No tracheal deviation.   Cardiovascular:      Rate and Rhythm: Normal rate and regular rhythm.      Heart sounds: Normal heart sounds.   Pulmonary:      Effort: Pulmonary effort is normal.      Breath sounds: Normal breath sounds.   Abdominal:      General: Bowel sounds are normal. There is no distension.      Palpations: Abdomen is soft.      Tenderness: There is no abdominal tenderness.   Musculoskeletal:         General: No deformity. Normal range of motion.      Cervical back: Normal range of motion.   Skin:     General: Skin is warm and dry.   Neurological:      Mental Status: She is alert and oriented to person, place, and time.   Psychiatric:         Behavior: Behavior normal.         Thought Content: Thought content normal.         Judgment: Judgment normal.         Vitals:    05/18/23 1451   BP: 124/80   Pulse: 71   Temp: 97.7 °F (36.5 °C)   SpO2: 99%     Current Outpatient Medications:   •  acetaminophen (TYLENOL) 500 MG tablet, Take 1 tablet by  mouth Every 6 (Six) Hours As Needed for Mild Pain ., Disp: 60 tablet, Rfl: 1  •  albuterol sulfate  (90 Base) MCG/ACT inhaler, Inhale 2 puffs Every 4 (Four) Hours As Needed for Wheezing or Shortness of Air., Disp: 8 g, Rfl: 0  •  amLODIPine (NORVASC) 5 MG tablet, Take 1 tablet by mouth 2 (Two) Times a Day. (Patient taking differently: Take 2 tablets by mouth Daily.), Disp: 180 tablet, Rfl: 1  •  Blood Pressure Monitor misc, Monitor and XL cuff, Disp: 1 each, Rfl: 0  •  buprenorphine-naloxone (SUBOXONE) 8-2 MG per SL tablet, Place 1 tablet under the tongue 2 (Two) Times a Day., Disp: , Rfl:   •  fluticasone (FLONASE) 50 MCG/ACT nasal spray, 2 sprays into the nostril(s) as directed by provider Daily., Disp: 18.2 mL, Rfl: 1  •  guaiFENesin (Mucinex) 600 MG 12 hr tablet, Take 1 tablet by mouth 2 (Two) Times a Day., Disp: 30 tablet, Rfl: 0  •  hydrocortisone 1 % cream, Apply 1 application topically to the appropriate area as directed 2 (Two) Times a Day., Disp: 1 each, Rfl: 1  •  loratadine (CLARITIN) 10 MG tablet, Take 1 tablet by mouth Daily., Disp: 90 tablet, Rfl: 1  •  losartan (Cozaar) 50 MG tablet, Take 1 tablet by mouth 2 (Two) Times a Day., Disp: 180 tablet, Rfl: 1  •  meclizine (ANTIVERT) 25 MG tablet, Take 1 tablet by mouth 3 (Three) Times a Day As Needed for Dizziness., Disp: 30 tablet, Rfl: 0  •  methocarbamol (ROBAXIN) 500 MG tablet, TAKE 1 TABLET BY MOUTH THREE TIMES A DAY AS NEEDED FOR MUSCLE SPASM, Disp: 90 tablet, Rfl: 0  •  metoprolol tartrate (LOPRESSOR) 100 MG tablet, Take 1 tablet by mouth 2 (Two) Times a Day., Disp: 180 tablet, Rfl: 1  •  naloxone (NARCAN) 4 MG/0.1ML nasal spray, INSTILL 1 SPRAY NASALLY EVERY 2-3 MINS AS NEEDED FOR OVERDOSE OR RESPIRATORY DEPRESSION., Disp: , Rfl:   •  norethindrone (MICRONOR) 0.35 MG tablet, Take 1 tablet by mouth Daily., Disp: 28 tablet, Rfl: 12  •  OLANZapine (zyPREXA) 15 MG tablet, Take 1 tablet by mouth Every Night., Disp: 90 tablet, Rfl: 1  •   omeprazole (PrilOSEC) 20 MG capsule, Take 1 capsule by mouth Daily., Disp: 90 capsule, Rfl: 3  •  sucralfate (CARAFATE) 1 g tablet, Take 1 tablet by mouth 3 (Three) Times a Day Before Meals., Disp: 90 tablet, Rfl: 1  •  valACYclovir (VALTREX) 500 MG tablet, Take 2 tablets by mouth 2 (Two) Times a Day., Disp: 30 tablet, Rfl: 2  •  venlafaxine XR (EFFEXOR-XR) 150 MG 24 hr capsule, Take 1 capsule by mouth Daily., Disp: 90 capsule, Rfl: 1  •  ciclopirox (Penlac) 8 % solution, Apply  topically to the appropriate area as directed Every Night., Disp: 1 each, Rfl: 1  •  docusate sodium (COLACE) 100 MG capsule, Take 1 capsule by mouth 2 (Two) Times a Day As Needed for Constipation. (Patient not taking: Reported on 5/18/2023), Disp: 60 capsule, Rfl: 2  •  polyethylene glycol (MIRALAX) 17 g packet, Take 17 g by mouth Daily. (Patient not taking: Reported on 5/18/2023), Disp: 10 each, Rfl: 0    Body mass index is 37.12 kg/m².         Assessment & Plan   Diagnoses and all orders for this visit:    1. Acute URI (Primary)  -     POCT SARS-CoV-2 Antigen ROSE + Flu    2. Allergy, subsequent encounter  -     loratadine (CLARITIN) 10 MG tablet; Take 1 tablet by mouth Daily.  Dispense: 90 tablet; Refill: 1  -     POCT SARS-CoV-2 Antigen ROSE + Flu        1.  URI: I suspect this is viral.  She probably also is slightly exacerbating with this due to her chronic allergies which she is out of her Claritin.  She is going to resume this I have sent a prescription to the pharmacy.  If symptoms worsen or fail to improve she will give me a call back

## 2023-05-28 DIAGNOSIS — I10 ESSENTIAL HYPERTENSION: Chronic | ICD-10-CM

## 2023-05-30 DIAGNOSIS — M54.50 CHRONIC BILATERAL LOW BACK PAIN WITHOUT SCIATICA: Chronic | ICD-10-CM

## 2023-05-30 DIAGNOSIS — G89.29 CHRONIC BILATERAL LOW BACK PAIN WITHOUT SCIATICA: Chronic | ICD-10-CM

## 2023-05-31 RX ORDER — AMLODIPINE BESYLATE 5 MG/1
TABLET ORAL
Qty: 180 TABLET | Refills: 1 | Status: SHIPPED | OUTPATIENT
Start: 2023-05-31

## 2023-05-31 RX ORDER — METHOCARBAMOL 500 MG/1
TABLET, FILM COATED ORAL
Qty: 90 TABLET | Refills: 0 | OUTPATIENT
Start: 2023-05-31

## 2023-06-02 DIAGNOSIS — G89.29 CHRONIC BILATERAL LOW BACK PAIN WITHOUT SCIATICA: Chronic | ICD-10-CM

## 2023-06-02 DIAGNOSIS — M54.50 CHRONIC BILATERAL LOW BACK PAIN WITHOUT SCIATICA: Chronic | ICD-10-CM

## 2023-06-02 RX ORDER — METHOCARBAMOL 500 MG/1
500 TABLET, FILM COATED ORAL 3 TIMES DAILY PRN
Qty: 90 TABLET | Refills: 0 | Status: SHIPPED | OUTPATIENT
Start: 2023-06-02

## 2023-06-08 DIAGNOSIS — U07.1 COVID-19 VIRUS INFECTION: ICD-10-CM

## 2023-06-08 RX ORDER — ALBUTEROL SULFATE 90 UG/1
AEROSOL, METERED RESPIRATORY (INHALATION)
Qty: 8 G | Refills: 0 | Status: SHIPPED | OUTPATIENT
Start: 2023-06-08

## 2023-06-16 ENCOUNTER — OFFICE VISIT (OUTPATIENT)
Dept: INTERNAL MEDICINE | Facility: CLINIC | Age: 48
End: 2023-06-16
Payer: COMMERCIAL

## 2023-06-16 VITALS
OXYGEN SATURATION: 99 % | TEMPERATURE: 97.6 F | HEIGHT: 66 IN | DIASTOLIC BLOOD PRESSURE: 74 MMHG | BODY MASS INDEX: 35.84 KG/M2 | HEART RATE: 64 BPM | SYSTOLIC BLOOD PRESSURE: 110 MMHG | WEIGHT: 223 LBS

## 2023-06-16 DIAGNOSIS — D50.0 IRON DEFICIENCY ANEMIA DUE TO CHRONIC BLOOD LOSS: ICD-10-CM

## 2023-06-16 DIAGNOSIS — R73.9 HYPERGLYCEMIA: ICD-10-CM

## 2023-06-16 DIAGNOSIS — Z00.00 HEALTHCARE MAINTENANCE: Primary | ICD-10-CM

## 2023-06-16 DIAGNOSIS — Z20.09 EXPOSURE TO INTESTINAL INFECTIOUS DISEASE: ICD-10-CM

## 2023-06-16 DIAGNOSIS — I10 ESSENTIAL HYPERTENSION: ICD-10-CM

## 2023-06-16 DIAGNOSIS — R10.84 GENERALIZED ABDOMINAL PAIN: ICD-10-CM

## 2023-06-16 DIAGNOSIS — R19.7 WATERY DIARRHEA: Primary | ICD-10-CM

## 2023-06-16 PROCEDURE — 1159F MED LIST DOCD IN RCRD: CPT | Performed by: NURSE PRACTITIONER

## 2023-06-16 PROCEDURE — 99213 OFFICE O/P EST LOW 20 MIN: CPT | Performed by: NURSE PRACTITIONER

## 2023-06-16 PROCEDURE — 3074F SYST BP LT 130 MM HG: CPT | Performed by: NURSE PRACTITIONER

## 2023-06-16 PROCEDURE — 3078F DIAST BP <80 MM HG: CPT | Performed by: NURSE PRACTITIONER

## 2023-06-16 PROCEDURE — 1160F RVW MEDS BY RX/DR IN RCRD: CPT | Performed by: NURSE PRACTITIONER

## 2023-06-16 NOTE — PROGRESS NOTES
Subjective   Silvia Clinton is a 47 y.o. female.     Chief Complaint   Patient presents with    Diarrhea     Pt c/o diarrhea, nausea, abdominal pain X on and off for long but that got worse last week.    Nausea    Abdominal Pain           Bloated        History of Present Illness   She is here today with complaints of nausea, abdominal pain and diarrhea for the past several months.  Symptoms are intermittent but have become worse over the past week. She notes two episodes of watery diarrhea a day for the past week.  She notes fecal urgency with this.  She has been experiencing lower abdominal pain.  Nausea will come and go.  She has previously vomited but has not vomited in the past week.  She denies any fever or chills, hematochezia or melena.  Her cat was recently diagnosed with Giardia by the vet last week.  She is concerned that she may have the same infection.  She notes that she lives with a roommate who has had recurrent diarrhea over the past several months.  She states that she is the one who typically cleans the bathrooms and toilet.  She notes that her roommate consumes well water often.    The following portions of the patient's history were reviewed and updated as appropriate: allergies, current medications, past family history, past medical history, past social history, past surgical history, and problem list.    Review of Systems   Constitutional:  Negative for chills, fatigue and fever.   Respiratory: Negative.     Cardiovascular: Negative.    Gastrointestinal:  Positive for abdominal distention, abdominal pain, diarrhea, nausea and vomiting. Negative for blood in stool.     Objective   Physical Exam  Constitutional:       Appearance: She is well-developed.   Neck:      Thyroid: No thyroid mass, thyromegaly or thyroid tenderness.      Vascular: No carotid bruit.      Trachea: Trachea normal.   Cardiovascular:      Rate and Rhythm: Normal rate and regular rhythm.      Chest Wall: PMI is not  displaced.      Pulses:           Radial pulses are 2+ on the right side and 2+ on the left side.        Dorsalis pedis pulses are 2+ on the right side and 2+ on the left side.        Posterior tibial pulses are 2+ on the right side and 2+ on the left side.      Heart sounds: S1 normal and S2 normal.   Pulmonary:      Effort: Pulmonary effort is normal.      Breath sounds: Normal breath sounds.   Abdominal:      General: Bowel sounds are increased. There is distension. There is no abdominal bruit. There are no signs of injury.      Palpations: Abdomen is soft. There is no hepatomegaly or splenomegaly.      Tenderness: There is abdominal tenderness in the right lower quadrant, epigastric area and left lower quadrant. There is no guarding or rebound. Negative signs include Wright's sign.      Hernia: No hernia is present.   Musculoskeletal:      Right lower leg: No edema.      Left lower leg: No edema.   Lymphadenopathy:      Head:      Right side of head: No submental, submandibular, tonsillar or occipital adenopathy.      Left side of head: No submental, submandibular, tonsillar or occipital adenopathy.      Cervical: No cervical adenopathy.   Skin:     General: Skin is warm and dry.      Capillary Refill: Capillary refill takes less than 2 seconds.      Nails: There is no clubbing.   Neurological:      Mental Status: She is alert and oriented to person, place, and time.   Psychiatric:         Attention and Perception: Attention normal.         Mood and Affect: Mood and affect normal.         Speech: Speech normal.         Behavior: Behavior normal.         Thought Content: Thought content normal.         Cognition and Memory: Cognition normal.       Vitals:    06/16/23 1053   BP: 110/74   Pulse: 64   Temp: 97.6 °F (36.4 °C)   SpO2: 99%      Body mass index is 35.99 kg/m².    Assessment & Plan   Problems Addressed this Visit    None  Visit Diagnoses       Watery diarrhea    -  Primary    Relevant Orders    Stool  Culture (Reference Lab) - Stool, Per Rectum    Giardia / Cryptosporidium Screen - Stool, Per Rectum    Ova & Parasite Examination - Stool, Per Rectum    Generalized abdominal pain        Relevant Orders    Stool Culture (Reference Lab) - Stool, Per Rectum    Giardia / Cryptosporidium Screen - Stool, Per Rectum    Ova & Parasite Examination - Stool, Per Rectum    Exposure to intestinal infectious disease        Relevant Orders    Stool Culture (Reference Lab) - Stool, Per Rectum    Giardia / Cryptosporidium Screen - Stool, Per Rectum    Ova & Parasite Examination - Stool, Per Rectum          Diagnoses         Codes Comments    Watery diarrhea    -  Primary ICD-10-CM: R19.7  ICD-9-CM: 787.91     Generalized abdominal pain     ICD-10-CM: R10.84  ICD-9-CM: 789.07     Exposure to intestinal infectious disease     ICD-10-CM: Z20.09  ICD-9-CM: V01.89           1.  Watery diarrhea/generalized abdominal pain-she has had multiple exposures to intestinal infectious disease.  Check stool studies including stool culture, ova and parasites and Giardia.  She will complete full lab work with CBC and CMP on Monday.  We will treat pending test results.  Notify for worsening symptoms.

## 2023-06-19 DIAGNOSIS — D50.0 IRON DEFICIENCY ANEMIA DUE TO CHRONIC BLOOD LOSS: ICD-10-CM

## 2023-06-19 DIAGNOSIS — I10 ESSENTIAL HYPERTENSION: Primary | ICD-10-CM

## 2023-06-19 DIAGNOSIS — R73.9 HYPERGLYCEMIA: ICD-10-CM

## 2023-06-19 DIAGNOSIS — Z00.00 HEALTHCARE MAINTENANCE: ICD-10-CM

## 2023-06-20 LAB
ALBUMIN SERPL-MCNC: 4.3 G/DL (ref 3.8–4.8)
ALBUMIN/GLOB SERPL: 1.7 {RATIO} (ref 1.2–2.2)
ALP SERPL-CCNC: 65 IU/L (ref 44–121)
ALT SERPL-CCNC: 9 IU/L (ref 0–32)
AST SERPL-CCNC: 15 IU/L (ref 0–40)
BASOPHILS # BLD AUTO: 0 X10E3/UL (ref 0–0.2)
BASOPHILS NFR BLD AUTO: 1 %
BILIRUB SERPL-MCNC: 0.5 MG/DL (ref 0–1.2)
BUN SERPL-MCNC: 12 MG/DL (ref 6–24)
BUN/CREAT SERPL: 17 (ref 9–23)
CALCIUM SERPL-MCNC: 8.8 MG/DL (ref 8.7–10.2)
CHLORIDE SERPL-SCNC: 105 MMOL/L (ref 96–106)
CHOLEST SERPL-MCNC: 145 MG/DL (ref 100–199)
CHOLEST/HDLC SERPL: 3.1 RATIO (ref 0–4.4)
CO2 SERPL-SCNC: 25 MMOL/L (ref 20–29)
CREAT SERPL-MCNC: 0.7 MG/DL (ref 0.57–1)
EGFRCR SERPLBLD CKD-EPI 2021: 109 ML/MIN/1.73
EOSINOPHIL # BLD AUTO: 0.1 X10E3/UL (ref 0–0.4)
EOSINOPHIL NFR BLD AUTO: 3 %
ERYTHROCYTE [DISTWIDTH] IN BLOOD BY AUTOMATED COUNT: 13 % (ref 11.7–15.4)
FOLATE SERPL-MCNC: 6.4 NG/ML
GLOBULIN SER CALC-MCNC: 2.6 G/DL (ref 1.5–4.5)
GLUCOSE SERPL-MCNC: 105 MG/DL (ref 70–99)
HBA1C MFR BLD: 5.3 % (ref 4.8–5.6)
HCT VFR BLD AUTO: 35 % (ref 34–46.6)
HDLC SERPL-MCNC: 47 MG/DL
HGB BLD-MCNC: 11.6 G/DL (ref 11.1–15.9)
IMM GRANULOCYTES # BLD AUTO: 0 X10E3/UL (ref 0–0.1)
IMM GRANULOCYTES NFR BLD AUTO: 0 %
LDLC SERPL CALC-MCNC: 77 MG/DL (ref 0–99)
LYMPHOCYTES # BLD AUTO: 1 X10E3/UL (ref 0.7–3.1)
LYMPHOCYTES NFR BLD AUTO: 24 %
MCH RBC QN AUTO: 29.5 PG (ref 26.6–33)
MCHC RBC AUTO-ENTMCNC: 33.1 G/DL (ref 31.5–35.7)
MCV RBC AUTO: 89 FL (ref 79–97)
MONOCYTES # BLD AUTO: 0.3 X10E3/UL (ref 0.1–0.9)
MONOCYTES NFR BLD AUTO: 7 %
NEUTROPHILS # BLD AUTO: 2.9 X10E3/UL (ref 1.4–7)
NEUTROPHILS NFR BLD AUTO: 65 %
PLATELET # BLD AUTO: 166 X10E3/UL (ref 150–450)
POTASSIUM SERPL-SCNC: 4 MMOL/L (ref 3.5–5.2)
PROT SERPL-MCNC: 6.9 G/DL (ref 6–8.5)
RBC # BLD AUTO: 3.93 X10E6/UL (ref 3.77–5.28)
SODIUM SERPL-SCNC: 143 MMOL/L (ref 134–144)
T4 FREE SERPL-MCNC: 1.07 NG/DL (ref 0.82–1.77)
TRIGL SERPL-MCNC: 116 MG/DL (ref 0–149)
TSH SERPL DL<=0.005 MIU/L-ACNC: 1.2 UIU/ML (ref 0.45–4.5)
VIT B12 SERPL-MCNC: 371 PG/ML (ref 232–1245)
VLDLC SERPL CALC-MCNC: 21 MG/DL (ref 5–40)
WBC # BLD AUTO: 4.3 X10E3/UL (ref 3.4–10.8)

## 2023-06-27 LAB
BACTERIA SPEC CULT: NORMAL
BACTERIA SPEC CULT: NORMAL
CAMPYLOBACTER STL CULT: NORMAL
CRYPTOSP AG STL QL IA: NEGATIVE
E COLI SXT STL QL IA: NEGATIVE
G LAMBLIA AG STL QL IA: NEGATIVE
O+P SPEC MICRO: NORMAL
O+P STL CONC: NORMAL
SALM + SHIG STL CULT: NORMAL

## 2023-07-01 PROBLEM — F40.232 FEAR ASSOCIATED WITH HEALTHCARE: Status: RESOLVED | Noted: 2021-05-24 | Resolved: 2023-07-01

## 2023-08-20 DIAGNOSIS — K21.9 GASTROESOPHAGEAL REFLUX DISEASE, UNSPECIFIED WHETHER ESOPHAGITIS PRESENT: Chronic | ICD-10-CM

## 2023-08-20 DIAGNOSIS — I10 ESSENTIAL HYPERTENSION: Chronic | ICD-10-CM

## 2023-08-20 DIAGNOSIS — F41.9 ANXIETY: Chronic | ICD-10-CM

## 2023-08-21 RX ORDER — OLANZAPINE 15 MG/1
TABLET ORAL
Qty: 90 TABLET | Refills: 1 | Status: SHIPPED | OUTPATIENT
Start: 2023-08-21

## 2023-08-21 RX ORDER — OMEPRAZOLE 20 MG/1
CAPSULE, DELAYED RELEASE ORAL
Qty: 90 CAPSULE | Refills: 3 | Status: SHIPPED | OUTPATIENT
Start: 2023-08-21

## 2023-08-21 RX ORDER — VENLAFAXINE HYDROCHLORIDE 150 MG/1
CAPSULE, EXTENDED RELEASE ORAL
Qty: 90 CAPSULE | Refills: 1 | Status: SHIPPED | OUTPATIENT
Start: 2023-08-21

## 2023-08-21 RX ORDER — LOSARTAN POTASSIUM 50 MG/1
TABLET ORAL
Qty: 180 TABLET | Refills: 1 | Status: SHIPPED | OUTPATIENT
Start: 2023-08-21

## 2023-08-23 DIAGNOSIS — I10 ESSENTIAL HYPERTENSION: Chronic | ICD-10-CM

## 2023-08-23 RX ORDER — METOPROLOL TARTRATE 100 MG/1
TABLET ORAL
Qty: 180 TABLET | Refills: 1 | Status: SHIPPED | OUTPATIENT
Start: 2023-08-23

## 2023-09-05 DIAGNOSIS — M54.50 CHRONIC BILATERAL LOW BACK PAIN WITHOUT SCIATICA: Chronic | ICD-10-CM

## 2023-09-05 DIAGNOSIS — G89.29 CHRONIC BILATERAL LOW BACK PAIN WITHOUT SCIATICA: Chronic | ICD-10-CM

## 2023-09-05 RX ORDER — METHOCARBAMOL 500 MG/1
TABLET, FILM COATED ORAL
Qty: 90 TABLET | Refills: 0 | Status: SHIPPED | OUTPATIENT
Start: 2023-09-05

## 2023-09-18 DIAGNOSIS — I10 ESSENTIAL HYPERTENSION: Chronic | ICD-10-CM

## 2023-09-18 RX ORDER — LOSARTAN POTASSIUM 50 MG/1
100 TABLET ORAL DAILY
Qty: 180 TABLET | Refills: 1 | Status: SHIPPED | OUTPATIENT
Start: 2023-09-18 | End: 2023-09-20

## 2023-09-20 ENCOUNTER — TELEPHONE (OUTPATIENT)
Dept: INTERNAL MEDICINE | Facility: CLINIC | Age: 48
End: 2023-09-20
Payer: COMMERCIAL

## 2023-09-20 RX ORDER — LOSARTAN POTASSIUM 100 MG/1
100 TABLET ORAL DAILY
Qty: 90 TABLET | Refills: 1 | Status: SHIPPED | OUTPATIENT
Start: 2023-09-20

## 2023-09-20 NOTE — TELEPHONE ENCOUNTER
I CAN NEVER GET HOLD OF PT, HER PHONE GOES STRAIGHT TO  AND  IS NOT SET UP. I SENT HER [employer] MESSAGES STATING THAT HER INSURANCE DO NOT COVER 50 BID. WE ARE SENDING 100 MG 1 A DAY BUT SHE CAN TAKE HALF TABLET BID.

## 2023-09-22 DIAGNOSIS — I10 ESSENTIAL HYPERTENSION: ICD-10-CM

## 2023-09-22 DIAGNOSIS — D50.0 IRON DEFICIENCY ANEMIA DUE TO CHRONIC BLOOD LOSS: ICD-10-CM

## 2023-09-22 DIAGNOSIS — Z00.00 HEALTHCARE MAINTENANCE: Primary | ICD-10-CM

## 2023-09-22 DIAGNOSIS — R73.9 HYPERGLYCEMIA: ICD-10-CM

## 2023-09-22 RX ORDER — ONDANSETRON 8 MG/1
8 TABLET, ORALLY DISINTEGRATING ORAL EVERY 8 HOURS PRN
Qty: 20 TABLET | Refills: 0 | Status: SHIPPED | OUTPATIENT
Start: 2023-09-22

## 2023-09-26 LAB
ALBUMIN SERPL-MCNC: 4.5 G/DL (ref 3.5–5.2)
ALBUMIN/GLOB SERPL: 1.7 G/DL
ALP SERPL-CCNC: 71 U/L (ref 39–117)
ALT SERPL-CCNC: 12 U/L (ref 1–33)
AST SERPL-CCNC: 17 U/L (ref 1–32)
BASOPHILS # BLD AUTO: 0.03 10*3/MM3 (ref 0–0.2)
BASOPHILS NFR BLD AUTO: 0.7 % (ref 0–1.5)
BILIRUB SERPL-MCNC: <0.2 MG/DL (ref 0–1.2)
BUN SERPL-MCNC: 15 MG/DL (ref 6–20)
BUN/CREAT SERPL: 21.1 (ref 7–25)
CALCIUM SERPL-MCNC: 9.2 MG/DL (ref 8.6–10.5)
CHLORIDE SERPL-SCNC: 107 MMOL/L (ref 98–107)
CHOLEST SERPL-MCNC: 129 MG/DL (ref 0–200)
CHOLEST/HDLC SERPL: 2.93 {RATIO}
CO2 SERPL-SCNC: 27.6 MMOL/L (ref 22–29)
CREAT SERPL-MCNC: 0.71 MG/DL (ref 0.57–1)
EGFRCR SERPLBLD CKD-EPI 2021: 106.3 ML/MIN/1.73
EOSINOPHIL # BLD AUTO: 0.11 10*3/MM3 (ref 0–0.4)
EOSINOPHIL NFR BLD AUTO: 2.7 % (ref 0.3–6.2)
ERYTHROCYTE [DISTWIDTH] IN BLOOD BY AUTOMATED COUNT: 14 % (ref 12.3–15.4)
GLOBULIN SER CALC-MCNC: 2.6 GM/DL
GLUCOSE SERPL-MCNC: 102 MG/DL (ref 65–99)
HCT VFR BLD AUTO: 32.1 % (ref 34–46.6)
HDLC SERPL-MCNC: 44 MG/DL (ref 40–60)
HGB BLD-MCNC: 9.8 G/DL (ref 12–15.9)
IMM GRANULOCYTES # BLD AUTO: 0.01 10*3/MM3 (ref 0–0.05)
IMM GRANULOCYTES NFR BLD AUTO: 0.2 % (ref 0–0.5)
IRON SATN MFR SERPL: 3 % (ref 20–50)
IRON SERPL-MCNC: 16 MCG/DL (ref 37–145)
LDLC SERPL CALC-MCNC: 72 MG/DL (ref 0–100)
LYMPHOCYTES # BLD AUTO: 1.27 10*3/MM3 (ref 0.7–3.1)
LYMPHOCYTES NFR BLD AUTO: 30.8 % (ref 19.6–45.3)
MCH RBC QN AUTO: 25.3 PG (ref 26.6–33)
MCHC RBC AUTO-ENTMCNC: 30.5 G/DL (ref 31.5–35.7)
MCV RBC AUTO: 82.9 FL (ref 79–97)
MONOCYTES # BLD AUTO: 0.36 10*3/MM3 (ref 0.1–0.9)
MONOCYTES NFR BLD AUTO: 8.7 % (ref 5–12)
NEUTROPHILS # BLD AUTO: 2.35 10*3/MM3 (ref 1.7–7)
NEUTROPHILS NFR BLD AUTO: 56.9 % (ref 42.7–76)
NRBC BLD AUTO-RTO: 0 /100 WBC (ref 0–0.2)
PLATELET # BLD AUTO: 135 10*3/MM3 (ref 140–450)
POTASSIUM SERPL-SCNC: 4.3 MMOL/L (ref 3.5–5.2)
PROT SERPL-MCNC: 7.1 G/DL (ref 6–8.5)
RBC # BLD AUTO: 3.87 10*6/MM3 (ref 3.77–5.28)
SODIUM SERPL-SCNC: 143 MMOL/L (ref 136–145)
TIBC SERPL-MCNC: 496 MCG/DL
TRIGL SERPL-MCNC: 62 MG/DL (ref 0–150)
TSH SERPL DL<=0.005 MIU/L-ACNC: 1.35 UIU/ML (ref 0.27–4.2)
UIBC SERPL-MCNC: 480 MCG/DL (ref 112–346)
VLDLC SERPL CALC-MCNC: 13 MG/DL (ref 5–40)
WBC # BLD AUTO: 4.13 10*3/MM3 (ref 3.4–10.8)

## 2023-09-27 ENCOUNTER — TELEPHONE (OUTPATIENT)
Dept: INTERNAL MEDICINE | Facility: CLINIC | Age: 48
End: 2023-09-27

## 2023-09-27 ENCOUNTER — OFFICE VISIT (OUTPATIENT)
Dept: INTERNAL MEDICINE | Facility: CLINIC | Age: 48
End: 2023-09-27
Payer: COMMERCIAL

## 2023-09-27 VITALS
HEART RATE: 75 BPM | TEMPERATURE: 98.6 F | SYSTOLIC BLOOD PRESSURE: 124 MMHG | OXYGEN SATURATION: 96 % | HEIGHT: 66 IN | WEIGHT: 212.1 LBS | DIASTOLIC BLOOD PRESSURE: 80 MMHG | BODY MASS INDEX: 34.09 KG/M2

## 2023-09-27 DIAGNOSIS — F17.200 CURRENT EVERY DAY SMOKER: ICD-10-CM

## 2023-09-27 DIAGNOSIS — L30.9 DERMATITIS: ICD-10-CM

## 2023-09-27 DIAGNOSIS — M32.9 SYSTEMIC LUPUS ERYTHEMATOSUS, UNSPECIFIED SLE TYPE, UNSPECIFIED ORGAN INVOLVEMENT STATUS: ICD-10-CM

## 2023-09-27 DIAGNOSIS — I10 ESSENTIAL HYPERTENSION: Chronic | ICD-10-CM

## 2023-09-27 DIAGNOSIS — F19.11 HISTORY OF DRUG ABUSE: ICD-10-CM

## 2023-09-27 DIAGNOSIS — D50.0 IRON DEFICIENCY ANEMIA DUE TO CHRONIC BLOOD LOSS: ICD-10-CM

## 2023-09-27 DIAGNOSIS — B18.2 CHRONIC HEPATITIS C WITHOUT HEPATIC COMA: ICD-10-CM

## 2023-09-27 DIAGNOSIS — Z00.00 HEALTHCARE MAINTENANCE: Primary | ICD-10-CM

## 2023-09-27 DIAGNOSIS — F31.81 BIPOLAR 2 DISORDER: ICD-10-CM

## 2023-09-27 DIAGNOSIS — K21.9 GASTROESOPHAGEAL REFLUX DISEASE, UNSPECIFIED WHETHER ESOPHAGITIS PRESENT: Chronic | ICD-10-CM

## 2023-09-27 DIAGNOSIS — I21.4 NON-STEMI (NON-ST ELEVATED MYOCARDIAL INFARCTION): ICD-10-CM

## 2023-09-27 RX ORDER — OMEPRAZOLE 20 MG/1
20 CAPSULE, DELAYED RELEASE ORAL DAILY
Qty: 90 CAPSULE | Refills: 3 | Status: SHIPPED | OUTPATIENT
Start: 2023-09-27

## 2023-09-27 RX ORDER — AMLODIPINE BESYLATE 10 MG/1
10 TABLET ORAL DAILY
Qty: 90 TABLET | Refills: 2 | Status: SHIPPED | OUTPATIENT
Start: 2023-09-27

## 2023-09-27 NOTE — TELEPHONE ENCOUNTER
----- Message from SYLWIA Charles sent at 9/27/2023  4:26 PM EDT -----  Regarding: RE: BLOOD TRANFUSION  I spoke with her today and told her that she needs to see hematology back for her iron deficiency anemia as she may need an iron transfusion like she has received in the past. I have placed a new urgent referral to hematology, as they said she would need a new referral to be seen, and they should be contacting her to schedule in the next few days. I told her to go ahead and start an oral iron supplement, ferrous sulfate 325 mg daily and repeat CBC in 1 week. If symptoms become worse before seeing hematology back, recommend she be seen in the ER.   ----- Message -----  From: Sapna Hankins MA  Sent: 9/27/2023   3:28 PM EDT  To: SYLWIA Charles  Subject: FW: BLOOD TRANFUSION                               ----- Message -----  From: Casandra Hayes  Sent: 9/27/2023   3:16 PM EDT  To: Sapna Hankins MA  Subject: BLOOD TRANFUSION                                 Patient stated she was in the office today and was told she may need blood transfusion if her levels went below 7 patient states she started her period today and she knows that is going to make it drop and needs to speak to you to see what to do. Please call patient today. Thank you

## 2023-09-27 NOTE — PROGRESS NOTES
Subjective   Silvia Clinton is a 48 y.o. female.     Chief Complaint   Patient presents with    Annual Exam    Fatigue        History of Present Illness   She is here today for CPE and lab f/u. She is feeling a lot of fatigue recently.  She has a history of menorrhagia and iron deficiency anemia due to chronic blood loss.  She does not tolerate oral iron.  She has previously needed iron infusions in the past.  She is followed by hematology, Dr. Santiago.  She is due for follow-up.  She also notes a pruritic rash on the backs of her legs and in her groin.  This typically becomes worse with heat and sweating.  She has not tried anything for symptoms.  She would also like to discuss disability forms that need to be completed.    Non-STEMI- she is due to see cardiology back. She notes occasional palpitations when she forgets to take her metoprolol.  She denies any exertional symptoms.  HTN- she is currently taking amlodipine 5 mg BID and losartan 50 mg BID. She is due for refill on the amlodipine.  BP well controlled at home.    SLE- she is due to see rheumatology back.     Migraine HA-she is currently having 1-2 migraine HA a month.     Bipolar disorder- she is needing a new referral to behavioral health for medication management.     Hx of drug abuse-she is currently taking Suboxone 1 tablet twice daily.  She has abstained from drug use.    Current every day smoker-she has quit smoking cigarettes and is now vaping nicotine daily.  Positive history of 30-pack-year smoking history.  She would like to work on smoking cessation from electronic cigarettes.    Hepatitis C infection- she is needing to establish with a new GI specialist, as Dr. Hanson has left the practice.   GERD- she is currently out of her omeprazole.  She notes worsening reflux symptoms since being out of the medication.  She denies any dysphagia odynophagia.  Colon cancer screening- she is due for c-scope.     GYN- due to see GYN back. She currently  uses OCP during menses for menorrhagia.     The following portions of the patient's history were reviewed and updated as appropriate: allergies, current medications, past family history, past medical history, past social history, past surgical history, and problem list.    Review of Systems   Constitutional:  Positive for fatigue. Negative for appetite change, chills, diaphoresis, fever, unexpected weight gain and unexpected weight loss.   HENT:  Negative for congestion, dental problem, ear pain, hearing loss, mouth sores, nosebleeds, postnasal drip, rhinorrhea, sinus pressure, sore throat, swollen glands, tinnitus and trouble swallowing.    Eyes:  Negative for blurred vision, pain, discharge, redness, itching and visual disturbance.   Respiratory:  Negative for cough, chest tightness, shortness of breath and wheezing.    Cardiovascular:  Negative for chest pain, palpitations and leg swelling.   Gastrointestinal:  Negative for abdominal distention, abdominal pain, blood in stool, constipation, diarrhea, nausea, vomiting and GERD.   Endocrine: Negative for cold intolerance and heat intolerance.   Genitourinary:  Positive for menstrual problem (menorrhagia). Negative for breast discharge, breast lump, breast pain, difficulty urinating, dysuria, flank pain, frequency, hematuria, pelvic pain, pelvic pressure, urgency, urinary incontinence, vaginal bleeding and vaginal discharge.   Musculoskeletal:  Negative for arthralgias, back pain, gait problem, joint swelling, myalgias and neck pain.   Skin:  Positive for rash. Negative for color change and skin lesions.   Allergic/Immunologic: Negative for environmental allergies and food allergies.   Neurological:  Positive for light-headedness. Negative for dizziness, tremors, seizures, syncope, speech difficulty, weakness, numbness, headache, memory problem and confusion.   Hematological:  Negative for adenopathy. Does not bruise/bleed easily.   Psychiatric/Behavioral:  Negative  for sleep disturbance, suicidal ideas, depressed mood and stress. The patient is not nervous/anxious.      Objective   Physical Exam  Constitutional:       Appearance: Normal appearance. She is well-developed.   HENT:      Head: Normocephalic and atraumatic.      Right Ear: Hearing, tympanic membrane, ear canal and external ear normal.      Left Ear: Hearing, tympanic membrane, ear canal and external ear normal.      Nose: Nose normal.      Right Sinus: No maxillary sinus tenderness or frontal sinus tenderness.      Left Sinus: No maxillary sinus tenderness or frontal sinus tenderness.      Mouth/Throat:      Lips: Pink.      Mouth: Mucous membranes are moist.      Dentition: Normal dentition.      Tongue: No lesions.      Pharynx: Oropharynx is clear. Uvula midline.      Tonsils: No tonsillar exudate.   Eyes:      General: Lids are normal.      Extraocular Movements: Extraocular movements intact.      Conjunctiva/sclera: Conjunctivae normal.      Pupils: Pupils are equal, round, and reactive to light.   Neck:      Thyroid: No thyroid mass, thyromegaly or thyroid tenderness.      Vascular: No carotid bruit.      Trachea: Trachea normal.   Cardiovascular:      Rate and Rhythm: Normal rate and regular rhythm.      Pulses: Normal pulses.           Radial pulses are 2+ on the right side and 2+ on the left side.        Popliteal pulses are 2+ on the right side and 2+ on the left side.        Dorsalis pedis pulses are 2+ on the right side and 2+ on the left side.        Posterior tibial pulses are 2+ on the right side and 2+ on the left side.      Heart sounds: S1 normal and S2 normal.   Pulmonary:      Effort: Pulmonary effort is normal.      Breath sounds: Normal breath sounds.   Abdominal:      General: Bowel sounds are normal. There is no distension or abdominal bruit.      Palpations: Abdomen is soft. There is no hepatomegaly or splenomegaly.      Tenderness: There is no abdominal tenderness.      Hernia: No hernia  is present.   Musculoskeletal:      Cervical back: Normal range of motion and neck supple.      Right lower leg: No edema.      Left lower leg: No edema.   Lymphadenopathy:      Head:      Right side of head: No submental, submandibular, tonsillar or occipital adenopathy.      Left side of head: No submental, submandibular, tonsillar or occipital adenopathy.      Cervical: No cervical adenopathy.      Upper Body:      Right upper body: No supraclavicular adenopathy.      Left upper body: No supraclavicular adenopathy.      Lower Body: No right inguinal adenopathy. No left inguinal adenopathy.   Skin:     General: Skin is warm and dry.      Coloration: Skin is pale.      Findings: Rash present. Rash is papular.      Nails: There is no clubbing.          Neurological:      General: No focal deficit present.      Mental Status: She is alert and oriented to person, place, and time.      Cranial Nerves: Cranial nerves 2-12 are intact.      Sensory: Sensation is intact.      Motor: Motor function is intact.      Coordination: Coordination is intact.      Gait: Gait is intact.      Deep Tendon Reflexes:      Reflex Scores:       Patellar reflexes are 2+ on the right side and 2+ on the left side.  Psychiatric:         Attention and Perception: Attention and perception normal.         Mood and Affect: Mood and affect normal.         Speech: Speech normal.         Behavior: Behavior normal. Behavior is cooperative.         Thought Content: Thought content normal.         Cognition and Memory: Cognition and memory normal.         Judgment: Judgment normal.       Vitals:    09/27/23 1042   BP: 124/80   Pulse: 75   Temp: 98.6 °F (37 °C)   SpO2: 96%      Body mass index is 34.23 kg/m².    Assessment & Plan   Problems Addressed this Visit          Cardiac and Vasculature    Non-STEMI (non-ST elevated myocardial infarction)    Relevant Medications    amLODIPine (NORVASC) 10 MG tablet    Essential hypertension    Relevant  Medications    amLODIPine (NORVASC) 10 MG tablet       Gastrointestinal Abdominal     Hepatitis C    Relevant Orders    Ambulatory Referral to Gastroenterology    GERD (gastroesophageal reflux disease)    Relevant Medications    omeprazole (priLOSEC) 20 MG capsule    Other Relevant Orders    Ambulatory Referral to Gastroenterology       Hematology and Neoplasia    Iron deficiency anemia due to chronic blood loss    Relevant Orders    Ambulatory Referral to Hematology    CBC & Differential       Mental Health    Bipolar 2 disorder    Relevant Orders    Ambulatory Referral to Behavioral Health (Completed)    History of drug abuse    Relevant Orders    Ambulatory Referral to Gastroenterology       Multi-system (Lupus, Sarcoid...)    Lupus (systemic lupus erythematosus)       Tobacco    Current every day smoker     Other Visit Diagnoses       Healthcare maintenance    -  Primary    Dermatitis        Relevant Medications    nystatin-triamcinolone (MYCOLOG II) 083417-8.1 UNIT/GM-% cream          Diagnoses         Codes Comments    Healthcare maintenance    -  Primary ICD-10-CM: Z00.00  ICD-9-CM: V70.0     Iron deficiency anemia due to chronic blood loss     ICD-10-CM: D50.0  ICD-9-CM: 280.0     History of drug abuse     ICD-10-CM: F19.11  ICD-9-CM: 305.93     Bipolar 2 disorder     ICD-10-CM: F31.81  ICD-9-CM: 296.89     Essential hypertension     ICD-10-CM: I10  ICD-9-CM: 401.9     Gastroesophageal reflux disease, unspecified whether esophagitis present     ICD-10-CM: K21.9  ICD-9-CM: 530.81     Dermatitis     ICD-10-CM: L30.9  ICD-9-CM: 692.9     Systemic lupus erythematosus, unspecified SLE type, unspecified organ involvement status     ICD-10-CM: M32.9  ICD-9-CM: 710.0     Chronic hepatitis C without hepatic coma     ICD-10-CM: B18.2  ICD-9-CM: 070.54     Non-STEMI (non-ST elevated myocardial infarction)     ICD-10-CM: I21.4  ICD-9-CM: 410.70     Current every day smoker     ICD-10-CM: F17.200  ICD-9-CM: 305.1            Lab results discussed with patient in office today.    1.  Preventative counseling-encouraged her to focus on healthy, Mediterranean-style diet along with regular exercise.  Ensure adequate dietary intake of calcium and vitamin D.  Information provided to patient today for occupational health to discuss disability forms.  2.  HTN-well-controlled.  Continue amlodipine and losartan at current doses.  Monitor BP at home with goal BP less than 130/80.  Notify persistently elevated above goal.  3.  Non-STEMI-due to see cardiology back.  Encouraged her to schedule this.  Cholesterol has improved some.  Would consider low-dose statin therapy for secondary risk reduction if cleared by GI secondary to chronic hepatitis C infection.  4.  Chronic hepatitis C-referral placed to GI to establish care.  5.  Iron deficiency anemia due to chronic blood loss-not controlled with hemoglobin down to 9.8 and iron saturation of 3%.  Emphasized the importance of her following up with hematology for iron infusions.  Recommend that she go ahead and start an oral iron supplement until she sees hematology back.  Repeat CBC in 1 week.  To ER with worsening symptoms.  6.  GERD-prescription refilled for omeprazole 20 mg daily.  Discussed reflux precautions and encourage smoking cessation.  Referral placed to GI to establish care.  7.  Current everyday smoker-encouraged her to quit.  Patient will need to start low-dose CT chest at age 60 for lung cancer screening.  8.  Bipolar 2 disorder-referral placed to behavioral health to establish care.  9.  SLE-due to see rheumatology back.  She will schedule this.  10.  History of drug abuse-encouraged her to continue to abstain from drug use.  She will continue Suboxone program.  11.  Dermatitis-prescription sent for nystatin-triamcinolone cream twice daily for up to 2 weeks.  Keep skin clean and dry.  Use a good skin emollient after bathing or showering.  “Discussed risks/benefits to vaccination,  reviewed components of the vaccine, discussed VIS, discussed informed consent, informed consent obtained. Patient/Parent was allowed to accept or refuse vaccine. Questions answered to satisfactory state of patient/Parent. We reviewed typical age appropriate and seasonally appropriate vaccinations. Reviewed immunization history and updated state vaccination form as needed. Patient was counseled on Hep B    Dentist due.  Encouraged her to schedule this.  Eye exam due.  Encouraged her to schedule this.  GYN due.  Encouraged her to schedule this.  C-scope due.  Referral placed to GI.  Encouraged sunscreen use outside    Follow-up in 6 months with fasting labs prior or sooner as needed.

## 2023-09-28 ENCOUNTER — TELEPHONE (OUTPATIENT)
Dept: ONCOLOGY | Facility: CLINIC | Age: 48
End: 2023-09-28
Payer: COMMERCIAL

## 2023-09-28 NOTE — TELEPHONE ENCOUNTER
Caller: Silvia Clinton    Relationship to patient: Self    Best call back number: [phone]     Chief complaint: PT HAD PHYSICAL AND PCP ADVISE PT GET IN FOR IRON INFUSION ASAP.  (IRON WAS  AND DROPPED TO 16  HEMOGLOBIN WAS 8 OR 9)    Type of visit: INFUSION    Requested date: ASAP    Additional notes: PLEASE TEXT PT WITH APPT DATE/TIME

## 2023-09-29 ENCOUNTER — TELEPHONE (OUTPATIENT)
Dept: INTERNAL MEDICINE | Facility: CLINIC | Age: 48
End: 2023-09-29
Payer: COMMERCIAL

## 2023-09-29 NOTE — TELEPHONE ENCOUNTER
GIGI IS CALLING BACK STATES NO ONE HAS CALLED HER TO SCHEDULE AN IRON INFUSION.  SHE STATES SHE IS HAVING TROUBLE STAYING AWAKE AND SHE HAS ALSO HAS STARTED HER MENSTRUAL CYCLE AND SHE IS NEEDING AN INFUSION RIGHT AWAY    PLEASE ADVISE

## 2023-09-29 NOTE — TELEPHONE ENCOUNTER
PT AWARE. PT STATES SHE CANNOT TAKE FERROUS SULFATE BECAUSE SHE GETS SICK EVEN TAKING WITH FOOD, SHE IS TAKING GUMMY AND SHE IS SEEING HEMATOLOGY ON MONDAY.

## 2023-10-02 ENCOUNTER — LAB (OUTPATIENT)
Dept: OTHER | Facility: HOSPITAL | Age: 48
End: 2023-10-02
Payer: COMMERCIAL

## 2023-10-02 ENCOUNTER — OFFICE VISIT (OUTPATIENT)
Dept: ONCOLOGY | Facility: CLINIC | Age: 48
End: 2023-10-02
Payer: COMMERCIAL

## 2023-10-02 VITALS
DIASTOLIC BLOOD PRESSURE: 75 MMHG | RESPIRATION RATE: 16 BRPM | SYSTOLIC BLOOD PRESSURE: 120 MMHG | BODY MASS INDEX: 33.89 KG/M2 | HEIGHT: 66 IN | HEART RATE: 61 BPM | TEMPERATURE: 97.3 F | OXYGEN SATURATION: 94 % | WEIGHT: 210.9 LBS

## 2023-10-02 DIAGNOSIS — T45.4X5A ADVERSE EFFECT OF IRON, INITIAL ENCOUNTER: ICD-10-CM

## 2023-10-02 DIAGNOSIS — D50.0 IRON DEFICIENCY ANEMIA DUE TO CHRONIC BLOOD LOSS: Primary | ICD-10-CM

## 2023-10-02 DIAGNOSIS — D50.0 IRON DEFICIENCY ANEMIA DUE TO CHRONIC BLOOD LOSS: ICD-10-CM

## 2023-10-02 LAB
BASOPHILS # BLD AUTO: 0.02 10*3/MM3 (ref 0–0.2)
BASOPHILS NFR BLD AUTO: 0.5 % (ref 0–1.5)
DEPRECATED RDW RBC AUTO: 44.2 FL (ref 37–54)
EOSINOPHIL # BLD AUTO: 0.09 10*3/MM3 (ref 0–0.4)
EOSINOPHIL NFR BLD AUTO: 2.3 % (ref 0.3–6.2)
ERYTHROCYTE [DISTWIDTH] IN BLOOD BY AUTOMATED COUNT: 14.8 % (ref 12.3–15.4)
FERRITIN SERPL-MCNC: 13.7 NG/ML (ref 13–150)
HCT VFR BLD AUTO: 31.8 % (ref 34–46.6)
HGB BLD-MCNC: 9.5 G/DL (ref 12–15.9)
IMM GRANULOCYTES # BLD AUTO: 0.01 10*3/MM3 (ref 0–0.05)
IMM GRANULOCYTES NFR BLD AUTO: 0.3 % (ref 0–0.5)
IRON 24H UR-MRATE: 72 MCG/DL (ref 37–145)
IRON SATN MFR SERPL: 15 % (ref 20–50)
LYMPHOCYTES # BLD AUTO: 1.24 10*3/MM3 (ref 0.7–3.1)
LYMPHOCYTES NFR BLD AUTO: 31.2 % (ref 19.6–45.3)
MCH RBC QN AUTO: 24.9 PG (ref 26.6–33)
MCHC RBC AUTO-ENTMCNC: 29.9 G/DL (ref 31.5–35.7)
MCV RBC AUTO: 83.2 FL (ref 79–97)
MONOCYTES # BLD AUTO: 0.26 10*3/MM3 (ref 0.1–0.9)
MONOCYTES NFR BLD AUTO: 6.5 % (ref 5–12)
NEUTROPHILS NFR BLD AUTO: 2.36 10*3/MM3 (ref 1.7–7)
NEUTROPHILS NFR BLD AUTO: 59.2 % (ref 42.7–76)
NRBC BLD AUTO-RTO: 0 /100 WBC (ref 0–0.2)
PLATELET # BLD AUTO: 171 10*3/MM3 (ref 140–450)
PMV BLD AUTO: 12 FL (ref 6–12)
RBC # BLD AUTO: 3.82 10*6/MM3 (ref 3.77–5.28)
TIBC SERPL-MCNC: 468 MCG/DL (ref 298–536)
TRANSFERRIN SERPL-MCNC: 314 MG/DL (ref 200–360)
WBC NRBC COR # BLD: 3.98 10*3/MM3 (ref 3.4–10.8)

## 2023-10-02 PROCEDURE — 36415 COLL VENOUS BLD VENIPUNCTURE: CPT

## 2023-10-02 PROCEDURE — 85025 COMPLETE CBC W/AUTO DIFF WBC: CPT | Performed by: NURSE PRACTITIONER

## 2023-10-02 PROCEDURE — 82728 ASSAY OF FERRITIN: CPT | Performed by: NURSE PRACTITIONER

## 2023-10-02 PROCEDURE — 83540 ASSAY OF IRON: CPT | Performed by: NURSE PRACTITIONER

## 2023-10-02 PROCEDURE — 84466 ASSAY OF TRANSFERRIN: CPT | Performed by: NURSE PRACTITIONER

## 2023-10-02 NOTE — PROGRESS NOTES
Subjective   Silvia Clinton is a 48 y.o. female.  Referred by Suhas Oseguera for iron deficiency anemia    History of Present Illness   Ms. Clinton is a 45-year-old premenopausal lady with history of hepatitis C, history of drug use, hypertension, SLE(she has not seen a rheumatologist), MRSA infection presents for further evaluation of anemia.She has had anemia since 2018.  Initially MCV was normal but subsequently noted to have low MCV with CBC on 7/8/2021 showing a hemoglobin of 8.9 and MCV of 73.3.  WBC count and platelet count has remained relatively normal except for some mild thrombocytopenia in 2019.  Per patient she has been anemic all her life since her teenage years.  She would take oral iron to help with the anemia and would have improvement.  After she was noted to be anemic she started oral iron .  Unfortunately she is not able to tolerate the iron supplements and she is having significant amount of nausea, abdominal pain, constipation to a point where she is not able to take the supplements.  She has not had menstrual cycles for 10 years when she was doing IV drugs but subsequently had menorrhagia for a year.  She has started using homeopathic medications for the past 2 months which helped with her heavy menstrual cycles and over the past 2 months she had fairly normal menstrual cycles.  She is scheduled to see OB/GYN next month.  She also reports bright red blood per rectum which has been going on for several months.  She has seen Dr. Steel.   She denies any malignancies in her immediate family.  Her maternal aunt had breast cancer.  Distant relatives with lung cancer and renal cancer.    She had COVID-19 infection in December 2021.  Received 2 doses of Injectafer in February 2022.    Interval History  The patient is seen back today in 6-month follow-up, actually a few weeks early for her scheduled appointment after she was seen by her PCP last week and found to be iron deficient again.  Iron sat at  her PCPs office was 3%.  Hemoglobin has been in the 9 range.  Her last dose of Injectafer was in January of this year with iron stores thereafter replete when seen in March.    Patient continues with menorrhagia.  She was supposed to have an ablation but then had COVID repeatedly and then her insurance changed and she has had several complications and being able to get back in with her GYN.  Ultimately that is the goal which of course will reduce or even potentially eliminate her need for IV iron.  She has taken oral iron in the past with poor GI tolerance.    Repeat iron studies today do confirm continued iron deficiency with ferritin of 13, iron sat of 15%.  Hemoglobin remains low at 9.5.  We discussed getting her approved for additional IV Injectafer or Venofer depending on insurance approval.  We will tentatively plan to bring her back later this week hopefully once approved by insurance.    Of note she has been referred by her PCP to GI for new evaluation and screening colonoscopy which she has yet to have and she is 48.    She denies other concerns at this time.    The following portions of the patient's history were reviewed and updated as appropriate: allergies, current medications, past family history, past medical history, past social history, past surgical history and problem list.    Past Medical History:   Diagnosis Date    Chest pain     Depression     Drug abstinence syndrome     Drug abuse     Ex-smoker     Heart attack     Hepatitis C     Hepatitis C     Hypertension     Kidney stone     Lupus     Lupus (systemic lupus erythematosus)     Mitral valve anterior leaflet prolapse     NSTEMI (non-ST elevated myocardial infarction)     Otitis     Palpitations     Seizures     Spinal epidural abscess     Tachycardia         Past Surgical History:   Procedure Laterality Date    BACK SURGERY      CHOLECYSTECTOMY      LEG SURGERY      broke tibula,fibula, steel noah    LIVER BIOPSY      LUMBAR LAMINECTOMY  "DISCECTOMY DECOMPRESSION Left 12/9/2018    Procedure: Posterior lumbar three through sacrum decompression;  Surgeon: Tevin Whitley MD;  Location: [facility] OR;  Service: Neurosurgery    LYMPH NODE BIOPSY      SPINE SURGERY          Family History   Problem Relation Age of Onset    Bipolar disorder Mother     Diabetes Mother     Hypertension Mother     No Known Problems Father     No Known Problems Sister     No Known Problems Brother     Nephrolithiasis Maternal Grandmother     Bipolar disorder Maternal Grandmother     Breast cancer Maternal Aunt     Liver cancer Maternal Uncle     Brain cancer Maternal Uncle     Lung cancer Maternal Uncle     Leukemia Cousin     Colon cancer Neg Hx     Cervical cancer Neg Hx     Uterine cancer Neg Hx     Ovarian cancer Neg Hx     Thyroid cancer Neg Hx         Social History     Socioeconomic History    Marital status: Legally    Tobacco Use    Smoking status: Former     Packs/day: 1.00     Years: 30.00     Pack years: 30.00     Types: Cigarettes     Quit date: 8/26/2020     Years since quitting: 3.1    Smokeless tobacco: Never    Tobacco comments:     E-CIG USE   Vaping Use    Vaping Use: Every day    Start date: 7/1/2020    Substances: Nicotine, Flavoring    Devices: Pre-filled or refillable cartridge, Refillable tank   Substance and Sexual Activity    Alcohol use: Not Currently     Comment: CAFFEINE USE: 1 CUP COFFEE/ 2 COKES DAILY    Drug use: Not Currently     Types: IV, Heroin, Methamphetamines     Comment: pt states she no longer uses heroin, she now uses meth    Sexual activity: Not Currently        OB History    No obstetric history on file.          Allergies   Allergen Reactions    [place] Hives     \"FELT LIKE BURNING, HEAT AND SWELLING\"    Sulfa Antibiotics Nausea And Vomiting and Swelling     Patient states when she took Sulfa medication, her throat started to swell.  nausea     Review of Systems   Constitutional:  Positive for fatigue.   HENT: " "Negative.     Eyes: Negative.    Respiratory: Negative.     Gastrointestinal:  Negative for abdominal pain, blood in stool, constipation and nausea.   Endocrine: Negative.    Genitourinary:  Positive for menstrual problem.   Musculoskeletal: Negative.    Allergic/Immunologic: Negative.    Neurological: Negative.    Hematological: Negative.    Psychiatric/Behavioral: Negative.     Review of systems as mentioned in the HPI    Objective   Blood pressure 120/75, pulse 61, temperature 97.3 °F (36.3 °C), temperature source Temporal, resp. rate 16, height 167 cm (65.75\"), weight 95.7 kg (210 lb 14.4 oz), SpO2 94 %, not currently breastfeeding.     Physical Exam  Vitals reviewed.   Constitutional:       Appearance: Normal appearance. She is obese.   HENT:      Head: Normocephalic and atraumatic.      Nose: Nose normal. No congestion.      Mouth/Throat:      Mouth: Mucous membranes are moist.      Pharynx: Oropharynx is clear.   Eyes:      Conjunctiva/sclera: Conjunctivae normal.      Pupils: Pupils are equal, round, and reactive to light.   Cardiovascular:      Rate and Rhythm: Normal rate and regular rhythm.      Heart sounds: Normal heart sounds.   Pulmonary:      Effort: Pulmonary effort is normal.      Breath sounds: Normal breath sounds.   Abdominal:      General: Abdomen is flat. Bowel sounds are normal.      Palpations: Abdomen is soft.   Musculoskeletal:         General: Normal range of motion.      Cervical back: Normal range of motion.   Skin:     General: Skin is warm and dry.      Findings: No rash.      Comments: Multiple tattoos on her extremities.   Neurological:      General: No focal deficit present.      Mental Status: She is alert and oriented to person, place, and time. Mental status is at baseline.      Motor: No weakness.   Psychiatric:         Mood and Affect: Mood normal.         Behavior: Behavior normal.         Thought Content: Thought content normal.         Judgment: Judgment normal.     I have " reexamined the patient and the results are consistent with the previously documented exam. Irena Barajas, APRN     Results from last 7 days   Lab Units 10/02/23  1246   WBC 10*3/mm3 3.98   NEUTROS ABS 10*3/mm3 2.36   HEMOGLOBIN g/dL 9.5*   HEMATOCRIT % 31.8*   PLATELETS 10*3/mm3 171     Results from last 7 days   Lab Units 10/02/23  1246   FERRITIN ng/mL 13.70   IRON mcg/dL 72   TIBC mcg/dL 468   Iron Sat 15%        No radiology results for the last 30 days.       Assessment & Plan     *Iron deficiency anemia  5/11/2021 iron saturation extremely low at 5%, TIBC elevated at 651, transferrin elevated at 437 consistent with iron deficiency  Ferritin low at 8.5 again consistent with iron deficiency  Patient reports being intolerant to oral iron  She has had severe nausea vomiting abdominal pain and constipation when she takes oral iron  Received Injectafer 750 mg IV x2 in July 2021  Iron studies from 1/17/2022 reviewed and suggestive of iron deficiency  Iron deficiency anemia could be secondary to ongoing menorrhagia versus GI bleed   Injectafer 750 mg IV x2 in February 2022  Continues to experience severe menorrhagia  Blood per rectum has resolved since constipation has improved  6/16/2022-hemoglobin normal at 12.2, iron studies normal.  12/15/2022 CBC reviewed with WBC 4.06, hemoglobin 11.4 and platelets 181,000  CMP reviewed and within normal limits  Iron saturation low at 6% with a TIBC of 493, ferritin low at 12.9  Patient unable to tolerate oral iron  Injectafer x2 1/2023.  3/16/2023: Felt that her symptoms improved after receiving Injectafer.  Unfortunately was diagnosed with COVID-19 3 weeks ago, and since then has had persistent fatigue.  Her iron studies today are adequate with iron saturation 27%, ferritin 130.9, TIBC 375.  She is currently not in need of iron replacement.  Recommended she follow-up with her PCP if fatigue persists.  10/2/2023 patient reviewed back today after PCP found iron sat to be  3% last week.  Repeat labs in our office show Hgb 9.5, ferritin 13, iron sat 15%.  Patient is intolerant to oral iron with significant GI upset.  We will pursue insurance approval for additional IV iron likely with Injectafer.    *Blood per rectum  Resolved since constipation has improved  She has not had a follow-up with gastroenterology.  Denies any bright red blood per rectum  Patient has been referred to GI by her PCP and needs baseline screening colonoscopy as she is 48 and has not had one reportedly.    *Hepatitis C-completed treatment with Epclusa.  Continue follow-up with gastroenterology    *?  SLE-CRP and ESR elevated.  Missed her appointment with rheumatology in December 2021    *Screening-missed her screening appointment in October 2021.  Screening mammogram not performed yet.    *Menorrhagia-she is having heavy menstrual cycles again.  Has not undergone uterine ablation yet.    *Morbid obesity-BMI  Body mass index is 34.3 kg/m².    *IV drug abuse-she has been sober for over 3 years now.    PLAN:   Plan to bring patient back later this week for IV iron, hopefully in the form of Injectafer x2 doses.  I will have her otherwise follow-up with Dr. Santiago in January 2024 with repeat CBC, CMP, ferritin, iron profile.  Patient states she is in the process of getting scheduled back with her GYN to get ablation which should reduce or possibly even eliminate her need for IV iron in the future.    I spent 36 minutes caring for Silvia on this date of service. This time includes time spent by me in the following activities: preparing for the visit, reviewing tests, obtaining and/or reviewing a separately obtained history, counseling and educating the patient/family/caregiver, ordering medications, tests, or procedures, referring and communicating with other health care professionals, documenting information in the medical record, and care coordination    Irena Barajas, APRN  10/02/23

## 2023-10-06 ENCOUNTER — INFUSION (OUTPATIENT)
Dept: ONCOLOGY | Facility: HOSPITAL | Age: 48
End: 2023-10-06
Payer: COMMERCIAL

## 2023-10-06 VITALS
OXYGEN SATURATION: 96 % | SYSTOLIC BLOOD PRESSURE: 105 MMHG | WEIGHT: 216 LBS | TEMPERATURE: 97.8 F | HEART RATE: 61 BPM | BODY MASS INDEX: 34.72 KG/M2 | HEIGHT: 66 IN | RESPIRATION RATE: 16 BRPM | DIASTOLIC BLOOD PRESSURE: 73 MMHG

## 2023-10-06 DIAGNOSIS — D50.0 IRON DEFICIENCY ANEMIA DUE TO CHRONIC BLOOD LOSS: Primary | ICD-10-CM

## 2023-10-06 DIAGNOSIS — T45.4X5A ADVERSE EFFECT OF IRON, INITIAL ENCOUNTER: ICD-10-CM

## 2023-10-06 PROCEDURE — 63710000001 PROCHLORPERAZINE MALEATE PER 5 MG: Performed by: NURSE PRACTITIONER

## 2023-10-06 PROCEDURE — 25010000002 FERRIC CARBOXYMALTOSE 750 MG/15ML SOLUTION 15 ML VIAL: Performed by: NURSE PRACTITIONER

## 2023-10-06 PROCEDURE — 25810000003 SODIUM CHLORIDE 0.9 % SOLUTION: Performed by: NURSE PRACTITIONER

## 2023-10-06 PROCEDURE — 96374 THER/PROPH/DIAG INJ IV PUSH: CPT

## 2023-10-06 RX ORDER — SODIUM CHLORIDE 9 MG/ML
250 INJECTION, SOLUTION INTRAVENOUS ONCE
Status: COMPLETED | OUTPATIENT
Start: 2023-10-06 | End: 2023-10-06

## 2023-10-06 RX ORDER — PROCHLORPERAZINE MALEATE 5 MG/1
10 TABLET ORAL ONCE
Status: COMPLETED | OUTPATIENT
Start: 2023-10-06 | End: 2023-10-06

## 2023-10-06 RX ADMIN — FERRIC CARBOXYMALTOSE INJECTION 750 MG: 50 INJECTION, SOLUTION INTRAVENOUS at 13:37

## 2023-10-06 RX ADMIN — SODIUM CHLORIDE 250 ML: 9 INJECTION, SOLUTION INTRAVENOUS at 13:12

## 2023-10-06 RX ADMIN — PROCHLORPERAZINE MALEATE 10 MG: 5 TABLET ORAL at 13:12

## 2023-10-11 ENCOUNTER — TELEPHONE (OUTPATIENT)
Dept: ONCOLOGY | Facility: CLINIC | Age: 48
End: 2023-10-11
Payer: COMMERCIAL

## 2023-10-11 NOTE — TELEPHONE ENCOUNTER
Caller: Silvia Clinton    Relationship to patient: Self    Best call back number: [phone]    Chief complaint: PT CALLED TO RESCHEDULE IF POSSIBLE     Type of visit: INFUSION     Requested date: JUST WANTED TO COME IN EARLIER SAME DAY       If rescheduling, when is the original appointment: 10-13-23

## 2023-10-11 NOTE — TELEPHONE ENCOUNTER
Caller: Gigi Clinton    Relationship: Self    Best call back number: [phone]    What is the best time to reach you: ANYTIME     Who are you requesting to speak with (clinical staff, provider,  specific staff member): SCHEDULING     What was the call regarding: GIGI CALLING BACK TO SEE IF INFUSION APPT WILL BE MOVED TO 9    Is it okay if the provider responds through MyChart: N/A

## 2023-10-13 ENCOUNTER — INFUSION (OUTPATIENT)
Dept: ONCOLOGY | Facility: HOSPITAL | Age: 48
End: 2023-10-13
Payer: COMMERCIAL

## 2023-10-13 VITALS
OXYGEN SATURATION: 96 % | HEIGHT: 66 IN | BODY MASS INDEX: 35.24 KG/M2 | WEIGHT: 219.3 LBS | TEMPERATURE: 97.5 F | RESPIRATION RATE: 16 BRPM | DIASTOLIC BLOOD PRESSURE: 91 MMHG | HEART RATE: 68 BPM | SYSTOLIC BLOOD PRESSURE: 123 MMHG

## 2023-10-13 DIAGNOSIS — D50.0 IRON DEFICIENCY ANEMIA DUE TO CHRONIC BLOOD LOSS: Primary | ICD-10-CM

## 2023-10-13 DIAGNOSIS — T45.4X5A ADVERSE EFFECT OF IRON, INITIAL ENCOUNTER: ICD-10-CM

## 2023-10-13 PROCEDURE — 25010000002 FERRIC CARBOXYMALTOSE 750 MG/15ML SOLUTION 15 ML VIAL: Performed by: NURSE PRACTITIONER

## 2023-10-13 PROCEDURE — 25810000003 SODIUM CHLORIDE 0.9 % SOLUTION: Performed by: NURSE PRACTITIONER

## 2023-10-13 PROCEDURE — 96374 THER/PROPH/DIAG INJ IV PUSH: CPT

## 2023-10-13 PROCEDURE — 63710000001 PROCHLORPERAZINE MALEATE PER 5 MG: Performed by: NURSE PRACTITIONER

## 2023-10-13 RX ORDER — PROCHLORPERAZINE MALEATE 5 MG/1
10 TABLET ORAL ONCE
Status: COMPLETED | OUTPATIENT
Start: 2023-10-13 | End: 2023-10-13

## 2023-10-13 RX ORDER — SODIUM CHLORIDE 9 MG/ML
250 INJECTION, SOLUTION INTRAVENOUS ONCE
Status: COMPLETED | OUTPATIENT
Start: 2023-10-13 | End: 2023-10-13

## 2023-10-13 RX ADMIN — PROCHLORPERAZINE MALEATE 10 MG: 5 TABLET ORAL at 09:22

## 2023-10-13 RX ADMIN — FERRIC CARBOXYMALTOSE INJECTION 750 MG: 50 INJECTION, SOLUTION INTRAVENOUS at 09:31

## 2023-10-13 RX ADMIN — SODIUM CHLORIDE 250 ML: 9 INJECTION, SOLUTION INTRAVENOUS at 09:31

## 2023-10-19 ENCOUNTER — OFFICE VISIT (OUTPATIENT)
Dept: CARDIOLOGY | Facility: CLINIC | Age: 48
End: 2023-10-19
Payer: COMMERCIAL

## 2023-10-19 ENCOUNTER — TELEPHONE (OUTPATIENT)
Dept: CARDIOLOGY | Facility: CLINIC | Age: 48
End: 2023-10-19
Payer: COMMERCIAL

## 2023-10-19 VITALS
HEIGHT: 66 IN | DIASTOLIC BLOOD PRESSURE: 64 MMHG | WEIGHT: 225.6 LBS | SYSTOLIC BLOOD PRESSURE: 110 MMHG | BODY MASS INDEX: 36.26 KG/M2 | HEART RATE: 65 BPM

## 2023-10-19 DIAGNOSIS — I10 ESSENTIAL HYPERTENSION: Primary | ICD-10-CM

## 2023-10-19 DIAGNOSIS — F17.200 CURRENT EVERY DAY SMOKER: ICD-10-CM

## 2023-10-19 PROCEDURE — 93000 ELECTROCARDIOGRAM COMPLETE: CPT | Performed by: NURSE PRACTITIONER

## 2023-10-19 PROCEDURE — 99214 OFFICE O/P EST MOD 30 MIN: CPT | Performed by: NURSE PRACTITIONER

## 2023-10-19 RX ORDER — LOSARTAN POTASSIUM 100 MG/1
50 TABLET ORAL 2 TIMES DAILY
Qty: 30 TABLET | Refills: 11 | Status: SHIPPED | OUTPATIENT
Start: 2023-10-19 | End: 2023-10-19 | Stop reason: SDUPTHER

## 2023-10-19 RX ORDER — AMLODIPINE BESYLATE 10 MG/1
5 TABLET ORAL 2 TIMES DAILY
Qty: 30 TABLET | Refills: 11 | Status: SHIPPED | OUTPATIENT
Start: 2023-10-19 | End: 2023-10-19 | Stop reason: SDUPTHER

## 2023-10-19 RX ORDER — AMLODIPINE BESYLATE 10 MG/1
5 TABLET ORAL 2 TIMES DAILY
Qty: 30 TABLET | Refills: 0 | Status: SHIPPED | OUTPATIENT
Start: 2023-10-19

## 2023-10-19 RX ORDER — LOSARTAN POTASSIUM 100 MG/1
50 TABLET ORAL 2 TIMES DAILY
Qty: 30 TABLET | Refills: 0 | Status: SHIPPED | OUTPATIENT
Start: 2023-10-19

## 2023-10-19 RX ORDER — METOPROLOL TARTRATE 100 MG/1
100 TABLET ORAL 2 TIMES DAILY
Qty: 60 TABLET | Refills: 0 | Status: SHIPPED | OUTPATIENT
Start: 2023-10-19

## 2023-10-19 RX ORDER — METOPROLOL TARTRATE 100 MG/1
100 TABLET ORAL 2 TIMES DAILY
Qty: 60 TABLET | Refills: 11 | Status: SHIPPED | OUTPATIENT
Start: 2023-10-19 | End: 2023-10-19 | Stop reason: SDUPTHER

## 2023-10-19 RX ORDER — CLONIDINE HYDROCHLORIDE 0.1 MG/1
0.1 TABLET ORAL 2 TIMES DAILY
COMMUNITY

## 2023-10-19 NOTE — TELEPHONE ENCOUNTER
I received a call from this patient while on call today.  She was seen in the office today.  She states that her blood pressure was 140/107 with heart rate 107.  She said that this reading made her feel terrible and she felt like she was having a heart attack.    She states every few years she has issues with her blood pressure spiking and feeling terrible.    This morning she had similar blood pressure reading and took 10 mg amlodipine, 100 mg losartan, and 100 mg metoprolol tartrate.    After she left our office today she started feeling bad and her blood pressure was again 140s/100s with heart rate low 100s.  She took an additional 100 mg metoprolol tartrate, an additional 10 mg amlodipine, and a 0.1 mg clonidine.    I told her that this blood pressure reading in itself was not enough to make me worried.  However she did report that she felt terrible, had chest pain, and states she could not live like this; based on this I have recommended she be evaluated in an emergency room.  We also discussed that clonidine when taken intermittently can cause some rebound hypertension.  She states this is the first though she is taking in a couple weeks.    Thanks!  SYLWIA Cruz

## 2023-10-19 NOTE — PROGRESS NOTES
Date of Office Visit: 10/19/2023  Encounter Provider: SYLWIA Zapien  Place of Service: [facility] CARDIOLOGY  Patient Name: Silvia Clinton  :1975    No chief complaint on file.  : ER follow-up    HPI: Silvia Clinton is a 48 y.o. female who is a patient of  Dr. Chavis.  She is new to me today and was last seen in our office 2021.  She has a history of hypertension, Bipolar, chronic pain syndrome, polysubstance abuse, hepatitis C, GERD, tobacco abuse and sinus tachycardia.  In 2019, patient presented with chest pain.  She underwent stress test that was normal.  She was discharged on ZIO which apparently she had issues with it falling off.     She saw Dr. Chavis in office 2020 with multiple complaints of palpitations, lightheadedness, elevated blood pressure and just not feeling well.  Metoprolol tartrate  increased to 50mg BID.  Ultimately, metoprolol was increased to 100mg BID.      She was then placed on losartan BID for better blood pressure control and reportedly was doing well.  Appears the last contact with our office was 10/18/2021 at which time, patient had placed herself back on amlodipine BID and requested a refill.  She noted no edema at that time.      Patient presented to [facility] ED with complaints of chest pain/ tightness with associated nausea, dyspnea and blurred vision  on 10/17/2023.  Troponin negative.  BNP normal.  CXR unremarkable. EKG showed sinus tachycardia with non-specific ST-T wave changes.      Patient presents today as she was recently seen in the emergency department.  Cardiac work-up was negative.  EKG today shows no change from EKG in 2023.  No chest pain at this time.  Blood pressure well controlled.  Patient has been dosing her own medications.  She states that she took the following antihypertensives this morning metoprolol tartrate 100 mg, amlodipine 10 mg and losartan 100 mg.  She normally splits  her amlodipine 5 mg twice daily and losartan 50 mg twice daily.  She has no refills and needs refills on her medications.  She has no lower extremity edema.  She appears euvolemic.    Previous testing and notes have been reviewed by me.   Past Medical History:   Diagnosis Date    Chest pain     Depression     Drug abstinence syndrome     Drug abuse     Ex-smoker     Heart attack     Hepatitis C     Hepatitis C     Hypertension     Kidney stone     Lupus     Lupus (systemic lupus erythematosus)     Mitral valve anterior leaflet prolapse     NSTEMI (non-ST elevated myocardial infarction)     Otitis     Palpitations     Seizures     Spinal epidural abscess     Tachycardia        Past Surgical History:   Procedure Laterality Date    BACK SURGERY      CHOLECYSTECTOMY      LEG SURGERY      broke tibula,fibula, steel noah    LIVER BIOPSY      LUMBAR LAMINECTOMY DISCECTOMY DECOMPRESSION Left 12/9/2018    Procedure: Posterior lumbar three through sacrum decompression;  Surgeon: Tevin Whitley MD;  Location: [facility];  Service: Neurosurgery    LYMPH NODE BIOPSY      SPINE SURGERY         Social History     Socioeconomic History    Marital status: Legally    Tobacco Use    Smoking status: Former     Packs/day: 1.00     Years: 30.00     Additional pack years: 0.00     Total pack years: 30.00     Types: Cigarettes     Quit date: 8/26/2020     Years since quitting: 3.1    Smokeless tobacco: Never    Tobacco comments:     E-CIG USE   Vaping Use    Vaping Use: Every day    Start date: 7/1/2020    Substances: Nicotine, Flavoring    Devices: Pre-filled or refillable cartridge, Refillable tank   Substance and Sexual Activity    Alcohol use: Not Currently     Comment: CAFFEINE USE: 1 CUP COFFEE/ 2 COKES DAILY    Drug use: Not Currently     Types: IV, Heroin, Methamphetamines     Comment: pt states she no longer uses heroin, she now uses meth    Sexual activity: Not Currently       Family History   Problem Relation Age  "of Onset    Bipolar disorder Mother     Diabetes Mother     Hypertension Mother     No Known Problems Father     No Known Problems Sister     No Known Problems Brother     Nephrolithiasis Maternal Grandmother     Bipolar disorder Maternal Grandmother     Breast cancer Maternal Aunt     Liver cancer Maternal Uncle     Brain cancer Maternal Uncle     Lung cancer Maternal Uncle     Leukemia Cousin     Colon cancer Neg Hx     Cervical cancer Neg Hx     Uterine cancer Neg Hx     Ovarian cancer Neg Hx     Thyroid cancer Neg Hx        Review of Systems   Constitutional: Negative.   HENT: Negative.     Eyes: Negative.    Cardiovascular: Negative.    Respiratory: Negative.     Endocrine: Negative.    Hematologic/Lymphatic: Negative.    Skin: Negative.    Musculoskeletal: Negative.    Gastrointestinal: Negative.    Genitourinary: Negative.    Neurological: Negative.    Psychiatric/Behavioral: Negative.     Allergic/Immunologic: Negative.        Allergies   Allergen Reactions    Florence Hives     \"FELT LIKE BURNING, HEAT AND SWELLING\"    Sulfa Antibiotics Nausea And Vomiting and Swelling     Patient states when she took Sulfa medication, her throat started to swell.  nausea         Current Outpatient Medications:     acetaminophen (TYLENOL) 500 MG tablet, Take 1 tablet by mouth Every 6 (Six) Hours As Needed for Mild Pain ., Disp: 60 tablet, Rfl: 1    amLODIPine (NORVASC) 10 MG tablet, Take 0.5 tablets by mouth 2 (Two) Times a Day., Disp: 30 tablet, Rfl: 0    buprenorphine-naloxone (SUBOXONE) 8-2 MG per SL tablet, Place 1 tablet under the tongue 2 (Two) Times a Day., Disp: , Rfl:     ciclopirox (Penlac) 8 % solution, Apply  topically to the appropriate area as directed Every Night., Disp: 1 each, Rfl: 1    cloNIDine (CATAPRES) 0.1 MG tablet, Take 1 tablet by mouth 2 (Two) Times a Day., Disp: , Rfl:     fluticasone (FLONASE) 50 MCG/ACT nasal spray, 2 sprays into the nostril(s) as directed by provider Daily., Disp: 18.2 mL, " "Rfl: 1    hydrocortisone 1 % cream, Apply 1 application topically to the appropriate area as directed 2 (Two) Times a Day., Disp: 1 each, Rfl: 1    loratadine (CLARITIN) 10 MG tablet, Take 1 tablet by mouth Daily., Disp: 90 tablet, Rfl: 1    losartan (Cozaar) 100 MG tablet, Take 0.5 tablets by mouth 2 (Two) Times a Day. Pt is taking half BID, Disp: 30 tablet, Rfl: 0    methocarbamol (ROBAXIN) 500 MG tablet, TAKE 1 TABLET BY MOUTH 3 TIMES A DAY AS NEEDED FOR MUSCLE SPASMS., Disp: 90 tablet, Rfl: 0    metoprolol tartrate (LOPRESSOR) 100 MG tablet, Take 1 tablet by mouth 2 (Two) Times a Day., Disp: 60 tablet, Rfl: 0    naloxone (NARCAN) 4 MG/0.1ML nasal spray, , Disp: , Rfl:     norethindrone (MICRONOR) 0.35 MG tablet, Take 1 tablet by mouth Daily., Disp: 28 tablet, Rfl: 12    nystatin-triamcinolone (MYCOLOG II) 093210-1.1 UNIT/GM-% cream, Apply 1 application  topically to the appropriate area as directed 2 (Two) Times a Day. Use for 2 weeks., Disp: 60 g, Rfl: 0    OLANZapine (zyPREXA) 15 MG tablet, TAKE 1 TABLET BY MOUTH EVERY DAY AT NIGHT, Disp: 90 tablet, Rfl: 1    ondansetron ODT (ZOFRAN-ODT) 8 MG disintegrating tablet, Place 1 tablet on the tongue Every 8 (Eight) Hours As Needed for Nausea or Vomiting., Disp: 20 tablet, Rfl: 0    valACYclovir (VALTREX) 500 MG tablet, Take 2 tablets by mouth 2 (Two) Times a Day., Disp: 30 tablet, Rfl: 2    venlafaxine XR (EFFEXOR-XR) 150 MG 24 hr capsule, TAKE 1 CAPSULE BY MOUTH EVERY DAY, Disp: 90 capsule, Rfl: 1    omeprazole (priLOSEC) 20 MG capsule, Take 1 capsule by mouth Daily. (Patient not taking: Reported on 10/19/2023), Disp: 90 capsule, Rfl: 3    sucralfate (CARAFATE) 1 g tablet, Take 1 tablet by mouth 3 (Three) Times a Day Before Meals. (Patient not taking: Reported on 10/19/2023), Disp: 90 tablet, Rfl: 1      Objective:     Vitals:    10/19/23 0903   BP: 110/64   Pulse: 65   Weight: 102 kg (225 lb 9.6 oz)   Height: 167.6 cm (65.98\")     Body mass index is 36.43 " kg/m².    PHYSICAL EXAM:    Constitutional:       Appearance: Healthy appearance. Not in distress.   Neck:      Vascular: No JVR. JVD normal.   Pulmonary:      Effort: Pulmonary effort is normal.      Breath sounds: Normal breath sounds. No wheezing. No rhonchi. No rales.   Chest:      Chest wall: Not tender to palpatation.   Cardiovascular:      PMI at left midclavicular line. Normal rate. Regular rhythm. Normal S1. Normal S2.       Murmurs: There is no murmur.      No gallop.  No click. No rub.   Pulses:     Intact distal pulses.   Edema:     Peripheral edema absent.   Abdominal:      General: Bowel sounds are normal.      Palpations: Abdomen is soft.      Tenderness: There is no abdominal tenderness.   Musculoskeletal: Normal range of motion.         General: No tenderness. Skin:     General: Skin is warm and dry.   Neurological:      General: No focal deficit present.      Mental Status: Alert and oriented to person, place and time.           ECG 12 Lead    Date/Time: 10/19/2023 9:45 AM  Performed by: Oneida Purvis APRN    Authorized by: Oneida Purvis APRN  Comparison: compared with previous ECG from 3/31/2023  Similar to previous ECG  Rhythm: sinus rhythm  Rate: normal  BPM: 65  QRS axis: normal  Other findings: early repolarization            Assessment:       Diagnosis Plan   1. Essential hypertension  amLODIPine (NORVASC) 10 MG tablet    metoprolol tartrate (LOPRESSOR) 100 MG tablet      2. Current every day smoker          No orders of the defined types were placed in this encounter.         Plan:       Hypertension: controlled  Bipolar: follows with psych  Polysubstance abuse: h/o of cocaine, amphetamines, patient notes that she had been clean from heroin x 5yrs and is taking suboxone    On the way out to check out, patient asked for a note for yesterday's visit to the emergency department.  When I refused to give her note, she stated that there is no need to make a follow-up appointment and  she will find another cardiologist.  I refilled her medications for this month and put 0 refills, as I had previously explained the importance of following with a cardiologist for blood pressure control when on more than 2 antihypertensives.         Your medication list            Accurate as of October 19, 2023  9:44 AM. If you have any questions, ask your nurse or doctor.                CHANGE how you take these medications        Instructions Last Dose Given Next Dose Due   amLODIPine 10 MG tablet  Commonly known as: NORVASC  What changed:   how much to take  when to take this  Changed by: SYLWIA Zapien      Take 0.5 tablets by mouth 2 (Two) Times a Day.       losartan 100 MG tablet  Commonly known as: Cozaar  What changed:   how much to take  when to take this  additional instructions  Changed by: SYLWIA Zapien      Take 0.5 tablets by mouth 2 (Two) Times a Day. Pt is taking half BID              CONTINUE taking these medications        Instructions Last Dose Given Next Dose Due   acetaminophen 500 MG tablet  Commonly known as: TYLENOL      Take 1 tablet by mouth Every 6 (Six) Hours As Needed for Mild Pain .       buprenorphine-naloxone 8-2 MG per SL tablet  Commonly known as: SUBOXONE      Place 1 tablet under the tongue 2 (Two) Times a Day.       ciclopirox 8 % solution  Commonly known as: Penlac      Apply  topically to the appropriate area as directed Every Night.       cloNIDine 0.1 MG tablet  Commonly known as: CATAPRES      Take 1 tablet by mouth 2 (Two) Times a Day.       fluticasone 50 MCG/ACT nasal spray  Commonly known as: FLONASE      2 sprays into the nostril(s) as directed by provider Daily.       hydrocortisone 1 % cream      Apply 1 application topically to the appropriate area as directed 2 (Two) Times a Day.       loratadine 10 MG tablet  Commonly known as: CLARITIN      Take 1 tablet by mouth Daily.       methocarbamol 500 MG tablet  Commonly known as: ROBAXIN      TAKE 1  TABLET BY MOUTH 3 TIMES A DAY AS NEEDED FOR MUSCLE SPASMS.       metoprolol tartrate 100 MG tablet  Commonly known as: LOPRESSOR      Take 1 tablet by mouth 2 (Two) Times a Day.       naloxone 4 MG/0.1ML nasal spray  Commonly known as: NARCAN           norethindrone 0.35 MG tablet  Commonly known as: MICRONOR      Take 1 tablet by mouth Daily.       nystatin-triamcinolone 889403-9.1 UNIT/GM-% cream  Commonly known as: MYCOLOG II      Apply 1 application  topically to the appropriate area as directed 2 (Two) Times a Day. Use for 2 weeks.       OLANZapine 15 MG tablet  Commonly known as: zyPREXA      TAKE 1 TABLET BY MOUTH EVERY DAY AT NIGHT       omeprazole 20 MG capsule  Commonly known as: priLOSEC      Take 1 capsule by mouth Daily.       ondansetron ODT 8 MG disintegrating tablet  Commonly known as: ZOFRAN-ODT      Place 1 tablet on the tongue Every 8 (Eight) Hours As Needed for Nausea or Vomiting.       sucralfate 1 g tablet  Commonly known as: CARAFATE      Take 1 tablet by mouth 3 (Three) Times a Day Before Meals.       valACYclovir 500 MG tablet  Commonly known as: VALTREX      Take 2 tablets by mouth 2 (Two) Times a Day.       venlafaxine  MG 24 hr capsule  Commonly known as: EFFEXOR-XR      TAKE 1 CAPSULE BY MOUTH EVERY DAY                 Where to Get Your Medications        These medications were sent to [id number] - [place], [place] - [address] - [phone]  - [phone] [address], [place] [address]      Phone: [phone]   amLODIPine 10 MG tablet  losartan 100 MG tablet  metoprolol tartrate 100 MG tablet           As always, it has been a pleasure to participate in your patient's care.      Sincerely,       SYLWIA Griffiths

## 2023-11-10 ENCOUNTER — TELEMEDICINE (OUTPATIENT)
Dept: INTERNAL MEDICINE | Facility: CLINIC | Age: 48
End: 2023-11-10
Payer: COMMERCIAL

## 2023-11-10 DIAGNOSIS — M54.42 CHRONIC BILATERAL LOW BACK PAIN WITH LEFT-SIDED SCIATICA: ICD-10-CM

## 2023-11-10 DIAGNOSIS — G47.19 EXCESSIVE DAYTIME SLEEPINESS: ICD-10-CM

## 2023-11-10 DIAGNOSIS — F41.9 ANXIETY: Chronic | ICD-10-CM

## 2023-11-10 DIAGNOSIS — G89.29 CHRONIC BILATERAL LOW BACK PAIN WITH LEFT-SIDED SCIATICA: ICD-10-CM

## 2023-11-10 DIAGNOSIS — R07.9 CHEST PAIN, UNSPECIFIED TYPE: ICD-10-CM

## 2023-11-10 DIAGNOSIS — R06.83 SNORING: ICD-10-CM

## 2023-11-10 DIAGNOSIS — Z09 HOSPITAL DISCHARGE FOLLOW-UP: Primary | ICD-10-CM

## 2023-11-10 DIAGNOSIS — E87.6 HYPOKALEMIA: ICD-10-CM

## 2023-11-10 RX ORDER — VENLAFAXINE HYDROCHLORIDE 150 MG/1
150 CAPSULE, EXTENDED RELEASE ORAL DAILY
Qty: 90 CAPSULE | Refills: 1 | Status: SHIPPED | OUTPATIENT
Start: 2023-11-10

## 2023-11-10 RX ORDER — METHOCARBAMOL 500 MG/1
500 TABLET, FILM COATED ORAL 3 TIMES DAILY PRN
Qty: 30 TABLET | Refills: 0 | Status: SHIPPED | OUTPATIENT
Start: 2023-11-10
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
## 2023-11-16 NOTE — PLAN OF CARE
Problem: Patient Care Overview  Goal: Plan of Care Review   12/14/18 6312   Coping/Psychosocial   Plan of Care Reviewed With patient   OTHER   Outcome Summary pt agreeable PT this date with nsg encouragement, ambulated 90ft CGA rwx, antalgic gait. pt also assisted to BSC, urinated. pt educated on figure 4 stretching for R sciatic pain. will cont to progress as ritu. pt anxious at times.           Quality 431: Preventive Care And Screening: Unhealthy Alcohol Use - Screening: Patient screened for unhealthy alcohol use using a single question and scores less than 2 times per year Quality 226: Preventive Care And Screening: Tobacco Use: Screening And Cessation Intervention: Patient screened for tobacco use and is an ex/non-smoker Quality 130: Documentation Of Current Medications In The Medical Record: Current Medications Documented Detail Level: Detailed

## 2023-12-08 DIAGNOSIS — M54.42 CHRONIC BILATERAL LOW BACK PAIN WITH LEFT-SIDED SCIATICA: ICD-10-CM

## 2023-12-08 DIAGNOSIS — G89.29 CHRONIC BILATERAL LOW BACK PAIN WITH LEFT-SIDED SCIATICA: ICD-10-CM

## 2023-12-08 RX ORDER — METHOCARBAMOL 500 MG/1
500 TABLET, FILM COATED ORAL 3 TIMES DAILY PRN
Qty: 30 TABLET | Refills: 0 | Status: SHIPPED | OUTPATIENT
Start: 2023-12-08

## 2023-12-08 NOTE — TELEPHONE ENCOUNTER
Caller: Silvia Clinton CM    Relationship: Self    Best call back number: [phone]*    Requested Prescriptions:   Requested Prescriptions     Pending Prescriptions Disp Refills    methocarbamol (ROBAXIN) 500 MG tablet 30 tablet 0     Sig: Take 1 tablet by mouth 3 (Three) Times a Day As Needed for Muscle Spasms.        Pharmacy where request should be sent: [id number] [address] [phone] [facility] [phone]      Last office visit with prescribing clinician: 9/27/2023   Last telemedicine visit with prescribing clinician: 11/10/2023   Next office visit with prescribing clinician: Visit date not found     Additional details provided by patient: PATIENT OUT OF MEDICATION AND REQUESTING A 30-DAY REFILL.     Does the patient have less than a 3 day supply:  [x] Yes  [] No    Would you like a call back once the refill request has been completed: [x] Yes [] No    If the office needs to give you a call back, can they leave a voicemail: [x] Yes [] No    Mariza Lucia   12/08/23 15:29 EST

## 2023-12-21 ENCOUNTER — OFFICE VISIT (OUTPATIENT)
Dept: SLEEP MEDICINE | Facility: HOSPITAL | Age: 48
End: 2023-12-21
Payer: COMMERCIAL

## 2023-12-21 VITALS
HEART RATE: 68 BPM | SYSTOLIC BLOOD PRESSURE: 117 MMHG | BODY MASS INDEX: 36.16 KG/M2 | HEIGHT: 66 IN | OXYGEN SATURATION: 97 % | WEIGHT: 225 LBS | DIASTOLIC BLOOD PRESSURE: 80 MMHG

## 2023-12-21 DIAGNOSIS — Z72.820 SLEEP DEFICIENT: ICD-10-CM

## 2023-12-21 DIAGNOSIS — F41.9 ANXIETY: ICD-10-CM

## 2023-12-21 DIAGNOSIS — G47.19 EXCESSIVE DAYTIME SLEEPINESS: ICD-10-CM

## 2023-12-21 DIAGNOSIS — R29.818 SUSPECTED SLEEP APNEA: Primary | ICD-10-CM

## 2023-12-21 DIAGNOSIS — F32.A DEPRESSION, UNSPECIFIED DEPRESSION TYPE: ICD-10-CM

## 2023-12-21 DIAGNOSIS — R06.83 SNORING: ICD-10-CM

## 2023-12-21 DIAGNOSIS — R06.81 WITNESSED EPISODE OF APNEA: ICD-10-CM

## 2023-12-21 DIAGNOSIS — Z72.821 INADEQUATE SLEEP HYGIENE: ICD-10-CM

## 2023-12-21 PROCEDURE — G0463 HOSPITAL OUTPT CLINIC VISIT: HCPCS
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
## 2023-12-28 ENCOUNTER — HOSPITAL ENCOUNTER (OUTPATIENT)
Dept: SLEEP MEDICINE | Facility: HOSPITAL | Age: 48
End: 2023-12-28
Payer: COMMERCIAL

## 2023-12-28 DIAGNOSIS — R29.818 SUSPECTED SLEEP APNEA: ICD-10-CM

## 2023-12-28 DIAGNOSIS — R06.81 WITNESSED EPISODE OF APNEA: ICD-10-CM

## 2023-12-28 DIAGNOSIS — R06.83 SNORING: ICD-10-CM

## 2023-12-28 DIAGNOSIS — G47.19 EXCESSIVE DAYTIME SLEEPINESS: ICD-10-CM

## 2023-12-28 PROCEDURE — 95806 SLEEP STUDY UNATT&RESP EFFT: CPT

## 2023-12-29 DIAGNOSIS — F41.9 ANXIETY: ICD-10-CM

## 2023-12-29 DIAGNOSIS — G47.33 OBSTRUCTIVE SLEEP APNEA, ADULT: Primary | ICD-10-CM

## 2023-12-29 PROCEDURE — 95806 SLEEP STUDY UNATT&RESP EFFT: CPT | Performed by: FAMILY MEDICINE

## 2024-01-04 ENCOUNTER — OFFICE VISIT (OUTPATIENT)
Dept: ONCOLOGY | Facility: CLINIC | Age: 49
End: 2024-01-04
Payer: COMMERCIAL

## 2024-01-04 ENCOUNTER — LAB (OUTPATIENT)
Dept: OTHER | Facility: HOSPITAL | Age: 49
End: 2024-01-04
Payer: COMMERCIAL

## 2024-01-04 VITALS
HEART RATE: 72 BPM | TEMPERATURE: 98 F | SYSTOLIC BLOOD PRESSURE: 126 MMHG | HEIGHT: 66 IN | DIASTOLIC BLOOD PRESSURE: 79 MMHG | OXYGEN SATURATION: 94 % | RESPIRATION RATE: 16 BRPM | WEIGHT: 231.5 LBS | BODY MASS INDEX: 37.21 KG/M2

## 2024-01-04 DIAGNOSIS — D50.0 IRON DEFICIENCY ANEMIA DUE TO CHRONIC BLOOD LOSS: Primary | ICD-10-CM

## 2024-01-04 DIAGNOSIS — D50.0 IRON DEFICIENCY ANEMIA DUE TO CHRONIC BLOOD LOSS: ICD-10-CM

## 2024-01-04 DIAGNOSIS — T45.4X5A ADVERSE EFFECT OF IRON, INITIAL ENCOUNTER: ICD-10-CM

## 2024-01-04 LAB
ALBUMIN SERPL-MCNC: 4.2 G/DL (ref 3.5–5.2)
ALBUMIN/GLOB SERPL: 1.3 G/DL
ALP SERPL-CCNC: 86 U/L (ref 39–117)
ALT SERPL W P-5'-P-CCNC: 21 U/L (ref 1–33)
ANION GAP SERPL CALCULATED.3IONS-SCNC: 13.1 MMOL/L (ref 5–15)
AST SERPL-CCNC: 19 U/L (ref 1–32)
BASOPHILS # BLD AUTO: 0.02 10*3/MM3 (ref 0–0.2)
BASOPHILS NFR BLD AUTO: 0.4 % (ref 0–1.5)
BILIRUB SERPL-MCNC: 0.3 MG/DL (ref 0–1.2)
BUN SERPL-MCNC: 10 MG/DL (ref 6–20)
BUN/CREAT SERPL: 16.9 (ref 7–25)
CALCIUM SPEC-SCNC: 9.4 MG/DL (ref 8.6–10.5)
CHLORIDE SERPL-SCNC: 100 MMOL/L (ref 98–107)
CO2 SERPL-SCNC: 26.9 MMOL/L (ref 22–29)
CREAT SERPL-MCNC: 0.59 MG/DL (ref 0.57–1)
DEPRECATED RDW RBC AUTO: 50.9 FL (ref 37–54)
EGFRCR SERPLBLD CKD-EPI 2021: 112.7 ML/MIN/1.73
EOSINOPHIL # BLD AUTO: 0.12 10*3/MM3 (ref 0–0.4)
EOSINOPHIL NFR BLD AUTO: 2.5 % (ref 0.3–6.2)
ERYTHROCYTE [DISTWIDTH] IN BLOOD BY AUTOMATED COUNT: 15.1 % (ref 12.3–15.4)
FERRITIN SERPL-MCNC: 89.3 NG/ML (ref 13–150)
GLOBULIN UR ELPH-MCNC: 3.2 GM/DL
GLUCOSE SERPL-MCNC: 120 MG/DL (ref 65–99)
HCT VFR BLD AUTO: 38.4 % (ref 34–46.6)
HGB BLD-MCNC: 12.4 G/DL (ref 12–15.9)
IMM GRANULOCYTES # BLD AUTO: 0.01 10*3/MM3 (ref 0–0.05)
IMM GRANULOCYTES NFR BLD AUTO: 0.2 % (ref 0–0.5)
IRON 24H UR-MRATE: 78 MCG/DL (ref 37–145)
IRON SATN MFR SERPL: 20 % (ref 20–50)
LYMPHOCYTES # BLD AUTO: 1.42 10*3/MM3 (ref 0.7–3.1)
LYMPHOCYTES NFR BLD AUTO: 29.2 % (ref 19.6–45.3)
MCH RBC QN AUTO: 30 PG (ref 26.6–33)
MCHC RBC AUTO-ENTMCNC: 32.3 G/DL (ref 31.5–35.7)
MCV RBC AUTO: 93 FL (ref 79–97)
MONOCYTES # BLD AUTO: 0.34 10*3/MM3 (ref 0.1–0.9)
MONOCYTES NFR BLD AUTO: 7 % (ref 5–12)
NEUTROPHILS NFR BLD AUTO: 2.95 10*3/MM3 (ref 1.7–7)
NEUTROPHILS NFR BLD AUTO: 60.7 % (ref 42.7–76)
NRBC BLD AUTO-RTO: 0 /100 WBC (ref 0–0.2)
PLATELET # BLD AUTO: 199 10*3/MM3 (ref 140–450)
PMV BLD AUTO: 11.4 FL (ref 6–12)
POTASSIUM SERPL-SCNC: 4.1 MMOL/L (ref 3.5–5.2)
PROT SERPL-MCNC: 7.4 G/DL (ref 6–8.5)
RBC # BLD AUTO: 4.13 10*6/MM3 (ref 3.77–5.28)
SODIUM SERPL-SCNC: 140 MMOL/L (ref 136–145)
TIBC SERPL-MCNC: 383 MCG/DL (ref 298–536)
TRANSFERRIN SERPL-MCNC: 257 MG/DL (ref 200–360)
WBC NRBC COR # BLD AUTO: 4.86 10*3/MM3 (ref 3.4–10.8)

## 2024-01-04 PROCEDURE — 80053 COMPREHEN METABOLIC PANEL: CPT | Performed by: NURSE PRACTITIONER

## 2024-01-04 PROCEDURE — 84466 ASSAY OF TRANSFERRIN: CPT | Performed by: NURSE PRACTITIONER

## 2024-01-04 PROCEDURE — 99214 OFFICE O/P EST MOD 30 MIN: CPT | Performed by: INTERNAL MEDICINE

## 2024-01-04 PROCEDURE — 82728 ASSAY OF FERRITIN: CPT | Performed by: NURSE PRACTITIONER

## 2024-01-04 PROCEDURE — 83540 ASSAY OF IRON: CPT | Performed by: NURSE PRACTITIONER

## 2024-01-04 PROCEDURE — 36415 COLL VENOUS BLD VENIPUNCTURE: CPT

## 2024-01-04 PROCEDURE — 85025 COMPLETE CBC W/AUTO DIFF WBC: CPT | Performed by: NURSE PRACTITIONER

## 2024-01-04 NOTE — PROGRESS NOTES
Subjective   Silvia Clinton is a 48 y.o. female.  Referred by Suhas Oseguera for iron deficiency anemia    History of Present Illness   Ms. Clinton is a 45-year-old premenopausal lady with history of hepatitis C, history of drug use, hypertension, SLE(she has not seen a rheumatologist), MRSA infection presents for further evaluation of anemia.She has had anemia since 2018.  Initially MCV was normal but subsequently noted to have low MCV with CBC on 7/8/2021 showing a hemoglobin of 8.9 and MCV of 73.3.  WBC count and platelet count has remained relatively normal except for some mild thrombocytopenia in 2019.  Per patient she has been anemic all her life since her teenage years.  She would take oral iron to help with the anemia and would have improvement.  After she was noted to be anemic she started oral iron .  Unfortunately she is not able to tolerate the iron supplements and she is having significant amount of nausea, abdominal pain, constipation to a point where she is not able to take the supplements.  She has not had menstrual cycles for 10 years when she was doing IV drugs but subsequently had menorrhagia for a year.  She has started using homeopathic medications for the past 2 months which helped with her heavy menstrual cycles and over the past 2 months she had fairly normal menstrual cycles.  She is scheduled to see OB/GYN next month.  She also reports bright red blood per rectum which has been going on for several months.  She has seen Dr. Steel.   She denies any malignancies in her immediate family.  Her maternal aunt had breast cancer.  Distant relatives with lung cancer and renal cancer.    She had COVID-19 infection in December 2021.  Received 2 doses of Injectafer in February 2022.    She was seen in October 2023 for worsening anemia and iron deficiency and received 2 doses of Injectafer in October 2023.    Interval History  Presents today for follow-up.  She reports feeling extremely tired.  She is  currently being evaluated for obstructive sleep apnea.  Continues to have fair amount of menorrhagia.  She plans to undergo uterine ablation soon.    Of note she has been referred by her PCP to GI for new evaluation and screening colonoscopy which she has yet to have and she is 48.    She denies other concerns at this time.    The following portions of the patient's history were reviewed and updated as appropriate: allergies, current medications, past family history, past medical history, past social history, past surgical history and problem list.    Past Medical History:   Diagnosis Date    Chest pain     Depression     Drug abstinence syndrome     Drug abuse     Ex-smoker     Heart attack     Hepatitis C     Hepatitis C     Hypertension     Kidney stone     Lupus     Lupus (systemic lupus erythematosus)     Mitral valve anterior leaflet prolapse     NSTEMI (non-ST elevated myocardial infarction)     Otitis     Palpitations     Seizures     Spinal epidural abscess     Tachycardia         Past Surgical History:   Procedure Laterality Date    BACK SURGERY      CHOLECYSTECTOMY      LEG SURGERY      broke tibula,fibula, steel noah    LIVER BIOPSY      LUMBAR LAMINECTOMY DISCECTOMY DECOMPRESSION Left 12/9/2018    Procedure: Posterior lumbar three through sacrum decompression;  Surgeon: Tevin Whitley MD;  Location: [facility];  Service: Neurosurgery    LYMPH NODE BIOPSY      SPINE SURGERY          Family History   Problem Relation Age of Onset    Bipolar disorder Mother     Diabetes Mother     Hypertension Mother     No Known Problems Father     No Known Problems Sister     No Known Problems Brother     Nephrolithiasis Maternal Grandmother     Bipolar disorder Maternal Grandmother     Breast cancer Maternal Aunt     Liver cancer Maternal Uncle     Brain cancer Maternal Uncle     Lung cancer Maternal Uncle     Leukemia Cousin     Colon cancer Neg Hx     Cervical cancer Neg Hx     Uterine cancer Neg Hx     Ovarian  "cancer Neg Hx     Thyroid cancer Neg Hx         Social History     Socioeconomic History    Marital status: Legally    Tobacco Use    Smoking status: Former     Packs/day: 1.00     Years: 30.00     Additional pack years: 0.00     Total pack years: 30.00     Types: Cigarettes     Quit date: 8/26/2020     Years since quitting: 3.3    Smokeless tobacco: Never    Tobacco comments:     E-CIG USE   Vaping Use    Vaping Use: Every day    Start date: 7/1/2020    Substances: Nicotine, Flavoring    Devices: Pre-filled or refillable cartridge, Refillable tank   Substance and Sexual Activity    Alcohol use: Not Currently     Comment: CAFFEINE USE: 1 CUP COFFEE/ 2 COKES DAILY    Drug use: Not Currently     Types: IV, Heroin, Methamphetamines     Comment: pt states she no longer uses heroin, she now uses meth    Sexual activity: Not Currently        OB History    No obstetric history on file.          Allergies   Allergen Reactions    [place] Hives     \"FELT LIKE BURNING, HEAT AND SWELLING\"    Sulfa Antibiotics Nausea And Vomiting and Swelling     Patient states when she took Sulfa medication, her throat started to swell.  nausea     Review of Systems   Constitutional:  Positive for fatigue.   HENT: Negative.     Eyes: Negative.    Respiratory: Negative.     Gastrointestinal:  Negative for abdominal pain, blood in stool, constipation and nausea.   Endocrine: Negative.    Genitourinary:  Positive for menstrual problem.   Musculoskeletal: Negative.    Allergic/Immunologic: Negative.    Neurological: Negative.    Hematological: Negative.    Psychiatric/Behavioral: Negative.       Review of systems as mentioned in the HPI otherwise negative    Objective   Blood pressure 126/79, pulse 72, temperature 98 °F (36.7 °C), temperature source Temporal, resp. rate 16, height 167.6 cm (65.98\"), weight 105 kg (231 lb 8 oz), SpO2 94%, not currently breastfeeding.     Physical Exam  Vitals reviewed.   Constitutional:       Appearance: " Normal appearance. She is obese.   HENT:      Head: Normocephalic and atraumatic.      Nose: Nose normal. No congestion.      Mouth/Throat:      Mouth: Mucous membranes are moist.      Pharynx: Oropharynx is clear.   Eyes:      Conjunctiva/sclera: Conjunctivae normal.      Pupils: Pupils are equal, round, and reactive to light.   Cardiovascular:      Rate and Rhythm: Normal rate and regular rhythm.      Heart sounds: Normal heart sounds.   Pulmonary:      Effort: Pulmonary effort is normal.      Breath sounds: Normal breath sounds.   Abdominal:      General: Abdomen is flat. Bowel sounds are normal.      Palpations: Abdomen is soft.   Musculoskeletal:         General: Normal range of motion.      Cervical back: Normal range of motion.   Skin:     General: Skin is warm and dry.      Findings: No rash.      Comments: Multiple tattoos on her extremities.   Neurological:      General: No focal deficit present.      Mental Status: She is alert and oriented to person, place, and time. Mental status is at baseline.      Motor: No weakness.   Psychiatric:         Mood and Affect: Mood normal.         Behavior: Behavior normal.         Thought Content: Thought content normal.         Judgment: Judgment normal.       I have reexamined the patient and the results are consistent with the previously documented exam. Dannielle Santiago MD      Results from last 7 days   Lab Units 01/04/24  1254   WBC 10*3/mm3 4.86   NEUTROS ABS 10*3/mm3 2.95   HEMOGLOBIN g/dL 12.4   HEMATOCRIT % 38.4   PLATELETS 10*3/mm3 199     Results from last 7 days   Lab Units 01/04/24  1254   SODIUM mmol/L 140   POTASSIUM mmol/L 4.1   CHLORIDE mmol/L 100   CO2 mmol/L 26.9   BUN mg/dL 10   CREATININE mg/dL 0.59   CALCIUM mg/dL 9.4   ALBUMIN g/dL 4.2   BILIRUBIN mg/dL 0.3   ALK PHOS U/L 86   ALT (SGPT) U/L 21   AST (SGOT) U/L 19   GLUCOSE mg/dL 120*   FERRITIN ng/mL 89.30   IRON mcg/dL 78   TIBC mcg/dL 383           No radiology results for the last 30 days.        Assessment & Plan     *Iron deficiency anemia  5/11/2021 iron saturation extremely low at 5%, TIBC elevated at 651, transferrin elevated at 437 consistent with iron deficiency  Ferritin low at 8.5 again consistent with iron deficiency  Patient reports being intolerant to oral iron  She has had severe nausea vomiting abdominal pain and constipation when she takes oral iron  Received Injectafer 750 mg IV x2 in July 2021  Iron studies from 1/17/2022 reviewed and suggestive of iron deficiency  Iron deficiency anemia could be secondary to ongoing menorrhagia versus GI bleed   Injectafer 750 mg IV x2 in February 2022  Continues to experience severe menorrhagia  Blood per rectum has resolved since constipation has improved  6/16/2022-hemoglobin normal at 12.2, iron studies normal.  12/15/2022 CBC reviewed with WBC 4.06, hemoglobin 11.4 and platelets 181,000  CMP reviewed and within normal limits  Iron saturation low at 6% with a TIBC of 493, ferritin low at 12.9  Patient unable to tolerate oral iron  Injectafer x2 1/2023.  3/16/2023: Felt that her symptoms improved after receiving Injectafer.  Unfortunately was diagnosed with COVID-19 3 weeks ago, and since then has had persistent fatigue.  Her iron studies today are adequate with iron saturation 27%, ferritin 130.9, TIBC 375.  She is currently not in need of iron replacement.  Recommended she follow-up with her PCP if fatigue persists.  10/2/2023 patient reviewed back today after PCP found iron sat to be 3% last week.  Repeat labs in our office show Hgb 9.5, ferritin 13, iron sat 15%.  Patient is intolerant to oral iron with significant GI upset.  We will pursue insurance approval for additional IV iron likely with Injectafer.  Received 2 doses of Injectafer in October 2023  Hemoglobin has improved and now normal at 12.4.  Ferritin improved to 89.3, iron normal at 78 TIBC 383 and iron saturation 20%.  Reassess labs again in 3 months    *Blood per rectum  Resolved  since constipation has improved  She has not had a follow-up with gastroenterology.  Denies any bright red blood per rectum  Patient has been referred to GI by her PCP and needs baseline screening colonoscopy as she is 48 and has not had one reportedly.  She is here to schedule a screening colonoscopy    *Fatigue  Has not improved even with correction of iron deficiency.  Possible obstructive sleep apnea  Currently being evaluated by her primary care physician    *Hepatitis C-completed treatment with Epclusa.  Continue follow-up with gastroenterology    *?  SLE-CRP and ESR elevated.  Missed her appointment with rheumatology in December 2021    *Screening-missed her screening appointment in October 2021.  Screening mammogram not performed yet.    *Menorrhagia-continues to have menorrhagia.  She has not had a uterine ablation and plans to have it soon.    *Morbid obesity-BMI  Body mass index is 37.38 kg/m².    *IV drug abuse-she has been sober for over 3 years now.    PLAN:   CBC and iron studies in 3 month  Patient states she is in the process of getting scheduled back with her GYN to get ablation which should reduce or possibly even eliminate her need for IV iron in the future.  She will need a colonoscopy since she is 48 years old and has never had 1.    Dannielle Santiago MD  01/04/24

## 2024-01-05 ENCOUNTER — TELEPHONE (OUTPATIENT)
Dept: SLEEP MEDICINE | Facility: HOSPITAL | Age: 49
End: 2024-01-05
Payer: COMMERCIAL

## 2024-01-17 ENCOUNTER — TELEPHONE (OUTPATIENT)
Dept: SLEEP MEDICINE | Facility: HOSPITAL | Age: 49
End: 2024-01-17
Payer: COMMERCIAL

## 2024-01-17 NOTE — TELEPHONE ENCOUNTER
Received call from Kellie with [facility], advised unable to reach patient. Called and patient advised she can't afford the machine at this time due.

## 2024-01-18 DIAGNOSIS — G89.29 CHRONIC BILATERAL LOW BACK PAIN WITH LEFT-SIDED SCIATICA: ICD-10-CM

## 2024-01-18 DIAGNOSIS — M54.42 CHRONIC BILATERAL LOW BACK PAIN WITH LEFT-SIDED SCIATICA: ICD-10-CM

## 2024-01-18 RX ORDER — METHOCARBAMOL 500 MG/1
500 TABLET, FILM COATED ORAL 3 TIMES DAILY PRN
Qty: 30 TABLET | Refills: 0 | Status: SHIPPED | OUTPATIENT
Start: 2024-01-18

## 2024-01-31 ENCOUNTER — TELEPHONE (OUTPATIENT)
Dept: SLEEP MEDICINE | Facility: HOSPITAL | Age: 49
End: 2024-01-31
Payer: COMMERCIAL

## 2024-02-21 DIAGNOSIS — L30.9 DERMATITIS: ICD-10-CM

## 2024-02-27 DIAGNOSIS — L30.9 DERMATITIS: ICD-10-CM

## 2024-03-12 DIAGNOSIS — G89.29 CHRONIC BILATERAL LOW BACK PAIN WITH LEFT-SIDED SCIATICA: ICD-10-CM

## 2024-03-12 DIAGNOSIS — M54.42 CHRONIC BILATERAL LOW BACK PAIN WITH LEFT-SIDED SCIATICA: ICD-10-CM

## 2024-03-12 NOTE — TELEPHONE ENCOUNTER
Caller: Silvia Clinton    Relationship: Self    Best call back number: [phone]*    Requested Prescriptions:   Requested Prescriptions     Pending Prescriptions Disp Refills    losartan (Cozaar) 100 MG tablet 30 tablet 0     Sig: Take 0.5 tablets by mouth 2 (Two) Times a Day. Pt is taking half BID    methocarbamol (ROBAXIN) 500 MG tablet 30 tablet 0     Sig: Take 1 tablet by mouth 3 (Three) Times a Day As Needed for Muscle Spasms.        Pharmacy where request should be sent: [facility] #[id number] - [place], [place] - [address] [facility] - [phone] [facility] [phone] FX     Last office visit with prescribing clinician: 9/27/2023   Last telemedicine visit with prescribing clinician: 11/10/2023   Next office visit with prescribing clinician: Visit date not found     Additional details provided by patient: PATIENT OUT OF THE METHOCARBAMOL, AND 1 DAY OF THE LOSARTAN. THE PATIENT NOW USING [facility]UE TO INSURANCE, AND WILL NEED NEW PRESCRIPTIONS SENT FOR THESE MEDICATIONS.    Does the patient have less than a 3 day supply:  [x] Yes  [] No    Would you like a call back once the refill request has been completed: [] Yes [x] No    If the office needs to give you a call back, can they leave a voicemail: [] Yes [x] No    Mariza Lucia   03/12/24 14:24 EDT

## 2024-03-13 RX ORDER — LOSARTAN POTASSIUM 100 MG/1
50 TABLET ORAL 2 TIMES DAILY
Qty: 30 TABLET | Refills: 0 | Status: SHIPPED | OUTPATIENT
Start: 2024-03-13

## 2024-03-13 RX ORDER — METHOCARBAMOL 500 MG/1
500 TABLET, FILM COATED ORAL 3 TIMES DAILY PRN
Qty: 90 TABLET | Refills: 0 | Status: SHIPPED | OUTPATIENT
Start: 2024-03-13

## 2024-03-18 RX ORDER — LOSARTAN POTASSIUM 100 MG/1
50 TABLET ORAL 2 TIMES DAILY
Qty: 90 TABLET | Refills: 1 | OUTPATIENT
Start: 2024-03-18

## 2024-04-11 ENCOUNTER — OFFICE VISIT (OUTPATIENT)
Dept: ONCOLOGY | Facility: CLINIC | Age: 49
End: 2024-04-11
Payer: COMMERCIAL

## 2024-04-11 ENCOUNTER — TELEPHONE (OUTPATIENT)
Dept: ONCOLOGY | Facility: CLINIC | Age: 49
End: 2024-04-11
Payer: COMMERCIAL

## 2024-04-11 ENCOUNTER — LAB (OUTPATIENT)
Dept: OTHER | Facility: HOSPITAL | Age: 49
End: 2024-04-11
Payer: COMMERCIAL

## 2024-04-11 VITALS
HEIGHT: 66 IN | HEART RATE: 65 BPM | WEIGHT: 243.5 LBS | RESPIRATION RATE: 16 BRPM | TEMPERATURE: 97.8 F | SYSTOLIC BLOOD PRESSURE: 108 MMHG | DIASTOLIC BLOOD PRESSURE: 74 MMHG | BODY MASS INDEX: 39.13 KG/M2 | OXYGEN SATURATION: 94 %

## 2024-04-11 DIAGNOSIS — D50.0 IRON DEFICIENCY ANEMIA DUE TO CHRONIC BLOOD LOSS: ICD-10-CM

## 2024-04-11 DIAGNOSIS — D50.0 IRON DEFICIENCY ANEMIA DUE TO CHRONIC BLOOD LOSS: Primary | ICD-10-CM

## 2024-04-11 LAB
BASOPHILS # BLD AUTO: 0.02 10*3/MM3 (ref 0–0.2)
BASOPHILS NFR BLD AUTO: 0.4 % (ref 0–1.5)
DEPRECATED RDW RBC AUTO: 42.6 FL (ref 37–54)
EOSINOPHIL # BLD AUTO: 0.14 10*3/MM3 (ref 0–0.4)
EOSINOPHIL NFR BLD AUTO: 2.8 % (ref 0.3–6.2)
ERYTHROCYTE [DISTWIDTH] IN BLOOD BY AUTOMATED COUNT: 12.7 % (ref 12.3–15.4)
FERRITIN SERPL-MCNC: 66.8 NG/ML (ref 13–150)
HCT VFR BLD AUTO: 39.1 % (ref 34–46.6)
HGB BLD-MCNC: 12.6 G/DL (ref 12–15.9)
IMM GRANULOCYTES # BLD AUTO: 0.01 10*3/MM3 (ref 0–0.05)
IMM GRANULOCYTES NFR BLD AUTO: 0.2 % (ref 0–0.5)
IRON 24H UR-MRATE: 57 MCG/DL (ref 37–145)
IRON SATN MFR SERPL: 11 % (ref 20–50)
LYMPHOCYTES # BLD AUTO: 1.36 10*3/MM3 (ref 0.7–3.1)
LYMPHOCYTES NFR BLD AUTO: 27 % (ref 19.6–45.3)
MCH RBC QN AUTO: 29.6 PG (ref 26.6–33)
MCHC RBC AUTO-ENTMCNC: 32.2 G/DL (ref 31.5–35.7)
MCV RBC AUTO: 92 FL (ref 79–97)
MONOCYTES # BLD AUTO: 0.37 10*3/MM3 (ref 0.1–0.9)
MONOCYTES NFR BLD AUTO: 7.4 % (ref 5–12)
NEUTROPHILS NFR BLD AUTO: 3.13 10*3/MM3 (ref 1.7–7)
NEUTROPHILS NFR BLD AUTO: 62.2 % (ref 42.7–76)
NRBC BLD AUTO-RTO: 0 /100 WBC (ref 0–0.2)
PLATELET # BLD AUTO: 171 10*3/MM3 (ref 140–450)
PMV BLD AUTO: 11.3 FL (ref 6–12)
RBC # BLD AUTO: 4.25 10*6/MM3 (ref 3.77–5.28)
TIBC SERPL-MCNC: 498 MCG/DL (ref 298–536)
TRANSFERRIN SERPL-MCNC: 334 MG/DL (ref 200–360)
WBC NRBC COR # BLD AUTO: 5.03 10*3/MM3 (ref 3.4–10.8)

## 2024-04-11 PROCEDURE — 99214 OFFICE O/P EST MOD 30 MIN: CPT | Performed by: INTERNAL MEDICINE

## 2024-04-11 PROCEDURE — 82728 ASSAY OF FERRITIN: CPT | Performed by: INTERNAL MEDICINE

## 2024-04-11 PROCEDURE — 84466 ASSAY OF TRANSFERRIN: CPT | Performed by: INTERNAL MEDICINE

## 2024-04-11 PROCEDURE — 83540 ASSAY OF IRON: CPT | Performed by: INTERNAL MEDICINE

## 2024-04-11 PROCEDURE — 85025 COMPLETE CBC W/AUTO DIFF WBC: CPT | Performed by: INTERNAL MEDICINE

## 2024-04-11 PROCEDURE — 36415 COLL VENOUS BLD VENIPUNCTURE: CPT

## 2024-04-11 RX ORDER — FERROUS SULFATE 137(45) MG
1 TABLET, EXTENDED RELEASE ORAL DAILY
Qty: 30 TABLET | Refills: 3 | Status: SHIPPED | OUTPATIENT
Start: 2024-04-11

## 2024-04-11 NOTE — TELEPHONE ENCOUNTER
----- Message from Arline Gil RN sent at 4/11/2024 10:34 AM EDT -----  She needs also an NP visit in 3 months with labs please.  She is aware    Thank you!

## 2024-04-11 NOTE — PROGRESS NOTES
Subjective   Silvia Clinton is a 48 y.o. female.  Referred by Suhas Oseguera for iron deficiency anemia    History of Present Illness   Ms. Clinton is a 45-year-old premenopausal lady with history of hepatitis C, history of drug use, hypertension, SLE(she has not seen a rheumatologist), MRSA infection presents for further evaluation of anemia.She has had anemia since 2018.  Initially MCV was normal but subsequently noted to have low MCV with CBC on 7/8/2021 showing a hemoglobin of 8.9 and MCV of 73.3.  WBC count and platelet count has remained relatively normal except for some mild thrombocytopenia in 2019.  Per patient she has been anemic all her life since her teenage years.  She would take oral iron to help with the anemia and would have improvement.  After she was noted to be anemic she started oral iron .  Unfortunately she is not able to tolerate the iron supplements and she is having significant amount of nausea, abdominal pain, constipation to a point where she is not able to take the supplements.  She has not had menstrual cycles for 10 years when she was doing IV drugs but subsequently had menorrhagia for a year.  She has started using homeopathic medications for the past 2 months which helped with her heavy menstrual cycles and over the past 2 months she had fairly normal menstrual cycles.  She is scheduled to see OB/GYN next month.  She also reports bright red blood per rectum which has been going on for several months.  She has seen Dr. Steel.   She denies any malignancies in her immediate family.  Her maternal aunt had breast cancer.  Distant relatives with lung cancer and renal cancer.    She had COVID-19 infection in December 2021.  Received 2 doses of Injectafer in February 2022.    She was seen in October 2023 for worsening anemia and iron deficiency and received 2 doses of Injectafer in October 2023.    Interval History  Silvia returns today for follow-up.  She reports occasional bleeding per  rectum.  She has not been able to see gastroenterology as she is having issues with insurance and having to pay a lot of money towards her visits.  She has recently been diagnosed with obstructive sleep apnea after sleep study and has a bill of about $3000 that she has to pay.  She is unable to afford the CPAP either.  Reports severe fatigue which has not changed significantly.  She has an appointment with rheumatology later this year.      The following portions of the patient's history were reviewed and updated as appropriate: allergies, current medications, past family history, past medical history, past social history, past surgical history and problem list.    Past Medical History:   Diagnosis Date    Chest pain     Depression     Drug abstinence syndrome     Drug abuse     Ex-smoker     Heart attack     Hepatitis C     Hepatitis C     Hypertension     Kidney stone     Lupus     Lupus (systemic lupus erythematosus)     Mitral valve anterior leaflet prolapse     NSTEMI (non-ST elevated myocardial infarction)     Otitis     Palpitations     Seizures     Spinal epidural abscess     Tachycardia         Past Surgical History:   Procedure Laterality Date    BACK SURGERY      CHOLECYSTECTOMY      LEG SURGERY      broke tibula,fibula, steel noah    LIVER BIOPSY      LUMBAR LAMINECTOMY DISCECTOMY DECOMPRESSION Left 12/9/2018    Procedure: Posterior lumbar three through sacrum decompression;  Surgeon: Tevin Whitley MD;  Location: [facility];  Service: Neurosurgery    LYMPH NODE BIOPSY      SPINE SURGERY          Family History   Problem Relation Age of Onset    Bipolar disorder Mother     Diabetes Mother     Hypertension Mother     No Known Problems Father     No Known Problems Sister     No Known Problems Brother     Nephrolithiasis Maternal Grandmother     Bipolar disorder Maternal Grandmother     Breast cancer Maternal Aunt     Liver cancer Maternal Uncle     Brain cancer Maternal Uncle     Lung cancer  "Maternal Uncle     Leukemia Cousin     Colon cancer Neg Hx     Cervical cancer Neg Hx     Uterine cancer Neg Hx     Ovarian cancer Neg Hx     Thyroid cancer Neg Hx         Social History     Socioeconomic History    Marital status: Legally    Tobacco Use    Smoking status: Former     Current packs/day: 0.00     Average packs/day: 1 pack/day for 30.0 years (30.0 ttl pk-yrs)     Types: Cigarettes     Start date: 8/26/1990     Quit date: 8/26/2020     Years since quitting: 3.6    Smokeless tobacco: Never    Tobacco comments:     E-CIG USE   Vaping Use    Vaping status: Every Day    Start date: 7/1/2020    Substances: Nicotine, Flavoring    Devices: Pre-filled or refillable cartridge, Refillable tank   Substance and Sexual Activity    Alcohol use: Not Currently     Comment: CAFFEINE USE: 1 CUP COFFEE/ 2 COKES DAILY    Drug use: Not Currently     Types: IV, Heroin, Methamphetamines     Comment: pt states she no longer uses heroin, she now uses meth    Sexual activity: Not Currently        OB History    No obstetric history on file.          Allergies   Allergen Reactions    [place] Hives     \"FELT LIKE BURNING, HEAT AND SWELLING\"    Sulfa Antibiotics Nausea And Vomiting and Swelling     Patient states when she took Sulfa medication, her throat started to swell.  nausea     Review of Systems   Constitutional:  Positive for fatigue.   HENT: Negative.     Eyes: Negative.    Respiratory: Negative.     Gastrointestinal:  Negative for abdominal pain, blood in stool, constipation and nausea.   Endocrine: Negative.    Genitourinary:  Positive for menstrual problem.   Musculoskeletal: Negative.    Allergic/Immunologic: Negative.    Neurological: Negative.    Hematological: Negative.    Psychiatric/Behavioral: Negative.       Review of systems as mentioned in the HPI otherwise negative    Objective   Blood pressure 108/74, pulse 65, temperature 97.8 °F (36.6 °C), temperature source Temporal, resp. rate 16, height 167 " "cm (65.75\"), weight 110 kg (243 lb 8 oz), SpO2 94%, not currently breastfeeding.     Physical Exam  Vitals reviewed.   Constitutional:       Appearance: Normal appearance. She is obese.   HENT:      Head: Normocephalic and atraumatic.      Nose: Nose normal. No congestion.      Mouth/Throat:      Mouth: Mucous membranes are moist.      Pharynx: Oropharynx is clear.   Eyes:      Conjunctiva/sclera: Conjunctivae normal.      Pupils: Pupils are equal, round, and reactive to light.   Cardiovascular:      Rate and Rhythm: Normal rate and regular rhythm.      Heart sounds: Normal heart sounds.   Pulmonary:      Effort: Pulmonary effort is normal.      Breath sounds: Normal breath sounds.   Abdominal:      General: Abdomen is flat. Bowel sounds are normal.      Palpations: Abdomen is soft.   Musculoskeletal:         General: Normal range of motion.      Cervical back: Normal range of motion.   Skin:     General: Skin is warm and dry.      Findings: No rash.      Comments: Multiple tattoos on her extremities.   Neurological:      General: No focal deficit present.      Mental Status: She is alert and oriented to person, place, and time. Mental status is at baseline.      Motor: No weakness.   Psychiatric:         Mood and Affect: Mood normal.         Behavior: Behavior normal.         Thought Content: Thought content normal.         Judgment: Judgment normal.       I have reexamined the patient and the results are consistent with the previously documented exam. Dannielle Santiago MD      Results from last 7 days   Lab Units 04/11/24  0841   WBC 10*3/mm3 5.03   NEUTROS ABS 10*3/mm3 3.13   HEMOGLOBIN g/dL 12.6   HEMATOCRIT % 39.1   PLATELETS 10*3/mm3 171                 No radiology results for the last 30 days.       Assessment & Plan     *Iron deficiency anemia  5/11/2021 iron saturation extremely low at 5%, TIBC elevated at 651, transferrin elevated at 437 consistent with iron deficiency  Ferritin low at 8.5 again consistent " with iron deficiency  Patient reports being intolerant to oral iron  She has had severe nausea vomiting abdominal pain and constipation when she takes oral iron  Received Injectafer 750 mg IV x2 in July 2021  Iron studies from 1/17/2022 reviewed and suggestive of iron deficiency  Iron deficiency anemia could be secondary to ongoing menorrhagia versus GI bleed   Injectafer 750 mg IV x2 in February 2022  Continues to experience severe menorrhagia  Blood per rectum has resolved since constipation has improved  6/16/2022-hemoglobin normal at 12.2, iron studies normal.  12/15/2022 CBC reviewed with WBC 4.06, hemoglobin 11.4 and platelets 181,000  CMP reviewed and within normal limits  Iron saturation low at 6% with a TIBC of 493, ferritin low at 12.9  Patient unable to tolerate oral iron  Injectafer x2 1/2023.  3/16/2023: Felt that her symptoms improved after receiving Injectafer.  Unfortunately was diagnosed with COVID-19 3 weeks ago, and since then has had persistent fatigue.  Her iron studies today are adequate with iron saturation 27%, ferritin 130.9, TIBC 375.  She is currently not in need of iron replacement.  Recommended she follow-up with her PCP if fatigue persists.  10/2/2023 patient reviewed back today after PCP found iron sat to be 3% last week.  Repeat labs in our office show Hgb 9.5, ferritin 13, iron sat 15%.  Patient is intolerant to oral iron with significant GI upset.  We will pursue insurance approval for additional IV iron likely with Injectafer.  Received 2 doses of Injectafer in October 2023  Hemoglobin today is normal at 12.6, ferritin 66.8 which is normal, iron saturation low at 11% and TIBC 498  Based on the iron saturation and slightly high normal TIBC it appears that patient is may be getting iron deficient again.  Since she is unable to afford IV iron at this time we will proceed with Slow Fe prescription for now.  We will plan to recheck labs again in 3 months.    *Blood per rectum  Resolved  since constipation has improved  She has not had a follow-up with gastroenterology.  Denies any bright red blood per rectum  Patient has been referred to GI by her PCP and needs baseline screening colonoscopy as she is 48 and has not had one reportedly.  Unable to schedule an appointment due to insurance and payment issues.    *Fatigue  She has been diagnosed with obstructive sleep apnea but unable to afford a CPAP machine.  Reports ongoing severe fatigue all the time.    *Hepatitis C-completed treatment with Epclusa.  Continue follow-up with gastroenterology    *?  SLE-CRP and ESR elevated.  Missed her appointment with rheumatology in December 2021    *Screening-has not had a screening mammogram yet.  She has also not seen gastroenterology for a colonoscopy.    *Menorrhagia-continues to have menorrhagia.  She has not had a uterine ablation and plans to have it soon.    *Severe obesity-BMI  Body mass index is 39.6 kg/m².      *IV drug abuse-she has been sober for over 3 years now.    PLAN:   CBC and iron studies in 3 month  She still needs a screening mammogram and colonoscopy and we discussed both of these today.  We also discussed regular exercises to help with weight management and improvement of fatigue.    Dannielle Santiago MD  04/11/24

## 2024-04-15 RX ORDER — LOSARTAN POTASSIUM 100 MG/1
TABLET ORAL
Qty: 30 TABLET | Refills: 0 | Status: SHIPPED | OUTPATIENT
Start: 2024-04-15

## 2024-04-17 DIAGNOSIS — I10 ESSENTIAL HYPERTENSION: ICD-10-CM

## 2024-04-17 DIAGNOSIS — M54.42 CHRONIC BILATERAL LOW BACK PAIN WITH LEFT-SIDED SCIATICA: ICD-10-CM

## 2024-04-17 DIAGNOSIS — G89.29 CHRONIC BILATERAL LOW BACK PAIN WITH LEFT-SIDED SCIATICA: ICD-10-CM

## 2024-04-18 RX ORDER — METHOCARBAMOL 500 MG/1
500 TABLET, FILM COATED ORAL 3 TIMES DAILY PRN
Qty: 90 TABLET | Refills: 0 | OUTPATIENT
Start: 2024-04-18

## 2024-04-18 RX ORDER — LOSARTAN POTASSIUM 100 MG/1
TABLET ORAL
Qty: 30 TABLET | Refills: 0 | OUTPATIENT
Start: 2024-04-18

## 2024-04-18 RX ORDER — METOPROLOL TARTRATE 100 MG/1
100 TABLET ORAL 2 TIMES DAILY
Qty: 180 TABLET | Refills: 1 | OUTPATIENT
Start: 2024-04-18

## 2024-05-13 RX ORDER — LOSARTAN POTASSIUM 100 MG/1
TABLET ORAL
Qty: 15 TABLET | Refills: 0 | Status: SHIPPED | OUTPATIENT
Start: 2024-05-13

## 2024-05-29 DIAGNOSIS — B00.89 HERPETIC WHITLOW OF FINGER OF RIGHT HAND WITH LYMPHANGITIS: ICD-10-CM

## 2024-05-29 DIAGNOSIS — I89.1 HERPETIC WHITLOW OF FINGER OF RIGHT HAND WITH LYMPHANGITIS: ICD-10-CM

## 2024-05-29 RX ORDER — VALACYCLOVIR HYDROCHLORIDE 500 MG/1
1000 TABLET, FILM COATED ORAL 2 TIMES DAILY
Qty: 30 TABLET | Refills: 2 | OUTPATIENT
Start: 2024-05-29

## 2024-05-29 NOTE — TELEPHONE ENCOUNTER
Caller: Silvia Clinton CM    Relationship: Self    Best call back number: [phone]    Requested Prescriptions:   Requested Prescriptions     Pending Prescriptions Disp Refills    valACYclovir (VALTREX) 500 MG tablet 30 tablet 2     Sig: Take 2 tablets by mouth 2 (Two) Times a Day.        Pharmacy where request should be sent: [facility] [id number] [place] [address] [facility] [phone] [facility] [phone]      Last office visit with prescribing clinician: 9/27/2023   Last telemedicine visit with prescribing clinician: Visit date not found   Next office visit with prescribing clinician: Visit date not found     Additional details provided by patient: OUT OF MEDICATION    Does the patient have less than a 3 day supply:  [x] Yes  [] No    Would you like a call back once the refill request has been completed: [] Yes [x] No    If the office needs to give you a call back, can they leave a voicemail: [] Yes [x] No    Chip Goodrich Rep   05/29/24 10:57 EDT

## 2024-06-03 RX ORDER — LOSARTAN POTASSIUM 100 MG/1
TABLET ORAL
Qty: 15 TABLET | Refills: 0 | OUTPATIENT
Start: 2024-06-03

## 2024-06-04 DIAGNOSIS — R79.89 ELEVATED LFTS: ICD-10-CM

## 2024-06-04 DIAGNOSIS — E87.6 HYPOKALEMIA: ICD-10-CM

## 2024-06-04 DIAGNOSIS — I10 ESSENTIAL HYPERTENSION: Primary | ICD-10-CM

## 2024-06-04 DIAGNOSIS — G47.19 EXCESSIVE DAYTIME SLEEPINESS: ICD-10-CM

## 2024-06-04 DIAGNOSIS — D50.0 IRON DEFICIENCY ANEMIA DUE TO CHRONIC BLOOD LOSS: ICD-10-CM

## 2024-06-05 ENCOUNTER — OFFICE VISIT (OUTPATIENT)
Dept: INTERNAL MEDICINE | Facility: CLINIC | Age: 49
End: 2024-06-05
Payer: COMMERCIAL

## 2024-06-05 VITALS
SYSTOLIC BLOOD PRESSURE: 102 MMHG | BODY MASS INDEX: 38.54 KG/M2 | OXYGEN SATURATION: 95 % | HEIGHT: 66 IN | WEIGHT: 239.8 LBS | DIASTOLIC BLOOD PRESSURE: 60 MMHG | TEMPERATURE: 98.3 F | HEART RATE: 69 BPM

## 2024-06-05 DIAGNOSIS — L30.9 DERMATITIS: ICD-10-CM

## 2024-06-05 DIAGNOSIS — R74.8 ELEVATED LIVER ENZYMES: ICD-10-CM

## 2024-06-05 DIAGNOSIS — B00.89 HERPETIC WHITLOW OF FINGER OF RIGHT HAND WITH LYMPHANGITIS: ICD-10-CM

## 2024-06-05 DIAGNOSIS — Z12.11 SCREENING FOR COLON CANCER: ICD-10-CM

## 2024-06-05 DIAGNOSIS — I10 ESSENTIAL HYPERTENSION: Primary | ICD-10-CM

## 2024-06-05 DIAGNOSIS — Z12.11 SCREEN FOR COLON CANCER: ICD-10-CM

## 2024-06-05 DIAGNOSIS — F19.10 POLYSUBSTANCE ABUSE: ICD-10-CM

## 2024-06-05 DIAGNOSIS — M32.9 SYSTEMIC LUPUS ERYTHEMATOSUS, UNSPECIFIED SLE TYPE, UNSPECIFIED ORGAN INVOLVEMENT STATUS: ICD-10-CM

## 2024-06-05 DIAGNOSIS — D50.0 IRON DEFICIENCY ANEMIA DUE TO CHRONIC BLOOD LOSS: ICD-10-CM

## 2024-06-05 DIAGNOSIS — F31.81 BIPOLAR 2 DISORDER: ICD-10-CM

## 2024-06-05 DIAGNOSIS — Z59.87: ICD-10-CM

## 2024-06-05 DIAGNOSIS — I89.1 HERPETIC WHITLOW OF FINGER OF RIGHT HAND WITH LYMPHANGITIS: ICD-10-CM

## 2024-06-05 DIAGNOSIS — R00.2 PALPITATIONS: ICD-10-CM

## 2024-06-05 PROBLEM — F19.11 HISTORY OF DRUG ABUSE: Status: RESOLVED | Noted: 2021-09-22 | Resolved: 2024-06-05

## 2024-06-05 LAB
ALBUMIN SERPL-MCNC: 4.4 G/DL (ref 3.5–5.2)
ALBUMIN/GLOB SERPL: 1.6 G/DL
ALP SERPL-CCNC: 74 U/L (ref 39–117)
ALT SERPL-CCNC: 24 U/L (ref 1–33)
AST SERPL-CCNC: 35 U/L (ref 1–32)
BASOPHILS # BLD AUTO: 0.01 10*3/MM3 (ref 0–0.2)
BASOPHILS NFR BLD AUTO: 0.2 % (ref 0–1.5)
BILIRUB SERPL-MCNC: 0.7 MG/DL (ref 0–1.2)
BUN SERPL-MCNC: 10 MG/DL (ref 6–20)
BUN/CREAT SERPL: 16.4 (ref 7–25)
CALCIUM SERPL-MCNC: 9.1 MG/DL (ref 8.6–10.5)
CHLORIDE SERPL-SCNC: 102 MMOL/L (ref 98–107)
CHOLEST SERPL-MCNC: 162 MG/DL (ref 0–200)
CHOLEST/HDLC SERPL: 3.6 {RATIO}
CO2 SERPL-SCNC: 26.4 MMOL/L (ref 22–29)
CREAT SERPL-MCNC: 0.61 MG/DL (ref 0.57–1)
EGFRCR SERPLBLD CKD-EPI 2021: 111.8 ML/MIN/1.73
EOSINOPHIL # BLD AUTO: 0.15 10*3/MM3 (ref 0–0.4)
EOSINOPHIL NFR BLD AUTO: 3.5 % (ref 0.3–6.2)
ERYTHROCYTE [DISTWIDTH] IN BLOOD BY AUTOMATED COUNT: 13.7 % (ref 12.3–15.4)
GLOBULIN SER CALC-MCNC: 2.8 GM/DL
GLUCOSE SERPL-MCNC: 110 MG/DL (ref 65–99)
HBA1C MFR BLD: 5.3 % (ref 4.8–5.6)
HCT VFR BLD AUTO: 37.5 % (ref 34–46.6)
HDLC SERPL-MCNC: 45 MG/DL (ref 40–60)
HGB BLD-MCNC: 12.2 G/DL (ref 12–15.9)
IMM GRANULOCYTES # BLD AUTO: 0 10*3/MM3 (ref 0–0.05)
IMM GRANULOCYTES NFR BLD AUTO: 0 % (ref 0–0.5)
IRON SATN MFR SERPL: 15 % (ref 20–50)
IRON SERPL-MCNC: 69 MCG/DL (ref 37–145)
LDLC SERPL CALC-MCNC: 98 MG/DL (ref 0–100)
LYMPHOCYTES # BLD AUTO: 1.27 10*3/MM3 (ref 0.7–3.1)
LYMPHOCYTES NFR BLD AUTO: 29.9 % (ref 19.6–45.3)
MCH RBC QN AUTO: 29.9 PG (ref 26.6–33)
MCHC RBC AUTO-ENTMCNC: 32.5 G/DL (ref 31.5–35.7)
MCV RBC AUTO: 91.9 FL (ref 79–97)
MONOCYTES # BLD AUTO: 0.35 10*3/MM3 (ref 0.1–0.9)
MONOCYTES NFR BLD AUTO: 8.2 % (ref 5–12)
NEUTROPHILS # BLD AUTO: 2.47 10*3/MM3 (ref 1.7–7)
NEUTROPHILS NFR BLD AUTO: 58.2 % (ref 42.7–76)
NRBC BLD AUTO-RTO: 0 /100 WBC (ref 0–0.2)
PLATELET # BLD AUTO: 179 10*3/MM3 (ref 140–450)
POTASSIUM SERPL-SCNC: 3.9 MMOL/L (ref 3.5–5.2)
PROT SERPL-MCNC: 7.2 G/DL (ref 6–8.5)
RBC # BLD AUTO: 4.08 10*6/MM3 (ref 3.77–5.28)
SODIUM SERPL-SCNC: 138 MMOL/L (ref 136–145)
TIBC SERPL-MCNC: 465 MCG/DL
TRIGL SERPL-MCNC: 103 MG/DL (ref 0–150)
TSH SERPL DL<=0.005 MIU/L-ACNC: 1.26 UIU/ML (ref 0.27–4.2)
UIBC SERPL-MCNC: 396 MCG/DL (ref 112–346)
VLDLC SERPL CALC-MCNC: 19 MG/DL (ref 5–40)
WBC # BLD AUTO: 4.25 10*3/MM3 (ref 3.4–10.8)
WRITTEN AUTHORIZATION: NORMAL

## 2024-06-05 PROCEDURE — 99214 OFFICE O/P EST MOD 30 MIN: CPT | Performed by: NURSE PRACTITIONER

## 2024-06-05 RX ORDER — AMLODIPINE BESYLATE 10 MG/1
5 TABLET ORAL 2 TIMES DAILY
Qty: 90 TABLET | Refills: 1 | Status: SHIPPED | OUTPATIENT
Start: 2024-06-05

## 2024-06-05 RX ORDER — LOSARTAN POTASSIUM 100 MG/1
100 TABLET ORAL DAILY
Qty: 90 TABLET | Refills: 1 | Status: SHIPPED | OUTPATIENT
Start: 2024-06-05

## 2024-06-05 RX ORDER — METOPROLOL TARTRATE 100 MG/1
100 TABLET ORAL 2 TIMES DAILY
Qty: 180 TABLET | Refills: 1 | Status: SHIPPED | OUTPATIENT
Start: 2024-06-05

## 2024-06-05 RX ORDER — VALACYCLOVIR HYDROCHLORIDE 500 MG/1
1000 TABLET, FILM COATED ORAL 2 TIMES DAILY
Qty: 30 TABLET | Refills: 2 | Status: SHIPPED | OUTPATIENT
Start: 2024-06-05

## 2024-06-05 SDOH — ECONOMIC STABILITY - INCOME SECURITY: MATERIAL HARDSHIP DUE TO LIMITED FINANCIAL RESOURCES, NOT ELSEWHERE CLASSIFIED: Z59.87

## 2024-06-05 NOTE — PROGRESS NOTES
Subjective   Silvia Clinton is a 48 y.o. female.     Chief Complaint   Patient presents with    Hypertension    Hyperlipidemia    Fatigue        History of Present Illness   She is here today for follow-up and lab work. She notes a lot of fatigue throughout the day over the past several months. She notes joint pain in her hips and knees. Her fingers are now starting to come painful. She will have days where she cannot get out of bed secondary to extreme fatigue and joint pain.  This affects her ability to work when symptoms are severe.  This then results in her not getting paid causing an increase in financial hardship.  She has a history of lupus and has not seen rheumatology in several years.  She is currently followed by hematology for iron deficiency.  She was recently started on Slow Fe.    HTN/palpitations- she is currently on amlodipine 5 mg BID, losartan 50 mg BID and metoprolol 100 mg BID. She still notes intermittent palpitations.  She is due to see cardiology back.  She has not been checking her blood pressure at home as her blood pressure cuff needed new batteries.  [place]- she has not been able to afford her cpap. She notes a lot of fatigue and nonrestorative sleep.     Polysubstance use-she is currently on Suboxone.  She has abstained from substance use.  Bipolar- she is needing to establish with a new behavioral health specialist.  She is currently on Effexor and olanzapine.    Herpetic nelson finger-she notes a flare in the herpetic nelson.  She is needing a refill on her valacyclovir today.    The following portions of the patient's history were reviewed and updated as appropriate: allergies, current medications, past family history, past medical history, past social history, past surgical history, and problem list.    Review of Systems   Constitutional:  Positive for fatigue. Negative for chills and fever.   Respiratory: Negative.     Cardiovascular:  Positive for palpitations. Negative for chest  pain and leg swelling.   Musculoskeletal:  Positive for arthralgias and myalgias.   Skin:  Positive for skin lesions.   Neurological: Negative.    Psychiatric/Behavioral:  Positive for depressed mood and stress. Negative for sleep disturbance and suicidal ideas. The patient is nervous/anxious.        Objective   Physical Exam  Constitutional:       Appearance: She is well-developed.   Neck:      Thyroid: No thyroid mass, thyromegaly or thyroid tenderness.      Vascular: No carotid bruit.      Trachea: Trachea normal.   Cardiovascular:      Rate and Rhythm: Normal rate and regular rhythm.      Chest Wall: PMI is not displaced.      Pulses:           Radial pulses are 2+ on the right side and 2+ on the left side.        Dorsalis pedis pulses are 2+ on the right side and 2+ on the left side.        Posterior tibial pulses are 2+ on the right side and 2+ on the left side.      Heart sounds: S1 normal and S2 normal.   Pulmonary:      Effort: Pulmonary effort is normal.      Breath sounds: Normal breath sounds.   Musculoskeletal:      Right lower leg: No edema.      Left lower leg: No edema.   Lymphadenopathy:      Head:      Right side of head: No submental, submandibular, tonsillar or occipital adenopathy.      Left side of head: No submental, submandibular, tonsillar or occipital adenopathy.      Cervical: No cervical adenopathy.   Skin:     General: Skin is warm and dry.      Capillary Refill: Capillary refill takes less than 2 seconds.      Findings: Lesion present.      Nails: There is no clubbing.      Comments: Blisterlike lesion with surrounding erythema present right first finger.   Neurological:      Mental Status: She is alert and oriented to person, place, and time.   Psychiatric:         Attention and Perception: Attention and perception normal.         Mood and Affect: Affect normal. Mood is anxious.         Speech: Speech normal.         Behavior: Behavior normal. Behavior is cooperative.         Thought  Content: Thought content normal.         Cognition and Memory: Cognition and memory normal.         Judgment: Judgment normal.         Vitals:    06/05/24 1455   BP: 102/60   Pulse: 69   Temp: 98.3 °F (36.8 °C)   SpO2: 95%      Body mass index is 39 kg/m².    Assessment & Plan   Problems Addressed this Visit       Polysubstance abuse    Relevant Orders    Ambulatory Referral to Behavioral Health    Essential hypertension - Primary    Relevant Medications    amLODIPine (NORVASC) 10 MG tablet    losartan (COZAAR) 100 MG tablet    metoprolol tartrate (LOPRESSOR) 100 MG tablet    Lupus (systemic lupus erythematosus)    Relevant Orders    Ambulatory Referral to Rheumatology (Completed)    Palpitations    Relevant Medications    metoprolol tartrate (LOPRESSOR) 100 MG tablet    Iron deficiency anemia due to chronic blood loss    Bipolar 2 disorder    Relevant Orders    Ambulatory Referral to Behavioral Health     Other Visit Diagnoses       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        Relevant Medications    valACYclovir (VALTREX) 500 MG tablet    nystatin-triamcinolone (MYCOLOG II) 121799-8.1 UNIT/GM-% cream    Dermatitis        Relevant Medications    nystatin-triamcinolone (MYCOLOG II) 798573-0.1 UNIT/GM-% cream    Unable to obtain healthcare due to limited financial resources        Relevant Orders    Ambulatory Referral to Social Care Services (Amb Case Mgmt)    Elevated liver enzymes        Relevant Orders    Hepatic Function Panel    Screening for colon cancer        Screen for colon cancer        Relevant Orders    Ambulatory Referral For Screening Colonoscopy          Diagnoses         Codes Comments    Essential hypertension    -  Primary ICD-10-CM: I10  ICD-9-CM: 401.9     Palpitations     ICD-10-CM: R00.2  ICD-9-CM: 785.1     Polysubstance abuse     ICD-10-CM: F19.10  ICD-9-CM: 305.90     Systemic lupus erythematosus, unspecified SLE type, unspecified organ involvement status     ICD-10-CM:  M32.9  ICD-9-CM: 710.0     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     ICD-10-CM: B00.89, I89.1  ICD-9-CM: 054.6     Dermatitis     ICD-10-CM: L30.9  ICD-9-CM: 692.9     Unable to obtain healthcare due to limited financial resources     ICD-10-CM: Z59.87  ICD-9-CM: V60.2     Bipolar 2 disorder     ICD-10-CM: F31.81  ICD-9-CM: 296.89     Iron deficiency anemia due to chronic blood loss     ICD-10-CM: D50.0  ICD-9-CM: 280.0     Elevated liver enzymes     ICD-10-CM: R74.8  ICD-9-CM: 790.5     Screening for colon cancer     ICD-10-CM: Z12.11  ICD-9-CM: V76.51     Screen for colon cancer     ICD-10-CM: Z12.11  ICD-9-CM: V76.51           Lab results discussed with patient in office today.    1.  HTN-blood pressure on the low end of normal today in office.  Recommend monitoring blood pressure closely at home and notify persistently less than 110/60.  She is currently asymptomatic.  If blood pressures persistently low recommend decreasing amlodipine.  2.  Palpitations-recommend continue metoprolol at current dose.  Encouraged her to schedule follow-up with cardiology.  Make sure to hydrate well with fluids and limit caffeine intake.  3.  Lupus-referral placed to rheumatology.  4.  Bipolar 2 disorder-referral placed to behavioral health to establish care.  5.  Iron deficiency anemia-iron saturation is still low.  Recommend continuing Slow Fe iron supplement and following up with hematology as scheduled.  6.  Polysubstance abuse-encouraged her to continue to abstain from substance use.  7.  Elevated liver enzymes-repeat hepatic function panel in 4 weeks.  8.  Unable to obtain health care due to limited financial resources-referral placed to social work for case management.  9.  Herpetic nelson of finger of right hand-valacyclovir refilled today.  Recommend seeing dermatology if symptoms do not improve.

## 2024-06-05 NOTE — PATIENT INSTRUCTIONS
Referrals today: social work, GI, rheumatology and behavioral health.     Call cardiology to schedule follow up.

## 2024-06-06 ENCOUNTER — REFERRAL TRIAGE (OUTPATIENT)
Age: 49
End: 2024-06-06
Payer: COMMERCIAL

## 2024-06-06 ENCOUNTER — TELEPHONE (OUTPATIENT)
Age: 49
End: 2024-06-06
Payer: COMMERCIAL

## 2024-06-06 NOTE — TELEPHONE ENCOUNTER
Called to schedule an appt with Mercedes JOHNSON Voicemail mail box was full could not leave message.

## 2024-06-07 ENCOUNTER — PATIENT OUTREACH (OUTPATIENT)
Age: 49
End: 2024-06-07
Payer: COMMERCIAL

## 2024-06-07 NOTE — OUTREACH NOTE
Social Work Assessment  Questions/Answers      Flowsheet Row Most Recent Value   Referral Source outpatient staff, outpatient clinic, physician   Reason for Consult community resources, financial concerns, housing concerns/homeless   Preferred Language English   Advance Care Planning Reviewed no concerns identified   Current Living Arrangements apartment   Potentially Unsafe Housing Conditions none   In the past 12 months has the electric, gas, oil, or water company threatened to shut off services in your home? No   Primary Care Provided by self   Source of Income salary/wages   Financial/Environmental Concerns unable to afford rent/mortgage, unable to afford medication(s), unable to afford food   Application for Public Assistance pending public assistance pending number   Usual Activity Tolerance fair   Current Activity Tolerance fair   Major Change/Loss/Stressor financial          SDOH updated and reviewed with the patient during this program:  --     Employment: Not At Risk (6/7/2024)    Employment     Do you want help finding or keeping work or a job?: I do not need or want help      Financial Resource Strain: High Risk (6/7/2024)    Overall Financial Resource Strain (CARDIA)     Difficulty of Paying Living Expenses: Hard      --     Food Insecurity: Food Insecurity Present (6/7/2024)    Hunger Vital Sign     Worried About Running Out of Food in the Last Year: Sometimes true     Ran Out of Food in the Last Year: Sometimes true      --     Housing Stability: Not At Risk (6/7/2024)    Housing Stability     Current Living Arrangements: apartment     Potentially Unsafe Housing Conditions: none   Recent Concern: Housing Stability - High Risk (6/7/2024)    Housing Stability Vital Sign     Unable to Pay for Housing in the Last Year: Yes     Number of Times Moved in the Last Year: 1     Homeless in the Last Year: No      --     Transportation Needs: No Transportation Needs (6/7/2024)    PRAPARE - Transportation     Lack  of Transportation (Medical): No     Lack of Transportation (Non-Medical): No      --     Utilities: Not At Risk (6/7/2024)    [facility] Utilities     Threatened with loss of utilities: No       Patient Outreach    MSW outreach to patient to discuss community resource needs as requested per primary care provider. Patient discusses difficulty affording her medications when prescribed by provider. Patient discusses her frustration with being over income limit for Medicaid. MSW discussed option of utilizing [place] [employer] for assistance with cost of prescriptions. Patient states to have completed application with [facility] [employer] Assistance Department and states she still need to send her income verification forms and tax forms back. Patient unsure where to send forms and MSW has provided patient with contact information via [facility] for [facility] financial counselors to discuss her application further and to confirm which location she will need to send her forms.    Patient and MSW discussed difficulty paying for housing and utility costs, and patient open to receiving resources from MSW. MSW has sent resources for [facility] [employer], [facility], [employer], and [place]. MSW has also included information for food pantries as requested per patient for assistance with food costs. MSW attempted to discuss additional community resource needs further with patient and she requested a call back this afternoon and ended the call with MSW.    Patient Outreach    MSW follow-up with patient this afternoon to notify her that all community resources were sent via [facility] along with contact information. Patient thanked MSW for the follow-up call and states that she does not remember her question from previous conversation with MSW. Patient provided with MSW contact information and states she will follow-up with MSW as needed.    Ade Mendoza  Worker  Ambulatory Case Management    6/7/2024, 13:41 EDT

## 2024-06-07 NOTE — OUTREACH NOTE
MSW received referral from primary care providers office for assistance with community resources. MSW outreach to patient by phone and MSW unable to leave message and call back number due to mailbox full. MSW will continue to attempt outreach for assistance.     Ade FELICIANO -   Ambulatory Case Management    6/7/2024, 09:13 EDT

## 2024-07-07 NOTE — PROGRESS NOTES
Subjective   Silvia Clinton is a 48 y.o. female.  Referred by Suhas Oseguera for iron deficiency anemia    History of Present Illness   Ms. Clinton is a 45-year-old premenopausal lady with history of hepatitis C, history of drug use, hypertension, SLE(she has not seen a rheumatologist), MRSA infection presents for further evaluation of anemia.She has had anemia since 2018.  Initially MCV was normal but subsequently noted to have low MCV with CBC on 7/8/2021 showing a hemoglobin of 8.9 and MCV of 73.3.  WBC count and platelet count has remained relatively normal except for some mild thrombocytopenia in 2019.  Per patient she has been anemic all her life since her teenage years.  She would take oral iron to help with the anemia and would have improvement.  After she was noted to be anemic she started oral iron .  Unfortunately she is not able to tolerate the iron supplements and she is having significant amount of nausea, abdominal pain, constipation to a point where she is not able to take the supplements.  She has not had menstrual cycles for 10 years when she was doing IV drugs but subsequently had menorrhagia for a year.  She has started using homeopathic medications for the past 2 months which helped with her heavy menstrual cycles and over the past 2 months she had fairly normal menstrual cycles.  She is scheduled to see OB/GYN next month.  She also reports bright red blood per rectum which has been going on for several months.  She has seen Dr. Steel.   She denies any malignancies in her immediate family.  Her maternal aunt had breast cancer.  Distant relatives with lung cancer and renal cancer.    She had COVID-19 infection in December 2021.  Received 2 doses of Injectafer in February 2022.    She was seen in October 2023 for worsening anemia and iron deficiency and received 2 doses of Injectafer in October 2023.    Interval History  Silvia returns today for follow-up.  She continues on slow Fe, is taking  this daily Monday through Friday, not taking on the weekends.  She has been experiencing constipation with this, which is why she holds the medication over the weekend so she can have a bowel movement.  She has not been taking any stool softeners.  She does occasionally eat prunes when she is constipated, and does feel this has been helpful.  Since starting on the oral iron replacement, she does feel that her fatigue has improved, though still feels very tired throughout the day.  She is no longer having any blood per rectum, however does report having heavy menses.  Reports being scheduled on several occasions for consultation with gynecology to consider ablation, however each time she is scheduled an appointment she ends up with COVID and has had to miss her appointments.  She does plan to call and schedule with them soon for consideration of ablation.  She has also not been able to obtain CPAP due to financial barriers.  In addition she has not been able to proceed with colonoscopy, mammogram, or any routine imaging due to financial barriers.  Aside from heavy menses, she denies any bleeding.  Denies nausea, vomiting, diarrhea, stomach cramps.    The following portions of the patient's history were reviewed and updated as appropriate: allergies, current medications, past family history, past medical history, past social history, past surgical history and problem list.    Past Medical History:   Diagnosis Date    Chest pain     Depression     Drug abstinence syndrome     Drug abuse     Ex-smoker     Heart attack     Hepatitis C     Hepatitis C     Hypertension     Kidney stone     Lupus     Lupus (systemic lupus erythematosus)     Mitral valve anterior leaflet prolapse     NSTEMI (non-ST elevated myocardial infarction)     Otitis     Palpitations     Seizures     Spinal epidural abscess     Tachycardia         Past Surgical History:   Procedure Laterality Date    BACK SURGERY      CHOLECYSTECTOMY      LEG SURGERY   "    broke tibula,fibula, steel noah    LIVER BIOPSY      LUMBAR LAMINECTOMY DISCECTOMY DECOMPRESSION Left 12/9/2018    Procedure: Posterior lumbar three through sacrum decompression;  Surgeon: Tevin Whitley MD;  Location: [facility] OR;  Service: Neurosurgery    LYMPH NODE BIOPSY      SPINE SURGERY          Family History   Problem Relation Age of Onset    Bipolar disorder Mother     Diabetes Mother     Hypertension Mother     No Known Problems Father     No Known Problems Sister     No Known Problems Brother     Nephrolithiasis Maternal Grandmother     Bipolar disorder Maternal Grandmother     Breast cancer Maternal Aunt     Liver cancer Maternal Uncle     Brain cancer Maternal Uncle     Lung cancer Maternal Uncle     Leukemia Cousin     Colon cancer Neg Hx     Cervical cancer Neg Hx     Uterine cancer Neg Hx     Ovarian cancer Neg Hx     Thyroid cancer Neg Hx         Social History     Socioeconomic History    Marital status: Legally    Tobacco Use    Smoking status: Former     Current packs/day: 0.00     Average packs/day: 1 pack/day for 30.0 years (30.0 ttl pk-yrs)     Types: Cigarettes     Start date: 8/26/1990     Quit date: 8/26/2020     Years since quitting: 3.8    Smokeless tobacco: Never    Tobacco comments:     E-CIG USE   Vaping Use    Vaping status: Every Day    Start date: 7/1/2020    Substances: Nicotine, Flavoring    Devices: Pre-filled or refillable cartridge, Refillable tank   Substance and Sexual Activity    Alcohol use: Not Currently     Comment: CAFFEINE USE: 1 CUP COFFEE/ 2 COKES DAILY    Drug use: Not Currently     Types: IV, Heroin, Methamphetamines     Comment: pt states she no longer uses heroin, she now uses meth    Sexual activity: Not Currently        OB History    No obstetric history on file.          Allergies   Allergen Reactions    Captiva Hives     \"FELT LIKE BURNING, HEAT AND SWELLING\"    Sulfa Antibiotics Nausea And Vomiting and Swelling     Patient states when " "she took Sulfa medication, her throat started to swell.  nausea     Review of Systems   Constitutional:  Positive for fatigue.   HENT: Negative.     Eyes: Negative.    Respiratory: Negative.     Gastrointestinal:  Negative for abdominal pain, blood in stool, constipation and nausea.   Endocrine: Negative.    Genitourinary:  Positive for menstrual problem.   Musculoskeletal: Negative.    Allergic/Immunologic: Negative.    Neurological: Negative.    Hematological: Negative.    Psychiatric/Behavioral: Negative.       Review of systems as mentioned in the HPI otherwise negative    Objective   Blood pressure 106/72, pulse 64, temperature 98.4 °F (36.9 °C), temperature source Oral, resp. rate 16, height 167 cm (65.75\"), weight 110 kg (242 lb), SpO2 98%, not currently breastfeeding.     Physical Exam  Vitals reviewed.   Constitutional:       Appearance: Normal appearance. She is obese.   HENT:      Head: Normocephalic and atraumatic.      Nose: Nose normal. No congestion.      Mouth/Throat:      Mouth: Mucous membranes are moist.      Pharynx: Oropharynx is clear.   Eyes:      Conjunctiva/sclera: Conjunctivae normal.      Pupils: Pupils are equal, round, and reactive to light.   Cardiovascular:      Rate and Rhythm: Normal rate and regular rhythm.      Heart sounds: Normal heart sounds.   Pulmonary:      Effort: Pulmonary effort is normal.      Breath sounds: Normal breath sounds.   Abdominal:      General: Abdomen is flat. Bowel sounds are normal.      Palpations: Abdomen is soft.   Musculoskeletal:         General: Normal range of motion.      Cervical back: Normal range of motion.   Skin:     General: Skin is warm and dry.      Findings: No rash.      Comments: Multiple tattoos on her extremities.   Neurological:      General: No focal deficit present.      Mental Status: She is alert and oriented to person, place, and time. Mental status is at baseline.      Motor: No weakness.   Psychiatric:         Mood and Affect: " Mood normal.         Behavior: Behavior normal.         Thought Content: Thought content normal.         Judgment: Judgment normal.       I have reexamined the patient and the results are consistent with the previously documented exam. SYLWIA Lizarraga    Results from last 7 days   Lab Units 07/11/24  0816   WBC 10*3/mm3 4.87   NEUTROS ABS 10*3/mm3 2.91   HEMOGLOBIN g/dL 12.3   HEMATOCRIT % 37.2   PLATELETS 10*3/mm3 196             No radiology results for the last 30 days.       Assessment & Plan     *Iron deficiency anemia  5/11/2021 iron saturation extremely low at 5%, TIBC elevated at 651, transferrin elevated at 437 consistent with iron deficiency  Ferritin low at 8.5 again consistent with iron deficiency  Patient reports being intolerant to oral iron  She has had severe nausea vomiting abdominal pain and constipation when she takes oral iron  Received Injectafer 750 mg IV x2 in July 2021  Iron studies from 1/17/2022 reviewed and suggestive of iron deficiency  Iron deficiency anemia could be secondary to ongoing menorrhagia versus GI bleed   Injectafer 750 mg IV x2 in February 2022  Continues to experience severe menorrhagia  Blood per rectum has resolved since constipation has improved  6/16/2022-hemoglobin normal at 12.2, iron studies normal.  12/15/2022 CBC reviewed with WBC 4.06, hemoglobin 11.4 and platelets 181,000  CMP reviewed and within normal limits  Iron saturation low at 6% with a TIBC of 493, ferritin low at 12.9  Patient unable to tolerate oral iron  Injectafer x2 1/2023.  3/16/2023: Felt that her symptoms improved after receiving Injectafer.  Unfortunately was diagnosed with COVID-19 3 weeks ago, and since then has had persistent fatigue.  Her iron studies today are adequate with iron saturation 27%, ferritin 130.9, TIBC 375.  She is currently not in need of iron replacement.  Recommended she follow-up with her PCP if fatigue persists.  10/2/2023 patient reviewed back today after PCP found  iron sat to be 3% last week.  Repeat labs in our office show Hgb 9.5, ferritin 13, iron sat 15%.  Patient is intolerant to oral iron with significant GI upset.  We will pursue insurance approval for additional IV iron likely with Injectafer.  Received 2 doses of Injectafer in October 2023 4/11/2024: Hemoglobinis normal at 12.6, ferritin 66.8 which is normal, iron saturation low at 11% and TIBC 498  Based on the iron saturation and slightly high normal TIBC it appears that patient is may be getting iron deficient again.  Since she is unable to afford IV iron at this time we will proceed with Slow Fe prescription for now.  7/11/2024: Continue Slow Fe Monday through Friday, not taking on the weekends.  Having constipation with the Slow Fe which is why she holds over the weekends so she has better bowel movements.  Not utilizing any OTC stool softeners, occasionally eating prunes which has been helpful.  Fatigue has improved, though still experiencing tiredness during the day.  Hemoglobin 12.3, iron saturation 16%, TIBC 456, ferritin 27.60.  Despite oral iron, ferritin has still declined.  She is having a lot of fatigue and difficulty tolerating oral iron due to constipation.  She would like to proceed with IV iron, this will be arranged.    *Blood per rectum  Resolved since constipation has improved  She has not had a follow-up with gastroenterology.  Denies any bright red blood per rectum  Patient has been referred to GI by her PCP and needs baseline screening colonoscopy as she is 48 and has not had one reportedly.  Does report she has not had any recent blood per rectum.  She has still been unable to pursue colonoscopy due to insurance and financial barriers.    *Fatigue  She has been diagnosed with obstructive sleep apnea but unable to afford a CPAP machine.  Reports ongoing severe fatigue all the time.  This did improve somewhat after starting oral iron replacement, though still experiencing daytime  tiredness.    *Hepatitis C-completed treatment with [facility].  Continue follow-up with gastroenterology    *?  SLE-CRP and ESR elevated.  Missed her appointment with rheumatology in December 2021    *Screening-has not had a screening mammogram yet.  She has also not seen gastroenterology for a colonoscopy.  Reports she has not pursued this due to financial barriers.    *Menorrhagia-continues to have menorrhagia.  Reports on several occasions she was scheduled for consultation with gynecology to consider uterine ablation, however has persistently missed appointments due to being diagnosed with COVID.  She plans to reach out to gynecology to schedule.    *Severe obesity-BMI  Body mass index is 39.36 kg/m².    *IV drug abuse-she has been sober for over 3 years now.    *Social-difficulty receiving health services due to financial constraints.  PCP has referred her to  who has provided the patient with community resources.     PLAN:   Will proceed with IV iron due to difficulties with tolerating oral iron including constipation.  CBC and iron studies in 3 month  She still needs a screening mammogram and colonoscopy and we discussed both of these today.     Elmira Fowler, SYLWIA  07/11/24

## 2024-07-08 RX ORDER — ONDANSETRON 8 MG/1
8 TABLET, ORALLY DISINTEGRATING ORAL EVERY 8 HOURS PRN
Qty: 20 TABLET | Refills: 0 | Status: SHIPPED | OUTPATIENT
Start: 2024-07-08

## 2024-07-09 RX ORDER — ONDANSETRON 8 MG/1
TABLET, ORALLY DISINTEGRATING ORAL
Qty: 20 TABLET | Refills: 0 | OUTPATIENT
Start: 2024-07-09

## 2024-07-11 ENCOUNTER — OFFICE VISIT (OUTPATIENT)
Dept: ONCOLOGY | Facility: CLINIC | Age: 49
End: 2024-07-11
Payer: COMMERCIAL

## 2024-07-11 ENCOUNTER — LAB (OUTPATIENT)
Dept: OTHER | Facility: HOSPITAL | Age: 49
End: 2024-07-11
Payer: COMMERCIAL

## 2024-07-11 ENCOUNTER — TELEPHONE (OUTPATIENT)
Dept: ONCOLOGY | Facility: CLINIC | Age: 49
End: 2024-07-11
Payer: COMMERCIAL

## 2024-07-11 VITALS
BODY MASS INDEX: 38.89 KG/M2 | TEMPERATURE: 98.4 F | WEIGHT: 242 LBS | OXYGEN SATURATION: 98 % | DIASTOLIC BLOOD PRESSURE: 72 MMHG | HEIGHT: 66 IN | SYSTOLIC BLOOD PRESSURE: 106 MMHG | HEART RATE: 64 BPM | RESPIRATION RATE: 16 BRPM

## 2024-07-11 DIAGNOSIS — D50.0 IRON DEFICIENCY ANEMIA DUE TO CHRONIC BLOOD LOSS: Primary | ICD-10-CM

## 2024-07-11 DIAGNOSIS — D50.0 IRON DEFICIENCY ANEMIA DUE TO CHRONIC BLOOD LOSS: ICD-10-CM

## 2024-07-11 LAB
ALBUMIN SERPL-MCNC: 4.1 G/DL (ref 3.5–5.2)
ALBUMIN/GLOB SERPL: 1.4 G/DL
ALP SERPL-CCNC: 80 U/L (ref 39–117)
ALT SERPL W P-5'-P-CCNC: 36 U/L (ref 1–33)
ANION GAP SERPL CALCULATED.3IONS-SCNC: 6.4 MMOL/L (ref 5–15)
AST SERPL-CCNC: 43 U/L (ref 1–32)
BASOPHILS # BLD AUTO: 0.02 10*3/MM3 (ref 0–0.2)
BASOPHILS NFR BLD AUTO: 0.4 % (ref 0–1.5)
BILIRUB SERPL-MCNC: 0.7 MG/DL (ref 0–1.2)
BUN SERPL-MCNC: 11 MG/DL (ref 6–20)
BUN/CREAT SERPL: 14.1 (ref 7–25)
CALCIUM SPEC-SCNC: 9.1 MG/DL (ref 8.6–10.5)
CHLORIDE SERPL-SCNC: 103 MMOL/L (ref 98–107)
CO2 SERPL-SCNC: 30.6 MMOL/L (ref 22–29)
CREAT SERPL-MCNC: 0.78 MG/DL (ref 0.57–1)
DEPRECATED RDW RBC AUTO: 44.9 FL (ref 37–54)
EGFRCR SERPLBLD CKD-EPI 2021: 95 ML/MIN/1.73
EOSINOPHIL # BLD AUTO: 0.13 10*3/MM3 (ref 0–0.4)
EOSINOPHIL NFR BLD AUTO: 2.7 % (ref 0.3–6.2)
ERYTHROCYTE [DISTWIDTH] IN BLOOD BY AUTOMATED COUNT: 13.3 % (ref 12.3–15.4)
FERRITIN SERPL-MCNC: 27.6 NG/ML (ref 13–150)
GLOBULIN UR ELPH-MCNC: 3 GM/DL
GLUCOSE SERPL-MCNC: 111 MG/DL (ref 65–99)
HCT VFR BLD AUTO: 37.2 % (ref 34–46.6)
HGB BLD-MCNC: 12.3 G/DL (ref 12–15.9)
IMM GRANULOCYTES # BLD AUTO: 0.01 10*3/MM3 (ref 0–0.05)
IMM GRANULOCYTES NFR BLD AUTO: 0.2 % (ref 0–0.5)
IRON 24H UR-MRATE: 73 MCG/DL (ref 37–145)
IRON SATN MFR SERPL: 16 % (ref 20–50)
LYMPHOCYTES # BLD AUTO: 1.32 10*3/MM3 (ref 0.7–3.1)
LYMPHOCYTES NFR BLD AUTO: 27.1 % (ref 19.6–45.3)
MCH RBC QN AUTO: 30.2 PG (ref 26.6–33)
MCHC RBC AUTO-ENTMCNC: 33.1 G/DL (ref 31.5–35.7)
MCV RBC AUTO: 91.4 FL (ref 79–97)
MONOCYTES # BLD AUTO: 0.48 10*3/MM3 (ref 0.1–0.9)
MONOCYTES NFR BLD AUTO: 9.9 % (ref 5–12)
NEUTROPHILS NFR BLD AUTO: 2.91 10*3/MM3 (ref 1.7–7)
NEUTROPHILS NFR BLD AUTO: 59.7 % (ref 42.7–76)
NRBC BLD AUTO-RTO: 0 /100 WBC (ref 0–0.2)
PLATELET # BLD AUTO: 196 10*3/MM3 (ref 140–450)
PMV BLD AUTO: 11.5 FL (ref 6–12)
POTASSIUM SERPL-SCNC: 4.4 MMOL/L (ref 3.5–5.2)
PROT SERPL-MCNC: 7.1 G/DL (ref 6–8.5)
RBC # BLD AUTO: 4.07 10*6/MM3 (ref 3.77–5.28)
SODIUM SERPL-SCNC: 140 MMOL/L (ref 136–145)
TIBC SERPL-MCNC: 456 MCG/DL (ref 298–536)
TRANSFERRIN SERPL-MCNC: 306 MG/DL (ref 200–360)
WBC NRBC COR # BLD AUTO: 4.87 10*3/MM3 (ref 3.4–10.8)

## 2024-07-11 PROCEDURE — 36415 COLL VENOUS BLD VENIPUNCTURE: CPT

## 2024-07-11 PROCEDURE — 84466 ASSAY OF TRANSFERRIN: CPT | Performed by: INTERNAL MEDICINE

## 2024-07-11 PROCEDURE — 82728 ASSAY OF FERRITIN: CPT | Performed by: INTERNAL MEDICINE

## 2024-07-11 PROCEDURE — 80053 COMPREHEN METABOLIC PANEL: CPT | Performed by: INTERNAL MEDICINE

## 2024-07-11 PROCEDURE — 85025 COMPLETE CBC W/AUTO DIFF WBC: CPT | Performed by: INTERNAL MEDICINE

## 2024-07-11 PROCEDURE — 83540 ASSAY OF IRON: CPT | Performed by: INTERNAL MEDICINE

## 2024-07-11 RX ORDER — SODIUM CHLORIDE 9 MG/ML
20 INJECTION, SOLUTION INTRAVENOUS ONCE
OUTPATIENT
Start: 2024-07-25

## 2024-07-11 RX ORDER — PROCHLORPERAZINE MALEATE 10 MG
10 TABLET ORAL ONCE
Status: CANCELLED | OUTPATIENT
Start: 2024-07-18 | End: 2024-07-18

## 2024-07-11 RX ORDER — SODIUM CHLORIDE 9 MG/ML
20 INJECTION, SOLUTION INTRAVENOUS ONCE
Status: CANCELLED | OUTPATIENT
Start: 2024-07-18

## 2024-07-11 RX ORDER — PROCHLORPERAZINE MALEATE 10 MG
10 TABLET ORAL ONCE
OUTPATIENT
Start: 2024-07-25 | End: 2024-07-25

## 2024-07-11 NOTE — TELEPHONE ENCOUNTER
----- Message from Anna OKEEFE sent at 7/11/2024 11:37 AM EDT -----    ----- Message -----  From: Arline Gil RN  Sent: 7/11/2024  11:03 AM EDT  To: [facility]    Please schedule pt for two doses of injectafer starting in about 1 week    Thank you

## 2024-07-15 ENCOUNTER — INFUSION (OUTPATIENT)
Dept: ONCOLOGY | Facility: HOSPITAL | Age: 49
End: 2024-07-15
Payer: COMMERCIAL

## 2024-07-15 VITALS
SYSTOLIC BLOOD PRESSURE: 111 MMHG | RESPIRATION RATE: 16 BRPM | WEIGHT: 247 LBS | HEART RATE: 87 BPM | OXYGEN SATURATION: 94 % | TEMPERATURE: 99 F | BODY MASS INDEX: 40.17 KG/M2 | DIASTOLIC BLOOD PRESSURE: 74 MMHG

## 2024-07-15 DIAGNOSIS — I10 ESSENTIAL HYPERTENSION: ICD-10-CM

## 2024-07-15 DIAGNOSIS — T45.4X5A ADVERSE EFFECT OF IRON, INITIAL ENCOUNTER: Primary | ICD-10-CM

## 2024-07-15 DIAGNOSIS — D50.0 IRON DEFICIENCY ANEMIA DUE TO CHRONIC BLOOD LOSS: ICD-10-CM

## 2024-07-15 DIAGNOSIS — F41.9 ANXIETY: Chronic | ICD-10-CM

## 2024-07-15 PROCEDURE — 25010000002 FERRIC CARBOXYMALTOSE 750 MG/15ML SOLUTION 15 ML VIAL: Performed by: NURSE PRACTITIONER

## 2024-07-15 PROCEDURE — 96374 THER/PROPH/DIAG INJ IV PUSH: CPT

## 2024-07-15 PROCEDURE — 63710000001 PROCHLORPERAZINE MALEATE PER 5 MG: Performed by: NURSE PRACTITIONER

## 2024-07-15 PROCEDURE — 25810000003 SODIUM CHLORIDE 0.9 % SOLUTION: Performed by: NURSE PRACTITIONER

## 2024-07-15 RX ORDER — SODIUM CHLORIDE 9 MG/ML
20 INJECTION, SOLUTION INTRAVENOUS ONCE
Status: COMPLETED | OUTPATIENT
Start: 2024-07-15 | End: 2024-07-15

## 2024-07-15 RX ORDER — PROCHLORPERAZINE MALEATE 5 MG/1
10 TABLET ORAL ONCE
Status: COMPLETED | OUTPATIENT
Start: 2024-07-15 | End: 2024-07-15

## 2024-07-15 RX ADMIN — SODIUM CHLORIDE 20 ML/HR: 9 INJECTION, SOLUTION INTRAVENOUS at 11:27

## 2024-07-15 RX ADMIN — PROCHLORPERAZINE MALEATE 10 MG: 5 TABLET ORAL at 11:26

## 2024-07-15 RX ADMIN — SODIUM CHLORIDE 750 MG: 900 INJECTION, SOLUTION INTRAVENOUS at 11:27

## 2024-07-15 NOTE — TELEPHONE ENCOUNTER
Caller: Silvia Clinton CM    Relationship: Self    Best call back number:    [phone]       Requested Prescriptions:   Requested Prescriptions     Pending Prescriptions Disp Refills    OLANZapine (zyPREXA) 15 MG tablet 90 tablet 1     Sig: Take 1 tablet by mouth every night at bedtime.      [facility]    Pharmacy where request should be sent: [id number] [phone] [facility] [phone]      Last office visit with prescribing clinician: 6/5/2024   Last telemedicine visit with prescribing clinician: Visit date not found   Next office visit with prescribing clinician: 12/5/2024     Additional details provided by patient: PATIENT IS OUT OF MEDICATION     Does the patient have less than a 3 day supply:  [x] Yes  [] No    Would you like a call back once the refill request has been completed: [] Yes [x] No    If the office needs to give you a call back, can they leave a voicemail: [] Yes [x] No    Chip Gardner Rep   07/15/24 14:24 EDT

## 2024-07-18 RX ORDER — OLANZAPINE 15 MG/1
15 TABLET ORAL
Qty: 90 TABLET | Refills: 1 | OUTPATIENT
Start: 2024-07-18

## 2024-07-22 ENCOUNTER — INFUSION (OUTPATIENT)
Dept: ONCOLOGY | Facility: HOSPITAL | Age: 49
End: 2024-07-22
Payer: COMMERCIAL

## 2024-07-22 VITALS
DIASTOLIC BLOOD PRESSURE: 67 MMHG | OXYGEN SATURATION: 98 % | SYSTOLIC BLOOD PRESSURE: 102 MMHG | RESPIRATION RATE: 16 BRPM | TEMPERATURE: 98.3 F | HEART RATE: 64 BPM

## 2024-07-22 DIAGNOSIS — D50.0 IRON DEFICIENCY ANEMIA DUE TO CHRONIC BLOOD LOSS: ICD-10-CM

## 2024-07-22 DIAGNOSIS — T45.4X5A ADVERSE EFFECT OF IRON, INITIAL ENCOUNTER: Primary | ICD-10-CM

## 2024-07-22 PROCEDURE — 25810000003 SODIUM CHLORIDE 0.9 % SOLUTION: Performed by: NURSE PRACTITIONER

## 2024-07-22 PROCEDURE — 96374 THER/PROPH/DIAG INJ IV PUSH: CPT

## 2024-07-22 PROCEDURE — 25010000002 FERRIC CARBOXYMALTOSE 750 MG/15ML SOLUTION 15 ML VIAL: Performed by: NURSE PRACTITIONER

## 2024-07-22 PROCEDURE — 63710000001 PROCHLORPERAZINE MALEATE PER 5 MG: Performed by: NURSE PRACTITIONER

## 2024-07-22 RX ORDER — PROCHLORPERAZINE MALEATE 5 MG/1
10 TABLET ORAL ONCE
Status: COMPLETED | OUTPATIENT
Start: 2024-07-22 | End: 2024-07-22

## 2024-07-22 RX ORDER — SODIUM CHLORIDE 9 MG/ML
20 INJECTION, SOLUTION INTRAVENOUS ONCE
Status: COMPLETED | OUTPATIENT
Start: 2024-07-22 | End: 2024-07-22

## 2024-07-22 RX ADMIN — SODIUM CHLORIDE 750 MG: 900 INJECTION, SOLUTION INTRAVENOUS at 08:26

## 2024-07-22 RX ADMIN — SODIUM CHLORIDE 20 ML/HR: 9 INJECTION, SOLUTION INTRAVENOUS at 08:14

## 2024-07-22 RX ADMIN — PROCHLORPERAZINE MALEATE 10 MG: 5 TABLET ORAL at 08:14

## 2024-07-31 DIAGNOSIS — F41.9 ANXIETY: Chronic | ICD-10-CM

## 2024-07-31 DIAGNOSIS — L30.9 DERMATITIS: ICD-10-CM

## 2024-07-31 RX ORDER — OLANZAPINE 15 MG/1
15 TABLET ORAL
Qty: 90 TABLET | Refills: 1 | OUTPATIENT
Start: 2024-07-31

## 2024-07-31 NOTE — TELEPHONE ENCOUNTER
Caller: Silvia Clinton CM    Relationship: Self    Best call back number: [phone]     Requested Prescriptions:   Requested Prescriptions     Pending Prescriptions Disp Refills    OLANZapine (zyPREXA) 15 MG tablet 90 tablet 1     Sig: Take 1 tablet by mouth every night at bedtime.    nystatin-triamcinolone (MYCOLOG II) 176740-1.1 UNIT/GM-% cream 60 g 1     Sig: Apply  topically to the appropriate area as directed 2 (Two) Times a Day.        Pharmacy where request should be sent: [id number] [place] [id number] [place] [address] [phone] FX     Last office visit with prescribing clinician: 6/5/2024   Last telemedicine visit with prescribing clinician: Visit date not found   Next office visit with prescribing clinician: 12/5/2024     Additional details provided by patient:     Does the patient have less than a 3 day supply:  [x] Yes  [] No    Would you like a call back once the refill request has been completed: [] Yes [x] No    If the office needs to give you a call back, can they leave a voicemail: [] Yes [x] No    Chip Gardner Rep   07/31/24 12:21 EDT

## 2024-08-02 DIAGNOSIS — F41.9 ANXIETY: Chronic | ICD-10-CM

## 2024-08-02 DIAGNOSIS — F31.81 BIPOLAR 2 DISORDER: Primary | ICD-10-CM

## 2024-08-02 RX ORDER — OLANZAPINE 15 MG/1
15 TABLET ORAL
Qty: 90 TABLET | Refills: 0 | Status: SHIPPED | OUTPATIENT
Start: 2024-08-02

## 2024-09-09 RX ORDER — ONDANSETRON 8 MG/1
8 TABLET, ORALLY DISINTEGRATING ORAL EVERY 8 HOURS PRN
Qty: 20 TABLET | Refills: 0 | Status: SHIPPED | OUTPATIENT
Start: 2024-09-09

## 2024-09-15 ENCOUNTER — TELEMEDICINE (OUTPATIENT)
Dept: FAMILY MEDICINE CLINIC | Facility: TELEHEALTH | Age: 49
End: 2024-09-15
Payer: COMMERCIAL

## 2024-09-15 DIAGNOSIS — B86 SCABIES: Primary | ICD-10-CM

## 2024-09-15 RX ORDER — PERMETHRIN 50 MG/G
1 CREAM TOPICAL ONCE
Qty: 1 G | Refills: 0 | Status: SHIPPED | OUTPATIENT
Start: 2024-09-15 | End: 2024-09-15

## 2024-09-15 NOTE — PROGRESS NOTES
You have chosen to receive care through a telehealth visit.  Do you consent to use a video/audio connection for your medical care today? Yes     CHIEF COMPLAINT  Chief Complaint   Patient presents with    Rash         HPI  Silvia Clinton is a 49 y.o. female  presents with complaint of rash under arms, stomach and genital area.  She has been exposed to scabies.    Review of Systems   Skin:  Positive for rash (red bumps to bilateral under arms, stomach and genital area).   All other systems reviewed and are negative.      Past Medical History:   Diagnosis Date    Chest pain     Depression     Drug abstinence syndrome     Drug abuse     Ex-smoker     Heart attack     Hepatitis C     Hepatitis C     Hypertension     Kidney stone     Lupus     Lupus (systemic lupus erythematosus)     Mitral valve anterior leaflet prolapse     NSTEMI (non-ST elevated myocardial infarction)     Otitis     Palpitations     Seizures     Spinal epidural abscess     Tachycardia        Family History   Problem Relation Age of Onset    Bipolar disorder Mother     Diabetes Mother     Hypertension Mother     No Known Problems Father     No Known Problems Sister     No Known Problems Brother     Nephrolithiasis Maternal Grandmother     Bipolar disorder Maternal Grandmother     Breast cancer Maternal Aunt     Liver cancer Maternal Uncle     Brain cancer Maternal Uncle     Lung cancer Maternal Uncle     Leukemia Cousin     Colon cancer Neg Hx     Cervical cancer Neg Hx     Uterine cancer Neg Hx     Ovarian cancer Neg Hx     Thyroid cancer Neg Hx        Social History     Socioeconomic History    Marital status: Legally    Tobacco Use    Smoking status: Former     Current packs/day: 0.00     Average packs/day: 1 pack/day for 30.0 years (30.0 ttl pk-yrs)     Types: Cigarettes     Start date: 1990     Quit date: 2020     Years since quittin.0    Smokeless tobacco: Never    Tobacco comments:     E-CIG USE   Vaping Use    Vaping  status: Every Day    Start date: 7/1/2020    Substances: Nicotine, Flavoring    Devices: Pre-filled or refillable cartridge, Refillable tank   Substance and Sexual Activity    Alcohol use: Not Currently     Comment: CAFFEINE USE: 1 CUP COFFEE/ 2 COKES DAILY    Drug use: Not Currently     Types: IV, Heroin, Methamphetamines     Comment: pt states she no longer uses heroin, she now uses meth    Sexual activity: Not Currently       Silvia Clinton  reports that she quit smoking about 4 years ago. Her smoking use included cigarettes. She started smoking about 34 years ago. She has a 30 pack-year smoking history. She has never used smokeless tobacco.     There were no vitals taken for this visit.    PHYSICAL EXAM  Physical Exam   Constitutional: She appears well-developed and well-nourished.   HENT:   Head: Normocephalic.   Eyes: Pupils are equal, round, and reactive to light.   Pulmonary/Chest: Effort normal.   Musculoskeletal: Normal range of motion.   Neurological: She is alert.   Skin: Rash (bilateral under arms, stomach and genital area) noted.   Psychiatric: She has a normal mood and affect.       Results for orders placed or performed in visit on 07/11/24   Comprehensive Metabolic Panel    Specimen: Blood   Result Value Ref Range    Glucose 111 (H) 65 - 99 mg/dL    BUN 11 6 - 20 mg/dL    Creatinine 0.78 0.57 - 1.00 mg/dL    Sodium 140 136 - 145 mmol/L    Potassium 4.4 3.5 - 5.2 mmol/L    Chloride 103 98 - 107 mmol/L    CO2 30.6 (H) 22.0 - 29.0 mmol/L    Calcium 9.1 8.6 - 10.5 mg/dL    Total Protein 7.1 6.0 - 8.5 g/dL    Albumin 4.1 3.5 - 5.2 g/dL    ALT (SGPT) 36 (H) 1 - 33 U/L    AST (SGOT) 43 (H) 1 - 32 U/L    Alkaline Phosphatase 80 39 - 117 U/L    Total Bilirubin 0.7 0.0 - 1.2 mg/dL    Globulin 3.0 gm/dL    A/G Ratio 1.4 g/dL    BUN/Creatinine Ratio 14.1 7.0 - 25.0    Anion Gap 6.4 5.0 - 15.0 mmol/L    eGFR 95.0 >60.0 mL/min/1.73   Ferritin    Specimen: Blood   Result Value Ref Range    Ferritin 27.60 13.00  - 150.00 ng/mL   Iron Profile    Specimen: Blood   Result Value Ref Range    Iron 73 37 - 145 mcg/dL    Iron Saturation (TSAT) 16 (L) 20 - 50 %    Transferrin 306 200 - 360 mg/dL    TIBC 456 298 - 536 mcg/dL   CBC Auto Differential    Specimen: Blood   Result Value Ref Range    WBC 4.87 3.40 - 10.80 10*3/mm3    RBC 4.07 3.77 - 5.28 10*6/mm3    Hemoglobin 12.3 12.0 - 15.9 g/dL    Hematocrit 37.2 34.0 - 46.6 %    MCV 91.4 79.0 - 97.0 fL    MCH 30.2 26.6 - 33.0 pg    MCHC 33.1 31.5 - 35.7 g/dL    RDW 13.3 12.3 - 15.4 %    RDW-SD 44.9 37.0 - 54.0 fl    MPV 11.5 6.0 - 12.0 fL    Platelets 196 140 - 450 10*3/mm3    Neutrophil % 59.7 42.7 - 76.0 %    Lymphocyte % 27.1 19.6 - 45.3 %    Monocyte % 9.9 5.0 - 12.0 %    Eosinophil % 2.7 0.3 - 6.2 %    Basophil % 0.4 0.0 - 1.5 %    Immature Grans % 0.2 0.0 - 0.5 %    Neutrophils, Absolute 2.91 1.70 - 7.00 10*3/mm3    Lymphocytes, Absolute 1.32 0.70 - 3.10 10*3/mm3    Monocytes, Absolute 0.48 0.10 - 0.90 10*3/mm3    Eosinophils, Absolute 0.13 0.00 - 0.40 10*3/mm3    Basophils, Absolute 0.02 0.00 - 0.20 10*3/mm3    Immature Grans, Absolute 0.01 0.00 - 0.05 10*3/mm3    nRBC 0.0 0.0 - 0.2 /100 WBC       Diagnoses and all orders for this visit:    1. Scabies (Primary)  -     permethrin (ELIMITE) 5 % cream; Apply 1 Application topically to the appropriate area as directed 1 (One) Time for 1 dose.  Dispense: 1 g; Refill: 0          FOLLOW-UP  As discussed during visit with PCP/[facility] if no improvement or Urgent Care/Emergency Department if worsening of symptoms    Patient verbalizes understanding of medication dosage, comfort measures, instructions for treatment and follow-up.    Nora Bearden, SYLWIA  09/15/2024  02:56 EDT    The use of a video visit has been reviewed with the patient and verbal informed consent has been obtained. Myself and Silvia Clinton participated in this visit. The patient is located in [address] [place] [address].    I am located  in [place], [place]. Mychart and Twilio were utilized. I spent 10 minutes in the patient's chart for this visit.      Note Disclaimer: At [facility], we believe that sharing information builds trust and better   relationships. You are receiving this note because you recently visited [facility]. It is possible you   will see health information before a provider has talked with you about it. This kind of information can   be easy to misunderstand. To help you fully understand what it means for your health, we urge you to   discuss this note with your provider.

## 2024-09-23 NOTE — PROGRESS NOTES
Continued Stay Note  [facility]     Patient Name: Silvia Velazquez  MRN: [id number]  Today's Date: 12/18/2018    Admit Date: 12/9/2018    Discharge Plan     Row Name 12/18/18 1015       Plan    Plan  [facility] treatment center in [place] pending eval    Patient/Family in Agreement with Plan  yes    Plan Comments  HR CCP spoke with Dr. Salinas to discuss the request of the physician at [facility] for pt's Methadone to be weaned to 10mg TID prior to her coming to their program.  After discussion with Dr. Salinas, CCP placed a call back to [facility] and left VM for the nurse to discuss possible safe transition plans.          Discharge Codes    No documentation.             Yolanda Altman RN     HR=82 bpm, PDCA=405/75 mmhg, SpO2=99.0 %, Resp=16 B/min, EtCO2=32 mmHg, Apnea=2 Seconds, Pain=0, Billie=2

## 2024-10-03 ENCOUNTER — TELEMEDICINE (OUTPATIENT)
Dept: FAMILY MEDICINE CLINIC | Facility: TELEHEALTH | Age: 49
End: 2024-10-03
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
## 2024-10-03 NOTE — LETTER
[facility]  [facility]  [address]    [facility] [address]  Phone: [phone]  Fax: [phone]    Silvia Clinton was seen and treated in our Urgent Care on 10/3/2024.  She may return to work on 10/5/24      .        Thank you for choosing [facility].    SYLWIA Pinzon

## 2024-10-04 ENCOUNTER — LAB (OUTPATIENT)
Dept: LAB | Facility: HOSPITAL | Age: 49
End: 2024-10-04
Payer: COMMERCIAL

## 2024-10-04 ENCOUNTER — TELEPHONE (OUTPATIENT)
Dept: INTERNAL MEDICINE | Facility: CLINIC | Age: 49
End: 2024-10-04

## 2024-10-04 ENCOUNTER — OFFICE VISIT (OUTPATIENT)
Dept: INTERNAL MEDICINE | Facility: CLINIC | Age: 49
End: 2024-10-04
Payer: COMMERCIAL

## 2024-10-04 VITALS
SYSTOLIC BLOOD PRESSURE: 110 MMHG | OXYGEN SATURATION: 95 % | HEIGHT: 66 IN | WEIGHT: 252.4 LBS | TEMPERATURE: 97.7 F | HEART RATE: 64 BPM | BODY MASS INDEX: 40.56 KG/M2 | DIASTOLIC BLOOD PRESSURE: 76 MMHG

## 2024-10-04 DIAGNOSIS — E66.813 CLASS 3 SEVERE OBESITY WITH SERIOUS COMORBIDITY AND BODY MASS INDEX (BMI) OF 40.0 TO 44.9 IN ADULT, UNSPECIFIED OBESITY TYPE: ICD-10-CM

## 2024-10-04 DIAGNOSIS — K21.9 GASTROESOPHAGEAL REFLUX DISEASE, UNSPECIFIED WHETHER ESOPHAGITIS PRESENT: ICD-10-CM

## 2024-10-04 DIAGNOSIS — E66.01 CLASS 3 SEVERE OBESITY WITH SERIOUS COMORBIDITY AND BODY MASS INDEX (BMI) OF 40.0 TO 44.9 IN ADULT, UNSPECIFIED OBESITY TYPE: ICD-10-CM

## 2024-10-04 DIAGNOSIS — D50.0 IRON DEFICIENCY ANEMIA DUE TO CHRONIC BLOOD LOSS: ICD-10-CM

## 2024-10-04 DIAGNOSIS — R73.01 IMPAIRED FASTING GLUCOSE: ICD-10-CM

## 2024-10-04 DIAGNOSIS — R73.01 IMPAIRED FASTING GLUCOSE: Primary | ICD-10-CM

## 2024-10-04 DIAGNOSIS — I10 ESSENTIAL HYPERTENSION: ICD-10-CM

## 2024-10-04 DIAGNOSIS — I21.4 NON-STEMI (NON-ST ELEVATED MYOCARDIAL INFARCTION): Primary | ICD-10-CM

## 2024-10-04 DIAGNOSIS — I21.4 NON-STEMI (NON-ST ELEVATED MYOCARDIAL INFARCTION): ICD-10-CM

## 2024-10-04 PROBLEM — R63.5 WEIGHT GAIN: Status: RESOLVED | Noted: 2021-03-22 | Resolved: 2024-10-04

## 2024-10-04 PROBLEM — R50.9 FEVER: Status: RESOLVED | Noted: 2022-02-02 | Resolved: 2024-10-04

## 2024-10-04 PROBLEM — R16.2 HEPATOSPLENOMEGALY: Status: ACTIVE | Noted: 2024-10-04

## 2024-10-04 LAB
ALBUMIN SERPL-MCNC: 4.2 G/DL (ref 3.5–5.2)
ALBUMIN/GLOB SERPL: 1.3 G/DL
ALP SERPL-CCNC: 69 U/L (ref 39–117)
ALT SERPL W P-5'-P-CCNC: 17 U/L (ref 1–33)
ANION GAP SERPL CALCULATED.3IONS-SCNC: 9.5 MMOL/L (ref 5–15)
AST SERPL-CCNC: 23 U/L (ref 1–32)
BASOPHILS # BLD AUTO: 0.03 10*3/MM3 (ref 0–0.2)
BASOPHILS NFR BLD AUTO: 0.6 % (ref 0–1.5)
BILIRUB SERPL-MCNC: 0.6 MG/DL (ref 0–1.2)
BUN SERPL-MCNC: 10 MG/DL (ref 6–20)
BUN/CREAT SERPL: 15.9 (ref 7–25)
CALCIUM SPEC-SCNC: 9.1 MG/DL (ref 8.6–10.5)
CHLORIDE SERPL-SCNC: 103 MMOL/L (ref 98–107)
CO2 SERPL-SCNC: 26.5 MMOL/L (ref 22–29)
CREAT SERPL-MCNC: 0.63 MG/DL (ref 0.57–1)
DEPRECATED RDW RBC AUTO: 45.3 FL (ref 37–54)
EGFRCR SERPLBLD CKD-EPI 2021: 110.3 ML/MIN/1.73
EOSINOPHIL # BLD AUTO: 0.14 10*3/MM3 (ref 0–0.4)
EOSINOPHIL NFR BLD AUTO: 2.7 % (ref 0.3–6.2)
ERYTHROCYTE [DISTWIDTH] IN BLOOD BY AUTOMATED COUNT: 13.2 % (ref 12.3–15.4)
FERRITIN SERPL-MCNC: 211.5 NG/ML (ref 13–150)
GLOBULIN UR ELPH-MCNC: 3.2 GM/DL
GLUCOSE SERPL-MCNC: 107 MG/DL (ref 65–99)
HBA1C MFR BLD: 4.9 % (ref 4.8–5.6)
HCT VFR BLD AUTO: 37.4 % (ref 34–46.6)
HGB BLD-MCNC: 12.4 G/DL (ref 12–15.9)
IMM GRANULOCYTES # BLD AUTO: 0.01 10*3/MM3 (ref 0–0.05)
IMM GRANULOCYTES NFR BLD AUTO: 0.2 % (ref 0–0.5)
IRON 24H UR-MRATE: 88 MCG/DL (ref 37–145)
IRON SATN MFR SERPL: 25 % (ref 20–50)
LYMPHOCYTES # BLD AUTO: 1.5 10*3/MM3 (ref 0.7–3.1)
LYMPHOCYTES NFR BLD AUTO: 29.4 % (ref 19.6–45.3)
MCH RBC QN AUTO: 30.9 PG (ref 26.6–33)
MCHC RBC AUTO-ENTMCNC: 33.2 G/DL (ref 31.5–35.7)
MCV RBC AUTO: 93.3 FL (ref 79–97)
MONOCYTES # BLD AUTO: 0.4 10*3/MM3 (ref 0.1–0.9)
MONOCYTES NFR BLD AUTO: 7.8 % (ref 5–12)
NEUTROPHILS NFR BLD AUTO: 3.03 10*3/MM3 (ref 1.7–7)
NEUTROPHILS NFR BLD AUTO: 59.3 % (ref 42.7–76)
NRBC BLD AUTO-RTO: 0 /100 WBC (ref 0–0.2)
PLATELET # BLD AUTO: 178 10*3/MM3 (ref 140–450)
PMV BLD AUTO: 11 FL (ref 6–12)
POTASSIUM SERPL-SCNC: 4.1 MMOL/L (ref 3.5–5.2)
PROT SERPL-MCNC: 7.4 G/DL (ref 6–8.5)
RBC # BLD AUTO: 4.01 10*6/MM3 (ref 3.77–5.28)
SODIUM SERPL-SCNC: 139 MMOL/L (ref 136–145)
TIBC SERPL-MCNC: 352 MCG/DL (ref 298–536)
TRANSFERRIN SERPL-MCNC: 236 MG/DL (ref 200–360)
WBC NRBC COR # BLD AUTO: 5.11 10*3/MM3 (ref 3.4–10.8)

## 2024-10-04 PROCEDURE — 83540 ASSAY OF IRON: CPT | Performed by: NURSE PRACTITIONER

## 2024-10-04 PROCEDURE — 82248 BILIRUBIN DIRECT: CPT | Performed by: NURSE PRACTITIONER

## 2024-10-04 PROCEDURE — 85025 COMPLETE CBC W/AUTO DIFF WBC: CPT | Performed by: NURSE PRACTITIONER

## 2024-10-04 PROCEDURE — 84466 ASSAY OF TRANSFERRIN: CPT | Performed by: NURSE PRACTITIONER

## 2024-10-04 PROCEDURE — 80053 COMPREHEN METABOLIC PANEL: CPT | Performed by: NURSE PRACTITIONER

## 2024-10-04 PROCEDURE — 83036 HEMOGLOBIN GLYCOSYLATED A1C: CPT | Performed by: NURSE PRACTITIONER

## 2024-10-04 PROCEDURE — 82728 ASSAY OF FERRITIN: CPT | Performed by: NURSE PRACTITIONER

## 2024-10-04 PROCEDURE — 99213 OFFICE O/P EST LOW 20 MIN: CPT | Performed by: NURSE PRACTITIONER

## 2024-10-04 RX ORDER — PERMETHRIN 50 MG/G
CREAM TOPICAL
COMMUNITY
Start: 2024-09-15 | End: 2024-10-04
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
## 2024-10-04 NOTE — PROGRESS NOTES
Subjective   Silvia Cilnton is a 49 y.o. female.     Chief Complaint   Patient presents with    Hyperglycemia     Pt states her BG have been running high.        History of Present Illness   She is here today with concerns for worsening hyperglycemia. She has noticed intermittent shakiness, polyphasia and polydipsia over the past few months.  She checked her blood sugar at a friend's house and found it was elevated at 160. She then had lab work completed with the [facility] clinic last month with elevated glucose above 160.    Blood sugars have been running between 100-120 over the last several months.  Last A1c completed 4 months ago normal at 5.3%.  Positive family history of mother and grandmother with diabetes.  She does note that she has been struggling with healthy eating, consuming a lot of sugars and carbohydrates recently.  She notes an increase in cravings and difficulty focusing on portion control.  She has returned to regular exercise.  She does note significant weight gain of 100 pounds over the past year.  She is frustrated as she has tried to lose weight without success.  She would like to discuss starting a medication to assist with weight loss in addition to healthy lifestyle.    The following portions of the patient's history were reviewed and updated as appropriate: allergies, current medications, past family history, past medical history, past social history, past surgical history, and problem list.    Review of Systems   Constitutional:  Positive for fatigue. Negative for chills and fever.   Respiratory: Negative.     Cardiovascular: Negative.    Endocrine: Positive for polydipsia and polyphagia. Negative for polyuria.   Neurological: Negative.    Psychiatric/Behavioral:  Positive for depressed mood and stress. Negative for suicidal ideas. The patient is nervous/anxious.        Objective   Physical Exam  Constitutional:       Appearance: She is well-developed.   Neck:      Thyroid: No thyroid  mass, thyromegaly or thyroid tenderness.      Vascular: No carotid bruit.      Trachea: Trachea normal.   Cardiovascular:      Rate and Rhythm: Normal rate and regular rhythm.      Chest Wall: PMI is not displaced.      Pulses:           Radial pulses are 2+ on the right side and 2+ on the left side.        Dorsalis pedis pulses are 2+ on the right side and 2+ on the left side.        Posterior tibial pulses are 2+ on the right side and 2+ on the left side.      Heart sounds: S1 normal and S2 normal. Murmur heard.      Systolic murmur is present with a grade of 1/6.   Pulmonary:      Effort: Pulmonary effort is normal.      Breath sounds: Normal breath sounds.   Musculoskeletal:      Right lower leg: No edema.      Left lower leg: No edema.   Lymphadenopathy:      Head:      Right side of head: No submental, submandibular, tonsillar or occipital adenopathy.      Left side of head: No submental, submandibular, tonsillar or occipital adenopathy.      Cervical: No cervical adenopathy.   Skin:     General: Skin is warm and dry.      Capillary Refill: Capillary refill takes less than 2 seconds.      Nails: There is no clubbing.   Neurological:      Mental Status: She is alert and oriented to person, place, and time.   Psychiatric:         Attention and Perception: Attention normal.         Mood and Affect: Mood and affect normal.         Speech: Speech normal.         Behavior: Behavior normal.         Thought Content: Thought content normal.         Cognition and Memory: Cognition normal.         Vitals:    10/04/24 1058   BP: 110/76   Pulse: 64   Temp: 97.7 °F (36.5 °C)   SpO2: 95%      Body mass index is 41.05 kg/m².    Assessment & Plan   Problems Addressed this Visit       Iron deficiency anemia due to chronic blood loss    Relevant Orders    CBC & Differential    Ferritin    Iron Profile    Class 3 severe obesity with serious comorbidity and body mass index (BMI) of 40.0 to 44.9 in adult     Other Visit Diagnoses        Impaired fasting glucose    -  Primary    Relevant Orders    Comprehensive Metabolic Panel    Hemoglobin A1c          Diagnoses         Codes Comments    Impaired fasting glucose    -  Primary ICD-10-CM: R73.01  ICD-9-CM: 790.21     Iron deficiency anemia due to chronic blood loss     ICD-10-CM: D50.0  ICD-9-CM: 280.0     Class 3 severe obesity with serious comorbidity and body mass index (BMI) of 40.0 to 44.9 in adult, unspecified obesity type     ICD-10-CM: E66.813, E66.01, Z68.41  ICD-9-CM: 278.01, V85.41           1.  Impaired fasting glucose/obesity-check lab work today including CMP and A1c.  Pending lab results would recommend initiating a medication to improve glycemic control.  Patient has several comorbidities including CAD, HTN and would benefit from weight loss.  Discussed with her today the benefits of initiating a GLP-1 medication for weight loss as well as to reduce cardiovascular risk.  Pending lab results would recommend initiation of Wegovy or Ozempic if A1c does indicate type 2 diabetes.  Emphasized the importance of focusing on healthy eating, optimizing protein and fiber intake, limiting sugar and carbohydrate intake, focusing on portion control along with increasing regular exercise with resistance training.  No personal or family history of medullary thyroid cancer.  We will plan to follow-up in 2 months.  2.  Iron deficiency anemia-we will check lab work today including CBC, ferritin and iron profile.

## 2024-10-04 NOTE — TELEPHONE ENCOUNTER
Caller: Silvia Clinton    Relationship: Self    Best call back number: [phone]     What is the best time to reach you: ANYTIME    Who are you requesting to speak with (clinical staff, provider,  specific staff member): ELFEGO CLAYTON    What was the call regarding: PATIENT STATED SHE HAS FOUND SHE HAS HAD TO USE HER GOOD RX CARD TO COVER MEDICATIONS AT [facility], BECAUSE HER INSURANCE WILL NOT COVER PRESCRIPTIONS AT OUT OF NETWORK PHARMACIES, INCLUDING [facility].    PATIENT STATED SHE MUST HAVE ALL OF HER PRESCRIPTIONS SENT TO  [facility]/pharmacy #[id number] - [place], [place] - [address] AT IN THE [place] - [phone] [facility] [phone] [employer] IN[employer] FOR THESE PRESCRIPTIONS TO BE COVERED.    PATIENT IS REQUESTING THIS BE DONE.    PATIENT STATED IF THE PRESCRIPTION FOR DIABETIC OR WEIGHT LOSS MEDICATION IS SENT TO [facility] IT WILL BE VERY EXPENSIVE, HOWEVER SHE WILL BE ABLE TO PICK THIS UP IF IT IS SENT TO [facility].    Is it okay if the provider responds through [employer]: PLEASE CALL IF  NEEDED TO DISCUSS.

## 2024-10-07 RX ORDER — ONDANSETRON 8 MG/1
8 TABLET, ORALLY DISINTEGRATING ORAL EVERY 8 HOURS PRN
Qty: 20 TABLET | Refills: 0 | Status: SHIPPED | OUTPATIENT
Start: 2024-10-07

## 2024-10-09 ENCOUNTER — TELEPHONE (OUTPATIENT)
Dept: INTERNAL MEDICINE | Facility: CLINIC | Age: 49
End: 2024-10-09
Payer: COMMERCIAL

## 2024-10-09 ENCOUNTER — TELEPHONE (OUTPATIENT)
Dept: ONCOLOGY | Facility: CLINIC | Age: 49
End: 2024-10-09
Payer: COMMERCIAL

## 2024-10-09 NOTE — TELEPHONE ENCOUNTER
Caller: Silvia Clinton    Relationship to patient: Self    Best call back number: [phone]      Patient is needing: NEEDS PRIOR AUTHORIZATION ON Semaglutide-Weight Management 0.25 MG/0.5ML solution auto-injector

## 2024-10-10 NOTE — TELEPHONE ENCOUNTER
Pt came and signed consent form. We will need to call her back about the side effects, dosage and all.

## 2024-10-14 ENCOUNTER — HOSPITAL ENCOUNTER (EMERGENCY)
Facility: HOSPITAL | Age: 49
Discharge: HOME | End: 2024-10-14
Attending: EMERGENCY MEDICINE | Admitting: EMERGENCY MEDICINE
Payer: COMMERCIAL

## 2024-10-14 ENCOUNTER — APPOINTMENT (OUTPATIENT)
Dept: CARDIOLOGY | Facility: HOSPITAL | Age: 49
End: 2024-10-14
Payer: COMMERCIAL

## 2024-10-14 ENCOUNTER — APPOINTMENT (OUTPATIENT)
Dept: GENERAL RADIOLOGY | Facility: HOSPITAL | Age: 49
End: 2024-10-14
Payer: COMMERCIAL

## 2024-10-14 VITALS
RESPIRATION RATE: 16 BRPM | TEMPERATURE: 98.7 F | SYSTOLIC BLOOD PRESSURE: 105 MMHG | HEART RATE: 70 BPM | DIASTOLIC BLOOD PRESSURE: 80 MMHG | OXYGEN SATURATION: 93 %

## 2024-10-14 DIAGNOSIS — R07.89 CHEST DISCOMFORT: ICD-10-CM

## 2024-10-14 DIAGNOSIS — R00.2 PALPITATIONS: Primary | ICD-10-CM

## 2024-10-14 LAB
ALBUMIN SERPL-MCNC: 4.2 G/DL (ref 3.5–5.2)
ALBUMIN/GLOB SERPL: 1.4 G/DL
ALP SERPL-CCNC: 72 U/L (ref 39–117)
ALT SERPL W P-5'-P-CCNC: 31 U/L (ref 1–33)
ANION GAP SERPL CALCULATED.3IONS-SCNC: 13.1 MMOL/L (ref 5–15)
AST SERPL-CCNC: 24 U/L (ref 1–32)
BASOPHILS # BLD AUTO: 0.02 10*3/MM3 (ref 0–0.2)
BASOPHILS NFR BLD AUTO: 0.4 % (ref 0–1.5)
BILIRUB SERPL-MCNC: 0.3 MG/DL (ref 0–1.2)
BUN SERPL-MCNC: 15 MG/DL (ref 6–20)
BUN/CREAT SERPL: 22.4 (ref 7–25)
CALCIUM SPEC-SCNC: 9.1 MG/DL (ref 8.6–10.5)
CHLORIDE SERPL-SCNC: 103 MMOL/L (ref 98–107)
CO2 SERPL-SCNC: 22.9 MMOL/L (ref 22–29)
CREAT SERPL-MCNC: 0.67 MG/DL (ref 0.57–1)
DEPRECATED RDW RBC AUTO: 45.6 FL (ref 37–54)
EGFRCR SERPLBLD CKD-EPI 2021: 108.6 ML/MIN/1.73
EOSINOPHIL # BLD AUTO: 0.09 10*3/MM3 (ref 0–0.4)
EOSINOPHIL NFR BLD AUTO: 1.9 % (ref 0.3–6.2)
ERYTHROCYTE [DISTWIDTH] IN BLOOD BY AUTOMATED COUNT: 13.2 % (ref 12.3–15.4)
GEN 5 2HR TROPONIN T REFLEX: 13 NG/L
GLOBULIN UR ELPH-MCNC: 3.1 GM/DL
GLUCOSE SERPL-MCNC: 94 MG/DL (ref 65–99)
HCT VFR BLD AUTO: 37.6 % (ref 34–46.6)
HGB BLD-MCNC: 12.5 G/DL (ref 12–15.9)
HOLD SPECIMEN: NORMAL
HOLD SPECIMEN: NORMAL
IMM GRANULOCYTES # BLD AUTO: 0.01 10*3/MM3 (ref 0–0.05)
IMM GRANULOCYTES NFR BLD AUTO: 0.2 % (ref 0–0.5)
LYMPHOCYTES # BLD AUTO: 1.09 10*3/MM3 (ref 0.7–3.1)
LYMPHOCYTES NFR BLD AUTO: 22.7 % (ref 19.6–45.3)
MCH RBC QN AUTO: 31.3 PG (ref 26.6–33)
MCHC RBC AUTO-ENTMCNC: 33.2 G/DL (ref 31.5–35.7)
MCV RBC AUTO: 94 FL (ref 79–97)
MONOCYTES # BLD AUTO: 0.41 10*3/MM3 (ref 0.1–0.9)
MONOCYTES NFR BLD AUTO: 8.5 % (ref 5–12)
NEUTROPHILS NFR BLD AUTO: 3.18 10*3/MM3 (ref 1.7–7)
NEUTROPHILS NFR BLD AUTO: 66.3 % (ref 42.7–76)
NRBC BLD AUTO-RTO: 0 /100 WBC (ref 0–0.2)
NT-PROBNP SERPL-MCNC: 72.1 PG/ML (ref 0–450)
PLATELET # BLD AUTO: 159 10*3/MM3 (ref 140–450)
PMV BLD AUTO: 11.9 FL (ref 6–12)
POTASSIUM SERPL-SCNC: 3.7 MMOL/L (ref 3.5–5.2)
PROT SERPL-MCNC: 7.3 G/DL (ref 6–8.5)
QT INTERVAL: 333 MS
QTC INTERVAL: 410 MS
RBC # BLD AUTO: 4 10*6/MM3 (ref 3.77–5.28)
SODIUM SERPL-SCNC: 139 MMOL/L (ref 136–145)
TROPONIN T DELTA: NORMAL
TROPONIN T SERPL HS-MCNC: <6 NG/L
WBC NRBC COR # BLD AUTO: 4.8 10*3/MM3 (ref 3.4–10.8)
WHOLE BLOOD HOLD COAG: NORMAL
WHOLE BLOOD HOLD SPECIMEN: NORMAL

## 2024-10-14 PROCEDURE — 93005 ELECTROCARDIOGRAM TRACING: CPT | Performed by: EMERGENCY MEDICINE

## 2024-10-14 PROCEDURE — 93010 ELECTROCARDIOGRAM REPORT: CPT | Performed by: INTERNAL MEDICINE

## 2024-10-14 PROCEDURE — 85025 COMPLETE CBC W/AUTO DIFF WBC: CPT | Performed by: EMERGENCY MEDICINE

## 2024-10-14 PROCEDURE — 99284 EMERGENCY DEPT VISIT MOD MDM: CPT

## 2024-10-14 PROCEDURE — 84484 ASSAY OF TROPONIN QUANT: CPT | Performed by: EMERGENCY MEDICINE

## 2024-10-14 PROCEDURE — 80053 COMPREHEN METABOLIC PANEL: CPT | Performed by: EMERGENCY MEDICINE

## 2024-10-14 PROCEDURE — 83880 ASSAY OF NATRIURETIC PEPTIDE: CPT | Performed by: PHYSICIAN ASSISTANT

## 2024-10-14 PROCEDURE — 36415 COLL VENOUS BLD VENIPUNCTURE: CPT

## 2024-10-14 PROCEDURE — 71045 X-RAY EXAM CHEST 1 VIEW: CPT

## 2024-10-14 PROCEDURE — 93242 EXT ECG>48HR<7D RECORDING: CPT

## 2024-10-14 PROCEDURE — 93005 ELECTROCARDIOGRAM TRACING: CPT

## 2024-10-14 RX ORDER — SODIUM CHLORIDE 0.9 % (FLUSH) 0.9 %
10 SYRINGE (ML) INJECTION AS NEEDED
Status: DISCONTINUED | OUTPATIENT
Start: 2024-10-14 | End: 2024-10-14 | Stop reason: HOSPADM

## 2024-10-14 RX ORDER — ASPIRIN 325 MG
325 TABLET ORAL ONCE
Status: COMPLETED | OUTPATIENT
Start: 2024-10-14 | End: 2024-10-14

## 2024-10-14 RX ADMIN — ASPIRIN 325 MG: 325 TABLET ORAL at 10:38

## 2024-10-14 NOTE — Clinical Note
[facility] EMERGENCY DEPARTMENT  [address] [facility] [address]  Phone: [phone]    Silvia Clinton was seen and treated in our emergency department on 10/14/2024.  She may return to work on 10/15/2024.         Thank you for choosing [facility].    Kyle Jarvis MD

## 2024-10-14 NOTE — DISCHARGE INSTRUCTIONS
Continue current medications, stay well-hydrated, close outpatient cardiology follow-up, ED return for worsening symptoms as needed.

## 2024-10-14 NOTE — ED PROVIDER NOTES
" EMERGENCY DEPARTMENT ENCOUNTER  Room Number:  18/18  PCP: Lady Sullivan APRN  Independent Historians: Patient      HPI:  Chief Complaint: had concerns including Chest Pain.     A complete HPI/ROS/PMH/PSH/SH/FH are unobtainable due to: None    Chronic or social conditions impacting patient care (Social Determinants of Health): None      Context: Silvia Clinton is a 49 y.o. female with a medical history of NSTEMI, hypertension, lupus, substance use, GERD, anxiety, bipolar disorder, hepatitis C who presents to the ED c/o acute chest pain.  Patient reports approximately 40 minutes ago she was at work when she felt palpitations and like her heart was \"flipping\".  She became flushed, diaphoretic, short of breath.  Reports she has some pressure in her chest.  She went to her car and took her Zyprexa and 1/2 tablet of metoprolol.  Reports the palpitations slowed down but she still feels pressure in her chest.  Has history of NSTEMI.  Not on aspirin or antiplatelet therapy.  She has seen Dr. Chavis previously.  Patient denies injury or trauma to the chest.  No associated vomiting.  No other systemic complaints at this time.      Review of prior external notes (non-ED) -and- Review of prior external test results outside of this encounter:  Patient seen in office by PCP on 10/4/2024 for impaired fasting glucose, obesity, iron deficiency anemia.  Reviewed assessment and plan.  Will consider Ozempic or Wegovy and have patient follow-up in 2 months.  Reviewed labs collected on 10/4/2024.  CBC with hemoglobin 12.4, CMP with creatinine 0.63.    Prescription drug monitoring program review:     N/A    PAST MEDICAL HISTORY  Active Ambulatory Problems     Diagnosis Date Noted    Spinal epidural abscess 12/09/2018    Non-STEMI (non-ST elevated myocardial infarction) 03/31/2019    Dizziness 11/26/2020    Polysubstance abuse     Essential hypertension     Lupus (systemic lupus erythematosus)     Palpitations     Vestibular " neuritis, left 11/27/2020    Lower extremity edema 12/30/2020    Chronic left-sided low back pain without sciatica 03/22/2021    Lumbar radiculopathy 04/26/2021    Pain of left lower extremity 04/26/2021    Obesity, Class III, BMI 40-49.9 (morbid obesity) 05/24/2021    Iron deficiency anemia due to chronic blood loss 06/23/2021    Adverse effect of iron 07/08/2021    Fatigue 09/17/2021    Allergies 09/17/2021    Heart murmur 09/17/2021    Constipation 09/17/2021    Elevated LFTs 09/17/2021    Hepatitis C 09/17/2021    Dysmenorrhea 09/17/2021    Bipolar 2 disorder 09/17/2021    Anxiety 09/17/2021    GERD (gastroesophageal reflux disease) 09/22/2021    Dietary counseling 09/22/2021    Grade I hemorrhoids 11/16/2021    COVID-19 12/14/2021    Migraine without aura 02/02/2023    Current every day smoker 09/27/2023    Class 3 severe obesity with serious comorbidity and body mass index (BMI) of 40.0 to 44.9 in adult 10/04/2024    Hepatosplenomegaly 10/04/2024     Resolved Ambulatory Problems     Diagnosis Date Noted    Chest pain 05/01/2019    Weight gain 03/22/2021    Nausea 04/20/2021    Fear associated with healthcare 05/24/2021    Heartburn 06/23/2021    History of drug abuse 09/22/2021    Vaginal itching 11/16/2021    Fever 02/02/2022    Heartburn 06/23/2021     Past Medical History:   Diagnosis Date    Depression     Drug abstinence syndrome     Drug abuse     Ex-smoker     Heart attack     Hypertension     Kidney stone     Lupus     Mitral valve anterior leaflet prolapse     NSTEMI (non-ST elevated myocardial infarction)     Otitis     Seizures     Tachycardia          PAST SURGICAL HISTORY  Past Surgical History:   Procedure Laterality Date    BACK SURGERY      CHOLECYSTECTOMY      LEG SURGERY      broke tibula,fibula, steel noah    LIVER BIOPSY      LUMBAR LAMINECTOMY DISCECTOMY DECOMPRESSION Left 12/9/2018    Procedure: Posterior lumbar three through sacrum decompression;  Surgeon: Tevin Whitley MD;   Location: [facility] OR;  Service: Neurosurgery    LYMPH NODE BIOPSY      SPINE SURGERY           FAMILY HISTORY  Family History   Problem Relation Age of Onset    Bipolar disorder Mother     Diabetes Mother     Hypertension Mother     No Known Problems Father     No Known Problems Sister     No Known Problems Brother     Nephrolithiasis Maternal Grandmother     Bipolar disorder Maternal Grandmother     Breast cancer Maternal Aunt     Liver cancer Maternal Uncle     Brain cancer Maternal Uncle     Lung cancer Maternal Uncle     Leukemia Cousin     Colon cancer Neg Hx     Cervical cancer Neg Hx     Uterine cancer Neg Hx     Ovarian cancer Neg Hx     Thyroid cancer Neg Hx          SOCIAL HISTORY  Social History     Socioeconomic History    Marital status: Single   Tobacco Use    Smoking status: Former     Current packs/day: 0.00     Average packs/day: 1 pack/day for 30.0 years (30.0 ttl pk-yrs)     Types: Cigarettes     Start date: 1990     Quit date: 2020     Years since quittin.1    Smokeless tobacco: Never    Tobacco comments:     E-CIG USE   Vaping Use    Vaping status: Every Day    Start date: 2020    Substances: Nicotine, Flavoring    Devices: Pre-filled or refillable cartridge, Refillable tank   Substance and Sexual Activity    Alcohol use: Not Currently     Comment: CAFFEINE USE: 1 CUP COFFEE/ 2 COKES DAILY    Drug use: Not Currently     Types: IV, Heroin, Methamphetamines     Comment: pt states she no longer uses heroin, she now uses meth    Sexual activity: Not Currently         ALLERGIES  Strawberry and Sulfa antibiotics      REVIEW OF SYSTEMS  Review of Systems   Constitutional:  Positive for diaphoresis. Negative for chills and fever.   HENT:  Negative for ear pain and sore throat.    Respiratory:  Positive for shortness of breath. Negative for cough.    Cardiovascular:  Positive for chest pain and palpitations.   Gastrointestinal:  Negative for abdominal pain and vomiting.    Genitourinary:  Negative for dysuria and hematuria.   Musculoskeletal:  Negative for arthralgias and joint swelling.   Skin:  Negative for pallor and rash.   Neurological:  Negative for numbness and headaches.   Psychiatric/Behavioral:  Negative for confusion and hallucinations.      Included in HPI  All systems reviewed and negative except for those discussed in HPI.      PHYSICAL EXAM    I have reviewed the triage vital signs and nursing notes.    ED Triage Vitals [10/14/24 1008]   Temp Heart Rate Resp BP SpO2   98.7 °F (37.1 °C) 97 16 -- 94 %      Temp src Heart Rate Source Patient Position BP Location FiO2 (%)   Tympanic Monitor -- -- --       Physical Exam  Constitutional:       General: She is not in acute distress.     Appearance: She is well-developed.   HENT:      Head: Normocephalic and atraumatic.   Eyes:      Extraocular Movements: Extraocular movements intact.   Cardiovascular:      Rate and Rhythm: Normal rate and regular rhythm.      Heart sounds: Normal heart sounds.   Pulmonary:      Effort: Pulmonary effort is normal.      Breath sounds: Normal breath sounds.   Abdominal:      General: There is no distension.   Skin:     General: Skin is warm.   Neurological:      General: No focal deficit present.      Mental Status: She is alert and oriented to person, place, and time.   Psychiatric:         Mood and Affect: Mood normal.           LAB RESULTS  Recent Results (from the past 24 hours)   ECG 12 Lead ED Triage Standing Order; Chest Pain    Collection Time: 10/14/24 10:13 AM   Result Value Ref Range    QT Interval 333 ms    QTC Interval 410 ms   Comprehensive Metabolic Panel    Collection Time: 10/14/24 10:34 AM    Specimen: Blood   Result Value Ref Range    Glucose 94 65 - 99 mg/dL    BUN 15 6 - 20 mg/dL    Creatinine 0.67 0.57 - 1.00 mg/dL    Sodium 139 136 - 145 mmol/L    Potassium 3.7 3.5 - 5.2 mmol/L    Chloride 103 98 - 107 mmol/L    CO2 22.9 22.0 - 29.0 mmol/L    Calcium 9.1 8.6 - 10.5 mg/dL     Total Protein 7.3 6.0 - 8.5 g/dL    Albumin 4.2 3.5 - 5.2 g/dL    ALT (SGPT) 31 1 - 33 U/L    AST (SGOT) 24 1 - 32 U/L    Alkaline Phosphatase 72 39 - 117 U/L    Total Bilirubin 0.3 0.0 - 1.2 mg/dL    Globulin 3.1 gm/dL    A/G Ratio 1.4 g/dL    BUN/Creatinine Ratio 22.4 7.0 - 25.0    Anion Gap 13.1 5.0 - 15.0 mmol/L    eGFR 108.6 >60.0 mL/min/1.73   High Sensitivity Troponin T    Collection Time: 10/14/24 10:34 AM    Specimen: Blood   Result Value Ref Range    HS Troponin T <6 <14 ng/L   Green Top (Gel)    Collection Time: 10/14/24 10:34 AM   Result Value Ref Range    Extra Tube Hold for add-ons.    Lavender Top    Collection Time: 10/14/24 10:34 AM   Result Value Ref Range    Extra Tube hold for add-on    Gold Top - SST    Collection Time: 10/14/24 10:34 AM   Result Value Ref Range    Extra Tube Hold for add-ons.    Light Blue Top    Collection Time: 10/14/24 10:34 AM   Result Value Ref Range    Extra Tube Hold for add-ons.    CBC Auto Differential    Collection Time: 10/14/24 10:34 AM    Specimen: Blood   Result Value Ref Range    WBC 4.80 3.40 - 10.80 10*3/mm3    RBC 4.00 3.77 - 5.28 10*6/mm3    Hemoglobin 12.5 12.0 - 15.9 g/dL    Hematocrit 37.6 34.0 - 46.6 %    MCV 94.0 79.0 - 97.0 fL    MCH 31.3 26.6 - 33.0 pg    MCHC 33.2 31.5 - 35.7 g/dL    RDW 13.2 12.3 - 15.4 %    RDW-SD 45.6 37.0 - 54.0 fl    MPV 11.9 6.0 - 12.0 fL    Platelets 159 140 - 450 10*3/mm3    Neutrophil % 66.3 42.7 - 76.0 %    Lymphocyte % 22.7 19.6 - 45.3 %    Monocyte % 8.5 5.0 - 12.0 %    Eosinophil % 1.9 0.3 - 6.2 %    Basophil % 0.4 0.0 - 1.5 %    Immature Grans % 0.2 0.0 - 0.5 %    Neutrophils, Absolute 3.18 1.70 - 7.00 10*3/mm3    Lymphocytes, Absolute 1.09 0.70 - 3.10 10*3/mm3    Monocytes, Absolute 0.41 0.10 - 0.90 10*3/mm3    Eosinophils, Absolute 0.09 0.00 - 0.40 10*3/mm3    Basophils, Absolute 0.02 0.00 - 0.20 10*3/mm3    Immature Grans, Absolute 0.01 0.00 - 0.05 10*3/mm3    nRBC 0.0 0.0 - 0.2 /100 WBC   BNP    Collection Time:  10/14/24 10:34 AM    Specimen: Blood   Result Value Ref Range    proBNP 72.1 0.0 - 450.0 pg/mL   High Sensitivity Troponin T 2Hr    Collection Time: 10/14/24 12:25 PM    Specimen: Blood   Result Value Ref Range    HS Troponin T 13 <14 ng/L    Troponin T Delta           RADIOLOGY  XR Chest 1 View    Result Date: 10/14/2024  XR CHEST 1 VW-  Clinical: Chest pain  COMPARISON 12/12/2021  FINDINGS: Very shallow inspiratory effort with crowding of the bronchovascular markings by laterally. No pulmonary edema, gross pleural effusion or consolidation. The cardiomediastinal silhouette is stable, satisfactory in appearance. The remainder is unremarkable.  CONCLUSION: Limited due to morbid obesity and shallow inspiratory effort. No active disease of the chest.  This report was finalized on 10/14/2024 10:29 AM by Dr. Jorge Shoemaker M.D on Workstation: [id number]         MEDICATIONS GIVEN IN ER  Medications   sodium chloride 0.9 % flush 10 mL (has no administration in time range)   aspirin tablet 325 mg (325 mg Oral Given 10/14/24 1038)         ORDERS PLACED DURING THIS VISIT:  Orders Placed This Encounter   Procedures    XR Chest 1 View    [place] Draw    Comprehensive Metabolic Panel    High Sensitivity Troponin T    CBC Auto Differential    BNP    High Sensitivity Troponin T 2Hr    NPO Diet NPO Type: Strict NPO    Undress & Gown    Continuous Pulse Oximetry    Oxygen Therapy- Nasal Cannula; Titrate 1-6 LPM Per SpO2; 90 - 95%    Holter Monitor - 72 Hour Up To 15 Days    ECG 12 Lead ED Triage Standing Order; Chest Pain    ECG 12 Lead ED Triage Standing Order; Chest Pain    Insert Peripheral IV    CBC & Differential    Green Top (Gel)    Lavender Top    Gold Top - SST    Light Blue Top         OUTPATIENT MEDICATION MANAGEMENT:  Current Facility-Administered Medications Ordered in Epic   Medication Dose Route Frequency Provider Last Rate Last Admin    sodium chloride 0.9 % flush 10 mL  10 mL Intravenous PRN Kyle Jarvis MD          Current Outpatient Medications Ordered in Epic   Medication Sig Dispense Refill    acetaminophen (TYLENOL) 500 MG tablet Take 1 tablet by mouth Every 6 (Six) Hours As Needed for Mild Pain . 60 tablet 1    amLODIPine (NORVASC) 10 MG tablet Take 0.5 tablets by mouth 2 (Two) Times a Day. 90 tablet 1    buprenorphine-naloxone (SUBOXONE) 8-2 MG per SL tablet Place 1 tablet under the tongue 2 (Two) Times a Day.      Ferrous Sulfate ER (Slow Fe) 142 (45 Fe) MG tablet controlled-release Take 142 mg by mouth Daily. 30 tablet 3    hydrocortisone 1 % cream Apply 1 application topically to the appropriate area as directed 2 (Two) Times a Day. 1 each 1    loratadine (CLARITIN) 10 MG tablet Take 1 tablet by mouth Daily. 90 tablet 1    losartan (COZAAR) 100 MG tablet Take 1 tablet by mouth Daily. 90 tablet 1    metoprolol tartrate (LOPRESSOR) 100 MG tablet Take 1 tablet by mouth 2 (Two) Times a Day. 180 tablet 1    naloxone (NARCAN) 4 MG/0.1ML nasal spray       nystatin-triamcinolone (MYCOLOG II) 220170-3.1 UNIT/GM-% cream APPLY TOPICALLY TO THE APPROPRIATE AREA AS DIRECTED 2 TIMES A DAY 60 g 0    OLANZapine (zyPREXA) 15 MG tablet Take 1 tablet by mouth every night at bedtime. 90 tablet 0    omeprazole (priLOSEC) 20 MG capsule Take 1 capsule by mouth Daily. 90 capsule 3    ondansetron ODT (ZOFRAN-ODT) 8 MG disintegrating tablet Take 1 tablet by mouth Every 8 (Eight) Hours As Needed for Nausea or Vomiting. 20 tablet 0    Semaglutide-Weight Management 0.25 MG/0.5ML solution auto-injector Inject 0.5 mL under the skin into the appropriate area as directed 1 (One) Time Per Week. 2 mL 1    sucralfate (CARAFATE) 1 g tablet Take 1 tablet by mouth 3 (Three) Times a Day Before Meals. 90 tablet 1    valACYclovir (VALTREX) 500 MG tablet Take 2 tablets by mouth 2 (Two) Times a Day. 30 tablet 2    venlafaxine XR (EFFEXOR-XR) 150 MG 24 hr capsule Take 1 capsule by mouth Daily. 90 capsule 1           PROGRESS, DATA ANALYSIS, CONSULTS, AND  MEDICAL DECISION MAKING  All labs have been independently interpreted by me.  All radiology studies have been reviewed by me. All EKG's have been independently viewed and interpreted by me.  Discussion below represents my analysis of pertinent findings related to patient's condition, differential diagnosis, treatment plan and final disposition.    Differential diagnosis includes but is not limited to NSTEMI, atrial fibrillation, paroxysmal SVT.        ED Course as of 10/14/24 1350   Mon Oct 14, 2024   1026 Chest x-ray independently interpreted by me as no pneumothorax [MP]   1118 WBC: 4.80 [MP]   1118 Hemoglobin: 12.5 [MP]   1118 BUN: 15 [MP]   1118 Creatinine: 0.67 [MP]   1118 proBNP: 72.1 [MP]   1118 HS Troponin T: <6 [MP]      ED Course User Index  [MP] Tete Funez PA-C             AS OF 13:50 EDT VITALS:    BP - 105/80  HR - 70  TEMP - 98.7 °F (37.1 °C) (Tympanic)  O2 SATS - 93%    COMPLEXITY OF CARE  Admission was considered but after careful review of the patient's presentation, physical examination, diagnostic results, and response to treatment the patient may be safely discharged with outpatient follow-up.      DIAGNOSIS  Final diagnoses:   Palpitations   Chest discomfort         DISPOSITION  ED Disposition       ED Disposition   Discharge    Condition   Stable    Comment   --                Please note that portions of this document were completed with a voice recognition program.    Note Disclaimer: At [facility], we believe that sharing information builds trust and better relationships. You are receiving this note because you recently visited [facility]. It is possible you will see health information before a provider has talked with you about it. This kind of information can be easy to misunderstand. To help you fully understand what it means for your health, we urge you to discuss this note with your provider.         Tete Funez PA-C  10/14/24 1351

## 2024-11-01 DIAGNOSIS — F41.9 ANXIETY: Chronic | ICD-10-CM

## 2024-11-01 RX ORDER — VENLAFAXINE HYDROCHLORIDE 150 MG/1
150 CAPSULE, EXTENDED RELEASE ORAL DAILY
Qty: 90 CAPSULE | Refills: 1 | Status: SHIPPED | OUTPATIENT
Start: 2024-11-01

## 2024-11-01 NOTE — TELEPHONE ENCOUNTER
Caller: Silvia Clinton CM    Relationship: Self    Best call back number: [phone]     Requested Prescriptions:   Requested Prescriptions     Pending Prescriptions Disp Refills    venlafaxine XR (EFFEXOR-XR) 150 MG 24 hr capsule 90 capsule 1     Sig: Take 1 capsule by mouth Daily.        Pharmacy where request should be sent: [id number] - [place], [place] - [address]  AT IN [facility] - [phone] [facility] [phone] FX     Last office visit with prescribing clinician: 10/4/2024   Last telemedicine visit with prescribing clinician: Visit date not found   Next office visit with prescribing clinician: 12/5/2024     Additional details provided by patient: PATIENT STATED THAT THE PRESCRIPTION WAS SENT IN FOR NAME BRAND ONLY AND HER INSURANCE DOESN'T COVER NAME BRAND. PATIENT IS REQUESTING THE GENERIC BRAND TO BE SENT TO PHARMACY. PATIENT IS OUT OF MEDICATION .  Does the patient have less than a 3 day supply:  [x] Yes  [] No    Would you like a call back once the refill request has been completed: [x] Yes [] No    If the office needs to give you a call back, can they leave a voicemail: [x] Yes [] No    Chip Victor Rep   11/01/24 09:10 EDT

## 2024-11-26 DIAGNOSIS — D50.0 IRON DEFICIENCY ANEMIA DUE TO CHRONIC BLOOD LOSS: ICD-10-CM

## 2024-11-27 DIAGNOSIS — F41.9 ANXIETY: ICD-10-CM

## 2024-11-27 DIAGNOSIS — F31.81 BIPOLAR 2 DISORDER: ICD-10-CM

## 2024-11-27 DIAGNOSIS — F31.81 BIPOLAR 2 DISORDER: Primary | ICD-10-CM

## 2024-11-27 RX ORDER — OLANZAPINE 15 MG/1
15 TABLET ORAL
Qty: 90 TABLET | Refills: 0 | Status: SHIPPED | OUTPATIENT
Start: 2024-11-27

## 2024-11-27 NOTE — TELEPHONE ENCOUNTER
Anesthesia Pre Eval Note    Anesthesia ROS/Med Hx    Overall Review:  EKG was reviewed       Neuro/Psych Review:    Positive for psychiatric history - Anxiety    Cardiovascular Review:  Exercise tolerance: good (>4 METS)    GI/HEPATIC/RENAL Review:    Positive for GERD Positive for liver disease (fatty liver)     End/Other Review:    Positive for obesity class II - 35.00 - 39.99  Additional Results:  EKG:  Encounter Date: 03/25/22  -ELECTROCARDIOGRAM 12-LEAD:                                                                 Narrative    Normal sinus rhythm, Nonspecific ST and T wave abnormality, Prolonged QT, Abnormal ECG                                                                Impression    Normal sinus rhythm, Nonspecific ST and T wave abnormality, Prolonged QT, Abnormal ECG    ALLERGIES:   -- Zoloft -- RASH       Last Labs        Component                Value               Date/Time                  WBC                      9.0                 03/25/2022 0908            RBC                      4.50                03/25/2022 0908            HGB                      13.7                03/25/2022 0908            HCT                      39.1                03/25/2022 0908            MCV                      86.9                03/25/2022 0908            MCH                      30.4                03/25/2022 0908            MCHC                     35.0                03/25/2022 0908            RDW-CV                   13.1                03/25/2022 0908            Sodium                   140                 03/25/2022 0908            Potassium                3.7                 03/25/2022 0908            Chloride                 103                 03/25/2022 0908            Carbon Dioxide           27                  03/25/2022 0908            Glucose                  92                  03/25/2022 0908            BUN                      15                  03/25/2022 0908            Creatinine            Pt states she do not have psych and want refill on Zyprexa and want referral       0.83                03/25/2022 0908            Glomerular Filtrati*     81                  03/25/2022 0908            Calcium                  8.9                 03/25/2022 0908            PLT                      251                 03/25/2022 0908        Past Medical History:  08/30/2021: Abnormal Pap smear of cervix      Comment:  [facility]  No date: Anxiety  08/30/2021: Atypical squamous cells cannot exclude high grade   squamous intraepithelial lesion on cytologic smear of cervix ([facility])  No date: Delayed emergence from anesthesia  No date: Edema  No date: Fatty liver  No date: GERD (gastroesophageal reflux disease)  No date: Gout  No date: Hypokalemia  12/17/2018: Infiltrating ductal carcinoma of left breast (CMS/HCC)  12/17/2018: Malignant neoplasm of upper-outer quadrant of breast in   female, estrogen receptor positive (CMS/HCC)      Comment:  left breast  No date: Osteopenia    Past Surgical History:  No date: Breast biopsy      Comment:  Left Breast  No date: Breast lumpectomy; Left  No date: Breast surgery  No date: Cyst removal; Left      Comment:  eye  No date: Pr history new or changing moles      Comment:  face and back  No date: [place] tooth extraction       Prior to Admission medications :  Medication fluticasone propionate (Flovent HFA) 110 MCG/ACT inhaler, Sig Inhale 1 puff into the lungs 2 times daily., Start Date 3/29/22, End Date , Taking? Yes, Authorizing Provider Meghan Peña, DO    Medication triamterene-hydrochlorothiazide (MAXZIDE) 75-50 MG per tablet, Sig Take 1 tablet by mouth daily., Start Date 1/7/22, End Date , Taking? Yes, Authorizing Provider Meghan Peña, DO    Medication allopurinol (ZYLOPRIM) 100 MG tablet, Sig Take 1 tablet by mouth daily., Start Date 1/7/22, End Date , Taking? Yes, Authorizing Provider Meghan Peña, DO    Medication potassium CHLORIDE (KLOR-CON M) 20 MEQ marika ER tablet, Sig Take 1 tablet by mouth daily., Start Date 1/7/22, End Date , Taking? Yes,  Authorizing Provider Meghan Peña, DO    Medication CALCIUM CARBONATE-VITAMIN D PO, Sig Take 1,200 mg by mouth daily., Start Date , End Date , Taking? Yes, Authorizing Provider Outside Provider    Medication Riboflavin (Vitamin B-2) 100 MG Tab, Sig Take 100 mg by mouth daily., Start Date , End Date , Taking? Yes, Authorizing Provider Outside Provider    Medication tamoxifen (NOLVADEX) 20 MG tablet, Sig Take 1 tablet by mouth daily., Start Date 11/17/21, End Date , Taking? Yes, Authorizing Provider Willie Polk MD    Medication biotin 1000 MCG tablet, Sig Take 10,000 mcg by mouth daily. , Start Date , End Date , Taking? Yes, Authorizing Provider Outside Provider    Medication Tart Cherry 1200 MG Cap, Sig Take 2,400 mg by mouth daily. , Start Date , End Date , Taking? Yes, Authorizing Provider Outside Provider    Medication CAFFEINE PO, Sig Take 200 mg by mouth daily. , Start Date , End Date , Taking? Yes, Authorizing Provider Outside Provider    Medication Magnesium 400 MG Cap, Sig Take 500 mg by mouth daily. , Start Date , End Date , Taking? Yes, Authorizing Provider Outside Provider    Medication ZINC SULFATE PO, Sig Take 100 mg by mouth 2 times daily. , Start Date , End Date , Taking? Yes, Authorizing Provider Outside Provider    Medication NIACIN PO, Sig Take 500 mg by mouth daily. , Start Date , End Date , Taking? Yes, Authorizing Provider Outside Provider    Medication vitamin B-12 (CYANOCOBALAMIN) 1000 MCG tablet, Sig Take 500 mcg by mouth daily. , Start Date , End Date , Taking? Yes, Authorizing Provider Outside Provider    Medication Cholecalciferol (VITAMIN D) 2000 UNITS CAPS, Sig Take 1 capsule by mouth daily., Start Date 12/2/13, End Date , Taking? Yes, Authorizing Provider Eden Watkins MD    Medication Ascorbic Acid (VITAMIN C) 500 MG tablet, Sig Take 2,000 mg by mouth daily. , Start Date , End Date , Taking? Yes, Authorizing Provider Outside Provider    Medication fish oil-omega-3 fatty acids  1000 MG CAPS, Sig Take 1 capsule by mouth daily. 1400 FISH OIL OMEGA 3 900 MG, Start Date , End Date , Taking? Yes, Authorizing Provider Outside Provider    Medication diphenhydramine-acetaminophen (Tylenol PM Extra Strength)  MG Tab, Sig Take 1 tablet by mouth nightly as needed., Start Date , End Date , Taking? , Authorizing Provider Outside Provider    Medication erythromycin (ILOTYCIN) ophthalmic ointment, Sig Apply 1/2 inch inside lower lid 3 times a day, Start Date 3/25/22, End Date , Taking? , Authorizing Provider Meghan Peña, DO    Medication ondansetron (Zofran) 4 MG tablet, Sig Take 1 tablet by mouth as directed. Take with procedure prep for nausea per physician's office., Start Date 1/17/22, End Date , Taking? , Authorizing Provider Sean MACKEY MD    Medication LORazepam (ATIVAN) 0.5 MG tablet, Sig Take 1 tablet by mouth daily as needed for Anxiety., Start Date 1/5/22, End Date 2/10/22, Taking? , Authorizing Provider Meghan Peña, DO    Medication Na Sulfate-K Sulfate-Mg Sulf (Suprep Bowel Prep Kit) 17.5-3.13-1.6 GM/177ML Solution, Sig Take 354 mLs by mouth as directed., Start Date 10/22/21, End Date , Taking? , Authorizing Provider Sean MACKEY MD    Medication ondansetron (Zofran) 4 MG tablet, Sig Take 1 tablet by mouth as directed., Start Date 10/22/21, End Date , Taking? , Authorizing Provider Sean MACKEY MD            Relevant Problems   No relevant active problems       Physical Exam     Airway   Mallampati: II  TM Distance: >3 FB  Neck ROM: Full    Cardiovascular  Cardiovascular exam normal    Dental Exam  Dental exam normal    Pulmonary Exam  Pulmonary exam normal      Anesthesia Plan:    ASA Status: 2  Anesthesia Type: MAC    Induction: Intravenous  Preferred Airway Type: Mask  Maintenance: TIVA  Premedication: None      Post-op Pain Management: Per Surgeon      Checklist  Reviewed: NPO Status, Allergies, Medications, Problem list, Past Med History, Lab Results and  EKG  Consent/Risks Discussed Statement:  The proposed anesthetic plan, including its risks and benefits, have been discussed with the Patient along with the risks and benefits of alternatives. Questions were encouraged and answered and the patient and/or representative understands and agrees to proceed.        I discussed with the patient (and/or patient's legal representative) the risks and benefits of the proposed anesthesia plan, MAC, which may include services performed by other anesthesia providers.    Alternative anesthesia plans, if available, were reviewed with the patient (and/or patient's legal representative). Discussion has been held with the patient (and/or patient's legal representative) regarding risks of anesthesia, which include allergic reaction, vomiting, sore throat and intra-operative awareness and emergent situations that may require change in anesthesia plan.    The patient (and/or patient's legal representative) has indicated understanding, his/her questions have been answered, and he/she wishes to proceed with the planned anesthetic.    Blood Products: Not Anticipated

## 2024-11-28 ENCOUNTER — HOSPITAL ENCOUNTER (EMERGENCY)
Facility: HOSPITAL | Age: 49
Discharge: HOME | End: 2024-11-28
Attending: EMERGENCY MEDICINE | Admitting: EMERGENCY MEDICINE
Payer: COMMERCIAL

## 2024-11-28 ENCOUNTER — APPOINTMENT (OUTPATIENT)
Dept: GENERAL RADIOLOGY | Facility: HOSPITAL | Age: 49
End: 2024-11-28
Payer: COMMERCIAL

## 2024-11-28 VITALS
HEART RATE: 71 BPM | BODY MASS INDEX: 38.25 KG/M2 | HEIGHT: 66 IN | RESPIRATION RATE: 16 BRPM | WEIGHT: 238 LBS | TEMPERATURE: 97.3 F | OXYGEN SATURATION: 93 % | DIASTOLIC BLOOD PRESSURE: 72 MMHG | SYSTOLIC BLOOD PRESSURE: 107 MMHG

## 2024-11-28 DIAGNOSIS — N39.0 ACUTE UTI: Primary | ICD-10-CM

## 2024-11-28 DIAGNOSIS — R73.9 HYPERGLYCEMIA: ICD-10-CM

## 2024-11-28 DIAGNOSIS — K21.9 GASTROESOPHAGEAL REFLUX DISEASE, UNSPECIFIED WHETHER ESOPHAGITIS PRESENT: ICD-10-CM

## 2024-11-28 DIAGNOSIS — Z86.79 HISTORY OF CAD (CORONARY ARTERY DISEASE): ICD-10-CM

## 2024-11-28 LAB
ALBUMIN SERPL-MCNC: 4.2 G/DL (ref 3.5–5.2)
ALBUMIN/GLOB SERPL: 1.4 G/DL
ALP SERPL-CCNC: 76 U/L (ref 39–117)
ALT SERPL W P-5'-P-CCNC: 26 U/L (ref 1–33)
ANION GAP SERPL CALCULATED.3IONS-SCNC: 9.6 MMOL/L (ref 5–15)
AST SERPL-CCNC: 25 U/L (ref 1–32)
BACTERIA UR QL AUTO: ABNORMAL /HPF
BASOPHILS # BLD AUTO: 0.01 10*3/MM3 (ref 0–0.2)
BASOPHILS NFR BLD AUTO: 0.2 % (ref 0–1.5)
BILIRUB SERPL-MCNC: 0.5 MG/DL (ref 0–1.2)
BILIRUB UR QL STRIP: NEGATIVE
BUN SERPL-MCNC: 13 MG/DL (ref 6–20)
BUN/CREAT SERPL: 19.7 (ref 7–25)
CALCIUM SPEC-SCNC: 8.9 MG/DL (ref 8.6–10.5)
CHLORIDE SERPL-SCNC: 102 MMOL/L (ref 98–107)
CLARITY UR: CLEAR
CO2 SERPL-SCNC: 25.4 MMOL/L (ref 22–29)
COLOR UR: ABNORMAL
CREAT SERPL-MCNC: 0.66 MG/DL (ref 0.57–1)
DEPRECATED RDW RBC AUTO: 38.8 FL (ref 37–54)
EGFRCR SERPLBLD CKD-EPI 2021: 109 ML/MIN/1.73
EOSINOPHIL # BLD AUTO: 0.1 10*3/MM3 (ref 0–0.4)
EOSINOPHIL NFR BLD AUTO: 2.3 % (ref 0.3–6.2)
ERYTHROCYTE [DISTWIDTH] IN BLOOD BY AUTOMATED COUNT: 12 % (ref 12.3–15.4)
GLOBULIN UR ELPH-MCNC: 2.9 GM/DL
GLUCOSE SERPL-MCNC: 147 MG/DL (ref 65–99)
GLUCOSE UR STRIP-MCNC: NEGATIVE MG/DL
HCT VFR BLD AUTO: 38.3 % (ref 34–46.6)
HGB BLD-MCNC: 13 G/DL (ref 12–15.9)
HGB UR QL STRIP.AUTO: NEGATIVE
HOLD SPECIMEN: NORMAL
HOLD SPECIMEN: NORMAL
HYALINE CASTS UR QL AUTO: ABNORMAL /LPF
IMM GRANULOCYTES # BLD AUTO: 0.01 10*3/MM3 (ref 0–0.05)
IMM GRANULOCYTES NFR BLD AUTO: 0.2 % (ref 0–0.5)
KETONES UR QL STRIP: ABNORMAL
LEUKOCYTE ESTERASE UR QL STRIP.AUTO: ABNORMAL
LYMPHOCYTES # BLD AUTO: 0.99 10*3/MM3 (ref 0.7–3.1)
LYMPHOCYTES NFR BLD AUTO: 22.7 % (ref 19.6–45.3)
MCH RBC QN AUTO: 30.5 PG (ref 26.6–33)
MCHC RBC AUTO-ENTMCNC: 33.9 G/DL (ref 31.5–35.7)
MCV RBC AUTO: 89.9 FL (ref 79–97)
MONOCYTES # BLD AUTO: 0.24 10*3/MM3 (ref 0.1–0.9)
MONOCYTES NFR BLD AUTO: 5.5 % (ref 5–12)
NEUTROPHILS NFR BLD AUTO: 3.02 10*3/MM3 (ref 1.7–7)
NEUTROPHILS NFR BLD AUTO: 69.1 % (ref 42.7–76)
NITRITE UR QL STRIP: NEGATIVE
NRBC BLD AUTO-RTO: 0 /100 WBC (ref 0–0.2)
PH UR STRIP.AUTO: 6.5 [PH] (ref 5–8)
PLATELET # BLD AUTO: 161 10*3/MM3 (ref 140–450)
PMV BLD AUTO: 12 FL (ref 6–12)
POTASSIUM SERPL-SCNC: 3.6 MMOL/L (ref 3.5–5.2)
PROT SERPL-MCNC: 7.1 G/DL (ref 6–8.5)
PROT UR QL STRIP: NEGATIVE
QT INTERVAL: 378 MS
QTC INTERVAL: 420 MS
RBC # BLD AUTO: 4.26 10*6/MM3 (ref 3.77–5.28)
RBC # UR STRIP: ABNORMAL /HPF
REF LAB TEST METHOD: ABNORMAL
SODIUM SERPL-SCNC: 137 MMOL/L (ref 136–145)
SP GR UR STRIP: >1.03 (ref 1–1.03)
SQUAMOUS #/AREA URNS HPF: ABNORMAL /HPF
TROPONIN T SERPL HS-MCNC: <6 NG/L
UROBILINOGEN UR QL STRIP: ABNORMAL
WBC # UR STRIP: ABNORMAL /HPF
WBC NRBC COR # BLD AUTO: 4.37 10*3/MM3 (ref 3.4–10.8)
WHOLE BLOOD HOLD COAG: NORMAL
WHOLE BLOOD HOLD SPECIMEN: NORMAL

## 2024-11-28 PROCEDURE — 93005 ELECTROCARDIOGRAM TRACING: CPT

## 2024-11-28 PROCEDURE — 99284 EMERGENCY DEPT VISIT MOD MDM: CPT

## 2024-11-28 PROCEDURE — 71045 X-RAY EXAM CHEST 1 VIEW: CPT

## 2024-11-28 PROCEDURE — 84484 ASSAY OF TROPONIN QUANT: CPT | Performed by: EMERGENCY MEDICINE

## 2024-11-28 PROCEDURE — 96374 THER/PROPH/DIAG INJ IV PUSH: CPT

## 2024-11-28 PROCEDURE — 81001 URINALYSIS AUTO W/SCOPE: CPT | Performed by: EMERGENCY MEDICINE

## 2024-11-28 PROCEDURE — 93005 ELECTROCARDIOGRAM TRACING: CPT | Performed by: EMERGENCY MEDICINE

## 2024-11-28 PROCEDURE — 85025 COMPLETE CBC W/AUTO DIFF WBC: CPT | Performed by: EMERGENCY MEDICINE

## 2024-11-28 PROCEDURE — 80053 COMPREHEN METABOLIC PANEL: CPT | Performed by: EMERGENCY MEDICINE

## 2024-11-28 PROCEDURE — 93010 ELECTROCARDIOGRAM REPORT: CPT | Performed by: INTERNAL MEDICINE

## 2024-11-28 RX ORDER — FAMOTIDINE 10 MG/ML
20 INJECTION, SOLUTION INTRAVENOUS ONCE
Status: COMPLETED | OUTPATIENT
Start: 2024-11-28 | End: 2024-11-28

## 2024-11-28 RX ORDER — NITROFURANTOIN 25; 75 MG/1; MG/1
100 CAPSULE ORAL ONCE
Status: COMPLETED | OUTPATIENT
Start: 2024-11-28 | End: 2024-11-28

## 2024-11-28 RX ORDER — SODIUM CHLORIDE 0.9 % (FLUSH) 0.9 %
10 SYRINGE (ML) INJECTION AS NEEDED
Status: DISCONTINUED | OUTPATIENT
Start: 2024-11-28 | End: 2024-11-28 | Stop reason: HOSPADM

## 2024-11-28 RX ORDER — ALUMINA, MAGNESIA, AND SIMETHICONE 2400; 2400; 240 MG/30ML; MG/30ML; MG/30ML
15 SUSPENSION ORAL ONCE
Status: COMPLETED | OUTPATIENT
Start: 2024-11-28 | End: 2024-11-28

## 2024-11-28 RX ORDER — NITROFURANTOIN 25; 75 MG/1; MG/1
100 CAPSULE ORAL 2 TIMES DAILY
Qty: 10 CAPSULE | Refills: 0 | Status: SHIPPED | OUTPATIENT
Start: 2024-11-28 | End: 2024-12-03

## 2024-11-28 RX ORDER — LIDOCAINE HYDROCHLORIDE 20 MG/ML
15 SOLUTION OROPHARYNGEAL ONCE
Status: COMPLETED | OUTPATIENT
Start: 2024-11-28 | End: 2024-11-28

## 2024-11-28 RX ADMIN — NITROFURANTOIN (MONOHYDRATE/MACROCRYSTALS) 100 MG: 75; 25 CAPSULE ORAL at 10:49

## 2024-11-28 RX ADMIN — FAMOTIDINE 20 MG: 10 INJECTION INTRAVENOUS at 09:16

## 2024-11-28 RX ADMIN — ALUMINUM HYDROXIDE, MAGNESIUM HYDROXIDE, DIMETHICONE 15 ML: 400; 400; 40 SUSPENSION ORAL at 09:18

## 2024-11-28 RX ADMIN — LIDOCAINE HYDROCHLORIDE 15 ML: 20 SOLUTION ORAL at 09:18

## 2024-11-28 NOTE — Clinical Note
[facility] EMERGENCY DEPARTMENT  [address] [facility] [address]  Phone: [phone]    Silvia Clinton was seen and treated in our emergency department on 11/28/2024.  She may return to work on 11/30/2024.         Thank you for choosing [facility].    Dipesh Howell MD

## 2024-11-28 NOTE — ED NOTES
Patient to ER via car from home for dark urine and frequency.  Patient reports having chest discomfort and nausea at time of triage

## 2024-11-28 NOTE — ED NOTES
Patient noticed dark and foul smelling urine yesterday as well as midsternal chest discomfort. She says she took an extra prilosec to see if that helped with discomfort-it did not. Continuous nausea has been taking zofran with no relief, she says she did start taking ozempic approx 6 weeks ago

## 2024-11-28 NOTE — ED PROVIDER NOTES
EMERGENCY DEPARTMENT ENCOUNTER  Room Number:  31/31  Date of encounter:  11/28/2024  PCP: Lady Sullivan APRN  Patient Care Team:  Lady Sullivan APRN as PCP - General (Nurse Practitioner)  Suhas Oseguera APRN as Referring Physician (Emergency Medicine)     HPI:  Context: Silvia Clinton is a 49 y.o. female who presents to the ED c/o chief complaint of urinary frequency and chest pain.  Patient reports concern for a bladder infection.  Patient reports that she has had some dysuria as well as urinary frequency, her urine has been dark in color.  Patient reports that she has had some mild dull achy suprapubic tenderness, has had nausea, denies any emesis.  No fever shakes chills or night sweats.  Patient also reports that she is having some slight chest discomfort, describes it as indigestion that has been constant since last night, was not relieved with her acid medicine this morning.  Chest pain is not exertional, denies any associated shortness of breath, pain does not radiate.  Patient reports similar pain in the past with acid reflux.  Patient reports that she is on Ozempic, to start recently, has been having problems with nausea since yesterday.    MEDICAL HISTORY REVIEW  Reviewed in EPIC    PAST MEDICAL HISTORY  Active Ambulatory Problems     Diagnosis Date Noted    Spinal epidural abscess 12/09/2018    Non-STEMI (non-ST elevated myocardial infarction) 03/31/2019    Dizziness 11/26/2020    Polysubstance abuse     Essential hypertension     Lupus (systemic lupus erythematosus)     Palpitations     Vestibular neuritis, left 11/27/2020    Lower extremity edema 12/30/2020    Chronic left-sided low back pain without sciatica 03/22/2021    Lumbar radiculopathy 04/26/2021    Pain of left lower extremity 04/26/2021    Obesity, Class III, BMI 40-49.9 (morbid obesity) 05/24/2021    Iron deficiency anemia due to chronic blood loss 06/23/2021    Adverse effect of iron 07/08/2021    Fatigue 09/17/2021    Allergies  09/17/2021    Heart murmur 09/17/2021    Constipation 09/17/2021    Elevated LFTs 09/17/2021    Hepatitis C 09/17/2021    Dysmenorrhea 09/17/2021    Bipolar 2 disorder 09/17/2021    Anxiety 09/17/2021    GERD (gastroesophageal reflux disease) 09/22/2021    Dietary counseling 09/22/2021    Grade I hemorrhoids 11/16/2021    COVID-19 12/14/2021    Migraine without aura 02/02/2023    Current every day smoker 09/27/2023    Class 3 severe obesity with serious comorbidity and body mass index (BMI) of 40.0 to 44.9 in adult 10/04/2024    Hepatosplenomegaly 10/04/2024     Resolved Ambulatory Problems     Diagnosis Date Noted    Chest pain 05/01/2019    Weight gain 03/22/2021    Nausea 04/20/2021    Fear associated with healthcare 05/24/2021    Heartburn 06/23/2021    History of drug abuse 09/22/2021    Vaginal itching 11/16/2021    Fever 02/02/2022    Heartburn 06/23/2021     Past Medical History:   Diagnosis Date    Depression     Drug abstinence syndrome     Drug abuse     Ex-smoker     Heart attack     Hypertension     Kidney stone     Lupus     Mitral valve anterior leaflet prolapse     NSTEMI (non-ST elevated myocardial infarction)     Otitis     Seizures     Tachycardia        PAST SURGICAL HISTORY  Past Surgical History:   Procedure Laterality Date    BACK SURGERY      CHOLECYSTECTOMY      LEG SURGERY      broke tibula,fibula, steel noah    LIVER BIOPSY      LUMBAR LAMINECTOMY DISCECTOMY DECOMPRESSION Left 12/9/2018    Procedure: Posterior lumbar three through sacrum decompression;  Surgeon: Tevin Whitley MD;  Location: [facility];  Service: Neurosurgery    LYMPH NODE BIOPSY      SPINE SURGERY         FAMILY HISTORY  Family History   Problem Relation Age of Onset    Bipolar disorder Mother     Diabetes Mother     Hypertension Mother     No Known Problems Father     No Known Problems Sister     No Known Problems Brother     Nephrolithiasis Maternal Grandmother     Bipolar disorder Maternal Grandmother     Breast  cancer Maternal Aunt     Liver cancer Maternal Uncle     Brain cancer Maternal Uncle     Lung cancer Maternal Uncle     Leukemia Cousin     Colon cancer Neg Hx     Cervical cancer Neg Hx     Uterine cancer Neg Hx     Ovarian cancer Neg Hx     Thyroid cancer Neg Hx        SOCIAL HISTORY  Social History     Socioeconomic History    Marital status: Single   Tobacco Use    Smoking status: Former     Current packs/day: 0.00     Average packs/day: 1 pack/day for 30.0 years (30.0 ttl pk-yrs)     Types: Cigarettes     Start date: 1990     Quit date: 2020     Years since quittin.2    Smokeless tobacco: Never    Tobacco comments:     E-CIG USE   Vaping Use    Vaping status: Every Day    Start date: 2020    Substances: Nicotine, Flavoring    Devices: Pre-filled or refillable cartridge, Refillable tank   Substance and Sexual Activity    Alcohol use: Not Currently     Comment: CAFFEINE USE: 1 CUP COFFEE/ 2 COKES DAILY    Drug use: Not Currently     Types: IV, Heroin, Methamphetamines     Comment: pt states she no longer uses heroin, she now uses meth    Sexual activity: Not Currently       ALLERGIES  Strawberry and Sulfa antibiotics    The patient's allergies have been reviewed    REVIEW OF SYSTEMS  All systems reviewed and negative except for those discussed in HPI.     PHYSICAL EXAM  I have reviewed the triage vital signs and nursing notes.  ED Triage Vitals [24 0838]   Temp Heart Rate Resp BP SpO2   97.3 °F (36.3 °C) 82 16 -- 95 %      Temp src Heart Rate Source Patient Position BP Location FiO2 (%)   -- -- -- -- --       General: No acute distress.  HENT: NCAT, PERRL, Nares patent.  Eyes: no scleral icterus.  Neck: trachea midline, no ROM limitations.  CV: regular rhythm, regular rate.  Respiratory: normal effort, CTAB.  Abdomen: soft, nondistended, NTTP, no rebound tenderness, no guarding or rigidity.  Musculoskeletal: no deformity.  Neuro: alert, moves all extremities, follows commands.  Skin: warm,  dry.    LAB RESULTS  Recent Results (from the past 24 hours)   ECG 12 Lead Chest Pain    Collection Time: 11/28/24  8:41 AM   Result Value Ref Range    QT Interval 378 ms    QTC Interval 420 ms   Urinalysis With Microscopic If Indicated (No Culture) - Urine, Clean Catch    Collection Time: 11/28/24  8:55 AM    Specimen: Urine, Clean Catch   Result Value Ref Range    Color, UA Dark Yellow (A) Yellow, Straw    Appearance, UA Clear Clear    pH, UA 6.5 5.0 - 8.0    Specific Gravity, UA >1.030 (H) 1.005 - 1.030    Glucose, UA Negative Negative    Ketones, UA Trace (A) Negative    Bilirubin, UA Negative Negative    Blood, UA Negative Negative    Protein, UA Negative Negative    Leuk Esterase, UA Small (1+) (A) Negative    Nitrite, UA Negative Negative    Urobilinogen, UA 4.0 E.U./dL (A) 0.2 - 1.0 E.U./dL   Urinalysis, Microscopic Only - Urine, Clean Catch    Collection Time: 11/28/24  8:55 AM    Specimen: Urine, Clean Catch   Result Value Ref Range    RBC, UA 6-10 (A) None Seen, 0-2 /HPF    WBC, UA 6-10 (A) None Seen, 0-2 /HPF    Bacteria, UA Trace (A) None Seen /HPF    Squamous Epithelial Cells, UA 3-6 (A) None Seen, 0-2 /HPF    Hyaline Casts, UA 0-2 None Seen /LPF    Methodology Manual Light Microscopy    Comprehensive Metabolic Panel    Collection Time: 11/28/24  9:09 AM    Specimen: Blood   Result Value Ref Range    Glucose 147 (H) 65 - 99 mg/dL    BUN 13 6 - 20 mg/dL    Creatinine 0.66 0.57 - 1.00 mg/dL    Sodium 137 136 - 145 mmol/L    Potassium 3.6 3.5 - 5.2 mmol/L    Chloride 102 98 - 107 mmol/L    CO2 25.4 22.0 - 29.0 mmol/L    Calcium 8.9 8.6 - 10.5 mg/dL    Total Protein 7.1 6.0 - 8.5 g/dL    Albumin 4.2 3.5 - 5.2 g/dL    ALT (SGPT) 26 1 - 33 U/L    AST (SGOT) 25 1 - 32 U/L    Alkaline Phosphatase 76 39 - 117 U/L    Total Bilirubin 0.5 0.0 - 1.2 mg/dL    Globulin 2.9 gm/dL    A/G Ratio 1.4 g/dL    BUN/Creatinine Ratio 19.7 7.0 - 25.0    Anion Gap 9.6 5.0 - 15.0 mmol/L    eGFR 109.0 >60.0 mL/min/1.73   High  Sensitivity Troponin T    Collection Time: 11/28/24  9:09 AM    Specimen: Blood   Result Value Ref Range    HS Troponin T <6 <14 ng/L   Green Top (Gel)    Collection Time: 11/28/24  9:09 AM   Result Value Ref Range    Extra Tube Hold for add-ons.    Lavender Top    Collection Time: 11/28/24  9:09 AM   Result Value Ref Range    Extra Tube hold for add-on    Gold Top - SST    Collection Time: 11/28/24  9:09 AM   Result Value Ref Range    Extra Tube Hold for add-ons.    Light Blue Top    Collection Time: 11/28/24  9:09 AM   Result Value Ref Range    Extra Tube Hold for add-ons.    CBC Auto Differential    Collection Time: 11/28/24  9:09 AM    Specimen: Blood   Result Value Ref Range    WBC 4.37 3.40 - 10.80 10*3/mm3    RBC 4.26 3.77 - 5.28 10*6/mm3    Hemoglobin 13.0 12.0 - 15.9 g/dL    Hematocrit 38.3 34.0 - 46.6 %    MCV 89.9 79.0 - 97.0 fL    MCH 30.5 26.6 - 33.0 pg    MCHC 33.9 31.5 - 35.7 g/dL    RDW 12.0 (L) 12.3 - 15.4 %    RDW-SD 38.8 37.0 - 54.0 fl    MPV 12.0 6.0 - 12.0 fL    Platelets 161 140 - 450 10*3/mm3    Neutrophil % 69.1 42.7 - 76.0 %    Lymphocyte % 22.7 19.6 - 45.3 %    Monocyte % 5.5 5.0 - 12.0 %    Eosinophil % 2.3 0.3 - 6.2 %    Basophil % 0.2 0.0 - 1.5 %    Immature Grans % 0.2 0.0 - 0.5 %    Neutrophils, Absolute 3.02 1.70 - 7.00 10*3/mm3    Lymphocytes, Absolute 0.99 0.70 - 3.10 10*3/mm3    Monocytes, Absolute 0.24 0.10 - 0.90 10*3/mm3    Eosinophils, Absolute 0.10 0.00 - 0.40 10*3/mm3    Basophils, Absolute 0.01 0.00 - 0.20 10*3/mm3    Immature Grans, Absolute 0.01 0.00 - 0.05 10*3/mm3    nRBC 0.0 0.0 - 0.2 /100 WBC       I ordered the above labs and reviewed the results.    RADIOLOGY  XR Chest 1 View    Result Date: 11/28/2024  XR CHEST 1 VW-  HISTORY: Female who is 49 years-old, chest pain  TECHNIQUE: Frontal views of the chest  COMPARISON: 10/14/2024  FINDINGS: The heart size is borderline. Pulmonary vasculature is unremarkable. No focal pulmonary consolidation, pleural effusion, or  pneumothorax. No acute osseous process.      No focal pulmonary consolidation. Borderline heart size. Follow-up as clinical indications persist.  This report was finalized on 11/28/2024 9:50 AM by Dr. Bryan Ivory M.D on Workstation: [id number]       I ordered the above noted radiological studies. I reviewed the images and results. I agree with the radiologist interpretation.    PROCEDURES  Procedures    MEDICATIONS GIVEN IN ER  Medications   sodium chloride 0.9 % flush 10 mL (has no administration in time range)   nitrofurantoin (macrocrystal-monohydrate) (MACROBID) capsule 100 mg (has no administration in time range)   aluminum-magnesium hydroxide-simethicone (MAALOX MAX) 400-400-40 MG/5ML suspension 15 mL (15 mL Oral Given 11/28/24 0918)   Lidocaine Viscous HCl (XYLOCAINE) 2 % solution 15 mL (15 mL Mouth/Throat Given 11/28/24 0918)   famotidine (PEPCID) injection 20 mg (20 mg Intravenous Given 11/28/24 0916)       PROGRESS, DATA ANALYSIS, CONSULTS, AND MEDICAL DECISION MAKING  A complete history and physical exam have been performed.  All available laboratory and imaging results have been reviewed by myself prior to disposition.    MDM    After the initial H&P, I discussed pertinent information from history and physical exam with patient/family.  Discussed differential diagnosis.  Discussed plan for ED evaluation/workup/treatment.  All questions answered.  Patient/family is agreeable with plan.  ED Course as of 11/28/24 1042   Thu Nov 28, 2024   0846 EKG independently viewed and contemporaneously interpreted by ED physician. Time: 8:41 AM.  Rate 74.  Interpretation: Normal sinus rhythm, normal axis, normal QRS, no acute ST changes. [JG]   5626 My differential diagnosis for chest pain includes but is not limited to:  Muscle strain, costochondritis, myositis, pleurisy, rib fracture, intercostal neuritis, herpes zoster, tumor, myocardial infarction, coronary syndrome, unstable angina, angina, aortic dissection,  mitral valve prolapse, pericarditis, palpitations, pulmonary embolus, pneumonia, pneumothorax, lung cancer, GERD, esophagitis, esophageal spasm     [JG]   0946 I reviewed chest x-ray in PACS, no pulmonary infiltrates per my read. [JG]   1039 Urinalysis consistent with urinary tract infection.  Patient afebrile with no leukocytosis, lab work unremarkable other than glucose 147.  Patient appears appropriate for treatment with oral antibiotic as an outpatient. [JG]   1040 Patient present with acid reflux, has history of same, cardiac history as well but patient reports similar to prior acid reflux.  Cardiac workup negative.  Patient reports symptom resolution with GI cocktail.  Cardiac etiology not suspected. [JG]   1040 Patient reassessed, discussed ED workup and results, discussed plan for discharge, need for follow-up with primary care.  Given extensive discussion return precautions, discharging. [JG]      ED Course User Index  [JG] Dipesh Howell MD       AS OF 10:42 EST VITALS:    BP - 107/72  HR - 71  TEMP - 97.3 °F (36.3 °C)  O2 SATS - 93%    DIAGNOSIS  Final diagnoses:   Acute UTI   Gastroesophageal reflux disease, unspecified whether esophagitis present   Hyperglycemia   History of CAD (coronary artery disease)         DISPOSITION  DISCHARGE    Patient discharged in stable condition.    Reviewed implications of results, diagnosis, meds, responsibility to follow up, warning signs and symptoms of possible worsening, potential complications and reasons to return to ER.    Patient/Family voiced understanding of above instructions.    Discussed plan for discharge, as there is no emergent indication for admission. Patient referred to primary care provider for BP management due to today's BP. Pt/family is agreeable and understands need for follow up and repeat testing.  Pt is aware that discharge does not mean that nothing is wrong but it indicates no emergency is present that requires admission and they must  continue care with follow-up as given below or physician of their choice.     FOLLOW-UP  Lady Sullivan, APRN  [address]  [address] [address] [phone]    Schedule an appointment as soon as possible for a visit in 2 days           Medication List        New Prescriptions      nitrofurantoin (macrocrystal-monohydrate) 100 MG capsule  Commonly known as: MACROBID  Take 1 capsule by mouth 2 (Two) Times a Day for 5 days.               Where to Get Your Medications        These medications were sent to [facility]/pharmacy #[id number] - [place], [place] - [address] AT [place] [facility] - [phone] [facility] [phone]   [address], [facility] [address]      Phone: [phone]   nitrofurantoin (macrocrystal-monohydrate) 100 MG capsule            Dipesh Howell MD  11/28/24 4811

## 2024-11-29 RX ORDER — ONDANSETRON 8 MG/1
8 TABLET, ORALLY DISINTEGRATING ORAL EVERY 8 HOURS PRN
Qty: 20 TABLET | Refills: 0 | Status: SHIPPED | OUTPATIENT
Start: 2024-11-29

## 2024-12-05 DIAGNOSIS — I10 ESSENTIAL HYPERTENSION: ICD-10-CM

## 2024-12-06 RX ORDER — LOSARTAN POTASSIUM 100 MG/1
TABLET ORAL
Qty: 45 TABLET | Refills: 0 | Status: SHIPPED | OUTPATIENT
Start: 2024-12-06

## 2024-12-09 DIAGNOSIS — R00.2 PALPITATIONS: ICD-10-CM

## 2024-12-09 DIAGNOSIS — I10 ESSENTIAL HYPERTENSION: ICD-10-CM

## 2024-12-09 NOTE — TELEPHONE ENCOUNTER
Caller: Silvia Clinton CM    Relationship: Self    Best call back number:     [phone] (Mobile)       Requested Prescriptions:   Requested Prescriptions     Pending Prescriptions Disp Refills    metoprolol tartrate (LOPRESSOR) 100 MG tablet 180 tablet 1     Sig: Take 1 tablet by mouth 2 (Two) Times a Day.        Pharmacy where request should be sent: [facility]/PHARMACY #[id number] - [place], [place] - [address]  AT [place] [facility] - [phone] [facility] [phone] FX     Last office visit with prescribing clinician: 10/4/2024   Last telemedicine visit with prescribing clinician: Visit date not found   Next office visit with prescribing clinician: Visit date not found     Additional details provided by patient: 2 DAYS     Does the patient have less than a 3 day supply:  [x] Yes  [] No    Would you like a call back once the refill request has been completed: [] Yes [x] No    If the office needs to give you a call back, can they leave a voicemail: [] Yes [x] No    Chip Shi Rep   12/09/24 15:46 EST

## 2024-12-10 RX ORDER — METOPROLOL TARTRATE 100 MG/1
100 TABLET ORAL 2 TIMES DAILY
Qty: 180 TABLET | Refills: 1 | Status: SHIPPED | OUTPATIENT
Start: 2024-12-10

## 2024-12-19 ENCOUNTER — OFFICE VISIT (OUTPATIENT)
Dept: INTERNAL MEDICINE | Facility: CLINIC | Age: 49
End: 2024-12-19
Payer: COMMERCIAL

## 2024-12-19 VITALS
BODY MASS INDEX: 37.72 KG/M2 | OXYGEN SATURATION: 96 % | DIASTOLIC BLOOD PRESSURE: 66 MMHG | WEIGHT: 234.7 LBS | TEMPERATURE: 98.1 F | HEART RATE: 70 BPM | HEIGHT: 66 IN | SYSTOLIC BLOOD PRESSURE: 118 MMHG

## 2024-12-19 DIAGNOSIS — R16.2 HEPATOSPLENOMEGALY: ICD-10-CM

## 2024-12-19 DIAGNOSIS — B18.2 CHRONIC HEPATITIS C WITHOUT HEPATIC COMA: ICD-10-CM

## 2024-12-19 DIAGNOSIS — I21.4 NON-STEMI (NON-ST ELEVATED MYOCARDIAL INFARCTION): ICD-10-CM

## 2024-12-19 DIAGNOSIS — Z00.00 HEALTHCARE MAINTENANCE: Primary | ICD-10-CM

## 2024-12-19 DIAGNOSIS — R00.2 PALPITATIONS: ICD-10-CM

## 2024-12-19 DIAGNOSIS — N30.00 ACUTE CYSTITIS WITHOUT HEMATURIA: ICD-10-CM

## 2024-12-19 DIAGNOSIS — F19.90 SUBSTANCE USE DISORDER: ICD-10-CM

## 2024-12-19 DIAGNOSIS — F31.81 BIPOLAR 2 DISORDER: ICD-10-CM

## 2024-12-19 DIAGNOSIS — I10 ESSENTIAL HYPERTENSION: ICD-10-CM

## 2024-12-19 DIAGNOSIS — E66.01 OBESITY, CLASS III, BMI 40-49.9 (MORBID OBESITY): ICD-10-CM

## 2024-12-19 DIAGNOSIS — K21.9 GASTROESOPHAGEAL REFLUX DISEASE, UNSPECIFIED WHETHER ESOPHAGITIS PRESENT: Chronic | ICD-10-CM

## 2024-12-19 DIAGNOSIS — D50.0 IRON DEFICIENCY ANEMIA DUE TO CHRONIC BLOOD LOSS: ICD-10-CM

## 2024-12-19 LAB
BILIRUB BLD-MCNC: ABNORMAL MG/DL
CLARITY, POC: ABNORMAL
COLOR UR: YELLOW
EXPIRATION DATE: ABNORMAL
GLUCOSE UR STRIP-MCNC: NEGATIVE MG/DL
KETONES UR QL: ABNORMAL
LEUKOCYTE EST, POC: NEGATIVE
Lab: ABNORMAL
NITRITE UR-MCNC: NEGATIVE MG/ML
PH UR: 6 [PH] (ref 5–8)
PROT UR STRIP-MCNC: ABNORMAL MG/DL
RBC # UR STRIP: NEGATIVE /UL
SP GR UR: 1.03 (ref 1–1.03)
UROBILINOGEN UR QL: NORMAL

## 2024-12-19 PROCEDURE — 81003 URINALYSIS AUTO W/O SCOPE: CPT | Performed by: NURSE PRACTITIONER

## 2024-12-19 PROCEDURE — 99396 PREV VISIT EST AGE 40-64: CPT | Performed by: NURSE PRACTITIONER

## 2024-12-19 PROCEDURE — 99213 OFFICE O/P EST LOW 20 MIN: CPT | Performed by: NURSE PRACTITIONER

## 2024-12-19 RX ORDER — CIPROFLOXACIN 250 MG/1
250 TABLET, FILM COATED ORAL 2 TIMES DAILY
Qty: 10 TABLET | Refills: 0 | Status: SHIPPED | OUTPATIENT
Start: 2024-12-19 | End: 2024-12-24

## 2024-12-19 RX ORDER — PANTOPRAZOLE SODIUM 40 MG/1
40 TABLET, DELAYED RELEASE ORAL DAILY
Qty: 90 TABLET | Refills: 1 | Status: SHIPPED | OUTPATIENT
Start: 2024-12-19

## 2024-12-19 RX ORDER — SUCRALFATE 1 G/1
1 TABLET ORAL
Qty: 90 TABLET | Refills: 1 | Status: SHIPPED | OUTPATIENT
Start: 2024-12-19

## 2024-12-19 RX ORDER — PHENAZOPYRIDINE HYDROCHLORIDE 200 MG/1
200 TABLET, FILM COATED ORAL 3 TIMES DAILY PRN
Qty: 6 TABLET | Refills: 0 | Status: SHIPPED | OUTPATIENT
Start: 2024-12-19

## 2024-12-19 NOTE — PROGRESS NOTES
Subjective   Silvia Clinton is a 49 y.o. female.     Chief Complaint   Patient presents with    Annual Exam    bladder pain     Pt states she recently had UTI and still have strong odor, bladder pain when it is full and frequency.        History of Present Illness   She is here today for CPE.  She is due for lab work.  She would like to wait on lab work as she recently had blood work taken with most recent ER visit last month. She notes strong urinary odor and intermittent dysuria and bladder pressure along with urinary frequency over the past few weeks.  She was seen in the ER on 11/28 and diagnosed with a UTI treated with Macrobid.  She noted improvement in symptoms initially, but symptoms returned a few days after stopping the antibiotics.  She denies any flank pain, hematuria, fever, chills.  She does note fatigue.    HTN- Patient doing well with current medication regimen, compliant with medication schedule, denies adverse effects.   Non-STEMI/Heart murmur- she would like a referral to a new cardiologist, preferably a female. She does note intermittent palpitations for years that have become more frequent over the past few months.  Most recent episode occurred at work last week lasting 10 seconds while active.  She denies any chest pain, shortness of breath, syncope.    Bipolar 2 disorder- she is needing a referral to psychiatry.  She feels like mood is well-controlled on the Zyprexa 15 mg nightly and Effexor 150 mg daily.  Substance use disorder-she is currently on Suboxone.     Obesity- she has been doing compounded semaglutide 0.25 mg weekly with 20 lb weight loss. She notes improvement in cravings and portion control.  She notes food noise has lessened.  She has been working on more mindful eating focusing on healthy food choices and has been staying more active.  She feels better overall with some weight loss.  She would like to increase to the 0.5 mg dose.    Hepatitis C/hepatosplenomegaly- she is  needing a referral to GI.  Previously referral was placed in June, but she states she never received a call from GI to schedule.  She was previously followed by Dr. Hanson on antiviral therapy after continuing to have high hep C viral load.  Hep B surface antibody was reactive.  She is due for colonoscopy.  GERD- she is currently on omeprazole 20-40 mg daily without improvement.  She notes frequent breakthrough symptoms despite higher dose of omeprazole.  She was also previously prescribed sucralfate to take 3 times daily before meals for episodes of gastritis.  She denies any dysphagia or odynophagia.    Iron deficiency anemia-secondary to menorrhagia.  She is followed by hematology receiving IV iron.  GYN- she is followed by [facility]. She is UTD with pap, she is not interested in mammogram at this time.    The following portions of the patient's history were reviewed and updated as appropriate: allergies, current medications, past family history, past medical history, past social history, past surgical history, and problem list.    Review of Systems   Constitutional:  Positive for fatigue. Negative for chills and fever.   HENT: Negative.     Eyes: Negative.    Respiratory: Negative.     Cardiovascular:  Positive for palpitations. Negative for chest pain and leg swelling.   Gastrointestinal:  Positive for GERD. Negative for abdominal distention, abdominal pain, blood in stool, constipation, diarrhea, nausea, rectal pain and vomiting.   Endocrine: Negative.    Genitourinary:  Positive for dysuria, frequency, pelvic pressure, urgency and urinary incontinence. Negative for flank pain, hematuria and pelvic pain.   Musculoskeletal: Negative.    Skin: Negative.    Allergic/Immunologic: Negative.    Neurological: Negative.    Hematological: Negative.    Psychiatric/Behavioral:  Positive for stress. Negative for sleep disturbance, suicidal ideas and depressed mood. The patient is not nervous/anxious.        Objective    Physical Exam  Constitutional:       Appearance: Normal appearance. She is well-developed.   HENT:      Head: Normocephalic and atraumatic.      Right Ear: Hearing, tympanic membrane, ear canal and external ear normal.      Left Ear: Hearing, tympanic membrane, ear canal and external ear normal.      Nose: Nose normal.      Right Sinus: No maxillary sinus tenderness or frontal sinus tenderness.      Left Sinus: No maxillary sinus tenderness or frontal sinus tenderness.      Mouth/Throat:      Lips: Pink.      Mouth: Mucous membranes are moist.      Dentition: Normal dentition.      Tongue: No lesions.      Pharynx: Oropharynx is clear. Uvula midline.      Tonsils: No tonsillar exudate.   Eyes:      General: Lids are normal.      Extraocular Movements: Extraocular movements intact.      Conjunctiva/sclera: Conjunctivae normal.      Pupils: Pupils are equal, round, and reactive to light.   Neck:      Thyroid: No thyroid mass, thyromegaly or thyroid tenderness.      Vascular: No carotid bruit.      Trachea: Trachea normal.   Cardiovascular:      Rate and Rhythm: Normal rate and regular rhythm.      Pulses: Normal pulses.           Radial pulses are 2+ on the right side and 2+ on the left side.        Popliteal pulses are 2+ on the right side and 2+ on the left side.        Dorsalis pedis pulses are 2+ on the right side and 2+ on the left side.        Posterior tibial pulses are 2+ on the right side and 2+ on the left side.      Heart sounds: S1 normal and S2 normal.      Systolic murmur is present with a grade of 1/6.   Pulmonary:      Effort: Pulmonary effort is normal.      Breath sounds: Normal breath sounds.   Abdominal:      General: Bowel sounds are normal. There is no distension or abdominal bruit.      Palpations: Abdomen is soft. There is no hepatomegaly or splenomegaly.      Tenderness: There is abdominal tenderness in the suprapubic area. There is no right CVA tenderness, left CVA tenderness, guarding or  rebound.      Hernia: No hernia is present.   Musculoskeletal:      Cervical back: Normal range of motion and neck supple.      Right lower leg: No edema.      Left lower leg: No edema.   Lymphadenopathy:      Head:      Right side of head: No submental, submandibular, tonsillar or occipital adenopathy.      Left side of head: No submental, submandibular, tonsillar or occipital adenopathy.      Cervical: No cervical adenopathy.      Upper Body:      Right upper body: No supraclavicular adenopathy.      Left upper body: No supraclavicular adenopathy.      Lower Body: No right inguinal adenopathy. No left inguinal adenopathy.   Skin:     General: Skin is warm and dry.      Findings: No rash.      Nails: There is no clubbing.   Neurological:      General: No focal deficit present.      Mental Status: She is alert and oriented to person, place, and time.      Cranial Nerves: Cranial nerves 2-12 are intact.      Sensory: Sensation is intact.      Motor: Motor function is intact.      Coordination: Coordination is intact.      Gait: Gait is intact.      Deep Tendon Reflexes:      Reflex Scores:       Patellar reflexes are 2+ on the right side and 2+ on the left side.  Psychiatric:         Attention and Perception: Attention and perception normal.         Mood and Affect: Affect normal. Mood is anxious.         Speech: Speech normal.         Behavior: Behavior normal. Behavior is cooperative.         Thought Content: Thought content normal.         Cognition and Memory: Cognition and memory normal.         Judgment: Judgment normal.         Vitals:    12/19/24 0906   BP: 118/66   Pulse: 70   Temp: 98.1 °F (36.7 °C)   SpO2: 96%      Body mass index is 37.88 kg/m².    Assessment & Plan   Problems Addressed this Visit       Non-STEMI (non-ST elevated myocardial infarction)    Relevant Orders    Ambulatory Referral to Cardiology    Substance use disorder    Relevant Orders    Ambulatory Referral to Psychiatry    Essential  hypertension    Relevant Orders    Ambulatory Referral to Cardiology    Palpitations    Relevant Orders    Ambulatory Referral to Gastroenterology    Obesity, Class III, BMI 40-49.9 (morbid obesity)    Iron deficiency anemia due to chronic blood loss    Hepatitis C    Relevant Orders    Ambulatory Referral to Gastroenterology    Bipolar 2 disorder    Relevant Orders    Ambulatory Referral to Psychiatry    GERD (gastroesophageal reflux disease)    Relevant Medications    sucralfate (CARAFATE) 1 g tablet    pantoprazole (Protonix) 40 MG EC tablet    Other Relevant Orders    Ambulatory Referral to Gastroenterology    Hepatosplenomegaly    Relevant Orders    Ambulatory Referral to Gastroenterology     Other Visit Diagnoses       Healthcare maintenance    -  Primary    Acute cystitis without hematuria        Relevant Medications    ciprofloxacin (CIPRO) 250 MG tablet    phenazopyridine (Pyridium) 200 MG tablet    Other Relevant Orders    POCT urinalysis dipstick, automated (Completed)    Urine Culture - Urine, Urine, Clean Catch          Diagnoses         Codes Comments    Healthcare maintenance    -  Primary ICD-10-CM: Z00.00  ICD-9-CM: V70.0     Bipolar 2 disorder     ICD-10-CM: F31.81  ICD-9-CM: 296.89     Substance use disorder     ICD-10-CM: F19.90  ICD-9-CM: 305.90     Iron deficiency anemia due to chronic blood loss     ICD-10-CM: D50.0  ICD-9-CM: 280.0     Chronic hepatitis C without hepatic coma     ICD-10-CM: B18.2  ICD-9-CM: 070.54     Hepatosplenomegaly     ICD-10-CM: R16.2  ICD-9-CM: 571.8     Essential hypertension     ICD-10-CM: I10  ICD-9-CM: 401.9     Non-STEMI (non-ST elevated myocardial infarction)     ICD-10-CM: I21.4  ICD-9-CM: 410.70     Acute cystitis without hematuria     ICD-10-CM: N30.00  ICD-9-CM: 595.0     Gastroesophageal reflux disease, unspecified whether esophagitis present     ICD-10-CM: K21.9  ICD-9-CM: 530.81     Palpitations     ICD-10-CM: R00.2  ICD-9-CM: 785.1     Obesity, Class III,  BMI 40-49.9 (morbid obesity)     ICD-10-CM: E66.01  ICD-9-CM: 278.01           1.  Preventative counseling-encouraged her to continue to work on healthy, balanced eating, regular exercise.  Ensure adequate dietary intake of calcium and vitamin D.  2.  HTN/palpitations/non-STEMI-patient would prefer a female cardiologist.  Referral placed to Dr. Luciano with [facility] as she is due for follow-up.  Continue losartan 100 mg daily, amlodipine 5 mg twice daily and metoprolol tartrate 100 mg twice daily.  Discussed statin therapy, but patient defers today.  3.  Chronic hepatitis C/hepatosplenomegaly-patient is due for follow-up with GI as well as colonoscopy.  Referral placed to GI for further management.  4.  Bipolar 2 disorder/substance use disorder-referral placed to psychiatry for medication management.  Continue Suboxone program.  Encouraged her to continue sobriety.  5.  GERD-not controlled.  Change from omeprazole to Protonix 40 mg daily with as needed Carafate 1 g 3 times daily before meals.  Discussed reflux precautions.  We will work on weight loss as well through healthy lifestyle and compounded semaglutide.  6.  Obesity-improving with treatment.  Can increase compounded semaglutide to 0.5 mg weekly as she has been tolerating the 0.25 mg dose well.  New prescription sent to compounding pharmacy.  She is aware of appropriate use and adverse effects as well as proper injection technique.  Continue to work on healthy eating, optimizing protein and fiber intake, limiting sugar and carbohydrate intake along with regular exercise with strength training.  Follow-up in 3 months.  7.  Iron deficiency anemia-most recent lab work completed November 28 showed stable hemoglobin at 13.  Recommend following up with hematology as needed for iron infusions.  I have also encouraged her to follow-up with gynecology to discuss ablation for menorrhagia.  8.  Acute cystitis-urine dip positive for protein, bilirubin and  ketones, urine sent for culture.  Start ciprofloxacin to 50 mg twice daily for 5 days.  Will also provide her with a prescription for Pyridium 200 mg 3 times daily for up to 2 days for bladder spasms.  Discussed with her that the Pyridium can cause the urine to turn orange.  Monitor for any tendon pain or worsening palpitations with the ciprofloxacin.  If symptoms persist recommend referral to urology.  “Discussed risks/benefits to vaccination, reviewed components of the vaccine, discussed VIS, discussed informed consent, informed consent obtained. Patient/Parent was allowed to accept or refuse vaccine. Questions answered to satisfactory state of patient/Parent. We reviewed typical age appropriate and seasonally appropriate vaccinations. Reviewed immunization history and updated state vaccination form as needed. Patient was counseled on Hep B  Influenza    Encourage routine dental and eye exam.  Encouraged sunscreen use outside.  GYN-records requested today.  Patient defers mammogram.  C-scope-due.  Referral placed to GI.    Follow-up in 3 months for obesity and weight loss medication management.

## 2024-12-23 LAB
BACTERIA UR CULT: NORMAL
BACTERIA UR CULT: NORMAL

## 2024-12-26 DIAGNOSIS — R32 URINARY INCONTINENCE, UNSPECIFIED TYPE: Primary | ICD-10-CM

## 2025-01-02 ENCOUNTER — TELEPHONE (OUTPATIENT)
Dept: INTERNAL MEDICINE | Facility: CLINIC | Age: 50
End: 2025-01-02
Payer: COMMERCIAL

## 2025-01-02 DIAGNOSIS — F41.9 ANXIETY DUE TO INVASIVE PROCEDURE: Primary | ICD-10-CM

## 2025-01-02 RX ORDER — ONDANSETRON 8 MG/1
8 TABLET, ORALLY DISINTEGRATING ORAL EVERY 8 HOURS PRN
Qty: 20 TABLET | Refills: 0 | Status: SHIPPED | OUTPATIENT
Start: 2025-01-02

## 2025-01-02 RX ORDER — DIAZEPAM 2 MG/1
2 TABLET ORAL ONCE
Qty: 1 TABLET | Refills: 0 | Status: SHIPPED | OUTPATIENT
Start: 2025-01-02 | End: 2025-01-02

## 2025-01-02 NOTE — TELEPHONE ENCOUNTER
Her chiropractor ordered MRI for her neck and she is too anxious and need medicine to calm her down. Please advise.
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
## 2025-01-02 NOTE — TELEPHONE ENCOUNTER
Caller: Silvia Clinton CM    Relationship: Self    Best call back number: [phone]    Requested Prescriptions:   Requested Prescriptions     Pending Prescriptions Disp Refills    ondansetron ODT (ZOFRAN-ODT) 8 MG disintegrating tablet 20 tablet 0     Sig: Take 1 tablet by mouth Every 8 (Eight) Hours As Needed for Nausea or Vomiting.        Pharmacy where request should be sent: [id number] - [place], [place] - [address]  IN [facility] [phone] [facility] [phone] FX     Last office visit with prescribing clinician: 12/19/2024   Last telemedicine visit with prescribing clinician: Visit date not found   Next office visit with prescribing clinician: 3/19/2025     Additional details provided by patient: PATIENT NEEDING REFILL     Does the patient have less than a 3 day supply:  [x] Yes  [] No    Would you like a call back once the refill request has been completed: [] Yes [x] No    If the office needs to give you a call back, can they leave a voicemail: [] Yes [x] No    Chip Taylor Rep   01/02/25 10:58 EST

## 2025-01-13 DIAGNOSIS — K21.9 GASTROESOPHAGEAL REFLUX DISEASE, UNSPECIFIED WHETHER ESOPHAGITIS PRESENT: Chronic | ICD-10-CM

## 2025-01-13 RX ORDER — SUCRALFATE 1 G/1
1 TABLET ORAL
Qty: 90 TABLET | Refills: 0 | Status: SHIPPED | OUTPATIENT
Start: 2025-01-13

## 2025-01-21 DIAGNOSIS — I10 ESSENTIAL HYPERTENSION: ICD-10-CM

## 2025-01-21 DIAGNOSIS — F41.9 ANXIETY: Chronic | ICD-10-CM

## 2025-01-21 RX ORDER — LOSARTAN POTASSIUM 100 MG/1
50 TABLET ORAL 2 TIMES DAILY
Qty: 90 TABLET | Refills: 0 | Status: SHIPPED | OUTPATIENT
Start: 2025-01-21

## 2025-01-21 RX ORDER — AMLODIPINE BESYLATE 10 MG/1
5 TABLET ORAL 2 TIMES DAILY
Qty: 90 TABLET | Refills: 1 | Status: SHIPPED | OUTPATIENT
Start: 2025-01-21

## 2025-01-21 RX ORDER — VENLAFAXINE HYDROCHLORIDE 150 MG/1
150 CAPSULE, EXTENDED RELEASE ORAL DAILY
Qty: 90 CAPSULE | Refills: 0 | Status: SHIPPED | OUTPATIENT
Start: 2025-01-21

## 2025-01-21 RX ORDER — ONDANSETRON 8 MG/1
8 TABLET, ORALLY DISINTEGRATING ORAL EVERY 8 HOURS PRN
Qty: 20 TABLET | Refills: 0 | Status: SHIPPED | OUTPATIENT
Start: 2025-01-21

## 2025-01-21 NOTE — TELEPHONE ENCOUNTER
PATIENT LOST HER ZOFRAN, SHE WANTS TO KNOW IF SHE CAN GET PRESCRIPTION 40MG OMEPREZOLE CALLED IN. PLEASE ADVISE.

## 2025-01-21 NOTE — TELEPHONE ENCOUNTER
Caller: Silvia Clinton CM    Relationship: Self    Best call back number: [phone]     Requested Prescriptions:   Requested Prescriptions     Pending Prescriptions Disp Refills    losartan (COZAAR) 100 MG tablet 45 tablet 0    amLODIPine (NORVASC) 10 MG tablet 90 tablet 1     Sig: Take 0.5 tablets by mouth 2 (Two) Times a Day.    venlafaxine XR (EFFEXOR-XR) 150 MG 24 hr capsule 90 capsule 1     Sig: Take 1 capsule by mouth Daily.        Pharmacy where request should be sent: [id number] - [place], [place] - [address] AT IN [facility] - [phone] [facility] [phone] FX     Last office visit with prescribing clinician: 12/19/2024   Last telemedicine visit with prescribing clinician: Visit date not found   Next office visit with prescribing clinician: 3/19/2025     Additional details provided by patient:     Does the patient have less than a 3 day supply:  [x] Yes  [] No    Would you like a call back once the refill request has been completed: [] Yes [] No    If the office needs to give you a call back, can they leave a voicemail: [] Yes [] No    April Chip Sykes Rep   01/21/25 13:34 EST

## 2025-01-22 DIAGNOSIS — K21.9 GASTROESOPHAGEAL REFLUX DISEASE, UNSPECIFIED WHETHER ESOPHAGITIS PRESENT: Primary | ICD-10-CM

## 2025-01-22 RX ORDER — OMEPRAZOLE 40 MG/1
40 CAPSULE, DELAYED RELEASE ORAL DAILY
Qty: 90 CAPSULE | Refills: 1 | Status: SHIPPED | OUTPATIENT
Start: 2025-01-22

## 2025-02-09 ENCOUNTER — HOSPITAL ENCOUNTER (EMERGENCY)
Facility: HOSPITAL | Age: 50
Discharge: HOME | End: 2025-02-09
Attending: EMERGENCY MEDICINE | Admitting: EMERGENCY MEDICINE
Payer: COMMERCIAL

## 2025-02-09 ENCOUNTER — APPOINTMENT (OUTPATIENT)
Dept: GENERAL RADIOLOGY | Facility: HOSPITAL | Age: 50
End: 2025-02-09
Payer: COMMERCIAL

## 2025-02-09 ENCOUNTER — APPOINTMENT (OUTPATIENT)
Dept: CARDIOLOGY | Facility: HOSPITAL | Age: 50
End: 2025-02-09
Payer: COMMERCIAL

## 2025-02-09 VITALS
RESPIRATION RATE: 16 BRPM | DIASTOLIC BLOOD PRESSURE: 78 MMHG | WEIGHT: 225 LBS | SYSTOLIC BLOOD PRESSURE: 113 MMHG | OXYGEN SATURATION: 97 % | HEART RATE: 68 BPM | TEMPERATURE: 97.9 F | BODY MASS INDEX: 36.16 KG/M2 | HEIGHT: 66 IN

## 2025-02-09 DIAGNOSIS — Z87.898 HISTORY OF PALPITATIONS: ICD-10-CM

## 2025-02-09 DIAGNOSIS — R00.2 PALPITATIONS: Primary | ICD-10-CM

## 2025-02-09 LAB
ALBUMIN SERPL-MCNC: 3.8 G/DL (ref 3.5–5.2)
ALBUMIN/GLOB SERPL: 1.2 G/DL
ALP SERPL-CCNC: 66 U/L (ref 39–117)
ALT SERPL W P-5'-P-CCNC: 12 U/L (ref 1–33)
AMPHET+METHAMPHET UR QL: NEGATIVE
AMPHETAMINES UR QL: NEGATIVE
ANION GAP SERPL CALCULATED.3IONS-SCNC: 7.3 MMOL/L (ref 5–15)
AST SERPL-CCNC: 20 U/L (ref 1–32)
BARBITURATES UR QL SCN: NEGATIVE
BASOPHILS # BLD AUTO: 0.02 10*3/MM3 (ref 0–0.2)
BASOPHILS NFR BLD AUTO: 0.4 % (ref 0–1.5)
BENZODIAZ UR QL SCN: POSITIVE
BILIRUB SERPL-MCNC: 0.5 MG/DL (ref 0–1.2)
BILIRUB UR QL STRIP: NEGATIVE
BUN SERPL-MCNC: 11 MG/DL (ref 6–20)
BUN/CREAT SERPL: 20 (ref 7–25)
BUPRENORPHINE SERPL-MCNC: POSITIVE NG/ML
CALCIUM SPEC-SCNC: 8.7 MG/DL (ref 8.6–10.5)
CANNABINOIDS SERPL QL: NEGATIVE
CHLORIDE SERPL-SCNC: 107 MMOL/L (ref 98–107)
CLARITY UR: CLEAR
CO2 SERPL-SCNC: 24.7 MMOL/L (ref 22–29)
COCAINE UR QL: POSITIVE
COLOR UR: YELLOW
CREAT SERPL-MCNC: 0.55 MG/DL (ref 0.57–1)
DEPRECATED RDW RBC AUTO: 44.1 FL (ref 37–54)
EGFRCR SERPLBLD CKD-EPI 2021: 113.9 ML/MIN/1.73
EOSINOPHIL # BLD AUTO: 0.14 10*3/MM3 (ref 0–0.4)
EOSINOPHIL NFR BLD AUTO: 3 % (ref 0.3–6.2)
ERYTHROCYTE [DISTWIDTH] IN BLOOD BY AUTOMATED COUNT: 13.1 % (ref 12.3–15.4)
ETHANOL BLD-MCNC: <10 MG/DL (ref 0–10)
ETHANOL UR QL: <0.01 %
FENTANYL UR-MCNC: NEGATIVE NG/ML
GEN 5 1HR TROPONIN T REFLEX: <6 NG/L
GLOBULIN UR ELPH-MCNC: 3.1 GM/DL
GLUCOSE SERPL-MCNC: 106 MG/DL (ref 65–99)
GLUCOSE UR STRIP-MCNC: NEGATIVE MG/DL
HCT VFR BLD AUTO: 36.8 % (ref 34–46.6)
HGB BLD-MCNC: 12.1 G/DL (ref 12–15.9)
HGB UR QL STRIP.AUTO: NEGATIVE
HOLD SPECIMEN: NORMAL
HOLD SPECIMEN: NORMAL
IMM GRANULOCYTES # BLD AUTO: 0.01 10*3/MM3 (ref 0–0.05)
IMM GRANULOCYTES NFR BLD AUTO: 0.2 % (ref 0–0.5)
KETONES UR QL STRIP: ABNORMAL
LEUKOCYTE ESTERASE UR QL STRIP.AUTO: NEGATIVE
LYMPHOCYTES # BLD AUTO: 1.16 10*3/MM3 (ref 0.7–3.1)
LYMPHOCYTES NFR BLD AUTO: 24.8 % (ref 19.6–45.3)
MAGNESIUM SERPL-MCNC: 1.8 MG/DL (ref 1.6–2.6)
MCH RBC QN AUTO: 30 PG (ref 26.6–33)
MCHC RBC AUTO-ENTMCNC: 32.9 G/DL (ref 31.5–35.7)
MCV RBC AUTO: 91.3 FL (ref 79–97)
METHADONE UR QL SCN: NEGATIVE
MONOCYTES # BLD AUTO: 0.36 10*3/MM3 (ref 0.1–0.9)
MONOCYTES NFR BLD AUTO: 7.7 % (ref 5–12)
NEUTROPHILS NFR BLD AUTO: 2.98 10*3/MM3 (ref 1.7–7)
NEUTROPHILS NFR BLD AUTO: 63.9 % (ref 42.7–76)
NITRITE UR QL STRIP: NEGATIVE
NRBC BLD AUTO-RTO: 0 /100 WBC (ref 0–0.2)
OPIATES UR QL: NEGATIVE
OXYCODONE UR QL SCN: NEGATIVE
PCP UR QL SCN: NEGATIVE
PH UR STRIP.AUTO: 6.5 [PH] (ref 5–8)
PLATELET # BLD AUTO: 159 10*3/MM3 (ref 140–450)
PMV BLD AUTO: 11.2 FL (ref 6–12)
POTASSIUM SERPL-SCNC: 3.7 MMOL/L (ref 3.5–5.2)
PROT SERPL-MCNC: 6.9 G/DL (ref 6–8.5)
PROT UR QL STRIP: NEGATIVE
RBC # BLD AUTO: 4.03 10*6/MM3 (ref 3.77–5.28)
SODIUM SERPL-SCNC: 139 MMOL/L (ref 136–145)
SP GR UR STRIP: 1.03 (ref 1–1.03)
TRICYCLICS UR QL SCN: NEGATIVE
TROPONIN T NUMERIC DELTA: NORMAL
TROPONIN T SERPL HS-MCNC: <6 NG/L
TSH SERPL DL<=0.05 MIU/L-ACNC: 0.93 UIU/ML (ref 0.27–4.2)
UROBILINOGEN UR QL STRIP: ABNORMAL
WBC NRBC COR # BLD AUTO: 4.67 10*3/MM3 (ref 3.4–10.8)
WHOLE BLOOD HOLD COAG: NORMAL
WHOLE BLOOD HOLD SPECIMEN: NORMAL

## 2025-02-09 PROCEDURE — 85025 COMPLETE CBC W/AUTO DIFF WBC: CPT

## 2025-02-09 PROCEDURE — 83735 ASSAY OF MAGNESIUM: CPT | Performed by: EMERGENCY MEDICINE

## 2025-02-09 PROCEDURE — 84443 ASSAY THYROID STIM HORMONE: CPT | Performed by: EMERGENCY MEDICINE

## 2025-02-09 PROCEDURE — 93005 ELECTROCARDIOGRAM TRACING: CPT

## 2025-02-09 PROCEDURE — 93242 EXT ECG>48HR<7D RECORDING: CPT

## 2025-02-09 PROCEDURE — 81003 URINALYSIS AUTO W/O SCOPE: CPT | Performed by: EMERGENCY MEDICINE

## 2025-02-09 PROCEDURE — 93005 ELECTROCARDIOGRAM TRACING: CPT | Performed by: EMERGENCY MEDICINE

## 2025-02-09 PROCEDURE — 80053 COMPREHEN METABOLIC PANEL: CPT | Performed by: EMERGENCY MEDICINE

## 2025-02-09 PROCEDURE — 36415 COLL VENOUS BLD VENIPUNCTURE: CPT

## 2025-02-09 PROCEDURE — 84484 ASSAY OF TROPONIN QUANT: CPT | Performed by: EMERGENCY MEDICINE

## 2025-02-09 PROCEDURE — 80307 DRUG TEST PRSMV CHEM ANLYZR: CPT | Performed by: EMERGENCY MEDICINE

## 2025-02-09 PROCEDURE — 71045 X-RAY EXAM CHEST 1 VIEW: CPT

## 2025-02-09 PROCEDURE — 82077 ASSAY SPEC XCP UR&BREATH IA: CPT | Performed by: EMERGENCY MEDICINE

## 2025-02-09 PROCEDURE — 99284 EMERGENCY DEPT VISIT MOD MDM: CPT

## 2025-02-09 RX ORDER — SODIUM CHLORIDE 0.9 % (FLUSH) 0.9 %
10 SYRINGE (ML) INJECTION AS NEEDED
Status: DISCONTINUED | OUTPATIENT
Start: 2025-02-09 | End: 2025-02-10 | Stop reason: HOSPADM

## 2025-02-09 RX ORDER — ASPIRIN 325 MG
325 TABLET ORAL ONCE
Status: DISCONTINUED | OUTPATIENT
Start: 2025-02-09 | End: 2025-02-10 | Stop reason: HOSPADM

## 2025-02-09 NOTE — Clinical Note
[facility] EMERGENCY DEPARTMENT  [address] [facility] [address]  Phone: [phone]    Silvia Clinton was seen and treated in our emergency department on 2/9/2025.  She may return to work on 02/10/2025.         Thank you for choosing [facility].    Kyle Jarvis MD

## 2025-02-10 NOTE — ED PROVIDER NOTES
EMERGENCY DEPARTMENT ENCOUNTER    Room Number:  16/16  PCP: Lady Sullivan APRN  Historian: Patient      HPI:  Chief Complaint: Palpitations  A complete HPI/ROS/PMH/PSH/SH/FH are unobtainable due to: None    Context: Silvia Clinton is a 49 y.o. female who presents to the ED via private vehicle from home c/o acute increased palpitations that started this morning, does have a history of palpitations.  Noted [place] of chest discomfort and shortness of breath at the time.  Did have an episode of nausea and vomiting.  Currently feeling much better but lingering intermittent symptoms.  Did feel some dizziness and lightheadedness earlier but that has since resolved.  Has some mild residual tightness in her chest.      MEDICAL RECORD REVIEW    External (non-ED) record review: Adult transthoracic echo reviewed in [facility] from November 27, 2020, ejection fraction of 61 to 65%              PAST MEDICAL HISTORY  Active Ambulatory Problems     Diagnosis Date Noted    Spinal epidural abscess 12/09/2018    Non-STEMI (non-ST elevated myocardial infarction) 03/31/2019    Dizziness 11/26/2020    Substance use disorder     Essential hypertension     Lupus (systemic lupus erythematosus)     Palpitations     Vestibular neuritis, left 11/27/2020    Lower extremity edema 12/30/2020    Chronic left-sided low back pain without sciatica 03/22/2021    Lumbar radiculopathy 04/26/2021    Pain of left lower extremity 04/26/2021    Obesity, Class III, BMI 40-49.9 (morbid obesity) 05/24/2021    Iron deficiency anemia due to chronic blood loss 06/23/2021    Adverse effect of iron 07/08/2021    Fatigue 09/17/2021    Allergies 09/17/2021    Heart murmur 09/17/2021    Constipation 09/17/2021    Elevated LFTs 09/17/2021    Hepatitis C 09/17/2021    Dysmenorrhea 09/17/2021    Bipolar 2 disorder 09/17/2021    Anxiety 09/17/2021    GERD (gastroesophageal reflux disease) 09/22/2021    Dietary counseling 09/22/2021    Grade I hemorrhoids 11/16/2021     COVID-19 12/14/2021    Migraine without aura 02/02/2023    Current every day smoker 09/27/2023    Class 3 severe obesity with serious comorbidity and body mass index (BMI) of 40.0 to 44.9 in adult 10/04/2024    Hepatosplenomegaly 10/04/2024     Resolved Ambulatory Problems     Diagnosis Date Noted    Chest pain 05/01/2019    Weight gain 03/22/2021    Nausea 04/20/2021    Fear associated with healthcare 05/24/2021    Heartburn 06/23/2021    History of drug abuse 09/22/2021    Vaginal itching 11/16/2021    Fever 02/02/2022    Heartburn 06/23/2021     Past Medical History:   Diagnosis Date    Depression     Drug abstinence syndrome     Drug abuse     Ex-smoker     Heart attack     Hypertension     Kidney stone     Lupus     Mitral valve anterior leaflet prolapse     NSTEMI (non-ST elevated myocardial infarction)     Otitis     Seizures     Tachycardia          PAST SURGICAL HISTORY  Past Surgical History:   Procedure Laterality Date    BACK SURGERY      CHOLECYSTECTOMY      LEG SURGERY      broke tibula,fibula, steel noah    LIVER BIOPSY      LUMBAR LAMINECTOMY DISCECTOMY DECOMPRESSION Left 12/9/2018    Procedure: Posterior lumbar three through sacrum decompression;  Surgeon: Tevin Whitley MD;  Location: [facility];  Service: Neurosurgery    LYMPH NODE BIOPSY      SPINE SURGERY           FAMILY HISTORY  Family History   Problem Relation Age of Onset    Bipolar disorder Mother     Diabetes Mother     Hypertension Mother     No Known Problems Father     No Known Problems Sister     No Known Problems Brother     Nephrolithiasis Maternal Grandmother     Bipolar disorder Maternal Grandmother     Breast cancer Maternal Aunt     Liver cancer Maternal Uncle     Brain cancer Maternal Uncle     Lung cancer Maternal Uncle     Leukemia Cousin     Colon cancer Neg Hx     Cervical cancer Neg Hx     Uterine cancer Neg Hx     Ovarian cancer Neg Hx     Thyroid cancer Neg Hx          SOCIAL HISTORY  Social History      Socioeconomic History    Marital status: Single   Tobacco Use    Smoking status: Former     Current packs/day: 0.00     Average packs/day: 1 pack/day for 30.0 years (30.0 ttl pk-yrs)     Types: Cigarettes     Start date: 1990     Quit date: 2020     Years since quittin.4    Smokeless tobacco: Never    Tobacco comments:     E-CIG USE   Vaping Use    Vaping status: Every Day    Start date: 2020    Substances: Nicotine, Flavoring    Devices: Pre-filled or refillable cartridge, Refillable tank   Substance and Sexual Activity    Alcohol use: Not Currently     Comment: CAFFEINE USE: 1 CUP COFFEE/ 2 COKES DAILY    Drug use: Not Currently     Types: IV, Heroin, Methamphetamines     Comment: pt states she no longer uses heroin, she now uses meth    Sexual activity: Not Currently         ALLERGIES  Strawberry and Sulfa antibiotics        REVIEW OF SYSTEMS  Review of Systems     All systems reviewed and negative except for those discussed in HPI.       PHYSICAL EXAM    I have reviewed the triage vital signs and nursing notes.    ED Triage Vitals   Temp Heart Rate Resp BP SpO2   25   97.9 °F (36.6 °C) 77 16 114/81 98 %      Temp src Heart Rate Source Patient Position BP Location FiO2 (%)   -- -- -- 25 --      Left arm        Physical Exam  General: No acute distress, nontoxic, nondiaphoretic  HEENT: Mucous membranes moist, atraumatic, EOMI  Neck: Full ROM  Pulm: Symmetric chest rise, nonlabored, lungs CTAB, no peripheral edema  Cardiovascular: Regular rate and rhythm, intact distal pulses  GI: Soft, nontender, nondistended, no rebound, no guarding, bowel sounds present  MSK: Full ROM, no deformity  Skin: Warm, dry  Neuro: Awake, alert, oriented x 4, GCS 15, moving all extremities, no focal deficits  Psych: Calm, cooperative        LAB RESULTS  Recent Results (from the past 24 hours)   ECG 12 Lead ED Triage Standing Order; Chest  Pain    Collection Time: 02/09/25  7:31 PM   Result Value Ref Range    QT Interval 382 ms    QTC Interval 419 ms   Comprehensive Metabolic Panel    Collection Time: 02/09/25  7:33 PM    Specimen: Arm, Right; Blood   Result Value Ref Range    Glucose 106 (H) 65 - 99 mg/dL    BUN 11 6 - 20 mg/dL    Creatinine 0.55 (L) 0.57 - 1.00 mg/dL    Sodium 139 136 - 145 mmol/L    Potassium 3.7 3.5 - 5.2 mmol/L    Chloride 107 98 - 107 mmol/L    CO2 24.7 22.0 - 29.0 mmol/L    Calcium 8.7 8.6 - 10.5 mg/dL    Total Protein 6.9 6.0 - 8.5 g/dL    Albumin 3.8 3.5 - 5.2 g/dL    ALT (SGPT) 12 1 - 33 U/L    AST (SGOT) 20 1 - 32 U/L    Alkaline Phosphatase 66 39 - 117 U/L    Total Bilirubin 0.5 0.0 - 1.2 mg/dL    Globulin 3.1 gm/dL    A/G Ratio 1.2 g/dL    BUN/Creatinine Ratio 20.0 7.0 - 25.0    Anion Gap 7.3 5.0 - 15.0 mmol/L    eGFR 113.9 >60.0 mL/min/1.73   High Sensitivity Troponin T    Collection Time: 02/09/25  7:33 PM    Specimen: Arm, Right; Blood   Result Value Ref Range    HS Troponin T <6 <14 ng/L   Green Top (Gel)    Collection Time: 02/09/25  7:33 PM   Result Value Ref Range    Extra Tube Hold for add-ons.    Lavender Top    Collection Time: 02/09/25  7:33 PM   Result Value Ref Range    Extra Tube hold for add-on    Gold Top - SST    Collection Time: 02/09/25  7:33 PM   Result Value Ref Range    Extra Tube Hold for add-ons.    Light Blue Top    Collection Time: 02/09/25  7:33 PM   Result Value Ref Range    Extra Tube Hold for add-ons.    CBC Auto Differential    Collection Time: 02/09/25  7:33 PM    Specimen: Arm, Right; Blood   Result Value Ref Range    WBC 4.67 3.40 - 10.80 10*3/mm3    RBC 4.03 3.77 - 5.28 10*6/mm3    Hemoglobin 12.1 12.0 - 15.9 g/dL    Hematocrit 36.8 34.0 - 46.6 %    MCV 91.3 79.0 - 97.0 fL    MCH 30.0 26.6 - 33.0 pg    MCHC 32.9 31.5 - 35.7 g/dL    RDW 13.1 12.3 - 15.4 %    RDW-SD 44.1 37.0 - 54.0 fl    MPV 11.2 6.0 - 12.0 fL    Platelets 159 140 - 450 10*3/mm3    Neutrophil % 63.9 42.7 - 76.0 %     Lymphocyte % 24.8 19.6 - 45.3 %    Monocyte % 7.7 5.0 - 12.0 %    Eosinophil % 3.0 0.3 - 6.2 %    Basophil % 0.4 0.0 - 1.5 %    Immature Grans % 0.2 0.0 - 0.5 %    Neutrophils, Absolute 2.98 1.70 - 7.00 10*3/mm3    Lymphocytes, Absolute 1.16 0.70 - 3.10 10*3/mm3    Monocytes, Absolute 0.36 0.10 - 0.90 10*3/mm3    Eosinophils, Absolute 0.14 0.00 - 0.40 10*3/mm3    Basophils, Absolute 0.02 0.00 - 0.20 10*3/mm3    Immature Grans, Absolute 0.01 0.00 - 0.05 10*3/mm3    nRBC 0.0 0.0 - 0.2 /100 WBC   Ethanol    Collection Time: 02/09/25  7:33 PM    Specimen: Arm, Right; Blood   Result Value Ref Range    Ethanol <10 0 - 10 mg/dL    Ethanol % <0.010 %   Magnesium    Collection Time: 02/09/25  7:33 PM    Specimen: Arm, Right; Blood   Result Value Ref Range    Magnesium 1.8 1.6 - 2.6 mg/dL   TSH Rfx On Abnormal To Free T4    Collection Time: 02/09/25  7:33 PM    Specimen: Arm, Right; Blood   Result Value Ref Range    TSH 0.929 0.270 - 4.200 uIU/mL   Urine Drug Screen - Urine, Clean Catch    Collection Time: 02/09/25  8:10 PM    Specimen: Urine, Clean Catch   Result Value Ref Range    THC, Screen, Urine Negative Negative    Phencyclidine (PCP), Urine Negative Negative    Cocaine Screen, Urine Positive (A) Negative    Methamphetamine, Ur Negative Negative    Opiate Screen Negative Negative    Amphetamine Screen, Urine Negative Negative    Benzodiazepine Screen, Urine Positive (A) Negative    Tricyclic Antidepressants Screen Negative Negative    Methadone Screen, Urine Negative Negative    Barbiturates Screen, Urine Negative Negative    Oxycodone Screen, Urine Negative Negative    Buprenorphine, Screen, Urine Positive (A) Negative   Urinalysis With Microscopic If Indicated (No Culture) - Urine, Clean Catch    Collection Time: 02/09/25  8:10 PM    Specimen: Urine, Clean Catch   Result Value Ref Range    Color, UA Yellow Yellow, Straw    Appearance, UA Clear Clear    pH, UA 6.5 5.0 - 8.0    Specific Gravity, UA 1.027 1.005 - 1.030     Glucose, UA Negative Negative    Ketones, UA Trace (A) Negative    Bilirubin, UA Negative Negative    Blood, UA Negative Negative    Protein, UA Negative Negative    Leuk Esterase, UA Negative Negative    Nitrite, UA Negative Negative    Urobilinogen, UA 1.0 E.U./dL 0.2 - 1.0 E.U./dL   Fentanyl, Urine - Urine, Clean Catch    Collection Time: 02/09/25  8:10 PM    Specimen: Urine, Clean Catch   Result Value Ref Range    Fentanyl, Urine Negative Negative   High Sensitivity Troponin T 1Hr    Collection Time: 02/09/25  8:40 PM    Specimen: Arm, Right; Blood   Result Value Ref Range    HS Troponin T <6 <14 ng/L    Troponin T Numeric Delta         Ordered the above labs and independently interpreted results. My findings will be discussed in the medical decision making section below        RADIOLOGY  XR Chest 1 View    Result Date: 2/9/2025  CXR ONE VIEW  HISTORY: Chest Pain Triage Protocol  COMPARISON: 11/28/2024  TECHNIQUE: single portable AP       The heart size is within normal limits.  The lungs are normally aerated. There is no pleural effusion or pneumothorax.    This report was finalized on 2/9/2025 8:24 PM by Dr. Alvin Valencia M.D on Workstation: [id number]       Ordered the above noted radiological studies.  Independently interpreted by me and my independent review of findings can be found in the ED Course.  See dictation for official radiology interpretation.      PROCEDURES    Procedures      EKG - Per my independent interpretation at 1933:    EKG Time: 1931  Rhythm/Rate: Sinus rhythm with a rate of 72  Normal axis  Normal intervals  Isolated Q wave in lead III, no acute ischemic changes  No STEMI       No emergent changes compared to November 28, 2024      MEDICATIONS GIVEN IN ER    Medications   sodium chloride 0.9 % flush 10 mL (has no administration in time range)   aspirin tablet 325 mg (325 mg Oral Not Given 2/9/25 2026)         PROGRESS, DATA ANALYSIS, CONSULTS, AND MEDICAL DECISION  MAKING    Please note that this section constitutes my independent interpretation of clinical data including lab results, radiology, EKG's.  This constitutes my independent professional opinion regarding differential diagnosis and management of this patient.  It may include any factors such as history from outside sources, review of external records, social determinants of health, management of medications, response to those treatments, and discussions with other providers.    Differential Diagnosis and Plan: Initial concern for electrolyte abnormality, dehydration, renal failure, anemia, arrhythmia, with lower concern for ACS.  Plan for labs, chest x-ray, EKG, and will reevaluate with results. HEART score 3 (pre-troponin).     Additional sources:  - Discussed/ obtained information from independent historians:       - (Social Determinants of Health): None     - Shared decision making:  Patient fully updated on and in agreement with the course and plan moving forward    ED Course as of 02/09/25 2234   Sun Feb 09, 2025 1946 WBC: 4.67 [DC]   1948 Hemoglobin: 12.1 [DC]   1948 Platelets: 159 [DC]   2007 HS Troponin T: <6 [DC]   2008 Glucose(!): 106 [DC]   2008 BUN: 11 [DC]   2008 Creatinine(!): 0.55 [DC]   2008 Sodium: 139 [DC]   2008 Potassium: 3.7 [DC]   2008 ALT (SGPT): 12 [DC]   2008 AST (SGOT): 20 [DC]   2008 Alkaline Phosphatase: 66 [DC]   2008 Total Bilirubin: 0.5 [DC]   2008 XR Chest 1 View  Per my independent interpretation of the chest x-ray, no evidence of pneumothorax [DC]   2029 Nitrite, UA: Negative [DC]   2029 Leukocytes, UA: Negative [DC]   2029 Ketones, UA(!): Trace [DC]   2029 Magnesium: 1.8 [DC]   2029 Ethanol %: <0.010 [DC]   2035 Cocaine Screen, Urine(!): Positive [DC]   2035 Benzodiazepine Screen, Urine(!): Positive [DC]   2035 Buprenorphine, Screen, Urine(!): Positive [DC]   2109 HS Troponin T: <6 [DC]   2145 Patient with a reassuring workup, after discussion she did do a Holter monitor back in  January but she lost the monitor before she could send back and.  She is interested in doing it again sent back and.  I will send her home on a holter monitor with outpatient cardiology follow-up recommended.  At this time no evidence of any acute emergent findings.  She does have cocaine, benzo and buprenorphine positive urine.  She does take buprenorphine chronically.  [DC]      ED Course User Index  [DC] Kyle Jarvis MD       Hospitalization Considered?:     Orders Placed During This Visit:  Orders Placed This Encounter   Procedures    XR Chest 1 View    [place] Draw    Comprehensive Metabolic Panel    High Sensitivity Troponin T    CBC Auto Differential    Ethanol    Urine Drug Screen - Urine, Clean Catch    Urinalysis With Microscopic If Indicated (No Culture) - Urine, Clean Catch    Magnesium    TSH Rfx On Abnormal To Free T4    High Sensitivity Troponin T 1Hr    Fentanyl, Urine - Urine, Clean Catch    NPO Diet NPO Type: Strict NPO    Undress & Gown    Continuous Pulse Oximetry    Oxygen Therapy- Nasal Cannula; Titrate 1-6 LPM Per SpO2; 90 - 95%    Holter Monitor - 72 Hour Up To 15 Days    ECG 12 Lead ED Triage Standing Order; Chest Pain    ECG 12 Lead ED Triage Standing Order; Chest Pain    Insert Peripheral IV    CBC & Differential    Green Top (Gel)    Lavender Top    Gold Top - SST    Light Blue Top       Additional orders considered but not placed:      Independent interpretation of labs, radiology studies, and discussions with consultants: See ED Course        AS OF 22:34 EST VITALS:    BP - 113/78  HR - 68  TEMP - 97.9 °F (36.6 °C)  02 SATS - 97%          DIAGNOSIS  Final diagnoses:   Palpitations   History of palpitations         DISPOSITION  ED Disposition       ED Disposition   Discharge    Condition   Stable    Comment   --                Please note that portions of this document were completed with a voice recognition program.    Note Disclaimer: At [facility], we believe that sharing  information builds trust and better relationships. You are receiving this note because you recently visited [facility]. It is possible you will see health information before a provider has talked with you about it. This kind of information can be easy to misunderstand. To help you fully understand what it means for your health, we urge you to discuss this note with your provider.                       Kyle Jarvis MD  02/09/25 [id number]

## 2025-02-10 NOTE — DISCHARGE INSTRUCTIONS
Follow-up with cardiology on the results of your Holter monitor, stay well-hydrated, continue current medications, outpatient PCP and cardiology follow-up as discussed, ED return for worsening symptoms as needed.

## 2025-02-11 LAB
QT INTERVAL: 382 MS
QTC INTERVAL: 419 MS

## 2025-02-20 DIAGNOSIS — L30.9 DERMATITIS: ICD-10-CM

## 2025-02-20 DIAGNOSIS — F31.81 BIPOLAR 2 DISORDER: ICD-10-CM

## 2025-02-20 RX ORDER — ONDANSETRON 8 MG/1
8 TABLET, ORALLY DISINTEGRATING ORAL EVERY 8 HOURS PRN
Qty: 20 TABLET | Refills: 0 | Status: SHIPPED | OUTPATIENT
Start: 2025-02-20

## 2025-02-20 RX ORDER — NYSTATIN AND TRIAMCINOLONE ACETONIDE 100000; 1 [USP'U]/G; MG/G
CREAM TOPICAL SEE ADMIN INSTRUCTIONS
Qty: 60 G | Refills: 0 | Status: SHIPPED | OUTPATIENT
Start: 2025-02-20

## 2025-02-20 RX ORDER — OLANZAPINE 15 MG/1
15 TABLET ORAL
Qty: 90 TABLET | Refills: 0 | Status: SHIPPED | OUTPATIENT
Start: 2025-02-20

## 2025-02-27 LAB
CV ZIO BASELINE AVG BPM: 83 BPM
CV ZIO BASELINE BPM HIGH: 117 BPM
CV ZIO BASELINE BPM LOW: 56 BPM
CV ZIO DEVICE ANALYSIS TIME: NORMAL
CV ZIO ECT SVE COUNT: 12 EPISODES
CV ZIO ECT SVE CPLT COUNT: 1 EPISODES
CV ZIO ECT SVE CPLT FREQ: NORMAL
CV ZIO ECT SVE FREQ: NORMAL
CV ZIO ECT SVE TPLT COUNT: 0 EPISODES
CV ZIO ECT SVE TPLT FREQ: 0
CV ZIO ECT VE COUNT: 0 EPISODES
CV ZIO ECT VE CPLT COUNT: 0 EPISODES
CV ZIO ECT VE CPLT FREQ: 0
CV ZIO ECT VE FREQ: 0
CV ZIO ECT VE TPLT COUNT: 0 EPISODES
CV ZIO ECT VE TPLT FREQ: 0
CV ZIO ECTOPIC SVE COUPLET RAW PERCENT: 0 %
CV ZIO ECTOPIC SVE ISOLATED PERCENT: 0.01 %
CV ZIO ECTOPIC SVE TRIPLET RAW PERCENT: 0 %
CV ZIO ECTOPIC VE COUPLET RAW PERCENT: 0 %
CV ZIO ECTOPIC VE ISOLATED PERCENT: 0 %
CV ZIO ECTOPIC VE TRIPLET RAW PERCENT: 0 %
CV ZIO ENROLLMENT END: NORMAL
CV ZIO ENROLLMENT START: NORMAL
CV ZIO PATIENT EVENTS DIARIES: 0
CV ZIO PATIENT EVENTS TRIGGERS: 11
CV ZIO PAUSE COUNT: 0
CV ZIO PRESCRIPTION STATUS: NORMAL
CV ZIO SVT COUNT: 0
CV ZIO TOTAL  ENROLLMENT PERIOD: NORMAL
CV ZIO VT COUNT: 0

## 2025-03-12 ENCOUNTER — OFFICE VISIT (OUTPATIENT)
Age: 50
End: 2025-03-12
Payer: COMMERCIAL

## 2025-03-12 VITALS
WEIGHT: 225 LBS | SYSTOLIC BLOOD PRESSURE: 110 MMHG | HEART RATE: 73 BPM | DIASTOLIC BLOOD PRESSURE: 72 MMHG | BODY MASS INDEX: 36.16 KG/M2 | HEIGHT: 66 IN

## 2025-03-12 DIAGNOSIS — R00.2 PALPITATIONS: Primary | ICD-10-CM

## 2025-03-12 PROCEDURE — 93000 ELECTROCARDIOGRAM COMPLETE: CPT | Performed by: INTERNAL MEDICINE

## 2025-03-12 PROCEDURE — 99214 OFFICE O/P EST MOD 30 MIN: CPT | Performed by: INTERNAL MEDICINE

## 2025-03-12 NOTE — PROGRESS NOTES
Date of Office Visit: 25    Encounter Provider: Raphael Chavis MD  Place of Service: Psychiatric CARDIOLOGY  Patient Name: Silvia Clinton  :1975    Chief Complaint   Patient presents with    Palpitations    Hospital Follow Up Visit     From [facility]       HPI: Silvia Clinton is a 49 y.o. female who is a patient of  Dr. Chavis.  She is new to me today and was last seen in our office 2021.  She has a history of hypertension, Bipolar, chronic pain syndrome, polysubstance abuse, hepatitis C, GERD, tobacco abuse and sinus tachycardia.  In 2019, patient presented with chest pain.  She underwent stress test that was normal.  She was discharged on ZIO which apparently she had issues with it falling off.     She saw Dr. Chavis in office 2020 with multiple complaints of palpitations, lightheadedness, elevated blood pressure and just not feeling well.  Metoprolol tartrate  increased to 50mg BID.  Ultimately, metoprolol was increased to 100mg BID.      She was then placed on losartan BID for better blood pressure control and reportedly was doing well.  Patient presented to [facility] ED with complaints of chest pain/ tightness with associated nausea, dyspnea and blurred vision  on 10/17/2023.  Troponin negative.  BNP normal.  CXR unremarkable. EKG showed sinus tachycardia with non-specific ST-T wave changes.    Patient was recently in the emergency room on 2025 with report of increased frequency of palpitations and chest discomfort.  She came in for evaluation and had a normal blood pressure and heart rate.  Drug screen was positive for benzos and cocaine.  Patient denies using both.  Per report the palpitations come on and typically are short in duration.  It sounds like she is having PVCs or PACs.  She feels like she is just irregular and skipping beats.  She states that about 4 times a year it would last much longer and occasionally she  will come into the ER for evaluation of that.    Previous testing and notes have been reviewed by me.   Past Medical History:   Diagnosis Date    Allergic rhinitis     Anemia 1992    Common occurrence    Anxiety 25yrs ago    Arrhythmia 99    MVP    Arthritis 07    Lupus    Chest pain     CHF (congestive heart failure) 21    Clotting disorder 2mo ago    Extremely heavy periods    Depression     Drug abstinence syndrome     Drug abuse     Ex-smoker     GERD (gastroesophageal reflux disease) 6mo ago    Heart attack     Heart murmur Birth    Hepatitis C     Hepatitis C     Hypertension 2019    Three blood pressure meds    Kidney stone     Lupus     Lupus (systemic lupus erythematosus)     Meningitis 2 yrsago    Emergency spine surgery septic    Mitral valve anterior leaflet prolapse     NSTEMI (non-ST elevated myocardial infarction)     Otitis     Palpitations     Seizures     Spinal epidural abscess     Tachycardia        Past Surgical History:   Procedure Laterality Date    BACK SURGERY      CHOLECYSTECTOMY      FRACTURE SURGERY  1.5yr ago    LEG SURGERY      broke tibula,fibula, steel noah    LIVER BIOPSY      LUMBAR LAMINECTOMY DISCECTOMY DECOMPRESSION Left 2018    Procedure: Posterior lumbar three through sacrum decompression;  Surgeon: Tevin Whitley MD;  Location: [facility];  Service: Neurosurgery    LYMPH NODE BIOPSY      SPINE SURGERY         Social History     Socioeconomic History    Marital status: Single   Tobacco Use    Smoking status: Former     Current packs/day: 0.00     Average packs/day: 1 pack/day for 30.0 years (30.0 ttl pk-yrs)     Types: Cigarettes     Start date: 1990     Quit date: 2020     Years since quittin.5    Smokeless tobacco: Never    Tobacco comments:     E-CIG USE   Vaping Use    Vaping status: Every Day    Start date: 2020    Substances: Nicotine, Flavoring    Devices: Pre-filled or refillable cartridge, Refillable tank     Passive vaping exposure: Yes   Substance and Sexual Activity    Alcohol use: Not Currently     Comment: CAFFEINE USE: 1 CUP COFFEE/ 2 COKES DAILY    Drug use: Not Currently     Types: IV, Heroin, Methamphetamines     Comment: Hx of use    Sexual activity: Not Currently       Family History   Problem Relation Age of Onset    Bipolar disorder Mother     Diabetes Mother     Hypertension Mother     Bleeding Disorder Mother         Hemorrhage during child birth    Depression Mother     Mental illness Mother     No Known Problems Father     No Known Problems Sister     No Known Problems Brother     Nephrolithiasis Maternal Grandmother     Bipolar disorder Maternal Grandmother     Diabetes Maternal Grandmother     Hypertension Maternal Grandmother     Obesity Maternal Grandmother     Breast cancer Maternal Aunt     Liver cancer Maternal Uncle     Brain cancer Maternal Uncle     Lung cancer Maternal Uncle     Leukemia Cousin     Cancer Maternal Aunt     Cancer Maternal Uncle     Colon cancer Neg Hx     Cervical cancer Neg Hx     Uterine cancer Neg Hx     Ovarian cancer Neg Hx     Thyroid cancer Neg Hx        Review of Systems   Constitutional: Negative. Negative for fever and malaise/fatigue.   HENT: Negative.  Negative for nosebleeds and sore throat.    Eyes: Negative.  Negative for blurred vision and double vision.   Cardiovascular: Negative.  Negative for chest pain, claudication, palpitations and syncope.   Respiratory: Negative.  Negative for cough, shortness of breath and snoring.    Endocrine: Negative.  Negative for cold intolerance, heat intolerance and polydipsia.   Hematologic/Lymphatic: Negative.    Skin: Negative.  Negative for itching, poor wound healing and rash.   Musculoskeletal: Negative.  Negative for joint pain, joint swelling, muscle weakness and myalgias.   Gastrointestinal: Negative.  Negative for abdominal pain, melena, nausea and vomiting.   Genitourinary: Negative.    Neurological: Negative.   "Negative for light-headedness, loss of balance, seizures, vertigo and weakness.   Psychiatric/Behavioral: Negative.  Negative for altered mental status and depression.    Allergic/Immunologic: Negative.        Allergies   Allergen Reactions    Laceys Spring Hives     \"FELT LIKE BURNING, HEAT AND SWELLING\"    Sulfa Antibiotics Nausea And Vomiting and Swelling     Patient states when she took Sulfa medication, her throat started to swell.  nausea         Current Outpatient Medications:     acetaminophen (TYLENOL) 500 MG tablet, Take 1 tablet by mouth Every 6 (Six) Hours As Needed for Mild Pain ., Disp: 60 tablet, Rfl: 1    amLODIPine (NORVASC) 10 MG tablet, Take 0.5 tablets by mouth 2 (Two) Times a Day., Disp: 90 tablet, Rfl: 1    buprenorphine-naloxone (SUBOXONE) 8-2 MG per SL tablet, Place 1 tablet under the tongue 2 (Two) Times a Day., Disp: , Rfl:     hydrocortisone 1 % cream, Apply 1 application topically to the appropriate area as directed 2 (Two) Times a Day., Disp: 1 each, Rfl: 1    loratadine (CLARITIN) 10 MG tablet, Take 1 tablet by mouth Daily., Disp: 90 tablet, Rfl: 1    losartan (COZAAR) 100 MG tablet, Take 0.5 tablets by mouth 2 (Two) Times a Day., Disp: 90 tablet, Rfl: 0    metoprolol tartrate (LOPRESSOR) 100 MG tablet, Take 1 tablet by mouth 2 (Two) Times a Day., Disp: 180 tablet, Rfl: 1    naloxone (NARCAN) 4 MG/0.1ML nasal spray, , Disp: , Rfl:     NON FORMULARY, SEMAGLUTIDE/CYANOCOBALAMIN 1MG-0.25MG/ML (2ML VIAL - BLUE CAP)-DISCARD 28 DAYS AFTER 1ST PUNCTURE OF VIAL, Disp: , Rfl:     nystatin-triamcinolone (MYCOLOG II) 971928-5.1 UNIT/GM-% cream, Apply  topically to the appropriate area as directed See Admin Instructions. Apply topically to the affected area twice daily as directed, Disp: 60 g, Rfl: 0    OLANZapine (zyPREXA) 15 MG tablet, Take 1 tablet by mouth every night at bedtime., Disp: 90 tablet, Rfl: 0    omeprazole (priLOSEC) 40 MG capsule, Take 1 capsule by mouth Daily., Disp: 90 capsule, Rfl: " "1    ondansetron ODT (ZOFRAN-ODT) 8 MG disintegrating tablet, Take 1 tablet by mouth Every 8 (Eight) Hours As Needed for Nausea or Vomiting., Disp: 20 tablet, Rfl: 0    sucralfate (CARAFATE) 1 g tablet, Take 1 tablet by mouth 3 (Three) Times a Day Before Meals., Disp: 90 tablet, Rfl: 0    valACYclovir (VALTREX) 500 MG tablet, Take 2 tablets by mouth 2 (Two) Times a Day., Disp: 30 tablet, Rfl: 2    venlafaxine XR (EFFEXOR-XR) 150 MG 24 hr capsule, Take 1 capsule by mouth Daily., Disp: 90 capsule, Rfl: 0      Objective:     Vitals:    03/12/25 1346   BP: 110/72   Pulse: 73   Weight: 102 kg (225 lb)   Height: 167.6 cm (66\")     Body mass index is 36.32 kg/m².    PHYSICAL EXAM:    Constitutional:       Appearance: Healthy appearance. Not in distress.   Neck:      Vascular: No JVR. JVD normal.   Pulmonary:      Effort: Pulmonary effort is normal.      Breath sounds: Normal breath sounds. No wheezing. No rhonchi. No rales.   Chest:      Chest wall: Not tender to palpatation.   Cardiovascular:      PMI at left midclavicular line. Normal rate. Regular rhythm. Normal S1. Normal S2.       Murmurs: There is no murmur.      No gallop.  No click. No rub.   Pulses:     Intact distal pulses.   Edema:     Peripheral edema absent.   Abdominal:      General: Bowel sounds are normal.      Palpations: Abdomen is soft.      Tenderness: There is no abdominal tenderness.   Musculoskeletal: Normal range of motion.         General: No tenderness. Skin:     General: Skin is warm and dry.   Neurological:      General: No focal deficit present.      Mental Status: Alert and oriented to person, place and time.           ECG 12 Lead    Date/Time: 3/12/2025 2:17 PM  Performed by: Raphael Chavis MD    Authorized by: Raphael Chavis MD  Comparison: compared with previous ECG from 2/9/2025  Similar to previous ECG  Rhythm: sinus rhythm  Rate: normal  QRS axis: normal              Assessment:     Plan:       Hypertension: controlled.  " Continue amlodipine, metoprolol tartrate and losartan therapy.    Bipolar: follows with psych    Polysubstance abuse: h/o of cocaine, amphetamines.  Denies usage.    4.  Palpitations: Reasonable to just get a transthoracic echocardiogram to ensure that her left ventricular size and function are normal.  Symptoms sound to be low risk.       Your medication list            Accurate as of March 12, 2025  2:17 PM. If you have any questions, ask your nurse or doctor.                CONTINUE taking these medications        Instructions Last Dose Given Next Dose Due   acetaminophen 500 MG tablet  Commonly known as: TYLENOL      Take 1 tablet by mouth Every 6 (Six) Hours As Needed for Mild Pain .       amLODIPine 10 MG tablet  Commonly known as: NORVASC      Take 0.5 tablets by mouth 2 (Two) Times a Day.       buprenorphine-naloxone 8-2 MG per SL tablet  Commonly known as: SUBOXONE      Place 1 tablet under the tongue 2 (Two) Times a Day.       hydrocortisone 1 % cream      Apply 1 application topically to the appropriate area as directed 2 (Two) Times a Day.       loratadine 10 MG tablet  Commonly known as: CLARITIN      Take 1 tablet by mouth Daily.       losartan 100 MG tablet  Commonly known as: COZAAR      Take 0.5 tablets by mouth 2 (Two) Times a Day.       metoprolol tartrate 100 MG tablet  Commonly known as: LOPRESSOR      Take 1 tablet by mouth 2 (Two) Times a Day.       naloxone 4 MG/0.1ML nasal spray  Commonly known as: NARCAN           NON FORMULARY      SEMAGLUTIDE/CYANOCOBALAMIN 1MG-0.25MG/ML (2ML VIAL - BLUE CAP)-DISCARD 28 DAYS AFTER 1ST PUNCTURE OF VIAL       nystatin-triamcinolone 335750-2.1 UNIT/GM-% cream  Commonly known as: MYCOLOG II      Apply  topically to the appropriate area as directed See Admin Instructions. Apply topically to the affected area twice daily as directed       OLANZapine 15 MG tablet  Commonly known as: zyPREXA      Take 1 tablet by mouth every night at bedtime.       omeprazole 40  MG capsule  Commonly known as: priLOSEC      Take 1 capsule by mouth Daily.       ondansetron ODT 8 MG disintegrating tablet  Commonly known as: ZOFRAN-ODT      Take 1 tablet by mouth Every 8 (Eight) Hours As Needed for Nausea or Vomiting.       sucralfate 1 g tablet  Commonly known as: CARAFATE      Take 1 tablet by mouth 3 (Three) Times a Day Before Meals.       valACYclovir 500 MG tablet  Commonly known as: VALTREX      Take 2 tablets by mouth 2 (Two) Times a Day.       venlafaxine  MG 24 hr capsule  Commonly known as: EFFEXOR-XR      Take 1 capsule by mouth Daily.

## 2025-03-17 DIAGNOSIS — L30.9 DERMATITIS: ICD-10-CM

## 2025-03-17 RX ORDER — ONDANSETRON 8 MG/1
8 TABLET, ORALLY DISINTEGRATING ORAL EVERY 8 HOURS PRN
Qty: 20 TABLET | Refills: 1 | Status: SHIPPED | OUTPATIENT
Start: 2025-03-17

## 2025-03-17 RX ORDER — NYSTATIN AND TRIAMCINOLONE ACETONIDE 100000; 1 [USP'U]/G; MG/G
CREAM TOPICAL SEE ADMIN INSTRUCTIONS
Qty: 60 G | Refills: 3 | Status: SHIPPED | OUTPATIENT
Start: 2025-03-17

## 2025-03-19 LAB
CV ZIO BASELINE AVG BPM: 77 BPM
CV ZIO BASELINE BPM HIGH: 128 BPM
CV ZIO BASELINE BPM LOW: 56 BPM
CV ZIO DEVICE ANALYSIS TIME: NORMAL
CV ZIO ECT SVE COUNT: 31 EPISODES
CV ZIO ECT SVE CPLT COUNT: 0 EPISODES
CV ZIO ECT SVE CPLT FREQ: 0
CV ZIO ECT SVE FREQ: NORMAL
CV ZIO ECT SVE TPLT COUNT: 1 EPISODES
CV ZIO ECT SVE TPLT FREQ: NORMAL
CV ZIO ECT VE COUNT: 49 EPISODES
CV ZIO ECT VE CPLT COUNT: 0 EPISODES
CV ZIO ECT VE CPLT FREQ: 0
CV ZIO ECT VE FREQ: NORMAL
CV ZIO ECT VE TPLT COUNT: 0 EPISODES
CV ZIO ECT VE TPLT FREQ: 0
CV ZIO ECTOPIC SVE COUPLET RAW PERCENT: 0 %
CV ZIO ECTOPIC SVE ISOLATED PERCENT: 0.01 %
CV ZIO ECTOPIC SVE TRIPLET RAW PERCENT: 0 %
CV ZIO ECTOPIC VE COUPLET RAW PERCENT: 0 %
CV ZIO ECTOPIC VE ISOLATED PERCENT: 0.01 %
CV ZIO ECTOPIC VE TRIPLET RAW PERCENT: 0 %
CV ZIO ENROLLMENT END: NORMAL
CV ZIO ENROLLMENT START: NORMAL
CV ZIO PATIENT EVENTS DIARIES: 0
CV ZIO PATIENT EVENTS TRIGGERS: 4
CV ZIO PAUSE COUNT: 0
CV ZIO PRESCRIPTION STATUS: NORMAL
CV ZIO SVT AVG BPM: 112 BPM
CV ZIO SVT BPM HIGH: 128 BPM
CV ZIO SVT BPM LOW: 94 BPM
CV ZIO SVT COUNT: 2
CV ZIO SVT F EPI AVG BPM: 122 BPM
CV ZIO SVT F EPI BEATS: 7 BEATS
CV ZIO SVT F EPI BPM HIGH: 128 BPM
CV ZIO SVT F EPI BPM LOW: 111 BPM
CV ZIO SVT F EPI DUR: 3.3 SEC
CV ZIO SVT F EPI END: NORMAL
CV ZIO SVT F EPI START: NORMAL
CV ZIO SVT L EPI AVG BPM: 103 BPM
CV ZIO SVT L EPI BEATS: 7 BEATS
CV ZIO SVT L EPI BPM HIGH: 110 BPM
CV ZIO SVT L EPI BPM LOW: 94 BPM
CV ZIO SVT L EPI DUR: 4.3 SEC
CV ZIO SVT L EPI END: NORMAL
CV ZIO SVT L EPI START: NORMAL
CV ZIO SVT SYMPT IN PT: NORMAL
CV ZIO TOTAL  ENROLLMENT PERIOD: NORMAL
CV ZIO VT COUNT: 0

## 2025-03-24 ENCOUNTER — HOSPITAL ENCOUNTER (OUTPATIENT)
Dept: CARDIOLOGY | Facility: HOSPITAL | Age: 50
Discharge: HOME | End: 2025-03-24
Admitting: INTERNAL MEDICINE
Payer: COMMERCIAL

## 2025-03-24 VITALS
BODY MASS INDEX: 36.16 KG/M2 | HEIGHT: 66 IN | WEIGHT: 225 LBS | SYSTOLIC BLOOD PRESSURE: 120 MMHG | DIASTOLIC BLOOD PRESSURE: 84 MMHG

## 2025-03-24 DIAGNOSIS — R00.2 PALPITATIONS: ICD-10-CM

## 2025-03-24 LAB
AORTIC ARCH: 2.6 CM
AORTIC DIMENSIONLESS INDEX: 0.88 (DI)
ASCENDING AORTA: 3 CM
AV MEAN PRESS GRAD SYS DOP V1V2: 4.8 MMHG
AV VMAX SYS DOP: 150.8 CM/SEC
BH CV ECHO MEAS - ACS: 1.81 CM
BH CV ECHO MEAS - AO MAX PG: 9.1 MMHG
BH CV ECHO MEAS - AO ROOT DIAM: 3.2 CM
BH CV ECHO MEAS - AO V2 VTI: 32.6 CM
BH CV ECHO MEAS - AVA(I,D): 2.7 CM2
BH CV ECHO MEAS - EDV(CUBED): 90.7 ML
BH CV ECHO MEAS - EDV(MOD-SP2): 117 ML
BH CV ECHO MEAS - EDV(MOD-SP4): 102 ML
BH CV ECHO MEAS - EF(MOD-SP2): 64.1 %
BH CV ECHO MEAS - EF(MOD-SP4): 59.8 %
BH CV ECHO MEAS - ESV(CUBED): 19.8 ML
BH CV ECHO MEAS - ESV(MOD-SP2): 42 ML
BH CV ECHO MEAS - ESV(MOD-SP4): 41 ML
BH CV ECHO MEAS - FS: 39.8 %
BH CV ECHO MEAS - IVS/LVPW: 1 CM
BH CV ECHO MEAS - IVSD: 0.86 CM
BH CV ECHO MEAS - LAT PEAK E' VEL: 10.6 CM/SEC
BH CV ECHO MEAS - LV DIASTOLIC VOL/BSA (35-75): 48.5 CM2
BH CV ECHO MEAS - LV MASS(C)D: 124.4 GRAMS
BH CV ECHO MEAS - LV MAX PG: 7.1 MMHG
BH CV ECHO MEAS - LV MEAN PG: 3.2 MMHG
BH CV ECHO MEAS - LV SYSTOLIC VOL/BSA (12-30): 19.5 CM2
BH CV ECHO MEAS - LV V1 MAX: 133.3 CM/SEC
BH CV ECHO MEAS - LV V1 VTI: 28.7 CM
BH CV ECHO MEAS - LVIDD: 4.5 CM
BH CV ECHO MEAS - LVIDS: 2.7 CM
BH CV ECHO MEAS - LVOT AREA: 3.1 CM2
BH CV ECHO MEAS - LVOT DIAM: 1.98 CM
BH CV ECHO MEAS - LVPWD: 0.86 CM
BH CV ECHO MEAS - MED PEAK E' VEL: 8.4 CM/SEC
BH CV ECHO MEAS - MV A DUR: 0.11 SEC
BH CV ECHO MEAS - MV A MAX VEL: 77.1 CM/SEC
BH CV ECHO MEAS - MV DEC SLOPE: 370.7 CM/SEC2
BH CV ECHO MEAS - MV DEC TIME: 0.22 SEC
BH CV ECHO MEAS - MV E MAX VEL: 83.2 CM/SEC
BH CV ECHO MEAS - MV E/A: 1.08
BH CV ECHO MEAS - MV MAX PG: 2.9 MMHG
BH CV ECHO MEAS - MV MEAN PG: 1.67 MMHG
BH CV ECHO MEAS - MV P1/2T: 71.9 MSEC
BH CV ECHO MEAS - MV V2 VTI: 26.1 CM
BH CV ECHO MEAS - MVA(P1/2T): 3.1 CM2
BH CV ECHO MEAS - MVA(VTI): 3.4 CM2
BH CV ECHO MEAS - PA ACC TIME: 0.17 SEC
BH CV ECHO MEAS - PA V2 MAX: 99.1 CM/SEC
BH CV ECHO MEAS - PULM A REVS DUR: 0.12 SEC
BH CV ECHO MEAS - PULM A REVS VEL: 25.7 CM/SEC
BH CV ECHO MEAS - PULM DIAS VEL: 21.4 CM/SEC
BH CV ECHO MEAS - PULM S/D: 1.91
BH CV ECHO MEAS - PULM SYS VEL: 40.9 CM/SEC
BH CV ECHO MEAS - QP/QS: 0.62
BH CV ECHO MEAS - RAP SYSTOLE: 3 MMHG
BH CV ECHO MEAS - RV MAX PG: 1.54 MMHG
BH CV ECHO MEAS - RV V1 MAX: 62 CM/SEC
BH CV ECHO MEAS - RV V1 VTI: 17.4 CM
BH CV ECHO MEAS - RVOT DIAM: 2 CM
BH CV ECHO MEAS - SV(LVOT): 88.3 ML
BH CV ECHO MEAS - SV(MOD-SP2): 75 ML
BH CV ECHO MEAS - SV(MOD-SP4): 61 ML
BH CV ECHO MEAS - SV(RVOT): 54.7 ML
BH CV ECHO MEAS - SVI(LVOT): 42 ML/M2
BH CV ECHO MEAS - SVI(MOD-SP2): 35.7 ML/M2
BH CV ECHO MEAS - SVI(MOD-SP4): 29 ML/M2
BH CV ECHO MEAS - TAPSE (>1.6): 2.43 CM
BH CV ECHO MEASUREMENTS AVERAGE E/E' RATIO: 8.76
BH CV XLRA - RV BASE: 2.8 CM
BH CV XLRA - RV LENGTH: 8.6 CM
BH CV XLRA - RV MID: 2.44 CM
BH CV XLRA - TDI S': 11.3 CM/SEC
LEFT ATRIUM VOLUME INDEX: 30.8 ML/M2
LV EF BIPLANE MOD: 62.4 %
SINUS: 2.9 CM
STJ: 2.34 CM

## 2025-03-24 PROCEDURE — 93306 TTE W/DOPPLER COMPLETE: CPT | Performed by: INTERNAL MEDICINE

## 2025-03-24 PROCEDURE — 25510000001 PERFLUTREN 6.52 MG/ML SUSPENSION 2 ML VIAL: Performed by: INTERNAL MEDICINE

## 2025-03-24 PROCEDURE — 93306 TTE W/DOPPLER COMPLETE: CPT

## 2025-03-24 RX ADMIN — PERFLUTREN 2 ML: 6.52 INJECTION, SUSPENSION INTRAVENOUS at 16:03

## 2025-04-02 ENCOUNTER — TELEMEDICINE (OUTPATIENT)
Dept: INTERNAL MEDICINE | Facility: CLINIC | Age: 50
End: 2025-04-02
Payer: COMMERCIAL

## 2025-04-02 VITALS — WEIGHT: 223.8 LBS | BODY MASS INDEX: 36.12 KG/M2

## 2025-04-02 DIAGNOSIS — E66.01 CLASS 3 SEVERE OBESITY DUE TO EXCESS CALORIES WITH SERIOUS COMORBIDITY AND BODY MASS INDEX (BMI) OF 40.0 TO 44.9 IN ADULT: Primary | ICD-10-CM

## 2025-04-02 DIAGNOSIS — B00.89 HERPETIC WHITLOW OF FINGER OF RIGHT HAND WITH LYMPHANGITIS: ICD-10-CM

## 2025-04-02 DIAGNOSIS — I89.1 HERPETIC WHITLOW OF FINGER OF RIGHT HAND WITH LYMPHANGITIS: ICD-10-CM

## 2025-04-02 DIAGNOSIS — M32.9 SYSTEMIC LUPUS ERYTHEMATOSUS, UNSPECIFIED SLE TYPE, UNSPECIFIED ORGAN INVOLVEMENT STATUS: ICD-10-CM

## 2025-04-02 DIAGNOSIS — I10 ESSENTIAL HYPERTENSION: ICD-10-CM

## 2025-04-02 DIAGNOSIS — B37.2 CUTANEOUS CANDIDIASIS: ICD-10-CM

## 2025-04-02 DIAGNOSIS — F31.81 BIPOLAR 2 DISORDER: ICD-10-CM

## 2025-04-02 DIAGNOSIS — F41.9 ANXIETY: Chronic | ICD-10-CM

## 2025-04-02 DIAGNOSIS — E66.813 CLASS 3 SEVERE OBESITY DUE TO EXCESS CALORIES WITH SERIOUS COMORBIDITY AND BODY MASS INDEX (BMI) OF 40.0 TO 44.9 IN ADULT: Primary | ICD-10-CM

## 2025-04-02 PROCEDURE — 99214 OFFICE O/P EST MOD 30 MIN: CPT | Performed by: NURSE PRACTITIONER

## 2025-04-02 RX ORDER — NYSTATIN AND TRIAMCINOLONE ACETONIDE 100000; 1 [USP'U]/G; MG/G
CREAM TOPICAL SEE ADMIN INSTRUCTIONS
Qty: 60 G | Refills: 3 | Status: SHIPPED | OUTPATIENT
Start: 2025-04-02

## 2025-04-02 RX ORDER — AMLODIPINE BESYLATE 10 MG/1
5 TABLET ORAL 2 TIMES DAILY
Qty: 90 TABLET | Refills: 1 | Status: SHIPPED | OUTPATIENT
Start: 2025-04-02

## 2025-04-02 RX ORDER — OLANZAPINE 15 MG/1
15 TABLET ORAL
Qty: 90 TABLET | Refills: 1 | Status: SHIPPED | OUTPATIENT
Start: 2025-04-02

## 2025-04-02 RX ORDER — VALACYCLOVIR HYDROCHLORIDE 500 MG/1
1000 TABLET, FILM COATED ORAL 2 TIMES DAILY
Qty: 30 TABLET | Refills: 2 | Status: SHIPPED | OUTPATIENT
Start: 2025-04-02

## 2025-04-02 RX ORDER — LOSARTAN POTASSIUM 100 MG/1
50 TABLET ORAL 2 TIMES DAILY
Qty: 90 TABLET | Refills: 1 | Status: SHIPPED | OUTPATIENT
Start: 2025-04-02

## 2025-04-02 RX ORDER — MULTIVIT WITH MINERALS/LUTEIN
250 TABLET ORAL DAILY
COMMUNITY

## 2025-04-02 RX ORDER — TIRZEPATIDE 2.5 MG/.5ML
INJECTION, SOLUTION SUBCUTANEOUS
COMMUNITY
End: 2025-04-02

## 2025-04-02 RX ORDER — VENLAFAXINE HYDROCHLORIDE 150 MG/1
150 CAPSULE, EXTENDED RELEASE ORAL DAILY
Qty: 90 CAPSULE | Refills: 1 | Status: SHIPPED | OUTPATIENT
Start: 2025-04-02

## 2025-04-02 NOTE — PROGRESS NOTES
Subjective   Silvia Clinton is a 49 y.o. female.     Chief Complaint   Patient presents with    Weight Loss        History of Present Illness   You have chosen to receive care through a telehealth visit.  Do you consent to use a video/audio connection for your medical care today? Yes    She is here today for a telehealth visit to follow up on obesity and weight loss from her home using [employer].  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  She was started on compounded semaglutide in October. She has lost approximately 30 pounds.  She notes improvement in cravings, hunger and portion control.  She is trying to optimize protein intake and has been able to be more active since weight loss.  She denies any medication side effect with the semaglutide.  She has run out of the compounded semaglutide 0.5 mg weekly and was able to get to get tirzepatide 2.5 mg weekly.  She feels that the compounded semaglutide is more effective for weight loss, but she had to transition temporarily secondary to financial constraints. She does note that since weight loss she is experiencing frequent fungal infections below her pannus.  This is becoming more bothersome.  She is currently using nystatin triamcinolone cream as needed.  She would be interested in referral to plastic surgery once her insurance changes in a few months.  She is needing refills today on her valacyclovir, Effexor, Zyprexa, losartan and amlodipine.  She was not able to follow-up with psychiatry at [facility] and is needing a new referral to psychiatry.  Is also needing a referral to rheumatology for further management of her lupus.    The following portions of the patient's history were reviewed and updated as appropriate: allergies, current medications, past family history, past medical history, past social history, past surgical history, and problem list.    Review of Systems   Constitutional:  Positive for fatigue. Negative for chills and fever.   HENT:  Negative  for trouble swallowing.    Respiratory: Negative.     Cardiovascular: Negative.    Gastrointestinal: Negative.    Psychiatric/Behavioral:  Positive for stress. Negative for sleep disturbance, suicidal ideas and depressed mood. The patient is nervous/anxious.        Objective   Physical Exam  Constitutional:       General: She is awake.      Appearance: Normal appearance.   Pulmonary:      Effort: Pulmonary effort is normal.   Neurological:      Mental Status: She is alert and oriented to person, place, and time.   Psychiatric:         Attention and Perception: Attention and perception normal.         Mood and Affect: Mood and affect normal.         Speech: Speech normal.         Behavior: Behavior normal. Behavior is cooperative.         Thought Content: Thought content normal.         Cognition and Memory: Cognition and memory normal.         Judgment: Judgment normal.         There were no vitals filed for this visit.       Assessment & Plan   Problems Addressed this Visit       Essential hypertension    Relevant Medications    losartan (COZAAR) 100 MG tablet    amLODIPine (NORVASC) 10 MG tablet    Lupus (systemic lupus erythematosus)    Relevant Orders    Ambulatory Referral to Rheumatology    Bipolar 2 disorder    Relevant Medications    OLANZapine (zyPREXA) 15 MG tablet    venlafaxine XR (EFFEXOR-XR) 150 MG 24 hr capsule    Other Relevant Orders    Ambulatory Referral to Behavioral Health (Completed)    Anxiety    Relevant Medications    venlafaxine XR (EFFEXOR-XR) 150 MG 24 hr capsule    Other Relevant Orders    Ambulatory Referral to Behavioral Health (Completed)    Class 3 severe obesity with serious comorbidity and body mass index (BMI) of 40.0 to 44.9 in adult - Primary     Other Visit Diagnoses         Cutaneous candidiasis        Relevant Medications    nystatin-triamcinolone (MYCOLOG II) 697874-1.1 UNIT/GM-% cream      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        Relevant  Medications    nystatin-triamcinolone (MYCOLOG II) 146760-9.1 UNIT/GM-% cream    valACYclovir (VALTREX) 500 MG tablet          Diagnoses         Codes Comments      Class 3 severe obesity due to excess calories with serious comorbidity and body mass index (BMI) of 40.0 to 44.9 in adult    -  Primary ICD-10-CM: E66.813, E66.01, Z68.41  ICD-9-CM: 278.01, V85.41       Bipolar 2 disorder     ICD-10-CM: F31.81  ICD-9-CM: 296.89       Cutaneous candidiasis     ICD-10-CM: B37.2  ICD-9-CM: 112.3       Anxiety     ICD-10-CM: F41.9  ICD-9-CM: 300.00       Essential hypertension     ICD-10-CM: I10  ICD-9-CM: 401.9       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  Obesity-improving with treatment and lifestyle.  Will return to compounded semaglutide and increase to 0.75 mg weekly.  If she is tolerating this after 4 to 6 weeks okay to increase to 1 mg weekly.  New prescription sent to compounding pharmacy.  She is aware of appropriate use and adverse effects.  Continue to work on healthy eating, optimizing protein and fiber intake, limiting sugar and carbohydrates along with regular exercise with walking and strength training.  Notify for any medication side effects.  Her insurance will be changing in the next few months.  She will update me on this and will try transitioning to Wegovy as she does have a history of CAD.  Prescription refilled for nystatin triamcinolone.  She will update me when her insurance changes and can place a referral to plastic surgery to discuss if she is a candidate for panniculectomy. Follow-up in 3 months.  2.  Bipolar 2 disorder/anxiety-referral placed to psychiatry to establish care.  Zyprexa and Effexor prescriptions refilled today.  3.  HTN-prescriptions for losartan and amlodipine refilled  today.  4.  SLE-referral placed to rheumatology to establish care.  5.  Herpetic nelson-prescription for valacyclovir refill today to use as needed.    Doximity visit lasting 20 minutes.

## 2025-04-17 DIAGNOSIS — F41.9 ANXIETY: Chronic | ICD-10-CM

## 2025-04-17 RX ORDER — VENLAFAXINE HYDROCHLORIDE 150 MG/1
150 CAPSULE, EXTENDED RELEASE ORAL DAILY
Qty: 90 CAPSULE | Refills: 1 | OUTPATIENT
Start: 2025-04-17

## 2025-04-18 DIAGNOSIS — I10 ESSENTIAL HYPERTENSION: ICD-10-CM

## 2025-04-18 RX ORDER — LOSARTAN POTASSIUM 100 MG/1
50 TABLET ORAL 2 TIMES DAILY
Qty: 90 TABLET | Refills: 1 | OUTPATIENT
Start: 2025-04-18

## 2025-05-19 RX ORDER — ONDANSETRON 8 MG/1
8 TABLET, ORALLY DISINTEGRATING ORAL EVERY 8 HOURS PRN
Qty: 20 TABLET | Refills: 1 | Status: SHIPPED | OUTPATIENT
Start: 2025-05-19

## 2025-05-19 NOTE — TELEPHONE ENCOUNTER
Caller: Silvia Clinton CM    Relationship: Self    Best call back number: [phone]    Requested Prescriptions:   Requested Prescriptions     Pending Prescriptions Disp Refills    ondansetron ODT (ZOFRAN-ODT) 8 MG disintegrating tablet 20 tablet 1     Sig: Take 1 tablet by mouth Every 8 (Eight) Hours As Needed for Nausea or Vomiting.        Pharmacy where request should be sent: [id number] [phone] [facility] [phone]      Last office visit with prescribing clinician: 12/19/2024   Last telemedicine visit with prescribing clinician: 4/2/2025   Next office visit with prescribing clinician: 7/10/2025     Chip Dye   05/19/25 15:14 EDT

## 2025-06-04 DIAGNOSIS — I10 ESSENTIAL HYPERTENSION: ICD-10-CM

## 2025-06-04 DIAGNOSIS — R00.2 PALPITATIONS: ICD-10-CM

## 2025-06-04 RX ORDER — METOPROLOL TARTRATE 100 MG/1
100 TABLET ORAL 2 TIMES DAILY
Qty: 180 TABLET | Refills: 1 | Status: SHIPPED | OUTPATIENT
Start: 2025-06-04 | End: 2025-06-05 | Stop reason: SDUPTHER

## 2025-06-05 ENCOUNTER — TELEPHONE (OUTPATIENT)
Dept: INTERNAL MEDICINE | Facility: CLINIC | Age: 50
End: 2025-06-05
Payer: COMMERCIAL

## 2025-06-05 DIAGNOSIS — I10 ESSENTIAL HYPERTENSION: ICD-10-CM

## 2025-06-05 DIAGNOSIS — R00.2 PALPITATIONS: ICD-10-CM

## 2025-06-05 RX ORDER — AMLODIPINE BESYLATE 10 MG/1
5 TABLET ORAL 2 TIMES DAILY
Qty: 90 TABLET | Refills: 1 | Status: SHIPPED | OUTPATIENT
Start: 2025-06-05

## 2025-06-05 RX ORDER — METOPROLOL TARTRATE 100 MG/1
100 TABLET ORAL 2 TIMES DAILY
Qty: 180 TABLET | Refills: 1 | Status: SHIPPED | OUTPATIENT
Start: 2025-06-05

## 2025-06-05 RX ORDER — LOSARTAN POTASSIUM 100 MG/1
50 TABLET ORAL 2 TIMES DAILY
Qty: 90 TABLET | Refills: 1 | Status: SHIPPED | OUTPATIENT
Start: 2025-06-05

## 2025-06-05 RX ORDER — ONDANSETRON 8 MG/1
8 TABLET, ORALLY DISINTEGRATING ORAL EVERY 8 HOURS PRN
Qty: 20 TABLET | Refills: 1 | Status: SHIPPED | OUTPATIENT
Start: 2025-06-05

## 2025-06-05 NOTE — TELEPHONE ENCOUNTER
Caller: Mary Beth Silvia N    Relationship: Self    Best call back number: [phone]*    What medication are you requesting:   amLODIPine (NORVASC) 10 MG tablet   losartan (COZAAR) 100 MG tablet   metoprolol tartrate (LOPRESSOR) 100 MG tablet   ondansetron ODT (ZOFRAN-ODT) 8 MG disintegrating tablet     If a prescription is needed, what is your preferred pharmacy and phone number: [id number] [place] [id number] [facility] [phone] [facility] [phone]      Additional notes: NEW PHARMACY. PATIENT REQUEST NEW PRESCRIPTIONS TO BE SENT TO [facility]. PATIENT STATES SHE IS OUT OF THE METOPROLOL.

## (undated) DEVICE — SEALANT HEMOS FLOSEAL MATRX W/MALL TP 5ML EA/6

## (undated) DEVICE — Device

## (undated) DEVICE — 1010 S-DRAPE TOWEL DRAPE 10/BX: Brand: STERI-DRAPE™

## (undated) DEVICE — DRP C/ARM 41X74IN

## (undated) DEVICE — DRP MICROSCOPE 4 BINOCULAR CV 54X150IN

## (undated) DEVICE — ANTIBACTERIAL UNDYED BRAIDED (POLYGLACTIN 910), SYNTHETIC ABSORBABLE SUTURE: Brand: COATED VICRYL

## (undated) DEVICE — 2.3MM TAPERED ROUTER

## (undated) DEVICE — CONTAINER,SPECIMEN,STRL PATH,4.5OZ: Brand: MEDLINE

## (undated) DEVICE — DISPOSABLE 9450003 ELECTRO TWST PAIR 8CH

## (undated) DEVICE — DRN WND JP RND W TROC SIL 15F 3/16IN

## (undated) DEVICE — SUT SILK 2/0 FS BLK 18IN 685G

## (undated) DEVICE — NDL HYPO PRECISIONGLIDE REG 25G 1 1/2

## (undated) DEVICE — DISPOSABLE IRRIGATION BIPOLAR CORD, M1000 TYPE: Brand: KIRWAN

## (undated) DEVICE — GLV SURG SENSICARE MICRO PF LF 8 STRL

## (undated) DEVICE — ELECTRD BLD EDGE/INSUL1P 2.4X5.1MM STRL

## (undated) DEVICE — SUT VICYL 2/0 CR8 18IN DYED J726D

## (undated) DEVICE — CONN TBG Y 5 IN 1 LF STRL

## (undated) DEVICE — SMOKE EVACUATION TUBING WITH 7/8 IN TO 1/4 IN REDUCER: Brand: BUFFALO FILTER

## (undated) DEVICE — TOTAL TRAY, 16FR 10ML SIL FOLEY, URN: Brand: MEDLINE

## (undated) DEVICE — DRN WND JP RND W TROC SIL 10F 1/8IN

## (undated) DEVICE — ADHS SKIN DERMABOND TOP ADVANCED

## (undated) DEVICE — NDL SPINE 20G 3 1/2 YEL STRL 1P/U

## (undated) DEVICE — PK NEURO SPINE 40

## (undated) DEVICE — SUT MNCRYL PLS ANTIB UD 4/0 PS2 18IN

## (undated) DEVICE — SPNG GZ WOVN 4X4IN 12PLY 10/BX STRL

## (undated) DEVICE — JACKSON-PRATT 100CC BULB RESERVOIR: Brand: CARDINAL HEALTH

## (undated) DEVICE — GOWN,PREVENTION PLUS,XXLARGE,STERILE: Brand: MEDLINE

## (undated) DEVICE — ENCORE® LATEX ORTHO SIZE 8.5, STERILE LATEX POWDER-FREE SURGICAL GLOVE: Brand: ENCORE

## (undated) DEVICE — GLV SURG BIOGEL LTX PF 7 1/2

## (undated) DEVICE — APPL CHLORAPREP W/TINT 26ML ORNG

## (undated) DEVICE — DRP SLUSH WARMR MACH 52X66IN OM-ORS-301

## (undated) DEVICE — 3.0MM PRECISION NEURO (MATCH HEAD)